# Patient Record
Sex: FEMALE | Race: WHITE | NOT HISPANIC OR LATINO | Employment: UNEMPLOYED | URBAN - METROPOLITAN AREA
[De-identification: names, ages, dates, MRNs, and addresses within clinical notes are randomized per-mention and may not be internally consistent; named-entity substitution may affect disease eponyms.]

---

## 2017-01-04 ENCOUNTER — CLINICAL SUPPORT (OUTPATIENT)
Dept: INTERNAL MEDICINE | Facility: CLINIC | Age: 52
End: 2017-01-04
Payer: COMMERCIAL

## 2017-01-04 ENCOUNTER — LAB VISIT (OUTPATIENT)
Dept: LAB | Facility: HOSPITAL | Age: 52
End: 2017-01-04
Attending: INTERNAL MEDICINE
Payer: COMMERCIAL

## 2017-01-04 ENCOUNTER — OFFICE VISIT (OUTPATIENT)
Dept: INTERNAL MEDICINE | Facility: CLINIC | Age: 52
End: 2017-01-04
Payer: COMMERCIAL

## 2017-01-04 VITALS
HEIGHT: 62 IN | BODY MASS INDEX: 32.09 KG/M2 | WEIGHT: 174.38 LBS | SYSTOLIC BLOOD PRESSURE: 160 MMHG | TEMPERATURE: 98 F | DIASTOLIC BLOOD PRESSURE: 94 MMHG | HEART RATE: 72 BPM

## 2017-01-04 DIAGNOSIS — M54.50 CHRONIC MIDLINE LOW BACK PAIN WITHOUT SCIATICA: ICD-10-CM

## 2017-01-04 DIAGNOSIS — F51.01 PRIMARY INSOMNIA: ICD-10-CM

## 2017-01-04 DIAGNOSIS — G89.29 CHRONIC MIDLINE LOW BACK PAIN WITHOUT SCIATICA: ICD-10-CM

## 2017-01-04 DIAGNOSIS — Z00.00 ANNUAL PHYSICAL EXAM: ICD-10-CM

## 2017-01-04 DIAGNOSIS — K59.01 SLOW TRANSIT CONSTIPATION: ICD-10-CM

## 2017-01-04 DIAGNOSIS — Z00.00 ANNUAL PHYSICAL EXAM: Primary | ICD-10-CM

## 2017-01-04 DIAGNOSIS — Z12.31 VISIT FOR SCREENING MAMMOGRAM: ICD-10-CM

## 2017-01-04 DIAGNOSIS — I10 ESSENTIAL HYPERTENSION: ICD-10-CM

## 2017-01-04 LAB
25(OH)D3+25(OH)D2 SERPL-MCNC: 24 NG/ML
ALBUMIN SERPL BCP-MCNC: 3.7 G/DL
ALP SERPL-CCNC: 40 U/L
ALT SERPL W/O P-5'-P-CCNC: 16 U/L
ANION GAP SERPL CALC-SCNC: 14 MMOL/L
AST SERPL-CCNC: 15 U/L
BASOPHILS # BLD AUTO: 0.02 K/UL
BASOPHILS NFR BLD: 0.2 %
BILIRUB SERPL-MCNC: 0.2 MG/DL
BUN SERPL-MCNC: 9 MG/DL
CALCIUM SERPL-MCNC: 10.5 MG/DL
CHLORIDE SERPL-SCNC: 101 MMOL/L
CHOLEST/HDLC SERPL: 5.8 {RATIO}
CO2 SERPL-SCNC: 21 MMOL/L
CREAT SERPL-MCNC: 1.1 MG/DL
DIFFERENTIAL METHOD: NORMAL
EOSINOPHIL # BLD AUTO: 0.1 K/UL
EOSINOPHIL NFR BLD: 1.5 %
ERYTHROCYTE [DISTWIDTH] IN BLOOD BY AUTOMATED COUNT: 13.5 %
EST. GFR  (AFRICAN AMERICAN): >60 ML/MIN/1.73 M^2
EST. GFR  (NON AFRICAN AMERICAN): 59.1 ML/MIN/1.73 M^2
GLUCOSE SERPL-MCNC: 94 MG/DL
HCT VFR BLD AUTO: 40.6 %
HDL/CHOLESTEROL RATIO: 17.3 %
HDLC SERPL-MCNC: 249 MG/DL
HDLC SERPL-MCNC: 43 MG/DL
HGB BLD-MCNC: 13.4 G/DL
LDLC SERPL CALC-MCNC: 144.8 MG/DL
LYMPHOCYTES # BLD AUTO: 3.3 K/UL
LYMPHOCYTES NFR BLD: 34.5 %
MCH RBC QN AUTO: 29.5 PG
MCHC RBC AUTO-ENTMCNC: 33 %
MCV RBC AUTO: 89 FL
MONOCYTES # BLD AUTO: 0.6 K/UL
MONOCYTES NFR BLD: 6.7 %
NEUTROPHILS # BLD AUTO: 5.4 K/UL
NEUTROPHILS NFR BLD: 56.9 %
NONHDLC SERPL-MCNC: 206 MG/DL
PLATELET # BLD AUTO: 286 K/UL
PMV BLD AUTO: 12 FL
POTASSIUM SERPL-SCNC: 3.9 MMOL/L
PROT SERPL-MCNC: 7.4 G/DL
RBC # BLD AUTO: 4.54 M/UL
SODIUM SERPL-SCNC: 136 MMOL/L
TRIGL SERPL-MCNC: 306 MG/DL
TSH SERPL DL<=0.005 MIU/L-ACNC: 1.16 UIU/ML
WBC # BLD AUTO: 9.54 K/UL

## 2017-01-04 PROCEDURE — 99396 PREV VISIT EST AGE 40-64: CPT | Mod: S$GLB,,, | Performed by: INTERNAL MEDICINE

## 2017-01-04 PROCEDURE — 80061 LIPID PANEL: CPT

## 2017-01-04 PROCEDURE — 80053 COMPREHEN METABOLIC PANEL: CPT

## 2017-01-04 PROCEDURE — 3077F SYST BP >= 140 MM HG: CPT | Mod: S$GLB,,, | Performed by: INTERNAL MEDICINE

## 2017-01-04 PROCEDURE — 82306 VITAMIN D 25 HYDROXY: CPT

## 2017-01-04 PROCEDURE — 99999 PR PBB SHADOW E&M-EST. PATIENT-LVL III: CPT | Mod: PBBFAC,,, | Performed by: INTERNAL MEDICINE

## 2017-01-04 PROCEDURE — 36415 COLL VENOUS BLD VENIPUNCTURE: CPT | Mod: PO

## 2017-01-04 PROCEDURE — 85025 COMPLETE CBC W/AUTO DIFF WBC: CPT

## 2017-01-04 PROCEDURE — 86803 HEPATITIS C AB TEST: CPT

## 2017-01-04 PROCEDURE — 84443 ASSAY THYROID STIM HORMONE: CPT

## 2017-01-04 PROCEDURE — 93005 ELECTROCARDIOGRAM TRACING: CPT | Mod: S$GLB,,, | Performed by: INTERNAL MEDICINE

## 2017-01-04 PROCEDURE — 93010 ELECTROCARDIOGRAM REPORT: CPT | Mod: S$GLB,,, | Performed by: INTERNAL MEDICINE

## 2017-01-04 PROCEDURE — 3080F DIAST BP >= 90 MM HG: CPT | Mod: S$GLB,,, | Performed by: INTERNAL MEDICINE

## 2017-01-04 RX ORDER — LISINOPRIL 20 MG/1
20 TABLET ORAL 2 TIMES DAILY
COMMUNITY
End: 2017-02-01

## 2017-01-04 NOTE — PROGRESS NOTES
Subjective:       Patient ID: Sherie Reyes is a 51 y.o. female.    Chief Complaint: Annual Exam and Establish Care    Patient is a 51 y.o.female who presents today for annual.    Cholesterol: (due now)  Vaccines: Influenza (yearly); Tetanus () ; Eye exam:  Mammogram: due now  Gyn exam: due in march; dr. hernandez  Colonoscopy: done two months ago; had a few internal hemorrhoids  Exercise: not much  Diet: healthy    Taking lasix every other day for leg swelling.    Had a recent echo; it showed something to do with a valve. She saw a cardiologist, Dr. Shay Stewart at .    She has been out of work for two years due to low back pain and hip pain/ workman comp case; taking tramadol and tizanidine daily for pain.     Past Medical History   Diagnosis Date    Anxiety     Degenerative joint disease (DJD) of hip     GERD (gastroesophageal reflux disease)     Hyperlipidemia     Hypertension     IBS (irritable bowel syndrome)     Insomnia     Lumbar disc herniation     PTSD (post-traumatic stress disorder)      Past Surgical History   Procedure Laterality Date     section      Adenoidectomy      Tonsillectomy       Social History     Social History    Marital status:      Spouse name: N/A    Number of children: N/A    Years of education: N/A     Occupational History    Not on file.     Social History Main Topics    Smoking status: Former Smoker     Types: Vaping w/o nicotine    Smokeless tobacco: Not on file      Comment: Vapor cigarettes    Alcohol use No    Drug use: No    Sexual activity: Not on file     Other Topics Concern    Not on file     Social History Narrative     Review of patient's allergies indicates:  No Known Allergies  Ms. Reyes had no medications administered during this visit.    Review of Systems   Constitutional: Negative for appetite change, chills, diaphoresis, fatigue and fever.   HENT: Negative for congestion, dental problem, ear discharge, ear  pain, hearing loss, postnasal drip, sinus pressure and sore throat.    Eyes: Negative for discharge, redness and itching.   Respiratory: Negative for cough, chest tightness, shortness of breath and wheezing.    Cardiovascular: Negative for chest pain, palpitations and leg swelling.   Gastrointestinal: Negative for abdominal pain, constipation, diarrhea, nausea and vomiting.   Endocrine: Negative for cold intolerance and heat intolerance.   Genitourinary: Negative for difficulty urinating, frequency, hematuria and urgency.   Musculoskeletal: Positive for back pain. Negative for arthralgias, gait problem, myalgias and neck pain.   Skin: Negative for color change and rash.   Neurological: Negative for dizziness, syncope and headaches.   Hematological: Negative for adenopathy.   Psychiatric/Behavioral: Negative for behavioral problems and sleep disturbance. The patient is not nervous/anxious.        Objective:      Physical Exam   Constitutional: She is oriented to person, place, and time. She appears well-developed and well-nourished. No distress.   HENT:   Head: Normocephalic and atraumatic.   Right Ear: Tympanic membrane and external ear normal.   Left Ear: Tympanic membrane and external ear normal.   Nose: Nose normal. No mucosal edema or rhinorrhea.   Mouth/Throat: Uvula is midline, oropharynx is clear and moist and mucous membranes are normal. No oropharyngeal exudate, posterior oropharyngeal edema, posterior oropharyngeal erythema or tonsillar abscesses.   Eyes: Conjunctivae and EOM are normal. Pupils are equal, round, and reactive to light. Right eye exhibits no discharge. Left eye exhibits no discharge. No scleral icterus.   Neck: Normal range of motion. Neck supple. No JVD present. No thyromegaly present.   Cardiovascular: Normal rate, regular rhythm, normal heart sounds and intact distal pulses.  Exam reveals no gallop and no friction rub.    No murmur heard.  Pulmonary/Chest: Effort normal and breath sounds  normal. No stridor. No respiratory distress. She has no wheezes. She has no rales. She exhibits no tenderness.   Abdominal: Soft. Bowel sounds are normal. She exhibits no distension. There is no tenderness. There is no rebound.   Musculoskeletal: Normal range of motion. She exhibits no edema or tenderness.   Lymphadenopathy:     She has no cervical adenopathy.   Neurological: She is alert and oriented to person, place, and time.   Skin: Skin is warm. No rash noted. She is not diaphoretic. No erythema.   Psychiatric: She has a normal mood and affect. Her behavior is normal.   Nursing note and vitals reviewed.      Assessment and Plan:       1. Annual physical exam  - CBC auto differential; Future  - Comprehensive metabolic panel; Future  - TSH; Future  - Lipid panel; Future  - Vitamin D; Future  - Urinalysis; Future  - Hepatitis C antibody; Future    2. Visit for screening mammogram  - Mammo Digital Screening Bilat with CAD; Future    3. Essential hypertension  - restart lisinopril as prescribed, 20 mg po bid; amlodipine at night  - she has been taking 1/2 tablet of lisinopril every six hours or so at times  - rtc in one week with bp log and bp machine  - EKG 12-lead    4. Primary insomnia  - ambien prn    5. Chronic midline low back pain without sciatica  - follows with workman comp case; taking tizanidine and tramadol prn    6. Slow transit constipation  - follows with gi        No Follow-up on file.

## 2017-01-05 ENCOUNTER — PATIENT MESSAGE (OUTPATIENT)
Dept: INTERNAL MEDICINE | Facility: CLINIC | Age: 52
End: 2017-01-05

## 2017-01-05 LAB — HCV AB SERPL QL IA: NEGATIVE

## 2017-01-06 ENCOUNTER — PATIENT MESSAGE (OUTPATIENT)
Dept: INTERNAL MEDICINE | Facility: CLINIC | Age: 52
End: 2017-01-06

## 2017-01-06 RX ORDER — GEMFIBROZIL 600 MG/1
600 TABLET, FILM COATED ORAL
Qty: 60 TABLET | Refills: 6 | Status: SHIPPED | OUTPATIENT
Start: 2017-01-06 | End: 2017-02-18 | Stop reason: SDUPTHER

## 2017-01-11 ENCOUNTER — HOSPITAL ENCOUNTER (OUTPATIENT)
Dept: RADIOLOGY | Facility: HOSPITAL | Age: 52
Discharge: HOME | End: 2017-01-11
Attending: INTERNAL MEDICINE
Payer: COMMERCIAL

## 2017-01-11 ENCOUNTER — OFFICE VISIT (OUTPATIENT)
Dept: INTERNAL MEDICINE | Facility: CLINIC | Age: 52
End: 2017-01-11
Payer: COMMERCIAL

## 2017-01-11 VITALS
TEMPERATURE: 99 F | HEART RATE: 114 BPM | HEIGHT: 62 IN | DIASTOLIC BLOOD PRESSURE: 90 MMHG | BODY MASS INDEX: 31.93 KG/M2 | SYSTOLIC BLOOD PRESSURE: 140 MMHG | RESPIRATION RATE: 18 BRPM | WEIGHT: 173.5 LBS

## 2017-01-11 DIAGNOSIS — R00.0 TACHYCARDIA: ICD-10-CM

## 2017-01-11 DIAGNOSIS — I10 ESSENTIAL HYPERTENSION: ICD-10-CM

## 2017-01-11 DIAGNOSIS — Z12.31 VISIT FOR SCREENING MAMMOGRAM: ICD-10-CM

## 2017-01-11 DIAGNOSIS — I10 UNCONTROLLED HYPERTENSION: Primary | ICD-10-CM

## 2017-01-11 PROCEDURE — 93005 ELECTROCARDIOGRAM TRACING: CPT | Mod: S$GLB,,, | Performed by: INTERNAL MEDICINE

## 2017-01-11 PROCEDURE — 77063 BREAST TOMOSYNTHESIS BI: CPT | Mod: 26,,, | Performed by: RADIOLOGY

## 2017-01-11 PROCEDURE — 3077F SYST BP >= 140 MM HG: CPT | Mod: S$GLB,,, | Performed by: INTERNAL MEDICINE

## 2017-01-11 PROCEDURE — 77067 SCR MAMMO BI INCL CAD: CPT | Mod: 26,,, | Performed by: RADIOLOGY

## 2017-01-11 PROCEDURE — 99999 PR PBB SHADOW E&M-EST. PATIENT-LVL IV: CPT | Mod: PBBFAC,,, | Performed by: INTERNAL MEDICINE

## 2017-01-11 PROCEDURE — 3080F DIAST BP >= 90 MM HG: CPT | Mod: S$GLB,,, | Performed by: INTERNAL MEDICINE

## 2017-01-11 PROCEDURE — 99214 OFFICE O/P EST MOD 30 MIN: CPT | Mod: S$GLB,,, | Performed by: INTERNAL MEDICINE

## 2017-01-11 PROCEDURE — 77067 SCR MAMMO BI INCL CAD: CPT | Mod: TC

## 2017-01-11 PROCEDURE — 93010 ELECTROCARDIOGRAM REPORT: CPT | Mod: S$GLB,,, | Performed by: INTERNAL MEDICINE

## 2017-01-11 PROCEDURE — 1159F MED LIST DOCD IN RCRD: CPT | Mod: S$GLB,,, | Performed by: INTERNAL MEDICINE

## 2017-01-11 NOTE — PROGRESS NOTES
Subjective:       Patient ID: Sherie Reyes is a 51 y.o. female.    Chief Complaint: Follow-up (1 wk fu ) and Hypertension    Patient is a 51 y.o.female who presents today for hypertension.    HTN - Patient is currently on lisinopril 20 mg po bid and norvasc 5 mg po bid. She does check her BP at home, and it runs very sporadic. In the mornings, it runs high 130-150/90s. At night, she can go as low as 60/30 and she is symptomatic and has to drink lots of water. She states that she saw a cardiologist at  and was told that her heart was fine about two months ago. She did keep a log of readings but is unsure of the timing in each reading. She generally takes her bp meds around 9 am and 10 pm. She does take tizanidine 6mg at 6-8 pm for chronic back pain.    Review of Systems   Constitutional: Negative for appetite change, chills, diaphoresis, fatigue and fever.   HENT: Negative for congestion, dental problem, ear discharge, ear pain, hearing loss, postnasal drip, sinus pressure and sore throat.    Eyes: Negative for discharge, redness and itching.   Respiratory: Negative for cough, chest tightness, shortness of breath and wheezing.    Cardiovascular: Negative for chest pain, palpitations and leg swelling.   Gastrointestinal: Negative for abdominal pain, constipation, diarrhea, nausea and vomiting.   Endocrine: Negative for cold intolerance and heat intolerance.   Genitourinary: Negative for difficulty urinating, frequency, hematuria and urgency.   Musculoskeletal: Negative for arthralgias, back pain, gait problem, myalgias and neck pain.   Skin: Negative for color change and rash.   Neurological: Negative for dizziness, syncope and headaches.   Hematological: Negative for adenopathy.   Psychiatric/Behavioral: Negative for behavioral problems and sleep disturbance. The patient is not nervous/anxious.        Objective:      Physical Exam   Constitutional: She is oriented to person, place, and time. She appears  well-developed and well-nourished. No distress.   HENT:   Head: Normocephalic and atraumatic.   Right Ear: External ear normal.   Left Ear: External ear normal.   Eyes: Conjunctivae and EOM are normal. Pupils are equal, round, and reactive to light. Right eye exhibits no discharge. Left eye exhibits no discharge. No scleral icterus.   Neck: Normal range of motion. Neck supple. No JVD present. No thyromegaly present.   Cardiovascular: Normal rate, regular rhythm, normal heart sounds and intact distal pulses.  Exam reveals no gallop and no friction rub.    No murmur heard.  Pulmonary/Chest: Effort normal and breath sounds normal. No stridor. No respiratory distress. She has no wheezes. She has no rales. She exhibits no tenderness.   Abdominal: Soft. Bowel sounds are normal. She exhibits no distension. There is no tenderness. There is no rebound.   Musculoskeletal: Normal range of motion. She exhibits no edema or tenderness.   Lymphadenopathy:     She has no cervical adenopathy.   Neurological: She is alert and oriented to person, place, and time.   Skin: Skin is warm and dry. No rash noted. She is not diaphoretic. No erythema.   Psychiatric: She has a normal mood and affect. Her behavior is normal.   Nursing note and vitals reviewed.      Assessment and Plan:       1. Uncontrolled hypertension  - blood pressure appears to be very erratic  - low readings always appear in the evening; perhaps due to her use of tizanidine in the evening; advised pt to discuss with her workman comp physician changing her muscle relaxer  - need to rule out other causes of erratic bp readings  - referral to cardio  - advise lisinopril 20 mg in AM; monitor BP prior to taking med and two hours after taking it  - advise lisinopril 20 mg and amlodipine 5 mg in PM; check bp prior to meds and two hours after taking it  - advised against  Using bp meds and tizanidine at the same time    2. Essential hypertension  - see above  - Ambulatory consult  to Cardiology    3. Tachycardia  - EKG 12-lead- sinus tachycardia  - Ambulatory consult to Cardiology    Appreciate cardio recs in this patient's bp meds as her readings on her log are extremely erratic and there is no trend          No Follow-up on file.

## 2017-01-12 ENCOUNTER — PATIENT MESSAGE (OUTPATIENT)
Dept: INTERNAL MEDICINE | Facility: CLINIC | Age: 52
End: 2017-01-12

## 2017-01-13 ENCOUNTER — PATIENT MESSAGE (OUTPATIENT)
Dept: INTERNAL MEDICINE | Facility: CLINIC | Age: 52
End: 2017-01-13

## 2017-01-19 ENCOUNTER — OFFICE VISIT (OUTPATIENT)
Dept: CARDIOLOGY | Facility: CLINIC | Age: 52
End: 2017-01-19
Payer: COMMERCIAL

## 2017-01-19 VITALS
WEIGHT: 171.06 LBS | DIASTOLIC BLOOD PRESSURE: 86 MMHG | HEIGHT: 62 IN | BODY MASS INDEX: 31.48 KG/M2 | SYSTOLIC BLOOD PRESSURE: 132 MMHG | HEART RATE: 112 BPM

## 2017-01-19 DIAGNOSIS — E78.2 MIXED HYPERLIPIDEMIA: ICD-10-CM

## 2017-01-19 DIAGNOSIS — I95.2 HYPOTENSION DUE TO DRUGS: ICD-10-CM

## 2017-01-19 DIAGNOSIS — R07.89 CHEST DISCOMFORT: ICD-10-CM

## 2017-01-19 DIAGNOSIS — I10 ESSENTIAL HYPERTENSION: Primary | ICD-10-CM

## 2017-01-19 PROBLEM — I95.9 HYPOTENSION: Status: ACTIVE | Noted: 2017-01-19

## 2017-01-19 PROCEDURE — 99243 OFF/OP CNSLTJ NEW/EST LOW 30: CPT | Mod: S$GLB,,, | Performed by: INTERNAL MEDICINE

## 2017-01-19 PROCEDURE — 99999 PR PBB SHADOW E&M-EST. PATIENT-LVL III: CPT | Mod: PBBFAC,,, | Performed by: INTERNAL MEDICINE

## 2017-01-19 RX ORDER — LEVONORGESTREL AND ETHINYL ESTRADIOL 0.15-0.03
1 KIT ORAL DAILY
COMMUNITY
Start: 2017-01-11 | End: 2022-10-12

## 2017-01-19 RX ORDER — LINACLOTIDE 290 UG/1
1 CAPSULE, GELATIN COATED ORAL DAILY
COMMUNITY
Start: 2017-01-14 | End: 2018-09-19

## 2017-01-19 NOTE — LETTER
January 22, 2017      Tonia Bernal DO  [address] [facility]  [place] [address]           [place] - Cardiology  [address] [facility] [address]  Phone: [phone]          Patient: Sherie Reyes   MR Number: [id number]   YOB: 1965   Date of Visit: 1/19/2017       Dear Dr. Tonia Bernal:    Thank you for referring Sherie Reyes to me for evaluation. Attached you will find relevant portions of my assessment and plan of care.    If you have questions, please do not hesitate to call me. I look forward to following Sherie Reyes along with you.    Sincerely,    Eufemia St MD    Enclosure  CC:  No Recipients    If you would like to receive this communication electronically, please contact [email or web address] or [phone] to request more information on [employer] Link access.    For providers and/or their staff who would like to refer a patient to [facility], please contact us through our one-stop-shop provider referral line, [facility] [place], at [phone].    If you feel you have received this communication in error or would no longer like to receive these types of communications, please e-mail [email or web address]

## 2017-01-19 NOTE — PROGRESS NOTES
Subjective:     Patient ID:  Sherie Reyes is a 51 y.o. female who presents for evaluation of Hypertension and Tachycardia      Problem List:  Hypertension    HPI:     Sherie Sexton is referred by Dr. Bernal. She finds that her BP is low later in the night. Last night she had taken her meds at the usual time. Her BP was 167/114 mm Hg before she took lisinopril and around 11 pm when she stood up, she felt lightheaded and fell. SBP was <60 mm Hg.  She takes amlodipine and the second dose of lisinopril around 8 pm. She goes to bed around midnight but does not fall asleep til 2 am.  She uses a wrist BP monitor and reports that it was accurate when it was checked in Dr. Bernal's office.   A few days ago, her BP was 190/119 mm Hg and HR was 109 bpm. She also reports chest discomfort. It can lasts several hours. It usually occurs while at rest.  An echo at [facility] ordered by Dr. Stewart was normal 3 months ago.  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       ROS  Negative except as noted above.    Current Outpatient Prescriptions   Medication Sig    amlodipine (NORVASC) 5 MG tablet Take 5 mg by mouth once daily.     buPROPion (WELLBUTRIN XL) 300 MG 24 hr tablet Take 300 mg by mouth once daily.    clonazePAM (KLONOPIN) 1 MG tablet Take 1 mg by mouth 3 (three) times daily.    diphenhydramine-acetaminophen (TYLENOL PM)  mg Tab Take 1 tablet by mouth nightly as needed.    duloxetine (CYMBALTA) 60 MG capsule Take 60 mg by mouth once daily.    furosemide (LASIX) 20 MG tablet Take 20 mg by mouth once daily.    gabapentin (NEURONTIN) 300 MG capsule Take 300 mg by mouth 3 (three) times daily.    gemfibrozil (LOPID) 600 MG tablet Take 1 tablet (600 mg total) by mouth 2 (two) times daily before meals.    KURVELO 0.15-0.03 mg per tablet Take 1 tablet by mouth once daily.    LINZESS 290 mcg Cap Take 1  "capsule by mouth once daily.    lisinopril (PRINIVIL,ZESTRIL) 20 MG tablet Take 20 mg by mouth 2 (two) times daily.    melatonin 5 mg Tab Take by mouth nightly as needed.    meloxicam (MOBIC) 15 MG tablet Take 15 mg by mouth once daily.    NALOXEGOL OXALATE (MOVANTIK ORAL) Take 25 mg by mouth once daily.     pantoprazole (PROTONIX) 40 MG tablet Take 40 mg by mouth once daily.    POLYETHYLENE GLYCOL 3350 (MIRALAX ORAL) Take by mouth as needed.     tizanidine (ZANAFLEX) 6 mg capsule Take 6 mg by mouth 2 (two) times daily as needed for Muscle spasms.    tramadol (ULTRAM) 50 mg tablet Take 50 mg by mouth every 6 (six) hours as needed for Pain.    zolpidem (AMBIEN) 10 mg Tab Take 5 mg by mouth nightly as needed.            Past Medical History   Diagnosis Date    Anxiety     Degenerative joint disease (DJD) of hip     GERD (gastroesophageal reflux disease)     Hyperlipidemia     Hypertension     IBS (irritable bowel syndrome)     Insomnia     Lumbar disc herniation     PTSD (post-traumatic stress disorder)          Social History   Substance Use Topics    Smoking status: Former Smoker     Types: Vaping w/o nicotine    Smokeless tobacco: None      Comment: Vapor cigarettes    Alcohol use No         History reviewed. No pertinent family history.      Objective:     Physical Exam   Constitutional: She is oriented to person, place, and time. She appears well-developed and well-nourished.   /86  Pulse (!) 112  Ht 5' 2" (1.575 m)  Wt 77.6 kg (171 lb 1.2 oz)  BMI 31.29 kg/m2     HENT:   Head: Normocephalic.   Neck: No JVD present. Carotid bruit is not present.   Cardiovascular: Normal rate, regular rhythm, S1 normal and S2 normal.  Exam reveals no gallop.    No murmur heard.  Pulses:       Posterior tibial pulses are 2+ on the right side, and 2+ on the left side.   Pulmonary/Chest: Effort normal. She has no wheezes. She has no rales. Chest wall is not dull to percussion.   Abdominal: Soft. She " exhibits no abdominal bruit. There is no splenomegaly or hepatomegaly. There is no tenderness.   Musculoskeletal:        Right lower leg: She exhibits no edema.        Left lower leg: She exhibits no edema.   Neurological: She is alert and oriented to person, place, and time. Gait normal.   Skin: Skin is warm and dry. No bruising and no rash noted. No cyanosis. Nails show no clubbing.   Psychiatric: She has a normal mood and affect. Her speech is normal and behavior is normal. Judgment normal. Cognition and memory are normal.         Lab Results   Component Value Date    CHOL 249 (H) 01/04/2017     Lab Results   Component Value Date    HDL 43 01/04/2017     Lab Results   Component Value Date    LDLCALC 144.8 01/04/2017     Lab Results   Component Value Date    TRIG 306 (H) 01/04/2017     Lab Results   Component Value Date    CHOLHDL 17.3 (L) 01/04/2017     Lab Results   Component Value Date    ALT 16 01/04/2017     Lab Results   Component Value Date    ALT 16 01/04/2017    AST 15 01/04/2017    ALKPHOS 40 (L) 01/04/2017    BILITOT 0.2 01/04/2017      Lab Results   Component Value Date    CREATININE 1.1 01/04/2017    BUN 9 01/04/2017     01/04/2017    K 3.9 01/04/2017     01/04/2017    CO2 21 (L) 01/04/2017         Assessment:     1. Essential hypertension    2. Hypotension due to drugs    3. Mixed hyperlipidemia    4. Chest discomfort          Plan:       Hypotension  Suspect that the combination of tizanidine and antihypertensive medication resulted in a severe drop in BP.   Recommend changing the timing of her meds. Llisinopril 40 mg in the morning, amlodipine 5 mg at supper time and tizanidine at bedtime. Review in 1-2 weeks.    Chest discomfort  Lexiscan.      Get prior echo and lipids.

## 2017-01-19 NOTE — PATIENT INSTRUCTIONS
Try taking lisinopril 40 mg in the morning, amlodipine 5 mg at supper time and tizanidine at bedtime.      ===============    Triglycerides are made worse by the following  Foods containing fat and sugars: ice cream, desserts, cakes, chocolate  Beer, fruit juices  Starches: rice, potatoes, bread, pasta  Lack of exercise  Uncontrolled diabetes  Cymbalta also raises triglycerides.

## 2017-01-19 NOTE — ASSESSMENT & PLAN NOTE
Suspect that the combination of tizanidine and antihypertensive medication resulted in a severe drop in BP.   Recommend changing the timing of her meds. Llisinopril 40 mg in the morning, amlodipine 5 mg at supper time and tizanidine at bedtime. Review in 1-2 weeks.

## 2017-01-20 ENCOUNTER — PATIENT MESSAGE (OUTPATIENT)
Dept: INTERNAL MEDICINE | Facility: CLINIC | Age: 52
End: 2017-01-20

## 2017-01-24 ENCOUNTER — TELEPHONE (OUTPATIENT)
Dept: CARDIOLOGY | Facility: CLINIC | Age: 52
End: 2017-01-24

## 2017-01-24 NOTE — TELEPHONE ENCOUNTER
Left msg for pt to call our office regarding SPECT appt that needs to be reschedule to a different date due to department changes.

## 2017-01-25 ENCOUNTER — TELEPHONE (OUTPATIENT)
Dept: INTERNAL MEDICINE | Facility: CLINIC | Age: 52
End: 2017-01-25

## 2017-01-25 NOTE — TELEPHONE ENCOUNTER
Had Recent changes to blood pressure medication done by dr lema.  Her bp got really low and she passed out, vomited and ambulance brought her to [facility] yesterday.  She is still in [facility].  Got 3 liters of fluid at hosp so far.  Pressure today 101/55 off medication.    They found acute pancreatitis and will likely be in 2 more days.  Kidney dr told her she was very dehydrated.   i told her to come see us for a hospital fol up visit once discharged.      Message just BELLE    Thanks Samira

## 2017-01-30 ENCOUNTER — PATIENT MESSAGE (OUTPATIENT)
Dept: INTERNAL MEDICINE | Facility: CLINIC | Age: 52
End: 2017-01-30

## 2017-02-01 ENCOUNTER — HOSPITAL ENCOUNTER (OUTPATIENT)
Dept: RADIOLOGY | Facility: HOSPITAL | Age: 52
Discharge: HOME | End: 2017-02-01
Attending: INTERNAL MEDICINE
Payer: COMMERCIAL

## 2017-02-01 ENCOUNTER — OFFICE VISIT (OUTPATIENT)
Dept: INTERNAL MEDICINE | Facility: CLINIC | Age: 52
End: 2017-02-01
Payer: COMMERCIAL

## 2017-02-01 VITALS
HEIGHT: 62 IN | HEART RATE: 110 BPM | RESPIRATION RATE: 16 BRPM | DIASTOLIC BLOOD PRESSURE: 88 MMHG | WEIGHT: 170.19 LBS | SYSTOLIC BLOOD PRESSURE: 144 MMHG | TEMPERATURE: 98 F | BODY MASS INDEX: 31.32 KG/M2

## 2017-02-01 DIAGNOSIS — M79.671 RIGHT FOOT PAIN: ICD-10-CM

## 2017-02-01 DIAGNOSIS — N17.9 ACUTE RENAL FAILURE, UNSPECIFIED ACUTE RENAL FAILURE TYPE: ICD-10-CM

## 2017-02-01 DIAGNOSIS — K85.90 ACUTE PANCREATITIS, UNSPECIFIED COMPLICATION STATUS, UNSPECIFIED PANCREATITIS TYPE: ICD-10-CM

## 2017-02-01 DIAGNOSIS — I10 ESSENTIAL HYPERTENSION: Primary | ICD-10-CM

## 2017-02-01 PROCEDURE — 73630 X-RAY EXAM OF FOOT: CPT | Mod: 26,RT,, | Performed by: RADIOLOGY

## 2017-02-01 PROCEDURE — 99999 PR PBB SHADOW E&M-EST. PATIENT-LVL III: CPT | Mod: PBBFAC,,, | Performed by: INTERNAL MEDICINE

## 2017-02-01 PROCEDURE — 3077F SYST BP >= 140 MM HG: CPT | Mod: S$GLB,,, | Performed by: INTERNAL MEDICINE

## 2017-02-01 PROCEDURE — 99214 OFFICE O/P EST MOD 30 MIN: CPT | Mod: S$GLB,,, | Performed by: INTERNAL MEDICINE

## 2017-02-01 PROCEDURE — 3079F DIAST BP 80-89 MM HG: CPT | Mod: S$GLB,,, | Performed by: INTERNAL MEDICINE

## 2017-02-01 PROCEDURE — 73630 X-RAY EXAM OF FOOT: CPT | Mod: TC,PO,RT

## 2017-02-01 RX ORDER — OXYCODONE AND ACETAMINOPHEN 7.5; 325 MG/1; MG/1
TABLET ORAL
COMMUNITY
Start: 2017-01-27 | End: 2017-03-22

## 2017-02-01 RX ORDER — PROMETHAZINE HYDROCHLORIDE 25 MG/1
TABLET ORAL
COMMUNITY
Start: 2017-01-27 | End: 2018-05-03 | Stop reason: SDUPTHER

## 2017-02-01 NOTE — PROGRESS NOTES
Subjective:       Patient ID: Sherie Reyes is a 51 y.o. female.    Chief Complaint: Hospital Follow Up    Patient is a 51 y.o.female who presents today for hospital follow up. She was recently hospitalized at  for acute pancreatitis and  Hypotension. The cause for her pancreatitis was unknown. In the hospital, her blood pressure was normal off all of her blood pressure medications. Her lipase was 358. She complains of right foot pain today; cannot place pressure on it.    Review of Systems   Constitutional: Negative for appetite change, chills, diaphoresis, fatigue and fever.   HENT: Negative for congestion, dental problem, ear discharge, ear pain, hearing loss, postnasal drip, sinus pressure and sore throat.    Eyes: Negative for discharge, redness and itching.   Respiratory: Negative for cough, chest tightness, shortness of breath and wheezing.    Cardiovascular: Negative for chest pain, palpitations and leg swelling.   Gastrointestinal: Positive for abdominal pain. Negative for constipation, diarrhea, nausea and vomiting.   Endocrine: Negative for cold intolerance and heat intolerance.   Genitourinary: Negative for difficulty urinating, frequency, hematuria and urgency.   Musculoskeletal: Positive for arthralgias. Negative for back pain, gait problem, myalgias and neck pain.   Skin: Negative for color change and rash.   Neurological: Negative for dizziness, syncope and headaches.   Hematological: Negative for adenopathy.   Psychiatric/Behavioral: Negative for behavioral problems and sleep disturbance. The patient is not nervous/anxious.        Objective:      Physical Exam   Constitutional: She is oriented to person, place, and time. She appears well-developed and well-nourished. No distress.   HENT:   Head: Normocephalic and atraumatic.   Right Ear: External ear normal.   Left Ear: External ear normal.   Eyes: Conjunctivae and EOM are normal. Pupils are equal, round, and reactive to light. Right eye  exhibits no discharge. Left eye exhibits no discharge. No scleral icterus.   Neck: Normal range of motion. Neck supple. No JVD present. No thyromegaly present.   Cardiovascular: Normal rate, regular rhythm, normal heart sounds and intact distal pulses.  Exam reveals no gallop and no friction rub.    No murmur heard.  Pulmonary/Chest: Effort normal and breath sounds normal. No stridor. No respiratory distress. She has no wheezes. She has no rales. She exhibits no tenderness.   Abdominal: Soft. Bowel sounds are normal. She exhibits no distension. There is generalized tenderness. There is no rebound.   Musculoskeletal: Normal range of motion. She exhibits no edema or tenderness.   Lymphadenopathy:     She has no cervical adenopathy.   Neurological: She is alert and oriented to person, place, and time.   Skin: Skin is warm. No rash noted. She is not diaphoretic. No erythema.   Psychiatric: She has a normal mood and affect. Her behavior is normal.   Nursing note and vitals reviewed.      Assessment and Plan:       1. Essential hypertension  - stable off bp meds    2. Acute pancreatitis, unspecified complication status, unspecified pancreatitis type  - seeing GI on 2/14  - Amylase; Future  - Lipase; Future  - Comprehensive metabolic panel; Future  - CBC auto differential; Future    3. Acute renal failure, unspecified acute renal failure type  - check cmp today    4. Right foot pain  - Ambulatory Referral to Podiatry  - X-Ray Foot Complete 3 view Right; Future        No Follow-up on file.

## 2017-02-02 ENCOUNTER — PATIENT MESSAGE (OUTPATIENT)
Dept: INTERNAL MEDICINE | Facility: CLINIC | Age: 52
End: 2017-02-02

## 2017-02-09 ENCOUNTER — PATIENT MESSAGE (OUTPATIENT)
Dept: INTERNAL MEDICINE | Facility: CLINIC | Age: 52
End: 2017-02-09

## 2017-02-09 ENCOUNTER — OFFICE VISIT (OUTPATIENT)
Dept: PODIATRY | Facility: CLINIC | Age: 52
End: 2017-02-09
Payer: COMMERCIAL

## 2017-02-09 VITALS
HEART RATE: 99 BPM | BODY MASS INDEX: 31.28 KG/M2 | DIASTOLIC BLOOD PRESSURE: 82 MMHG | HEIGHT: 62 IN | SYSTOLIC BLOOD PRESSURE: 143 MMHG | WEIGHT: 170 LBS

## 2017-02-09 DIAGNOSIS — M79.671 RIGHT FOOT PAIN: Primary | ICD-10-CM

## 2017-02-09 DIAGNOSIS — M25.80 SESAMOIDITIS: ICD-10-CM

## 2017-02-09 DIAGNOSIS — M77.8 FLEXOR HALLUCIS LONGUS TENDINITIS: ICD-10-CM

## 2017-02-09 PROCEDURE — 99999 PR PBB SHADOW E&M-EST. PATIENT-LVL IV: CPT | Mod: PBBFAC,,, | Performed by: PODIATRIST

## 2017-02-09 PROCEDURE — 3077F SYST BP >= 140 MM HG: CPT | Mod: S$GLB,,, | Performed by: PODIATRIST

## 2017-02-09 PROCEDURE — 3079F DIAST BP 80-89 MM HG: CPT | Mod: S$GLB,,, | Performed by: PODIATRIST

## 2017-02-09 PROCEDURE — 99204 OFFICE O/P NEW MOD 45 MIN: CPT | Mod: S$GLB,,, | Performed by: PODIATRIST

## 2017-02-09 NOTE — LETTER
February 9, 2017      Tonia Bernal DO  [address] [facility] [address]           [place] - Podiatry  [address] [facility] [address]  Phone: [phone]          Patient: Sherie Reyes   MR Number: [id number]   YOB: 1965   Date of Visit: 2/9/2017       Dear Dr. Tonia Bernal:    Thank you for referring Sherie Reyes to me for evaluation. Attached you will find relevant portions of my assessment and plan of care.    If you have questions, please do not hesitate to call me. I look forward to following Sherie Reyes along with you.    Sincerely,    Susu Boswell DPM    Enclosure  CC:  No Recipients    If you would like to receive this communication electronically, please contact [email or web address] or [phone] to request more information on [employer] access.    For providers and/or their staff who would like to refer a patient to [facility], please contact us through our one-stop-shop provider referral line, [facility], at [phone].    If you feel you have received this communication in error or would no longer like to receive these types of communications, please e-mail [email or web address]

## 2017-02-09 NOTE — MR AVS SNAPSHOT
[facility] [facility]   [facility]  [place] [address]  Phone: [phone]                  Sherie Reyes   2017 8:45 AM   Office Visit    Description:  Female : 1965   Provider:  Susu Boswell DPM   Department:  [facility]           Reason for Visit     Foot Pain           Diagnoses this Visit        Comments    Right foot pain    -  Primary     Sesamoiditis         Flexor hallucis longus tendinitis                To Do List           Future Appointments        Provider Department Dept Phone    2017 2:40 PM Tonia Bernal DO [facility] [phone]    3/9/2017 1:30 PM Susu Boswell DPM [facility] [phone]    3/16/2017 7:15 AM NUCLEAR CAMERA, [facility] - [facility] [phone]      Goals (5 Years of Data)     None      Follow-Up and Disposition     Return in about 4 weeks (around 3/9/2017) for follow up foot pain, or sooner if concerned.      [facility] On [facility]     [facility] On Call Nurse Care Line -  Assistance  Registered nurses in the [facility] provide clinical advisement, health education, appointment booking, and other advisory services.  Call for this free service at [phone].             Medications                Verify that the below list of medications is an accurate representation of the medications you are currently taking.  If none reported, the list may be blank. If incorrect, please contact your healthcare provider. Carry this list with you in case of emergency.           Current Medications     alprazolam 1 mg/mL Conc     buPROPion (WELLBUTRIN XL) 300 MG 24 hr tablet Take 300 mg by mouth once daily.    clonazePAM (KLONOPIN) 1 MG tablet Take 1 mg by mouth 3 (three) times daily.    diphenhydramine-acetaminophen (TYLENOL PM)  mg Tab Take 1 tablet by mouth nightly as needed.    duloxetine (CYMBALTA) 60 MG capsule Take 60 mg by mouth once daily.    gabapentin (NEURONTIN) 300 MG  "capsule Take 300 mg by mouth 3 (three) times daily.    gemfibrozil (LOPID) 600 MG tablet Take 1 tablet (600 mg total) by mouth 2 (two) times daily before meals.    KURVELO 0.15-0.03 mg per tablet Take 1 tablet by mouth once daily.    LINZESS 290 mcg Cap Take 1 capsule by mouth once daily.    melatonin 5 mg Tab Take by mouth nightly as needed.    NALOXEGOL OXALATE (MOVANTIK ORAL) Take 25 mg by mouth once daily.     oxycodone-acetaminophen (PERCOCET) 7.5-325 mg per tablet     pantoprazole (PROTONIX) 40 MG tablet Take 40 mg by mouth once daily.    POLYETHYLENE GLYCOL 3350 (MIRALAX ORAL) Take by mouth as needed.     promethazine (PHENERGAN) 25 MG tablet     tizanidine (ZANAFLEX) 6 mg capsule Take 6 mg by mouth 2 (two) times daily as needed for Muscle spasms.    tramadol (ULTRAM) 50 mg tablet Take 50 mg by mouth every 6 (six) hours as needed for Pain.    zolpidem (AMBIEN) 10 mg Tab Take 5 mg by mouth nightly as needed.            Clinical Reference Information           Your Vitals Were     BP Pulse Height Weight BMI    143/82 99 5' 2" (1.575 m) 77.1 kg (170 lb) 31.09 kg/m2      Blood Pressure          Most Recent Value    BP  (!)  143/82      Allergies as of 2/9/2017     No Known Allergies      Immunizations Administered on Date of Encounter - 2/9/2017     None      Language Assistance Services     ATTENTION: Language assistance services are available, free of charge. Please call [phone].      ATENCIÓN: Si michellela niall, tiene a blanco disposición servicios gratuitos de asistencia lingüística. Llame al [phone].     [facility] Ý: N?u b?n nói Ti?ng Vi?t, có các d?ch v? h? tr? ngôn ng? mi?n phí dành cho b?n. G?i s? [phone].         [facility] complies with applicable Federal civil rights laws and does not discriminate on the basis of race, color, national origin, age, disability, or sex.        "

## 2017-02-09 NOTE — PROGRESS NOTES
CC: right  foot pain     HPI:   Sherie Reyes is a 51 y.o. female with complaints of pain to the right  forefoot, sub 1st MPJ. Pt states the pain began a little over a month ago, when she had a fall, possibly hyperextended her toe when her  helped her up.  She has tried no treatment yet.  Xrays done last week without fractures noted.   She reports pain mainly with weight bearing and direct pressure to the area.  There is also pain with dorsiflexion of the toe.  She is wearing athletic shoes today.      PCP:  Tonia Bernal DO          Past Medical History   Diagnosis Date    Anxiety     Degenerative joint disease (DJD) of hip     GERD (gastroesophageal reflux disease)     Hyperlipidemia     Hypertension     IBS (irritable bowel syndrome)     Insomnia     Lumbar disc herniation     PTSD (post-traumatic stress disorder)        Current Outpatient Prescriptions on File Prior to Visit   Medication Sig Dispense Refill    buPROPion (WELLBUTRIN XL) 300 MG 24 hr tablet Take 300 mg by mouth once daily.      clonazePAM (KLONOPIN) 1 MG tablet Take 1 mg by mouth 3 (three) times daily.      diphenhydramine-acetaminophen (TYLENOL PM)  mg Tab Take 1 tablet by mouth nightly as needed.      duloxetine (CYMBALTA) 60 MG capsule Take 60 mg by mouth once daily.      gabapentin (NEURONTIN) 300 MG capsule Take 300 mg by mouth 3 (three) times daily.      gemfibrozil (LOPID) 600 MG tablet Take 1 tablet (600 mg total) by mouth 2 (two) times daily before meals. 60 tablet 6    KURVELO 0.15-0.03 mg per tablet Take 1 tablet by mouth once daily.      LINZESS 290 mcg Cap Take 1 capsule by mouth once daily.      melatonin 5 mg Tab Take by mouth nightly as needed.      NALOXEGOL OXALATE (MOVANTIK ORAL) Take 25 mg by mouth once daily.       oxycodone-acetaminophen (PERCOCET) 7.5-325 mg per tablet       pantoprazole (PROTONIX) 40 MG tablet Take 40 mg by mouth once daily.      POLYETHYLENE GLYCOL 3350  "(MIRALAX ORAL) Take by mouth as needed.       promethazine (PHENERGAN) 25 MG tablet       tizanidine (ZANAFLEX) 6 mg capsule Take 6 mg by mouth 2 (two) times daily as needed for Muscle spasms.      tramadol (ULTRAM) 50 mg tablet Take 50 mg by mouth every 6 (six) hours as needed for Pain.      zolpidem (AMBIEN) 10 mg Tab Take 5 mg by mouth nightly as needed.        No current facility-administered medications on file prior to visit.          ALLG:   Review of patient's allergies indicates:  No Known Allergies          ROS:   General ROS: negative for chills, fatigue or fever  Cardiovascular ROS: no chest pain or dyspnea on exertion  Musculoskeletal ROS: positive for  joint pain or joint stiffness.  Negative for loss of strength  Neuro ROS: Negative for syncope, numbness, or muscle weakness  Skin ROS: Negative for rash, itching or hair changes.        EXAM:  Vitals:    17 0843   BP: (!) 143/82   Pulse: 99   Weight: 77.1 kg (170 lb)   Height: 5' 2" (1.575 m)        General: Patient is well-developed, well-nourished.  Alert and oriented x 3 and in no apparent distress.   Lower extremity exam:   Vasc: Palpable pedal pulses bilaterally. Normal capillary refill time.    Neuro:  Light touch, proprioception, and sharp/dull sensation are all intact bilaterally.   No sensorimotor deficits.   Derm: No open lesions, macerations, rashes, or hyperkeratotic lesions. no erythema, minimal localized edema to the area of the right sub 1st metatarsophalangeal joint.  MSK: localized tenderness on palpation of tibial sesamoid, right foot. Full ROM of 1st MPJ, with pain at sesamoids with dorsiflexion. 1st ray ROM full and intact without crepitus. No pain on palpation to dorsal 1st MPJ, or lesser MPJs of the affected foot.    IMAGIN/1/17  Findings: 3 views right.  There is no fracture, dislocation, or bony erosion.  Normal variant Bipartite tibial sesamoid noted.       ASSESSMENT:   1. Right foot pain     2. Sesamoiditis - " Right Foot     3. Flexor hallucis longus tendinitis - Right Foot          PLAN:  I counseled the patient on her conditions, their implications and medical management.  Reviewed xrays with patient.   Walking boot and offloading pad.    Rest as much as possible.  Reduce weight bearing and ice prn.   NSAIDs prn pain   Return in about 4 weeks (around 3/9/2017) for follow up foot pain, or sooner if concerned.  Patient is amenable to plan.

## 2017-02-10 ENCOUNTER — PATIENT MESSAGE (OUTPATIENT)
Dept: INTERNAL MEDICINE | Facility: CLINIC | Age: 52
End: 2017-02-10

## 2017-02-17 ENCOUNTER — PATIENT MESSAGE (OUTPATIENT)
Dept: PODIATRY | Facility: CLINIC | Age: 52
End: 2017-02-17

## 2017-02-17 ENCOUNTER — TELEPHONE (OUTPATIENT)
Dept: PODIATRY | Facility: CLINIC | Age: 52
End: 2017-02-17

## 2017-02-18 ENCOUNTER — PATIENT MESSAGE (OUTPATIENT)
Dept: INTERNAL MEDICINE | Facility: CLINIC | Age: 52
End: 2017-02-18

## 2017-02-18 RX ORDER — GEMFIBROZIL 600 MG/1
600 TABLET, FILM COATED ORAL
Qty: 60 TABLET | Refills: 6 | Status: SHIPPED | OUTPATIENT
Start: 2017-02-18 | End: 2018-07-31 | Stop reason: SDUPTHER

## 2017-02-20 ENCOUNTER — PATIENT MESSAGE (OUTPATIENT)
Dept: INTERNAL MEDICINE | Facility: CLINIC | Age: 52
End: 2017-02-20

## 2017-02-23 ENCOUNTER — TELEPHONE (OUTPATIENT)
Dept: PODIATRY | Facility: CLINIC | Age: 52
End: 2017-02-23

## 2017-02-23 DIAGNOSIS — M79.671 RIGHT FOOT PAIN: Primary | ICD-10-CM

## 2017-02-23 RX ORDER — HYDROCODONE BITARTRATE AND IBUPROFEN 7.5; 2 MG/1; MG/1
1 TABLET, FILM COATED ORAL EVERY 12 HOURS PRN
Qty: 20 TABLET | Refills: 0 | Status: SHIPPED | OUTPATIENT
Start: 2017-02-23 | End: 2017-03-09 | Stop reason: SDUPTHER

## 2017-02-23 NOTE — TELEPHONE ENCOUNTER
----- Message from Memo Agustin sent at 2/23/2017 10:02 AM CST -----  Contact: patient  Pt is returning call. [phone]

## 2017-02-23 NOTE — TELEPHONE ENCOUNTER
Called patient to check up on foot pain.    She will try to stay off her foot more and take Vicoprofen bid prn pain.  Rest and elevate.   follow up as previously scheduled.  Patient is amenable to plan.

## 2017-03-06 ENCOUNTER — PATIENT MESSAGE (OUTPATIENT)
Dept: INTERNAL MEDICINE | Facility: CLINIC | Age: 52
End: 2017-03-06

## 2017-03-06 ENCOUNTER — TELEPHONE (OUTPATIENT)
Dept: INTERNAL MEDICINE | Facility: CLINIC | Age: 52
End: 2017-03-06

## 2017-03-06 NOTE — TELEPHONE ENCOUNTER
----- Message from Jaquelin Rafa sent at 3/6/2017  8:37 AM CST -----  Contact: Mobile: [phone]   Patient went to [facility] for dehydration on Saturday 04/04/17. Last night she was very pale, legs and hands were very cold and disoriented ( she was told). She didn't go to hospital because her B/p was good. This morning she's doing good. She wanted you to know this and add in her chart. She's not sure to f/u with you or not. She want to know if you want her to f/u?

## 2017-03-07 ENCOUNTER — PATIENT MESSAGE (OUTPATIENT)
Dept: INTERNAL MEDICINE | Facility: CLINIC | Age: 52
End: 2017-03-07

## 2017-03-08 ENCOUNTER — OFFICE VISIT (OUTPATIENT)
Dept: INTERNAL MEDICINE | Facility: CLINIC | Age: 52
End: 2017-03-08
Payer: COMMERCIAL

## 2017-03-08 VITALS
TEMPERATURE: 98 F | BODY MASS INDEX: 31.93 KG/M2 | HEIGHT: 62 IN | HEART RATE: 107 BPM | SYSTOLIC BLOOD PRESSURE: 146 MMHG | WEIGHT: 173.5 LBS | RESPIRATION RATE: 16 BRPM | DIASTOLIC BLOOD PRESSURE: 86 MMHG

## 2017-03-08 DIAGNOSIS — R23.1 PALE COMPLEXION: ICD-10-CM

## 2017-03-08 DIAGNOSIS — R42 DIZZINESS: ICD-10-CM

## 2017-03-08 DIAGNOSIS — E86.0 DEHYDRATION: Primary | ICD-10-CM

## 2017-03-08 PROCEDURE — 3077F SYST BP >= 140 MM HG: CPT | Mod: S$GLB,,, | Performed by: INTERNAL MEDICINE

## 2017-03-08 PROCEDURE — 99999 PR PBB SHADOW E&M-EST. PATIENT-LVL III: CPT | Mod: PBBFAC,,, | Performed by: INTERNAL MEDICINE

## 2017-03-08 PROCEDURE — 1160F RVW MEDS BY RX/DR IN RCRD: CPT | Mod: S$GLB,,, | Performed by: INTERNAL MEDICINE

## 2017-03-08 PROCEDURE — 3079F DIAST BP 80-89 MM HG: CPT | Mod: S$GLB,,, | Performed by: INTERNAL MEDICINE

## 2017-03-08 PROCEDURE — 99213 OFFICE O/P EST LOW 20 MIN: CPT | Mod: S$GLB,,, | Performed by: INTERNAL MEDICINE

## 2017-03-08 RX ORDER — AMLODIPINE BESYLATE 5 MG/1
TABLET ORAL
COMMUNITY
Start: 2017-02-27 | End: 2017-03-08

## 2017-03-08 RX ORDER — LISINOPRIL 20 MG/1
TABLET ORAL
COMMUNITY
Start: 2017-02-25 | End: 2017-03-08

## 2017-03-08 NOTE — PROGRESS NOTES
Subjective:       Patient ID: Sherie Reyes is a 51 y.o. female.    Chief Complaint: Follow-up (er follow up x 5 days )    Patient is a 51 y.o.female who presents today for follow up.    She went to  ER a few days ago. She felt dehydrated and had symptoms of dizziness, confusion and pale face. Her labs were all normal. She was given IV fluids.     On Saturday, she was leaving her therapist office. She dropped her keys, bent forward and hit her head. She became dizzy and went straight to the ER. Her labs were normal. Her BP was low.    On Sunday, she felt lightheaded again. Her family said she was pale and had cool extremities. Her blood pressure was good during this episode.     She denies any symptoms currently.  Review of Systems   Constitutional: Negative for appetite change, chills, diaphoresis, fatigue and fever.   HENT: Negative for congestion, dental problem, ear discharge, ear pain, hearing loss, postnasal drip, sinus pressure and sore throat.    Eyes: Negative for discharge, redness and itching.   Respiratory: Negative for cough, chest tightness, shortness of breath and wheezing.    Cardiovascular: Negative for chest pain, palpitations and leg swelling.   Gastrointestinal: Negative for abdominal pain, constipation, diarrhea, nausea and vomiting.   Endocrine: Negative for cold intolerance and heat intolerance.   Genitourinary: Negative for difficulty urinating, frequency, hematuria and urgency.   Musculoskeletal: Negative for arthralgias, back pain, gait problem, myalgias and neck pain.   Skin: Negative for color change and rash.   Neurological: Negative for dizziness, syncope and headaches.   Hematological: Negative for adenopathy.   Psychiatric/Behavioral: Negative for behavioral problems and sleep disturbance. The patient is not nervous/anxious.        Objective:      Physical Exam   Constitutional: She is oriented to person, place, and time. She appears well-developed and well-nourished. No  distress.   HENT:   Head: Normocephalic and atraumatic.   Right Ear: External ear normal.   Left Ear: External ear normal.   Eyes: Conjunctivae and EOM are normal. Pupils are equal, round, and reactive to light. Right eye exhibits no discharge. Left eye exhibits no discharge. No scleral icterus.   Neck: Normal range of motion. Neck supple. No JVD present. No thyromegaly present.   Cardiovascular: Normal rate, regular rhythm, normal heart sounds and intact distal pulses.  Exam reveals no gallop and no friction rub.    No murmur heard.  Pulmonary/Chest: Effort normal and breath sounds normal. No stridor. No respiratory distress. She has no wheezes. She has no rales. She exhibits no tenderness.   Abdominal: Soft. Bowel sounds are normal. She exhibits no distension. There is no tenderness. There is no rebound.   Musculoskeletal: Normal range of motion. She exhibits no edema or tenderness.   Lymphadenopathy:     She has no cervical adenopathy.   Neurological: She is alert and oriented to person, place, and time.   Skin: Skin is warm. No rash noted. She is not diaphoretic. No erythema.   Psychiatric: She has a normal mood and affect. Her behavior is normal.   Nursing note and vitals reviewed.      Assessment and Plan:       1. Dehydration  - labs are normal  - increase fluids; gatorade daily    2. Dizziness  - recommend doing nuclear stress ordered by cardio; if normal, will order MRI and carotid US    3. Pale complexion  - cbc normal in ER    Notify clinic if symptoms change, worsen or do not improve        No Follow-up on file.

## 2017-03-09 ENCOUNTER — OFFICE VISIT (OUTPATIENT)
Dept: PODIATRY | Facility: CLINIC | Age: 52
End: 2017-03-09
Payer: COMMERCIAL

## 2017-03-09 VITALS
DIASTOLIC BLOOD PRESSURE: 83 MMHG | HEART RATE: 97 BPM | HEIGHT: 62 IN | WEIGHT: 173 LBS | BODY MASS INDEX: 31.83 KG/M2 | SYSTOLIC BLOOD PRESSURE: 140 MMHG

## 2017-03-09 DIAGNOSIS — M25.80 SESAMOIDITIS: ICD-10-CM

## 2017-03-09 DIAGNOSIS — M79.671 RIGHT FOOT PAIN: Primary | ICD-10-CM

## 2017-03-09 PROCEDURE — 3077F SYST BP >= 140 MM HG: CPT | Mod: S$GLB,,, | Performed by: PODIATRIST

## 2017-03-09 PROCEDURE — 1160F RVW MEDS BY RX/DR IN RCRD: CPT | Mod: S$GLB,,, | Performed by: PODIATRIST

## 2017-03-09 PROCEDURE — 99213 OFFICE O/P EST LOW 20 MIN: CPT | Mod: S$GLB,,, | Performed by: PODIATRIST

## 2017-03-09 PROCEDURE — 3079F DIAST BP 80-89 MM HG: CPT | Mod: S$GLB,,, | Performed by: PODIATRIST

## 2017-03-09 PROCEDURE — 99999 PR PBB SHADOW E&M-EST. PATIENT-LVL IV: CPT | Mod: PBBFAC,,, | Performed by: PODIATRIST

## 2017-03-09 RX ORDER — HYDROCODONE BITARTRATE AND IBUPROFEN 7.5; 2 MG/1; MG/1
1 TABLET, FILM COATED ORAL EVERY 12 HOURS PRN
Qty: 20 TABLET | Refills: 0 | Status: SHIPPED | OUTPATIENT
Start: 2017-03-09 | End: 2017-03-22 | Stop reason: SDUPTHER

## 2017-03-09 NOTE — PROGRESS NOTES
CC: right  foot pain     HPI:   Sherie Reyes is a 51 y.o. female with complaints of pain to the right  forefoot, sub 1st MPJ. Pt states the pain began a little over a month ago, when she had a fall, possibly hyperextended her toe when her  helped her up.  She has tried no treatment yet.  Xrays done last week without fractures noted.   She reports pain mainly with weight bearing and direct pressure to the area.  There is also pain with dorsiflexion of the toe.  She is wearing athletic shoes today.      3/9/17  follow up right foot pain.   She is using an offloading pad which helps.  Also in CAM with mild relief.  Still painful to the area however.  Vicoprofen does help.   No other changes in the interval.         PCP:  Tonia Bernal DO          Past Medical History:   Diagnosis Date    Anxiety     Degenerative joint disease (DJD) of hip     GERD (gastroesophageal reflux disease)     Hyperlipidemia     Hypertension     IBS (irritable bowel syndrome)     Insomnia     Lumbar disc herniation     PTSD (post-traumatic stress disorder)        Current Outpatient Prescriptions on File Prior to Visit   Medication Sig Dispense Refill    alprazolam 1 mg/mL Conc       buPROPion (WELLBUTRIN XL) 300 MG 24 hr tablet Take 300 mg by mouth once daily.      clonazePAM (KLONOPIN) 1 MG tablet Take 1 mg by mouth 3 (three) times daily.      diphenhydramine-acetaminophen (TYLENOL PM)  mg Tab Take 1 tablet by mouth nightly as needed.      duloxetine (CYMBALTA) 60 MG capsule Take 60 mg by mouth once daily.      gabapentin (NEURONTIN) 300 MG capsule Take 300 mg by mouth 3 (three) times daily.      gemfibrozil (LOPID) 600 MG tablet Take 1 tablet (600 mg total) by mouth 2 (two) times daily before meals. 60 tablet 6    KURVELO 0.15-0.03 mg per tablet Take 1 tablet by mouth once daily.      LINZESS 290 mcg Cap Take 1 capsule by mouth once daily.      melatonin 5 mg Tab Take by mouth nightly as needed.    "   NALOXEGOL OXALATE (MOVANTIK ORAL) Take 25 mg by mouth once daily.       oxycodone-acetaminophen (PERCOCET) 7.5-325 mg per tablet       pantoprazole (PROTONIX) 40 MG tablet Take 40 mg by mouth once daily.      POLYETHYLENE GLYCOL 3350 (MIRALAX ORAL) Take by mouth as needed.       promethazine (PHENERGAN) 25 MG tablet       tizanidine (ZANAFLEX) 6 mg capsule Take 6 mg by mouth 2 (two) times daily as needed for Muscle spasms.      tramadol (ULTRAM) 50 mg tablet Take 50 mg by mouth every 6 (six) hours as needed for Pain.      zolpidem (AMBIEN) 10 mg Tab Take 5 mg by mouth nightly as needed.        No current facility-administered medications on file prior to visit.          ALLG:   Review of patient's allergies indicates:  No Known Allergies          ROS:   General ROS: negative for chills, fatigue or fever  Cardiovascular ROS: no chest pain or dyspnea on exertion  Musculoskeletal ROS: positive for  joint pain or joint stiffness.  Negative for loss of strength  Neuro ROS: Negative for syncope, numbness, or muscle weakness  Skin ROS: Negative for rash, itching or hair changes.        EXAM:  Vitals:    03/09/17 1410   BP: (!) 140/83   Pulse: 97   Weight: 78.5 kg (173 lb)   Height: 5' 2" (1.575 m)        General: Patient is well-developed, well-nourished.  Alert and oriented x 3 and in no apparent distress.   Lower extremity exam:   Vasc: Palpable pedal pulses bilaterally. Normal capillary refill time.    Neuro:  Light touch, proprioception, and sharp/dull sensation are all intact bilaterally.   No sensorimotor deficits.   Derm: No open lesions, macerations, rashes, or hyperkeratotic lesions. no erythema, minimal localized edema to the area of the right sub 1st metatarsophalangeal joint.  MSK: localized tenderness on palpation of tibial sesamoid, right foot. Full ROM of 1st MPJ, with pain at sesamoids with dorsiflexion. 1st ray ROM full and intact without crepitus. No pain on palpation to dorsal 1st MPJ, or " lesser MPJs of the affected foot.    IMAGIN/1/17  Findings: 3 views right.  There is no fracture, dislocation, or bony erosion.  Normal variant Bipartite tibial sesamoid noted.       ASSESSMENT:   1. Right foot pain  MRI Lower Extremity Without Contrast Right    hydrocodone-ibuprofen 7.5-200 mg (VICOPROFEN) 7.5-200 mg per tablet   2. Sesamoiditis - Right Foot  MRI Lower Extremity Without Contrast Right        PLAN:  I counseled the patient on her conditions, their implications and medical management.   Continue Walking boot and offloading pad.    Rest as much as possible.  Reduce weight bearing and ice prn.   MRI, will call with results.   Patient is amenable to plan.

## 2017-03-13 ENCOUNTER — HOSPITAL ENCOUNTER (OUTPATIENT)
Dept: RADIOLOGY | Facility: HOSPITAL | Age: 52
Discharge: HOME | End: 2017-03-13
Attending: PODIATRIST
Payer: COMMERCIAL

## 2017-03-13 DIAGNOSIS — M25.80 SESAMOIDITIS: ICD-10-CM

## 2017-03-13 DIAGNOSIS — M79.671 RIGHT FOOT PAIN: ICD-10-CM

## 2017-03-13 PROCEDURE — 73718 MRI LOWER EXTREMITY W/O DYE: CPT | Mod: TC,RT

## 2017-03-13 PROCEDURE — 73718 MRI LOWER EXTREMITY W/O DYE: CPT | Mod: 26,RT,, | Performed by: RADIOLOGY

## 2017-03-15 ENCOUNTER — PATIENT MESSAGE (OUTPATIENT)
Dept: INTERNAL MEDICINE | Facility: CLINIC | Age: 52
End: 2017-03-15

## 2017-03-16 ENCOUNTER — CLINICAL SUPPORT (OUTPATIENT)
Dept: CARDIOLOGY | Facility: CLINIC | Age: 52
End: 2017-03-16
Payer: COMMERCIAL

## 2017-03-16 DIAGNOSIS — R07.89 CHEST DISCOMFORT: ICD-10-CM

## 2017-03-16 PROCEDURE — 99999 PR PBB SHADOW E&M-EST. PATIENT-LVL III: CPT | Mod: PBBFAC,,,

## 2017-03-16 PROCEDURE — 93015 CV STRESS TEST SUPVJ I&R: CPT | Mod: S$GLB,,, | Performed by: INTERNAL MEDICINE

## 2017-03-16 PROCEDURE — 78452 HT MUSCLE IMAGE SPECT MULT: CPT | Mod: S$GLB,,, | Performed by: INTERNAL MEDICINE

## 2017-03-16 PROCEDURE — A9502 TC99M TETROFOSMIN: HCPCS | Mod: S$GLB,,, | Performed by: INTERNAL MEDICINE

## 2017-03-18 ENCOUNTER — PATIENT MESSAGE (OUTPATIENT)
Dept: PODIATRY | Facility: CLINIC | Age: 52
End: 2017-03-18

## 2017-03-21 ENCOUNTER — PATIENT MESSAGE (OUTPATIENT)
Dept: INTERNAL MEDICINE | Facility: CLINIC | Age: 52
End: 2017-03-21

## 2017-03-22 ENCOUNTER — TELEPHONE (OUTPATIENT)
Dept: PODIATRY | Facility: CLINIC | Age: 52
End: 2017-03-22

## 2017-03-22 DIAGNOSIS — M79.671 RIGHT FOOT PAIN: ICD-10-CM

## 2017-03-22 RX ORDER — HYDROCODONE BITARTRATE AND IBUPROFEN 7.5; 2 MG/1; MG/1
1 TABLET, FILM COATED ORAL EVERY 12 HOURS PRN
Qty: 20 TABLET | Refills: 0 | Status: SHIPPED | OUTPATIENT
Start: 2017-03-22 | End: 2017-08-17

## 2017-03-24 ENCOUNTER — TELEPHONE (OUTPATIENT)
Dept: CARDIOLOGY | Facility: CLINIC | Age: 52
End: 2017-03-24

## 2017-03-24 ENCOUNTER — PATIENT MESSAGE (OUTPATIENT)
Dept: INTERNAL MEDICINE | Facility: CLINIC | Age: 52
End: 2017-03-24

## 2017-03-24 NOTE — TELEPHONE ENCOUNTER
----- Message from Eufemia St MD sent at 3/24/2017  7:58 AM CDT -----  Please let patient know that her stress test was normal. I understand that she had some chest discomfort during the study, but that is not uncommon.

## 2017-03-25 ENCOUNTER — PATIENT MESSAGE (OUTPATIENT)
Dept: INTERNAL MEDICINE | Facility: CLINIC | Age: 52
End: 2017-03-25

## 2017-03-25 DIAGNOSIS — R42 DIZZINESS: Primary | ICD-10-CM

## 2017-03-25 DIAGNOSIS — G44.309 HEADACHES DUE TO OLD HEAD INJURY: ICD-10-CM

## 2017-03-25 DIAGNOSIS — R53.83 FATIGUE, UNSPECIFIED TYPE: ICD-10-CM

## 2017-03-25 DIAGNOSIS — R55 SYNCOPE, UNSPECIFIED SYNCOPE TYPE: ICD-10-CM

## 2017-03-25 DIAGNOSIS — G89.29 CHRONIC NONINTRACTABLE HEADACHE, UNSPECIFIED HEADACHE TYPE: ICD-10-CM

## 2017-03-25 DIAGNOSIS — S09.90XS HEADACHES DUE TO OLD HEAD INJURY: ICD-10-CM

## 2017-03-25 DIAGNOSIS — R51.9 CHRONIC NONINTRACTABLE HEADACHE, UNSPECIFIED HEADACHE TYPE: ICD-10-CM

## 2017-03-29 ENCOUNTER — CLINICAL SUPPORT (OUTPATIENT)
Dept: CARDIOLOGY | Facility: CLINIC | Age: 52
End: 2017-03-29
Payer: COMMERCIAL

## 2017-03-29 DIAGNOSIS — R51.9 CHRONIC NONINTRACTABLE HEADACHE, UNSPECIFIED HEADACHE TYPE: ICD-10-CM

## 2017-03-29 DIAGNOSIS — G89.29 CHRONIC NONINTRACTABLE HEADACHE, UNSPECIFIED HEADACHE TYPE: ICD-10-CM

## 2017-03-29 DIAGNOSIS — R42 DIZZINESS: ICD-10-CM

## 2017-03-29 DIAGNOSIS — R53.83 FATIGUE, UNSPECIFIED TYPE: ICD-10-CM

## 2017-03-29 DIAGNOSIS — R55 SYNCOPE, UNSPECIFIED SYNCOPE TYPE: ICD-10-CM

## 2017-03-29 LAB — INTERNAL CAROTID STENOSIS: NORMAL

## 2017-03-29 PROCEDURE — 93880 EXTRACRANIAL BILAT STUDY: CPT | Mod: S$GLB,,, | Performed by: INTERNAL MEDICINE

## 2017-03-30 ENCOUNTER — PATIENT MESSAGE (OUTPATIENT)
Dept: INTERNAL MEDICINE | Facility: CLINIC | Age: 52
End: 2017-03-30

## 2017-03-30 DIAGNOSIS — M79.671 RIGHT FOOT PAIN: ICD-10-CM

## 2017-03-30 RX ORDER — HYDROCODONE BITARTRATE AND IBUPROFEN 7.5; 2 MG/1; MG/1
1 TABLET, FILM COATED ORAL EVERY 12 HOURS PRN
Qty: 20 TABLET | Refills: 0 | OUTPATIENT
Start: 2017-03-30

## 2017-03-31 ENCOUNTER — PATIENT MESSAGE (OUTPATIENT)
Dept: INTERNAL MEDICINE | Facility: CLINIC | Age: 52
End: 2017-03-31

## 2017-04-02 ENCOUNTER — PATIENT MESSAGE (OUTPATIENT)
Dept: INTERNAL MEDICINE | Facility: CLINIC | Age: 52
End: 2017-04-02

## 2017-04-03 ENCOUNTER — PATIENT MESSAGE (OUTPATIENT)
Dept: INTERNAL MEDICINE | Facility: CLINIC | Age: 52
End: 2017-04-03

## 2017-04-03 ENCOUNTER — HOSPITAL ENCOUNTER (OUTPATIENT)
Dept: RADIOLOGY | Facility: HOSPITAL | Age: 52
Discharge: HOME | End: 2017-04-03
Attending: INTERNAL MEDICINE
Payer: COMMERCIAL

## 2017-04-03 DIAGNOSIS — R51.9 CHRONIC NONINTRACTABLE HEADACHE, UNSPECIFIED HEADACHE TYPE: ICD-10-CM

## 2017-04-03 DIAGNOSIS — R42 DIZZINESS: ICD-10-CM

## 2017-04-03 DIAGNOSIS — G89.29 CHRONIC NONINTRACTABLE HEADACHE, UNSPECIFIED HEADACHE TYPE: ICD-10-CM

## 2017-04-03 DIAGNOSIS — R55 SYNCOPE, UNSPECIFIED SYNCOPE TYPE: ICD-10-CM

## 2017-04-03 DIAGNOSIS — R53.83 FATIGUE, UNSPECIFIED TYPE: ICD-10-CM

## 2017-04-03 PROCEDURE — A9585 GADOBUTROL INJECTION: HCPCS | Performed by: INTERNAL MEDICINE

## 2017-04-03 PROCEDURE — 63600175 PHARM REV CODE 636 W HCPCS: Performed by: RADIOLOGY

## 2017-04-03 PROCEDURE — 25500020 PHARM REV CODE 255: Performed by: INTERNAL MEDICINE

## 2017-04-03 PROCEDURE — 70553 MRI BRAIN STEM W/O & W/DYE: CPT | Mod: TC

## 2017-04-03 PROCEDURE — 70553 MRI BRAIN STEM W/O & W/DYE: CPT | Mod: 26,,, | Performed by: RADIOLOGY

## 2017-04-03 RX ORDER — MIDAZOLAM HYDROCHLORIDE 1 MG/ML
1 INJECTION INTRAMUSCULAR; INTRAVENOUS ONCE
Status: COMPLETED | OUTPATIENT
Start: 2017-04-03 | End: 2017-04-03

## 2017-04-03 RX ORDER — GADOBUTROL 604.72 MG/ML
8 INJECTION INTRAVENOUS
Status: COMPLETED | OUTPATIENT
Start: 2017-04-03 | End: 2017-04-03

## 2017-04-03 RX ADMIN — MIDAZOLAM HYDROCHLORIDE 1 MG: 1 INJECTION, SOLUTION INTRAMUSCULAR; INTRAVENOUS at 07:04

## 2017-04-03 RX ADMIN — GADOBUTROL 8 ML: 604.72 INJECTION INTRAVENOUS at 08:04

## 2017-04-04 ENCOUNTER — PATIENT MESSAGE (OUTPATIENT)
Dept: INTERNAL MEDICINE | Facility: CLINIC | Age: 52
End: 2017-04-04

## 2017-04-04 ENCOUNTER — LAB VISIT (OUTPATIENT)
Dept: LAB | Facility: HOSPITAL | Age: 52
End: 2017-04-04
Attending: INTERNAL MEDICINE
Payer: COMMERCIAL

## 2017-04-04 ENCOUNTER — OFFICE VISIT (OUTPATIENT)
Dept: PODIATRY | Facility: CLINIC | Age: 52
End: 2017-04-04
Payer: COMMERCIAL

## 2017-04-04 VITALS
HEART RATE: 98 BPM | WEIGHT: 173 LBS | DIASTOLIC BLOOD PRESSURE: 80 MMHG | SYSTOLIC BLOOD PRESSURE: 131 MMHG | BODY MASS INDEX: 31.83 KG/M2 | HEIGHT: 62 IN

## 2017-04-04 DIAGNOSIS — R55 SYNCOPE, UNSPECIFIED SYNCOPE TYPE: ICD-10-CM

## 2017-04-04 DIAGNOSIS — R93.0 ABNORMAL MRI OF HEAD: Primary | ICD-10-CM

## 2017-04-04 DIAGNOSIS — E86.0 DEHYDRATION: ICD-10-CM

## 2017-04-04 DIAGNOSIS — M79.671 RIGHT FOOT PAIN: Primary | ICD-10-CM

## 2017-04-04 DIAGNOSIS — M77.51 BURSITIS OF RIGHT FOOT: ICD-10-CM

## 2017-04-04 LAB
ANION GAP SERPL CALC-SCNC: 9 MMOL/L
BUN SERPL-MCNC: 10 MG/DL
CALCIUM SERPL-MCNC: 9.6 MG/DL
CHLORIDE SERPL-SCNC: 105 MMOL/L
CO2 SERPL-SCNC: 24 MMOL/L
CREAT SERPL-MCNC: 1 MG/DL
EST. GFR  (AFRICAN AMERICAN): >60 ML/MIN/1.73 M^2
EST. GFR  (NON AFRICAN AMERICAN): >60 ML/MIN/1.73 M^2
GLUCOSE SERPL-MCNC: 88 MG/DL
POTASSIUM SERPL-SCNC: 4 MMOL/L
SODIUM SERPL-SCNC: 138 MMOL/L

## 2017-04-04 PROCEDURE — 99499 UNLISTED E&M SERVICE: CPT | Mod: S$GLB,,, | Performed by: PODIATRIST

## 2017-04-04 PROCEDURE — 99999 PR PBB SHADOW E&M-EST. PATIENT-LVL III: CPT | Mod: PBBFAC,,, | Performed by: PODIATRIST

## 2017-04-04 PROCEDURE — 36415 COLL VENOUS BLD VENIPUNCTURE: CPT | Mod: PO

## 2017-04-04 PROCEDURE — 20605 DRAIN/INJ JOINT/BURSA W/O US: CPT | Mod: S$GLB,,, | Performed by: PODIATRIST

## 2017-04-04 PROCEDURE — 80048 BASIC METABOLIC PNL TOTAL CA: CPT

## 2017-04-04 RX ORDER — DEXAMETHASONE SODIUM PHOSPHATE 4 MG/ML
4 INJECTION, SOLUTION INTRA-ARTICULAR; INTRALESIONAL; INTRAMUSCULAR; INTRAVENOUS; SOFT TISSUE ONCE
Status: COMPLETED | OUTPATIENT
Start: 2017-04-04 | End: 2017-04-04

## 2017-04-04 RX ORDER — TRIAMCINOLONE ACETONIDE 40 MG/ML
40 INJECTION, SUSPENSION INTRA-ARTICULAR; INTRAMUSCULAR ONCE
Status: COMPLETED | OUTPATIENT
Start: 2017-04-04 | End: 2017-04-04

## 2017-04-04 RX ADMIN — TRIAMCINOLONE ACETONIDE 40 MG: 40 INJECTION, SUSPENSION INTRA-ARTICULAR; INTRAMUSCULAR at 03:04

## 2017-04-04 RX ADMIN — DEXAMETHASONE SODIUM PHOSPHATE 4 MG: 4 INJECTION, SOLUTION INTRA-ARTICULAR; INTRALESIONAL; INTRAMUSCULAR; INTRAVENOUS; SOFT TISSUE at 03:04

## 2017-04-04 NOTE — PROGRESS NOTES
CC: right  foot pain     HPI:   Sherie Reyes is a 51 y.o. female with complaints of pain to the right  forefoot, sub 1st MPJ. Pt states the pain began a little over a month ago, when she had a fall, possibly hyperextended her toe when her  helped her up.  She has tried no treatment yet.  Xrays done last week without fractures noted.   She reports pain mainly with weight bearing and direct pressure to the area.  There is also pain with dorsiflexion of the toe.  She is wearing athletic shoes today.      3/9/17  follow up right foot pain.   She is using an offloading pad which helps.  Also in CAM with mild relief.  Still painful to the area however.  Vicoprofen does help.   No other changes in the interval.       4/4/17:  follow up right foot pain.  Still in walking boot as this seems to improve her pain though she reports that she keeps falling due to dehydration.  MRI foot was done.        PCP:  Tonia Bernal DO          Past Medical History:   Diagnosis Date    Anxiety     Degenerative joint disease (DJD) of hip     GERD (gastroesophageal reflux disease)     Hyperlipidemia     Hypertension     IBS (irritable bowel syndrome)     Insomnia     Lumbar disc herniation     PTSD (post-traumatic stress disorder)        Current Outpatient Prescriptions on File Prior to Visit   Medication Sig Dispense Refill    alprazolam 1 mg/mL Conc       buPROPion (WELLBUTRIN XL) 300 MG 24 hr tablet Take 300 mg by mouth once daily.      clonazePAM (KLONOPIN) 1 MG tablet Take 1 mg by mouth 3 (three) times daily.      diphenhydramine-acetaminophen (TYLENOL PM)  mg Tab Take 1 tablet by mouth nightly as needed.      duloxetine (CYMBALTA) 60 MG capsule Take 60 mg by mouth once daily.      gabapentin (NEURONTIN) 300 MG capsule Take 300 mg by mouth 3 (three) times daily.      gemfibrozil (LOPID) 600 MG tablet Take 1 tablet (600 mg total) by mouth 2 (two) times daily before meals. 60 tablet 6     "hydrocodone-ibuprofen 7.5-200 mg (VICOPROFEN) 7.5-200 mg per tablet Take 1 tablet by mouth every 12 (twelve) hours as needed for Pain. 20 tablet 0    KURVELO 0.15-0.03 mg per tablet Take 1 tablet by mouth once daily.      LINZESS 290 mcg Cap Take 1 capsule by mouth once daily.      melatonin 5 mg Tab Take by mouth nightly as needed.      NALOXEGOL OXALATE (MOVANTIK ORAL) Take 25 mg by mouth once daily.       pantoprazole (PROTONIX) 40 MG tablet Take 40 mg by mouth once daily.      POLYETHYLENE GLYCOL 3350 (MIRALAX ORAL) Take by mouth as needed.       promethazine (PHENERGAN) 25 MG tablet       tizanidine (ZANAFLEX) 6 mg capsule Take 6 mg by mouth 2 (two) times daily as needed for Muscle spasms.      zolpidem (AMBIEN) 10 mg Tab Take 5 mg by mouth nightly as needed.        Current Facility-Administered Medications on File Prior to Visit   Medication Dose Route Frequency Provider Last Rate Last Dose    [COMPLETED] gadobutrol 8 mL  8 mL Intravenous ONCE PRN Tonia Bernal DO   8 mL at 04/03/17 2010    [COMPLETED] midazolam injection 1 mg  1 mg Intravenous Once Rafat Burton MD   1 mg at 04/03/17 1944         ALLG:   Review of patient's allergies indicates:  No Known Allergies          ROS:   General ROS: negative for chills, fatigue or fever  Cardiovascular ROS: no chest pain or dyspnea on exertion  Musculoskeletal ROS: positive for  joint pain or joint stiffness.  Negative for loss of strength  Neuro ROS: Negative for syncope, numbness, or muscle weakness  Skin ROS: Negative for rash, itching or hair changes.        EXAM:  Vitals:    04/04/17 1442   BP: 131/80   Pulse: 98   Weight: 78.5 kg (173 lb)   Height: 5' 2" (1.575 m)        General: Patient is well-developed, well-nourished.  Alert and oriented x 3 and in no apparent distress.   Lower extremity exam:   Vasc: Palpable pedal pulses bilaterally. Normal capillary refill time.    Neuro:  Light touch, proprioception, and sharp/dull sensation are all " intact bilaterally.   No sensorimotor deficits.   Derm: No open lesions, macerations, rashes, or hyperkeratotic lesions. no erythema, minimal localized edema to the area of the right sub 1st metatarsophalangeal joint.  MSK: Full ROM of 1st MPJ, with pain at sesamoids with dorsiflexion. 1st ray ROM full and intact without crepitus. No pain on palpation to dorsal 1st MPJ, or lesser MPJs of the affected foot.      IMAGIN/1/17  Findings: 3 views right.  There is no fracture, dislocation, or bony erosion.  Normal variant Bipartite tibial sesamoid noted.   MRI:  3/13/17   Acute on chronic degenerative changes of the right 1st MTP joint, as above.  Fluid between the 1st and 2nd metatarsal heads, suggesting intermetatarsal bursitis.  Marrow edema at the base of the first metatarsal, near the insertion of the peroneus longus tendon. No fracture. The peroneus longus and  [place] ligaments appear intact.            ASSESSMENT:   1. Right foot pain  dexamethasone injection 4 mg    triamcinolone acetonide injection 40 mg   2. Bursitis of right foot  dexamethasone injection 4 mg    triamcinolone acetonide injection 40 mg            PLAN:  I counseled the patient on her conditions, their implications and medical management.   Continue Walking boot as needed.    Rest as much as possible.  Reduce weight bearing and ice prn.   With patient's permission,  a cortisone injection was performed at the Right 1st intermetatarsal space,  using 3ccs of mixture of (1cc 1% plain Lidocaine : 1cc 0.5% bupivicaine : 0.5cc kenalog-40 : 0.5cc dexamethasone).  Patient tolerated this without immediate complication.    Return in about 1 month (around 2017) for follow up foot pain, or sooner if concerned.

## 2017-04-04 NOTE — PROGRESS NOTES
1 mg versed given prior to MRI start for anxiolysis.  Patient denies allergies.  Patient verified ride home .  Care of patient left in hands of MRI staff.

## 2017-04-05 ENCOUNTER — PATIENT MESSAGE (OUTPATIENT)
Dept: INTERNAL MEDICINE | Facility: CLINIC | Age: 52
End: 2017-04-05

## 2017-04-05 DIAGNOSIS — R55 SYNCOPE, UNSPECIFIED SYNCOPE TYPE: Primary | ICD-10-CM

## 2017-04-11 ENCOUNTER — OFFICE VISIT (OUTPATIENT)
Dept: NEUROLOGY | Facility: CLINIC | Age: 52
End: 2017-04-11
Payer: COMMERCIAL

## 2017-04-11 VITALS
WEIGHT: 173.06 LBS | HEIGHT: 62 IN | BODY MASS INDEX: 31.85 KG/M2 | SYSTOLIC BLOOD PRESSURE: 164 MMHG | DIASTOLIC BLOOD PRESSURE: 100 MMHG | HEART RATE: 118 BPM

## 2017-04-11 DIAGNOSIS — I95.1 SYNCOPE DUE TO ORTHOSTATIC HYPOTENSION: Primary | ICD-10-CM

## 2017-04-11 PROCEDURE — 3077F SYST BP >= 140 MM HG: CPT | Mod: S$GLB,,, | Performed by: PSYCHIATRY & NEUROLOGY

## 2017-04-11 PROCEDURE — 99214 OFFICE O/P EST MOD 30 MIN: CPT | Mod: S$GLB,,, | Performed by: PSYCHIATRY & NEUROLOGY

## 2017-04-11 PROCEDURE — 1160F RVW MEDS BY RX/DR IN RCRD: CPT | Mod: S$GLB,,, | Performed by: PSYCHIATRY & NEUROLOGY

## 2017-04-11 PROCEDURE — 3080F DIAST BP >= 90 MM HG: CPT | Mod: S$GLB,,, | Performed by: PSYCHIATRY & NEUROLOGY

## 2017-04-11 PROCEDURE — 99999 PR PBB SHADOW E&M-EST. PATIENT-LVL III: CPT | Mod: PBBFAC,,, | Performed by: PSYCHIATRY & NEUROLOGY

## 2017-04-11 NOTE — MR AVS SNAPSHOT
[place] [facility]  [address] [place] [address] Ave  [place] [address]  Phone: [phone]  Fax: [phone]                  Sherie Reyes   2017 2:20 PM   Office Visit    Description:  Female : 1965   Provider:  London Palacios MD   Department:  [place] [facility]rology           Reason for Visit     Loss of Consciousness                To Do List           Future Appointments        Provider Department Dept Phone    2017 2:30 PM Susu Boswell DPM [place] - Podiatry [phone]      Goals (5 Years of Data)     None      Follow-Up and Disposition     Return if symptoms worsen or fail to improve.      [facility] On Call     [facility] On Call Nurse Care Line -  Assistance  Unless otherwise directed by your provider, please contact [facility], our nurse care line that is available for  assistance.     Registered nurses in the [facility] provide: appointment scheduling, clinical advisement, health education, and other advisory services.  Call: [phone] (toll free)               Medications                Verify that the below list of medications is an accurate representation of the medications you are currently taking.  If none reported, the list may be blank. If incorrect, please contact your healthcare provider. Carry this list with you in case of emergency.           Current Medications     alprazolam 1 mg/mL Conc     buPROPion (WELLBUTRIN XL) 300 MG 24 hr tablet Take 300 mg by mouth once daily.    clonazePAM (KLONOPIN) 1 MG tablet Take 1 mg by mouth 3 (three) times daily.    diphenhydramine-acetaminophen (TYLENOL PM)  mg Tab Take 1 tablet by mouth nightly as needed.    duloxetine (CYMBALTA) 60 MG capsule Take 60 mg by mouth once daily.    gabapentin (NEURONTIN) 300 MG capsule Take 300 mg by mouth 3 (three) times daily.    gemfibrozil (LOPID) 600 MG tablet Take 1 tablet (600 mg total) by mouth 2 (two) times daily before meals.    hydrocodone-ibuprofen 7.5-200  "mg (VICOPROFEN) 7.5-200 mg per tablet Take 1 tablet by mouth every 12 (twelve) hours as needed for Pain.    KURVELO 0.15-0.03 mg per tablet Take 1 tablet by mouth once daily.    LINZESS 290 mcg Cap Take 1 capsule by mouth once daily.    melatonin 5 mg Tab Take by mouth nightly as needed.    NALOXEGOL OXALATE (MOVANTIK ORAL) Take 25 mg by mouth once daily.     pantoprazole (PROTONIX) 40 MG tablet Take 40 mg by mouth once daily.    POLYETHYLENE GLYCOL 3350 (MIRALAX ORAL) Take by mouth as needed.     promethazine (PHENERGAN) 25 MG tablet     tizanidine (ZANAFLEX) 6 mg capsule Take 6 mg by mouth 2 (two) times daily as needed for Muscle spasms.    zolpidem (AMBIEN) 10 mg Tab Take 5 mg by mouth nightly as needed.            Clinical Reference Information           Your Vitals Were     BP Pulse Height Weight BMI    164/100 118 5' 2" (1.575 m) 78.5 kg (173 lb 1 oz) 31.65 kg/m2      Blood Pressure          Most Recent Value    BP  (!)  164/100      Allergies as of 4/11/2017     No Known Allergies      Immunizations Administered on Date of Encounter - 4/11/2017     None      Language Assistance Services     ATTENTION: Language assistance services are available, free of charge. Please call [phone].      ATENCIÓN: Si habla niall, tiene a blanco disposición servicios gratuitos de asistencia lingüística. Llame al [phone].     WHIT Ý: N?u b?n nói Ti?ng Vi?t, có các d?ch v? h? tr? ngôn ng? mi?n phí dành cho b?n. G?i s? [phone].         [facility] complies with applicable Federal civil rights laws and does not discriminate on the basis of race, color, national origin, age, disability, or sex.        "

## 2017-04-11 NOTE — LETTER
April 11, 2017      Tonai Bernal DO  [address] [facility] [place] [address]           [facility]  [address] [facility] [address]  Phone: [phone]  Fax: [phone]          Patient: Sherie Reyes   MR Number: [id number]   YOB: 1965   Date of Visit: 4/11/2017       Dear Dr. Tonia Bernal:    Thank you for referring Sherie Reyes to me for evaluation. Attached you will find relevant portions of my assessment and plan of care.    If you have questions, please do not hesitate to call me. I look forward to following Sherie Reyes along with you.    Sincerely,    London Palacios MD    Enclosure  CC:  No Recipients    If you would like to receive this communication electronically, please contact [email or web address] or [phone] to request more information on [employer] Link access.    For providers and/or their staff who would like to refer a patient to [facility], please contact us through our one-stop-shop provider referral line, [facility], at [phone].    If you feel you have received this communication in error or would no longer like to receive these types of communications, please e-mail [email or web address]

## 2017-04-11 NOTE — PROGRESS NOTES
"Vascular Neurology  Clinic Note    Reason For Visit (Chief Complaint):Loss of Consciousness      HPI: Sherie Reyes is a 51 y.o. {Blank single:87524::"left handed","right handed"} female with ***.  {Blank single:10272::"These symptoms have never happened before","These symptoms have occurred before, ***"}. {Blank single:00315::"There are no identified triggers or modifying factors","***"}. {Blank single:19197::"There have been no recurrent events","These symptoms have reoccurred ***"}. {Blank single:06498::"There are no other associated symptoms","These symptoms are associated with ***","***"}.    Patient's family member is present and they informed me that ***    Brain Imaging:  {Blank multiple:70272::"CT ***","MRI ***","None"}  Vessel Imaging:  {Blank multiple:12939::"Carotid Ultrasound - ***","CTA -  ***","MRA -  ***","None"}  Cardiac Evaluation:  {Blank multiple:02108::"TTE - ***","TOVA - ***","Telemetry - ***","LTM - ***","None"}  Other:   {Blank multiple:14915::"CTP***","Transcranial Doppler ***","None"}  Relevant Labwork:    Recent Labs  Lab 01/04/17  1425   LDL CHOLESTEROL 144.8   HDL 43   TRIGLYCERIDES 306 H   CHOLESTEROL 249 H       {Blank multiple:53595::"I independently viewed the above imaging studies"," I reviewed the reports of the above imaging***","I independently viewed the above imaging studies in addition to reviewing the report."}  I reviewed the above labwork.    Review of Systems  Review of Systems      Past Medical History  Past Medical History:   Diagnosis Date    Anxiety     Degenerative joint disease (DJD) of hip     GERD (gastroesophageal reflux disease)     Hyperlipidemia     Hypertension     IBS (irritable bowel syndrome)     Insomnia     Lumbar disc herniation     PTSD (post-traumatic stress disorder)      Past Medical History:   Diagnosis Date    Anxiety     Degenerative joint disease (DJD) of hip     GERD (gastroesophageal reflux disease)     Hyperlipidemia     " "Hypertension     IBS (irritable bowel syndrome)     Insomnia     Lumbar disc herniation     PTSD (post-traumatic stress disorder)        Family History  {Blank single:87592::"Relevant history: ***","No relevant history"}   Social History  Social History   Substance Use Topics    Smoking status: Former Smoker     Types: Vaping w/o nicotine    Smokeless tobacco: None      Comment: Vapor cigarettes    Alcohol use No         Medication List with Changes/Refills   Current Medications    ALPRAZOLAM 1 MG/ML CONC        BUPROPION (WELLBUTRIN XL) 300 MG 24 HR TABLET    Take 300 mg by mouth once daily.    CLONAZEPAM (KLONOPIN) 1 MG TABLET    Take 1 mg by mouth 3 (three) times daily.    DIPHENHYDRAMINE-ACETAMINOPHEN (TYLENOL PM)  MG TAB    Take 1 tablet by mouth nightly as needed.    DULOXETINE (CYMBALTA) 60 MG CAPSULE    Take 60 mg by mouth once daily.    GABAPENTIN (NEURONTIN) 300 MG CAPSULE    Take 300 mg by mouth 3 (three) times daily.    GEMFIBROZIL (LOPID) 600 MG TABLET    Take 1 tablet (600 mg total) by mouth 2 (two) times daily before meals.    HYDROCODONE-IBUPROFEN 7.5-200 MG (VICOPROFEN) 7.5-200 MG PER TABLET    Take 1 tablet by mouth every 12 (twelve) hours as needed for Pain.    KURVELO 0.15-0.03 MG PER TABLET    Take 1 tablet by mouth once daily.    LINZESS 290 MCG CAP    Take 1 capsule by mouth once daily.    MELATONIN 5 MG TAB    Take by mouth nightly as needed.    NALOXEGOL OXALATE (MOVANTIK ORAL)    Take 25 mg by mouth once daily.     PANTOPRAZOLE (PROTONIX) 40 MG TABLET    Take 40 mg by mouth once daily.    POLYETHYLENE GLYCOL 3350 (MIRALAX ORAL)    Take by mouth as needed.     PROMETHAZINE (PHENERGAN) 25 MG TABLET        TIZANIDINE (ZANAFLEX) 6 MG CAPSULE    Take 6 mg by mouth 2 (two) times daily as needed for Muscle spasms.    ZOLPIDEM (AMBIEN) 10 MG TAB    Take 5 mg by mouth nightly as needed.        EXAM  Vital Signs:Blood pressure (!) 164/100, pulse (!) 118, height 5' 2" (1.575 m), weight " "78.5 kg (173 lb 1 oz).  General: well appearing without discomfort   Mental Status:alert, oriented to person - place - age - month   Language: able to name, repeat, comprehend commands   Cranial Nerves: EOMI, PERRL, no facial asymmetry, tongue to midline, palate midline  Motor: 5/5 power in all extremities, normal tone  Sensory: intact light touch bilaterally, intact proprioception bilaterally  Coordination: no ataxia on finger-to-nose or heel-to-shin testing; no truncal ataxia  Gait & Stance: normal    Stroke Scales    MoCA {Blank single:03883::"= ***","not performed"}    ___________________  ASSESSMENT & PLAN    There are no diagnoses linked to this encounter.    ***     · Secondary stroke prevention: {Blank single:03076::"Continue ***","Start ***","***"}  · {Blank single:87489::"Continue current statin therapy ***","Start ***","***"}  · Blood pressure goal {Blank single:69785::"<140/90 mmHg","< 130/80 mmHg","***"}   · Stroke education administered  · ***    No Follow-up on file.    "

## 2017-04-12 NOTE — PROGRESS NOTES
"  Sherie Reyes is a 51 y.o. year old female that  Presents for evaluation of syncope, dizziness, HA.  Chief Complaint   Patient presents with    Loss of Consciousness   .     HPI: Many thanks for allowing me to participate in the care of your patient.  Patient is a pleasant 52 y/o with PMH as detailed below, who comes in expressing that since the beginning of this year she had had 2 ED visits after passing out in the context of severe hypotension and polypharmacy.  I did review all the available clinical notes.  She reportedly had erratic BP that apparently got worsened by concurrent use of multiple medications.  Patient also mentioned that a week after discharge from the hospital she was at her MD office, leaned forward to  her keys and sustained an episode in which she became dizzy, pale, and her BP dropped to the low 70's and thus she was taken again to the ED.  Then, reports that approximately a week ago  her legs got " like jelly, without power or function" and her body went forward but without losing  Consciousness.  Work up so far includes unrevealing ECHO, stress test, and MRI brain that I personally reviewed and only showed minimal and nonspecific white matter changes.  On the other hand, she said that she gets severe HA with sensitivity to light, lasting several hours, " but I usually don't pay much attention because I have been busy".  Denies true vertigo, double vision, focal weakness or numbness, slurred speech, language or vision impairment.       Past Medical History:   Diagnosis Date    Anxiety     Degenerative joint disease (DJD) of hip     GERD (gastroesophageal reflux disease)     Hyperlipidemia     Hypertension     IBS (irritable bowel syndrome)     Insomnia     Lumbar disc herniation     PTSD (post-traumatic stress disorder)      Social History     Social History    Marital status:      Spouse name: N/A    Number of children: N/A    Years of education: N/A " "    Occupational History    Not on file.     Social History Main Topics    Smoking status: Former Smoker     Types: Vaping w/o nicotine    Smokeless tobacco: Not on file      Comment: Vapor cigarettes    Alcohol use No    Drug use: No    Sexual activity: Not on file     Other Topics Concern    Not on file     Social History Narrative     Past Surgical History:   Procedure Laterality Date    ADENOIDECTOMY       SECTION      TONSILLECTOMY       History reviewed. No pertinent family history.        Review of Systems  General ROS: negative for chills, fever or weight loss  Psychological ROS: negative for hallucination, depression or suicidal ideation  Ophthalmic ROS: negative for blurry vision, photophobia or eye pain  ENT ROS: negative for epistaxis, sore throat or rhinorrhea  Respiratory ROS: no cough, shortness of breath, or wheezing  Cardiovascular ROS: no chest pain or dyspnea on exertion  Gastrointestinal ROS: no abdominal pain, change in bowel habits, or black/ bloody stools  Genito-Urinary ROS: no dysuria, trouble voiding, or hematuria  Musculoskeletal ROS: negative for gait disturbance or muscular weakness  Neurological ROS: no syncope or seizures; no ataxia  Dermatological ROS: negative for pruritis, rash and jaundice      Physical Exam:  BP (!) 164/100  Pulse (!) 118  Ht 5' 2" (1.575 m)  Wt 78.5 kg (173 lb 1 oz)  BMI 31.65 kg/m2  General appearance: alert, cooperative, no distress  Constitutional:Oriented to person, place, and time.appears well-developed and well-nourished.   HEENT: Normocephalic, atraumatic, neck symmetrical, no nasal discharge   Eyes: conjunctivae/corneas clear, PERRL, EOM's intact  Lungs: clear to auscultation bilaterally, no dullness to percussion bilaterally  Heart: regular rate and rhythm without rub; no displacement of the PMI   Abdomen: soft, non-tender; bowel sounds normoactive; no organomegaly  Extremities: extremities symmetric; no clubbing, cyanosis, or " edema  Integument: Skin color, texture, turgor normal; no rashes; hair distrubution normal  Neurologic: Alert, awake, and oriented x 4. Comprehension, naming, and repetitiommn intact. No aphasia or dysarthria. CN 2-12: pupils 4 mm bilaterally reactive to light. Fundi without papilledema. EOM full, no nystagmus. Face sensation normal. No face asymmetry. Tongue midline. Motor: 5/5 all over. Sensory: intact. DTR's: 2 all over. Plantars: down. Coordination and gait normal  Psychiatric: no pressured speech; normal affect; no evidence of impaired cognition     LABS:    Complete Blood Count  Lab Results   Component Value Date    RBC 4.17 02/01/2017    HGB 12.5 02/01/2017    HCT 38.1 02/01/2017    MCV 91 02/01/2017    MCH 30.0 02/01/2017    MCHC 32.8 02/01/2017    RDW 13.7 02/01/2017     02/01/2017    MPV 11.6 02/01/2017    GRAN 4.4 02/01/2017    GRAN 64.5 02/01/2017    LYMPH 1.9 02/01/2017    LYMPH 28.3 02/01/2017    MONO 0.3 02/01/2017    MONO 4.8 02/01/2017    EOS 0.1 02/01/2017    BASO 0.02 02/01/2017    EOSINOPHIL 1.8 02/01/2017    BASOPHIL 0.3 02/01/2017    DIFFMETHOD Automated 02/01/2017       Comprehensive Metabolic Panel  Lab Results   Component Value Date    GLU 88 04/04/2017    BUN 10 04/04/2017    CREATININE 1.0 04/04/2017     04/04/2017    K 4.0 04/04/2017     04/04/2017    PROT 7.7 02/01/2017    ALBUMIN 3.7 02/01/2017    BILITOT 0.2 02/01/2017    AST 17 02/01/2017    ALKPHOS 47 (L) 02/01/2017    CO2 24 04/04/2017    ALT 21 02/01/2017    ANIONGAP 9 04/04/2017    EGFRNONAA >60.0 04/04/2017    ESTGFRAFRICA >60.0 04/04/2017       TSH  Lab Results   Component Value Date    TSH 1.155 01/04/2017         Assessment:  52 y/o with syncope/presyncopal episodes in the setting of marked hypotension.  Neuro-exam non focal. MRI brain was reviewed and showed minimal but non specific white matter hyperintensity of unclear significance, although can not entirely exclude migraine as the etiology.  Advised  patient to keep a HA calendar for the next 4 weeks for better characterization of her HA phenotype.  On the other hand, will defer EEG as the concern for seizures is quite low at this moment.  Patient to al office with any questions, concerns, or new developments.  Thanks for the kindness of this referral.    No diagnosis found.  There were no encounter diagnoses.      Plan:  No orders of the defined types were placed in this encounter.          London Palacios MD

## 2017-05-02 ENCOUNTER — OFFICE VISIT (OUTPATIENT)
Dept: PODIATRY | Facility: CLINIC | Age: 52
End: 2017-05-02
Payer: COMMERCIAL

## 2017-05-02 VITALS
HEART RATE: 106 BPM | SYSTOLIC BLOOD PRESSURE: 135 MMHG | DIASTOLIC BLOOD PRESSURE: 88 MMHG | HEIGHT: 62 IN | BODY MASS INDEX: 31.83 KG/M2 | WEIGHT: 173 LBS

## 2017-05-02 DIAGNOSIS — S99.921A RIGHT FOOT INJURY, INITIAL ENCOUNTER: ICD-10-CM

## 2017-05-02 DIAGNOSIS — M79.671 RIGHT FOOT PAIN: Primary | ICD-10-CM

## 2017-05-02 PROCEDURE — 1160F RVW MEDS BY RX/DR IN RCRD: CPT | Mod: S$GLB,,, | Performed by: PODIATRIST

## 2017-05-02 PROCEDURE — 99213 OFFICE O/P EST LOW 20 MIN: CPT | Mod: S$GLB,,, | Performed by: PODIATRIST

## 2017-05-02 PROCEDURE — 99999 PR PBB SHADOW E&M-EST. PATIENT-LVL III: CPT | Mod: PBBFAC,,, | Performed by: PODIATRIST

## 2017-05-02 PROCEDURE — 3079F DIAST BP 80-89 MM HG: CPT | Mod: S$GLB,,, | Performed by: PODIATRIST

## 2017-05-02 PROCEDURE — 3075F SYST BP GE 130 - 139MM HG: CPT | Mod: S$GLB,,, | Performed by: PODIATRIST

## 2017-08-17 ENCOUNTER — HOSPITAL ENCOUNTER (OUTPATIENT)
Dept: RADIOLOGY | Facility: HOSPITAL | Age: 52
Discharge: HOME | End: 2017-08-17
Attending: INTERNAL MEDICINE
Payer: COMMERCIAL

## 2017-08-17 ENCOUNTER — PATIENT MESSAGE (OUTPATIENT)
Dept: INTERNAL MEDICINE | Facility: CLINIC | Age: 52
End: 2017-08-17

## 2017-08-17 ENCOUNTER — OFFICE VISIT (OUTPATIENT)
Dept: INTERNAL MEDICINE | Facility: CLINIC | Age: 52
End: 2017-08-17
Payer: COMMERCIAL

## 2017-08-17 VITALS
RESPIRATION RATE: 16 BRPM | HEART RATE: 74 BPM | BODY MASS INDEX: 32.94 KG/M2 | DIASTOLIC BLOOD PRESSURE: 70 MMHG | OXYGEN SATURATION: 97 % | WEIGHT: 179 LBS | SYSTOLIC BLOOD PRESSURE: 104 MMHG | TEMPERATURE: 98 F | HEIGHT: 62 IN

## 2017-08-17 DIAGNOSIS — M25.511 CHRONIC PAIN OF BOTH SHOULDERS: Primary | ICD-10-CM

## 2017-08-17 DIAGNOSIS — G89.29 CHRONIC PAIN OF BOTH SHOULDERS: Primary | ICD-10-CM

## 2017-08-17 DIAGNOSIS — M25.511 CHRONIC PAIN OF BOTH SHOULDERS: ICD-10-CM

## 2017-08-17 DIAGNOSIS — M25.512 CHRONIC PAIN OF BOTH SHOULDERS: Primary | ICD-10-CM

## 2017-08-17 DIAGNOSIS — M25.512 CHRONIC PAIN OF BOTH SHOULDERS: ICD-10-CM

## 2017-08-17 DIAGNOSIS — G89.29 CHRONIC PAIN OF BOTH SHOULDERS: ICD-10-CM

## 2017-08-17 PROCEDURE — 73030 X-RAY EXAM OF SHOULDER: CPT | Mod: 26,50,, | Performed by: RADIOLOGY

## 2017-08-17 PROCEDURE — 99214 OFFICE O/P EST MOD 30 MIN: CPT | Mod: S$GLB,,, | Performed by: INTERNAL MEDICINE

## 2017-08-17 PROCEDURE — 73030 X-RAY EXAM OF SHOULDER: CPT | Mod: TC,50,PO

## 2017-08-17 PROCEDURE — 3074F SYST BP LT 130 MM HG: CPT | Mod: S$GLB,,, | Performed by: INTERNAL MEDICINE

## 2017-08-17 PROCEDURE — 3078F DIAST BP <80 MM HG: CPT | Mod: S$GLB,,, | Performed by: INTERNAL MEDICINE

## 2017-08-17 PROCEDURE — 99999 PR PBB SHADOW E&M-EST. PATIENT-LVL IV: CPT | Mod: PBBFAC,,, | Performed by: INTERNAL MEDICINE

## 2017-08-17 PROCEDURE — 3008F BODY MASS INDEX DOCD: CPT | Mod: S$GLB,,, | Performed by: INTERNAL MEDICINE

## 2017-08-17 RX ORDER — QUETIAPINE FUMARATE 50 MG/1
50 TABLET, FILM COATED ORAL NIGHTLY
COMMUNITY
End: 2018-05-04

## 2017-08-17 RX ORDER — IBUPROFEN 600 MG/1
600 TABLET ORAL 2 TIMES DAILY
Qty: 28 TABLET | Refills: 0 | Status: SHIPPED | OUTPATIENT
Start: 2017-08-17 | End: 2017-11-06

## 2017-08-17 RX ORDER — METHYLPREDNISOLONE 4 MG/1
TABLET ORAL
Qty: 1 PACKAGE | Refills: 0 | Status: SHIPPED | OUTPATIENT
Start: 2017-08-17 | End: 2017-10-22

## 2017-08-17 NOTE — MEDICAL/APP STUDENT
Subjective:       Patient ID: Sherie Reyes is a 52 y.o. female.    Chief Complaint: Shoulder Pain (chronic bilateral shoulder pain)    HPI   53 yo F with chronic shoulder pain, HTN, HLD presented to clinic today with bilateral shoulder pain, L >> R.  Pt was unclear when this acute exacerbation started, however she stated this pain has been essentially chronic for approximately 10 years.  She reported limited ROM bilaterally, but much worse on her L.  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  The pain has prevented her from sleeping comfortably, since it is exacerbated when she attempts to lie on it.  The pain is constant and dull at baseline, but is sharp with certain movements.  She's tried tylenol, ibuprofen, gabapentin, and tramadol with only mild benefit.    Pt denied associated symptoms including fever, chills, unexplained weight loss, or any skin changes.    Review of Systems   Constitutional: Negative for activity change, appetite change, chills, diaphoresis, fatigue, fever and unexpected weight change.   HENT: Negative for congestion, ear discharge, ear pain and nosebleeds.    Eyes: Negative for photophobia, pain, discharge, redness, itching and visual disturbance.   Respiratory: Negative for apnea, cough, choking, chest tightness, shortness of breath, wheezing and stridor.    Cardiovascular: Negative for chest pain, palpitations and leg swelling.   Gastrointestinal: Negative for abdominal distention, abdominal pain, anal bleeding, blood in stool, constipation, diarrhea, nausea, rectal pain and vomiting.   Genitourinary: Negative for difficulty urinating, dysuria, enuresis, frequency and hematuria.   Musculoskeletal: Positive for arthralgias and myalgias. Negative for back pain, gait problem, joint swelling, neck pain and neck stiffness.   Skin: Negative for color change, pallor, rash and wound.   Neurological: Positive for dizziness. Negative for light-headedness.       Objective:       Physical Exam   Constitutional: She is oriented to person, place, and time. She appears well-developed and well-nourished. No distress.   HENT:   Head: Normocephalic and atraumatic.   Mouth/Throat: No oropharyngeal exudate.   Eyes: Conjunctivae and EOM are normal. Pupils are equal, round, and reactive to light. Right eye exhibits no discharge. Left eye exhibits no discharge. No scleral icterus.   Neck: Normal range of motion. Neck supple.   Cardiovascular: Normal rate, regular rhythm and normal heart sounds.  Exam reveals no gallop and no friction rub.    No murmur heard.  Pulmonary/Chest: Effort normal and breath sounds normal. No respiratory distress. She has no wheezes. She has no rales. She exhibits no tenderness.   Abdominal: Soft. Bowel sounds are normal. She exhibits no distension and no mass. There is no tenderness. There is no rebound and no guarding. No hernia.   Musculoskeletal: She exhibits tenderness. She exhibits no edema or deformity.        Right shoulder: She exhibits tenderness and pain. She exhibits normal range of motion, no bony tenderness, no swelling, no effusion, no crepitus, no deformity, no laceration and normal strength.        Left shoulder: She exhibits decreased range of motion, tenderness, bony tenderness, pain and decreased strength. She exhibits no swelling, no effusion, no crepitus, no deformity, no laceration, no spasm and normal pulse.        Arms:  Lymphadenopathy:     She has no cervical adenopathy.   Neurological: She is alert and oriented to person, place, and time. No cranial nerve deficit.   Skin: Skin is warm and dry. She is not diaphoretic.   Nursing note and vitals reviewed.      Assessment:       1. Chronic pain of both shoulders        Plan:           Chronic pain of both shoulders  - Ibuprofen 800mg BID for 2 weeks  - bilateral shoulder XR to assess for arthritic changes  - arrange orthopedic surgery referral

## 2017-08-21 ENCOUNTER — PATIENT MESSAGE (OUTPATIENT)
Dept: INTERNAL MEDICINE | Facility: CLINIC | Age: 52
End: 2017-08-21

## 2017-08-22 ENCOUNTER — PATIENT MESSAGE (OUTPATIENT)
Dept: INTERNAL MEDICINE | Facility: CLINIC | Age: 52
End: 2017-08-22

## 2017-08-25 DIAGNOSIS — Z12.11 COLON CANCER SCREENING: ICD-10-CM

## 2017-08-28 ENCOUNTER — OFFICE VISIT (OUTPATIENT)
Dept: ORTHOPEDICS | Facility: CLINIC | Age: 52
End: 2017-08-28
Payer: COMMERCIAL

## 2017-08-28 VITALS — WEIGHT: 179 LBS | HEIGHT: 62 IN | BODY MASS INDEX: 32.94 KG/M2

## 2017-08-28 DIAGNOSIS — M25.511 CHRONIC PAIN OF BOTH SHOULDERS: ICD-10-CM

## 2017-08-28 DIAGNOSIS — M25.512 CHRONIC PAIN OF BOTH SHOULDERS: ICD-10-CM

## 2017-08-28 DIAGNOSIS — G89.29 CHRONIC PAIN OF BOTH SHOULDERS: ICD-10-CM

## 2017-08-28 PROCEDURE — 99999 PR PBB SHADOW E&M-EST. PATIENT-LVL II: CPT | Mod: PBBFAC,,, | Performed by: ORTHOPAEDIC SURGERY

## 2017-08-28 PROCEDURE — 20610 DRAIN/INJ JOINT/BURSA W/O US: CPT | Mod: LT,S$GLB,, | Performed by: ORTHOPAEDIC SURGERY

## 2017-08-28 PROCEDURE — 99203 OFFICE O/P NEW LOW 30 MIN: CPT | Mod: 25,S$GLB,, | Performed by: ORTHOPAEDIC SURGERY

## 2017-08-28 PROCEDURE — 3008F BODY MASS INDEX DOCD: CPT | Mod: S$GLB,,, | Performed by: ORTHOPAEDIC SURGERY

## 2017-08-28 RX ORDER — TRIAMCINOLONE ACETONIDE 40 MG/ML
40 INJECTION, SUSPENSION INTRA-ARTICULAR; INTRAMUSCULAR
Status: COMPLETED | OUTPATIENT
Start: 2017-08-28 | End: 2017-08-28

## 2017-08-28 RX ADMIN — TRIAMCINOLONE ACETONIDE 40 MG: 40 INJECTION, SUSPENSION INTRA-ARTICULAR; INTRAMUSCULAR at 03:08

## 2017-08-28 NOTE — Clinical Note
August 28, 2017      Tonia Bernal, DO  [address] [facility] [place] [address]           [place] [facility]  [address] [facility] [address]  [facility] [address]  Phone: [phone]          Patient: Sherie Reyes   MR Number: [id number]   YOB: 1965   Date of Visit: 8/28/2017       Dear Dr. Tonia Bernal:    Thank you for referring Sherie Reyes to me for evaluation. Attached you will find relevant portions of my assessment and plan of care.    If you have questions, please do not hesitate to call me. I look forward to following Sherie Reyes along with you.    Sincerely,    Melchor Hughes Jr., MD    Enclosure  CC:  No Recipients    If you would like to receive this communication electronically, please contact [email or web address] or [phone] to request more information on [employer] Link access.    For providers and/or their staff who would like to refer a patient to [facility], please contact us through our one-stop-shop provider referral line, [facility], at [phone].    If you feel you have received this communication in error or would no longer like to receive these types of communications, please e-mail [email or web address]

## 2017-08-28 NOTE — LETTER
August 28, 2017        Tonia Bernal,   [address] [facility] [address]             [facility]  [address] [place] [address] [address]  [facility] [address]  Phone: [phone]   Patient: Sherie Reyes   MR Number: [id number]   YOB: 1965   Date of Visit: 8/28/2017       Dear Dr. Bernal:    Thank you for referring Sherie Reyes to me for evaluation. Below are the relevant portions of my assessment and plan of care.            If you have questions, please do not hesitate to call me. I look forward to following Sherie along with you.    Sincerely,      Melchor Hughes Jr., MD           CC  No Recipients

## 2017-08-28 NOTE — PROGRESS NOTES
INITIAL VISIT HISTORY:  A 52-year-old female presents for evaluation of   bilateral shoulder symptoms.  She has had problems on and off for the past   several years, left shoulder worse than right.  She has also had problems with   chronic back problems and this seems to be aggravating her left shoulder   currently.  No recent trauma reported.  She has not had any specific treatment   for the shoulders.    No numbness or tingling reported in the upper extremities.    PAST MEDICAL HISTORY:  Significant for anxiety, GERD, hyperlipidemia, IBS,   hypertension, insomnia, lumbar disk herniation and problems, chronic back pain.    PAST SURGICAL HISTORY:  Includes , adenoidectomy, tonsillectomy.    FAMILY HISTORY:  Negative.    SOCIAL HISTORY:  The patient is a former smoker, does not give pack-year   history.  Denies alcohol.    REVIEW OF SYSTEMS:  Negative fever, chills, rashes.    CURRENT MEDICATIONS:  Reviewed on chart.    ALLERGIES:  None.    PHYSICAL EXAMINATION:  GENERAL:  Well-developed, well-nourished female in no acute distress, alert and   oriented x3.  She is currently ambulating with a cane due to back pain.  EXTREMITIES:  Physical examination of left shoulder demonstrates some tenderness   anterolaterally, positive impingement sign, slight weakness of rotator cuff, no   instability.  Right shoulder demonstrates full range of motion, negative   impingement sign.  NEUROLOGIC:  Intact.  NECK:  Nontender.    X-RAYS:  AP and lateral of left shoulder demonstrates slight spurring at the   anterolateral acromion and slight irregularity at the greater tuberosity;   otherwise, no abnormalities.  Right shoulder demonstrates no significant   abnormalities.    IMPRESSION:  1.  Left shoulder impingement.  2.  Possible rotator cuff tear, left shoulder.    PLAN:  Because the patient's symptoms have been present for so long in the left   shoulder, I recommended an MRI scan ordered of left shoulder to check the    rotator cuff.  Additionally, injection performed on left shoulder with   combination of Kenalog 40 mg and 2 mL of Xylocaine in sterile technique.  After   pause for timeout, injection performed in left shoulder without complication.  I   recommended ice to the area and anti-inflammatory medication by mouth.  Follow   up after the MRI is complete.      CHARLENE  dd: 08/28/2017 15:22:04 (CDT)  td: 08/29/2017 02:36:08 (CDT)  Doc ID   #[id number]  Job ID #[id number]    CC:

## 2017-08-29 ENCOUNTER — PATIENT MESSAGE (OUTPATIENT)
Dept: INTERNAL MEDICINE | Facility: CLINIC | Age: 52
End: 2017-08-29

## 2017-08-29 NOTE — ADDENDUM NOTE
Encounter addended by: Melchor Hughes Jr., MD on: 8/29/2017  8:24 AM<BR>    Actions taken: Letter status changed

## 2017-08-30 ENCOUNTER — PATIENT MESSAGE (OUTPATIENT)
Dept: ADMINISTRATIVE | Facility: HOSPITAL | Age: 52
End: 2017-08-30

## 2017-08-30 ENCOUNTER — PATIENT MESSAGE (OUTPATIENT)
Dept: INTERNAL MEDICINE | Facility: CLINIC | Age: 52
End: 2017-08-30

## 2017-08-31 ENCOUNTER — HOSPITAL ENCOUNTER (OUTPATIENT)
Dept: RADIOLOGY | Facility: HOSPITAL | Age: 52
Discharge: HOME | End: 2017-08-31
Attending: ORTHOPAEDIC SURGERY
Payer: COMMERCIAL

## 2017-08-31 DIAGNOSIS — G89.29 CHRONIC PAIN OF BOTH SHOULDERS: ICD-10-CM

## 2017-08-31 DIAGNOSIS — M25.511 CHRONIC PAIN OF BOTH SHOULDERS: ICD-10-CM

## 2017-08-31 DIAGNOSIS — M25.512 CHRONIC PAIN OF BOTH SHOULDERS: ICD-10-CM

## 2017-08-31 PROCEDURE — 73221 MRI JOINT UPR EXTREM W/O DYE: CPT | Mod: TC,LT

## 2017-08-31 PROCEDURE — 73221 MRI JOINT UPR EXTREM W/O DYE: CPT | Mod: 26,LT,, | Performed by: RADIOLOGY

## 2017-09-19 ENCOUNTER — PATIENT MESSAGE (OUTPATIENT)
Dept: INTERNAL MEDICINE | Facility: CLINIC | Age: 52
End: 2017-09-19

## 2017-09-19 ENCOUNTER — OFFICE VISIT (OUTPATIENT)
Dept: ORTHOPEDICS | Facility: CLINIC | Age: 52
End: 2017-09-19
Payer: COMMERCIAL

## 2017-09-19 VITALS — BODY MASS INDEX: 32.94 KG/M2 | HEIGHT: 62 IN | WEIGHT: 179 LBS

## 2017-09-19 DIAGNOSIS — M25.511 CHRONIC PAIN OF BOTH SHOULDERS: Primary | ICD-10-CM

## 2017-09-19 DIAGNOSIS — M25.512 CHRONIC PAIN OF BOTH SHOULDERS: Primary | ICD-10-CM

## 2017-09-19 DIAGNOSIS — G89.29 CHRONIC PAIN OF BOTH SHOULDERS: Primary | ICD-10-CM

## 2017-09-19 PROCEDURE — 99214 OFFICE O/P EST MOD 30 MIN: CPT | Mod: 57,25,S$GLB, | Performed by: ORTHOPAEDIC SURGERY

## 2017-09-19 PROCEDURE — 3008F BODY MASS INDEX DOCD: CPT | Mod: S$GLB,,, | Performed by: ORTHOPAEDIC SURGERY

## 2017-09-19 PROCEDURE — 20610 DRAIN/INJ JOINT/BURSA W/O US: CPT | Mod: LT,S$GLB,, | Performed by: ORTHOPAEDIC SURGERY

## 2017-09-19 PROCEDURE — 99999 PR PBB SHADOW E&M-EST. PATIENT-LVL III: CPT | Mod: PBBFAC,,, | Performed by: ORTHOPAEDIC SURGERY

## 2017-09-19 RX ORDER — HYDROCODONE BITARTRATE AND ACETAMINOPHEN 5; 325 MG/1; MG/1
1 TABLET ORAL EVERY 12 HOURS PRN
Qty: 30 TABLET | Refills: 0 | Status: SHIPPED | OUTPATIENT
Start: 2017-09-19 | End: 2017-09-29

## 2017-09-19 RX ORDER — TRIAMCINOLONE ACETONIDE 40 MG/ML
40 INJECTION, SUSPENSION INTRA-ARTICULAR; INTRAMUSCULAR
Status: COMPLETED | OUTPATIENT
Start: 2017-09-19 | End: 2017-09-19

## 2017-09-19 RX ADMIN — TRIAMCINOLONE ACETONIDE 40 MG: 40 INJECTION, SUSPENSION INTRA-ARTICULAR; INTRAMUSCULAR at 09:09

## 2017-09-19 NOTE — PROGRESS NOTES
CHIEF COMPLAINT:  Left shoulder pain.    HISTORY OF PRESENT ILLNESS:  A 52-year-old female with ongoing symptoms of left   shoulder for more than a year now.  Symptoms are worse with use, particularly   overhead lifting.  She has had therapy in the past without relief.  We also   tried an injection, which only worked for a few weeks.    A recent MRI showed tendinopathy and bursitis.    PAST MEDICAL HISTORY:  Significant for anxiety, hip pain, GERD, hyperlipidemia,   hypertension, irritable bowel, insomnia and PTSD.    PAST SURGICAL HISTORY:  Includes , tonsillectomy and adenoidectomy.    SOCIAL HISTORY:  The patient is a former smoker, currently vapes, does not drink   alcohol.    REVIEW OF SYSTEMS:  Negative for fever, chills or rashes.    CURRENT MEDICATIONS:  Reviewed on chart.    ALLERGIES:  None.    PHYSICAL EXAMINATION:  GENERAL:  A well-developed, well-nourished female, in no acute distress, alert   and oriented x3.  HEENT:  Unremarkable.  LUNGS:  Clear to auscultation.  HEART:  Regular rate and rhythm.  ABDOMEN:  Soft, nontender.  EXTREMITIES:  Significant for the left shoulder demonstrating some tenderness   anterolaterally.  No bruising, no swelling.  Range of motion is slightly   decreased secondary to pain.  She does have a positive impingement sign and a   positive supraspinatus stress test.  Rotator cuff strength intact.    MRI of the left shoulder demonstrates some bursitis with impingement and   tendinosis of the rotator cuff.    IMPRESSION:  Left shoulder impingement, possible partial tear of rotator cuff.    PLAN:  The patient would like to set up surgery for the left shoulder.  This   would include left shoulder arthroscopy, possible rotator cuff repair.  The   risks and benefits of surgery were explained to the patient, she understands.      CHARLENE  dd: 2017 09:40:41 (CDT)  td: 2017 23:12:17 (CDT)  Doc ID   #[id number]  Job ID #[id number]    CC:

## 2017-09-20 ENCOUNTER — PATIENT MESSAGE (OUTPATIENT)
Dept: INTERNAL MEDICINE | Facility: CLINIC | Age: 52
End: 2017-09-20

## 2017-10-16 ENCOUNTER — ANESTHESIA EVENT (OUTPATIENT)
Dept: SURGERY | Facility: HOSPITAL | Age: 52
End: 2017-10-16
Payer: COMMERCIAL

## 2017-10-16 ENCOUNTER — PATIENT MESSAGE (OUTPATIENT)
Dept: INTERNAL MEDICINE | Facility: CLINIC | Age: 52
End: 2017-10-16

## 2017-10-16 ENCOUNTER — HOSPITAL ENCOUNTER (OUTPATIENT)
Dept: PREADMISSION TESTING | Facility: HOSPITAL | Age: 52
Discharge: HOME | End: 2017-10-16
Attending: ORTHOPAEDIC SURGERY
Payer: COMMERCIAL

## 2017-10-16 NOTE — DISCHARGE INSTRUCTIONS
· Arrive on  ***  at  ***  .  · Report to the 2nd floor Procedural Check In Room .   · Nothing to eat or drink after midnight the night before your procedure.  · Take the *** medications the morning of surgery with a small sip of water.                                                         · Please remove all body piercings and leave all your jewelery and valuables at home .  · Please remove your contact lenses.   · Wear loose comfortable clothing.  · You will not be able to drive that day, please make arrangement for transportation to and from your procedure.  · You cannot be alone for 24 hours after anesthesia. Make arrangements as needed.  · Shower the night before your procedure and the morning of your procedure with Hibiclens antibacterial solution.  · No lotions, creams or powders  · Stop Aspirin, Ibuprofen, Advil, Motrin, Aleve, Nuprin, Naprosyn (all NSAID Medication) or any other blood thinners 5 days before your procedure unless directed by your physician.  Also hold all over the counter vitamins and herbal supplements for 5 days prior to your procedure.  · You may take Tylenol or Acetaminophen up the day of surgery for any pain.        Call [phone] with any questions.          Pre-Op Bathing Instructions    Before surgery, you can play an important role in your own health.    Because skin is not sterile, we need to be sure that your skin is as free of germs as possible. By following the instructions below, you can reduce the number of germs on your skin before surgery.    IMPORTANT: You will need to shower with a special soap called Hibiclens*, available at any pharmacy, over the counter usually in the first aid isle.  If you are allergic to Chlorhexidine (the antiseptic in Hibiclens), use an antibacterial soap such as Dial Soap for your preoperative showers.  You will shower with Hibiclens the night before and the morning of surgery. Both the night before your surgery and the morning of your  surgery see steps 2 and 3.   Do not use Hibiclens on the head, face or genitals to avoid injury to those areas.    STEP #1  1.  Shower with Hibiclens solution night before and the morning of surgery.      STEP #2: THE NIGHT BEFORE YOUR SURGERY     1. Do not shave the area of your body where your surgery will be performed.  2. Wash your hair as usual with your normal  Shampoo. Wash body shoulder to toes with your normal soap.  3. Squeeze Hibiclens into hand apply to surgical site.   4. Wash the site gently for five (5) minutes. Do not scrub your skin too hard.   5. Do not wash with your regular soap after Hibiclens is used.  6. Rinse your body thoroughly.  7. Pat yourself dry with a clean, soft towel.  8. Do not use lotion, cream, or powder.  9. Wear clean clothes.    STEP #3: THE MORNING OF YOUR SURGERY     1. Repeat Step #2.    * Not to be used by people allergic to Chlorhexidine.          Anesthesia: General Anesthesia  Youre due to have surgery. During surgery, youll be given medication called anesthesia. (It is also called anesthetic.) This will keep you comfortable and pain-free. Your anesthesia provider will use general anesthesia. This sheet tells you more about it.  What is general anesthesia?    General anesthesia puts you into a state like deep sleep. It goes into the bloodstream (IV anesthetics), into the lungs (gas anesthetics), or both. You feel nothing during the procedure. You will not remember it. During the procedure, the anesthesia provider monitors you continuously. He or she checks your heart rate and rhythm, blood pressure, breathing, and blood oxygen.  · IV Anesthetics. IV anesthetics are given through an IV line in your arm. Theyre often given first. This is so you are asleep before a gas anesthetic is started. Some kinds of IV anesthetics relieve pain. Others relax you. Your doctor will decide which kind is best in your case.  · Gas Anesthetics. Gas anesthetics are breathed into the lungs.  They are often used to keep you asleep. They can be given through a facemask or a tube placed in your larynx or trachea (breathing tube).  · If you have a facemask, your anesthesia provider will most likely place it over your nose and mouth while youre still awake. Youll breathe oxygen through the mask as your IV anesthetic is started. Gas anesthetic may be added through the mask.  · If you have a tube in the larynx or trachea, it will be inserted into your throat after youre asleep.  Anesthesia tools and medications  You will likely have:  · IV anesthetics. These are put into an IV line into your bloodstream.  · Gas anesthetics. You breathe these anesthetics into your lungs, where they pass into your bloodstream.  · Pulse oximeter. This is a small clip that is attached to the end of your finger. This measures your blood oxygen level.  · Electrocardiography leads (electrodes). These are small sticky pads that are placed on your chest. They record your heart rate and rhythm.  · Blood pressure cuff. This reads your blood pressure.    Anesthesia safety  · Follow all instructions you are given for how long not to eat or drink before your procedure.  · Be sure your doctor knows what medications and drugs you take. This includes over-the-counter medications, herbs, supplements, alcohol or other drugs. You will be asked when those were last taken.  · Have an adult family member or friend drive you home after the procedure.  · For the first 24 hours after your surgery:  · Do not drive or use heavy equipment.  · Have a trusted family member or spouse make important decisions or sign documents.  · Avoid alcohol.  · Have a responsible adult stay with you. He or she can watch for problems and help keep you safe.  © 7020-0432 [employer]. [address] [place] [address], [place] [address]. All rights reserved. This information is not intended as a substitute for professional medical care. Always follow your healthcare  professional's instructions.

## 2017-10-17 ENCOUNTER — PATIENT MESSAGE (OUTPATIENT)
Dept: INTERNAL MEDICINE | Facility: CLINIC | Age: 52
End: 2017-10-17

## 2017-10-19 ENCOUNTER — TELEPHONE (OUTPATIENT)
Dept: ORTHOPEDICS | Facility: CLINIC | Age: 52
End: 2017-10-19

## 2017-10-20 ENCOUNTER — HOSPITAL ENCOUNTER (OUTPATIENT)
Facility: HOSPITAL | Age: 52
Discharge: HOME | End: 2017-10-20
Attending: ORTHOPAEDIC SURGERY | Admitting: ORTHOPAEDIC SURGERY
Payer: COMMERCIAL

## 2017-10-20 ENCOUNTER — SURGERY (OUTPATIENT)
Age: 52
End: 2017-10-20

## 2017-10-20 ENCOUNTER — ANESTHESIA (OUTPATIENT)
Dept: SURGERY | Facility: HOSPITAL | Age: 52
End: 2017-10-20
Payer: COMMERCIAL

## 2017-10-20 VITALS
RESPIRATION RATE: 17 BRPM | DIASTOLIC BLOOD PRESSURE: 84 MMHG | HEART RATE: 102 BPM | HEIGHT: 62 IN | TEMPERATURE: 98 F | SYSTOLIC BLOOD PRESSURE: 148 MMHG | OXYGEN SATURATION: 97 % | WEIGHT: 170 LBS | BODY MASS INDEX: 31.28 KG/M2

## 2017-10-20 DIAGNOSIS — G89.29 CHRONIC PAIN OF BOTH SHOULDERS: ICD-10-CM

## 2017-10-20 DIAGNOSIS — M25.511 CHRONIC PAIN OF BOTH SHOULDERS: ICD-10-CM

## 2017-10-20 DIAGNOSIS — M25.512 CHRONIC PAIN OF BOTH SHOULDERS: ICD-10-CM

## 2017-10-20 LAB
ANION GAP SERPL CALC-SCNC: 9 MMOL/L
BUN SERPL-MCNC: 13 MG/DL
CALCIUM SERPL-MCNC: 9.3 MG/DL
CHLORIDE SERPL-SCNC: 108 MMOL/L
CO2 SERPL-SCNC: 21 MMOL/L
CREAT SERPL-MCNC: 1 MG/DL
EST. GFR  (AFRICAN AMERICAN): >60 ML/MIN/1.73 M^2
EST. GFR  (NON AFRICAN AMERICAN): >60 ML/MIN/1.73 M^2
GLUCOSE SERPL-MCNC: 116 MG/DL
POTASSIUM SERPL-SCNC: 4 MMOL/L
SODIUM SERPL-SCNC: 138 MMOL/L

## 2017-10-20 PROCEDURE — 63600175 PHARM REV CODE 636 W HCPCS: Performed by: NURSE ANESTHETIST, CERTIFIED REGISTERED

## 2017-10-20 PROCEDURE — 71000016 HC POSTOP RECOV ADDL HR: Performed by: ORTHOPAEDIC SURGERY

## 2017-10-20 PROCEDURE — 36000711: Performed by: ORTHOPAEDIC SURGERY

## 2017-10-20 PROCEDURE — 27200664 HC NERVE BLOCK COMPLETE KIT: Performed by: ANESTHESIOLOGY

## 2017-10-20 PROCEDURE — 93005 ELECTROCARDIOGRAM TRACING: CPT

## 2017-10-20 PROCEDURE — 76942 ECHO GUIDE FOR BIOPSY: CPT | Performed by: ANESTHESIOLOGY

## 2017-10-20 PROCEDURE — 25000003 PHARM REV CODE 250: Performed by: NURSE ANESTHETIST, CERTIFIED REGISTERED

## 2017-10-20 PROCEDURE — 63600175 PHARM REV CODE 636 W HCPCS: Performed by: ANESTHESIOLOGY

## 2017-10-20 PROCEDURE — 25000003 PHARM REV CODE 250: Performed by: ORTHOPAEDIC SURGERY

## 2017-10-20 PROCEDURE — 36415 COLL VENOUS BLD VENIPUNCTURE: CPT

## 2017-10-20 PROCEDURE — 63600175 PHARM REV CODE 636 W HCPCS: Performed by: ORTHOPAEDIC SURGERY

## 2017-10-20 PROCEDURE — 29824 SHO ARTHRS SRG DSTL CLAVICLC: CPT | Mod: LT,,, | Performed by: ORTHOPAEDIC SURGERY

## 2017-10-20 PROCEDURE — 71000015 HC POSTOP RECOV 1ST HR: Performed by: ORTHOPAEDIC SURGERY

## 2017-10-20 PROCEDURE — 71000033 HC RECOVERY, INTIAL HOUR: Performed by: ORTHOPAEDIC SURGERY

## 2017-10-20 PROCEDURE — 80048 BASIC METABOLIC PNL TOTAL CA: CPT

## 2017-10-20 PROCEDURE — 37000008 HC ANESTHESIA 1ST 15 MINUTES: Performed by: ORTHOPAEDIC SURGERY

## 2017-10-20 PROCEDURE — 71000039 HC RECOVERY, EACH ADD'L HOUR: Performed by: ORTHOPAEDIC SURGERY

## 2017-10-20 PROCEDURE — 27201423 OPTIME MED/SURG SUP & DEVICES STERILE SUPPLY: Performed by: ORTHOPAEDIC SURGERY

## 2017-10-20 PROCEDURE — 36000710: Performed by: ORTHOPAEDIC SURGERY

## 2017-10-20 PROCEDURE — 29826 SHO ARTHRS SRG DECOMPRESSION: CPT | Mod: LT,,, | Performed by: ORTHOPAEDIC SURGERY

## 2017-10-20 PROCEDURE — 25000003 PHARM REV CODE 250: Performed by: ANESTHESIOLOGY

## 2017-10-20 PROCEDURE — 37000009 HC ANESTHESIA EA ADD 15 MINS: Performed by: ORTHOPAEDIC SURGERY

## 2017-10-20 RX ORDER — DEXAMETHASONE SODIUM PHOSPHATE 4 MG/ML
INJECTION, SOLUTION INTRA-ARTICULAR; INTRALESIONAL; INTRAMUSCULAR; INTRAVENOUS; SOFT TISSUE
Status: DISCONTINUED | OUTPATIENT
Start: 2017-10-20 | End: 2017-10-20

## 2017-10-20 RX ORDER — KETOROLAC TROMETHAMINE 30 MG/ML
15 INJECTION, SOLUTION INTRAMUSCULAR; INTRAVENOUS ONCE
Status: COMPLETED | OUTPATIENT
Start: 2017-10-20 | End: 2017-10-20

## 2017-10-20 RX ORDER — ONDANSETRON 8 MG/1
8 TABLET, ORALLY DISINTEGRATING ORAL EVERY 8 HOURS PRN
Status: DISCONTINUED | OUTPATIENT
Start: 2017-10-20 | End: 2017-10-20 | Stop reason: HOSPADM

## 2017-10-20 RX ORDER — HYDROMORPHONE HYDROCHLORIDE 2 MG/ML
0.4 INJECTION, SOLUTION INTRAMUSCULAR; INTRAVENOUS; SUBCUTANEOUS EVERY 5 MIN PRN
Status: DISCONTINUED | OUTPATIENT
Start: 2017-10-20 | End: 2017-10-20 | Stop reason: HOSPADM

## 2017-10-20 RX ORDER — ONDANSETRON HYDROCHLORIDE 2 MG/ML
INJECTION, SOLUTION INTRAMUSCULAR; INTRAVENOUS
Status: DISCONTINUED | OUTPATIENT
Start: 2017-10-20 | End: 2017-10-20

## 2017-10-20 RX ORDER — MIDAZOLAM HYDROCHLORIDE 1 MG/ML
INJECTION INTRAMUSCULAR; INTRAVENOUS
Status: DISCONTINUED | OUTPATIENT
Start: 2017-10-20 | End: 2017-10-20

## 2017-10-20 RX ORDER — LIDOCAINE HCL/PF 100 MG/5ML
SYRINGE (ML) INTRAVENOUS
Status: DISCONTINUED | OUTPATIENT
Start: 2017-10-20 | End: 2017-10-20

## 2017-10-20 RX ORDER — HYDROCODONE BITARTRATE AND ACETAMINOPHEN 7.5; 325 MG/1; MG/1
1 TABLET ORAL EVERY 4 HOURS PRN
Qty: 40 TABLET | Refills: 0 | Status: SHIPPED | OUTPATIENT
Start: 2017-10-20 | End: 2017-10-30 | Stop reason: SDUPTHER

## 2017-10-20 RX ORDER — CEFAZOLIN SODIUM 2 G/50ML
2 SOLUTION INTRAVENOUS
Status: DISCONTINUED | OUTPATIENT
Start: 2017-10-20 | End: 2017-10-20 | Stop reason: HOSPADM

## 2017-10-20 RX ORDER — PROPOFOL 10 MG/ML
VIAL (ML) INTRAVENOUS
Status: DISCONTINUED | OUTPATIENT
Start: 2017-10-20 | End: 2017-10-20

## 2017-10-20 RX ORDER — OXYCODONE HYDROCHLORIDE 5 MG/1
10 TABLET ORAL EVERY 4 HOURS PRN
Status: DISCONTINUED | OUTPATIENT
Start: 2017-10-20 | End: 2017-10-20 | Stop reason: HOSPADM

## 2017-10-20 RX ORDER — FENTANYL CITRATE 50 UG/ML
INJECTION, SOLUTION INTRAMUSCULAR; INTRAVENOUS
Status: DISCONTINUED | OUTPATIENT
Start: 2017-10-20 | End: 2017-10-20

## 2017-10-20 RX ORDER — ACETAMINOPHEN 325 MG/1
650 TABLET ORAL EVERY 4 HOURS PRN
Status: DISCONTINUED | OUTPATIENT
Start: 2017-10-20 | End: 2017-10-20 | Stop reason: HOSPADM

## 2017-10-20 RX ORDER — EPINEPHRINE 1 MG/ML
INJECTION, SOLUTION INTRACARDIAC; INTRAMUSCULAR; INTRAVENOUS; SUBCUTANEOUS
Status: DISCONTINUED | OUTPATIENT
Start: 2017-10-20 | End: 2017-10-20 | Stop reason: HOSPADM

## 2017-10-20 RX ORDER — SUCCINYLCHOLINE CHLORIDE 20 MG/ML
INJECTION INTRAMUSCULAR; INTRAVENOUS
Status: DISCONTINUED | OUTPATIENT
Start: 2017-10-20 | End: 2017-10-20

## 2017-10-20 RX ORDER — ACETAMINOPHEN 10 MG/ML
INJECTION, SOLUTION INTRAVENOUS
Status: DISCONTINUED | OUTPATIENT
Start: 2017-10-20 | End: 2017-10-20

## 2017-10-20 RX ORDER — SODIUM CHLORIDE, SODIUM LACTATE, POTASSIUM CHLORIDE, CALCIUM CHLORIDE 600; 310; 30; 20 MG/100ML; MG/100ML; MG/100ML; MG/100ML
INJECTION, SOLUTION INTRAVENOUS CONTINUOUS PRN
Status: DISCONTINUED | OUTPATIENT
Start: 2017-10-20 | End: 2017-10-20

## 2017-10-20 RX ORDER — LORAZEPAM 1 MG/1
2 TABLET ORAL ONCE
Status: DISCONTINUED | OUTPATIENT
Start: 2017-10-20 | End: 2017-10-20

## 2017-10-20 RX ORDER — ONDANSETRON 2 MG/ML
4 INJECTION INTRAMUSCULAR; INTRAVENOUS DAILY PRN
Status: DISCONTINUED | OUTPATIENT
Start: 2017-10-20 | End: 2017-10-20 | Stop reason: HOSPADM

## 2017-10-20 RX ORDER — OXYCODONE AND ACETAMINOPHEN 5; 325 MG/1; MG/1
1 TABLET ORAL
Status: DISCONTINUED | OUTPATIENT
Start: 2017-10-20 | End: 2017-10-20 | Stop reason: HOSPADM

## 2017-10-20 RX ORDER — HYDROMORPHONE HYDROCHLORIDE 2 MG/ML
0.2 INJECTION, SOLUTION INTRAMUSCULAR; INTRAVENOUS; SUBCUTANEOUS EVERY 5 MIN PRN
Status: DISCONTINUED | OUTPATIENT
Start: 2017-10-20 | End: 2017-10-20 | Stop reason: HOSPADM

## 2017-10-20 RX ADMIN — MIDAZOLAM HYDROCHLORIDE 2 MG: 1 INJECTION, SOLUTION INTRAMUSCULAR; INTRAVENOUS at 10:10

## 2017-10-20 RX ADMIN — OXYCODONE HYDROCHLORIDE 10 MG: 5 TABLET ORAL at 04:10

## 2017-10-20 RX ADMIN — ACETAMINOPHEN 1000 MG: 10 INJECTION, SOLUTION INTRAVENOUS at 10:10

## 2017-10-20 RX ADMIN — HYDROMORPHONE HYDROCHLORIDE 0.4 MG: 2 INJECTION, SOLUTION INTRAMUSCULAR; INTRAVENOUS; SUBCUTANEOUS at 12:10

## 2017-10-20 RX ADMIN — FENTANYL CITRATE 150 MCG: 50 INJECTION, SOLUTION INTRAMUSCULAR; INTRAVENOUS at 10:10

## 2017-10-20 RX ADMIN — SODIUM CHLORIDE, SODIUM LACTATE, POTASSIUM CHLORIDE, AND CALCIUM CHLORIDE: .6; .31; .03; .02 INJECTION, SOLUTION INTRAVENOUS at 10:10

## 2017-10-20 RX ADMIN — EPINEPHRINE 6 ML: 1 INJECTION, SOLUTION INTRAMUSCULAR; SUBCUTANEOUS at 09:10

## 2017-10-20 RX ADMIN — CEFAZOLIN SODIUM 2 G: 2 SOLUTION INTRAVENOUS at 10:10

## 2017-10-20 RX ADMIN — FENTANYL CITRATE 50 MCG: 50 INJECTION, SOLUTION INTRAMUSCULAR; INTRAVENOUS at 11:10

## 2017-10-20 RX ADMIN — SODIUM CHLORIDE, SODIUM LACTATE, POTASSIUM CHLORIDE, AND CALCIUM CHLORIDE: .6; .31; .03; .02 INJECTION, SOLUTION INTRAVENOUS at 08:10

## 2017-10-20 RX ADMIN — KETOROLAC TROMETHAMINE 15 MG: 30 INJECTION, SOLUTION INTRAMUSCULAR at 12:10

## 2017-10-20 RX ADMIN — SUCCINYLCHOLINE CHLORIDE 120 MG: 20 INJECTION, SOLUTION INTRAMUSCULAR; INTRAVENOUS at 10:10

## 2017-10-20 RX ADMIN — LORAZEPAM 2 MG: 2 INJECTION INTRAMUSCULAR; INTRAVENOUS at 01:10

## 2017-10-20 RX ADMIN — ONDANSETRON 8 MG: 2 INJECTION, SOLUTION INTRAMUSCULAR; INTRAVENOUS at 11:10

## 2017-10-20 RX ADMIN — DEXAMETHASONE SODIUM PHOSPHATE 8 MG: 4 INJECTION, SOLUTION INTRAMUSCULAR; INTRAVENOUS at 11:10

## 2017-10-20 RX ADMIN — PROPOFOL 150 MG: 10 INJECTION, EMULSION INTRAVENOUS at 10:10

## 2017-10-20 RX ADMIN — PROMETHAZINE HYDROCHLORIDE 6.25 MG: 25 INJECTION INTRAMUSCULAR; INTRAVENOUS at 01:10

## 2017-10-20 RX ADMIN — PROPOFOL 50 MG: 10 INJECTION, EMULSION INTRAVENOUS at 10:10

## 2017-10-20 RX ADMIN — LIDOCAINE HYDROCHLORIDE 80 MG: 20 INJECTION, SOLUTION INTRAVENOUS at 10:10

## 2017-10-20 NOTE — H&P
CHIEF COMPLAINT:  Left shoulder pain.     HISTORY OF PRESENT ILLNESS:  A 52-year-old female with ongoing symptoms of left   shoulder for more than a year now.  Symptoms are worse with use, particularly   overhead lifting.  She has had therapy in the past without relief.  We also   tried an injection, which only worked for a few weeks.     A recent MRI showed tendinopathy and bursitis.     PAST MEDICAL HISTORY:  Significant for anxiety, hip pain, GERD, hyperlipidemia,   hypertension, irritable bowel, insomnia and PTSD.     PAST SURGICAL HISTORY:  Includes , tonsillectomy and adenoidectomy.     SOCIAL HISTORY:  The patient is a former smoker, currently vapes, does not drink   alcohol.     REVIEW OF SYSTEMS:  Negative for fever, chills or rashes.     CURRENT MEDICATIONS:  Reviewed on chart.     ALLERGIES:  None.     PHYSICAL EXAMINATION:  GENERAL:  A well-developed, well-nourished female, in no acute distress, alert   and oriented x3.  HEENT:  Unremarkable.  LUNGS:  Clear to auscultation.  HEART:  Regular rate and rhythm.  ABDOMEN:  Soft, nontender.  EXTREMITIES:  Significant for the left shoulder demonstrating some tenderness   anterolaterally.  No bruising, no swelling.  Range of motion is slightly   decreased secondary to pain.  She does have a positive impingement sign and a   positive supraspinatus stress test.  Rotator cuff strength intact.     MRI of the left shoulder demonstrates some bursitis with impingement and   tendinosis of the rotator cuff.     IMPRESSION:  Left shoulder impingement, possible partial tear of rotator cuff.     PLAN:  The patient would like to set up surgery for the left shoulder.  This   would include left shoulder arthroscopy, possible rotator cuff repair.  The   risks and benefits of surgery were explained to the patient, she understands.

## 2017-10-20 NOTE — TRANSFER OF CARE
"Anesthesia Transfer of Care Note    Patient: Sherie Reyes    Procedure(s) Performed: Procedure(s) (LRB):  REPAIR ROTATOR CUFF ARTHROSCOPIC (Left)    Patient location: PACU    Anesthesia Type: general and regional    Transport from OR: Transported from OR on 6-10 L/min O2 by face mask with adequate spontaneous ventilation    Post pain: adequate analgesia    Post assessment: no apparent anesthetic complications    Post vital signs: stable    Level of consciousness: sedated and responds to stimulation    Nausea/Vomiting: no nausea/vomiting    Complications: none    Transfer of care protocol was followed      Last vitals:   Visit Vitals  BP (!) 113/100   Pulse 100   Temp 36.1 °C (97 °F) (Temporal)   Resp 17   Ht 5' 2" (1.575 m)   Wt 77.1 kg (170 lb)   LMP 10/13/2017   SpO2 99%   Breastfeeding? No   BMI 31.09 kg/m²     "

## 2017-10-20 NOTE — ANESTHESIA PREPROCEDURE EVALUATION
10/20/2017  Sherie Reyes is a 52 y.o., female.    Anesthesia Evaluation      I have reviewed the Medications.     Review of Systems  Anesthesia Hx:  History of prior surgery of interest to airway management or planning: Denies Personal Hx of Anesthesia complications.   Social:  Former Smoker, No Alcohol Use    Cardiovascular:   Hypertension, well controlled    Hepatic/GI:   GERD, well controlled    Musculoskeletal:   Arthritis     Psych:   Psychiatric History anxiety          Physical Exam  General:  Well nourished, Obesity    Airway/Jaw/Neck:  Airway Findings: Mouth Opening: Normal Tongue: Normal  General Airway Assessment: Adult  Mallampati: III  TM Distance: Normal, at least 6 cm  Jaw/Neck Findings:     Neck ROM: Normal ROM      Dental:  Dental Findings: In tact   Chest/Lungs:  Chest/Lungs Findings: Clear to auscultation, Normal Respiratory Rate     Heart/Vascular:  Heart Findings: Rate: Normal  Rhythm: Regular Rhythm        Mental Status:  Mental Status Findings:  Cooperative, Alert and Oriented         Anesthesia Plan  Type of Anesthesia, risks & benefits discussed:  Anesthesia Type:  general  Patient's Preference: general  Intra-op Monitoring Plan:   Intra-op Monitoring Plan Comments:   Post Op Pain Control Plan: multimodal analgesia, peripheral nerve block and IV/PO Opioids PRN  Post Op Pain Control Plan Comments:   Induction:   IV  Beta Blocker:  Patient is not currently on a Beta-Blocker (No further documentation required).       Informed Consent: Patient understands risks and agrees with Anesthesia plan.  Questions answered. Anesthesia consent signed with patient.  ASA Score: 2     Day of Surgery Review of History & Physical:    H&P update referred to the surgeon.         Ready For Surgery From Anesthesia Perspective.

## 2017-10-20 NOTE — BRIEF OP NOTE
[facility]  Brief Operative Note     SUMMARY     Surgery Date: 10/20/2017     Surgeon(s) and Role:     * Melchor Hughes Jr., MD - Primary    Assisting Surgeon: None    Pre-op Diagnosis:  Chronic pain of both shoulders [M25.512, G89.29, M25.511]    Post-op Diagnosis:  Post-Op Diagnosis Codes:     * Chronic pain of both shoulders [M25.511, G89.29, M25.512]    Procedure(s) (LRB):  REPAIR ROTATOR CUFF ARTHROSCOPIC (Left)    Anesthesia: General    Description of the findings of the procedure: left shoulder SAD and AC resection    Findings/Key Components: left shoulder impingemnet    Estimated Blood Loss: * No values recorded between 10/20/2017 11:20 AM and 10/20/2017 12:11 PM *         Specimens:   Specimen (12h ago through future)    None          Discharge Note    SUMMARY     Admit Date: 10/20/2017    Discharge Date and Time:  10/20/2017 12:14 PM    Hospital Course (synopsis of major diagnoses, care, treatment, and services provided during the course of the hospital stay): see op noyte     Final Diagnosis: Post-Op Diagnosis Codes:     * Chronic pain of both shoulders [M25.511, G89.29, M25.512]    Disposition: Home or Self Care    Follow Up/Patient Instructions:     Medications:  Reconciled Home Medications:   Current Discharge Medication List      START taking these medications    Details   hydrocodone-acetaminophen 7.5-325mg (NORCO) 7.5-325 mg per tablet Take 1 tablet by mouth every 4 (four) hours as needed.  Qty: 40 tablet, Refills: 0         CONTINUE these medications which have NOT CHANGED    Details   buPROPion (WELLBUTRIN XL) 300 MG 24 hr tablet Take 300 mg by mouth once daily.      duloxetine (CYMBALTA) 60 MG capsule Take 60 mg by mouth once daily.      alprazolam 1 mg/mL Conc       clonazePAM (KLONOPIN) 1 MG tablet Take 1 mg by mouth 3 (three) times daily.      diphenhydramine-acetaminophen (TYLENOL PM)  mg Tab Take 1 tablet by mouth nightly as needed.      gabapentin (NEURONTIN) 300  MG capsule Take 300 mg by mouth 6 (six) times daily.       gemfibrozil (LOPID) 600 MG tablet Take 1 tablet (600 mg total) by mouth 2 (two) times daily before meals.  Qty: 60 tablet, Refills: 6      ibuprofen (ADVIL,MOTRIN) 600 MG tablet Take 1 tablet (600 mg total) by mouth 2 (two) times daily. With food  Qty: 28 tablet, Refills: 0    Associated Diagnoses: Chronic pain of both shoulders      KURVELO 0.15-0.03 mg per tablet Take 1 tablet by mouth once daily.      LINZESS 290 mcg Cap Take 1 capsule by mouth once daily.      melatonin 5 mg Tab Take by mouth nightly as needed.      methylPREDNISolone (MEDROL DOSEPACK) 4 mg tablet Take as directed  Qty: 1 Package, Refills: 0    Associated Diagnoses: Chronic pain of both shoulders      pantoprazole (PROTONIX) 40 MG tablet Take 40 mg by mouth once daily.      POLYETHYLENE GLYCOL 3350 (MIRALAX ORAL) Take by mouth as needed.       promethazine (PHENERGAN) 25 MG tablet       quetiapine (SEROQUEL) 50 MG tablet Take 50 mg by mouth every evening.      tizanidine (ZANAFLEX) 6 mg capsule Take 6 mg by mouth 2 (two) times daily as needed for Muscle spasms.             Discharge Procedure Orders  Diet general     Shower on day dressing removed (No bath)     Call MD for:  temperature >100.4     Call MD for:  persistent nausea and vomiting     Call MD for:  severe uncontrolled pain     Ice to affected area     Remove dressing in 24 hours       Follow-up Information     Melchor Hughes Jr, MD. Schedule an appointment as soon as possible for a visit in 8 days.    Specialties:  Hand Surgery, Orthopedic Surgery  Why:  For suture removal  Contact information:  [address]  [address]  [facility] [address] [phone]

## 2017-10-20 NOTE — ANESTHESIA PROCEDURE NOTES
Continuous interscalene block    Patient location during procedure: holding area   Block not for primary anesthetic.  Reason for block: at surgeon's request and post-op pain management   Post-op Pain Location: shoulder pain  Start time: 10/20/2017 10:26 AM  Timeout: 10/20/2017 10:25 AM   End time: 10/20/2017 10:44 AM  Surgery related to: left shoulder pain  Staffing  Anesthesiologist: AMILCAR ROTH  Performed: anesthesiologist   Preanesthetic Checklist  Completed: patient identified, site marked, surgical consent, pre-op evaluation, timeout performed, IV checked, risks and benefits discussed and monitors and equipment checked  Peripheral Block  Patient position: supine  Prep: ChloraPrep and site prepped and draped  Patient monitoring: heart rate, cardiac monitor, continuous pulse ox, continuous capnometry and frequent blood pressure checks  Block type: interscalene  Laterality: left  Injection technique: continuous  Needle  Needle type: echogenic   Needle gauge: 20 G  Needle length: 3.5 in  Needle localization: ultrasound guidance and nerve stimulator  Needle insertion depth: 4 cm  Catheter type: stimulating  Catheter size: 18 G  Catheter at skin depth: 4 cm  Test dose: negative   -ultrasound image captured on disc.  Assessment  Injection assessment: negative aspiration, negative parasthesia and local visualized surrounding nerve  Paresthesia pain: none  Heart rate change: no  Slow fractionated injection: yes  Medications:  Bolus administered: 30 mL of 0.2 ropivacaine  Epinephrine added: 5 mcg/mL (1/200,000)  Additional Notes  Clonidine 25mcg added

## 2017-10-20 NOTE — ANESTHESIA POSTPROCEDURE EVALUATION
"Anesthesia Post Evaluation    Patient: Sherie Reyes    Procedure(s) Performed: Procedure(s) (LRB):  REPAIR ROTATOR CUFF ARTHROSCOPIC (Left)    Final Anesthesia Type: general  Patient location during evaluation: PACU  Patient participation: Yes- Able to Participate  Level of consciousness: awake and alert  Post-procedure vital signs: reviewed and stable  Pain management: adequate  Airway patency: patent  PONV status at discharge: No PONV  Anesthetic complications: no      Cardiovascular status: blood pressure returned to baseline  Respiratory status: unassisted  Hydration status: euvolemic  Follow-up not needed.        Visit Vitals  BP (!) 180/94   Pulse (!) 128   Temp 37.1 °C (98.8 °F) (Skin)   Resp (!) 22   Ht 5' 2" (1.575 m)   Wt 77.1 kg (170 lb)   LMP 10/13/2017   SpO2 96%   Breastfeeding? No   BMI 31.09 kg/m²       Pain/Jessica Score: Pain Assessment Performed: Yes (10/20/2017 12:15 PM)  Presence of Pain: non-verbal indicators absent (10/20/2017 12:15 PM)  Pain Rating Prior to Med Admin: 6 (10/20/2017 12:59 PM)  Jessica Score: 8 (10/20/2017 12:30 PM)      "

## 2017-10-20 NOTE — PLAN OF CARE
Patient sleeping,arousable. VSS. Dr. Angelo okay to release patient from PACU. Report to Nik AKINS with time for questions.

## 2017-10-20 NOTE — DISCHARGE INSTRUCTIONS
DIET: You may resume your home diet. If nausea is present, increase your diet gradually with fluids and bland foods    ACTIVITY LEVEL: You have received sedation or an anesthetic, you may feel sleepy for several hours. Rest until you are more awake. Gradually resume your normal activities    Medications: Pain medication should be taken only if needed and as directed. If antibiotics are prescribed, the medication should be taken until completed. You will be given an updated list of you medications.    No driving, alcoholic beverages or signing legal documents for next 24 hours or while taking pain medication.       CALL THE DOCTOR:    For any obvious bleeding (some dried blood over the incision is normal).      Redness, swelling, foul smell around incision or fever over 101.   Shortness of breath, Coughing up Bloody sputum, Pains or Swelling in your Calves .   Persistent pain or nausea not relieved by medication.    If any unusual problems or difficulties occur contact your doctor. If you cannot contact your doctor but feel your signs and symptoms warrant a physicians attention return to the emergency room.

## 2017-10-20 NOTE — PLAN OF CARE
POC board updated and reviewed with pt and pt's family. Pt and pt's family verbalize understanding. Safety precautions maintained. Call bell in reach. Non skid socks on. Bed locked and in lowest position. Instructed pt to call for assistance. Pt verbalizes understanding.

## 2017-10-20 NOTE — PLAN OF CARE
..VSS  NAD  Discharge instructions given with claimed understanding by pt and family. Patient has ride home with family or friend. Claims pain level is ___5__ at this time.  Has voided without difficulty if required by surgical type. Dressing to left shoulder is dry and intact   Arm remains in sling  Dr lafleur made rounds

## 2017-10-20 NOTE — PLAN OF CARE
Block complete, vss, no issues, negative aspiration and test dose site confirmed with US and nerve stimulator, catheter secured in place

## 2017-10-21 NOTE — OP NOTE
DATE OF PROCEDURE:  10/20/2017.    PREOPERATIVE DIAGNOSES:  1.  Left shoulder impingement with partial tear rotator cuff.  2.  Left shoulder acromioclavicular arthritis.    POSTOPERATIVE DIAGNOSES:  1.  Left shoulder impingement with partial tear rotator cuff.  2.  Left shoulder acromioclavicular arthritis.    OPERATIVE PROCEDURES:  1.  Left shoulder arthroscopy with subacromial decompression and debridement of   partial tear rotator cuff.  2.  Left shoulder arthroscopic acromioclavicular joint resection (mini-Lyric).    SURGEON:  Melchor Hughes Jr., M.D.    ANESTHESIA:  General endotracheal.    ESTIMATED BLOOD LOSS:  Minimal.    COMPLICATIONS:  None.    SPECIMENS:  None.    BRIEF INDICATIONS:  A 52-year-old female with left shoulder symptoms   unresponsive to conservative treatment.    PROCEDURE IN DETAIL:  After operative consent was obtained, the patient brought   to the Operating Room, placed supine on the operating room table.  Anesthesia by   general endotracheal method performed by the Anesthesia staff.  After the   patient was asleep, carefully turned to lateral decubitus position and   stabilized on the bean bag.  The left shoulder and upper extremity prepped and   draped out in the normal sterile fashion.  The left arm suspended in 12 pounds   longitudinal traction.    Following this, a posterolateral stab incision made with a #15 blade and the   scope inserted into the left shoulder joint.  Diagnostic arthroscopy of the   shoulder showed some degenerative arthritis and mild-to-moderate and some   partial tear rotator cuff bursal side, scope reinserted into the subacromial   space.  A lateral portal, was created with a #15 blade, the sucker shaver   inserted laterally and a complete bursectomy performed.  The CA ligament was   divided and excised.  The bur was brought in laterally and an acromioplasty   performed from lateral to medial, decompressing the subacromial space all the   way to the AC joint.   After smoothing all surfaces.  The AC joint was identified   and noted to be degenerative and a distal clavicle mini Lyric excision   performed removing the distal 5 to 8 mm of bone, smoothing down nicely.    Attention then turned to the rotator cuff, which had a partial tear less than   50% of the bursal surfaces, it was carefully debrided with the sucker shaver to   a smooth edge.  Hemostasis achieved with a Bovie.  The instruments were then   removed.  Excess fluid evacuated from the joint and the portals closed using   interrupted 3-0 nylon suture on the skin.  Sterile dressing applied followed by   a sling.  The patient extubated and brought to the Recovery Room in stable   condition.  All sponge and needle counts reported as correct.  No complications.      ISAEL/IN  dd: 10/20/2017 12:17:56 (CDT)  td: 10/20/2017 19:49:10 (CDT)  Doc ID   #[id number]  Job ID #[id number]    CC:

## 2017-10-22 ENCOUNTER — HOSPITAL ENCOUNTER (EMERGENCY)
Facility: HOSPITAL | Age: 52
Discharge: HOME | End: 2017-10-22
Attending: EMERGENCY MEDICINE
Payer: OTHER MISCELLANEOUS

## 2017-10-22 VITALS
DIASTOLIC BLOOD PRESSURE: 63 MMHG | HEART RATE: 82 BPM | TEMPERATURE: 98 F | SYSTOLIC BLOOD PRESSURE: 114 MMHG | RESPIRATION RATE: 16 BRPM | OXYGEN SATURATION: 97 %

## 2017-10-22 DIAGNOSIS — T50.905A MEDICATION SIDE EFFECT, INITIAL ENCOUNTER: ICD-10-CM

## 2017-10-22 DIAGNOSIS — Z48.00 ENCOUNTER FOR CHANGE OR REMOVAL OF WOUND DRESSING: Primary | ICD-10-CM

## 2017-10-22 LAB
ALBUMIN SERPL BCP-MCNC: 2.5 G/DL
ALP SERPL-CCNC: 40 U/L
ALT SERPL W/O P-5'-P-CCNC: 15 U/L
ANION GAP SERPL CALC-SCNC: 6 MMOL/L
AST SERPL-CCNC: 17 U/L
BASOPHILS # BLD AUTO: 0.02 K/UL
BASOPHILS NFR BLD: 0.2 %
BILIRUB SERPL-MCNC: 0.1 MG/DL
BILIRUB UR QL STRIP: NEGATIVE
BUN SERPL-MCNC: 9 MG/DL
CALCIUM SERPL-MCNC: 8.1 MG/DL
CHLORIDE SERPL-SCNC: 109 MMOL/L
CLARITY UR: CLEAR
CO2 SERPL-SCNC: 21 MMOL/L
COLOR UR: YELLOW
CREAT SERPL-MCNC: 1 MG/DL
DIFFERENTIAL METHOD: ABNORMAL
EOSINOPHIL # BLD AUTO: 0.2 K/UL
EOSINOPHIL NFR BLD: 1.9 %
ERYTHROCYTE [DISTWIDTH] IN BLOOD BY AUTOMATED COUNT: 13.4 %
EST. GFR  (AFRICAN AMERICAN): >60 ML/MIN/1.73 M^2
EST. GFR  (NON AFRICAN AMERICAN): >60 ML/MIN/1.73 M^2
GLUCOSE SERPL-MCNC: 83 MG/DL
GLUCOSE UR QL STRIP: NEGATIVE
HCT VFR BLD AUTO: 31 %
HGB BLD-MCNC: 9.9 G/DL
HGB UR QL STRIP: ABNORMAL
KETONES UR QL STRIP: NEGATIVE
LEUKOCYTE ESTERASE UR QL STRIP: NEGATIVE
LYMPHOCYTES # BLD AUTO: 3.9 K/UL
LYMPHOCYTES NFR BLD: 42.6 %
MCH RBC QN AUTO: 29.6 PG
MCHC RBC AUTO-ENTMCNC: 31.9 G/DL
MCV RBC AUTO: 93 FL
MONOCYTES # BLD AUTO: 0.6 K/UL
MONOCYTES NFR BLD: 6.6 %
NEUTROPHILS # BLD AUTO: 4.5 K/UL
NEUTROPHILS NFR BLD: 48.5 %
NITRITE UR QL STRIP: NEGATIVE
PH UR STRIP: 6 [PH] (ref 5–8)
PLATELET # BLD AUTO: 232 K/UL
PMV BLD AUTO: 10.6 FL
POTASSIUM SERPL-SCNC: 3.9 MMOL/L
PROT SERPL-MCNC: 5.3 G/DL
PROT UR QL STRIP: NEGATIVE
RBC # BLD AUTO: 3.34 M/UL
SODIUM SERPL-SCNC: 136 MMOL/L
SP GR UR STRIP: 1.01 (ref 1–1.03)
URN SPEC COLLECT METH UR: ABNORMAL
UROBILINOGEN UR STRIP-ACNC: NEGATIVE EU/DL
WBC # BLD AUTO: 9.18 K/UL

## 2017-10-22 PROCEDURE — 93005 ELECTROCARDIOGRAM TRACING: CPT

## 2017-10-22 PROCEDURE — 81003 URINALYSIS AUTO W/O SCOPE: CPT

## 2017-10-22 PROCEDURE — 96361 HYDRATE IV INFUSION ADD-ON: CPT

## 2017-10-22 PROCEDURE — 96360 HYDRATION IV INFUSION INIT: CPT

## 2017-10-22 PROCEDURE — 99284 EMERGENCY DEPT VISIT MOD MDM: CPT

## 2017-10-22 PROCEDURE — 25000003 PHARM REV CODE 250: Performed by: NURSE PRACTITIONER

## 2017-10-22 PROCEDURE — 80053 COMPREHEN METABOLIC PANEL: CPT

## 2017-10-22 PROCEDURE — 85025 COMPLETE CBC W/AUTO DIFF WBC: CPT

## 2017-10-22 RX ADMIN — SODIUM CHLORIDE 1000 ML: 0.9 INJECTION, SOLUTION INTRAVENOUS at 04:10

## 2017-10-22 RX ADMIN — SODIUM CHLORIDE 1000 ML: 0.9 INJECTION, SOLUTION INTRAVENOUS at 03:10

## 2017-10-22 NOTE — ED NOTES
Pt presents to ED with post op problem to pain pump after shoulder surgery she had done on Friday by Dr. Hughes. Pt states site around IV dressing was leaking and that she has not been receiving medication. Pt called Dr. Hughes and he told her to come to ED.

## 2017-10-22 NOTE — ED NOTES
Pt continues to be drowsy and easily falls asleep. Respirations even, unlabored. Equal rise and fall of chest.  Awakens very easily to voice. BP still low. NP aware. Will continue to monitor.

## 2017-10-22 NOTE — ADDENDUM NOTE
Addendum  created 10/22/17 1158 by Siddharth Hernández MD    Anesthesia Event edited, Sign clinical note

## 2017-10-22 NOTE — ANESTHESIA POST-OP PAIN MANAGEMENT
Acute Pain Service Progress Note    Sherie Reyes is a 52 y.o., female, [id number].    Surgery:    1.  Left shoulder arthroscopy with subacromial decompression and debridement of   partial tear rotator cuff.  2.  Left shoulder arthroscopic acromioclavicular joint resection (mini-Lyric).    Post Op Day #: 2    Catheter type: perineural  interscalene    Infusion type: Ropivacaine 0.2%  14 basal - now empty.    Problem List:    Active Hospital Problems    Diagnosis  POA    *Chronic pain of both shoulders [M25.511, G89.29, M25.512]  Yes      Resolved Hospital Problems    Diagnosis Date Resolved POA   No resolved problems to display.       Subjective:  Patient noticed leaking of fluid near catheter insertion site. Contacted On-Q help service, who recommended turning up infusion rate. Patient did so, On-Q ball now empty. Patient states she does not believe she had any relief from the catheter.   Pain with rest: 8    Numbers   Pain with movement: 9    Numbers   Side Effects    1. Pruritis No    2. Nausea No    3. Motor Blockade Yes    4. Sedation No    Objective:      Vitals   Vitals:    10/20/17 1630   BP: (!) 148/84   Pulse: 102   Resp: 17   Temp: 36.8 °C (98.2 °F)        Labs    Admission on 10/20/2017, Discharged on 10/20/2017   Component Date Value Ref Range Status    Sodium 10/20/2017 138  136 - 145 mmol/L Final    Potassium 10/20/2017 4.0  3.5 - 5.1 mmol/L Final    Chloride 10/20/2017 108  95 - 110 mmol/L Final    CO2 10/20/2017 21* 23 - 29 mmol/L Final    Glucose 10/20/2017 116* 70 - 110 mg/dL Final    BUN, Bld 10/20/2017 13  6 - 20 mg/dL Final    Creatinine 10/20/2017 1.0  0.5 - 1.4 mg/dL Final    Calcium 10/20/2017 9.3  8.7 - 10.5 mg/dL Final    Anion Gap 10/20/2017 9  8 - 16 mmol/L Final    eGFR if African American 10/20/2017 >60  >60 mL/min/1.73 m^2 Final    eGFR if non African American 10/20/2017 >60  >60 mL/min/1.73 m^2 Final        Meds   No current facility-administered medications for  this encounter.      Current Outpatient Prescriptions   Medication Sig Dispense Refill    buPROPion (WELLBUTRIN XL) 300 MG 24 hr tablet Take 300 mg by mouth once daily.      duloxetine (CYMBALTA) 60 MG capsule Take 60 mg by mouth once daily.      alprazolam 1 mg/mL Conc       clonazePAM (KLONOPIN) 1 MG tablet Take 1 mg by mouth 3 (three) times daily.      diphenhydramine-acetaminophen (TYLENOL PM)  mg Tab Take 1 tablet by mouth nightly as needed.      gabapentin (NEURONTIN) 300 MG capsule Take 300 mg by mouth 6 (six) times daily.       gemfibrozil (LOPID) 600 MG tablet Take 1 tablet (600 mg total) by mouth 2 (two) times daily before meals. 60 tablet 6    hydrocodone-acetaminophen 7.5-325mg (NORCO) 7.5-325 mg per tablet Take 1 tablet by mouth every 4 (four) hours as needed. 40 tablet 0    ibuprofen (ADVIL,MOTRIN) 600 MG tablet Take 1 tablet (600 mg total) by mouth 2 (two) times daily. With food 28 tablet 0    KURVELO 0.15-0.03 mg per tablet Take 1 tablet by mouth once daily.      LINZESS 290 mcg Cap Take 1 capsule by mouth once daily.      melatonin 5 mg Tab Take by mouth nightly as needed.      methylPREDNISolone (MEDROL DOSEPACK) 4 mg tablet Take as directed 1 Package 0    pantoprazole (PROTONIX) 40 MG tablet Take 40 mg by mouth once daily.      POLYETHYLENE GLYCOL 3350 (MIRALAX ORAL) Take by mouth as needed.       promethazine (PHENERGAN) 25 MG tablet       quetiapine (SEROQUEL) 50 MG tablet Take 50 mg by mouth every evening.      tizanidine (ZANAFLEX) 6 mg capsule Take 6 mg by mouth 2 (two) times daily as needed for Muscle spasms.         Assessment:     Pain control challenging    Plan:  Patient to remove catheter today.  Patient to take oral medications - prescription per primary team.      Evaluator Siddharth Hernández

## 2017-10-22 NOTE — DISCHARGE INSTRUCTIONS
You had your pain ball removed in the ED today.  Follow up with  as scheduled.  Take HALF of your prescription pain medication as prescribed if needed for pain.  Return to the ED if condition changes, progresses, or if you have any concerns. Increase your water intake.

## 2017-10-22 NOTE — ED NOTES
Pt assisted to restroom via wheelchair. Steady on her feet. No dizziness or lightheaded reported. States she feels fine. NAD noted.

## 2017-10-22 NOTE — ED PROVIDER NOTES
Encounter Date: 10/22/2017       History     Chief Complaint   Patient presents with    Post-op Problem     pt presents to ed with c/o left shoulder pain and discomfort s/p shoulder repain with dr. lafleur on Friday. pt reports beginning with leaking of pain pump on Friday night and hasn't been getting pain medication for relief. called dr. lafleur's office. was told to come into ed.      51yo female presents to the ED for a post-op problem.  On Friday, pt had orthoscopic surgery for left shoulder pain with .  She had a pain ball placed by anesthesia which was working well.  Starting Friday night, she noticed fluid coming from dressing at the location of the catheder.   The leaking worsened yesterday and today she reports that the pain pump is not working at all.  There has been no leakage from the dressing today.  She denies fever, weakness, increased pain, and drainage from the surgical site.  She called  today and was advised to come to the ED.  She reports that she took her first Norco at home about 30 mins PTA.  She denies CP, SOB, abd pain, and vomiting.  This is the extent of the patient's complaints at this time.        The history is provided by the patient.     Review of patient's allergies indicates:  No Known Allergies  Past Medical History:   Diagnosis Date    Anxiety     Degenerative joint disease (DJD) of hip     GERD (gastroesophageal reflux disease)     Hyperlipidemia     Hypertension     IBS (irritable bowel syndrome)     Insomnia     Lumbar disc herniation     PTSD (post-traumatic stress disorder)      Past Surgical History:   Procedure Laterality Date    ADENOIDECTOMY       SECTION      TONSILLECTOMY       History reviewed. No pertinent family history.  Social History   Substance Use Topics    Smoking status: Former Smoker     Types: Vaping w/o nicotine    Smokeless tobacco: Former User     Quit date: 10/20/2014      Comment: Vapor cigarettes    Alcohol use  No      Comment: socially     Review of Systems   Constitutional: Negative for chills, fatigue and fever.   HENT: Negative for congestion.    Respiratory: Negative for cough and shortness of breath.    Cardiovascular: Negative for chest pain.   Gastrointestinal: Negative for abdominal pain, diarrhea, nausea and vomiting.   Genitourinary: Negative for dysuria.   Musculoskeletal: Positive for arthralgias. Negative for back pain, joint swelling, neck pain and neck stiffness.   Skin: Positive for wound. Negative for rash.   Neurological: Negative for dizziness, weakness, numbness and headaches.   Psychiatric/Behavioral: Negative for confusion.       Physical Exam     Initial Vitals   BP Pulse Resp Temp SpO2   -- -- -- -- --      MAP       --         Physical Exam    Nursing note and vitals reviewed.  Constitutional: She appears well-developed and well-nourished. She is Obese . She is active and cooperative. She is easily aroused.  Non-toxic appearance. She does not have a sickly appearance. She does not appear ill. No distress.   Pt complains of dry mouth.    HENT:   Head: Normocephalic and atraumatic.   Eyes: Conjunctivae and EOM are normal.   Neck: Normal range of motion. Neck supple.   Cardiovascular: Normal rate, regular rhythm and normal heart sounds.   Pulses:       Radial pulses are 2+ on the right side, and 2+ on the left side.   Pulmonary/Chest: Effort normal and breath sounds normal.   Abdominal: Soft. Normal appearance and bowel sounds are normal. There is no tenderness.   Musculoskeletal:        Left shoulder: She exhibits tenderness, swelling and pain. She exhibits normal range of motion, no bony tenderness, no effusion, no crepitus, no deformity, no laceration, no spasm, normal pulse and normal strength.        Cervical back: Normal.        Left upper arm: Normal.   Neurological: She is alert, oriented to person, place, and time and easily aroused. She has normal strength. No sensory deficit. GCS eye  subscore is 4. GCS verbal subscore is 5. GCS motor subscore is 6.   Skin: Skin is warm, dry and intact. Bruising noted. No rash noted.        Well-approximated surgical wounds to left shoulder.    Psychiatric: She has a normal mood and affect. Her behavior is normal. Judgment and thought content normal. Her speech is slurred. Cognition and memory are normal.         ED Course   Procedures  Labs Reviewed - No data to display          Medical Decision Making:   Initial Assessment:   52-year-old female here for a left shoulder postop problem.  She reports the catheter of her pain pump is leaking.  She reports left shoulder pain.  No new trauma, fever/chills, chest pain, weakness, or increased pain. Pt appears well, nontoxic. Pt has hypotension.  Left shoulder surgical wounds well-approximated without drainage or signs of infection.  HR RRR.  Pain pump cath to left lateral neck with dressing loose.  No signs of infection.  No current leaking from site.    Differential Diagnosis:   Post-op complication, device malfunction, device removal, infection, medication side-effect.   Clinical Tests:   Lab Tests: Ordered and Reviewed  Radiological Study: Ordered and Reviewed  Medical Tests: Ordered and Reviewed  ED Management:  Exam, consult anesthesia, labs, IV fluids, EKG  Other:   I have discussed this case with another health care provider.       <> Summary of the Discussion: Anesthesia (Dr.James Vilchis) saw the pt in the ED and removed the catheter and pain ball.  Pt to f/u with  at scheduled appointment.   Anesthesia removed the pain pump and cath.    Pt has hypotension despite 1000ml NS IV bolus.  Labs and EKG ordered.  I feel the pt's hypotension is a side-effect of her medications.  Pt reports the only new medication is Norco, and she took it as prescribed.    Patient's blood pressure improved after 2L NS bolus.  Labs unremarkable.  CXR read by radiologist as negative for infiltrate or acute change.  Urinalysis  negative for infection.  Pt has norco and neurontin at home for pain. I feel the patient's hypotension was due to a medication side effect.  I advised her to take half of her prescribed pain medication as needed for pain. Advised f/u with  within 1-2 days. Increase fluid intake. Reviewed return precautions.  Pt verbalized understanding, compliance, and agreement with the treatment plan.                     ED Course      Clinical Impression:   The encounter diagnosis was Encounter for change or removal of wound dressing.                           YASMANI Aguero  10/22/17 1523       Heather Weber [facility]  10/22/17 1642       ARCENIO Aguero  10/22/17 1800       Heather Weber [facility]  10/22/17 1902

## 2017-10-23 ENCOUNTER — TELEPHONE (OUTPATIENT)
Dept: ORTHOPEDICS | Facility: CLINIC | Age: 52
End: 2017-10-23

## 2017-10-23 ENCOUNTER — TELEPHONE (OUTPATIENT)
Dept: INTERNAL MEDICINE | Facility: CLINIC | Age: 52
End: 2017-10-23

## 2017-10-23 NOTE — TELEPHONE ENCOUNTER
----- Message from Lise Lemos sent at 10/23/2017  4:22 PM CDT -----  Contact: pt   Pt would like to be called back regarding low blood pressure. Pt was I ER on yesterday 10/22/17.       Pt can be reached at [phone].

## 2017-10-23 NOTE — TELEPHONE ENCOUNTER
Spoke with pt, informed in pain. Pt advised to start taking pain meds d/t pain. Understanding verbalized. PO appt scheduled at this time.

## 2017-10-23 NOTE — TELEPHONE ENCOUNTER
Pt wanted to update Dr. Glynn, had surgery on Friday 10/20/17. Pt went to ER 10/22/17 for dehydration. Pt has family hx of low potassium.

## 2017-10-23 NOTE — TELEPHONE ENCOUNTER
----- Message from Magui Grey sent at 10/23/2017  4:17 PM CDT -----  Contact: [phone]/self  Patient had surgery on Friday 10/20/17 and is now having difficulties. Patient was seen in the ER yesterday. Please advise.

## 2017-10-26 ENCOUNTER — PATIENT MESSAGE (OUTPATIENT)
Dept: INTERNAL MEDICINE | Facility: CLINIC | Age: 52
End: 2017-10-26

## 2017-10-27 ENCOUNTER — PATIENT MESSAGE (OUTPATIENT)
Dept: INTERNAL MEDICINE | Facility: CLINIC | Age: 52
End: 2017-10-27

## 2017-10-30 ENCOUNTER — OFFICE VISIT (OUTPATIENT)
Dept: ORTHOPEDICS | Facility: CLINIC | Age: 52
End: 2017-10-30
Payer: COMMERCIAL

## 2017-10-30 DIAGNOSIS — G89.29 CHRONIC PAIN OF BOTH SHOULDERS: Primary | ICD-10-CM

## 2017-10-30 DIAGNOSIS — M25.512 CHRONIC PAIN OF BOTH SHOULDERS: Primary | ICD-10-CM

## 2017-10-30 DIAGNOSIS — M75.112 INCOMPLETE TEAR OF LEFT ROTATOR CUFF: ICD-10-CM

## 2017-10-30 DIAGNOSIS — M25.511 CHRONIC PAIN OF BOTH SHOULDERS: Primary | ICD-10-CM

## 2017-10-30 PROCEDURE — 99024 POSTOP FOLLOW-UP VISIT: CPT | Mod: S$GLB,,, | Performed by: ORTHOPAEDIC SURGERY

## 2017-10-30 PROCEDURE — 99999 PR PBB SHADOW E&M-EST. PATIENT-LVL II: CPT | Mod: PBBFAC,,, | Performed by: ORTHOPAEDIC SURGERY

## 2017-10-30 RX ORDER — HYDROCODONE BITARTRATE AND ACETAMINOPHEN 7.5; 325 MG/1; MG/1
1 TABLET ORAL EVERY 8 HOURS PRN
Qty: 60 TABLET | Refills: 0 | Status: SHIPPED | OUTPATIENT
Start: 2017-10-30 | End: 2017-11-06 | Stop reason: SDUPTHER

## 2017-10-30 NOTE — PROGRESS NOTES
HISTORY OF PRESENT ILLNESS:  Ms. Reyes in followup of left shoulder   arthroscopy and debridement, partial tear, rotator cuff.  She is doing well   except for pain in the shoulder.    PHYSICAL EXAMINATION:  LEFT SHOULDER:  The incision is healing well.  Slight swelling.  Slight   bruising.  Range of motion is limited secondary to pain.  NEUROLOGIC:  Intact.    PLAN:  Sutures were removed.  Instructed in range of motion exercises.  She can   start weaning out of the sling now.  Pain medicine refilled, but again, I want   her to cut back to no more than 2 pain pills per day.  We will order some   therapy for the shoulder for range of motion, passive and active assist.  No   heavy lifting.  Follow up in 3 weeks.      ISAEL/IN  dd: 10/30/2017 14:47:35 (CDT)  td: 10/31/2017 07:39:57 (CDT)  Doc ID   #[id number]  Job ID #[id number]    CC:

## 2017-10-31 ENCOUNTER — HOSPITAL ENCOUNTER (OUTPATIENT)
Dept: RADIOLOGY | Facility: HOSPITAL | Age: 52
Discharge: HOME | End: 2017-10-31
Attending: INTERNAL MEDICINE
Payer: COMMERCIAL

## 2017-10-31 ENCOUNTER — OFFICE VISIT (OUTPATIENT)
Dept: INTERNAL MEDICINE | Facility: CLINIC | Age: 52
End: 2017-10-31
Payer: COMMERCIAL

## 2017-10-31 VITALS
DIASTOLIC BLOOD PRESSURE: 80 MMHG | BODY MASS INDEX: 35.17 KG/M2 | SYSTOLIC BLOOD PRESSURE: 122 MMHG | HEIGHT: 62 IN | RESPIRATION RATE: 20 BRPM | TEMPERATURE: 98 F | WEIGHT: 191.13 LBS | HEART RATE: 88 BPM

## 2017-10-31 DIAGNOSIS — F41.1 GAD (GENERALIZED ANXIETY DISORDER): ICD-10-CM

## 2017-10-31 DIAGNOSIS — R14.0 ABDOMINAL DISTENSION: ICD-10-CM

## 2017-10-31 DIAGNOSIS — F32.A DEPRESSION, UNSPECIFIED DEPRESSION TYPE: ICD-10-CM

## 2017-10-31 DIAGNOSIS — D64.9 POSTOPERATIVE ANEMIA: ICD-10-CM

## 2017-10-31 DIAGNOSIS — F41.0 PANIC ATTACK: Primary | ICD-10-CM

## 2017-10-31 PROCEDURE — 74000 XR ABDOMEN AP 1 VIEW: CPT | Mod: 26,,, | Performed by: RADIOLOGY

## 2017-10-31 PROCEDURE — 99999 PR PBB SHADOW E&M-EST. PATIENT-LVL III: CPT | Mod: PBBFAC,,, | Performed by: INTERNAL MEDICINE

## 2017-10-31 PROCEDURE — 74000 XR ABDOMEN AP 1 VIEW: CPT | Mod: TC,PO

## 2017-10-31 PROCEDURE — 99214 OFFICE O/P EST MOD 30 MIN: CPT | Mod: S$GLB,,, | Performed by: INTERNAL MEDICINE

## 2017-10-31 NOTE — PROGRESS NOTES
Subjective:       Patient ID: Sherie Reyes is a 52 y.o. female.    Chief Complaint: Panic Attack    Patient is a 52 y.o.female who presents today for anxiety. She sees a therapist and a psychiatrist. She is taking wellbutrin, klonopin and cymbalta. She woke up with a panic attack. She called her therapist who said it was something medical. Pt recently  Had an EKG which was unchanged. She recently had surgery two weeks ago.     She complains of mild abdominal distension and is having small bowel movements.    Review of Systems   Constitutional: Negative for appetite change, chills, diaphoresis, fatigue and fever.   HENT: Negative for congestion, dental problem, ear discharge, ear pain, hearing loss, postnasal drip, sinus pressure and sore throat.    Eyes: Negative for discharge, redness and itching.   Respiratory: Negative for cough, chest tightness, shortness of breath and wheezing.    Cardiovascular: Negative for chest pain, palpitations and leg swelling.   Gastrointestinal: Positive for abdominal pain and constipation. Negative for diarrhea, nausea and vomiting.   Endocrine: Negative for cold intolerance and heat intolerance.   Genitourinary: Negative for difficulty urinating, frequency, hematuria and urgency.   Musculoskeletal: Negative for arthralgias, back pain, gait problem, myalgias and neck pain.   Skin: Negative for color change and rash.   Neurological: Negative for dizziness, syncope and headaches.   Hematological: Negative for adenopathy.   Psychiatric/Behavioral: Negative for behavioral problems and sleep disturbance. The patient is nervous/anxious.        Objective:      Physical Exam   Constitutional: She is oriented to person, place, and time. She appears well-developed and well-nourished. No distress.   HENT:   Head: Normocephalic and atraumatic.   Right Ear: External ear normal.   Left Ear: External ear normal.   Nose: Nose normal.   Mouth/Throat: Oropharynx is clear and moist. No  oropharyngeal exudate.   Eyes: Conjunctivae and EOM are normal. Pupils are equal, round, and reactive to light. Right eye exhibits no discharge. Left eye exhibits no discharge. No scleral icterus.   Neck: Normal range of motion. Neck supple. No JVD present. No thyromegaly present.   Cardiovascular: Normal rate, regular rhythm, normal heart sounds and intact distal pulses.  Exam reveals no gallop and no friction rub.    No murmur heard.  Pulmonary/Chest: Effort normal and breath sounds normal. No stridor. No respiratory distress. She has no wheezes. She has no rales. She exhibits no tenderness.   Abdominal: Soft. Bowel sounds are normal. She exhibits distension. There is no rebound.   Musculoskeletal: Normal range of motion. She exhibits no edema or tenderness.   Lymphadenopathy:     She has no cervical adenopathy.   Neurological: She is alert and oriented to person, place, and time. No cranial nerve deficit.   Skin: Skin is warm and dry. No rash noted. She is not diaphoretic. No erythema.   Psychiatric: She has a normal mood and affect. Her behavior is normal.   Nursing note and vitals reviewed.      Assessment and Plan:       1. Panic attack  - rule out medical cause; sees a psychiatrist  - CBC auto differential; Future  - Comprehensive metabolic panel; Future  - TSH; Future    2. ZARI (generalized anxiety disorder)  - on daily psych meds    3. Depression, unspecified depression type  - follows with psychiatry    4. Abdominal distension  - X-Ray Abdomen AP 1 View; Future  - Urinalysis; Future  - Urine culture; Future  - Iron and TIBC; Future  - Ferritin; Future    5. Postoperative anemia  - check labs          No Follow-up on file.

## 2017-11-01 ENCOUNTER — PATIENT MESSAGE (OUTPATIENT)
Dept: INTERNAL MEDICINE | Facility: CLINIC | Age: 52
End: 2017-11-01

## 2017-11-01 ENCOUNTER — PATIENT MESSAGE (OUTPATIENT)
Dept: ORTHOPEDICS | Facility: CLINIC | Age: 52
End: 2017-11-01

## 2017-11-01 RX ORDER — NITROFURANTOIN 25; 75 MG/1; MG/1
100 CAPSULE ORAL 2 TIMES DAILY
Qty: 10 CAPSULE | Refills: 0 | Status: SHIPPED | OUTPATIENT
Start: 2017-11-01 | End: 2017-11-06

## 2017-11-02 ENCOUNTER — TELEPHONE (OUTPATIENT)
Dept: ORTHOPEDICS | Facility: CLINIC | Age: 52
End: 2017-11-02

## 2017-11-02 NOTE — TELEPHONE ENCOUNTER
I spoke with the patient and informed her to ice, stay on antibiotics, take advil. She understood.

## 2017-11-02 NOTE — TELEPHONE ENCOUNTER
----- Message from Magui Grey sent at 11/2/2017  4:49 PM CDT -----  Contact: [phone]/self  Patient requesting to speak with you concerning the pain she's experiencing in her left shoulder. Please call and advise.

## 2017-11-06 ENCOUNTER — OFFICE VISIT (OUTPATIENT)
Dept: ORTHOPEDICS | Facility: CLINIC | Age: 52
End: 2017-11-06
Payer: COMMERCIAL

## 2017-11-06 VITALS — WEIGHT: 191 LBS | HEIGHT: 62 IN | BODY MASS INDEX: 35.15 KG/M2

## 2017-11-06 DIAGNOSIS — M75.112 INCOMPLETE TEAR OF LEFT ROTATOR CUFF: Primary | ICD-10-CM

## 2017-11-06 PROCEDURE — 99024 POSTOP FOLLOW-UP VISIT: CPT | Mod: S$GLB,,, | Performed by: ORTHOPAEDIC SURGERY

## 2017-11-06 PROCEDURE — 99999 PR PBB SHADOW E&M-EST. PATIENT-LVL II: CPT | Mod: PBBFAC,,, | Performed by: ORTHOPAEDIC SURGERY

## 2017-11-06 RX ORDER — HYDROCODONE BITARTRATE AND ACETAMINOPHEN 7.5; 325 MG/1; MG/1
1 TABLET ORAL EVERY 8 HOURS PRN
Qty: 60 TABLET | Refills: 0 | Status: SHIPPED | OUTPATIENT
Start: 2017-11-06 | End: 2017-11-27 | Stop reason: SDUPTHER

## 2017-11-06 NOTE — PROGRESS NOTES
HISTORY OF PRESENT ILLNESS:  Ms. Reyes returns early because of increasing   pain in the left shoulder.  She has been having a little bit more pain than she   thought was normal.  She has been taking hydrocodone three times a day.    PHYSICAL EXAMINATION:  LEFT SHOULDER:  The incisions are all well healed.  There is some mild swelling,   diffuse tenderness.  No evidence of infection.  Range of motion limited   secondary to pain.  Incisions well healed.    PLAN:  She has not started therapy yet, so I have encouraged her to follow up   with therapy as soon as possible.  I think that will help some of her symptoms.    Also, we are going to go ahead and start her on Vimovo twice a day with food to   get good anti-inflammatory effect and I want her to stop the ibuprofen   over-the-counter.  We can refill the hydrocodone, but no more than three per day   maximum.  Follow up in three weeks for recheck.      CHARLENE  dd: 11/06/2017 16:16:44 (CST)  td: 11/07/2017 11:48:42 (CST)  Doc ID   #[id number]  Job ID #[id number]    CC:

## 2017-11-18 ENCOUNTER — PATIENT MESSAGE (OUTPATIENT)
Dept: ORTHOPEDICS | Facility: CLINIC | Age: 52
End: 2017-11-18

## 2017-11-27 ENCOUNTER — OFFICE VISIT (OUTPATIENT)
Dept: ORTHOPEDICS | Facility: CLINIC | Age: 52
End: 2017-11-27
Payer: COMMERCIAL

## 2017-11-27 ENCOUNTER — CLINICAL SUPPORT (OUTPATIENT)
Dept: REHABILITATION | Facility: HOSPITAL | Age: 52
End: 2017-11-27
Attending: ORTHOPAEDIC SURGERY
Payer: COMMERCIAL

## 2017-11-27 ENCOUNTER — TELEPHONE (OUTPATIENT)
Dept: ORTHOPEDICS | Facility: CLINIC | Age: 52
End: 2017-11-27

## 2017-11-27 DIAGNOSIS — M25.612 DECREASED RANGE OF MOTION OF LEFT SHOULDER: ICD-10-CM

## 2017-11-27 DIAGNOSIS — G89.29 CHRONIC PAIN OF BOTH SHOULDERS: Primary | ICD-10-CM

## 2017-11-27 DIAGNOSIS — M75.112 INCOMPLETE TEAR OF LEFT ROTATOR CUFF: Primary | ICD-10-CM

## 2017-11-27 DIAGNOSIS — M25.511 CHRONIC PAIN OF BOTH SHOULDERS: Primary | ICD-10-CM

## 2017-11-27 DIAGNOSIS — M25.611 DECREASED ROM OF RIGHT SHOULDER: ICD-10-CM

## 2017-11-27 DIAGNOSIS — M25.512 CHRONIC PAIN OF BOTH SHOULDERS: Primary | ICD-10-CM

## 2017-11-27 PROCEDURE — 20610 DRAIN/INJ JOINT/BURSA W/O US: CPT | Mod: 79,RT,S$GLB, | Performed by: ORTHOPAEDIC SURGERY

## 2017-11-27 PROCEDURE — 97161 PT EVAL LOW COMPLEX 20 MIN: CPT

## 2017-11-27 PROCEDURE — 99024 POSTOP FOLLOW-UP VISIT: CPT | Mod: S$GLB,,, | Performed by: ORTHOPAEDIC SURGERY

## 2017-11-27 PROCEDURE — 99999 PR PBB SHADOW E&M-EST. PATIENT-LVL I: CPT | Mod: PBBFAC,,, | Performed by: ORTHOPAEDIC SURGERY

## 2017-11-27 RX ORDER — HYDROCODONE BITARTRATE AND ACETAMINOPHEN 7.5; 325 MG/1; MG/1
1 TABLET ORAL
Qty: 30 TABLET | Refills: 0 | Status: SHIPPED | OUTPATIENT
Start: 2017-11-27 | End: 2017-12-21 | Stop reason: SDUPTHER

## 2017-11-27 RX ORDER — TRIAMCINOLONE ACETONIDE 40 MG/ML
40 INJECTION, SUSPENSION INTRA-ARTICULAR; INTRAMUSCULAR
Status: COMPLETED | OUTPATIENT
Start: 2017-11-27 | End: 2017-11-27

## 2017-11-27 RX ADMIN — TRIAMCINOLONE ACETONIDE 40 MG: 40 INJECTION, SUSPENSION INTRA-ARTICULAR; INTRAMUSCULAR at 03:11

## 2017-11-27 NOTE — TELEPHONE ENCOUNTER
I spoke with Mrs. Herrera and informed her that she would have to go through our Legal department to set it up. She stated she have been calling and sending emails but can not get in contact with anyone. I informed her that I can not set it up, she was informed to try them again.
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
## 2017-11-27 NOTE — TELEPHONE ENCOUNTER
----- Message from Vero Stover sent at 11/27/2017 11:07 AM CST -----  Contact: Sharon from Attorneys office/ [phone]  Attorneys office called in to speak with Dr. Hughes about setting a conference call so 's office can speak with him about patient.    Please call and advise.

## 2017-11-27 NOTE — PROGRESS NOTES
HISTORY OF PRESENT ILLNESS:  Ms. Reyes in followup of left shoulder   arthroscopy.  She is about five weeks' postop, just started therapy last week,   having some pain in the opposite right shoulder now apparently because of   overuse.  She has had problems in the right shoulder as well.    PHYSICAL EXAMINATION:  LEFT SHOULDER:  Incisions well healed.  No swelling, no tenderness.  Range of   motion is actually pretty good passively, limited active elevation and some   weakness.  RIGHT SHOULDER:  Demonstrates a positive impingement sign, which is actually   worse than the left.  NEUROLOGIC:  Intact.    PLAN:  I would like her to continue therapy for the left shoulder, refilled   hydrocodone, but no more than one per day.    For the opposite right shoulder, the patient would like to have an injection for   the right shoulder.  After pause for timeout, she identified the right shoulder   injected with combination of Kenalog 40 mg, 2 mL Xylocaine, sterile technique.    She tolerated the procedure well without complications.    Follow up in 3-4 weeks.      CHARLENE  dd: 11/27/2017 15:45:20 (CST)  td: 11/28/2017 10:24:41 (CST)  Doc ID   #[id number]  Job ID #[id number]    CC:

## 2017-12-06 ENCOUNTER — CLINICAL SUPPORT (OUTPATIENT)
Dept: REHABILITATION | Facility: HOSPITAL | Age: 52
End: 2017-12-06
Attending: ORTHOPAEDIC SURGERY
Payer: COMMERCIAL

## 2017-12-06 DIAGNOSIS — M25.511 CHRONIC PAIN OF BOTH SHOULDERS: Primary | ICD-10-CM

## 2017-12-06 DIAGNOSIS — M25.512 CHRONIC PAIN OF BOTH SHOULDERS: Primary | ICD-10-CM

## 2017-12-06 DIAGNOSIS — G89.29 CHRONIC PAIN OF BOTH SHOULDERS: Primary | ICD-10-CM

## 2017-12-06 PROCEDURE — 97140 MANUAL THERAPY 1/> REGIONS: CPT

## 2017-12-06 PROCEDURE — 97110 THERAPEUTIC EXERCISES: CPT
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
## 2017-12-12 ENCOUNTER — CLINICAL SUPPORT (OUTPATIENT)
Dept: REHABILITATION | Facility: HOSPITAL | Age: 52
End: 2017-12-12
Attending: ORTHOPAEDIC SURGERY
Payer: COMMERCIAL

## 2017-12-12 DIAGNOSIS — M25.611 DECREASED ROM OF RIGHT SHOULDER: ICD-10-CM

## 2017-12-12 DIAGNOSIS — M25.512 CHRONIC PAIN OF BOTH SHOULDERS: Primary | ICD-10-CM

## 2017-12-12 DIAGNOSIS — M25.612 DECREASED RANGE OF MOTION OF LEFT SHOULDER: ICD-10-CM

## 2017-12-12 DIAGNOSIS — M25.511 CHRONIC PAIN OF BOTH SHOULDERS: Primary | ICD-10-CM

## 2017-12-12 DIAGNOSIS — G89.29 CHRONIC PAIN OF BOTH SHOULDERS: Primary | ICD-10-CM

## 2017-12-12 PROCEDURE — 97140 MANUAL THERAPY 1/> REGIONS: CPT

## 2017-12-12 PROCEDURE — 97110 THERAPEUTIC EXERCISES: CPT
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
## 2017-12-14 ENCOUNTER — CLINICAL SUPPORT (OUTPATIENT)
Dept: REHABILITATION | Facility: HOSPITAL | Age: 52
End: 2017-12-14
Attending: ORTHOPAEDIC SURGERY
Payer: COMMERCIAL

## 2017-12-14 DIAGNOSIS — M25.611 DECREASED ROM OF RIGHT SHOULDER: ICD-10-CM

## 2017-12-14 DIAGNOSIS — G89.29 CHRONIC PAIN OF BOTH SHOULDERS: Primary | ICD-10-CM

## 2017-12-14 DIAGNOSIS — M25.512 CHRONIC PAIN OF BOTH SHOULDERS: Primary | ICD-10-CM

## 2017-12-14 DIAGNOSIS — M25.612 DECREASED RANGE OF MOTION OF LEFT SHOULDER: ICD-10-CM

## 2017-12-14 DIAGNOSIS — M25.511 CHRONIC PAIN OF BOTH SHOULDERS: Primary | ICD-10-CM

## 2017-12-14 PROCEDURE — 97140 MANUAL THERAPY 1/> REGIONS: CPT

## 2017-12-14 PROCEDURE — 97110 THERAPEUTIC EXERCISES: CPT
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
## 2017-12-15 ENCOUNTER — PATIENT MESSAGE (OUTPATIENT)
Dept: ORTHOPEDICS | Facility: CLINIC | Age: 52
End: 2017-12-15

## 2017-12-18 ENCOUNTER — CLINICAL SUPPORT (OUTPATIENT)
Dept: REHABILITATION | Facility: HOSPITAL | Age: 52
End: 2017-12-18
Attending: ORTHOPAEDIC SURGERY
Payer: COMMERCIAL

## 2017-12-18 DIAGNOSIS — G89.29 CHRONIC PAIN OF BOTH SHOULDERS: Primary | ICD-10-CM

## 2017-12-18 DIAGNOSIS — M25.511 CHRONIC PAIN OF BOTH SHOULDERS: Primary | ICD-10-CM

## 2017-12-18 DIAGNOSIS — M25.611 DECREASED ROM OF RIGHT SHOULDER: ICD-10-CM

## 2017-12-18 DIAGNOSIS — M25.512 CHRONIC PAIN OF BOTH SHOULDERS: Primary | ICD-10-CM

## 2017-12-18 DIAGNOSIS — M25.612 DECREASED RANGE OF MOTION OF LEFT SHOULDER: ICD-10-CM

## 2017-12-18 PROCEDURE — 97110 THERAPEUTIC EXERCISES: CPT

## 2017-12-18 PROCEDURE — 97140 MANUAL THERAPY 1/> REGIONS: CPT
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
## 2017-12-19 ENCOUNTER — PATIENT MESSAGE (OUTPATIENT)
Dept: ORTHOPEDICS | Facility: CLINIC | Age: 52
End: 2017-12-19

## 2017-12-20 ENCOUNTER — CLINICAL SUPPORT (OUTPATIENT)
Dept: REHABILITATION | Facility: HOSPITAL | Age: 52
End: 2017-12-20
Attending: ORTHOPAEDIC SURGERY
Payer: COMMERCIAL

## 2017-12-20 DIAGNOSIS — M25.612 DECREASED RANGE OF MOTION OF LEFT SHOULDER: Primary | ICD-10-CM

## 2017-12-20 PROCEDURE — 97140 MANUAL THERAPY 1/> REGIONS: CPT

## 2017-12-20 PROCEDURE — 97110 THERAPEUTIC EXERCISES: CPT

## 2017-12-20 NOTE — PROGRESS NOTES
Outpatient Physical Therapy Progress Note     Start of treatment: 3:00  End of treatment: 4:20  Total Treatment time: 60      Subjective: Pt reports 6/10 L shld pn upon arrival    Objective: Pt arrived with L UE in sling    Treatment: Pt was instructed in and performed ther ex 45 min for UE strengthening, stretching, ROM, flexibility, scap stab. .  UBE 6 min  Pulleys flex/scap 3 min ea  supine flex with wand 30x  supine ABD with wand 30x  supine ER with wand 30x  [place] 30x each way  Shld flex on wall 30x  Shld ABD on wall 30x  Scap retraction 30x  Pt reviewed and demonstrated HEP with good return  Pt received grade I-II shld jt/GH mobs, PROM x 10 min  Pt received CP to L shld x 10 min      Assessment: Pt keaton tx with ther ex fair due to continued provocation of LBP, with transitional movements. Pt with limited PROM due to pn. Pt with very tight posterior capsule.Patient is a 52 y.o. female referred to outpatient physical therapy who presents with a PT diagnosis of B shoulder pain/weakness/decreaed ROM demonstrating joint dysfunction and functional limitation as described below. Level of complexity is low;  based on patient's past medical history including the below co-morbidities and personal factors; functional limitations, and clinical presentation directly impacting his/her plan of care. Pt demonstrates fair rehab potential as she has difficulty with mobility from prior accident and her ROM is severely limited and painful. Pt will benefit from physcial therapy services in order to maximize pain free functional mobility. The following goals were discussed with the patient and patient is in agreement with them as to be addressed in the treatment plan            Medical necessity is demonstrated by:   Fall risk  Unable to participate in daily activities  Continued inability to participate in vocational pursuits  Pain limits function of effected part for all activities  Unable to participate fully in daily  activities  Requires skilled supervision to complete and progress HEP      Progress towards goals: good    New/Revised Goals:    Plan:  Continue with established Plan of Care toward PT goals.

## 2017-12-21 ENCOUNTER — OFFICE VISIT (OUTPATIENT)
Dept: ORTHOPEDICS | Facility: CLINIC | Age: 52
End: 2017-12-21
Payer: COMMERCIAL

## 2017-12-21 VITALS — HEIGHT: 62 IN | WEIGHT: 191 LBS | BODY MASS INDEX: 35.15 KG/M2

## 2017-12-21 DIAGNOSIS — M75.40 IMPINGEMENT SYNDROME OF SHOULDER REGION, UNSPECIFIED LATERALITY: Primary | ICD-10-CM

## 2017-12-21 PROCEDURE — 20610 DRAIN/INJ JOINT/BURSA W/O US: CPT | Mod: RT,S$GLB,, | Performed by: ORTHOPAEDIC SURGERY

## 2017-12-21 PROCEDURE — 99999 PR PBB SHADOW E&M-EST. PATIENT-LVL II: CPT | Mod: PBBFAC,,, | Performed by: ORTHOPAEDIC SURGERY

## 2017-12-21 PROCEDURE — 99024 POSTOP FOLLOW-UP VISIT: CPT | Mod: S$GLB,,, | Performed by: ORTHOPAEDIC SURGERY

## 2017-12-21 RX ORDER — TRIAMCINOLONE ACETONIDE 40 MG/ML
40 INJECTION, SUSPENSION INTRA-ARTICULAR; INTRAMUSCULAR
Status: COMPLETED | OUTPATIENT
Start: 2017-12-21 | End: 2017-12-21

## 2017-12-21 RX ORDER — HYDROCODONE BITARTRATE AND ACETAMINOPHEN 7.5; 325 MG/1; MG/1
1 TABLET ORAL
Qty: 30 TABLET | Refills: 0 | Status: SHIPPED | OUTPATIENT
Start: 2017-12-21 | End: 2018-04-03

## 2017-12-21 RX ADMIN — TRIAMCINOLONE ACETONIDE 40 MG: 40 INJECTION, SUSPENSION INTRA-ARTICULAR; INTRAMUSCULAR at 03:12

## 2017-12-21 NOTE — PROGRESS NOTES
HISTORY OF PRESENT ILLNESS:  Ms. Reyes in followup of left shoulder   arthroscopy and debridement, partial tear.  She is about two months' postop.    She is doing well in therapy, but having increasing pain in the opposite right   shoulder where she is having ongoing symptoms related to impingement.  She had   an injection about a month ago with some improvement and would like to have this   repeated today.    PHYSICAL EXAMINATION:  LEFT SHOULDER:  No tenderness.  No swelling.  Incision well healed.  Range of   motion is pretty good passively, limited active elevation.  Strength is still   weak.  RIGHT SHOULDER:  Demonstrates some tenderness anteriorly, positive impingement   sign, slight weakness rotator cuff.    PLAN:  For the left shoulder, increase activities as tolerated, continue   therapy, no heavy lifting.  For the right shoulder, after pause for timeout, the   patient identified the right shoulder, then injected with combination of   Kenalog 40 mg, 2 mL Xylocaine, sterile technique.  She tolerated the procedure   well without complications.    Continue current medications.  Refilled today one pain pill per day, augment   with Advil or Motrin.  Follow up in one month.      CHARLENE  dd: 12/21/2017 15:48:23 (CST)  td: 12/22/2017 11:04:21 (CST)  Doc ID   #[id number]  Job ID #[id number]    CC:

## 2017-12-27 ENCOUNTER — CLINICAL SUPPORT (OUTPATIENT)
Dept: REHABILITATION | Facility: HOSPITAL | Age: 52
End: 2017-12-27
Attending: ORTHOPAEDIC SURGERY
Payer: COMMERCIAL

## 2017-12-27 DIAGNOSIS — M25.612 DECREASED RANGE OF MOTION OF LEFT SHOULDER: ICD-10-CM

## 2017-12-27 DIAGNOSIS — G89.29 CHRONIC PAIN OF BOTH SHOULDERS: Primary | ICD-10-CM

## 2017-12-27 DIAGNOSIS — M25.611 DECREASED ROM OF RIGHT SHOULDER: ICD-10-CM

## 2017-12-27 DIAGNOSIS — M25.512 CHRONIC PAIN OF BOTH SHOULDERS: Primary | ICD-10-CM

## 2017-12-27 DIAGNOSIS — M25.511 CHRONIC PAIN OF BOTH SHOULDERS: Primary | ICD-10-CM

## 2017-12-27 PROCEDURE — 97140 MANUAL THERAPY 1/> REGIONS: CPT

## 2017-12-27 PROCEDURE — 97110 THERAPEUTIC EXERCISES: CPT

## 2017-12-27 NOTE — PROGRESS NOTES
"                                                  Physical Therapy Daily Note     Date: 2017  [facility]  Clinic Number: [id number]  Diagnosis:   Encounter Diagnoses   Name Primary?   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 Pt was having issues standing and ended up compensating by using her UEs more, which she feels like contributed to developing B shoulder pain. Pt is post-op week 5 s/p L RTC tear with subacrominal decompression.    Subjective     Pt reports 6/10 B shoulder pain pre-tx. Pt reports she feels like her R shoulder is overworking when she performs UBE and that the L shoulder is just along for the ride. When she does this, she feels like she leaves with increased pain and difficulty driving.    Objective   Measurements   145 abduction L GHJ s/l    Patient received individual therapy to increase strength, endurance, ROM, flexibility and posture with activities as follows:     Sherie received therapeutic exercises to develop strength, endurance, ROM, flexibility and posture for 27 minutes includin/27/2017   Supine AAROM flexion x30   S/l ER NP   S/l abduction x20   scap squeezes 20x5"   Upper trap stretch 2x30" B   No money YTB 2x10   scap stabilization ball on wall x20 up/down and side/side     Supine pec minor stretch 1/2 foam 4 minutes         Sherie received the following manual therapy techniques for 28 minutes.    B shoulder posterior GHJ glides grade 2-3   B GHJ flexion with subscap release   PROM B shoulders in all " planes   L levator scap contract-relax   L first rib mobilization   R inferior glides grade 3 in end range flexion and abduction        Pt received ice to R shoulder for 10 minutes post-tx.    Written Home Exercises Provided: no new exercises provided this session  Pt demo good understanding of the education provided. Kathriney demonstrated good return demonstration of activities.     Education provided: Pt educated on the importance of postural stability and proper positioning in reaching overhead. Pt educated in upper trap dominance with dysfunctional movement patterns and shoulder pain.  Kathriney verbalized good understanding of education provided.   No spiritual or educational barriers to learning identified.    Assessment   Pt demos improved shoulder flexion and abduction during s/l abduction and supine flexion. Pt demos improved scapular control during scap squeezes; added scap stabilization with the ball to progress scapular strengthening. Pt requires cueing to sit tall and keep shoulders down and back during all exercises. Pt will continue to benefit from skilled PT in order to decrease pain, increase B shoulder ROM/strength/endurance, and improve functional use of B UEs.    GOALS:   Short Term Goals:  3 weeks  1. Patient will be proficient and compliant in HEP. *GOAL MET 12/18/17  2. Decrease L shoulder pain at worst to no greater than 7/10.  3. Increase L shoulder ROM to >/= 90 degrees in order to increase functional mobility.    Long Term Goals: 12 weeks  1.Pt will be able to wash her hair with her L hand.  2. Pt will be able to put on her seatbelt with her L arm.  3. Decrease B shoulder pain at worst to no greater than 3/10.     Plan   Progress RUE strength/ROM and postural stability as tolerated.      Therapist: Eva Guardado, PT, DPT

## 2017-12-28 DIAGNOSIS — M16.11 OSTEOARTHRITIS OF RIGHT HIP, UNSPECIFIED OSTEOARTHRITIS TYPE: Primary | ICD-10-CM

## 2017-12-29 ENCOUNTER — OFFICE VISIT (OUTPATIENT)
Dept: ORTHOPEDICS | Facility: CLINIC | Age: 52
End: 2017-12-29
Payer: COMMERCIAL

## 2017-12-29 VITALS
SYSTOLIC BLOOD PRESSURE: 131 MMHG | BODY MASS INDEX: 35.15 KG/M2 | HEIGHT: 62 IN | DIASTOLIC BLOOD PRESSURE: 84 MMHG | WEIGHT: 191 LBS

## 2017-12-29 DIAGNOSIS — M54.16 LUMBAR RADICULOPATHY, CHRONIC: Primary | ICD-10-CM

## 2017-12-29 PROCEDURE — 99999 PR PBB SHADOW E&M-EST. PATIENT-LVL IV: CPT | Mod: PBBFAC,,, | Performed by: ORTHOPAEDIC SURGERY

## 2017-12-29 PROCEDURE — 99202 OFFICE O/P NEW SF 15 MIN: CPT | Mod: S$GLB,,, | Performed by: ORTHOPAEDIC SURGERY

## 2017-12-29 NOTE — LETTER
December 29, 2017      Melchor Hughes Jr., MD  [address]  [address]  [place] [address]           [facility]  [address]  [place] [address]  Phone: [phone]          Patient: Sherie Reyes   MR Number: [id number]   YOB: 1965   Date of Visit: 12/29/2017       Dear Dr. Melchor Hughes Jr.:    Thank you for referring Sherie Reyes to me for evaluation. Attached you will find relevant portions of my assessment and plan of care.    If you have questions, please do not hesitate to call me. I look forward to following Sherie Reyes along with you.    Sincerely,    Olivier Adams MD    Enclosure  CC:  No Recipients    If you would like to receive this communication electronically, please contact [email or web address] or [phone] to request more information on [facility] access.    For providers and/or their staff who would like to refer a patient to [facility], please contact us through our one-stop-shop provider referral line, [facility], at [phone].    If you feel you have received this communication in error or would no longer like to receive these types of communications, please e-mail [email or web address]

## 2017-12-29 NOTE — PROGRESS NOTES
Subjective:      Patient ID: Sherie Reyes is a 52 y.o. female.    Chief Complaint: Pain of the Right Hip    Pain   Associated symptoms include myalgias. Pertinent negatives include no abdominal pain, chest pain, congestion or fever.   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 Pain is constant. Starts in her lower back and radiates to her lateral hip and distal to knee. +numbness and giving way. -Bowel /bladder incont.  Treated with NSAIDs, narcotics and Neurontin w/o relief. Also has failed to improve with extensive Ptx.   Review of Systems   Constitution: Negative for fever and weight loss.   HENT: Negative for congestion.    Eyes: Negative for visual disturbance.   Cardiovascular: Negative for chest pain.   Respiratory: Negative for shortness of breath.    Hematologic/Lymphatic: Negative for bleeding problem. Does not bruise/bleed easily.   Skin: Negative for poor wound healing.   Musculoskeletal: Positive for back pain, joint pain, muscle cramps and myalgias.   Gastrointestinal: Negative for abdominal pain.   Genitourinary: Negative for dysuria.   Neurological: Negative for seizures.   Psychiatric/Behavioral: Negative for altered mental status.   Allergic/Immunologic: Negative for persistent infections.         Objective:            General    Constitutional: She appears well-developed and well-nourished. No distress.     General Musculoskeletal Exam   Gait: normal     Right Ankle/Foot Exam     Tests   Heel Walk: unable to perform  Tiptoe Walk: unable to perform    Back (L-Spine & T-Spine) / Neck (C-Spine) Exam     Back (L-Spine & T-Spine) Range of Motion   Extension: normal   Flexion: normal     Spinal Sensation   Right Side Sensation  L-Spine Level: normal    Back (L-Spine & T-Spine) Tests   Right Side Tests  Straight leg raise:      Sitting SLR: 50 degrees      Supine SLR: 30 degrees    Other She has no scoliosis .  Spinal Kyphosis:  Absent  Spinal Lordosis:   Absent    Comments:  Hip MRI shows minimal labral faying      Muscle Strength   Right Lower Extremity   Hip Abduction: 5/5   Hip Flexion: 5/5   Hip Extensors: 5/5  Quadriceps:  5/5   Hamstrin/5   Anterior tibial:  5/5/5  Gastrocsoleus:  5/5/5  EHL:  5/5    Reflexes     Right Side   Quadriceps:  2+  Achilles:  2+  Babinski Sign:  absent    Vascular Exam     Right Pulses  Dorsalis Pedis:      2+  Posterior Tibial:      2+                    Assessment:       Encounter Diagnosis   Name Primary?    Lumbar radiculopathy, chronic Yes    The hip pain is referred. The labral findings on MRI are not relevant to the patient's symptoms.      Plan:       Sherie was seen today for pain.    Diagnoses and all orders for this visit:    Lumbar radiculopathy, chronic    I explained my impression in detail. MRI and spine surgery referral recommended. Process explained. Patient agrees.

## 2018-01-02 ENCOUNTER — PATIENT MESSAGE (OUTPATIENT)
Dept: ORTHOPEDICS | Facility: CLINIC | Age: 53
End: 2018-01-02

## 2018-01-05 ENCOUNTER — CLINICAL SUPPORT (OUTPATIENT)
Dept: REHABILITATION | Facility: HOSPITAL | Age: 53
End: 2018-01-05
Attending: ORTHOPAEDIC SURGERY
Payer: COMMERCIAL

## 2018-01-05 DIAGNOSIS — M25.612 DECREASED RANGE OF MOTION OF LEFT SHOULDER: ICD-10-CM

## 2018-01-05 DIAGNOSIS — M25.511 CHRONIC PAIN OF BOTH SHOULDERS: Primary | ICD-10-CM

## 2018-01-05 DIAGNOSIS — M25.611 DECREASED ROM OF RIGHT SHOULDER: ICD-10-CM

## 2018-01-05 DIAGNOSIS — G89.29 CHRONIC PAIN OF BOTH SHOULDERS: Primary | ICD-10-CM

## 2018-01-05 DIAGNOSIS — M25.512 CHRONIC PAIN OF BOTH SHOULDERS: Primary | ICD-10-CM

## 2018-01-05 PROCEDURE — 97140 MANUAL THERAPY 1/> REGIONS: CPT

## 2018-01-05 PROCEDURE — 97110 THERAPEUTIC EXERCISES: CPT

## 2018-01-05 NOTE — PROGRESS NOTES
"                                                  Physical Therapy Daily Note     Date: 2018  [facility]  Clinic Number: [id number]  Diagnosis:   Encounter Diagnoses   Name Primary?   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 Pt was having issues standing and ended up compensating by using her UEs more, which she feels like contributed to developing B shoulder pain. Pt is post-op week 5 s/p L RTC tear with subacrominal decompression.    Subjective     Pt reports 6/10 B shoulder pain pre-tx. Pt reports she feels stiff and has tried to stretch her shoulder independently. Pt reports she can get her arms overhead to wash her hair but has to do it all at once because her arms hurt so much coming down. Pt reports her shoulder "pops" a lot, even when she is not moving her arm overhead, such as when she is rolling over in bed. Pt  Reports her doctor told her not to let the arm pop, but that she can't help it.    Objective   Noticeable clicking/popping palpated in L shoulder joint with minimal ROM under 30 degrees flexion.    Patient received individual therapy to increase strength, endurance, ROM, flexibility and posture with activities as follows:     Sherie received therapeutic exercises to develop strength, endurance, ROM, flexibility and posture for 25 minutes includin/05/2018   Supine AAROM flexion x30   S/l ER NP   S/l abduction x20   Standing shoulder extension 2x10 OTB   scap squeezes 20x5"   Upper trap stretch 2x30" B   No money YTB " 2x10   scap stabilization ball on wall x20 up/down and side/side     Supine pec minor stretch 1/2 foam 4 minutes     25 minutes over therex overseen by PT technicians, Em and Bebe.    Sherie received the following manual therapy techniques for 30 minutes.    B shoulder posterior GHJ glides grade 2-3   B GHJ flexion with subscap release   PROM B shoulders in all planes   B upper trap contract-relax       Pt received ice to L shoulder for 10 minutes post-tx.    Written Home Exercises Provided: no new exercises provided this session  Pt demo good understanding of the education provided. Sherie demonstrated good return demonstration of activities.     Education provided: Pt educated on the importance of postural stability and proper positioning in reaching overhead. Pt educated in upper trap dominance with dysfunctional movement patterns and shoulder pain.  Sherie verbalized good understanding of education provided.   No spiritual or educational barriers to learning identified.    Assessment   Pt demos improved tolerance to ROM and demos WNL PROM B. Patient continues to be limited in AROM secondary to pain and has a clicking/popping in L shoulder in early ranges of motion. Pt muscle guards due to pain and reports increased pain when lowering her arm. Added standing shoulder extension to program to improve scapular stability and posture this session. Pt making good progress toward goals. Pt will continue to benefit from skilled PT in order to decrease pain, increase B shoulder ROM/strength/endurance, and improve functional use of B UEs.    GOALS:   Short Term Goals:  3 weeks  1. Patient will be proficient and compliant in HEP. *GOAL MET 12/18/17  2. Decrease L shoulder pain at worst to no greater than 7/10.  3. Increase L shoulder ROM to >/= 90 degrees in order to increase functional mobility. *GOAL MET 12/18/17    Long Term Goals: 12 weeks  1.Pt will be able to wash her hair with her L hand. *GOAL MET 1/5/18  2. Pt  will be able to put on her seatbelt with her L arm.  3. Decrease B shoulder pain at worst to no greater than 3/10.     Plan   Pt will be treated by physical therapy 1-3 times a week for 12 weeks for Pt education, HEP, therapeutic exercises, neuromuscular re-education, soft tissue and joint mobilizations, modalities prn to achieve established goals. Sherie may at times be seen by a PTA as part of the Rehab Team. Progress RUE strength/ROM and postural stability as tolerated.      Therapist: Eva Guardado, PT, DPT

## 2018-01-10 ENCOUNTER — CLINICAL SUPPORT (OUTPATIENT)
Dept: REHABILITATION | Facility: HOSPITAL | Age: 53
End: 2018-01-10
Attending: ORTHOPAEDIC SURGERY
Payer: COMMERCIAL

## 2018-01-10 DIAGNOSIS — M25.612 DECREASED RANGE OF MOTION OF LEFT SHOULDER: Primary | ICD-10-CM

## 2018-01-10 PROCEDURE — 97140 MANUAL THERAPY 1/> REGIONS: CPT

## 2018-01-10 PROCEDURE — 97110 THERAPEUTIC EXERCISES: CPT

## 2018-01-10 NOTE — PROGRESS NOTES
"                                                  Physical Therapy Daily Note     Date: 01/10/2018  Name: Sherie [facility]  Clinic Number: [id number]  Diagnosis:   Encounter Diagnosis   Name Primary?   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 Pt was having issues standing and ended up compensating by using her UEs more, which she feels like contributed to developing B shoulder pain. Pt is post-op week 5 s/p L RTC tear with subacrominal decompression.    Subjective     Pt states that her shld is feeling better today, denies pn. Pt reports she feels stiff and has tried to stretch her shoulder independently.     Objective    No noticeable clicking/popping palpated in L shoulder joint today    Patient received individual therapy to increase strength, endurance, ROM, flexibility and posture with activities as follows:     Sherie was instructed in and performed  therapeutic exercises to develop strength, endurance, ROM, flexibility and posture for 45 minutes includin/10/2018   Supine AAROM flexion x30   S/l ER 30x   S/l abduction x30   Standing shoulder extension 3x10 OTB   scap squeezes 20x5"   Upper trap stretch 2x30" B   No money YTB 3x10   scap stabilization ball on wall x20 up/down and side/side     Supine pec minor stretch 1/2 foam 5 minutes       Sherie received the following manual therapy techniques for 15 minutes.    B shoulder posterior GHJ glides grade 2-3   B GHJ flexion with subscap release   PROM B shoulders in all planes   B upper trap contract-relax       Pt received ice to L shoulder for 10 minutes " post-tx.    Written Home Exercises Provided: no new exercises provided this session  Pt demo good understanding of the education provided. Sherie demonstrated good return demonstration of activities.     Education provided: Pt educated on the importance of postural stability and proper positioning in reaching overhead. Pt educated in upper trap dominance with dysfunctional movement patterns and shoulder pain.  Sherie verbalized good understanding of education provided.   No spiritual or educational barriers to learning identified.    Assessment   Pt demos improved tolerance to ROM and demos WNL PROM B. Patient with improved PROM and popping or clicking on this date. Pt muscle guards due to pain and reports slight  pain when lowering her arm. Pt making good progress toward goals. Pt will continue to benefit from skilled PT in order to decrease pain, increase B shoulder ROM/strength/endurance, and improve functional use of B UEs.    GOALS:   Short Term Goals:  3 weeks  1. Patient will be proficient and compliant in HEP. *GOAL MET 12/18/17  2. Decrease L shoulder pain at worst to no greater than 7/10.  3. Increase L shoulder ROM to >/= 90 degrees in order to increase functional mobility. *GOAL MET 12/18/17    Long Term Goals: 12 weeks  1.Pt will be able to wash her hair with her L hand. *GOAL MET 1/5/18  2. Pt will be able to put on her seatbelt with her L arm.  3. Decrease B shoulder pain at worst to no greater than 3/10.     Plan   Pt will be treated by physical therapy 1-3 times a week for 12 weeks for Pt education, HEP, therapeutic exercises, neuromuscular re-education, soft tissue and joint mobilizations, modalities prn to achieve established goals. Sherie may at times be seen by a PTA as part of the Rehab Team. Progress RUE strength/ROM and postural stability as tolerated.      Therapist: Zaki Pinzon, CATHIE,

## 2018-01-12 ENCOUNTER — CLINICAL SUPPORT (OUTPATIENT)
Dept: REHABILITATION | Facility: HOSPITAL | Age: 53
End: 2018-01-12
Attending: ORTHOPAEDIC SURGERY
Payer: COMMERCIAL

## 2018-01-12 DIAGNOSIS — M25.511 CHRONIC PAIN OF BOTH SHOULDERS: Primary | ICD-10-CM

## 2018-01-12 DIAGNOSIS — G89.29 CHRONIC PAIN OF BOTH SHOULDERS: Primary | ICD-10-CM

## 2018-01-12 DIAGNOSIS — M25.611 DECREASED ROM OF RIGHT SHOULDER: ICD-10-CM

## 2018-01-12 DIAGNOSIS — M25.512 CHRONIC PAIN OF BOTH SHOULDERS: Primary | ICD-10-CM

## 2018-01-12 DIAGNOSIS — M25.612 DECREASED RANGE OF MOTION OF LEFT SHOULDER: ICD-10-CM

## 2018-01-12 PROCEDURE — 97140 MANUAL THERAPY 1/> REGIONS: CPT

## 2018-01-12 PROCEDURE — 97110 THERAPEUTIC EXERCISES: CPT

## 2018-01-12 NOTE — PROGRESS NOTES
"                                                  Physical Therapy Daily Note     Date: 2018  [facility]  Clinic Number: [id number]  Diagnosis:   Encounter Diagnoses   Name Primary?   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 Pt was having issues standing and ended up compensating by using her UEs more, which she feels like contributed to developing B shoulder pain. Pt is post-op week 5 s/p L RTC tear with subacrominal decompression.    Subjective     Pt reports 8/10 L shoulder pain pre-tx and 0/10 R shoulder pain pre treatment. Attributes increased pain to weather.    Objective    No noticeable clicking/popping palpated in L shoulder joint today    Patient received individual therapy to increase strength, endurance, ROM, flexibility and posture with activities as follows:     Sherie was instructed in and performed  therapeutic exercises to develop strength, endurance, ROM, flexibility and posture for 22 minutes includin/12/2018   Supine AAROM flexion x30   S/l ER 1#x30   S/l abduction 1#x30   Standing shoulder extension 3x10 OTB np   scap squeezes 20x5"   Upper trap stretch 2x30" B   No money YTB 3x10   scap stabilization ball on wall x30 up/down and side/side   Prone shoulder extension x20   Prone rows 1# 3x10     Supine pec minor stretch 1/2 foam 5 minutes np       Sherie received the following manual therapy techniques for 8 minutes.    B shoulder posterior GHJ glides grade 2-3   L shoulder inferior glides grade 3 in end-range " flexion   PROM B shoulders in all planes       Pt received ice to L shoulder for 10 minutes post-tx.    Written Home Exercises Provided: no new exercises provided this session  Pt demo good understanding of the education provided. Sherie demonstrated good return demonstration of activities.     Education provided: Pt educated on the importance of postural stability and proper positioning in reaching overhead. Pt educated in upper trap dominance with dysfunctional movement patterns and shoulder pain.  Sherie verbalized good understanding of education provided.   No spiritual or educational barriers to learning identified.    PT reviewed FOTO scores for Sherie Reyes on 1/12/18  FOTO score: 42    Functional Limitations Reports - G Codes  Category: Carrying, moving & handling objects  Tool: FOTO      Current ():  CK  Goal (): CK        Assessment   Pt demos full PROM BUE, but continues to report pain with ROM. According to FOTO, patient between 40% and 60% limited in carrying, moving, and handling objects at this point, demonstrating improvements in function. Patient demos improved strength with weight added to s/l ER and abduction. Added prone rows and shoulder extension to program to improve scapular stability and control. Pain is patient's most significant limiting factor in improving function. Pt will continue to benefit from skilled PT in order to decrease pain, increase B shoulder ROM/strength/endurance, and improve functional use of B UEs.    GOALS:   Short Term Goals:  3 weeks  1. Patient will be proficient and compliant in HEP. *GOAL MET 12/18/17  2. Decrease L shoulder pain at worst to no greater than 7/10.  3. Increase L shoulder ROM to >/= 90 degrees in order to increase functional mobility. *GOAL MET 12/18/17    Long Term Goals: 12 weeks  1.Pt will be able to wash her hair with her L hand. *GOAL MET 1/5/18  2. Pt will be able to put on her seatbelt with her L arm.  3. Decrease B shoulder  pain at worst to no greater than 3/10.     Plan   Pt will be treated by physical therapy 1-3 times a week for 12 weeks for Pt education, HEP, therapeutic exercises, neuromuscular re-education, soft tissue and joint mobilizations, modalities prn to achieve established goals. Sherie may at times be seen by a PTA as part of the Rehab Team. Progress BUE strength/ROM and postural stability as tolerated.      Therapist: Eva Guardado, PT,

## 2018-01-18 ENCOUNTER — PATIENT MESSAGE (OUTPATIENT)
Dept: ORTHOPEDICS | Facility: CLINIC | Age: 53
End: 2018-01-18

## 2018-01-22 ENCOUNTER — OFFICE VISIT (OUTPATIENT)
Dept: ORTHOPEDICS | Facility: CLINIC | Age: 53
End: 2018-01-22
Payer: COMMERCIAL

## 2018-01-22 DIAGNOSIS — M75.112 INCOMPLETE TEAR OF LEFT ROTATOR CUFF: Primary | ICD-10-CM

## 2018-01-22 PROCEDURE — 20610 DRAIN/INJ JOINT/BURSA W/O US: CPT | Mod: LT,S$GLB,, | Performed by: ORTHOPAEDIC SURGERY

## 2018-01-22 PROCEDURE — 99213 OFFICE O/P EST LOW 20 MIN: CPT | Mod: 25,S$GLB,, | Performed by: ORTHOPAEDIC SURGERY

## 2018-01-22 PROCEDURE — 99999 PR PBB SHADOW E&M-EST. PATIENT-LVL II: CPT | Mod: PBBFAC,,, | Performed by: ORTHOPAEDIC SURGERY

## 2018-01-22 RX ORDER — OXYCODONE AND ACETAMINOPHEN 7.5; 325 MG/1; MG/1
1 TABLET ORAL EVERY 12 HOURS PRN
Qty: 60 TABLET | Refills: 0 | Status: SHIPPED | OUTPATIENT
Start: 2018-01-22 | End: 2018-04-03

## 2018-01-22 RX ORDER — TRIAMCINOLONE ACETONIDE 40 MG/ML
40 INJECTION, SUSPENSION INTRA-ARTICULAR; INTRAMUSCULAR
Status: COMPLETED | OUTPATIENT
Start: 2018-01-22 | End: 2018-01-22

## 2018-01-22 RX ADMIN — TRIAMCINOLONE ACETONIDE 40 MG: 40 INJECTION, SUSPENSION INTRA-ARTICULAR; INTRAMUSCULAR at 03:01

## 2018-01-22 NOTE — PROGRESS NOTES
HISTORY OF PRESENT ILLNESS:  Ms. Reyes in followup of bilateral shoulder   symptoms.  She received an injection in her right shoulder about a month ago   with some improvement.  She is still recovering from surgery on the left   shoulder from October, at which time she underwent debridement and decompression   of the left shoulder; however, she has been out of therapy recently and she   feels like this may have aggravated her symptoms a bit.  Previously, I   recommended therapy, but it has not been approved yet by insurance.    Basically, she is here for increasing pain, left shoulder today.    PHYSICAL EXAMINATION:  LEFT SHOULDER:  Previous surgical incisions well healed.  Mild tenderness.  No   swelling.  Range of motion shoulder is full, but she has a mildly positive   impingement sign.  Rotator cuff strength a little bit weak.  Sensation intact.    PLAN:  The patient would like to try an injection today performed into the left   shoulder.  After pause for timeout, she identified the left shoulder injected   with combination of Kenalog 40 mg, 2 mL Xylocaine, sterile technique.  She   tolerated the procedure well.    I have also recommended that we continue the Vimovo by mouth.  We will try to   get her into therapy again for the left shoulder.  In the meantime, continue   current strengthening exercises, which she can do at home.  I have given her   some Percocet, but I want her to start weaning off of the Percocet and if she   requires this in the future, we may need to make referral to the Pain Clinic.    Follow up in one month.      CHARLENE  dd: 01/22/2018 16:01:14 (CST)  td: 01/23/2018 11:20:18 (CST)  Doc ID   #[id number]  Job ID #[id number]    CC:

## 2018-01-26 NOTE — PROGRESS NOTES
January 22, 2018     [facility] [address] [address]  [place]  [address]    RE:  SHERIE FORD  [facility] No.:  [id number]    Dear Mr. Alex:    We spoke on the phone today regarding patient, Sherie Ford.  You have asked   me some questions regarding her shoulder condition and how this could be related   to her previous back problem.    As you recall, Ms. Ford is a 52-year-old female who I have treated for left   shoulder impingement and partial tear of the rotator cuff.  She failed   conservative treatment and underwent surgery for the shoulder in October 2017.    At the time of surgery, there was noted to be a partial tear of the rotator   cuff.    In terms of causation, this type of thing usually develops slowly over time, not   necessarily the result of a onetime event.  However, in a patient who has back   problems who needs to rely on their upper extremities for transfers and   weightbearing, particularly on the left arm and shoulder, this could either   cause or aggravate preexisting problem in the shoulder.    If you have further questions regarding Ms. Ford, feel free to contact me   for a formal conference.    Sincerely,        Melchor Hughes M.D.          CHALRENE  dd: 01/25/2018 17:02:35 (CST)  td: 01/26/2018 07:11:24 (CST)  Doc ID   #[id number]  Job ID #[id number]    CC:

## 2018-02-02 ENCOUNTER — CLINICAL SUPPORT (OUTPATIENT)
Dept: REHABILITATION | Facility: HOSPITAL | Age: 53
End: 2018-02-02
Attending: ORTHOPAEDIC SURGERY
Payer: COMMERCIAL

## 2018-02-02 DIAGNOSIS — M25.612 DECREASED RANGE OF MOTION OF LEFT SHOULDER: ICD-10-CM

## 2018-02-02 DIAGNOSIS — M25.511 CHRONIC PAIN OF BOTH SHOULDERS: Primary | ICD-10-CM

## 2018-02-02 DIAGNOSIS — M25.512 CHRONIC PAIN OF BOTH SHOULDERS: Primary | ICD-10-CM

## 2018-02-02 DIAGNOSIS — G89.29 CHRONIC PAIN OF BOTH SHOULDERS: Primary | ICD-10-CM

## 2018-02-02 DIAGNOSIS — M25.611 DECREASED ROM OF RIGHT SHOULDER: ICD-10-CM

## 2018-02-02 PROCEDURE — 97110 THERAPEUTIC EXERCISES: CPT

## 2018-02-02 PROCEDURE — 97140 MANUAL THERAPY 1/> REGIONS: CPT
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
## 2018-02-09 ENCOUNTER — CLINICAL SUPPORT (OUTPATIENT)
Dept: REHABILITATION | Facility: HOSPITAL | Age: 53
End: 2018-02-09
Attending: ORTHOPAEDIC SURGERY
Payer: COMMERCIAL

## 2018-02-09 DIAGNOSIS — M25.611 DECREASED ROM OF RIGHT SHOULDER: ICD-10-CM

## 2018-02-09 DIAGNOSIS — G89.29 CHRONIC PAIN OF BOTH SHOULDERS: Primary | ICD-10-CM

## 2018-02-09 DIAGNOSIS — M25.612 DECREASED RANGE OF MOTION OF LEFT SHOULDER: ICD-10-CM

## 2018-02-09 DIAGNOSIS — M25.511 CHRONIC PAIN OF BOTH SHOULDERS: Primary | ICD-10-CM

## 2018-02-09 DIAGNOSIS — M25.512 CHRONIC PAIN OF BOTH SHOULDERS: Primary | ICD-10-CM

## 2018-02-09 PROCEDURE — 97110 THERAPEUTIC EXERCISES: CPT

## 2018-02-09 PROCEDURE — 97140 MANUAL THERAPY 1/> REGIONS: CPT
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
## 2018-02-12 ENCOUNTER — HOSPITAL ENCOUNTER (EMERGENCY)
Facility: HOSPITAL | Age: 53
Discharge: HOME | End: 2018-02-12
Attending: EMERGENCY MEDICINE

## 2018-02-12 VITALS
WEIGHT: 160 LBS | SYSTOLIC BLOOD PRESSURE: 141 MMHG | RESPIRATION RATE: 20 BRPM | TEMPERATURE: 98 F | DIASTOLIC BLOOD PRESSURE: 96 MMHG | BODY MASS INDEX: 29.44 KG/M2 | OXYGEN SATURATION: 97 % | HEART RATE: 123 BPM | HEIGHT: 62 IN

## 2018-02-12 DIAGNOSIS — S46.919A STRAIN OF SHOULDER, UNSPECIFIED LATERALITY, INITIAL ENCOUNTER: Primary | ICD-10-CM

## 2018-02-12 PROCEDURE — 25000003 PHARM REV CODE 250: Performed by: EMERGENCY MEDICINE

## 2018-02-12 PROCEDURE — 96372 THER/PROPH/DIAG INJ SC/IM: CPT

## 2018-02-12 PROCEDURE — 99283 EMERGENCY DEPT VISIT LOW MDM: CPT | Mod: 25

## 2018-02-12 PROCEDURE — 63600175 PHARM REV CODE 636 W HCPCS: Performed by: EMERGENCY MEDICINE

## 2018-02-12 RX ORDER — DICLOFENAC SODIUM 50 MG/1
50 TABLET, DELAYED RELEASE ORAL 3 TIMES DAILY
Qty: 15 TABLET | Refills: 0 | Status: SHIPPED | OUTPATIENT
Start: 2018-02-12 | End: 2018-04-03

## 2018-02-12 RX ORDER — KETOROLAC TROMETHAMINE 30 MG/ML
30 INJECTION, SOLUTION INTRAMUSCULAR; INTRAVENOUS
Status: COMPLETED | OUTPATIENT
Start: 2018-02-12 | End: 2018-02-12

## 2018-02-12 RX ORDER — OXYCODONE HYDROCHLORIDE 5 MG/1
10 TABLET ORAL
Status: COMPLETED | OUTPATIENT
Start: 2018-02-12 | End: 2018-02-12

## 2018-02-12 RX ORDER — METAXALONE 800 MG/1
800 TABLET ORAL
Status: COMPLETED | OUTPATIENT
Start: 2018-02-12 | End: 2018-02-12

## 2018-02-12 RX ADMIN — KETOROLAC TROMETHAMINE 30 MG: 30 INJECTION, SOLUTION INTRAMUSCULAR at 02:02

## 2018-02-12 RX ADMIN — METAXALONE 800 MG: 800 TABLET ORAL at 02:02

## 2018-02-12 RX ADMIN — OXYCODONE HYDROCHLORIDE 10 MG: 5 TABLET ORAL at 03:02
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
## 2018-02-12 NOTE — ED NOTES
Pt requesting pain medication prior to discharge, states previously administered medications have not decreased pain. Rates pain 10/10 at this time. No adverse reactions noted to medications administered.

## 2018-02-15 ENCOUNTER — CLINICAL SUPPORT (OUTPATIENT)
Dept: REHABILITATION | Facility: HOSPITAL | Age: 53
End: 2018-02-15
Attending: ORTHOPAEDIC SURGERY
Payer: COMMERCIAL

## 2018-02-15 DIAGNOSIS — M25.611 DECREASED ROM OF RIGHT SHOULDER: ICD-10-CM

## 2018-02-15 DIAGNOSIS — G89.29 CHRONIC PAIN OF BOTH SHOULDERS: Primary | ICD-10-CM

## 2018-02-15 DIAGNOSIS — M25.612 DECREASED RANGE OF MOTION OF LEFT SHOULDER: ICD-10-CM

## 2018-02-15 DIAGNOSIS — M25.512 CHRONIC PAIN OF BOTH SHOULDERS: Primary | ICD-10-CM

## 2018-02-15 DIAGNOSIS — M25.511 CHRONIC PAIN OF BOTH SHOULDERS: Primary | ICD-10-CM

## 2018-02-15 PROCEDURE — 97110 THERAPEUTIC EXERCISES: CPT

## 2018-02-15 PROCEDURE — 97140 MANUAL THERAPY 1/> REGIONS: CPT
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
## 2018-02-21 ENCOUNTER — CLINICAL SUPPORT (OUTPATIENT)
Dept: REHABILITATION | Facility: HOSPITAL | Age: 53
End: 2018-02-21
Attending: ORTHOPAEDIC SURGERY
Payer: COMMERCIAL

## 2018-02-21 DIAGNOSIS — M25.612 DECREASED RANGE OF MOTION OF LEFT SHOULDER: ICD-10-CM

## 2018-02-21 DIAGNOSIS — M25.511 CHRONIC PAIN OF BOTH SHOULDERS: Primary | ICD-10-CM

## 2018-02-21 DIAGNOSIS — M25.512 CHRONIC PAIN OF BOTH SHOULDERS: Primary | ICD-10-CM

## 2018-02-21 DIAGNOSIS — G89.29 CHRONIC PAIN OF BOTH SHOULDERS: Primary | ICD-10-CM

## 2018-02-21 DIAGNOSIS — M25.611 DECREASED ROM OF RIGHT SHOULDER: ICD-10-CM

## 2018-02-21 PROCEDURE — 97110 THERAPEUTIC EXERCISES: CPT
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
## 2018-02-22 ENCOUNTER — OFFICE VISIT (OUTPATIENT)
Dept: ORTHOPEDICS | Facility: CLINIC | Age: 53
End: 2018-02-22
Payer: COMMERCIAL

## 2018-02-22 VITALS — WEIGHT: 160 LBS | BODY MASS INDEX: 29.44 KG/M2 | HEIGHT: 62 IN

## 2018-02-22 DIAGNOSIS — M75.112 INCOMPLETE TEAR OF LEFT ROTATOR CUFF: Primary | ICD-10-CM

## 2018-02-22 PROCEDURE — 99213 OFFICE O/P EST LOW 20 MIN: CPT | Mod: S$GLB,,, | Performed by: ORTHOPAEDIC SURGERY

## 2018-02-22 PROCEDURE — 3008F BODY MASS INDEX DOCD: CPT | Mod: S$GLB,,, | Performed by: ORTHOPAEDIC SURGERY

## 2018-02-22 PROCEDURE — 99999 PR PBB SHADOW E&M-EST. PATIENT-LVL II: CPT | Mod: PBBFAC,,, | Performed by: ORTHOPAEDIC SURGERY

## 2018-02-22 RX ORDER — NAPROXEN AND ESOMEPRAZOLE MAGNESIUM 20; 500 MG/1; MG/1
1 TABLET, DELAYED RELEASE ORAL 2 TIMES DAILY WITH MEALS
Qty: 60 TABLET | Refills: 2 | Status: SHIPPED | OUTPATIENT
Start: 2018-02-22 | End: 2018-06-29

## 2018-02-22 RX ORDER — OXYCODONE AND ACETAMINOPHEN 7.5; 325 MG/1; MG/1
1 TABLET ORAL EVERY 12 HOURS PRN
Qty: 60 TABLET | Refills: 0 | Status: SHIPPED | OUTPATIENT
Start: 2018-02-22 | End: 2018-06-26

## 2018-02-22 NOTE — PROGRESS NOTES
HISTORY OF PRESENT ILLNESS:  Ms. Reyes in followup of bilateral shoulder   symptoms, continues to have pain in both shoulders.  She underwent left shoulder   arthroscopy and decompression about four months ago in the left shoulder, still   having some pain in the left shoulder and also the right.  We did an injection   last visit for the right shoulder, which helped out a little bit.    She is currently in therapy.  She is still on the Percocet and we have discussed   getting off of the Percocet, but she does not feel able to do so at this point.    PHYSICAL EXAMINATION:  Both shoulders have full range of motion.  Mildly   positive impingement sign, left worse than right.  Strength is good.    IMPRESSION:  Chronic pain after previous left shoulder arthroscopy.    PLAN:  I will make referral to the Pain Clinic for her chronic pain.  She does   have other issues in the lower extremities as well so this may be a good idea to   get her under some type of pain management program.    For the shoulders, I would like her to continue Vimovo by mouth and continue   therapy both shoulders.  Follow up in 1-2 months.      CHARLENE  dd: 02/22/2018 14:40:27 (CST)  td: 02/23/2018 11:38:09 (CST)  Doc ID   #[id number]  Job ID #[id number]    CC:

## 2018-02-23 ENCOUNTER — CLINICAL SUPPORT (OUTPATIENT)
Dept: REHABILITATION | Facility: HOSPITAL | Age: 53
End: 2018-02-23
Attending: ORTHOPAEDIC SURGERY
Payer: COMMERCIAL

## 2018-02-23 DIAGNOSIS — M25.511 CHRONIC PAIN OF BOTH SHOULDERS: Primary | ICD-10-CM

## 2018-02-23 DIAGNOSIS — M25.611 DECREASED ROM OF RIGHT SHOULDER: ICD-10-CM

## 2018-02-23 DIAGNOSIS — M25.612 DECREASED RANGE OF MOTION OF LEFT SHOULDER: ICD-10-CM

## 2018-02-23 DIAGNOSIS — M25.512 CHRONIC PAIN OF BOTH SHOULDERS: Primary | ICD-10-CM

## 2018-02-23 DIAGNOSIS — G89.29 CHRONIC PAIN OF BOTH SHOULDERS: Primary | ICD-10-CM

## 2018-02-23 PROCEDURE — 97110 THERAPEUTIC EXERCISES: CPT
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
## 2018-02-26 ENCOUNTER — CLINICAL SUPPORT (OUTPATIENT)
Dept: REHABILITATION | Facility: HOSPITAL | Age: 53
End: 2018-02-26
Attending: ORTHOPAEDIC SURGERY
Payer: COMMERCIAL

## 2018-02-26 DIAGNOSIS — M25.611 DECREASED ROM OF RIGHT SHOULDER: ICD-10-CM

## 2018-02-26 DIAGNOSIS — G89.29 CHRONIC PAIN OF BOTH SHOULDERS: Primary | ICD-10-CM

## 2018-02-26 DIAGNOSIS — M25.511 CHRONIC PAIN OF BOTH SHOULDERS: Primary | ICD-10-CM

## 2018-02-26 DIAGNOSIS — M25.612 DECREASED RANGE OF MOTION OF LEFT SHOULDER: ICD-10-CM

## 2018-02-26 DIAGNOSIS — M25.512 CHRONIC PAIN OF BOTH SHOULDERS: Primary | ICD-10-CM

## 2018-02-26 PROCEDURE — 97110 THERAPEUTIC EXERCISES: CPT
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
## 2018-02-28 ENCOUNTER — CLINICAL SUPPORT (OUTPATIENT)
Dept: REHABILITATION | Facility: HOSPITAL | Age: 53
End: 2018-02-28
Attending: ORTHOPAEDIC SURGERY
Payer: COMMERCIAL

## 2018-02-28 DIAGNOSIS — M25.612 DECREASED RANGE OF MOTION OF LEFT SHOULDER: ICD-10-CM

## 2018-02-28 DIAGNOSIS — M25.512 CHRONIC PAIN OF BOTH SHOULDERS: Primary | ICD-10-CM

## 2018-02-28 DIAGNOSIS — M25.611 DECREASED ROM OF RIGHT SHOULDER: ICD-10-CM

## 2018-02-28 DIAGNOSIS — M25.511 CHRONIC PAIN OF BOTH SHOULDERS: Primary | ICD-10-CM

## 2018-02-28 DIAGNOSIS — G89.29 CHRONIC PAIN OF BOTH SHOULDERS: Primary | ICD-10-CM

## 2018-02-28 PROCEDURE — 97110 THERAPEUTIC EXERCISES: CPT

## 2018-02-28 NOTE — PROGRESS NOTES
"                                                  Physical Therapy Daily Note     Date: 2018  [facility]  Clinic Number: [id number]  Diagnosis:   Encounter Diagnoses   Name Primary?   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 Pt was having issues standing and ended up compensating by using her UEs more, which she feels like contributed to developing B shoulder pain. Pt is post-op week 5 s/p L RTC tear with subacrominal decompression.    Subjective     Pt reports 8/10 B shoulder pain pre-treatment. Pt reports she was really sore after last treatment session.    Objective   Measurements (as of 2/15/18)   105 AROM R shoulder flexion, 4-/5 MMT   92 degrees AROM L shoulder flexion, 4/5 MMT   115 degress AROM R shoulder abudction, 4/5 MMT   110 degrees AORM L shoulder abduction, 4-/5 MMT  Patient had X-Rays of B shoulders with no evidence of dislocation or fracture and was given dx of muscle strain from ER visit. Pt was given Voltaren for pain, cleared of c-spine involvement, and discharged from ER.      Patient received individual therapy to increase strength, endurance, ROM, flexibility and posture with activities as follows:     Sherie was instructed in and performed  therapeutic exercises to develop strength, endurance, ROM, flexibility and posture for 27 minutes includin/28/18   Supine AAROM flexion x30    S/l ER 3# 3x10   S/l abduction 2# 3x10   Standing shoulder extension 3x10 GTB    rows GTB 3x10   scap squeezes 20x5" np   Upper trap stretch 3x30" B with inferior " GHJ glide    No money OTB 3x10    scap stabilization ball on wall x30 up/down and side/side, 15 circles clockwise and counterclockwise np   Prone shoulder extension x20 np   Prone rows 1# 3x10 np   Seated flexion x5 np   DOREEN posterior capsule stretch x2 min    scap slides up wall x5 YTB np   Supine pec minor stretch 1/2 foam 3 minutes    Rhythmic stabilization 3 levels of flexion x1 min each    Therex overseen by PT technician, Ricki    Sherie received the following manual therapy techniques for 0 minutes: NP   B shoulder posterior GHJ glides grade 2-3   B shoulder inferior glides grade 2-3       Pt received ice to L shoulder for 10 minutes post-tx.    Written Home Exercises Provided: no new exercises provided this session  HEP2GO Code: [id number]  Pt demo good understanding of the education provided. Sherie demonstrated good return demonstration of activities.     Education provided: Pt educated on the importance of postural stability and proper positioning in reaching overhead. Pt educated in upper trap dominance with dysfunctional movement patterns and shoulder pain.  Sherie verbalized good understanding of education provided.   No spiritual or educational barriers to learning identified.    PT reviewed FOTO scores for Sherie Reyes on 1/12/18  FOTO score: 42    Functional Limitations Reports - G Codes  Category: Carrying, moving & handling objects  Tool: FOTO      Current ():  CK  Goal (): CK        Assessment   Due to patient reports of increased pain after last treatment session, no weight was increased this session. Pt with decreased need for cueing during exercises this session, however, still laughs out with reports of pain during each exercise. Pt making fair progress toward goals. Pt will continue to benefit from skilled PT in order to decrease pain, increase B shoulder ROM/strength/endurance, and improve functional use of B UEs.    GOALS:   Short Term Goals:  3 weeks  1. Patient will be  proficient and compliant in HEP. *GOAL MET 12/18/17  2. Decrease L shoulder pain at worst to no greater than 7/10.  3. Increase L shoulder ROM to >/= 90 degrees in order to increase functional mobility. *GOAL MET 12/18/17    Long Term Goals: 12 weeks  1.Pt will be able to wash her hair with her L hand. *GOAL MET 1/5/18  2. Pt will be able to put on her seatbelt with her L arm.  3. Decrease B shoulder pain at worst to no greater than 3/10.     Plan   Pt will be treated by physical therapy 1-3 times a week for 12 weeks for Pt education, HEP, therapeutic exercises, neuromuscular re-education, soft tissue and joint mobilizations, modalities prn to achieve established goals. Sherie may at times be seen by a PTA as part of the Rehab Team. Progress BUE strength/ROM and postural stability as tolerated.      Therapist: Eva Guardado, PT, DPT

## 2018-03-01 ENCOUNTER — PATIENT MESSAGE (OUTPATIENT)
Dept: ADMINISTRATIVE | Facility: HOSPITAL | Age: 53
End: 2018-03-01

## 2018-03-03 ENCOUNTER — PATIENT MESSAGE (OUTPATIENT)
Dept: ADMINISTRATIVE | Facility: OTHER | Age: 53
End: 2018-03-03

## 2018-03-06 ENCOUNTER — CLINICAL SUPPORT (OUTPATIENT)
Dept: REHABILITATION | Facility: HOSPITAL | Age: 53
End: 2018-03-06
Attending: ORTHOPAEDIC SURGERY
Payer: MEDICAID

## 2018-03-06 DIAGNOSIS — M25.612 DECREASED RANGE OF MOTION OF LEFT SHOULDER: ICD-10-CM

## 2018-03-06 DIAGNOSIS — G89.29 CHRONIC PAIN OF BOTH SHOULDERS: Primary | ICD-10-CM

## 2018-03-06 DIAGNOSIS — M25.512 CHRONIC PAIN OF BOTH SHOULDERS: Primary | ICD-10-CM

## 2018-03-06 DIAGNOSIS — M25.611 DECREASED ROM OF RIGHT SHOULDER: ICD-10-CM

## 2018-03-06 DIAGNOSIS — M25.511 CHRONIC PAIN OF BOTH SHOULDERS: Primary | ICD-10-CM

## 2018-03-06 PROCEDURE — 97110 THERAPEUTIC EXERCISES: CPT

## 2018-03-07 NOTE — PLAN OF CARE
"                                                  Physical Therapy Daily Note     Date: 03/07/2018  [facility]  Cl[facility] Number: [id number]  Diagnosis:   Encounter Diagnoses   Name Primary?   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 Pt was having issues standing and ended up compensating by using her UEs more, which she feels like contributed to developing B shoulder pain. Pt is post-op week 5 s/p L RTC tear with subacrominal decompression.    Subjective     Pt reports 6/10 B shoulder pain pre-treatment. Pt reports worker's comp denied her medication so she has been having a lot of pain. Pt reports she feels everything from her low back to her shoulders. Pt reports she was in so much pain last night that when she was searching for something to watch on [employer] she just fell asleep.    Objective   Measurements   Full PROM all planes B shoulder      Patient received individual therapy to increase strength, endurance, ROM, flexibility and posture with activities as follows:     Sherie was instructed in and performed  therapeutic exercises to develop strength, endurance, ROM, flexibility and posture for 24 minutes including:      3/6/18   Supine AAROM flexion x30    S/l ER 3# 3x10 np   S/l abduction 2# 3x10 np   Standing shoulder extension 3x10 GTB    rows GTB 3x10   scap squeezes 20x5" np   Upper trap stretch 3x30" B with inferior GHJ glide np    No money OTB 3x10    scap stabilization ball on wall x30 up/down and side/side, 15 circles clockwise and counterclockwise    Prone shoulder " extension x20 np   Prone rows 1# 3x10 np   Seated flexion x5 np   DOREEN posterior capsule stretch x2 min np   scap slides up wall x5 YTB np   Supine pec minor stretch 1/2 foam 3 minutes    Rhythmic stabilization 3 levels of flexion x1 min each      Sherie received the following manual therapy techniques for 0 minutes: NP   B shoulder posterior GHJ glides grade 2-3   B shoulder inferior glides grade 2-3       Pt received ice to L shoulder for 10 minutes post-tx.    Written Home Exercises Provided: no new exercises provided this session  HEP2GO Code: [id number]  Pt demo good understanding of the education provided. Sherie demonstrated good return demonstration of activities.     Education provided: Pt educated on the importance of postural stability and proper positioning in reaching overhead. Pt educated in upper trap dominance with dysfunctional movement patterns and shoulder pain.  Sherie verbalized good understanding of education provided.   No spiritual or educational barriers to learning identified.    PT reviewed FOTO scores for Sherie Reyes on 1/12/18  FOTO score: 42    Functional Limitations Reports - G Codes  Category: Carrying, moving & handling objects  Tool: FOTO      Current ():  CK  Goal (): CK        Assessment   Patient arrived to treatment late after forgetting about the change in location so time was limited this session. Pt presented with decreased pain pre treatment despite reports of feeling worse off her pain medication. Pt required decreased need for cueing this session but continues to transition slowly secondary to pain in her low back and B shoulders. Pt was encouraged to focus more on increased strength and function rather than pain. Because patient was inconsistent in treatment and is now consistent, I am recommending an extension of POC to continue working on goals. Pt will continue to benefit from skilled PT in order to decrease pain, increase B shoulder  ROM/strength/endurance, and improve functional use of B UEs.    GOALS:   Short Term Goals:  3 weeks  1. Patient will be proficient and compliant in HEP. *GOAL MET 12/18/17  2. Decrease L shoulder pain at worst to no greater than 7/10.  3. Increase L shoulder ROM to >/= 90 degrees in order to increase functional mobility. *GOAL MET 12/18/17    Long Term Goals: 12 weeks  1.Pt will be able to wash her hair with her L hand. *GOAL MET 1/5/18  2. Pt will be able to put on her seatbelt with her L arm.  3. Decrease B shoulder pain at worst to no greater than 3/10.     Plan   Pt will be treated by physical therapy 1-3 times a week for 4 more weeks for Pt education, HEP, therapeutic exercises, neuromuscular re-education, soft tissue and joint mobilizations, modalities prn to achieve established goals. Sherie may at times be seen by a PTA as part of the Rehab Team. Progress BUE strength/ROM and postural stability as tolerated.      Therapist: Eva Guardado, PT, DPT

## 2018-03-07 NOTE — PROGRESS NOTES
"                                                  Physical Therapy Daily Note     Date: 03/07/2018  [facility]  Clinic Number: [id number]  Diagnosis:   Encounter Diagnoses   Name Primary?   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 Pt was having issues standing and ended up compensating by using her UEs more, which she feels like contributed to developing B shoulder pain. Pt is post-op week 5 s/p L RTC tear with subacrominal decompression.    Subjective     Pt reports 6/10 B shoulder pain pre-treatment. Pt reports worker's comp denied her medication so she has been having a lot of pain. Pt reports she feels everything from her low back to her shoulders. Pt reports she was in so much pain last night that when she was searching for something to watch on [employer] she just fell asleep.    Objective   Measurements   Full PROM all planes B shoulder      Patient received individual therapy to increase strength, endurance, ROM, flexibility and posture with activities as follows:     Sherie was instructed in and performed  therapeutic exercises to develop strength, endurance, ROM, flexibility and posture for 24 minutes including:      3/6/18   Supine AAROM flexion x30    S/l ER 3# 3x10 np   S/l abduction 2# 3x10 np   Standing shoulder extension 3x10 GTB    rows GTB 3x10   scap squeezes 20x5" np   Upper trap stretch 3x30" B with inferior GHJ glide np    No money OTB 3x10    scap stabilization ball on wall x30 up/down and side/side, 15 circles clockwise and counterclockwise    Prone shoulder " extension x20 np   Prone rows 1# 3x10 np   Seated flexion x5 np   DOREEN posterior capsule stretch x2 min np   scap slides up wall x5 YTB np   Supine pec minor stretch 1/2 foam 3 minutes    Rhythmic stabilization 3 levels of flexion x1 min each      Sherie received the following manual therapy techniques for 0 minutes: NP   B shoulder posterior GHJ glides grade 2-3   B shoulder inferior glides grade 2-3       Pt received ice to L shoulder for 10 minutes post-tx.    Written Home Exercises Provided: no new exercises provided this session  HEP2GO Code: [id number]  Pt demo good understanding of the education provided. Sherie demonstrated good return demonstration of activities.     Education provided: Pt educated on the importance of postural stability and proper positioning in reaching overhead. Pt educated in upper trap dominance with dysfunctional movement patterns and shoulder pain.  Sherie verbalized good understanding of education provided.   No spiritual or educational barriers to learning identified.    PT reviewed FOTO scores for Sherie Reyes on 1/12/18  FOTO score: 42    Functional Limitations Reports - G Codes  Category: Carrying, moving & handling objects  Tool: FOTO      Current ():  CK  Goal (): CK        Assessment   Patient arrived to treatment late after forgetting about the change in location so time was limited this session. Pt presented with decreased pain pre treatment despite reports of feeling worse off her pain medication. Pt required decreased need for cueing this session but continues to transition slowly secondary to pain in her low back and B shoulders. Pt was encouraged to focus more on increased strength and function rather than pain. Because patient was inconsistent in treatment and is now consistent, I am recommending an extension of POC to continue working on goals. Pt will continue to benefit from skilled PT in order to decrease pain, increase B shoulder  ROM/strength/endurance, and improve functional use of B UEs.    GOALS:   Short Term Goals:  3 weeks  1. Patient will be proficient and compliant in HEP. *GOAL MET 12/18/17  2. Decrease L shoulder pain at worst to no greater than 7/10.  3. Increase L shoulder ROM to >/= 90 degrees in order to increase functional mobility. *GOAL MET 12/18/17    Long Term Goals: 12 weeks  1.Pt will be able to wash her hair with her L hand. *GOAL MET 1/5/18  2. Pt will be able to put on her seatbelt with her L arm.  3. Decrease B shoulder pain at worst to no greater than 3/10.     Plan   Pt will be treated by physical therapy 1-3 times a week for 4 more weeks for Pt education, HEP, therapeutic exercises, neuromuscular re-education, soft tissue and joint mobilizations, modalities prn to achieve established goals. Sherie may at times be seen by a PTA as part of the Rehab Team. Progress BUE strength/ROM and postural stability as tolerated.      Therapist: Eva Guardado, PT, DPT

## 2018-03-08 ENCOUNTER — TELEPHONE (OUTPATIENT)
Dept: ORTHOPEDICS | Facility: CLINIC | Age: 53
End: 2018-03-08

## 2018-03-08 ENCOUNTER — CLINICAL SUPPORT (OUTPATIENT)
Dept: REHABILITATION | Facility: HOSPITAL | Age: 53
End: 2018-03-08
Attending: ORTHOPAEDIC SURGERY
Payer: MEDICAID

## 2018-03-08 DIAGNOSIS — M25.511 CHRONIC PAIN OF BOTH SHOULDERS: Primary | ICD-10-CM

## 2018-03-08 DIAGNOSIS — G89.29 CHRONIC PAIN OF BOTH SHOULDERS: Primary | ICD-10-CM

## 2018-03-08 DIAGNOSIS — M25.612 DECREASED RANGE OF MOTION OF LEFT SHOULDER: ICD-10-CM

## 2018-03-08 DIAGNOSIS — M25.611 DECREASED ROM OF RIGHT SHOULDER: ICD-10-CM

## 2018-03-08 DIAGNOSIS — M25.512 CHRONIC PAIN OF BOTH SHOULDERS: Primary | ICD-10-CM

## 2018-03-08 PROCEDURE — 97110 THERAPEUTIC EXERCISES: CPT

## 2018-03-08 NOTE — PROGRESS NOTES
"                                                  Physical Therapy Daily Note     Date: 03/08/2018  [facility]  Clinic Number: [id number]  Diagnosis:   Encounter Diagnoses   Name Primary?   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 Pt was having issues standing and ended up compensating by using her UEs more, which she feels like contributed to developing B shoulder pain. Pt had RTC repair with subacromial decompression on October 20, 2017.    Subjective     Pt reports no changes in pain since last treatment session. Pt reports she has not been trying to use her LUE as normally as her RUE. She has only tried to carry light objects in her LUE because she doesn't want to "mess anything up." Pt reports she is nervous that she may never be pain free.    Objective   Measurements   Full PROM all planes B shoulder      Patient received individual therapy to increase strength, endurance, ROM, flexibility and posture with activities as follows:     Sherie was instructed in and performed  therapeutic exercises to develop strength, endurance, ROM, flexibility and posture for 60 minutes including:      3/8/18   Supine AAROM flexion x30    S/l ER 3# 3x10 np   S/l abduction 2# 3x10 np   Standing shoulder extension 3x10 GTB    rows GTB 3x10   scap squeezes 20x5" np   Upper trap stretch 3x30" B with inferior GHJ glide    No money OTB 3x10    scap stabilization ball on wall x30 up/down and side/side, 15 circles clockwise and counterclockwise    Prone shoulder extension x20 np   Prone rows 1# 3x10 " np   Seated flexion 2x10   DOREEN posterior capsule stretch x2 min np   scap slides up wall x5 YTB np   Standing ER 2x10 B OTB   Standing IR 2x10 OTB   Supine pec minor stretch 1/2 foam 3 minutes    Rhythmic stabilization 3 levels of flexion x1 min each B      Sherie received the following manual therapy techniques for 0 minutes: NP   B shoulder posterior GHJ glides grade 2-3   B shoulder inferior glides grade 2-3       Pt received ice to L shoulder for 10 minutes post-tx.    Written Home Exercises Provided: no new exercises provided this session  HEP2GO Code: [id number]  Pt demo good understanding of the education provided. Sherie demonstrated good return demonstration of activities.     Education provided: Pt educated on the importance of postural stability and proper positioning in reaching overhead. Pt educated in upper trap dominance with dysfunctional movement patterns and shoulder pain. Discussed possible referral to a pain specialist.  Sherie verbalized good understanding of education provided.   No spiritual or educational barriers to learning identified.    PT reviewed FOTO scores for Sherie Reyes on 3/8/18  FOTO score: 40    Functional Limitations Reports - G Codes  Category: Carrying, moving & handling objects  Tool: FOTO      Current ():  CL  Goal (): CK          Assessment   According to FOTO, patient between 60% and 80% limited in Carrying, moving & handling objects. Patient has presented to therapy with improved joint capsule mobility the past 2 sessions not requiring joint mobilizations for full ROM. Although patient has improved strength and ROM, she continues to have a great deal of pain. Discussed possible referral to a pain specialist; pt noted that her doctor thought the same thing but her  wants her to hold off since her case is with workerman's compensation. Pt continues to require cues for posture. Pt has difficulty finding motivation to increase functional use of BUEs due to  pain and reports she does not want to increase use when she has so much pain. Pt will continue to benefit from skilled PT in order to decrease pain, increase B shoulder ROM/strength/endurance, and improve functional use of B UEs.    GOALS:   Short Term Goals:  3 weeks  1. Patient will be proficient and compliant in HEP. *GOAL MET 12/18/17  2. Decrease L shoulder pain at worst to no greater than 7/10.  3. Increase L shoulder ROM to >/= 90 degrees in order to increase functional mobility. *GOAL MET 12/18/17    Long Term Goals: 12 weeks  1.Pt will be able to wash her hair with her L hand. *GOAL MET 1/5/18  2. Pt will be able to put on her seatbelt with her L arm.  3. Decrease B shoulder pain at worst to no greater than 3/10.     Plan   Progress BUE strength as tolerated.    Therapist: Eva Guardado, PT, DPT

## 2018-03-08 NOTE — TELEPHONE ENCOUNTER
----- Message from Vero Stover sent at 3/8/2018  1:03 PM CST -----  Contact: Self/ [phone]  Patient needs prior authorization for medication oxyCODONE-acetaminophen (PERCOCET).    Please call and advise.

## 2018-03-12 ENCOUNTER — TELEPHONE (OUTPATIENT)
Dept: ORTHOPEDICS | Facility: CLINIC | Age: 53
End: 2018-03-12

## 2018-03-12 NOTE — TELEPHONE ENCOUNTER
----- Message from Susanna Michel sent at 3/12/2018  2:34 PM CDT -----  Contact: [phone] self  Patient called to add her medicaid insurance and advised [facility] is waiting for a prior auth to be sent to medicaid for the oxyCODONE-acetaminophen (PERCOCET) 7.5-325 mg per tablet. The insurance had been added to the chart. Please call and advise.

## 2018-03-12 NOTE — TELEPHONE ENCOUNTER
I spoke with the patient and informed her that her percocet was denied by her insurance. She stated she will give them a call.

## 2018-03-19 ENCOUNTER — CLINICAL SUPPORT (OUTPATIENT)
Dept: REHABILITATION | Facility: HOSPITAL | Age: 53
End: 2018-03-19
Attending: ORTHOPAEDIC SURGERY
Payer: MEDICAID

## 2018-03-19 DIAGNOSIS — M25.512 CHRONIC PAIN OF BOTH SHOULDERS: Primary | ICD-10-CM

## 2018-03-19 DIAGNOSIS — M25.612 DECREASED RANGE OF MOTION OF LEFT SHOULDER: ICD-10-CM

## 2018-03-19 DIAGNOSIS — M25.511 CHRONIC PAIN OF BOTH SHOULDERS: Primary | ICD-10-CM

## 2018-03-19 DIAGNOSIS — M25.611 DECREASED ROM OF RIGHT SHOULDER: ICD-10-CM

## 2018-03-19 DIAGNOSIS — G89.29 CHRONIC PAIN OF BOTH SHOULDERS: Primary | ICD-10-CM

## 2018-03-19 PROCEDURE — 97110 THERAPEUTIC EXERCISES: CPT

## 2018-03-19 NOTE — PROGRESS NOTES
"                                                  Physical Therapy Daily Note     Date: 03/19/2018  [facility]  Clinic Number: [id number]  Diagnosis:   Encounter Diagnoses   Name Primary?   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 Pt was having issues standing and ended up compensating by using her UEs more, which she feels like contributed to developing B shoulder pain. Pt had RTC repair with subacromial decompression on October 20, 2017.    Subjective     Pt reports no changes in pain since last treatment session. Pt inquired "is this how it's always going to be? I can't even drive with this (left) arm."    Objective   Measurements   Full PROM all planes B shoulder      Patient received individual therapy to increase strength, endurance, ROM, flexibility and posture with activities as follows:     Sherie was instructed in and performed  therapeutic exercises to develop strength, endurance, ROM, flexibility and posture for 25 minutes including:      3/19/18   Supine AAROM flexion x30    S/l ER 3# 3x10 np   S/l abduction 2# 3x10 np   Standing shoulder extension 3x10 GTB    rows GTB 3x10   scap squeezes 20x5" np   Upper trap stretch 3x30" B with inferior GHJ glide    No money OTB 3x10    scap stabilization ball on wall x30 up/down and side/side, 15 circles clockwise and counterclockwise NP    Prone shoulder extension x20 np   Prone rows 1# 3x10 np   Seated flexion 2x10 NP   DOREEN posterior capsule stretch x2 min np   scap slides up wall x5 YTB np   Standing ER 2x10 B OTB NP "   Standing IR 2x10 OTB NP   Supine pec minor stretch 1/2 foam 3 minutes    Rhythmic stabilization 3 levels of flexion x1 min each B NP  Seated Horizontal adduction OTB 3x10      Sherie received the following manual therapy techniques for 5 minutes:    B shoulder posterior GHJ glides grade 2-3   B GHJ PROM       Pt received ice to L shoulder for 10 minutes post-tx.    Written Home Exercises Provided: no new exercises provided this session  HEP2GO Code: [id number]  Pt demo good understanding of the education provided. Sherie demonstrated good return demonstration of activities.     Education provided: Pt educated on the importance of postural stability and proper positioning in reaching overhead. Pt educated in upper trap dominance with dysfunctional movement patterns and shoulder pain. Discussed possible referral to a pain specialist.  Sherie verbalized good understanding of education provided.   No spiritual or educational barriers to learning identified.    PT reviewed FOTO scores for Sherie Reyes on 3/8/18  FOTO score: 40    Functional Limitations Reports - G Codes  Category: Carrying, moving & handling objects  Tool: FOTO      Current ():  CL  Goal (): CK          Assessment   Patient continues to present with a great deal of pain and we have discussed that her doctor has referred her to pain management. Added seated shoulder horizontal adduction to improve BUE strength and driving mechanics. Pt requires verbal cues to keep shoulders away from ears. Pt making fair progress toward goals. Pt will continue to benefit from skilled PT in order to decrease pain, increase B shoulder ROM/strength/endurance, and improve functional use of B UEs.    GOALS:   Short Term Goals:  3 weeks  1. Patient will be proficient and compliant in HEP. *GOAL MET 12/18/17  2. Decrease L shoulder pain at worst to no greater than 7/10.  3. Increase L shoulder ROM to >/= 90 degrees in order to increase functional mobility. *GOAL  MET 12/18/17    Long Term Goals: 12 weeks  1.Pt will be able to wash her hair with her L hand. *GOAL MET 1/5/18  2. Pt will be able to put on her seatbelt with her L arm.  3. Decrease B shoulder pain at worst to no greater than 3/10.     Plan   Progress BUE strength as tolerated.    Therapist: Eva Guardado, PT, DPT

## 2018-03-21 ENCOUNTER — CLINICAL SUPPORT (OUTPATIENT)
Dept: REHABILITATION | Facility: HOSPITAL | Age: 53
End: 2018-03-21
Attending: ORTHOPAEDIC SURGERY
Payer: MEDICAID

## 2018-03-21 DIAGNOSIS — M25.611 DECREASED ROM OF RIGHT SHOULDER: ICD-10-CM

## 2018-03-21 DIAGNOSIS — M25.512 CHRONIC PAIN OF BOTH SHOULDERS: Primary | ICD-10-CM

## 2018-03-21 DIAGNOSIS — M25.612 DECREASED RANGE OF MOTION OF LEFT SHOULDER: ICD-10-CM

## 2018-03-21 DIAGNOSIS — G89.29 CHRONIC PAIN OF BOTH SHOULDERS: Primary | ICD-10-CM

## 2018-03-21 DIAGNOSIS — M25.511 CHRONIC PAIN OF BOTH SHOULDERS: Primary | ICD-10-CM

## 2018-03-21 PROCEDURE — 97110 THERAPEUTIC EXERCISES: CPT

## 2018-03-21 NOTE — PROGRESS NOTES
"                                                  Physical Therapy Daily Note     Date: 03/21/2018  [facility]  Clinic Number: [id number]  Diagnosis:   Encounter Diagnoses   Name Primary?   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 Pt was having issues standing and ended up compensating by using her UEs more, which she feels like contributed to developing B shoulder pain. Pt had RTC repair with subacromial decompression on October 20, 2017.    Subjective     Pt reports she was so sore yesterday she couldn't even get out of bed. Pt reports trying to drive with her L arm yesterday.    Objective   Measurements   Full PROM all planes B shoulder      Patient received individual therapy to increase strength, endurance, ROM, flexibility and posture with activities as follows:     Sherie was instructed in and performed  therapeutic exercises to develop strength, endurance, ROM, flexibility and posture for 40 minutes including:      3/21/18   Supine AAROM flexion x30    S/l ER 3# 3x10 NP   S/l horizontal abduction 3x10   S/l abduction 2# 3x10 L   Standing shoulder extension 3x10 GTB    rows GTB 3x10   scap squeezes 20x5" np   Upper trap stretch 3x30" B with inferior GHJ glide    No money OTB 3x10    scap stabilization ball on wall x30 up/down and side/side, 15 circles clockwise and counterclockwise NP    Prone shoulder extension 1# 3x10   Prone rows 1# 3x10 np   Seated flexion 3x10    DOREEN posterior capsule stretch x2 min np   scap slides up wall x5 YTB np   Standing IR 2x10 OTB    Supine " pec minor stretch 1/2 foam 3 minutes    Rhythmic stabilization 3 levels of flexion x1 min each B NP  Seated Horizontal adduction OTB 3x10    10 minutes of therex overseen by PT technician, Rishabh Eller.    Sherie received the following manual therapy techniques for 0 minutes: NP   B shoulder posterior GHJ glides grade 2-3   B GHJ PROM       Pt received heat to B shoulder for 10 minutes post-tx.    Written Home Exercises Provided: no new exercises provided this session  HEP2GO Code: [id number]  Pt demo good understanding of the education provided. Sherie demonstrated good return demonstration of activities.     Education provided: Pt educated on the importance of postural stability and proper positioning in reaching overhead. Pt educated in upper trap dominance with dysfunctional movement patterns and shoulder pain. Discussed possible referral to a pain specialist.  Sherie verbalized good understanding of education provided.   No spiritual or educational barriers to learning identified.    PT reviewed FOTO scores for Sherie Reyes on 3/8/18  FOTO score: 40    Functional Limitations Reports - G Codes  Category: Carrying, moving & handling objects  Tool: FOTO      Current ():  CL  Goal (): CK          Assessment   Patient continues to present with a great deal of pain and reported increased pain after last treatment session so no new exercises were added this session. Pt requires verbal cues to keep shoulders away from ears. Pt demos improved AROM during seated L GHJ flexion. Pt making fair progress toward goals. Pt will continue to benefit from skilled PT in order to decrease pain, increase B shoulder ROM/strength/endurance, and improve functional use of B UEs.    GOALS:   Short Term Goals:  3 weeks  1. Patient will be proficient and compliant in HEP. *GOAL MET 12/18/17  2. Decrease L shoulder pain at worst to no greater than 7/10.  3. Increase L shoulder ROM to >/= 90 degrees in order to increase  functional mobility. *GOAL MET 12/18/17    Long Term Goals: 12 weeks  1.Pt will be able to wash her hair with her L hand. *GOAL MET 1/5/18  2. Pt will be able to put on her seatbelt with her L arm.  3. Decrease B shoulder pain at worst to no greater than 3/10.     Plan   Progress BUE strength as tolerated.    Therapist: Eva Guardado, PT, DPT

## 2018-03-26 ENCOUNTER — CLINICAL SUPPORT (OUTPATIENT)
Dept: REHABILITATION | Facility: HOSPITAL | Age: 53
End: 2018-03-26
Attending: ORTHOPAEDIC SURGERY
Payer: MEDICAID

## 2018-03-26 DIAGNOSIS — M25.611 DECREASED ROM OF RIGHT SHOULDER: ICD-10-CM

## 2018-03-26 DIAGNOSIS — G89.29 CHRONIC PAIN OF BOTH SHOULDERS: Primary | ICD-10-CM

## 2018-03-26 DIAGNOSIS — M25.512 CHRONIC PAIN OF BOTH SHOULDERS: Primary | ICD-10-CM

## 2018-03-26 DIAGNOSIS — M25.612 DECREASED RANGE OF MOTION OF LEFT SHOULDER: ICD-10-CM

## 2018-03-26 DIAGNOSIS — M25.511 CHRONIC PAIN OF BOTH SHOULDERS: Primary | ICD-10-CM

## 2018-03-26 PROCEDURE — 97110 THERAPEUTIC EXERCISES: CPT

## 2018-03-26 NOTE — PROGRESS NOTES
"                                                  Physical Therapy Daily Note     Date: 03/26/2018  [facility]  Clinic Number: [id number]  Diagnosis:   Encounter Diagnoses   Name Primary?   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 Pt was having issues standing and ended up compensating by using her UEs more, which she feels like contributed to developing B shoulder pain. Pt had RTC repair with subacromial decompression on October 20, 2017.    Subjective     Pt reports she is still in a lot of pain and no changes in sx since last treatment. "it just won't behave and it just pops all day long, I don't care what I do. It's ok until the big pops, boy they hurt. It was feeling pretty good a couple days ago but not so much anymore. I didn't do anything."    Objective   Measurements   Full PROM all planes B shoulder    Pt warmed up on cycle ergometer 2min forward/backward with LUE only    Patient received individual therapy to increase strength, endurance, ROM, flexibility and posture with activities as follows:     Sherie was instructed in and performed  therapeutic exercises to develop strength, endurance, ROM, flexibility and posture for 25 minutes including:      3/26/18   Supine AAROM flexion x30    S/l ER 3# 3x10 NP   S/l horizontal abduction 3x10 1#   S/l abduction 2# 3x10 L   Standing shoulder extension 3x10 GTB NP   rows GTB 3x10 NP   scap squeezes 20x5" np   Upper trap stretch 3x30" B with inferior GHJ glide    No money OTB 3x10    Seated flexion 3x10    DOREEN posterior capsule " stretch x2 min    Supine pec minor stretch 1/2 foam 3 minutes    Rhythmic stabilization 3 levels of flexion x1 min each B NP  Seated Horizontal adduction OTB 3x10       Pt received heat to L shoulder for 10 minutes post-tx.    Written Home Exercises Provided: no new exercises provided this session  HEP2GO Code: [id number]  Pt demo good understanding of the education provided. Sherie demonstrated good return demonstration of activities.     Education provided: Pt educated on the importance of postural stability and proper positioning in reaching overhead. Pt educated in upper trap dominance with dysfunctional movement patterns and shoulder pain. Discussed possible referral to a pain specialist.  Sherie verbalized good understanding of education provided.   No spiritual or educational barriers to learning identified.    PT reviewed FOTO scores for Sherie Reyes on 3/8/18  FOTO score: 40    Functional Limitations Reports - G Codes  Category: Carrying, moving & handling objects  Tool: FOTO      Current ():  CL  Goal (): CK          Assessment   Patient continues to present with a great deal of pain with all movements, but demos full ROM. Pt making fair progress toward goals but pain has not improved at all. Pt will continue to benefit from skilled PT in order to decrease pain, increase B shoulder ROM/strength/endurance, and improve functional use of B Ues. Anticipate discharge with HEP next visit.    GOALS:   Short Term Goals:  3 weeks  1. Patient will be proficient and compliant in HEP. *GOAL MET 12/18/17  2. Decrease L shoulder pain at worst to no greater than 7/10.  3. Increase L shoulder ROM to >/= 90 degrees in order to increase functional mobility. *GOAL MET 12/18/17    Long Term Goals: 12 weeks  1.Pt will be able to wash her hair with her L hand. *GOAL MET 1/5/18  2. Pt will be able to put on her seatbelt with her L arm.  3. Decrease B shoulder pain at worst to no greater than 3/10.     Plan    Progress BUE strength as tolerated. Anticipate d/c from PT next visit.    Therapist: Eva Guardado, PT, DPT

## 2018-03-28 ENCOUNTER — CLINICAL SUPPORT (OUTPATIENT)
Dept: REHABILITATION | Facility: HOSPITAL | Age: 53
End: 2018-03-28
Attending: ORTHOPAEDIC SURGERY
Payer: MEDICAID

## 2018-03-28 DIAGNOSIS — M25.612 DECREASED RANGE OF MOTION OF LEFT SHOULDER: ICD-10-CM

## 2018-03-28 DIAGNOSIS — M25.512 CHRONIC PAIN OF BOTH SHOULDERS: Primary | ICD-10-CM

## 2018-03-28 DIAGNOSIS — M25.611 DECREASED ROM OF RIGHT SHOULDER: ICD-10-CM

## 2018-03-28 DIAGNOSIS — M25.511 CHRONIC PAIN OF BOTH SHOULDERS: Primary | ICD-10-CM

## 2018-03-28 DIAGNOSIS — G89.29 CHRONIC PAIN OF BOTH SHOULDERS: Primary | ICD-10-CM

## 2018-03-28 PROCEDURE — 97110 THERAPEUTIC EXERCISES: CPT

## 2018-03-28 NOTE — PROGRESS NOTES
"                                                  Physical Therapy Daily Note     Date: 03/28/2018  [facility]  Clinic Number: [id number]  Diagnosis:   Encounter Diagnoses   Name Primary?   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 Pt was having issues standing and ended up compensating by using her UEs more, which she feels like contributed to developing B shoulder pain. Pt had RTC repair with subacromial decompression on October 20, 2017.    Subjective     Pt reports she has increased pain today (does not verbalize number). She "didn't sleep right last night" because she has a lot going on. Pt reports she goes to see her doctor tomorrow and he is going to ask her whether she wants to go to another round of Pt. Pt reports PT never works for her; she tried 4 different therapists and aquatic therapy for her back and she never got better. She reports she "doesn't heal right."    Objective   Measurements   Full PROM all planes B shoulder   4/5 B MMT GHJ    Pt warmed up on cycle ergometer 2min forward/backward with LUE only    Patient received individual therapy to increase strength, endurance, ROM, flexibility and posture with activities as follows:     Sherie was instructed in and performed  therapeutic exercises to develop strength, endurance, ROM, flexibility and posture for 45 minutes including:      3/28/18   Supine AAROM flexion x30    S/l horizontal abduction 3x10 1#   S/l abduction 2# 3x10 L   Standing shoulder extension 3x10 GTB    rows GTB 3x10    scap squeezes 20x5" np " "  Upper trap stretch 3x30" B with inferior GHJ glide    No money OTB 3x10    DOREEN posterior capsule stretch x2 min    Supine pec minor stretch 1/2 foam 3 minutes    Rhythmic stabilization 3 levels of flexion x1 min each L  Seated Horizontal adduction OTB 3x10  Supine serratus punches 2# 3x10     Pt received heat to L shoulder for 10 minutes post-tx.    Written Home Exercises Provided: no new exercises provided this session  HEP2GO Code: [id number]  Pt demo good understanding of the education provided. Sherie demonstrated good return demonstration of activities.     Education provided: Pt educated on the importance of postural stability and proper positioning in reaching overhead. Pt educated in upper trap dominance with dysfunctional movement patterns and shoulder pain. Discussed possible referral to a pain specialist.  Sherie verbalized good understanding of education provided.   No spiritual or educational barriers to learning identified.    PT reviewed FOTO scores for Sherie Reyes on 3/28/18  FOTO score: 36    Functional Limitations Reports - G Codes  Category: Carrying, moving & handling objects  Tool: FOTO      Goal (): CK  Discharge ():  CL        Assessment   Pt has met 2/3 STGs and 1/3 LTGs. According to FOTO, patient is between 60% and 80% limited in carrying, moving, and handling objects. Pt has not made functional progress in 23 sessions according to FOTO. Pt demos improved strength and ROM based on PT measures, however, her pain level has not decreased since evaluation. Pt has not made any new progress in therapy in weeks; I agree with the doctor's recommendation of seeing a pain specialist. Pt was seen for 23 sessions between 11/27/17 and 3/28/18 for evaluation, HEP, education, therex, manual therapy, and modalities. Pt will no longer benefit from skilled PT and is being discharged with HEP to continue working toward goals independently.    GOALS:   Short Term Goals:  3 weeks  1. Patient " will be proficient and compliant in HEP. *GOAL MET 12/18/17  2. Decrease L shoulder pain at worst to no greater than 7/10.  3. Increase L shoulder ROM to >/= 90 degrees in order to increase functional mobility. *GOAL MET 12/18/17    Long Term Goals: 12 weeks  1.Pt will be able to wash her hair with her L hand. *GOAL MET 1/5/18  2. Pt will be able to put on her seatbelt with her L arm.  3. Decrease B shoulder pain at worst to no greater than 3/10.     Plan   Discharge patient with HEP today, 3/28/18.    Therapist: Eva Guardado, PT, DPT

## 2018-03-29 ENCOUNTER — TELEPHONE (OUTPATIENT)
Dept: ORTHOPEDICS | Facility: CLINIC | Age: 53
End: 2018-03-29

## 2018-03-29 ENCOUNTER — OFFICE VISIT (OUTPATIENT)
Dept: ORTHOPEDICS | Facility: CLINIC | Age: 53
End: 2018-03-29
Payer: MEDICAID

## 2018-03-29 VITALS — HEIGHT: 62 IN | WEIGHT: 160 LBS | BODY MASS INDEX: 29.44 KG/M2

## 2018-03-29 DIAGNOSIS — M25.512 LEFT SHOULDER PAIN, UNSPECIFIED CHRONICITY: Primary | ICD-10-CM

## 2018-03-29 DIAGNOSIS — M75.112 INCOMPLETE TEAR OF LEFT ROTATOR CUFF: ICD-10-CM

## 2018-03-29 PROCEDURE — 99213 OFFICE O/P EST LOW 20 MIN: CPT | Mod: PBBFAC,PO | Performed by: ORTHOPAEDIC SURGERY

## 2018-03-29 PROCEDURE — 99213 OFFICE O/P EST LOW 20 MIN: CPT | Mod: S$PBB,,, | Performed by: ORTHOPAEDIC SURGERY

## 2018-03-29 PROCEDURE — 99999 PR PBB SHADOW E&M-EST. PATIENT-LVL III: CPT | Mod: PBBFAC,,, | Performed by: ORTHOPAEDIC SURGERY

## 2018-03-29 NOTE — TELEPHONE ENCOUNTER
I spoke with the patient and informed her of her mri appointment. She was made aware of date, time and location.

## 2018-03-29 NOTE — PROGRESS NOTES
HISTORY OF PRESENT ILLNESS:  Ms. Reyes is about six months out from left   shoulder subacromial decompression.  She continues to have pain in the shoulder.    She has finished up therapy but really does not feel like she is any better.    She really does not feel like the surgery helped her.    PHYSICAL EXAMINATION:  LEFT SHOULDER:  She is mildly tender diffusely about the shoulder.  No bruising,   no swelling.  Range of motion is limited secondary to pain and the patient's   resistance.  She has a positive impingement sign, positive supraspinatus stress   test.  NEUROLOGIC:  Normal.    IMAGING:  Previous x-rays showed no significant abnormalities.    IMPRESSION:  Persistent pain, left shoulder after previous surgery.    PLAN:  At this point, I think we need to go ahead and get another MRI.  I have   ordered this for the left shoulder to look at the rotator cuff to see what could   be causing her pain.  I have also made a referral to the [facility] for pain   management issues.  Follow up with me after the MRI is complete.      CHARLENE  dd: 03/29/2018 15:00:07 (CDT)  td: 03/30/2018 11:55:14 (BO)  Doc ID   #[id number]  Job ID #[id number]    CC:

## 2018-03-29 NOTE — TELEPHONE ENCOUNTER
----- Message from Gabrielle Singleton sent at 3/29/2018  3:36 PM CDT -----  Contact: self / [phone]  Patient is requesting a call back regarding her MRI. Please advise

## 2018-03-30 ENCOUNTER — HOSPITAL ENCOUNTER (EMERGENCY)
Facility: HOSPITAL | Age: 53
Discharge: HOME | End: 2018-03-31
Attending: FAMILY MEDICINE
Payer: MEDICAID

## 2018-03-30 DIAGNOSIS — I95.9 HYPOTENSION: ICD-10-CM

## 2018-03-30 DIAGNOSIS — N39.0 URINARY TRACT INFECTION WITHOUT HEMATURIA, SITE UNSPECIFIED: Primary | ICD-10-CM

## 2018-03-30 LAB
ABO + RH BLD: NORMAL
ALBUMIN SERPL BCP-MCNC: 3.5 G/DL
ALP SERPL-CCNC: 60 U/L
ALT SERPL W/O P-5'-P-CCNC: 19 U/L
AMORPH CRY UR QL COMP ASSIST: ABNORMAL
ANION GAP SERPL CALC-SCNC: 11 MMOL/L
AST SERPL-CCNC: 19 U/L
B-HCG UR QL: NEGATIVE
BACTERIA #/AREA URNS AUTO: ABNORMAL /HPF
BASOPHILS # BLD AUTO: 0.04 K/UL
BASOPHILS NFR BLD: 0.3 %
BILIRUB SERPL-MCNC: 0.3 MG/DL
BILIRUB UR QL STRIP: NEGATIVE
BLD GP AB SCN CELLS X3 SERPL QL: NORMAL
BNP SERPL-MCNC: <10 PG/ML
BUN SERPL-MCNC: 23 MG/DL
BUN SERPL-MCNC: 25 MG/DL (ref 6–30)
CALCIUM SERPL-MCNC: 9.4 MG/DL
CHLORIDE SERPL-SCNC: 102 MMOL/L
CHLORIDE SERPL-SCNC: 102 MMOL/L (ref 95–110)
CLARITY UR REFRACT.AUTO: ABNORMAL
CO2 SERPL-SCNC: 21 MMOL/L
COLOR UR AUTO: YELLOW
CREAT SERPL-MCNC: 1.6 MG/DL
CREAT SERPL-MCNC: 1.6 MG/DL (ref 0.5–1.4)
DIFFERENTIAL METHOD: ABNORMAL
EOSINOPHIL # BLD AUTO: 0.3 K/UL
EOSINOPHIL NFR BLD: 1.8 %
ERYTHROCYTE [DISTWIDTH] IN BLOOD BY AUTOMATED COUNT: 12.9 %
EST. GFR  (AFRICAN AMERICAN): 43 ML/MIN/1.73 M^2
EST. GFR  (NON AFRICAN AMERICAN): 37.3 ML/MIN/1.73 M^2
GLUCOSE SERPL-MCNC: 81 MG/DL
GLUCOSE SERPL-MCNC: 84 MG/DL (ref 70–110)
GLUCOSE UR QL STRIP: NEGATIVE
HCT VFR BLD AUTO: 39.9 %
HCT VFR BLD CALC: 43 %PCV (ref 36–54)
HGB BLD-MCNC: 13.1 G/DL
HGB UR QL STRIP: NEGATIVE
HYALINE CASTS UR QL AUTO: 5 /LPF
IMM GRANULOCYTES # BLD AUTO: 0.06 K/UL
IMM GRANULOCYTES NFR BLD AUTO: 0.4 %
INR PPP: 0.9
KETONES UR QL STRIP: NEGATIVE
LEUKOCYTE ESTERASE UR QL STRIP: ABNORMAL
LYMPHOCYTES # BLD AUTO: 2.9 K/UL
LYMPHOCYTES NFR BLD: 20.7 %
MCH RBC QN AUTO: 30.1 PG
MCHC RBC AUTO-ENTMCNC: 32.8 G/DL
MCV RBC AUTO: 92 FL
MICROSCOPIC COMMENT: ABNORMAL
MONOCYTES # BLD AUTO: 1.1 K/UL
MONOCYTES NFR BLD: 7.6 %
NEUTROPHILS # BLD AUTO: 9.5 K/UL
NEUTROPHILS NFR BLD: 69.2 %
NITRITE UR QL STRIP: NEGATIVE
NRBC BLD-RTO: 0 /100 WBC
PH UR STRIP: 5 [PH] (ref 5–8)
PLATELET # BLD AUTO: 278 K/UL
PMV BLD AUTO: 12.1 FL
POC IONIZED CALCIUM: 1.14 MMOL/L (ref 1.06–1.42)
POC TCO2 (MEASURED): 23 MMOL/L (ref 23–29)
POTASSIUM BLD-SCNC: 4.2 MMOL/L (ref 3.5–5.1)
POTASSIUM SERPL-SCNC: 4.3 MMOL/L
PROT SERPL-MCNC: 7.4 G/DL
PROT UR QL STRIP: NEGATIVE
PROTHROMBIN TIME: 9.8 SEC
RBC # BLD AUTO: 4.35 M/UL
RBC #/AREA URNS AUTO: 1 /HPF (ref 0–4)
SAMPLE: ABNORMAL
SODIUM BLD-SCNC: 135 MMOL/L (ref 136–145)
SODIUM SERPL-SCNC: 134 MMOL/L
SP GR UR STRIP: 1.02 (ref 1–1.03)
SQUAMOUS #/AREA URNS AUTO: 7 /HPF
TROPONIN I SERPL DL<=0.01 NG/ML-MCNC: <0.006 NG/ML
TSH SERPL DL<=0.005 MIU/L-ACNC: 1.18 UIU/ML
URN SPEC COLLECT METH UR: ABNORMAL
UROBILINOGEN UR STRIP-ACNC: NEGATIVE EU/DL
WBC # BLD AUTO: 13.74 K/UL
WBC #/AREA URNS AUTO: 24 /HPF (ref 0–5)

## 2018-03-30 PROCEDURE — 25000003 PHARM REV CODE 250: Performed by: FAMILY MEDICINE

## 2018-03-30 PROCEDURE — 99284 EMERGENCY DEPT VISIT MOD MDM: CPT | Mod: ,,, | Performed by: PHYSICIAN ASSISTANT

## 2018-03-30 PROCEDURE — 94761 N-INVAS EAR/PLS OXIMETRY MLT: CPT

## 2018-03-30 PROCEDURE — 99285 EMERGENCY DEPT VISIT HI MDM: CPT | Mod: 25

## 2018-03-30 PROCEDURE — 83880 ASSAY OF NATRIURETIC PEPTIDE: CPT

## 2018-03-30 PROCEDURE — 84484 ASSAY OF TROPONIN QUANT: CPT

## 2018-03-30 PROCEDURE — 85025 COMPLETE CBC W/AUTO DIFF WBC: CPT

## 2018-03-30 PROCEDURE — 81001 URINALYSIS AUTO W/SCOPE: CPT

## 2018-03-30 PROCEDURE — 93005 ELECTROCARDIOGRAM TRACING: CPT

## 2018-03-30 PROCEDURE — 80053 COMPREHEN METABOLIC PANEL: CPT

## 2018-03-30 PROCEDURE — 87086 URINE CULTURE/COLONY COUNT: CPT

## 2018-03-30 PROCEDURE — 85610 PROTHROMBIN TIME: CPT

## 2018-03-30 PROCEDURE — 84443 ASSAY THYROID STIM HORMONE: CPT

## 2018-03-30 PROCEDURE — 93010 ELECTROCARDIOGRAM REPORT: CPT | Mod: ,,, | Performed by: INTERNAL MEDICINE

## 2018-03-30 PROCEDURE — 81025 URINE PREGNANCY TEST: CPT

## 2018-03-30 PROCEDURE — 96361 HYDRATE IV INFUSION ADD-ON: CPT

## 2018-03-30 PROCEDURE — 96374 THER/PROPH/DIAG INJ IV PUSH: CPT

## 2018-03-30 PROCEDURE — 86850 RBC ANTIBODY SCREEN: CPT

## 2018-03-30 PROCEDURE — 25000003 PHARM REV CODE 250: Performed by: PHYSICIAN ASSISTANT

## 2018-03-30 RX ORDER — ONDANSETRON 4 MG/1
4 TABLET, ORALLY DISINTEGRATING ORAL
Status: COMPLETED | OUTPATIENT
Start: 2018-03-30 | End: 2018-03-30

## 2018-03-30 RX ADMIN — SODIUM CHLORIDE 1000 ML: 0.9 INJECTION, SOLUTION INTRAVENOUS at 09:03

## 2018-03-30 RX ADMIN — SODIUM CHLORIDE 1000 ML: 0.9 INJECTION, SOLUTION INTRAVENOUS at 10:03

## 2018-03-30 RX ADMIN — ONDANSETRON 4 MG: 4 TABLET, ORALLY DISINTEGRATING ORAL at 09:03

## 2018-03-31 VITALS
HEIGHT: 62 IN | WEIGHT: 160 LBS | BODY MASS INDEX: 29.44 KG/M2 | OXYGEN SATURATION: 97 % | TEMPERATURE: 97 F | DIASTOLIC BLOOD PRESSURE: 66 MMHG | HEART RATE: 89 BPM | SYSTOLIC BLOOD PRESSURE: 139 MMHG | RESPIRATION RATE: 18 BRPM

## 2018-03-31 PROCEDURE — 63600175 PHARM REV CODE 636 W HCPCS: Performed by: PHYSICIAN ASSISTANT

## 2018-03-31 PROCEDURE — 25500020 PHARM REV CODE 255: Performed by: EMERGENCY MEDICINE

## 2018-03-31 RX ORDER — CEPHALEXIN 500 MG/1
500 CAPSULE ORAL 4 TIMES DAILY
Qty: 28 CAPSULE | Refills: 0 | Status: SHIPPED | OUTPATIENT
Start: 2018-03-31 | End: 2018-04-07

## 2018-03-31 RX ORDER — CEFTRIAXONE 1 G/1
1 INJECTION, POWDER, FOR SOLUTION INTRAMUSCULAR; INTRAVENOUS
Status: COMPLETED | OUTPATIENT
Start: 2018-03-31 | End: 2018-03-31

## 2018-03-31 RX ADMIN — CEFTRIAXONE SODIUM 1 G: 1 INJECTION, POWDER, FOR SOLUTION INTRAMUSCULAR; INTRAVENOUS at 01:03

## 2018-03-31 RX ADMIN — IOHEXOL 75 ML: 350 INJECTION, SOLUTION INTRAVENOUS at 01:03

## 2018-03-31 NOTE — DISCHARGE INSTRUCTIONS
Follow up with family doctor on Monday regarding your elevated kidney function. Stay hydrated. Finish full course of antibiotics. Return to the ED if your symptoms persist or worsen.

## 2018-03-31 NOTE — ED NOTES
.  Patient identifiers for Sherie Reyes 52 y.o. female checked and correct.  Chief Complaint   Patient presents with    Hypotension     Pt reports feeling weak and took BP at home - 80/60. Reports sore throat and chest congestion. BP in triage 127/58     Past Medical History:   Diagnosis Date    Anxiety     Degenerative joint disease (DJD) of hip     GERD (gastroesophageal reflux disease)     Hyperlipidemia     Hypertension     IBS (irritable bowel syndrome)     Insomnia     Lumbar disc herniation     PTSD (post-traumatic stress disorder)      Allergies reported: Review of patient's allergies indicates:  No Known Allergies      LOC: Patient is awake, alert, and aware of environment with an appropriate affect. Patient is oriented x 3 and speaking appropriately.  APPEARANCE: Patient resting comfortably and in no acute distress. Patient is clean and well groomed, patient's clothing is properly fastened.  SKIN: The skin is warm and dry. Patient has normal skin turgor and moist mucus membranes. Skin is intact; no bruising or breakdown noted.  MUSKULOSKELETAL: Patient is moving all extremities well, no obvious deformities noted. Pulses intact.   RESPIRATORY: Airway is open and patent. Respirations are spontaneous and non-labored with normal effort and rate, BBS=clear  ABDOMEN: No distention noted. Bowel sounds active in all 4 quadrants. Soft and non-tender upon palpation.  NEUROLOGICAL: PERRL. Facial expression is symmetrical. Hand grasps are equal bilaterally. Normal sensation in all extremities when touched with finger.

## 2018-03-31 NOTE — PROVIDER PROGRESS NOTES - EMERGENCY DEPT.
Encounter Date: 3/30/2018    ED Physician Progress Notes           Patient is a 52-year-old female who reports increasing episodes of fatigue, dizziness, lightheadedness with periods of diaphoresis for the past few days.  Over the past 24 hours she's developed a fairly nonproductive cough.  She does have heavy menstrual cycles approximately 4 times a year and is on an oral contraceptive to try to regulate the cycles.  Her most recent heavy cycle was within the past week and a half.  Today, her daughter felt she looked pale.  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  He denies fever, chills or vomiting.  His never been known to be anemic before.  She does have a history of hypertension but is not on any hyper-on antihypertensives.  On exam, she does appear quite pale and slightly diaphoretic.  Chest exam shows no gallop.  Lungs are clear with no crackles extremities do not appear acutely ischemic.  Obviously with the above history, I'm concerned the patient has symptomatic blood loss anemia.  6.  Symptomatic hypotension and possibly even high output heart failure.  We will start IV fluids appropriate labs, type and screen and refer to the main ED pods for further disposition  because of her cough and dyspnea, probably reasonable to do a chest x-ray and BNP as well

## 2018-03-31 NOTE — ED PROVIDER NOTES
Encounter Date: 3/30/2018       History     Chief Complaint   Patient presents with    Hypotension     Pt reports feeling weak and took BP at home - 80/60. Reports sore throat and chest congestion. BP in triage 127/58     Patient is a 52-year-old female with a history of GERD, hypertension, IBS, chronic back pain and is presenting to the ER for evaluation of hypotension.  Patient states that when she checked her blood pressure earlier today it was 80 systolic.  She states that over the last couple days she has been feeling increasingly dizzy and lightheaded.  She also had some intermittent nausea.  Patient states that she has been clammy and sweaty and has had some increasing shortness of breath at times.  She denies any chest pain or discomfort.  Denies any vomiting or diarrhea.  Patient states that she has been on antihypertensive medications for a while and has not had any recent changes.  She denies any headache, blurred vision or visual disturbances.  She does complain of a nonproductive cough over the last couple days.  She's also had a scratchy throat.  No recorded fevers at home.  No chills.  She denies any UTI symptoms including dysuria hematuria.  No flank pain otherwise.  Patient denies prior cardiac history.  No stent placement.      The history is provided by the patient and the spouse.     Review of patient's allergies indicates:  No Known Allergies  Past Medical History:   Diagnosis Date    Anxiety     Degenerative joint disease (DJD) of hip     GERD (gastroesophageal reflux disease)     Hyperlipidemia     Hypertension     IBS (irritable bowel syndrome)     Insomnia     Lumbar disc herniation     PTSD (post-traumatic stress disorder)      Past Surgical History:   Procedure Laterality Date    ADENOIDECTOMY       SECTION      TONSILLECTOMY       History reviewed. No pertinent family history.  Social History   Substance Use Topics    Smoking status: Former Smoker     Types: Vaping  w/o nicotine    Smokeless tobacco: Former User     Quit date: 10/20/2014      Comment: Vapor cigarettes    Alcohol use No      Comment: socially     Review of Systems   Constitutional: Negative for chills and fever.   HENT: Negative for congestion.    Eyes: Negative for visual disturbance.   Respiratory: Positive for cough and shortness of breath.    Cardiovascular: Negative for chest pain and palpitations.   Gastrointestinal: Negative for abdominal pain, diarrhea, nausea and vomiting.   Genitourinary: Negative for dysuria, flank pain and hematuria.   Musculoskeletal: Positive for back pain (chronic).   Skin: Negative for rash.   Allergic/Immunologic: Negative for immunocompromised state.   Neurological: Positive for dizziness, weakness and light-headedness. Negative for seizures and syncope.   Hematological: Does not bruise/bleed easily.   Psychiatric/Behavioral: Negative for confusion.       Physical Exam     Initial Vitals [03/30/18 1916]   BP Pulse Resp Temp SpO2   (!) 127/58 108 18 96.8 °F (36 °C) 97 %      MAP       81         Physical Exam    Constitutional: She appears well-developed and well-nourished. She is not diaphoretic. No distress.   HENT:   Head: Normocephalic and atraumatic.   Mouth/Throat: Oropharynx is clear and moist.   Eyes: Conjunctivae and EOM are normal. Pupils are equal, round, and reactive to light.   Neck: Neck supple.   Cardiovascular: Normal rate, regular rhythm, normal heart sounds and intact distal pulses.   Pulmonary/Chest: Breath sounds normal. No respiratory distress. She has no wheezes. She has no rhonchi.   Abdominal: Soft. Normal appearance. There is tenderness in the left upper quadrant.   Neurological: She is alert and oriented to person, place, and time.   Skin: Skin is warm and dry.         ED Course   Procedures  Labs Reviewed   CBC W/ AUTO DIFFERENTIAL - Abnormal; Notable for the following:        Result Value    WBC 13.74 (*)     Gran # (ANC) 9.5 (*)     Immature Grans  (Abs) 0.06 (*)     Mono # 1.1 (*)     All other components within normal limits   COMPREHENSIVE METABOLIC PANEL - Abnormal; Notable for the following:     Sodium 134 (*)     CO2 21 (*)     BUN, Bld 23 (*)     Creatinine 1.6 (*)     eGFR if  43.0 (*)     eGFR if non  37.3 (*)     All other components within normal limits   URINALYSIS, REFLEX TO URINE CULTURE - Abnormal; Notable for the following:     Appearance, UA Cloudy (*)     Leukocytes, UA 3+ (*)     All other components within normal limits    Narrative:     ADD ON URINE PREGNANCY PER DR PEPE RANGEL/ORDER# [id number] @ 23:35   3/30/18  Preferred Collection Type->Urine, Clean Catch One urine cup only   URINALYSIS MICROSCOPIC - Abnormal; Notable for the following:     WBC, UA 24 (*)     Bacteria, UA Few (*)     Hyaline Casts, UA 5 (*)     All other components within normal limits    Narrative:     ADD ON URINE PREGNANCY PER DR PEPE RANGEL/ORDER# [id number] @ 23:35   3/30/18  Preferred Collection Type->Urine, Clean Catch One urine cup only   ISTAT PROCEDURE - Abnormal; Notable for the following:     POC Creatinine 1.6 (*)     POC Sodium 135 (*)     All other components within normal limits   CULTURE, URINE   B-TYPE NATRIURETIC PEPTIDE   TROPONIN I   PROTIME-INR   TSH   TSH    Narrative:     [id number]  Add on TSH per Abisai De Paz MD @ 21:55  03/30/2018    PREGNANCY TEST, URINE RAPID   PREGNANCY TEST, URINE RAPID    Narrative:     ADD ON URINE PREGNANCY PER DR PEPE RANGEL/ORDER# [id number] @ 23:35   3/30/18  Preferred Collection Type->Urine, Clean Catch One urine cup only   TYPE & SCREEN   ISTAT CHEM8              CT Abdomen Pelvis With Contrast   Final Result      No findings to explain this patient's left upper quadrant pain.      Incidental findings including small umbilical hernia, splenule, and common bile duct at the upper limit of normal.      Electronically signed by resident: Abisai Mccord   Date:    03/31/2018    Time:    01:13      Electronically signed by: Charlie Shafer MD   Date:    03/31/2018   Time:    01:46      X-Ray Chest 1 View   Final Result      No acute findings in the chest.         Electronically signed by: Charlie Shafer MD   Date:    03/30/2018   Time:    21:51                APC / Resident Notes:   Patient was seen in the ER promptly upon arrival.  She is afebrile in no acute distress.  Physical examination is unremarkable.  Mild tenderness over the left upper quadrant.  Patient was placed on a cardiac monitor.  EKG unremarkable.  Patient was not hypotensive in the ER.    Laboratory studies show a mildly elevated white count of 13.  Chemistries do show an elevated creatinine of 1.6, BUN 23.  Lipase normal.  Cardiac workup unremarkable.  X-ray of the chest associated acute abnormality.  CT of the abdomen and pelvis does not show any acute pathology.    Urinalysis did show WBC in the urine.  Negative for blood.  Did cover her with Rocephin and ED.  We'll prescribe patient home on Keflex to use as directed for urinary tract infection.  Patient advised to stay hydrated.  Upon reassessment patient is resting comfortably.    I did have an extensive conversation with patient regarding close follow-up of her elevated kidney functions.  She is to follow-up with her family doctor on Monday for further assessment and recheck her blood work.  I did advise the ER if she is unable to get a close follow-up appointment or if symptoms persist or worsen.    The care of this patient was overseen by attending physician who agrees with treatment, plan, and disposition.             Attending Attestation:     Physician Attestation Statement for NP/PA:   I discussed this assessment and plan of this patient with the NP/PA, but I did not personally examine the patient. The face to face encounter was performed by the NP/PA.                     Clinical Impression:   The primary encounter diagnosis was Urinary tract infection  without hematuria, site unspecified. A diagnosis of Hypotension was also pertinent to this visit.    Disposition:   Disposition: Discharged  Condition: Stable                        Cookie Delgadillo PA-C  03/31/18 0204       Freddy Mcgee MD  04/01/18 3491

## 2018-03-31 NOTE — ED NOTES
"Lab called stating "the urine sent was sent in the blue container with a sharp that we aren't supposed to accept so I must cancel it". Pt informed a new sample is needed.   "

## 2018-04-01 LAB
BACTERIA UR CULT: NORMAL
BACTERIA UR CULT: NORMAL

## 2018-04-03 ENCOUNTER — OFFICE VISIT (OUTPATIENT)
Dept: INTERNAL MEDICINE | Facility: CLINIC | Age: 53
End: 2018-04-03
Payer: MEDICAID

## 2018-04-03 ENCOUNTER — LAB VISIT (OUTPATIENT)
Dept: LAB | Facility: HOSPITAL | Age: 53
End: 2018-04-03
Attending: INTERNAL MEDICINE
Payer: MEDICAID

## 2018-04-03 ENCOUNTER — HOSPITAL ENCOUNTER (OUTPATIENT)
Dept: RADIOLOGY | Facility: HOSPITAL | Age: 53
Discharge: HOME | End: 2018-04-03
Attending: ORTHOPAEDIC SURGERY
Payer: MEDICAID

## 2018-04-03 VITALS
HEART RATE: 80 BPM | TEMPERATURE: 98 F | WEIGHT: 187.63 LBS | HEIGHT: 62 IN | BODY MASS INDEX: 34.53 KG/M2 | SYSTOLIC BLOOD PRESSURE: 152 MMHG | DIASTOLIC BLOOD PRESSURE: 98 MMHG

## 2018-04-03 DIAGNOSIS — J01.00 ACUTE NON-RECURRENT MAXILLARY SINUSITIS: ICD-10-CM

## 2018-04-03 DIAGNOSIS — N17.9 AKI (ACUTE KIDNEY INJURY): ICD-10-CM

## 2018-04-03 DIAGNOSIS — I10 ESSENTIAL HYPERTENSION: ICD-10-CM

## 2018-04-03 DIAGNOSIS — R05.9 COUGH: ICD-10-CM

## 2018-04-03 DIAGNOSIS — N30.00 ACUTE CYSTITIS WITHOUT HEMATURIA: Primary | ICD-10-CM

## 2018-04-03 DIAGNOSIS — D72.829 LEUKOCYTOSIS, UNSPECIFIED TYPE: ICD-10-CM

## 2018-04-03 DIAGNOSIS — M25.512 LEFT SHOULDER PAIN, UNSPECIFIED CHRONICITY: ICD-10-CM

## 2018-04-03 LAB
ANION GAP SERPL CALC-SCNC: 8 MMOL/L
BASOPHILS # BLD AUTO: 0.04 K/UL
BASOPHILS NFR BLD: 0.6 %
BUN SERPL-MCNC: 10 MG/DL
CALCIUM SERPL-MCNC: 9.7 MG/DL
CHLORIDE SERPL-SCNC: 109 MMOL/L
CO2 SERPL-SCNC: 20 MMOL/L
CREAT SERPL-MCNC: 0.9 MG/DL
DIFFERENTIAL METHOD: ABNORMAL
EOSINOPHIL # BLD AUTO: 0.1 K/UL
EOSINOPHIL NFR BLD: 1.9 %
ERYTHROCYTE [DISTWIDTH] IN BLOOD BY AUTOMATED COUNT: 13.1 %
EST. GFR  (AFRICAN AMERICAN): >60 ML/MIN/1.73 M^2
EST. GFR  (NON AFRICAN AMERICAN): >60 ML/MIN/1.73 M^2
GLUCOSE SERPL-MCNC: 102 MG/DL
HCT VFR BLD AUTO: 40.1 %
HGB BLD-MCNC: 12.4 G/DL
IMM GRANULOCYTES # BLD AUTO: 0.02 K/UL
IMM GRANULOCYTES NFR BLD AUTO: 0.3 %
LYMPHOCYTES # BLD AUTO: 2.3 K/UL
LYMPHOCYTES NFR BLD: 33.3 %
MCH RBC QN AUTO: 29.2 PG
MCHC RBC AUTO-ENTMCNC: 30.9 G/DL
MCV RBC AUTO: 95 FL
MONOCYTES # BLD AUTO: 0.3 K/UL
MONOCYTES NFR BLD: 4.3 %
NEUTROPHILS # BLD AUTO: 4.1 K/UL
NEUTROPHILS NFR BLD: 59.6 %
NRBC BLD-RTO: 0 /100 WBC
PLATELET # BLD AUTO: 337 K/UL
PMV BLD AUTO: 12 FL
POTASSIUM SERPL-SCNC: 4.5 MMOL/L
RBC # BLD AUTO: 4.24 M/UL
SODIUM SERPL-SCNC: 137 MMOL/L
WBC # BLD AUTO: 6.94 K/UL

## 2018-04-03 PROCEDURE — 80048 BASIC METABOLIC PNL TOTAL CA: CPT

## 2018-04-03 PROCEDURE — 99213 OFFICE O/P EST LOW 20 MIN: CPT | Mod: PBBFAC,25,PO | Performed by: INTERNAL MEDICINE

## 2018-04-03 PROCEDURE — 99214 OFFICE O/P EST MOD 30 MIN: CPT | Mod: S$PBB,,, | Performed by: INTERNAL MEDICINE

## 2018-04-03 PROCEDURE — 85025 COMPLETE CBC W/AUTO DIFF WBC: CPT

## 2018-04-03 PROCEDURE — 36415 COLL VENOUS BLD VENIPUNCTURE: CPT | Mod: PO

## 2018-04-03 PROCEDURE — 73221 MRI JOINT UPR EXTREM W/O DYE: CPT | Mod: 26,LT,, | Performed by: RADIOLOGY

## 2018-04-03 PROCEDURE — 99999 PR PBB SHADOW E&M-EST. PATIENT-LVL III: CPT | Mod: PBBFAC,,, | Performed by: INTERNAL MEDICINE

## 2018-04-03 PROCEDURE — 73221 MRI JOINT UPR EXTREM W/O DYE: CPT | Mod: TC,LT

## 2018-04-03 RX ORDER — BENZONATATE 200 MG/1
200 CAPSULE ORAL 3 TIMES DAILY PRN
Qty: 30 CAPSULE | Refills: 0 | Status: SHIPPED | OUTPATIENT
Start: 2018-04-03 | End: 2018-04-13

## 2018-04-03 NOTE — PROGRESS NOTES
Subjective:       Patient ID: Sherie Reyes is a 52 y.o. female who presents for Follow-up (ER hypotention)    Repeated hypotensive episodes    HPI     51yo F with HTN and erratic BP who presented to clinic on 3/30 due to lightheadedness and was found to by hypotensive. She was previously on amlodipine. She was given IVF with improvement in BP and antihypertensive medication was stopped. She was found to have a UTI and was treated with Keflex. She did not have any symptoms at the time but will complete the antibiotic. No fever, chills or sweats. She took amlodipine 5mg daily or lisinopril 40mg daily but was stopped ~1 year ago. Unclear cause for her hypotensive episode.      Review of Systems   Constitutional: Positive for diaphoresis (unknown cause). Negative for chills, fatigue and fever.   HENT: Positive for sinus pain and sinus pressure. Negative for congestion, ear pain, postnasal drip, rhinorrhea and sore throat.    Eyes: Negative for redness and visual disturbance.   Respiratory: Positive for cough. Negative for chest tightness and shortness of breath.    Cardiovascular: Negative for chest pain, palpitations and leg swelling.   Gastrointestinal: Negative for abdominal pain, diarrhea, nausea and vomiting.   Genitourinary: Negative for dysuria, hematuria and urgency.   Musculoskeletal: Positive for arthralgias and back pain (chronic). Negative for myalgias.   Skin: Negative for rash.   Neurological: Negative for dizziness, weakness, light-headedness and headaches.   Hematological: Negative for adenopathy.       Objective:      Physical Exam   Constitutional: She is oriented to person, place, and time. Vital signs are normal. She appears well-developed and well-nourished. No distress.   HENT:   Head: Normocephalic and atraumatic.   Right Ear: Hearing and external ear normal.   Left Ear: Hearing and external ear normal.   Nose: Mucosal edema and rhinorrhea present. Right sinus exhibits maxillary sinus  tenderness. Right sinus exhibits no frontal sinus tenderness. Left sinus exhibits maxillary sinus tenderness. Left sinus exhibits no frontal sinus tenderness.   Mouth/Throat: Uvula is midline, oropharynx is clear and moist and mucous membranes are normal. No oropharyngeal exudate or posterior oropharyngeal erythema.   Eyes: Lids are normal.   Cardiovascular: Normal rate, regular rhythm, normal heart sounds and intact distal pulses.    No murmur heard.  Pulmonary/Chest: Effort normal and breath sounds normal. She has no wheezes.   Abdominal: Soft. Bowel sounds are normal. She exhibits no distension. There is no tenderness.   Musculoskeletal: Normal range of motion. She exhibits no edema.   Neurological: She is alert and oriented to person, place, and time.   Skin: Skin is warm, dry and intact. No rash noted. She is not diaphoretic.   Psychiatric: She has a normal mood and affect.   Vitals reviewed.      Assessment:       1. Acute cystitis without hematuria    2. Acute non-recurrent maxillary sinusitis    3. Cough    4. Leukocytosis, unspecified type    5. TASIA (acute kidney injury)    6. Essential hypertension        Plan:       1. Acute cystitis without hematuria  - continue keflex    2. Acute non-recurrent maxillary sinusitis  - manage symptomatically  - call if symptoms worsen over next few days  - may use xyzal daily, avoid decongestants, start flonase nasal spray    3. Cough  - start tessalon perles 200mg three times daily as needed for cough    4. Leukocytosis, unspecified type  - CBC auto differential; Future    5. TASIA (acute kidney injury)  - Basic metabolic panel; Future    6. Hypertension  - persistent episodic hypotension, evaluated ib past by Cardiology, possibly dysautonomia, consider Neurology referral    RTC for follow up with PCP    Farhana Whittington MD

## 2018-04-04 ENCOUNTER — PATIENT MESSAGE (OUTPATIENT)
Dept: INTERNAL MEDICINE | Facility: CLINIC | Age: 53
End: 2018-04-04

## 2018-04-05 ENCOUNTER — TELEPHONE (OUTPATIENT)
Dept: ORTHOPEDICS | Facility: CLINIC | Age: 53
End: 2018-04-05

## 2018-04-05 NOTE — TELEPHONE ENCOUNTER
----- Message from Phuong Agustin sent at 4/5/2018  3:06 PM CDT -----  Contact: self/[phone]  She needs to see if she can schedule an appt for left shoulder pain asap.  
Attempted to contact pt in regards to ML. No answer noted. Message left with name and call back number.   
normal...

## 2018-04-09 ENCOUNTER — PATIENT MESSAGE (OUTPATIENT)
Dept: ORTHOPEDICS | Facility: CLINIC | Age: 53
End: 2018-04-09

## 2018-04-09 ENCOUNTER — PATIENT MESSAGE (OUTPATIENT)
Dept: INTERNAL MEDICINE | Facility: CLINIC | Age: 53
End: 2018-04-09

## 2018-04-19 ENCOUNTER — TELEPHONE (OUTPATIENT)
Dept: ORTHOPEDICS | Facility: CLINIC | Age: 53
End: 2018-04-19

## 2018-05-01 ENCOUNTER — OFFICE VISIT (OUTPATIENT)
Dept: INTERNAL MEDICINE | Facility: CLINIC | Age: 53
End: 2018-05-01
Payer: MEDICAID

## 2018-05-01 ENCOUNTER — LAB VISIT (OUTPATIENT)
Dept: LAB | Facility: HOSPITAL | Age: 53
End: 2018-05-01
Attending: INTERNAL MEDICINE
Payer: MEDICAID

## 2018-05-01 VITALS
SYSTOLIC BLOOD PRESSURE: 150 MMHG | HEART RATE: 94 BPM | DIASTOLIC BLOOD PRESSURE: 102 MMHG | BODY MASS INDEX: 33.71 KG/M2 | TEMPERATURE: 98 F | RESPIRATION RATE: 16 BRPM | HEIGHT: 62 IN | WEIGHT: 183.19 LBS

## 2018-05-01 DIAGNOSIS — I10 ESSENTIAL HYPERTENSION: ICD-10-CM

## 2018-05-01 DIAGNOSIS — I95.0 IDIOPATHIC HYPOTENSION: ICD-10-CM

## 2018-05-01 DIAGNOSIS — R61 DIAPHORESIS: ICD-10-CM

## 2018-05-01 DIAGNOSIS — R00.2 HEART PALPITATIONS: ICD-10-CM

## 2018-05-01 DIAGNOSIS — I95.2 HYPOTENSION DUE TO DRUGS: ICD-10-CM

## 2018-05-01 DIAGNOSIS — I10 ESSENTIAL HYPERTENSION: Primary | ICD-10-CM

## 2018-05-01 LAB
ALBUMIN SERPL BCP-MCNC: 3.8 G/DL
ALP SERPL-CCNC: 49 U/L
ALT SERPL W/O P-5'-P-CCNC: 20 U/L
ANION GAP SERPL CALC-SCNC: 12 MMOL/L
AST SERPL-CCNC: 20 U/L
BILIRUB SERPL-MCNC: 0.3 MG/DL
BUN SERPL-MCNC: 8 MG/DL
CALCIUM SERPL-MCNC: 10.3 MG/DL
CHLORIDE SERPL-SCNC: 105 MMOL/L
CO2 SERPL-SCNC: 22 MMOL/L
CREAT SERPL-MCNC: 1.2 MG/DL
CRP SERPL-MCNC: 6.4 MG/L
ERYTHROCYTE [SEDIMENTATION RATE] IN BLOOD BY WESTERGREN METHOD: 6 MM/HR
EST. GFR  (AFRICAN AMERICAN): >60 ML/MIN/1.73 M^2
EST. GFR  (NON AFRICAN AMERICAN): 52.8 ML/MIN/1.73 M^2
GLUCOSE SERPL-MCNC: 102 MG/DL
POTASSIUM SERPL-SCNC: 4.2 MMOL/L
PROT SERPL-MCNC: 7.4 G/DL
SODIUM SERPL-SCNC: 139 MMOL/L
T4 FREE SERPL-MCNC: 1.13 NG/DL
TSH SERPL DL<=0.005 MIU/L-ACNC: 0.4 UIU/ML

## 2018-05-01 PROCEDURE — 82088 ASSAY OF ALDOSTERONE: CPT

## 2018-05-01 PROCEDURE — 86140 C-REACTIVE PROTEIN: CPT

## 2018-05-01 PROCEDURE — 83519 RIA NONANTIBODY: CPT | Mod: 59

## 2018-05-01 PROCEDURE — 99999 PR PBB SHADOW E&M-EST. PATIENT-LVL IV: CPT | Mod: PBBFAC,,, | Performed by: INTERNAL MEDICINE

## 2018-05-01 PROCEDURE — 86341 ISLET CELL ANTIBODY: CPT

## 2018-05-01 PROCEDURE — 84443 ASSAY THYROID STIM HORMONE: CPT

## 2018-05-01 PROCEDURE — 86038 ANTINUCLEAR ANTIBODIES: CPT

## 2018-05-01 PROCEDURE — 99214 OFFICE O/P EST MOD 30 MIN: CPT | Mod: S$PBB,,, | Performed by: INTERNAL MEDICINE

## 2018-05-01 PROCEDURE — 85651 RBC SED RATE NONAUTOMATED: CPT

## 2018-05-01 PROCEDURE — 80053 COMPREHEN METABOLIC PANEL: CPT

## 2018-05-01 PROCEDURE — 84439 ASSAY OF FREE THYROXINE: CPT

## 2018-05-01 PROCEDURE — 36415 COLL VENOUS BLD VENIPUNCTURE: CPT | Mod: PO

## 2018-05-01 PROCEDURE — 99214 OFFICE O/P EST MOD 30 MIN: CPT | Mod: PBBFAC,PO | Performed by: INTERNAL MEDICINE

## 2018-05-01 RX ORDER — TRAMADOL HYDROCHLORIDE 50 MG/1
100 TABLET ORAL DAILY
COMMUNITY
End: 2019-04-05

## 2018-05-01 RX ORDER — GABAPENTIN 300 MG/1
CAPSULE ORAL
COMMUNITY
End: 2018-05-04 | Stop reason: SDUPTHER

## 2018-05-01 NOTE — PROGRESS NOTES
Subjective:       Patient ID: Sherie Reyes is a 52 y.o. female who presents for Follow-up and Hypertension    Multiple hospitalizations for episodic hypotension @[facility] and [facility], cause unclear    Hypertension   This is a chronic problem. The current episode started more than 1 year ago. The problem has been waxing and waning since onset. Associated symptoms include headaches (when BP elevated) and palpitations (episodic). Pertinent negatives include no chest pain, malaise/fatigue or shortness of breath. There are no associated agents to hypertension. Risk factors for coronary artery disease include obesity and sedentary lifestyle. There are no compliance problems.  There is no history of kidney disease or heart failure. There is no history of chronic renal disease.     Has been off antihypertensives but per BP log, has periodic BP drops as low as 68/70's. Has not been recording HR when heart races. Episodic diaphoresis but no associated BP elevations. Does note headaches and dizziness when BP is elevated.      Review of Systems   Constitutional: Positive for diaphoresis (episodic). Negative for chills, fever and malaise/fatigue.   HENT: Negative for congestion, rhinorrhea and sinus pressure.    Eyes: Negative for redness and visual disturbance.   Respiratory: Negative for cough and shortness of breath.    Cardiovascular: Positive for palpitations (episodic). Negative for chest pain and leg swelling.   Gastrointestinal: Positive for constipation. Negative for abdominal pain, diarrhea, nausea and vomiting.   Endocrine: Positive for heat intolerance. Negative for cold intolerance.   Musculoskeletal: Positive for arthralgias and back pain. Negative for myalgias.   Skin: Negative for rash.   Neurological: Positive for dizziness (when BP elevated) and headaches (when BP elevated). Negative for speech difficulty, weakness and light-headedness.   Psychiatric/Behavioral: Positive for sleep disturbance. The patient is  nervous/anxious.        Objective:      Physical Exam   Constitutional: She is oriented to person, place, and time. Vital signs are normal. She appears well-developed and well-nourished. No distress.   HENT:   Head: Normocephalic and atraumatic.   Right Ear: Hearing and external ear normal.   Left Ear: Hearing and external ear normal.   Nose: Nose normal.   Mouth/Throat: Uvula is midline and mucous membranes are normal.   Eyes: Lids are normal.   Cardiovascular: Normal rate, regular rhythm, normal heart sounds and intact distal pulses.    No murmur heard.  Pulmonary/Chest: Effort normal and breath sounds normal. She has no wheezes.   Abdominal: Soft. Bowel sounds are normal. She exhibits no distension. There is no tenderness.   Musculoskeletal: Normal range of motion. She exhibits no edema.   Neurological: She is alert and oriented to person, place, and time.   Skin: Skin is warm, dry and intact. No rash noted. She is not diaphoretic.   Psychiatric: She has a normal mood and affect.   Vitals reviewed.      Assessment:       1. Essential hypertension    2. Idiopathic hypotension    3. Heart palpitations    4. Diaphoresis        Plan:       1. Essential hypertension  - Comprehensive metabolic panel; Future  - Urinalysis; Future  - Protein / creatinine ratio, urine; Future  - Ambulatory Referral to Cardiology  - [place]; Future  - Aldosterone/Renin Activity Ratio; Future  - continue to monitor BP regularly, call with readings  - hold antihypertensive due to episodic hypotension    2. Idiopathic hypotension  - Comprehensive metabolic panel; Future  - Urinalysis; Future  - Protein / creatinine ratio, urine; Future  - Sedimentation rate, manual; Future  - C-reactive protein; Future  - Ambulatory Referral to Cardiology  - Autoimmune Dysautonomia Evaluation; Future    3. Heart palpitations  - [place]; Future  - Aldosterone/Renin Activity Ratio; Future  - Autoimmune Dysautonomia Evaluation; Future    4. Diaphoresis  - TSH;  Future    RTC for f/u with PCP    Farhana Whittington MD

## 2018-05-02 LAB — ANA SER QL IF: NORMAL

## 2018-05-03 ENCOUNTER — PATIENT MESSAGE (OUTPATIENT)
Dept: INTERNAL MEDICINE | Facility: CLINIC | Age: 53
End: 2018-05-03

## 2018-05-03 DIAGNOSIS — I99.8 BLOOD PRESSURE INSTABILITY: Primary | ICD-10-CM

## 2018-05-03 DIAGNOSIS — R80.9 PROTEINURIA, UNSPECIFIED TYPE: ICD-10-CM

## 2018-05-03 DIAGNOSIS — R82.81 PYURIA: Primary | ICD-10-CM

## 2018-05-03 RX ORDER — CLONIDINE HYDROCHLORIDE 0.1 MG/1
0.1 TABLET ORAL 2 TIMES DAILY PRN
Qty: 10 TABLET | Refills: 0 | Status: SHIPPED | OUTPATIENT
Start: 2018-05-03 | End: 2018-05-07

## 2018-05-03 RX ORDER — NITROFURANTOIN 25; 75 MG/1; MG/1
100 CAPSULE ORAL 2 TIMES DAILY
Qty: 10 CAPSULE | Refills: 0 | Status: SHIPPED | OUTPATIENT
Start: 2018-05-03 | End: 2018-05-08

## 2018-05-03 RX ORDER — PROMETHAZINE HYDROCHLORIDE 25 MG/1
25 TABLET ORAL EVERY 8 HOURS PRN
Qty: 20 TABLET | Refills: 0 | Status: SHIPPED | OUTPATIENT
Start: 2018-05-03 | End: 2018-08-01

## 2018-05-03 NOTE — TELEPHONE ENCOUNTER
BP when I spoke to patient earlier was 150's but she called with /134. Repeated readings were 168/115 with 's and then 169/104. Rechecked and was 162/119.    No chest pain but admits to nausea today. Advised to monitor another reading to see if it decreases    Advised to go to the ER immediately if BP does not decrease to <160 or if she develops chest pain, SOB or dizziness.    Patient had repeated hospitalizations at [facility] [place] and  for hypotensive episodes when she was on an antihypertensive but she has stopped. Would be hesitant to try a medication at home since BP last night was 74/55.

## 2018-05-03 NOTE — TELEPHONE ENCOUNTER
Phenergan did not help with nausea. Repeat SBP in 160's, HA resolved. Took 1/2 clonidine and patient to monitor BP and HR every 15 minutes. Advised to call in 1 hour with readings.    ER if BP elevates or develops dizziness, shortness of breath or chest pain.    If BP stabilizes overnight, will get to clinic in AM for EKG, vitals, additional testing.

## 2018-05-03 NOTE — TELEPHONE ENCOUNTER
Spoke to patient about urinalysis results.    Last UA in March + for UTI but culture negative. Did not culture this time. Will start Macrobid 100mg bid.    Repeat UA and culture 1 week after stopping Macrobid. Repeat urine PC ratio.    Unable to make an appointment with Cardiology before November per patient. She is willing to go to [facility]. Please arrange.

## 2018-05-04 ENCOUNTER — LAB VISIT (OUTPATIENT)
Dept: LAB | Facility: HOSPITAL | Age: 53
End: 2018-05-04
Attending: INTERNAL MEDICINE
Payer: MEDICAID

## 2018-05-04 ENCOUNTER — PATIENT MESSAGE (OUTPATIENT)
Dept: INTERNAL MEDICINE | Facility: CLINIC | Age: 53
End: 2018-05-04

## 2018-05-04 ENCOUNTER — OFFICE VISIT (OUTPATIENT)
Dept: INTERNAL MEDICINE | Facility: CLINIC | Age: 53
End: 2018-05-04
Payer: MEDICAID

## 2018-05-04 ENCOUNTER — CLINICAL SUPPORT (OUTPATIENT)
Dept: CARDIOLOGY | Facility: CLINIC | Age: 53
End: 2018-05-04
Attending: INTERNAL MEDICINE
Payer: MEDICAID

## 2018-05-04 VITALS
HEART RATE: 88 BPM | RESPIRATION RATE: 16 BRPM | HEIGHT: 62 IN | WEIGHT: 181.19 LBS | SYSTOLIC BLOOD PRESSURE: 152 MMHG | DIASTOLIC BLOOD PRESSURE: 96 MMHG | TEMPERATURE: 98 F | BODY MASS INDEX: 33.34 KG/M2 | OXYGEN SATURATION: 98 %

## 2018-05-04 DIAGNOSIS — R42 DIZZINESS: ICD-10-CM

## 2018-05-04 DIAGNOSIS — I99.8 FLUCTUATING BLOOD PRESSURE: ICD-10-CM

## 2018-05-04 DIAGNOSIS — I99.8 FLUCTUATING BLOOD PRESSURE: Primary | ICD-10-CM

## 2018-05-04 DIAGNOSIS — I10 ESSENTIAL HYPERTENSION: ICD-10-CM

## 2018-05-04 DIAGNOSIS — R07.89 CHEST TIGHTNESS: Primary | ICD-10-CM

## 2018-05-04 DIAGNOSIS — I99.8 BLOOD PRESSURE INSTABILITY: ICD-10-CM

## 2018-05-04 DIAGNOSIS — I95.0 IDIOPATHIC HYPOTENSION: ICD-10-CM

## 2018-05-04 DIAGNOSIS — R07.89 CHEST TIGHTNESS: ICD-10-CM

## 2018-05-04 PROCEDURE — 36415 COLL VENOUS BLD VENIPUNCTURE: CPT | Mod: PO

## 2018-05-04 PROCEDURE — 99214 OFFICE O/P EST MOD 30 MIN: CPT | Mod: S$PBB,,, | Performed by: INTERNAL MEDICINE

## 2018-05-04 PROCEDURE — 93005 ELECTROCARDIOGRAM TRACING: CPT | Mod: PBBFAC,PO | Performed by: INTERNAL MEDICINE

## 2018-05-04 PROCEDURE — 93306 TTE W/DOPPLER COMPLETE: CPT | Mod: PBBFAC,PO | Performed by: INTERNAL MEDICINE

## 2018-05-04 PROCEDURE — 86316 IMMUNOASSAY TUMOR OTHER: CPT

## 2018-05-04 PROCEDURE — 99999 PR PBB SHADOW E&M-EST. PATIENT-LVL V: CPT | Mod: PBBFAC,,, | Performed by: INTERNAL MEDICINE

## 2018-05-04 PROCEDURE — 93010 ELECTROCARDIOGRAM REPORT: CPT | Mod: ,,, | Performed by: INTERNAL MEDICINE

## 2018-05-04 PROCEDURE — 82384 ASSAY THREE CATECHOLAMINES: CPT | Mod: 91

## 2018-05-04 PROCEDURE — 99215 OFFICE O/P EST HI 40 MIN: CPT | Mod: PBBFAC,PO,25 | Performed by: INTERNAL MEDICINE

## 2018-05-04 RX ORDER — GABAPENTIN 600 MG/1
TABLET ORAL
COMMUNITY
Start: 2018-03-07 | End: 2018-05-04

## 2018-05-04 RX ORDER — QUETIAPINE FUMARATE 100 MG/1
1 TABLET, FILM COATED ORAL DAILY
Status: ON HOLD | COMMUNITY
Start: 2018-02-26 | End: 2020-07-09 | Stop reason: HOSPADM

## 2018-05-04 NOTE — PROGRESS NOTES
Subjective:       Patient ID: Sherie Reyes is a 52 y.o. female who presents for Hypertension      53yo F with HTN previously on antihypertensive medications until a few months ago [place] [facility] [place]. Episodes were though to be related to medication interactions and since the last hospitalization, patient has been hesitant to restart medications. BP on 5/2/18 was 74/55 and a few hours later, was 95/62.     She called clinic on 5/3/18 when BP increased to 210/122 and discussed getting her to ER. She reported hesitance to go to ER since BP is so erratic. She was agreeable with very frequent BP checks and ordered clonidine 0.1mg to be given in small doses (1/2 pill) n order to monitor her response and avoid hypotension. She remained stable overnight and presented to clinic with /96. She denies headache, no weakness, no body pain, no dizziness, no vision changes, no chest pain.    Hypertension   This is a chronic problem. The current episode started more than 1 year ago. The problem has been gradually worsening since onset. Associated symptoms include headaches (intermittent) and palpitations. Pertinent negatives include no anxiety, chest pain, malaise/fatigue, orthopnea, peripheral edema, PND or shortness of breath. Agents associated with hypertension include oral contraceptives. Risk factors for coronary artery disease include sedentary lifestyle. Past treatments include calcium channel blockers and diuretics. The current treatment provides mild improvement. Compliance problems include exercise.  There is no history of CAD/MI or heart failure. There is no history of chronic renal disease.      [facility]:     - 3/4/17 for syncopal episode, EMS recorded BP 95/43 on arrival, workup negative and discharged    - 1/25/17 for N/V, syncope, hypotension to 60/40's, admitted and treated for pancreatitis, TASIA. On lisinopril, amlodipine and lasix at the time.      Review of  Systems   Constitutional: Negative for chills, fatigue, fever and malaise/fatigue.   HENT: Negative for congestion, rhinorrhea and sinus pressure.    Eyes: Negative for redness and visual disturbance.   Respiratory: Negative for cough, chest tightness and shortness of breath.    Cardiovascular: Positive for palpitations. Negative for chest pain, orthopnea, leg swelling and PND.   Gastrointestinal: Negative for abdominal pain, diarrhea, nausea and vomiting.   Genitourinary: Negative for decreased urine volume, dysuria, flank pain and hematuria.   Musculoskeletal: Positive for back pain. Negative for arthralgias and myalgias.   Skin: Negative for rash.   Neurological: Positive for headaches (intermittent). Negative for dizziness, weakness and light-headedness.   Hematological: Negative for adenopathy.   Psychiatric/Behavioral: Negative for dysphoric mood. The patient is not nervous/anxious.        Objective:      Physical Exam   Constitutional: She is oriented to person, place, and time. Vital signs are normal. She appears well-developed and well-nourished. No distress.   HENT:   Head: Normocephalic and atraumatic.   Right Ear: Hearing and external ear normal.   Left Ear: Hearing and external ear normal.   Nose: Nose normal.   Mouth/Throat: Uvula is midline and mucous membranes are normal.   Eyes: Lids are normal.   Neck: Full passive range of motion without pain.   Cardiovascular: Normal rate, regular rhythm, normal heart sounds and intact distal pulses.    No murmur heard.  Pulmonary/Chest: Effort normal and breath sounds normal. She has no wheezes.   Abdominal: Soft. Bowel sounds are normal. She exhibits no distension. There is no tenderness.   Musculoskeletal: Normal range of motion. She exhibits no edema.   Neurological: She is alert and oriented to person, place, and time.   Skin: Skin is warm, dry and intact. No rash noted. She is not diaphoretic.   Psychiatric: She has a normal mood and affect.   Vitals  reviewed.      Assessment:       1. Fluctuating blood pressure    2. Idiopathic hypotension    3. Essential hypertension        Plan:       1. Fluctuating blood pressure  - IN OFFICE EKG 12-LEAD (to [facility])  - CHROMOGRANIN A; Future  - serum and urine catecholamines  - Ambulatory Referral to Cardiology  - dysautonomia panel pending    2. Idiopathic hypotension  - IN OFFICE EKG 12-LEAD (to [facility])  - Ambulatory Referral to Cardiology    3. Essential hypertension  - IN OFFICE EKG 12-LEAD (to [facility])  - Ambulatory Referral to Cardiology  - monitor BP closely, hesitant to have a scheduled antihypertensive    Farhana Whittington MD

## 2018-05-04 NOTE — PATIENT INSTRUCTIONS
Continue clonidine 0.1mg every 12 hours as needed for blood pressure > 180/110    ER if develops chest pain, dizziness or shortness of breath

## 2018-05-05 ENCOUNTER — PATIENT MESSAGE (OUTPATIENT)
Dept: INTERNAL MEDICINE | Facility: CLINIC | Age: 53
End: 2018-05-05

## 2018-05-05 LAB
AORTIC VALVE REGURGITATION: NORMAL
DIASTOLIC DYSFUNCTION: NO
ESTIMATED PA SYSTOLIC PRESSURE: 27.04
RETIRED EF AND QEF - SEE NOTES: 65 (ref 55–65)
TRICUSPID VALVE REGURGITATION: NORMAL

## 2018-05-05 NOTE — TELEPHONE ENCOUNTER
Spoke with patient. Just woke up and checked blood pressure 141/80 with HR 87.     May take full clonidine 0.1mg pill if SBP>170.     Will see if Cardiology referral is scheduled on Monday.

## 2018-05-06 ENCOUNTER — PATIENT MESSAGE (OUTPATIENT)
Dept: INTERNAL MEDICINE | Facility: CLINIC | Age: 53
End: 2018-05-06

## 2018-05-07 ENCOUNTER — PATIENT MESSAGE (OUTPATIENT)
Dept: INTERNAL MEDICINE | Facility: CLINIC | Age: 53
End: 2018-05-07

## 2018-05-07 ENCOUNTER — TELEPHONE (OUTPATIENT)
Dept: INTERNAL MEDICINE | Facility: CLINIC | Age: 53
End: 2018-05-07

## 2018-05-07 RX ORDER — LISINOPRIL 5 MG/1
5 TABLET ORAL DAILY
Qty: 90 TABLET | Refills: 0 | Status: SHIPPED | OUTPATIENT
Start: 2018-05-07 | End: 2018-05-09 | Stop reason: SDUPTHER

## 2018-05-07 NOTE — TELEPHONE ENCOUNTER
Will need to begin daily medication for BP control since persistently high.    Starting Lisinopril 5mg daily with patient in AM    Patient to make appt with Cardiology tomorrow.

## 2018-05-07 NOTE — TELEPHONE ENCOUNTER
----- Message from Bhavna Agustin sent at 5/7/2018  2:34 PM CDT -----  Contact: self/ [phone]  Patient is returning a phone call.  Who left a message for the patient: Marguerite  Does patient know what this is regarding:  bp?  Comments:

## 2018-05-07 NOTE — TELEPHONE ENCOUNTER
Patient sent second message with following results   Here's some updated numbers for you.     1800.  108/75.  87.  Asymptomatic     2000.  133/84.  88.  Asymptomatic     2200.  133/84.  88  Asymptomatic     0000.  126/86.  78.  Asymptomatic     0900.  129/93.  96       This morning I noticed that my urine was almost the color red with some low abd acheness. Could this be normal with the antibiotics?     Thank you   Sherie Reyes

## 2018-05-08 ENCOUNTER — TELEPHONE (OUTPATIENT)
Dept: INTERNAL MEDICINE | Facility: CLINIC | Age: 53
End: 2018-05-08

## 2018-05-08 ENCOUNTER — DOCUMENTATION ONLY (OUTPATIENT)
Dept: INTERNAL MEDICINE | Facility: CLINIC | Age: 53
End: 2018-05-08

## 2018-05-08 LAB
ACHR BIND AB SER-SCNC: 0 NMOL/L
DYSAUTONOMIA,INTERPRETATION,S: NORMAL
GAD65 AB SER-SCNC: 0 NMOL/L
HU1 AB TITR SER: NEGATIVE TITER
NACHR AB SER-SCNC: 0 NMOL/L
PAVAL REFLEX TEST ADDED: NORMAL
STRIA MUS AB TITR SER: NEGATIVE TITER
VGCC-N BIND AB SER-SCNC: 0 NMOL/L
VGCC-P/Q BIND AB SER-SCNC: 0 NMOL/L
VGKC AB SER-SCNC: 0 NMOL/L

## 2018-05-08 NOTE — TELEPHONE ENCOUNTER
On visit 5/4/18  Orthostatic b/p and pulse performed as follows:    Lying  B/p    150/90 Pulse 80   Sitting  B/p    154/96 Pulse 88  Stand B/p    152/94 Pulse 90

## 2018-05-09 LAB
ALDOST SERPL-MCNC: 9.6 NG/DL
ALDOST/RENIN PLAS-RTO: 0.8 RATIO
CGA SERPL-MCNC: 153 NG/ML (ref 0–95)
RENIN PLAS-CCNC: 12.5 NG/ML/HR

## 2018-05-09 RX ORDER — LISINOPRIL 10 MG/1
10 TABLET ORAL DAILY
Start: 2018-05-09 | End: 2018-07-11 | Stop reason: ALTCHOICE

## 2018-05-09 NOTE — TELEPHONE ENCOUNTER
Called patient and left message.     Should report BP readings from yesterday and today before she takes the dose of lisinopril.     Will send last office visit note for Dr. Wilson.

## 2018-05-10 ENCOUNTER — OFFICE VISIT (OUTPATIENT)
Dept: ORTHOPEDICS | Facility: CLINIC | Age: 53
End: 2018-05-10
Payer: MEDICAID

## 2018-05-10 VITALS — HEIGHT: 62 IN | BODY MASS INDEX: 33.31 KG/M2 | WEIGHT: 181 LBS

## 2018-05-10 DIAGNOSIS — G89.29 CHRONIC PAIN OF BOTH SHOULDERS: Primary | ICD-10-CM

## 2018-05-10 DIAGNOSIS — M25.511 CHRONIC PAIN OF BOTH SHOULDERS: Primary | ICD-10-CM

## 2018-05-10 DIAGNOSIS — M25.512 CHRONIC PAIN OF BOTH SHOULDERS: Primary | ICD-10-CM

## 2018-05-10 PROCEDURE — 99999 PR PBB SHADOW E&M-EST. PATIENT-LVL III: CPT | Mod: PBBFAC,,, | Performed by: ORTHOPAEDIC SURGERY

## 2018-05-10 PROCEDURE — 99213 OFFICE O/P EST LOW 20 MIN: CPT | Mod: PBBFAC,PO | Performed by: ORTHOPAEDIC SURGERY

## 2018-05-10 PROCEDURE — 20610 DRAIN/INJ JOINT/BURSA W/O US: CPT | Mod: 50,S$PBB,, | Performed by: ORTHOPAEDIC SURGERY

## 2018-05-10 PROCEDURE — 99213 OFFICE O/P EST LOW 20 MIN: CPT | Mod: 25,S$PBB,, | Performed by: ORTHOPAEDIC SURGERY

## 2018-05-10 PROCEDURE — 20610 DRAIN/INJ JOINT/BURSA W/O US: CPT | Mod: 50,PBBFAC,PO | Performed by: ORTHOPAEDIC SURGERY

## 2018-05-10 RX ORDER — TRIAMCINOLONE ACETONIDE 40 MG/ML
40 INJECTION, SUSPENSION INTRA-ARTICULAR; INTRAMUSCULAR
Status: COMPLETED | OUTPATIENT
Start: 2018-05-10 | End: 2018-05-10

## 2018-05-10 RX ADMIN — TRIAMCINOLONE ACETONIDE 40 MG: 40 INJECTION, SUSPENSION INTRA-ARTICULAR; INTRAMUSCULAR at 11:05

## 2018-05-10 NOTE — PROGRESS NOTES
HISTORY OF PRESENT ILLNESS:  Ms. Reyes in followup of bilateral shoulder   pain.  She had a previous left shoulder arthroscopy and decompression more than   six months ago.  She continued to have pain in the left shoulder and a recent   MRI was performed on the left shoulder.  The results of the MRI show that she   really does not have anything significant going on.  No tear noted, just some   post-surgical changes, may be some mild tendinosis.  I went over that with her   today.  We have tried physical therapy for both shoulders.  She continues to   have pain in both shoulders.  She would like to try injections today.    I also tried referring her to the Pain Clinic, but she did not follow up with   that, something to do with her  she said.    PHYSICAL EXAMINATION:  Both shoulders have some mild tenderness.  No swelling.    Full range of motion.  Mildly positive impingement sign bilaterally.    IMPRESSION:  Bilateral shoulder impingement after previous left shoulder   arthroscopy.    PLAN:  Previously I thought her symptoms may be coming from her neck, but she   really assures me that it is not coming from her neck.  I have encouraged her to   follow up with the Pain Clinic because I really do not see anything further   that surgery would help in either shoulder and explained that to her today.    She would like to try injections.  After pause for timeout, she identified each   shoulder injected bilaterally with combination of Kenalog 40 mg, 2 mL Xylocaine,   sterile technique.  She tolerated the procedure well without complication.    I have also recommended she continue with Vimovo by mouth and exercises for the   shoulders.  Follow up in 6-8 weeks or p.r.n. and again I have encouraged her to   follow up with the Pain Clinic as well.      CHARLENE  dd: 05/10/2018 11:14:36 (CDT)  td: 05/11/2018 07:14:38 (CDT)  Doc ID   #[id number]  Job ID #[id number]    CC:

## 2018-05-11 ENCOUNTER — LAB VISIT (OUTPATIENT)
Dept: LAB | Facility: HOSPITAL | Age: 53
End: 2018-05-11
Attending: INTERNAL MEDICINE
Payer: MEDICAID

## 2018-05-11 DIAGNOSIS — R80.9 PROTEINURIA, UNSPECIFIED TYPE: ICD-10-CM

## 2018-05-11 DIAGNOSIS — R82.81 PYURIA: ICD-10-CM

## 2018-05-11 LAB
BACTERIA #/AREA URNS AUTO: ABNORMAL /HPF
BILIRUB UR QL STRIP: NEGATIVE
CLARITY UR REFRACT.AUTO: ABNORMAL
COLOR UR AUTO: YELLOW
CREAT UR-MCNC: 173 MG/DL
GLUCOSE UR QL STRIP: NEGATIVE
HGB UR QL STRIP: ABNORMAL
KETONES UR QL STRIP: NEGATIVE
LEUKOCYTE ESTERASE UR QL STRIP: ABNORMAL
MICROSCOPIC COMMENT: ABNORMAL
NITRITE UR QL STRIP: NEGATIVE
PH UR STRIP: 5 [PH] (ref 5–8)
PROT UR QL STRIP: NEGATIVE
PROT UR-MCNC: 26 MG/DL
PROT/CREAT RATIO, UR: 0.15
RBC #/AREA URNS AUTO: 6 /HPF (ref 0–4)
SP GR UR STRIP: 1.02 (ref 1–1.03)
SQUAMOUS #/AREA URNS AUTO: 11 /HPF
URN SPEC COLLECT METH UR: ABNORMAL
UROBILINOGEN UR STRIP-ACNC: NEGATIVE EU/DL
WBC #/AREA URNS AUTO: 15 /HPF (ref 0–5)

## 2018-05-11 PROCEDURE — 87086 URINE CULTURE/COLONY COUNT: CPT

## 2018-05-11 PROCEDURE — 81001 URINALYSIS AUTO W/SCOPE: CPT

## 2018-05-11 PROCEDURE — 82570 ASSAY OF URINE CREATININE: CPT

## 2018-05-12 LAB
CATECHOLS PLAS-MCNC: 356 PG/ML
DOPAMINE SERPL-MCNC: 11 PG/ML
EPINEPH PLAS-MCNC: <20 PG/ML
NOREPINEPH PLAS-MCNC: 345 PG/ML

## 2018-05-13 LAB
BACTERIA UR CULT: NORMAL
BACTERIA UR CULT: NORMAL

## 2018-05-15 ENCOUNTER — PATIENT MESSAGE (OUTPATIENT)
Dept: INTERNAL MEDICINE | Facility: CLINIC | Age: 53
End: 2018-05-15

## 2018-05-15 NOTE — TELEPHONE ENCOUNTER
Psychiatrist decreased wellbutrin xl to 150mg daily and Cymbalta but patient cannot recall the dose.    -150's, call if SBP drops to less than 90 or SBP greater than 160.

## 2018-05-16 ENCOUNTER — PATIENT MESSAGE (OUTPATIENT)
Dept: INTERNAL MEDICINE | Facility: CLINIC | Age: 53
End: 2018-05-16

## 2018-06-10 ENCOUNTER — PATIENT MESSAGE (OUTPATIENT)
Dept: INTERNAL MEDICINE | Facility: CLINIC | Age: 53
End: 2018-06-10

## 2018-06-11 ENCOUNTER — HOSPITAL ENCOUNTER (EMERGENCY)
Facility: HOSPITAL | Age: 53
Discharge: HOME | End: 2018-06-11
Attending: EMERGENCY MEDICINE
Payer: MEDICAID

## 2018-06-11 ENCOUNTER — PATIENT MESSAGE (OUTPATIENT)
Dept: INTERNAL MEDICINE | Facility: CLINIC | Age: 53
End: 2018-06-11

## 2018-06-11 VITALS
OXYGEN SATURATION: 96 % | SYSTOLIC BLOOD PRESSURE: 138 MMHG | WEIGHT: 181 LBS | RESPIRATION RATE: 18 BRPM | DIASTOLIC BLOOD PRESSURE: 87 MMHG | BODY MASS INDEX: 33.31 KG/M2 | TEMPERATURE: 98 F | HEIGHT: 62 IN | HEART RATE: 88 BPM

## 2018-06-11 DIAGNOSIS — R29.818 ROMBERG'S TEST POSITIVE: ICD-10-CM

## 2018-06-11 DIAGNOSIS — R26.81 GAIT INSTABILITY: Primary | ICD-10-CM

## 2018-06-11 LAB
ALBUMIN SERPL BCP-MCNC: 3.1 G/DL
ALP SERPL-CCNC: 53 U/L
ALT SERPL W/O P-5'-P-CCNC: 16 U/L
AMMONIA PLAS-SCNC: 33 UMOL/L
AMPHET+METHAMPHET UR QL: NEGATIVE
ANION GAP SERPL CALC-SCNC: 12 MMOL/L
AST SERPL-CCNC: 10 U/L
BACTERIA #/AREA URNS AUTO: ABNORMAL /HPF
BARBITURATES UR QL SCN>200 NG/ML: NEGATIVE
BASOPHILS # BLD AUTO: 0.03 K/UL
BASOPHILS NFR BLD: 0.5 %
BENZODIAZ UR QL SCN>200 NG/ML: NORMAL
BILIRUB SERPL-MCNC: 0.2 MG/DL
BILIRUB UR QL STRIP: NEGATIVE
BUN SERPL-MCNC: 9 MG/DL
BZE UR QL SCN: NEGATIVE
CALCIUM SERPL-MCNC: 8.8 MG/DL
CANNABINOIDS UR QL SCN: NEGATIVE
CHLORIDE SERPL-SCNC: 107 MMOL/L
CLARITY UR REFRACT.AUTO: ABNORMAL
CO2 SERPL-SCNC: 20 MMOL/L
COLOR UR AUTO: YELLOW
CREAT SERPL-MCNC: 1.3 MG/DL
CREAT UR-MCNC: 146 MG/DL
DIFFERENTIAL METHOD: ABNORMAL
EOSINOPHIL # BLD AUTO: 0.2 K/UL
EOSINOPHIL NFR BLD: 2.3 %
ERYTHROCYTE [DISTWIDTH] IN BLOOD BY AUTOMATED COUNT: 12.6 %
EST. GFR  (AFRICAN AMERICAN): 55.3 ML/MIN/1.73 M^2
EST. GFR  (NON AFRICAN AMERICAN): 48 ML/MIN/1.73 M^2
ETHANOL SERPL-MCNC: <10 MG/DL
GLUCOSE SERPL-MCNC: 133 MG/DL
GLUCOSE UR QL STRIP: NEGATIVE
HCT VFR BLD AUTO: 35.6 %
HGB BLD-MCNC: 11.7 G/DL
HGB UR QL STRIP: ABNORMAL
IMM GRANULOCYTES # BLD AUTO: 0.02 K/UL
IMM GRANULOCYTES NFR BLD AUTO: 0.3 %
INR PPP: 0.9
KETONES UR QL STRIP: NEGATIVE
LEUKOCYTE ESTERASE UR QL STRIP: ABNORMAL
LYMPHOCYTES # BLD AUTO: 3.1 K/UL
LYMPHOCYTES NFR BLD: 48.3 %
MAGNESIUM SERPL-MCNC: 1.7 MG/DL
MCH RBC QN AUTO: 29.4 PG
MCHC RBC AUTO-ENTMCNC: 32.9 G/DL
MCV RBC AUTO: 89 FL
METHADONE UR QL SCN>300 NG/ML: NEGATIVE
MICROSCOPIC COMMENT: ABNORMAL
MONOCYTES # BLD AUTO: 0.5 K/UL
MONOCYTES NFR BLD: 7.6 %
NEUTROPHILS # BLD AUTO: 2.7 K/UL
NEUTROPHILS NFR BLD: 41 %
NITRITE UR QL STRIP: NEGATIVE
NRBC BLD-RTO: 0 /100 WBC
OPIATES UR QL SCN: NORMAL
PCP UR QL SCN>25 NG/ML: NEGATIVE
PH UR STRIP: 5 [PH] (ref 5–8)
PLATELET # BLD AUTO: 260 K/UL
PMV BLD AUTO: 11 FL
POTASSIUM SERPL-SCNC: 3.8 MMOL/L
PROT SERPL-MCNC: 6.3 G/DL
PROT UR QL STRIP: NEGATIVE
PROTHROMBIN TIME: 9.8 SEC
RBC # BLD AUTO: 3.98 M/UL
RBC #/AREA URNS AUTO: 3 /HPF (ref 0–4)
SODIUM SERPL-SCNC: 139 MMOL/L
SP GR UR STRIP: 1.01 (ref 1–1.03)
SQUAMOUS #/AREA URNS AUTO: 19 /HPF
TOXICOLOGY INFORMATION: NORMAL
TROPONIN I SERPL DL<=0.01 NG/ML-MCNC: 0.01 NG/ML
URN SPEC COLLECT METH UR: ABNORMAL
UROBILINOGEN UR STRIP-ACNC: NEGATIVE EU/DL
WBC # BLD AUTO: 6.48 K/UL
WBC #/AREA URNS AUTO: 26 /HPF (ref 0–5)

## 2018-06-11 PROCEDURE — 63600175 PHARM REV CODE 636 W HCPCS: Performed by: EMERGENCY MEDICINE

## 2018-06-11 PROCEDURE — 80320 DRUG SCREEN QUANTALCOHOLS: CPT

## 2018-06-11 PROCEDURE — 99284 EMERGENCY DEPT VISIT MOD MDM: CPT | Mod: ,,, | Performed by: EMERGENCY MEDICINE

## 2018-06-11 PROCEDURE — 96374 THER/PROPH/DIAG INJ IV PUSH: CPT

## 2018-06-11 PROCEDURE — 82140 ASSAY OF AMMONIA: CPT

## 2018-06-11 PROCEDURE — 80307 DRUG TEST PRSMV CHEM ANLYZR: CPT

## 2018-06-11 PROCEDURE — 85610 PROTHROMBIN TIME: CPT

## 2018-06-11 PROCEDURE — 93005 ELECTROCARDIOGRAM TRACING: CPT

## 2018-06-11 PROCEDURE — 81001 URINALYSIS AUTO W/SCOPE: CPT

## 2018-06-11 PROCEDURE — 85025 COMPLETE CBC W/AUTO DIFF WBC: CPT

## 2018-06-11 PROCEDURE — 87086 URINE CULTURE/COLONY COUNT: CPT

## 2018-06-11 PROCEDURE — 83735 ASSAY OF MAGNESIUM: CPT

## 2018-06-11 PROCEDURE — 93010 ELECTROCARDIOGRAM REPORT: CPT | Mod: ,,, | Performed by: INTERNAL MEDICINE

## 2018-06-11 PROCEDURE — 80053 COMPREHEN METABOLIC PANEL: CPT

## 2018-06-11 PROCEDURE — 84484 ASSAY OF TROPONIN QUANT: CPT

## 2018-06-11 PROCEDURE — 99284 EMERGENCY DEPT VISIT MOD MDM: CPT | Mod: 25

## 2018-06-11 RX ORDER — LORAZEPAM 2 MG/ML
2 INJECTION INTRAMUSCULAR ONCE AS NEEDED
Status: COMPLETED | OUTPATIENT
Start: 2018-06-11 | End: 2018-06-11

## 2018-06-11 RX ADMIN — LORAZEPAM 2 MG: 2 INJECTION INTRAMUSCULAR; INTRAVENOUS at 09:06
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
## 2018-06-11 NOTE — ED TRIAGE NOTES
52 year old female pt presents to the ed with complaints of gait disturbance and involuntarily muscle movements. Pt states she fell 5 x yesterday and presents with minor bruising. Denies loc or head trauma. Pt baseline has gait disturbance due to back injury from work. Pt is aox 4. Pt denies any neurological problems. Pt does complain of pain to the right shoulder and pain to the lower back 9/10. Pt denies chest pain or sob but complains of nausea and blurred vision. Pt also complains of confusion

## 2018-06-11 NOTE — PROGRESS NOTES
"Pt placed to portable monitor and to MRI / moved to MRI table and placed to monitor and Sat upon arrival pt stating " I think I need some more ativan" nurse stated to pt to allow the medication she received to work / pt having slurred speech AAO   "

## 2018-06-11 NOTE — TELEPHONE ENCOUNTER
Spoke to patient.    She reports slurred speech, dizziness, lack of coordination since Wednesday. Advised patient to go to the ER, nearest is [facility]. She agrees.
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
## 2018-06-11 NOTE — PROVIDER PROGRESS NOTES - EMERGENCY DEPT.
Encounter Date: 6/11/2018    ED Physician Progress Notes        Physician Note:   Received check out from Dr. Baez awaiting MRI of the brain. If negative, plan was to discharge. Her MRI shows no acute findings. Discussed with pt who is happy to be discharged. She appears nontoxic. No distress.     I, Matilda Garza, am scribing for, and in the presence of, Dr. Araya. I performed the above scribed service and the documentation accurately describes the services I performed. I attest to the accuracy of the note.

## 2018-06-11 NOTE — ED NOTES
Josiah from MRI called. They are ready for pt. When I told pt, she stated she needed her medication to have to MRI, she is very clostrophobic.

## 2018-06-11 NOTE — ED NOTES
Left hand HL D/C'd . Pt asked to speak with MD again, she had additional questions. I went to get Dr Araya and he followed me back to the room and spoke to patient- answered all her questions and re-explaned her d/c instructions

## 2018-06-12 LAB
BACTERIA UR CULT: NORMAL
BACTERIA UR CULT: NORMAL

## 2018-06-25 ENCOUNTER — PATIENT MESSAGE (OUTPATIENT)
Dept: INTERNAL MEDICINE | Facility: CLINIC | Age: 53
End: 2018-06-25

## 2018-06-26 NOTE — TELEPHONE ENCOUNTER
BP at home 158-110/ 50-68's, controlled. No recent need for lisinopril.    H/o recurrent MRSA infection.

## 2018-06-29 ENCOUNTER — PATIENT MESSAGE (OUTPATIENT)
Dept: INTERNAL MEDICINE | Facility: CLINIC | Age: 53
End: 2018-06-29

## 2018-06-29 RX ORDER — HYDROGEN PEROXIDE 3 %
20 SOLUTION, NON-ORAL MISCELLANEOUS 2 TIMES DAILY
Qty: 60 CAPSULE | Refills: 2 | Status: SHIPPED | OUTPATIENT
Start: 2018-06-29 | End: 2018-08-01

## 2018-06-29 RX ORDER — NAPROXEN 500 MG/1
500 TABLET ORAL 2 TIMES DAILY WITH MEALS
Qty: 60 TABLET | Refills: 2 | Status: SHIPPED | OUTPATIENT
Start: 2018-06-29 | End: 2018-12-12 | Stop reason: SDUPTHER

## 2018-06-29 NOTE — TELEPHONE ENCOUNTER
Patient took clonidine 0.1mg. BP decreased, will recheck.    Stop protonix. Use naproxen 500mg bid plus esomeprazole 20mg bid. Patient to see if covered and notify.

## 2018-07-02 ENCOUNTER — PATIENT MESSAGE (OUTPATIENT)
Dept: INTERNAL MEDICINE | Facility: CLINIC | Age: 53
End: 2018-07-02

## 2018-07-03 ENCOUNTER — PATIENT MESSAGE (OUTPATIENT)
Dept: INTERNAL MEDICINE | Facility: CLINIC | Age: 53
End: 2018-07-03

## 2018-07-10 ENCOUNTER — PATIENT MESSAGE (OUTPATIENT)
Dept: INTERNAL MEDICINE | Facility: CLINIC | Age: 53
End: 2018-07-10

## 2018-07-10 DIAGNOSIS — R80.9 PROTEINURIA, UNSPECIFIED TYPE: ICD-10-CM

## 2018-07-10 DIAGNOSIS — I99.8 BLOOD PRESSURE INSTABILITY: Primary | ICD-10-CM

## 2018-07-10 DIAGNOSIS — R79.89 ABNORMAL TSH: ICD-10-CM

## 2018-07-11 RX ORDER — CLONIDINE HYDROCHLORIDE 0.1 MG/1
0.1 TABLET ORAL EVERY 12 HOURS PRN
Qty: 60 TABLET | Refills: 0 | Status: SHIPPED | OUTPATIENT
Start: 2018-07-11 | End: 2018-08-20 | Stop reason: SDUPTHER

## 2018-07-12 ENCOUNTER — OFFICE VISIT (OUTPATIENT)
Dept: ORTHOPEDICS | Facility: CLINIC | Age: 53
End: 2018-07-12
Payer: MEDICAID

## 2018-07-12 VITALS — WEIGHT: 176 LBS | HEIGHT: 62 IN | BODY MASS INDEX: 32.39 KG/M2

## 2018-07-12 DIAGNOSIS — M25.512 CHRONIC PAIN OF BOTH SHOULDERS: Primary | ICD-10-CM

## 2018-07-12 DIAGNOSIS — G89.29 CHRONIC PAIN OF BOTH SHOULDERS: Primary | ICD-10-CM

## 2018-07-12 DIAGNOSIS — M25.511 CHRONIC PAIN OF BOTH SHOULDERS: Primary | ICD-10-CM

## 2018-07-12 PROCEDURE — 20610 DRAIN/INJ JOINT/BURSA W/O US: CPT | Mod: 50,S$PBB,, | Performed by: ORTHOPAEDIC SURGERY

## 2018-07-12 PROCEDURE — 99213 OFFICE O/P EST LOW 20 MIN: CPT | Mod: 25,S$PBB,, | Performed by: ORTHOPAEDIC SURGERY

## 2018-07-12 PROCEDURE — 99999 PR PBB SHADOW E&M-EST. PATIENT-LVL III: CPT | Mod: PBBFAC,,, | Performed by: ORTHOPAEDIC SURGERY

## 2018-07-12 PROCEDURE — 99213 OFFICE O/P EST LOW 20 MIN: CPT | Mod: PBBFAC,PO,25 | Performed by: ORTHOPAEDIC SURGERY

## 2018-07-12 PROCEDURE — 20610 DRAIN/INJ JOINT/BURSA W/O US: CPT | Mod: 50,PBBFAC,PO | Performed by: ORTHOPAEDIC SURGERY

## 2018-07-12 RX ORDER — TRIAMCINOLONE ACETONIDE 40 MG/ML
40 INJECTION, SUSPENSION INTRA-ARTICULAR; INTRAMUSCULAR
Status: COMPLETED | OUTPATIENT
Start: 2018-07-12 | End: 2018-07-12

## 2018-07-12 RX ADMIN — TRIAMCINOLONE ACETONIDE 40 MG: 40 INJECTION, SUSPENSION INTRA-ARTICULAR; INTRAMUSCULAR at 11:07

## 2018-07-12 NOTE — PROGRESS NOTES
HISTORY OF PRESENT ILLNESS:  Ms. Reyes in followup of chronic shoulder and   neck pain.  Last visit I recommended referral to the Pain Clinic, but she tells   me that her  wants her to wait on that.    She has had injections in the past with improvement, but symptoms seem to be   coming back now.  It is hard to pin down, which shoulder is the worst.  It seems   to go from one side to the other.  A recent MRI of the left shoulder showed   some post-surgical changes, but no evidence of significant abnormalities.    PHYSICAL EXAMINATION:  Both shoulders have full range of motion.  Mildly   positive impingement sign bilaterally.    Strength is good.    PLAN:  Again, I am not really sure what we can do to cure this problem, but the   injections are helpful, so she would like to try this again.  After pause for   timeout, she identified each shoulder injected bilaterally with combination of   Kenalog 40 mg, 2 mL Xylocaine, sterile technique.  Tolerated the procedure well.    Going forward, I would like her to consider a Pain Clinic, anti-inflammatory   medication by mouth.  I do not think she needs any further surgery but follow up   in three months.      CHARLENE  dd: 07/12/2018 11:10:00 (CDT)  td: 07/13/2018 07:37:35 (CDT)  Doc ID   #[id number]  Job ID #[id number]    CC:

## 2018-07-17 ENCOUNTER — PATIENT MESSAGE (OUTPATIENT)
Dept: INTERNAL MEDICINE | Facility: CLINIC | Age: 53
End: 2018-07-17

## 2018-07-17 ENCOUNTER — PATIENT MESSAGE (OUTPATIENT)
Dept: ADMINISTRATIVE | Facility: HOSPITAL | Age: 53
End: 2018-07-17

## 2018-07-18 ENCOUNTER — PATIENT MESSAGE (OUTPATIENT)
Dept: INTERNAL MEDICINE | Facility: CLINIC | Age: 53
End: 2018-07-18

## 2018-07-18 NOTE — TELEPHONE ENCOUNTER
Spoke to patient.     No BP lows. Will repeat labs, cbc, bmp, ua, urine protein, urine eos, thyroid hormones and antibodies, aldosterone-renin ratio, renal ultrasound.

## 2018-07-24 ENCOUNTER — LAB VISIT (OUTPATIENT)
Dept: LAB | Facility: HOSPITAL | Age: 53
End: 2018-07-24
Attending: INTERNAL MEDICINE
Payer: MEDICAID

## 2018-07-24 DIAGNOSIS — I99.8 BLOOD PRESSURE INSTABILITY: ICD-10-CM

## 2018-07-24 DIAGNOSIS — R79.89 ABNORMAL TSH: ICD-10-CM

## 2018-07-24 DIAGNOSIS — R80.9 PROTEINURIA, UNSPECIFIED TYPE: ICD-10-CM

## 2018-07-24 LAB
BASOPHILS # BLD AUTO: 0.05 K/UL
BASOPHILS NFR BLD: 0.4 %
DIFFERENTIAL METHOD: ABNORMAL
EOSINOPHIL # BLD AUTO: 0.1 K/UL
EOSINOPHIL NFR BLD: 0.5 %
ERYTHROCYTE [DISTWIDTH] IN BLOOD BY AUTOMATED COUNT: 13.6 %
HCT VFR BLD AUTO: 44.9 %
HGB BLD-MCNC: 14.2 G/DL
IMM GRANULOCYTES # BLD AUTO: 0.05 K/UL
IMM GRANULOCYTES NFR BLD AUTO: 0.4 %
LYMPHOCYTES # BLD AUTO: 3.5 K/UL
LYMPHOCYTES NFR BLD: 29.7 %
MCH RBC QN AUTO: 29 PG
MCHC RBC AUTO-ENTMCNC: 31.6 G/DL
MCV RBC AUTO: 92 FL
MONOCYTES # BLD AUTO: 0.8 K/UL
MONOCYTES NFR BLD: 6.5 %
NEUTROPHILS # BLD AUTO: 7.4 K/UL
NEUTROPHILS NFR BLD: 62.5 %
NRBC BLD-RTO: 0 /100 WBC
PLATELET # BLD AUTO: 363 K/UL
PMV BLD AUTO: 11.3 FL
RBC # BLD AUTO: 4.9 M/UL
WBC # BLD AUTO: 11.77 K/UL

## 2018-07-24 PROCEDURE — 84439 ASSAY OF FREE THYROXINE: CPT

## 2018-07-24 PROCEDURE — 84445 ASSAY OF TSI GLOBULIN: CPT

## 2018-07-24 PROCEDURE — 86376 MICROSOMAL ANTIBODY EACH: CPT

## 2018-07-24 PROCEDURE — 84443 ASSAY THYROID STIM HORMONE: CPT

## 2018-07-24 PROCEDURE — 80048 BASIC METABOLIC PNL TOTAL CA: CPT

## 2018-07-24 PROCEDURE — 86800 THYROGLOBULIN ANTIBODY: CPT

## 2018-07-24 PROCEDURE — 85025 COMPLETE CBC W/AUTO DIFF WBC: CPT

## 2018-07-24 PROCEDURE — 82088 ASSAY OF ALDOSTERONE: CPT

## 2018-07-24 PROCEDURE — 84481 FREE ASSAY (FT-3): CPT

## 2018-07-25 ENCOUNTER — PATIENT MESSAGE (OUTPATIENT)
Dept: INTERNAL MEDICINE | Facility: CLINIC | Age: 53
End: 2018-07-25

## 2018-07-25 LAB
ANION GAP SERPL CALC-SCNC: 13 MMOL/L
BUN SERPL-MCNC: 13 MG/DL
CALCIUM SERPL-MCNC: 9.7 MG/DL
CHLORIDE SERPL-SCNC: 101 MMOL/L
CO2 SERPL-SCNC: 23 MMOL/L
CREAT SERPL-MCNC: 1.1 MG/DL
EST. GFR  (AFRICAN AMERICAN): >60 ML/MIN/1.73 M^2
EST. GFR  (NON AFRICAN AMERICAN): 58.3 ML/MIN/1.73 M^2
GLUCOSE SERPL-MCNC: 93 MG/DL
POTASSIUM SERPL-SCNC: 3.7 MMOL/L
SODIUM SERPL-SCNC: 137 MMOL/L
T3FREE SERPL-MCNC: 3 PG/ML
T4 FREE SERPL-MCNC: 1.17 NG/DL
THYROGLOB AB SERPL IA-ACNC: <4 IU/ML
THYROPEROXIDASE IGG SERPL-ACNC: <6 IU/ML
TSH SERPL DL<=0.005 MIU/L-ACNC: 1.51 UIU/ML

## 2018-07-26 ENCOUNTER — PATIENT MESSAGE (OUTPATIENT)
Dept: INTERNAL MEDICINE | Facility: CLINIC | Age: 53
End: 2018-07-26

## 2018-07-26 ENCOUNTER — HOSPITAL ENCOUNTER (OUTPATIENT)
Dept: RADIOLOGY | Facility: HOSPITAL | Age: 53
Discharge: HOME | End: 2018-07-26
Attending: INTERNAL MEDICINE
Payer: MEDICAID

## 2018-07-26 DIAGNOSIS — R80.9 PROTEINURIA, UNSPECIFIED TYPE: ICD-10-CM

## 2018-07-26 LAB — TSI SER-ACNC: <0.1 IU/L

## 2018-07-26 PROCEDURE — 76770 US EXAM ABDO BACK WALL COMP: CPT | Mod: TC

## 2018-07-26 PROCEDURE — 76770 US EXAM ABDO BACK WALL COMP: CPT | Mod: 26,,, | Performed by: RADIOLOGY

## 2018-07-27 LAB
ALDOST SERPL-MCNC: 22.2 NG/DL
ALDOST/RENIN PLAS-RTO: 2.6 RATIO
RENIN PLAS-CCNC: 8.6 NG/ML/HR

## 2018-07-27 RX ORDER — AMLODIPINE BESYLATE 5 MG/1
5 TABLET ORAL DAILY
Qty: 30 TABLET | Refills: 0 | Status: SHIPPED | OUTPATIENT
Start: 2018-07-27 | End: 2018-08-20 | Stop reason: SDUPTHER

## 2018-07-28 NOTE — PROGRESS NOTES
Subjective:       Patient ID: Sherie Reyes is a 53 y.o. female.    Chief Complaint: Hypertension (htn; cardiology referral )    Patient is a 53 y.o.female who presents today for follow up on htn.  She is taking amlodipine in the morning. She takes clonidine as needed. Blood pressure has been high at home but she realized that her home bp machine is not accurate at all. She denies chest pain or shortness of breath.      Review of Systems   Constitutional: Negative for appetite change, chills, diaphoresis, fatigue and fever.   HENT: Negative for congestion, dental problem, ear discharge, ear pain, hearing loss, postnasal drip, sinus pressure and sore throat.    Eyes: Negative for discharge, redness and itching.   Respiratory: Negative for cough, chest tightness, shortness of breath and wheezing.    Cardiovascular: Negative for chest pain, palpitations and leg swelling.   Gastrointestinal: Negative for abdominal pain, constipation, diarrhea, nausea and vomiting.   Endocrine: Negative for cold intolerance and heat intolerance.   Genitourinary: Negative for difficulty urinating, frequency, hematuria and urgency.   Musculoskeletal: Negative for arthralgias, back pain, gait problem, myalgias and neck pain.   Skin: Negative for color change and rash.   Neurological: Positive for headaches. Negative for dizziness and syncope.   Hematological: Negative for adenopathy.   Psychiatric/Behavioral: Negative for behavioral problems and sleep disturbance. The patient is not nervous/anxious.        Objective:      Physical Exam   Constitutional: She is oriented to person, place, and time. She appears well-developed and well-nourished. No distress.   HENT:   Head: Normocephalic and atraumatic.   Right Ear: External ear normal.   Left Ear: External ear normal.   Nose: Nose normal.   Mouth/Throat: Oropharynx is clear and moist. No oropharyngeal exudate.   Eyes: Conjunctivae and EOM are normal. Pupils are equal, round, and  reactive to light. Right eye exhibits no discharge. Left eye exhibits no discharge. No scleral icterus.   Neck: Normal range of motion. Neck supple. No JVD present. No thyromegaly present.   Cardiovascular: Normal rate, regular rhythm, normal heart sounds and intact distal pulses.  Exam reveals no gallop and no friction rub.    No murmur heard.  Pulmonary/Chest: Effort normal and breath sounds normal. No stridor. No respiratory distress. She has no wheezes. She has no rales. She exhibits no tenderness.   Abdominal: Soft. Bowel sounds are normal. She exhibits no distension. There is no tenderness. There is no rebound.   Musculoskeletal: Normal range of motion. She exhibits no edema or tenderness.   Lymphadenopathy:     She has no cervical adenopathy.   Neurological: She is alert and oriented to person, place, and time. No cranial nerve deficit.   Skin: Skin is warm and dry. No rash noted. She is not diaphoretic. No erythema.   Psychiatric: She has a normal mood and affect. Her behavior is normal.   Nursing note and vitals reviewed.      Assessment and Plan:       1. Proteinuria, unspecified type  - Urinalysis; Future  - Urine culture; Future  - Hypertension [facility] ([facility]) Enrollment Order  - Hypertension [facility] ([facility]): Assign Onboarding Questionnaires    2. Hypertension, unspecified type    - Urinalysis; Future  - Urine culture; Future  - Hypertension [facility] ([facility]) Enrollment Order  - Hypertension [facility] Medicine ([facility]): Assign Onboarding Questionnaires          No Follow-up on file.

## 2018-07-31 RX ORDER — GEMFIBROZIL 600 MG/1
600 TABLET, FILM COATED ORAL
Qty: 60 TABLET | Refills: 6 | Status: SHIPPED | OUTPATIENT
Start: 2018-07-31 | End: 2018-12-12 | Stop reason: SDUPTHER

## 2018-08-01 ENCOUNTER — PATIENT MESSAGE (OUTPATIENT)
Dept: ADMINISTRATIVE | Facility: OTHER | Age: 53
End: 2018-08-01

## 2018-08-01 ENCOUNTER — OFFICE VISIT (OUTPATIENT)
Dept: INTERNAL MEDICINE | Facility: CLINIC | Age: 53
End: 2018-08-01
Payer: MEDICAID

## 2018-08-01 ENCOUNTER — PATIENT MESSAGE (OUTPATIENT)
Dept: INTERNAL MEDICINE | Facility: CLINIC | Age: 53
End: 2018-08-01

## 2018-08-01 VITALS
SYSTOLIC BLOOD PRESSURE: 128 MMHG | WEIGHT: 181.44 LBS | HEART RATE: 113 BPM | TEMPERATURE: 98 F | HEIGHT: 60 IN | BODY MASS INDEX: 35.62 KG/M2 | RESPIRATION RATE: 16 BRPM | DIASTOLIC BLOOD PRESSURE: 88 MMHG | OXYGEN SATURATION: 98 %

## 2018-08-01 DIAGNOSIS — R80.9 PROTEINURIA, UNSPECIFIED TYPE: Primary | ICD-10-CM

## 2018-08-01 DIAGNOSIS — I10 HYPERTENSION, UNSPECIFIED TYPE: ICD-10-CM

## 2018-08-01 PROCEDURE — 99213 OFFICE O/P EST LOW 20 MIN: CPT | Mod: S$PBB,,, | Performed by: INTERNAL MEDICINE

## 2018-08-01 PROCEDURE — 99999 PR PBB SHADOW E&M-EST. PATIENT-LVL III: CPT | Mod: PBBFAC,,, | Performed by: INTERNAL MEDICINE

## 2018-08-01 PROCEDURE — 99213 OFFICE O/P EST LOW 20 MIN: CPT | Mod: PBBFAC,PO | Performed by: INTERNAL MEDICINE

## 2018-08-02 ENCOUNTER — PATIENT MESSAGE (OUTPATIENT)
Dept: ADMINISTRATIVE | Facility: OTHER | Age: 53
End: 2018-08-02

## 2018-08-02 ENCOUNTER — LAB VISIT (OUTPATIENT)
Dept: LAB | Facility: HOSPITAL | Age: 53
End: 2018-08-02
Attending: INTERNAL MEDICINE
Payer: MEDICAID

## 2018-08-02 DIAGNOSIS — I10 HYPERTENSION, UNSPECIFIED TYPE: ICD-10-CM

## 2018-08-02 DIAGNOSIS — R80.9 PROTEINURIA, UNSPECIFIED TYPE: ICD-10-CM

## 2018-08-02 LAB
BACTERIA #/AREA URNS AUTO: ABNORMAL /HPF
BILIRUB UR QL STRIP: NEGATIVE
CLARITY UR REFRACT.AUTO: CLEAR
COLOR UR AUTO: YELLOW
GLUCOSE UR QL STRIP: NEGATIVE
HGB UR QL STRIP: ABNORMAL
KETONES UR QL STRIP: NEGATIVE
LEUKOCYTE ESTERASE UR QL STRIP: ABNORMAL
MICROSCOPIC COMMENT: ABNORMAL
NITRITE UR QL STRIP: NEGATIVE
PH UR STRIP: 6 [PH] (ref 5–8)
PROT UR QL STRIP: NEGATIVE
RBC #/AREA URNS AUTO: 5 /HPF (ref 0–4)
SP GR UR STRIP: 1.01 (ref 1–1.03)
SQUAMOUS #/AREA URNS AUTO: 2 /HPF
URN SPEC COLLECT METH UR: ABNORMAL
UROBILINOGEN UR STRIP-ACNC: NEGATIVE EU/DL
WBC #/AREA URNS AUTO: 3 /HPF (ref 0–5)

## 2018-08-02 PROCEDURE — 81001 URINALYSIS AUTO W/SCOPE: CPT

## 2018-08-02 PROCEDURE — 87086 URINE CULTURE/COLONY COUNT: CPT

## 2018-08-03 LAB
BACTERIA UR CULT: NORMAL
BACTERIA UR CULT: NORMAL

## 2018-08-08 ENCOUNTER — PATIENT OUTREACH (OUTPATIENT)
Dept: OTHER | Facility: OTHER | Age: 53
End: 2018-08-08

## 2018-08-08 NOTE — PROGRESS NOTES
Last 5 Patient Entered Readings                                      Current 30 Day Average: 122/83     Recent Readings 8/7/2018 8/7/2018 8/7/2018 8/7/2018 8/6/2018    SBP (mmHg) 147 113 170 167 100    DBP (mmHg) 102 81 95 108 70    Pulse 112 67 101 88 93          Mrs. Sherie Reyes is a 53 y.o. female who is newly enrolled in the [facility] Medicine Hypertension Clinics.     The following information was reviewed/updated:  Preferred pharmacy   [facility] [place], [place] - [address]  [address]  [place] [address]  Phone: [phone] Fax: [phone]      Patient prefers a 90 days supply    HYPERTENSION    Explained that we expect patient to obtain several blood pressures per week at random times of day.   Our goal is to get  BP to consistently below 130/80mmHg and make the process convenient so patient can avoid extra trips to the office. Getting your blood pressure below 130/80mmHg (definition of control) will reduce your risk for heart attack, kidney failure, stroke and death (as well as kidney failure, eye disease, & dementia).     Patient is not meeting the goal already. Current BP average 122/83 mmHg.  When asked what the patient thinks is causing BP to be elevated, she states: always fluctuating.    Discussed appropriate BP measuring technique:  Before taking your blood pressure, find a quiet place. You will need to listen for your heartbeat.  Roll up the sleeve on your left arm or remove any tight-sleeved clothing, if needed. (It's best to take your blood pressure from your left arm if you are right-handed.You can use the other arm if you have been told by your health care provider to do so.)  Rest in a chair next to a table for 5 to 10 minutes. (Your left arm should rest comfortably at heart level.)  Sit up straight with your back against the chair, legs uncrossed and on the ground.  Rest your forearm on the table with the palm of your hand facing up.  You  "should not talk, read the newspaper, or watch television during this process.    Last 5 Patient Entered Readings                                      Current 30 Day Average: 122/83     Recent Readings 8/7/2018 8/7/2018 8/7/2018 8/7/2018 8/6/2018    SBP (mmHg) 147 113 170 167 100    DBP (mmHg) 102 81 95 108 70    Pulse 112 67 101 88 93          Instructed patient not to allow anyone else to use phone and BP cuff or glucometer.   I'm not available for emergencies. Patient will call [facility] on-call ([phone] or [phone]) or [phone] if needed.     Patient and I agreed that she will continue to monitor blood pressure, blood glucose, and sodium intake and continue to remain adherent to medications.     I will plan to follow-up with the patient in 3 weeks.   Emailed patient link to [facility] Hypert[facility] webpages and my contact information in case she has any questions.      Lifestyle Assessment:    Diet: <2,000mg of sodium.  Patient states that she does not currently watch her sodium intake.  Does not add salt to foods.   cooks, and states she is a picky eater.  Asked if patient would like to receive quick and easy recipes, patient agreed.  Will email quick and easy recipes.    Exercise: Patient states that she got hurt at work and has been limited on exercise.  Informed patient to focus on low sodium diet.    Alcohol/Tobacco: Patient states she is a social drinker.  Quit smoking cigarette 5 years ago, but is now vaping.    Medication: Patient takes blood pressure before medication so she knows which medication to take so she will not "bottom out."  Placed task for pharmacist to address medication timing.    Reviewed technique with patient.  States she has been sitting on her bed or couch.  Informed patient the best pace to take it would be dining room chair or desk chair.  Encouraged patient to wait 5-10 minutes before taking reading.    "

## 2018-08-13 ENCOUNTER — PATIENT OUTREACH (OUTPATIENT)
Dept: OTHER | Facility: OTHER | Age: 53
End: 2018-08-13

## 2018-08-13 DIAGNOSIS — I10 ESSENTIAL HYPERTENSION: Primary | ICD-10-CM

## 2018-08-13 NOTE — LETTER
Yaniv Calderón, PharmD  [address] [place] [address]     Dear Sherie,    Thank you for enrolling in the [facility] Program. To participate in our program, we ask that you submit a blood pressure reading at least once weekly through your [facility] and maintain regular contact with your Care Team.  We have not connected with you by phone in some time.     The Digital Medicine Care Team has attempted to reach you on multiple occasions to determine if you would like to continue participating in the program. While we encourage you to continue participating fully, we understand that circumstances may change.      To continue participating in the program, please contact me at . If we do not hear back, you will be un-enrolled and your physician will be notified of your decision.    We look forward to hearing from you soon.    Sincerely,     Ariadna Jimenez, Yaniv Acosta, PharmD  [facility]                                                                                                                        Sherie Posey [facility]  [address] [address]

## 2018-08-13 NOTE — PROGRESS NOTES
8/31/18: LVM to complete introduction. WCB in 2 weeks and start NC protocol at next unsuccessful encounter.

## 2018-08-20 RX ORDER — CLONIDINE HYDROCHLORIDE 0.1 MG/1
0.1 TABLET ORAL EVERY 12 HOURS PRN
Qty: 60 TABLET | Refills: 6 | Status: ON HOLD | OUTPATIENT
Start: 2018-08-20 | End: 2020-07-09 | Stop reason: HOSPADM

## 2018-08-20 RX ORDER — AMLODIPINE BESYLATE 5 MG/1
5 TABLET ORAL DAILY
Qty: 30 TABLET | Refills: 6 | Status: SHIPPED | OUTPATIENT
Start: 2018-08-20 | End: 2018-10-08

## 2018-09-05 NOTE — PROGRESS NOTES
Last 5 Patient Entered Readings                                      Current 30 Day Average: 137/88     Recent Readings 9/4/2018 9/3/2018 9/2/2018 9/1/2018 8/31/2018    SBP (mmHg) 167 169 128 135 149    DBP (mmHg) 103 95 84 88 94    Pulse 81 91 87 86 98          09/05: Pushing call back until PharmD completes introduction call.    09/20: Spoke with pharmacist yesterday.  Pushing call back to next week.

## 2018-09-17 NOTE — PROGRESS NOTES
Last 5 Patient Entered Readings                                      Current 30 Day Average: 146/92     Recent Readings 9/16/2018 9/15/2018 9/14/2018 9/13/2018 9/12/2018    SBP (mmHg) 155 161 152 148 159    DBP (mmHg) 95 99 87 97 105    Pulse 95 105 91 98 118          Left voicemail and sent non-compliance letter.

## 2018-09-19 RX ORDER — CEPHRADINE 500 MG
10000 CAPSULE ORAL DAILY
COMMUNITY
End: 2019-04-05

## 2018-09-19 NOTE — PROGRESS NOTES
Last 5 Patient Entered Readings                                      Current 30 Day Average: 145/90     Recent Readings 9/18/2018 9/18/2018 9/18/2018 9/18/2018 9/16/2018    SBP (mmHg) 90 152 95 157 155    DBP (mmHg) 49 118 46 100 95    Pulse 86 96 84 119 95          Patient's BP average is above goal of <130/80.     Patient denies s/s of hypotension (lightheadedness, dizziness, nausea, fatigue) associated with low readings. Instructed patient to inform me if this occurs, patient confirms understanding.      Patient denies s/s of hypertension (SOB, CP, severe headaches, changes in vision) associated with high readings. Instructed patient to go to the ED if BP > 180/110 and accompanied by hypertensive s/s, patient confirms understanding.    Will continue to monitor regularly. Will follow up in 2-3 weeks, sooner if BP begins to trend upward or downward.    Patient has my contact information and knows to call with any concerns or clinical changes.     Current HTN regimen:  Hypertension Medications             amLODIPine (NORVASC) 5 MG tablet Take 1 tablet (5 mg total) by mouth once daily.    cloNIDine (CATAPRES) 0.1 MG tablet Take 1 tablet (0.1 mg total) by mouth every 12 (twelve) hours as needed (only for blood pressure greater than 180/120).        Responses to the depression screening suggest patient is having depressive symptoms. Patient is followed for this and is not interested in referral.     JANIS screening results for this patient suggest a high likelihood of sleep apnea, which can contribute to hypertension. Patient has not been previously diagnosed with sleep apnea and is interested in referral at this time.    Tonia Bernal DO indicated she would like to have patient  referred to a specialist for further evaluation. Referral to sleep clinic placed.    Reviewed BP measurement technique with patient due to variations in BP readings. She is sitting on sofa and not unwrapping cuff between uses. She reports a  history of occasional low BP which is why she likes to take her BP before medications. She agreed to also take BP readings an hour or more after medication going forward. She uses cuff on right arm despite being right handed. Also appears cuff is too loose. She denies using clonidine in past 2 weeks. Takes naproxen 1-2 times per week. 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

## 2018-09-25 ENCOUNTER — PATIENT OUTREACH (OUTPATIENT)
Dept: OTHER | Facility: OTHER | Age: 53
End: 2018-09-25

## 2018-09-25 NOTE — PROGRESS NOTES
Last 5 Patient Entered Readings                                      Current 30 Day Average: 145/89     Recent Readings 9/24/2018 9/24/2018 9/24/2018 9/24/2018 9/23/2018    SBP (mmHg) 158 157 177 144 115    DBP (mmHg) 92 100 166 97 77    Pulse 121 122 120 121 91        09/25: LVM.  Will follow up next week to discuss higher readings.  Patient taking BP readings a few minutes apart.  Will review technique.    10/8: Pharmacist reached out to patient today.  Pushing call back 1 week.

## 2018-10-01 ENCOUNTER — PATIENT OUTREACH (OUTPATIENT)
Dept: OTHER | Facility: OTHER | Age: 53
End: 2018-10-01

## 2018-10-01 DIAGNOSIS — I10 ESSENTIAL HYPERTENSION: Primary | ICD-10-CM

## 2018-10-01 RX ORDER — CHLORTHALIDONE 25 MG/1
TABLET ORAL
Qty: 30 TABLET | Refills: 2 | Status: SHIPPED | OUTPATIENT
Start: 2018-10-01 | End: 2018-11-26 | Stop reason: SDUPTHER

## 2018-10-01 NOTE — PROGRESS NOTES
Last 5 Patient Entered Readings                                      Current 30 Day Average: 150/93     Recent Readings 9/30/2018 9/30/2018 9/29/2018 9/26/2018 9/24/2018    SBP (mmHg) 185 152 168 142 158    DBP (mmHg) 114 120 118 94 92    Pulse 131 112 110 104 121        Called patient due to elevated BP this weekend.    Patient's BP average is above goal of <130/80.     Patient denies s/s of hypotension (lightheadedness, dizziness, nausea, fatigue) associated with low readings. Instructed patient to inform me if this occurs, patient confirms understanding.      Patient denies s/s of hypertension (SOB, CP, severe headaches, changes in vision) associated with high readings. Instructed patient to go to the ED if BP > 180/110 and accompanied by hypertensive s/s, patient confirms understanding.    Patient confirms adjusting BP measurement technique as last discussed. Appears she is having some trouble placing cuff on arm; discussed placing Velcro at angle to accommodate variation in arm size. She reports going to a casino this weekend and diet being off. Despite diet changes this weekend, BP has been running high. Patient would prefer to begin chlorthalidone versus increasing amlodipine. Begin with chlorthalidone 12.5 mg daily given patient's concerns for history of dehydration. Repeat BMP ~ 2 weeks.    Will continue to monitor regularly. Will follow up in 2-3 weeks, sooner if BP begins to trend upward or downward.    Patient has my contact information and knows to call with any concerns or clinical changes.     Current HTN regimen:  Hypertension Medications             amLODIPine (NORVASC) 5 MG tablet Take 1 tablet (5 mg total) by mouth once daily.    cloNIDine (CATAPRES) 0.1 MG tablet Take 1 tablet (0.1 mg total) by mouth every 12 (twelve) hours as needed (only for blood pressure greater than 180/120).

## 2018-10-05 ENCOUNTER — PATIENT OUTREACH (OUTPATIENT)
Dept: OTHER | Facility: OTHER | Age: 53
End: 2018-10-05

## 2018-10-05 DIAGNOSIS — I10 ESSENTIAL HYPERTENSION: Primary | ICD-10-CM

## 2018-10-05 NOTE — PROGRESS NOTES
Last 5 Patient Entered Readings                                      Current 30 Day Average: 153/94     Recent Readings 10/4/2018 10/4/2018 10/3/2018 10/3/2018 10/2/2018    SBP (mmHg) 183 181 184 170 145    DBP (mmHg) 113 104 103 112 89    Pulse 121 109 104 109 101          Called due to elevated readings; left voicemail. Sent [facility] message recommending to increase chlorthalidone to 25 mg once daily, increase amlodipine to 10 mg daily, and increase potassium-rich foods in diet.

## 2018-10-08 RX ORDER — AMLODIPINE BESYLATE 5 MG/1
10 TABLET ORAL DAILY
Qty: 30 TABLET | Refills: 6
Start: 2018-10-08 | End: 2018-11-26 | Stop reason: SDUPTHER

## 2018-10-08 NOTE — PROGRESS NOTES
Last 5 Patient Entered Readings                                      Current 30 Day Average: 150/94     Recent Readings 10/7/2018 10/7/2018 10/6/2018 10/6/2018 10/6/2018    SBP (mmHg) 119 184 115 96 84    DBP (mmHg) 93 102 86 73 63    Pulse 98 114 92 85 73          Patient's BP average is above goal of <130/80.     Patient denies s/s of hypotension (lightheadedness, dizziness, nausea, fatigue) associated with low readings. Instructed patient to inform me if this occurs, patient confirms understanding.      Patient denies s/s of hypertension (SOB, CP, severe headaches, changes in vision) associated with high readings. Instructed patient to go to the ED if BP > 180/110 and accompanied by hypertensive s/s, patient confirms understanding.    Patient attributes lower BP readings 10/6 to taking tizanidine during the day (typically only takes at night). She reports fatigue 10/6 but unclear if related to BP or tizanidine. She felt well 10/7 and so far today. She started increased doses of amlodipine and chlorthalidone 10/5.    Will continue to monitor regularly. Will follow up in 2-3 weeks, sooner if BP begins to trend upward or downward.    Patient has my contact information and knows to call with any concerns or clinical changes.     Current HTN regimen:  Hypertension Medications             amLODIPine (NORVASC) 5 MG tablet Take 1 tablet (5 mg total) by mouth once daily.    chlorthalidone (HYGROTEN) 25 MG Tab Take 0.5 tablet (12.5mg) by mouth once a day. May increase to 1 tablet daily based on lab results. (for blood pressure)    cloNIDine (CATAPRES) 0.1 MG tablet Take 1 tablet (0.1 mg total) by mouth every 12 (twelve) hours as needed (only for blood pressure greater than 180/120).

## 2018-10-10 ENCOUNTER — PATIENT OUTREACH (OUTPATIENT)
Dept: OTHER | Facility: OTHER | Age: 53
End: 2018-10-10

## 2018-10-10 NOTE — PROGRESS NOTES
Last 5 Patient Entered Readings                                      Current 30 Day Average: 146/93     Recent Readings 10/9/2018 10/9/2018 10/9/2018 10/8/2018 10/8/2018    SBP (mmHg) 152 91 182 94 92    DBP (mmHg) 94 64 121 67 71    Pulse 115 99 118 85 82          Patient's BP average is above goal of <130/80.     Patient denies s/s of hypotension (lightheadedness, dizziness, nausea, fatigue) associated with low readings. Instructed patient to inform me if this occurs, patient confirms understanding.      Patient denies s/s of hypertension (SOB, CP, severe headaches, changes in vision) associated with high readings. Instructed patient to go to the ED if BP > 180/110 and accompanied by hypertensive s/s, patient confirms understanding.    Called to discuss BP fluctuations. Patient thinks she took amlodipine 5 mg yesterday instead of 10 mg. She has been varying timing of her BP medications. She already took chlorthalidone and amlodipine 10 mg today. Agreed on standard schedule for BP medications starting 10/11, chlorthalidone 11am-12pm and amlodipine 6-7pm. She will also trial reduction of amlodipine to 5mg daily starting 10/11. She typically takes tizanidine 7-8 pm. She admits she struggles with placing BP cuff and arm and immediately takes measurement. Advised to begin placing cuff on arm, resting 5 minutes, then press start button.    Will continue to monitor regularly. Will follow up in 1-2 weeks, sooner if BP begins to trend upward or downward.    Patient has my contact information and knows to call with any concerns or clinical changes.     Current HTN regimen:  Hypertension Medications             amLODIPine (NORVASC) 5 MG tablet Take 2 tablets (10 mg total) by mouth once daily.    chlorthalidone (HYGROTEN) 25 MG Tab Take 0.5 tablet (12.5mg) by mouth once a day. May increase to 1 tablet daily based on lab results. (for blood pressure)    cloNIDine (CATAPRES) 0.1 MG tablet Take 1 tablet (0.1 mg total) by mouth  every 12 (twelve) hours as needed (only for blood pressure greater than 180/120).

## 2018-10-11 ENCOUNTER — OFFICE VISIT (OUTPATIENT)
Dept: ORTHOPEDICS | Facility: CLINIC | Age: 53
End: 2018-10-11
Payer: MEDICAID

## 2018-10-11 VITALS — HEIGHT: 60 IN | WEIGHT: 181.44 LBS | BODY MASS INDEX: 35.62 KG/M2

## 2018-10-11 DIAGNOSIS — G89.29 CHRONIC PAIN OF BOTH SHOULDERS: Primary | ICD-10-CM

## 2018-10-11 DIAGNOSIS — M25.512 CHRONIC PAIN OF BOTH SHOULDERS: Primary | ICD-10-CM

## 2018-10-11 DIAGNOSIS — M25.511 CHRONIC PAIN OF BOTH SHOULDERS: Primary | ICD-10-CM

## 2018-10-11 PROCEDURE — 99213 OFFICE O/P EST LOW 20 MIN: CPT | Mod: PBBFAC,PN | Performed by: ORTHOPAEDIC SURGERY

## 2018-10-11 PROCEDURE — 20610 DRAIN/INJ JOINT/BURSA W/O US: CPT | Mod: S$PBB,RT,, | Performed by: ORTHOPAEDIC SURGERY

## 2018-10-11 PROCEDURE — 99213 OFFICE O/P EST LOW 20 MIN: CPT | Mod: 25,S$PBB,, | Performed by: ORTHOPAEDIC SURGERY

## 2018-10-11 PROCEDURE — 99999 PR PBB SHADOW E&M-EST. PATIENT-LVL III: CPT | Mod: PBBFAC,,, | Performed by: ORTHOPAEDIC SURGERY

## 2018-10-11 PROCEDURE — 20610 DRAIN/INJ JOINT/BURSA W/O US: CPT | Mod: PBBFAC,PN,RT | Performed by: ORTHOPAEDIC SURGERY

## 2018-10-11 RX ORDER — TRIAMCINOLONE ACETONIDE 40 MG/ML
40 INJECTION, SUSPENSION INTRA-ARTICULAR; INTRAMUSCULAR
Status: COMPLETED | OUTPATIENT
Start: 2018-10-11 | End: 2018-10-11

## 2018-10-11 RX ADMIN — TRIAMCINOLONE ACETONIDE 40 MG: 40 INJECTION, SUSPENSION INTRA-ARTICULAR; INTRAMUSCULAR at 11:10

## 2018-10-11 NOTE — PROGRESS NOTES
HISTORY OF PRESENT ILLNESS:  Ms. Reyes in followup lateral shoulders   symptoms after previous rotator cuff repair, left shoulder.  She is having some   ongoing symptoms in the right shoulder.  The left shoulder seems like it is   doing well.    PHYSICAL EXAMINATION:  RIGHT SHOULDER:  No tenderness.  No swelling.  Range of motion full.  Mildly   positive impingement sign.  Rotator cuff strength intact.  NEUROLOGIC:  Normal.    IMPRESSION:  Right shoulder impingement.    PLAN:  The patient would like to try an injection right shoulder.  After pause   for timeout, she identified the right shoulder injected with Kenalog 40 mg, 2 mL   Xylocaine, sterile technique.  Tolerated the procedure well.    I have also recommended she consider an MRI scan for the shoulder.  She is not   quite ready.  Follow up as needed.      CHARLENE  dd: 10/11/2018 12:00:01 (CDT)  td: 10/12/2018 06:45:29 (CDT)  Doc ID   #[id number]  Job ID #[id number]    CC:

## 2018-10-12 ENCOUNTER — PATIENT OUTREACH (OUTPATIENT)
Dept: OTHER | Facility: OTHER | Age: 53
End: 2018-10-12

## 2018-10-12 DIAGNOSIS — I10 ESSENTIAL HYPERTENSION: Primary | ICD-10-CM

## 2018-10-12 NOTE — PROGRESS NOTES
Last 5 Patient Entered Readings                                      Current 30 Day Average: 142/91     Recent Readings 10/11/2018 10/11/2018 10/11/2018 10/11/2018 10/10/2018    SBP (mmHg) 125 150 193 131 109    DBP (mmHg) 81 97 104 126 74    Pulse 86 105 118 119 86          Left voicemail. Review timing of BP medications and tizanidine. Consider moving amlodipine timing to take with chlorthalidone.

## 2018-10-15 NOTE — PROGRESS NOTES
Last 5 Patient Entered Readings                                      Current 30 Day Average: 142/91     Recent Readings 10/15/2018 10/14/2018 10/14/2018 10/14/2018 10/13/2018    SBP (mmHg) 147 139 181 158 167    DBP (mmHg) 108 91 108 129 90    Pulse 110 98 96 97 107          Returning patient's call; left voicemail. Review timing of BP medications and tizanidine. Consider moving amlodipine timing to take with chlorthalidone.

## 2018-10-16 ENCOUNTER — LAB VISIT (OUTPATIENT)
Dept: LAB | Facility: HOSPITAL | Age: 53
End: 2018-10-16
Attending: INTERNAL MEDICINE
Payer: MEDICAID

## 2018-10-16 DIAGNOSIS — I10 ESSENTIAL HYPERTENSION: ICD-10-CM

## 2018-10-16 LAB
ANION GAP SERPL CALC-SCNC: 15 MMOL/L
BUN SERPL-MCNC: 18 MG/DL
CALCIUM SERPL-MCNC: 10.2 MG/DL
CHLORIDE SERPL-SCNC: 92 MMOL/L
CO2 SERPL-SCNC: 28 MMOL/L
CREAT SERPL-MCNC: 1.3 MG/DL
EST. GFR  (AFRICAN AMERICAN): 54.9 ML/MIN/1.73 M^2
EST. GFR  (NON AFRICAN AMERICAN): 47.6 ML/MIN/1.73 M^2
GLUCOSE SERPL-MCNC: 115 MG/DL
POTASSIUM SERPL-SCNC: 3.2 MMOL/L
SODIUM SERPL-SCNC: 135 MMOL/L

## 2018-10-16 PROCEDURE — 80048 BASIC METABOLIC PNL TOTAL CA: CPT

## 2018-10-16 PROCEDURE — 36415 COLL VENOUS BLD VENIPUNCTURE: CPT | Mod: PO

## 2018-10-16 NOTE — PROGRESS NOTES
Last 5 Patient Entered Readings                                      Current 30 Day Average: 140/89     Recent Readings 10/15/2018 10/15/2018 10/15/2018 10/14/2018 10/14/2018    SBP (mmHg) 109 165 147 139 181    DBP (mmHg) 80 105 108 91 108    Pulse 79 116 110 98 96          See [facility] messages, advised following schedule based on elevated BP readings 2-4pm:  Chlorthalidone + 1 amlodipine tablet (5mg) together ~ 12 pm (between 12-2pm)  1 amlodipine tablet (5mg) in the evening   Tizanidine when needed, but typically ~ 7-8 pm

## 2018-10-17 ENCOUNTER — PATIENT MESSAGE (OUTPATIENT)
Dept: INTERNAL MEDICINE | Facility: CLINIC | Age: 53
End: 2018-10-17

## 2018-10-17 RX ORDER — POTASSIUM CHLORIDE 750 MG/1
10 TABLET, EXTENDED RELEASE ORAL DAILY
Qty: 30 TABLET | Refills: 11 | Status: SHIPPED | OUTPATIENT
Start: 2018-10-17 | End: 2018-11-08

## 2018-10-17 NOTE — PROGRESS NOTES
Last 5 Patient Entered Readings                                      Current 30 Day Average: 139/89     Recent Readings 10/16/2018 10/15/2018 10/15/2018 10/15/2018 10/14/2018    SBP (mmHg) 134 109 165 147 139    DBP (mmHg) 90 80 105 108 91    Pulse 81 79 116 110 98          KCl 10 mEq daily prescribed by Dr. Bernal due to K+ of 3.2. Patient responded to [facility] messages that she understands BP medication instructions.

## 2018-10-22 NOTE — PROGRESS NOTES
Last 5 Patient Entered Readings                                      Current 30 Day Average: 139/90     Recent Readings 10/21/2018 10/21/2018 10/20/2018 10/20/2018 10/19/2018    SBP (mmHg) 108 165 123 143 97    DBP (mmHg) 78 99 79 94 70    Pulse 81 110 81 93 86          10/22: LVM.  Sent MyChart.  Will call in 2 weeks.

## 2018-10-23 NOTE — PROGRESS NOTES
Last 5 Patient Entered Readings                                      Current 30 Day Average: 138/90     Recent Readings 10/22/2018 10/22/2018 10/21/2018 10/21/2018 10/20/2018    SBP (mmHg) 127 167 108 165 123    DBP (mmHg) 81 98 78 99 79    Pulse 78 101 81 110 81          Digital Medicine: Health  Follow Up    Lifestyle Modifications:    1.Dietary Modifications (Sodium intake <2,000mg/day, food labels, dining out): Patient states she only eats once a day, usually in the morning before taking her BP.   does most of the cooking.    2.Physical Activity: States she tried walking down to the end of the block and back last week, but states it was too hot.  Encouraged patient to continue to walk now that the weather is cooler.  Reports pain, but knows exercise can help decrease BP.    3.Medication Therapy: Patient has been compliant with the medication regimen.    4.Patient has the following medication side effects/concerns: none  (Frequency/Alleviating factors/Precipitating factors, etc.)     Follow up with Mrs. Sherie Posey Amy completed. No further questions or concerns. Will continue to follow up to achieve health goals.    States she has noticed that her BP is higher in the morning.  Takes medication 1 hour before taking blood pressure readings.  States she is usually in pain in the mornings and feels this could be attributing to higher readings.  Reports waking up some mornings with headache or feeling dizzy.  This is when its usually high and tries to lay down until her headache goes away and takes another reading and it is lower.  Informed patient pharmacist will be calling her this week.

## 2018-10-25 ENCOUNTER — PATIENT MESSAGE (OUTPATIENT)
Dept: INTERNAL MEDICINE | Facility: CLINIC | Age: 53
End: 2018-10-25

## 2018-10-25 RX ORDER — PROMETHAZINE HYDROCHLORIDE 25 MG/1
25 TABLET ORAL EVERY 6 HOURS PRN
Qty: 30 TABLET | Refills: 0 | Status: SHIPPED | OUTPATIENT
Start: 2018-10-25 | End: 2018-11-24

## 2018-10-25 NOTE — PROGRESS NOTES
Last 5 Patient Entered Readings                                      Current 30 Day Average: 138/90     Recent Readings 10/24/2018 10/24/2018 10/24/2018 10/23/2018 10/23/2018    SBP (mmHg) 142 107 172 112 108    DBP (mmHg) 92 79 94 80 80    Pulse 96 80 100 79 86          Patient's BP average is above goal of <130/80.     Patient denies s/s of hypotension (lightheadedness, dizziness, nausea, fatigue) associated with low readings. Instructed patient to inform me if this occurs, patient confirms understanding.      Patient denies s/s of hypertension (SOB, CP, severe headaches, changes in vision) associated with high readings. Instructed patient to go to the ED if BP > 180/110 and accompanied by hypertensive s/s, patient confirms understanding.    Patient reports taking chlorthalidone and amlodipine 5mg 10am-12pm and amlodipine 5mg 7-9pm. BP continues to fluctuate, due in part to tizanidine. However, BP overall improved and fluctuations less extreme. Patient reports recent nausea. Advised to take KCl with food as she has been taking on empty stomach. Scheduled repeat K+ level 11/7.    Will continue to monitor regularly. Will follow up in 2-3 weeks, sooner if BP begins to trend upward or downward.    Patient has my contact information and knows to call with any concerns or clinical changes.     Current HTN regimen:  Hypertension Medications             amLODIPine (NORVASC) 5 MG tablet Take 2 tablets (10 mg total) by mouth once daily.    chlorthalidone (HYGROTEN) 25 MG Tab Take 0.5 tablet (12.5mg) by mouth once a day. May increase to 1 tablet daily based on lab results. (for blood pressure)    cloNIDine (CATAPRES) 0.1 MG tablet Take 1 tablet (0.1 mg total) by mouth every 12 (twelve) hours as needed (only for blood pressure greater than 180/120).

## 2018-11-02 ENCOUNTER — PATIENT OUTREACH (OUTPATIENT)
Dept: OTHER | Facility: OTHER | Age: 53
End: 2018-11-02

## 2018-11-02 NOTE — PROGRESS NOTES
"Last 5 Patient Entered Readings                                      Current 30 Day Average: 132/91     Recent Readings 11/1/2018 11/1/2018 11/1/2018 11/1/2018 10/31/2018    SBP (mmHg) 136 155 73 178 155    DBP (mmHg) 52 91 61 108 95    Pulse 97 104 104 104 96        11/2: Patient states she had an "episode" on 10/29 when her blood pressure dropped to 70/43.  In her notes she states "Tiazindine at 1915, not feeling well, very little urination output abdominal distention with left rib pain,, sweating, dizziness, unsteady gait, plenty of water and Gatorade intake."  Reports [place]  And the closest hospital was called [facility]  Encouraged patient to use [facility] or go to the ED if she is feeling those symptoms.  Informed pharmacist about this episode and will have her reach out next week.      "

## 2018-11-06 ENCOUNTER — PATIENT OUTREACH (OUTPATIENT)
Dept: OTHER | Facility: OTHER | Age: 53
End: 2018-11-06

## 2018-11-06 NOTE — PROGRESS NOTES
Last 5 Patient Entered Readings                                      Current 30 Day Average: 127/90     Recent Readings 11/5/2018 11/5/2018 11/4/2018 11/4/2018 11/4/2018    SBP (mmHg) 100 154 106 109 94    DBP (mmHg) 71 92 70 72 70    Pulse 79 92 84 92 81        Patient's BP average is above goal of <130/80.     Patient denies s/s of hypotension (lightheadedness, dizziness, nausea, fatigue) associated with low readings. Instructed patient to inform me if this occurs, patient confirms understanding.      Patient denies s/s of hypertension (SOB, CP, severe headaches, changes in vision) associated with high readings. Instructed patient to go to the ED if BP > 180/110 and accompanied by hypertensive s/s, patient confirms understanding.    Patient reports this past weekend she did not take any other pain medications other than tizanidine. Discussed reported symptoms 10/29, she has since realized that she missed her clonazepam dose that morning and also felt dehydrated, which may have contributed to her symptoms. She contributes elevated readings to increased stress while she was at her sister's. She confirms that she tries to take tizanidine with food. She has tizanidine capsules so cannot split in half. Tizanidine prescribed by workers [facility] physician and she has follow up in December or January. Encouraged her to follow up with prescribing physician to discuss changing to tablets so trial dose reduction.    Will continue to monitor regularly. Will follow up in 2-3 weeks, sooner if BP begins to trend upward or downward.    Patient has my contact information and knows to call with any concerns or clinical changes.     Current HTN regimen:  Hypertension Medications             amLODIPine (NORVASC) 5 MG tablet Take 2 tablets (10 mg total) by mouth once daily.    chlorthalidone (HYGROTEN) 25 MG Tab Take 0.5 tablet (12.5mg) by mouth once a day. May increase to 1 tablet daily based on lab results. (for blood pressure)     cloNIDine (CATAPRES) 0.1 MG tablet Take 1 tablet (0.1 mg total) by mouth every 12 (twelve) hours as needed (only for blood pressure greater than 180/120).

## 2018-11-07 ENCOUNTER — LAB VISIT (OUTPATIENT)
Dept: LAB | Facility: HOSPITAL | Age: 53
End: 2018-11-07
Attending: INTERNAL MEDICINE
Payer: MEDICAID

## 2018-11-07 DIAGNOSIS — I10 ESSENTIAL HYPERTENSION: ICD-10-CM

## 2018-11-07 LAB — POTASSIUM SERPL-SCNC: 2.8 MMOL/L

## 2018-11-07 PROCEDURE — 84132 ASSAY OF SERUM POTASSIUM: CPT

## 2018-11-07 PROCEDURE — 36415 COLL VENOUS BLD VENIPUNCTURE: CPT | Mod: PO

## 2018-11-08 ENCOUNTER — PATIENT MESSAGE (OUTPATIENT)
Dept: INTERNAL MEDICINE | Facility: CLINIC | Age: 53
End: 2018-11-08

## 2018-11-08 ENCOUNTER — TELEPHONE (OUTPATIENT)
Dept: INTERNAL MEDICINE | Facility: CLINIC | Age: 53
End: 2018-11-08

## 2018-11-08 DIAGNOSIS — E87.6 HYPOKALEMIA: Primary | ICD-10-CM

## 2018-11-08 RX ORDER — POTASSIUM CHLORIDE 1.5 G/1.58G
20 POWDER, FOR SOLUTION ORAL 2 TIMES DAILY
Qty: 60 PACKET | Refills: 3 | Status: SHIPPED | OUTPATIENT
Start: 2018-11-08 | End: 2018-11-26 | Stop reason: SDUPTHER

## 2018-11-08 NOTE — TELEPHONE ENCOUNTER
Notify pt that potassium level has dropped further to 2.8. Did she start the potassium pills? If so, we need to increase from 10 mg to 40 mg daily and repeat potassium in one week. Let me know if she is agreeable

## 2018-11-08 NOTE — PROGRESS NOTES
Last 5 Patient Entered Readings                                      Current 30 Day Average: 128/90     Recent Readings 11/7/2018 11/7/2018 11/7/2018 11/6/2018 11/6/2018    SBP (mmHg) 135 160 121 110 159    DBP (mmHg) 87 97 89 87 100    Pulse 100 105 82 82 97        K+ level decreased further. Dr. Bernal increased KCl dose and scheduled repeat BMP next week. If K+ does not improve, consider decreasing chlorthalidone to 12.5 mg once daily.

## 2018-11-16 ENCOUNTER — TELEPHONE (OUTPATIENT)
Dept: FAMILY MEDICINE | Facility: CLINIC | Age: 53
End: 2018-11-16

## 2018-11-16 ENCOUNTER — LAB VISIT (OUTPATIENT)
Dept: LAB | Facility: HOSPITAL | Age: 53
End: 2018-11-16
Attending: INTERNAL MEDICINE
Payer: MEDICAID

## 2018-11-16 DIAGNOSIS — E87.6 HYPOKALEMIA: Primary | ICD-10-CM

## 2018-11-16 DIAGNOSIS — E87.6 HYPOKALEMIA: ICD-10-CM

## 2018-11-16 LAB
ANION GAP SERPL CALC-SCNC: 13 MMOL/L
BUN SERPL-MCNC: 9 MG/DL
CALCIUM SERPL-MCNC: 9.8 MG/DL
CHLORIDE SERPL-SCNC: 95 MMOL/L
CO2 SERPL-SCNC: 28 MMOL/L
CREAT SERPL-MCNC: 1.2 MG/DL
EST. GFR  (AFRICAN AMERICAN): >60 ML/MIN/1.73 M^2
EST. GFR  (NON AFRICAN AMERICAN): 52.5 ML/MIN/1.73 M^2
GLUCOSE SERPL-MCNC: 126 MG/DL
POTASSIUM SERPL-SCNC: 2.7 MMOL/L
SODIUM SERPL-SCNC: 136 MMOL/L

## 2018-11-16 PROCEDURE — 80048 BASIC METABOLIC PNL TOTAL CA: CPT

## 2018-11-16 PROCEDURE — 36415 COLL VENOUS BLD VENIPUNCTURE: CPT | Mod: PO

## 2018-11-16 NOTE — TELEPHONE ENCOUNTER
Notified of critically low K 2.7 creatinine stable  Spoke with pt - she notes she is compliant with meds, K powder 40 BID.    Increase to 80 BID over the weekend.  Copy to her PCP.

## 2018-11-17 NOTE — TELEPHONE ENCOUNTER
Contact pt to continue 80 mg po bid and repeat potassium on Tuesday. Is she taking chlorthalidone daily and how much?

## 2018-11-19 DIAGNOSIS — R10.9 ABDOMINAL PAIN, UNSPECIFIED ABDOMINAL LOCATION: Primary | ICD-10-CM

## 2018-11-19 NOTE — TELEPHONE ENCOUNTER
Spoke to pt and informed of Dr. Glynn's recommendations. She verbalized understanding.     She does take chlorthalidone 25 mg daily.

## 2018-11-20 ENCOUNTER — LAB VISIT (OUTPATIENT)
Dept: LAB | Facility: HOSPITAL | Age: 53
End: 2018-11-20
Attending: INTERNAL MEDICINE
Payer: MEDICAID

## 2018-11-20 ENCOUNTER — PATIENT OUTREACH (OUTPATIENT)
Dept: OTHER | Facility: OTHER | Age: 53
End: 2018-11-20

## 2018-11-20 DIAGNOSIS — E87.6 HYPOKALEMIA: ICD-10-CM

## 2018-11-20 LAB
ANION GAP SERPL CALC-SCNC: 11 MMOL/L
BUN SERPL-MCNC: 8 MG/DL
CALCIUM SERPL-MCNC: 9.4 MG/DL
CHLORIDE SERPL-SCNC: 102 MMOL/L
CO2 SERPL-SCNC: 26 MMOL/L
CREAT SERPL-MCNC: 1 MG/DL
EST. GFR  (AFRICAN AMERICAN): >60 ML/MIN/1.73 M^2
EST. GFR  (NON AFRICAN AMERICAN): >60 ML/MIN/1.73 M^2
GLUCOSE SERPL-MCNC: 96 MG/DL
POTASSIUM SERPL-SCNC: 3.6 MMOL/L
SODIUM SERPL-SCNC: 139 MMOL/L

## 2018-11-20 PROCEDURE — 80048 BASIC METABOLIC PNL TOTAL CA: CPT

## 2018-11-20 PROCEDURE — 36415 COLL VENOUS BLD VENIPUNCTURE: CPT | Mod: PO

## 2018-11-20 NOTE — PROGRESS NOTES
Last 5 Patient Entered Readings                                      Current 30 Day Average: 126/87     Recent Readings 11/19/2018 11/19/2018 11/18/2018 11/17/2018 11/17/2018    SBP (mmHg) 126 147 123 141 175    DBP (mmHg) 75 88 82 91 101    Pulse 88 105 87 98 99        Patient's BP average is above goal of <130/80.     Patient denies s/s of hypotension (lightheadedness, dizziness, nausea, fatigue) associated with low readings. Instructed patient to inform me if this occurs, patient confirms understanding.      Patient denies s/s of hypertension (SOB, CP, severe headaches, changes in vision) associated with high readings. Instructed patient to go to the ED if BP > 180/110 and accompanied by hypertensive s/s, patient confirms understanding.    BP much improved, however, K+ remains significantly low. Patient has increased KCl to 80 mEq BID as directed by Dr. Bernal. She will have repeat lab today. I advised patient to decrease chlorthalidone to 12.5 mg once daily and will route note to Dr. Bernal. She denies taking any clonidine recently.    Will continue to monitor regularly. Will follow up in 1-2 weeks, sooner if BP begins to trend upward or downward.    Patient has my contact information and knows to call with any concerns or clinical changes.     Current HTN regimen:  Hypertension Medications             amLODIPine (NORVASC) 5 MG tablet Take 2 tablets (10 mg total) by mouth once daily.    chlorthalidone (HYGROTEN) 25 MG Tab Take 0.5 tablet (12.5mg) by mouth once a day. May increase to 1 tablet daily based on lab results. (for blood pressure)    cloNIDine (CATAPRES) 0.1 MG tablet Take 1 tablet (0.1 mg total) by mouth every 12 (twelve) hours as needed (only for blood pressure greater than 180/120).

## 2018-11-21 ENCOUNTER — PATIENT MESSAGE (OUTPATIENT)
Dept: INTERNAL MEDICINE | Facility: CLINIC | Age: 53
End: 2018-11-21

## 2018-11-21 DIAGNOSIS — E87.6 HYPOKALEMIA: Primary | ICD-10-CM

## 2018-11-25 ENCOUNTER — PATIENT MESSAGE (OUTPATIENT)
Dept: INTERNAL MEDICINE | Facility: CLINIC | Age: 53
End: 2018-11-25

## 2018-11-25 DIAGNOSIS — I10 ESSENTIAL HYPERTENSION: ICD-10-CM

## 2018-11-26 ENCOUNTER — PATIENT MESSAGE (OUTPATIENT)
Dept: INTERNAL MEDICINE | Facility: CLINIC | Age: 53
End: 2018-11-26

## 2018-11-26 DIAGNOSIS — I10 ESSENTIAL HYPERTENSION: ICD-10-CM

## 2018-11-26 RX ORDER — POTASSIUM CHLORIDE 1.5 G/1.58G
40 POWDER, FOR SOLUTION ORAL 2 TIMES DAILY
Qty: 120 PACKET | Refills: 3 | Status: SHIPPED | OUTPATIENT
Start: 2018-11-26 | End: 2019-01-16

## 2018-11-26 RX ORDER — AMLODIPINE BESYLATE 5 MG/1
10 TABLET ORAL DAILY
Qty: 60 TABLET | Refills: 6 | Status: SHIPPED | OUTPATIENT
Start: 2018-11-26 | End: 2018-11-27

## 2018-11-26 RX ORDER — CHLORTHALIDONE 25 MG/1
TABLET ORAL
Qty: 30 TABLET | Refills: 2
Start: 2018-11-26 | End: 2018-11-26 | Stop reason: SDUPTHER

## 2018-11-26 RX ORDER — CHLORTHALIDONE 25 MG/1
TABLET ORAL
Qty: 30 TABLET | Refills: 2 | Status: SHIPPED | OUTPATIENT
Start: 2018-11-26 | End: 2019-07-10

## 2018-11-27 ENCOUNTER — PATIENT OUTREACH (OUTPATIENT)
Dept: OTHER | Facility: OTHER | Age: 53
End: 2018-11-27

## 2018-11-27 ENCOUNTER — TELEPHONE (OUTPATIENT)
Dept: INTERNAL MEDICINE | Facility: CLINIC | Age: 53
End: 2018-11-27

## 2018-11-27 DIAGNOSIS — I10 ESSENTIAL HYPERTENSION: Primary | ICD-10-CM

## 2018-11-27 RX ORDER — AMLODIPINE BESYLATE 10 MG/1
10 TABLET ORAL DAILY
Qty: 30 TABLET | Refills: 11 | Status: SHIPPED | OUTPATIENT
Start: 2018-11-27 | End: 2018-12-04

## 2018-11-27 NOTE — PROGRESS NOTES
Last 5 Patient Entered Readings                                      Current 30 Day Average: 123/87     Recent Readings 11/26/2018 11/26/2018 11/26/2018 11/25/2018 11/25/2018    SBP (mmHg) 132 132 112 114 124    DBP (mmHg) 88 88 79 78 67    Pulse 78 78 73 95 99          Patient's BP average is above goal of <130/80.     Patient denies s/s of hypotension (lightheadedness, dizziness, nausea, fatigue) associated with low readings. Instructed patient to inform me if this occurs, patient confirms understanding.      Patient denies s/s of hypertension (SOB, CP, severe headaches, changes in vision) associated with high readings. Instructed patient to go to the ED if BP > 180/110 and accompanied by hypertensive s/s, patient confirms understanding.    Confirmed notes in chart with patient, she will be continuing KCl 40 mEq BID and chlorthalidone 25 mg daily, repeat BMP 12/5. Amlodipine 10mg prescription sent to pharmacy and picked up by patient's . Clarified with her to take 1/2 tablet (5mg) BID; split dosing providing more stability with concomitant tizanidine.    Will continue to monitor regularly. Will follow up in 2-3 weeks, sooner if BP begins to trend upward or downward.    Patient has my contact information and knows to call with any concerns or clinical changes.     Current HTN regimen:  Hypertension Medications             amLODIPine (NORVASC) 10 MG tablet Take 1 tablet (10 mg total) by mouth once daily.    chlorthalidone (HYGROTEN) 25 MG Tab Take one by mouth once a day.    cloNIDine (CATAPRES) 0.1 MG tablet Take 1 tablet (0.1 mg total) by mouth every 12 (twelve) hours as needed (only for blood pressure greater than 180/120).

## 2018-12-04 ENCOUNTER — PATIENT OUTREACH (OUTPATIENT)
Dept: OTHER | Facility: OTHER | Age: 53
End: 2018-12-04

## 2018-12-04 RX ORDER — AMLODIPINE BESYLATE 5 MG/1
5 TABLET ORAL 2 TIMES DAILY
Qty: 60 TABLET | Refills: 2 | Status: SHIPPED | OUTPATIENT
Start: 2018-12-04 | End: 2019-01-15 | Stop reason: SDUPTHER

## 2018-12-04 NOTE — PROGRESS NOTES
Last 5 Patient Entered Readings                                      Current 30 Day Average: 121/85     Recent Readings 12/3/2018 12/3/2018 12/2/2018 12/1/2018 12/1/2018    SBP (mmHg) 121 104 88 135 112    DBP (mmHg) 78 69 59 86 74    Pulse 86 84 85 99 86        12/4: States her blood pressure is looking good.  States she went and saw another neurologist.  Reports she is waiting longer than an hour to take BP and feels this is helping her readings.  Asked if pharmacist could switch from 10mg to 5mg of amlodipine so she doesn't have to cut her medication.  Informed pharmacist.

## 2018-12-04 NOTE — PROGRESS NOTES
Last 5 Patient Entered Readings                                      Current 30 Day Average: 121/85     Recent Readings 12/3/2018 12/3/2018 12/2/2018 12/1/2018 12/1/2018    SBP (mmHg) 121 104 88 135 112    DBP (mmHg) 78 69 59 86 74    Pulse 86 84 85 99 86          Patient requesting change to amlodipine 5mg tablet, 5mg BID, to avoid having to split tablet.

## 2018-12-04 NOTE — PROGRESS NOTES
Last 5 Patient Entered Readings                                      Current 30 Day Average: 121/85     Recent Readings 12/3/2018 12/3/2018 12/2/2018 12/1/2018 12/1/2018    SBP (mmHg) 121 104 88 135 112    DBP (mmHg) 78 69 59 86 74    Pulse 86 84 85 99 86          12/4: States she is in the doctor's office.  Will give me a call back.

## 2018-12-05 ENCOUNTER — LAB VISIT (OUTPATIENT)
Dept: LAB | Facility: HOSPITAL | Age: 53
End: 2018-12-05
Attending: INTERNAL MEDICINE
Payer: MEDICAID

## 2018-12-05 DIAGNOSIS — R10.9 ABDOMINAL PAIN, UNSPECIFIED ABDOMINAL LOCATION: ICD-10-CM

## 2018-12-05 DIAGNOSIS — E87.6 HYPOKALEMIA: ICD-10-CM

## 2018-12-05 LAB
AMYLASE SERPL-CCNC: 18 U/L
ANION GAP SERPL CALC-SCNC: 9 MMOL/L
BUN SERPL-MCNC: 7 MG/DL
CALCIUM SERPL-MCNC: 9.1 MG/DL
CHLORIDE SERPL-SCNC: 101 MMOL/L
CO2 SERPL-SCNC: 27 MMOL/L
CREAT SERPL-MCNC: 1.1 MG/DL
EST. GFR  (AFRICAN AMERICAN): >60 ML/MIN/1.73 M^2
EST. GFR  (NON AFRICAN AMERICAN): 58.3 ML/MIN/1.73 M^2
GLUCOSE SERPL-MCNC: 102 MG/DL
LIPASE SERPL-CCNC: 4 U/L
POTASSIUM SERPL-SCNC: 3.9 MMOL/L
SODIUM SERPL-SCNC: 137 MMOL/L

## 2018-12-05 PROCEDURE — 82150 ASSAY OF AMYLASE: CPT

## 2018-12-05 PROCEDURE — 36415 COLL VENOUS BLD VENIPUNCTURE: CPT | Mod: PO

## 2018-12-05 PROCEDURE — 83690 ASSAY OF LIPASE: CPT

## 2018-12-05 PROCEDURE — 80048 BASIC METABOLIC PNL TOTAL CA: CPT

## 2018-12-12 ENCOUNTER — PATIENT MESSAGE (OUTPATIENT)
Dept: INTERNAL MEDICINE | Facility: CLINIC | Age: 53
End: 2018-12-12

## 2018-12-12 RX ORDER — NAPROXEN 500 MG/1
500 TABLET ORAL 2 TIMES DAILY WITH MEALS
Qty: 60 TABLET | Refills: 2 | Status: SHIPPED | OUTPATIENT
Start: 2018-12-12 | End: 2019-04-25

## 2018-12-12 RX ORDER — GEMFIBROZIL 600 MG/1
600 TABLET, FILM COATED ORAL
Qty: 60 TABLET | Refills: 6 | Status: SHIPPED | OUTPATIENT
Start: 2018-12-12 | End: 2019-05-07 | Stop reason: SDUPTHER

## 2018-12-19 ENCOUNTER — PATIENT OUTREACH (OUTPATIENT)
Dept: OTHER | Facility: OTHER | Age: 53
End: 2018-12-19

## 2018-12-19 NOTE — PROGRESS NOTES
Last 5 Patient Entered Readings                                      Current 30 Day Average: 121/79     Recent Readings 12/18/2018 12/18/2018 12/17/2018 12/17/2018 12/17/2018    SBP (mmHg) 100 150 120 158 122    DBP (mmHg) 66 93 67 94 60    Pulse 78 101 71 72 88          Patient's BP average is controlled based on 2017 ACC/AHA HTN guidelines of goal BP <130/80.      Patient denies s/s of hypotension (lightheadedness, dizziness, nausea, fatigue) associated with low readings. Instructed patient to inform me if this occurs, patient confirms understanding.      Patient denies s/s of hypertension (SOB, CP, severe headaches, changes in vision) associated with high readings. Instructed patient to go to the ED if BP > 180/110 and accompanied by hypertensive s/s, patient confirms understanding.     BP average at goal. Readings continue to fluctuate, likely due to tizanidine, however improved overall. Discussed overall feeling of not feeling well 12/17, attributes to being outside all night 12/15 and needing to recover.    I will continue to monitor regularly and will follow up in 4-6 weeks, sooner if BP begins to trend upward or downward.    Patient has my contact information and knows to call with any concerns or clinical changes.     Current HTN regimen:  Hypertension Medications             amLODIPine (NORVASC) 5 MG tablet Take 1 tablet (5 mg total) by mouth 2 (two) times daily.    chlorthalidone (HYGROTEN) 25 MG Tab Take one by mouth once a day.    cloNIDine (CATAPRES) 0.1 MG tablet Take 1 tablet (0.1 mg total) by mouth every 12 (twelve) hours as needed (only for blood pressure greater than 180/120).

## 2018-12-29 ENCOUNTER — PATIENT MESSAGE (OUTPATIENT)
Dept: INTERNAL MEDICINE | Facility: CLINIC | Age: 53
End: 2018-12-29

## 2018-12-29 RX ORDER — PANTOPRAZOLE SODIUM 40 MG/1
40 TABLET, DELAYED RELEASE ORAL DAILY
Qty: 30 TABLET | Refills: 11 | Status: SHIPPED | OUTPATIENT
Start: 2018-12-29 | End: 2019-12-09 | Stop reason: SDUPTHER

## 2019-01-02 ENCOUNTER — PATIENT MESSAGE (OUTPATIENT)
Dept: INTERNAL MEDICINE | Facility: CLINIC | Age: 54
End: 2019-01-02

## 2019-01-04 ENCOUNTER — HOSPITAL ENCOUNTER (OUTPATIENT)
Facility: HOSPITAL | Age: 54
LOS: 1 days | Discharge: HOME | End: 2019-01-05
Attending: EMERGENCY MEDICINE | Admitting: FAMILY MEDICINE
Payer: MEDICAID

## 2019-01-04 DIAGNOSIS — N39.0 URINARY TRACT INFECTION WITHOUT HEMATURIA, SITE UNSPECIFIED: ICD-10-CM

## 2019-01-04 DIAGNOSIS — N17.9 AKI (ACUTE KIDNEY INJURY): ICD-10-CM

## 2019-01-04 DIAGNOSIS — R51.9 ACUTE NONINTRACTABLE HEADACHE, UNSPECIFIED HEADACHE TYPE: Primary | ICD-10-CM

## 2019-01-04 DIAGNOSIS — I10 ESSENTIAL HYPERTENSION: ICD-10-CM

## 2019-01-04 DIAGNOSIS — R42 DIZZINESS: ICD-10-CM

## 2019-01-04 PROBLEM — F41.9 ANXIETY: Status: ACTIVE | Noted: 2019-01-04

## 2019-01-04 PROBLEM — F43.10 PTSD (POST-TRAUMATIC STRESS DISORDER): Status: ACTIVE | Noted: 2019-01-04

## 2019-01-04 LAB
ALBUMIN SERPL BCP-MCNC: 3.5 G/DL
ALP SERPL-CCNC: 51 U/L
ALT SERPL W/O P-5'-P-CCNC: 26 U/L
ANION GAP SERPL CALC-SCNC: 12 MMOL/L
AST SERPL-CCNC: 21 U/L
BACTERIA #/AREA URNS HPF: ABNORMAL /HPF
BASOPHILS # BLD AUTO: 0.03 K/UL
BASOPHILS NFR BLD: 0.3 %
BILIRUB SERPL-MCNC: 0.3 MG/DL
BILIRUB UR QL STRIP: ABNORMAL
BUN SERPL-MCNC: 10 MG/DL
CALCIUM SERPL-MCNC: 9.8 MG/DL
CHLORIDE SERPL-SCNC: 100 MMOL/L
CLARITY UR: ABNORMAL
CO2 SERPL-SCNC: 24 MMOL/L
COLOR UR: ABNORMAL
CREAT SERPL-MCNC: 1.5 MG/DL
DIFFERENTIAL METHOD: ABNORMAL
EOSINOPHIL # BLD AUTO: 0.2 K/UL
EOSINOPHIL NFR BLD: 1.7 %
ERYTHROCYTE [DISTWIDTH] IN BLOOD BY AUTOMATED COUNT: 13.7 %
EST. GFR  (AFRICAN AMERICAN): 46 ML/MIN/1.73 M^2
EST. GFR  (NON AFRICAN AMERICAN): 40 ML/MIN/1.73 M^2
GLUCOSE SERPL-MCNC: 98 MG/DL
GLUCOSE UR QL STRIP: NEGATIVE
HCT VFR BLD AUTO: 40.5 %
HGB BLD-MCNC: 13.5 G/DL
HGB UR QL STRIP: ABNORMAL
INR PPP: 0.9
KETONES UR QL STRIP: ABNORMAL
LEUKOCYTE ESTERASE UR QL STRIP: ABNORMAL
LYMPHOCYTES # BLD AUTO: 3.3 K/UL
LYMPHOCYTES NFR BLD: 32.2 %
MCH RBC QN AUTO: 28.8 PG
MCHC RBC AUTO-ENTMCNC: 33.3 G/DL
MCV RBC AUTO: 87 FL
MICROSCOPIC COMMENT: ABNORMAL
MONOCYTES # BLD AUTO: 0.8 K/UL
MONOCYTES NFR BLD: 7.5 %
NEUTROPHILS # BLD AUTO: 6 K/UL
NEUTROPHILS NFR BLD: 58.1 %
NITRITE UR QL STRIP: NEGATIVE
PH UR STRIP: 6 [PH] (ref 5–8)
PLATELET # BLD AUTO: 359 K/UL
PMV BLD AUTO: 11.4 FL
POTASSIUM SERPL-SCNC: 3.4 MMOL/L
PROT SERPL-MCNC: 7.8 G/DL
PROT UR QL STRIP: ABNORMAL
PROTHROMBIN TIME: 9.5 SEC
RBC # BLD AUTO: 4.68 M/UL
RBC #/AREA URNS HPF: 10 /HPF (ref 0–4)
SODIUM SERPL-SCNC: 136 MMOL/L
SP GR UR STRIP: >=1.03 (ref 1–1.03)
URN SPEC COLLECT METH UR: ABNORMAL
UROBILINOGEN UR STRIP-ACNC: NEGATIVE EU/DL
WBC # BLD AUTO: 10.36 K/UL
WBC #/AREA URNS HPF: 50 /HPF (ref 0–5)
WBC CLUMPS URNS QL MICRO: ABNORMAL

## 2019-01-04 PROCEDURE — 25500020 PHARM REV CODE 255: Performed by: EMERGENCY MEDICINE

## 2019-01-04 PROCEDURE — 25000003 PHARM REV CODE 250: Performed by: PHYSICIAN ASSISTANT

## 2019-01-04 PROCEDURE — 93010 EKG 12-LEAD: ICD-10-PCS | Mod: ,,, | Performed by: INTERNAL MEDICINE

## 2019-01-04 PROCEDURE — 99220 PR INITIAL OBSERVATION CARE,LEVL III: ICD-10-PCS | Mod: ,,, | Performed by: FAMILY MEDICINE

## 2019-01-04 PROCEDURE — 87086 URINE CULTURE/COLONY COUNT: CPT

## 2019-01-04 PROCEDURE — 63600175 PHARM REV CODE 636 W HCPCS: Performed by: PHYSICIAN ASSISTANT

## 2019-01-04 PROCEDURE — 99285 EMERGENCY DEPT VISIT HI MDM: CPT | Mod: 25

## 2019-01-04 PROCEDURE — 93005 ELECTROCARDIOGRAM TRACING: CPT

## 2019-01-04 PROCEDURE — 99220 PR INITIAL OBSERVATION CARE,LEVL III: CPT | Mod: ,,, | Performed by: FAMILY MEDICINE

## 2019-01-04 PROCEDURE — 81000 URINALYSIS NONAUTO W/SCOPE: CPT

## 2019-01-04 PROCEDURE — 80053 COMPREHEN METABOLIC PANEL: CPT

## 2019-01-04 PROCEDURE — 85025 COMPLETE CBC W/AUTO DIFF WBC: CPT

## 2019-01-04 PROCEDURE — 85610 PROTHROMBIN TIME: CPT

## 2019-01-04 PROCEDURE — G0378 HOSPITAL OBSERVATION PER HR: HCPCS

## 2019-01-04 PROCEDURE — 96361 HYDRATE IV INFUSION ADD-ON: CPT

## 2019-01-04 PROCEDURE — 96365 THER/PROPH/DIAG IV INF INIT: CPT

## 2019-01-04 PROCEDURE — 93010 ELECTROCARDIOGRAM REPORT: CPT | Mod: ,,, | Performed by: INTERNAL MEDICINE

## 2019-01-04 RX ORDER — CLONIDINE HYDROCHLORIDE 0.1 MG/1
0.1 TABLET ORAL EVERY 12 HOURS PRN
Status: DISCONTINUED | OUTPATIENT
Start: 2019-01-05 | End: 2019-01-05 | Stop reason: HOSPADM

## 2019-01-04 RX ORDER — ACETAMINOPHEN 500 MG
1000 TABLET ORAL
Status: COMPLETED | OUTPATIENT
Start: 2019-01-04 | End: 2019-01-04

## 2019-01-04 RX ORDER — METOCLOPRAMIDE 10 MG/1
10 TABLET ORAL
Status: COMPLETED | OUTPATIENT
Start: 2019-01-04 | End: 2019-01-04

## 2019-01-04 RX ORDER — AMLODIPINE BESYLATE 5 MG/1
5 TABLET ORAL 2 TIMES DAILY
Status: DISCONTINUED | OUTPATIENT
Start: 2019-01-05 | End: 2019-01-05 | Stop reason: HOSPADM

## 2019-01-04 RX ORDER — PANTOPRAZOLE SODIUM 40 MG/1
40 TABLET, DELAYED RELEASE ORAL DAILY
Status: DISCONTINUED | OUTPATIENT
Start: 2019-01-05 | End: 2019-01-05 | Stop reason: HOSPADM

## 2019-01-04 RX ORDER — QUETIAPINE FUMARATE 25 MG/1
100 TABLET, FILM COATED ORAL DAILY
Status: DISCONTINUED | OUTPATIENT
Start: 2019-01-05 | End: 2019-01-05 | Stop reason: HOSPADM

## 2019-01-04 RX ORDER — SODIUM CHLORIDE 9 MG/ML
INJECTION, SOLUTION INTRAVENOUS CONTINUOUS
Status: ACTIVE | OUTPATIENT
Start: 2019-01-04 | End: 2019-01-05

## 2019-01-04 RX ORDER — ACETAMINOPHEN 325 MG/1
650 TABLET ORAL EVERY 4 HOURS PRN
Status: DISCONTINUED | OUTPATIENT
Start: 2019-01-04 | End: 2019-01-05

## 2019-01-04 RX ORDER — BUPROPION HYDROCHLORIDE 150 MG/1
300 TABLET ORAL DAILY
Status: DISCONTINUED | OUTPATIENT
Start: 2019-01-05 | End: 2019-01-05 | Stop reason: HOSPADM

## 2019-01-04 RX ORDER — CLONAZEPAM 1 MG/1
1 TABLET ORAL 2 TIMES DAILY PRN
Status: DISCONTINUED | OUTPATIENT
Start: 2019-01-05 | End: 2019-01-05 | Stop reason: HOSPADM

## 2019-01-04 RX ORDER — GEMFIBROZIL 600 MG/1
600 TABLET, FILM COATED ORAL
Status: DISCONTINUED | OUTPATIENT
Start: 2019-01-05 | End: 2019-01-05 | Stop reason: HOSPADM

## 2019-01-04 RX ORDER — DULOXETIN HYDROCHLORIDE 30 MG/1
60 CAPSULE, DELAYED RELEASE ORAL DAILY
Status: DISCONTINUED | OUTPATIENT
Start: 2019-01-05 | End: 2019-01-05 | Stop reason: HOSPADM

## 2019-01-04 RX ORDER — CHLORTHALIDONE 25 MG/1
25 TABLET ORAL DAILY
Status: DISCONTINUED | OUTPATIENT
Start: 2019-01-05 | End: 2019-01-05 | Stop reason: HOSPADM

## 2019-01-04 RX ORDER — GABAPENTIN 300 MG/1
600 CAPSULE ORAL 3 TIMES DAILY
Status: DISCONTINUED | OUTPATIENT
Start: 2019-01-05 | End: 2019-01-05 | Stop reason: HOSPADM

## 2019-01-04 RX ADMIN — ACETAMINOPHEN 1000 MG: 500 TABLET ORAL at 06:01

## 2019-01-04 RX ADMIN — SODIUM CHLORIDE: 0.9 INJECTION, SOLUTION INTRAVENOUS at 11:01

## 2019-01-04 RX ADMIN — IOHEXOL 100 ML: 350 INJECTION, SOLUTION INTRAVENOUS at 07:01

## 2019-01-04 RX ADMIN — CEFTRIAXONE 2 G: 2 INJECTION, SOLUTION INTRAVENOUS at 08:01

## 2019-01-04 RX ADMIN — SODIUM CHLORIDE 1000 ML: 0.9 INJECTION, SOLUTION INTRAVENOUS at 06:01

## 2019-01-04 RX ADMIN — METOCLOPRAMIDE 10 MG: 10 TABLET ORAL at 06:01

## 2019-01-04 RX ADMIN — ACETAMINOPHEN 650 MG: 325 TABLET ORAL at 11:01

## 2019-01-04 NOTE — ED TRIAGE NOTES
Patient states she has been having a headache behind her ears for 2 days; has taken everything over the counter with no relief. Patient states she is currently being treated for a back injury; she is suppose to have a cervical MRI done but her insurance has not approved it yet because it is going through workmans comp. no pain on flexion and extension of neck but pain and stiffness with rotation in her neck. .

## 2019-01-04 NOTE — ED PROVIDER NOTES
Encounter Date: 2019       History     Chief Complaint   Patient presents with    Headache     pt c/o headache behind right ear onset x 2 days ago. pt reports painful to speak and turn neck when headache began. Pt reports she called her PCP and was advised to be seen in ED       53-year-old female with PMH of EGD, GERD, HLD, HTN, anxiety, IBS, PTSD, lumbar herniated disc presents the ED with complaints of severe posterior headache x2 days.  Patient states headache started abruptly 2 days ago and woke her from sleep.  She rated it at 12/10 in severity at that time.  She states that today it is that approximately a 7/10 in severity.  She describes it as the worst headache of her life. She localizes it to behind her ears bilaterally and travels about care home down her neck.  Also admits to mild dizziness.   She has taken multiple OTC pain medications with no relief.  Patient is currently being treated for a back injury and has some residual right-sided weakness.  She states that her weakness is not worsened. She denies blurry vision, photophobia, history of migraines,  Numbness, nausea, vomiting , slurred speech, facial asymmetry , trauma or injury.      The history is provided by the patient. No  was used.     Review of patient's allergies indicates:  No Known Allergies  Past Medical History:   Diagnosis Date    Anxiety     Degenerative joint disease (DJD) of hip     GERD (gastroesophageal reflux disease)     Hyperlipidemia     Hypertension     IBS (irritable bowel syndrome)     Insomnia     Lumbar disc herniation     PTSD (post-traumatic stress disorder)      Past Surgical History:   Procedure Laterality Date    ADENOIDECTOMY      ARTHROSCOPY-SHOULDER WITH SUBACROMIAL DECOMPRESSION  10/20/2017    [facility]     SECTION      DEBRIDEMENT-ROTATOR CUFF-ARTHROSCOPIC Left 10/20/2017    [facility]     RESECTION-ACROMIOCLAVICULAR JOINT-ARTHROSCOPIC  10/20/2017    [facility] OR    TONSILLECTOMY       Family History   Problem Relation Age of Onset    Hypertension Mother     Hypothyroidism Mother     Hypertension Father     Stroke Father 46    Diabetes Father     Cancer Maternal Grandfather      Social History     Tobacco Use    Smoking status: Former Smoker     Types: Vaping w/o nicotine    Smokeless tobacco: Former User     Quit date: 10/20/2014    Tobacco comment: Vapor cigarettes   Substance Use Topics    Alcohol use: No     Comment: socially    Drug use: No     Review of Systems   Eyes: Negative for visual disturbance.   Cardiovascular: Negative for chest pain.   Gastrointestinal: Negative for nausea and vomiting.   Neurological: Positive for dizziness, weakness (Chronic right-sided, not worsened.) and headaches. Negative for syncope, facial asymmetry, speech difficulty and numbness.   Psychiatric/Behavioral: Negative for confusion.   All other systems reviewed and are negative.      Physical Exam     Initial Vitals   BP Pulse Resp Temp SpO2   -- -- -- -- --      MAP       --         Physical Exam    Nursing note and vitals reviewed.  Constitutional: Vital signs are normal. She appears well-developed and well-nourished. No distress.   HENT:   Head: Normocephalic and atraumatic.   Nose: Nose normal.   Eyes: Conjunctivae, EOM and lids are normal. Pupils are equal, round, and reactive to light.   Neck: Normal range of motion and phonation normal. Muscular tenderness (Bilateral occipital and superior cervical paraspinal muscles with tightness) present. No spinous process tenderness present. Normal range of motion present. No neck rigidity.     Increased pain with neck rotation.  No increased pain with neck flexion or extension   Cardiovascular: Normal rate, regular rhythm and normal heart sounds.   Pulmonary/Chest: Effort normal and breath sounds normal.   Abdominal:  Soft. Normal appearance and bowel sounds are normal.   Musculoskeletal: Normal range of motion.   Neurological: She is alert and oriented to person, place, and time. She has normal strength. No sensory deficit.    No focal neuro deficits   Skin: Skin is warm and dry.   Psychiatric: She has a normal mood and affect. Her speech is normal and behavior is normal. Judgment and thought content normal. Cognition and memory are normal.         ED Course   Procedures  Labs Reviewed   CBC W/ AUTO DIFFERENTIAL - Abnormal; Notable for the following components:       Result Value    Platelets 359 (*)     All other components within normal limits   COMPREHENSIVE METABOLIC PANEL - Abnormal; Notable for the following components:    Potassium 3.4 (*)     Creatinine 1.5 (*)     Alkaline Phosphatase 51 (*)     eGFR if  46 (*)     eGFR if non  40 (*)     All other components within normal limits   URINALYSIS, REFLEX TO URINE CULTURE - Abnormal; Notable for the following components:    Color, UA Orange (*)     Appearance, UA Hazy (*)     Specific Gravity, UA >=1.030 (*)     Protein, UA Trace (*)     Ketones, UA Trace (*)     Bilirubin (UA) 1+ (*)     Occult Blood UA Trace (*)     Leukocytes, UA 2+ (*)     All other components within normal limits    Narrative:     Preferred Collection Type->Urine, Clean Catch   URINALYSIS MICROSCOPIC - Abnormal; Notable for the following components:    RBC, UA 10 (*)     WBC, UA 50 (*)     Bacteria, UA Moderate (*)     All other components within normal limits    Narrative:     Preferred Collection Type->Urine, Clean Catch   CULTURE, URINE   PROTIME-INR   CBC W/ AUTO DIFFERENTIAL   COMPREHENSIVE METABOLIC PANEL   MAGNESIUM   PHOSPHORUS   COMPREHENSIVE METABOLIC PANEL   CBC W/ AUTO DIFFERENTIAL   POCT URINE PREGNANCY          Imaging Results          CTA Head and Neck (xpd) (Final result)  Result time 01/04/19 19:46:03   Procedure changed from CTA Brain     Final result by  Deja Vargas MD (01/04/19 19:46:03)                 Impression:      No acute intracranial abnormalities identified.    CTA head and neck without significant focal stenosis, occlusion, or aneurysm.      Electronically signed by: Deja Vargas MD  Date:    01/04/2019  Time:    19:46             Narrative:    EXAMINATION:  CTA HEAD AND NECK (XPD)    CLINICAL HISTORY:  Headache, acute, severe, thunderclap, worst HA of life;    TECHNIQUE:  Non contrast low dose axial images were obtained through the head.  CT angiogram was performed from the level of the myra to the top of the head following the IV administration of 100mL of Omnipaque 350.   Sagittal and coronal reconstructions and maximum intensity projection reconstructions were performed. Arterial stenosis percentages are based on NASCET measurement criteria.    COMPARISON:  CT head and MRI brain from June 2018.    FINDINGS:  No evidence of acute/recent major vascular distribution cerebral infarction, intraparenchymal hemorrhage, or intra-axial space occupying lesion. The ventricular system is normal in size and configuration with no evidence of hydrocephalus. No effacement of the skull-base cisterns. No abnormal extra-axial fluid collections or blood products. The visualized paranasal sinuses and mastoid air cells are clear. The calvarium shows no significant abnormality.    CTA Neck: The origins of the right brachiocephalic, left common carotid and left subclavian arteries from the arch are within normal limits.  The origins of the vertebral arteries are within normal limits.  The vertebral arteries are unremarkable throughout their course without evidence for focal stenosis or occlusion.   The bilateral common carotid arteries and internal carotid arteries are patent without evidence for focal stenosis or occlusion.    Anterior circulation: The bilateral distal cervical, petrous, cavernous, and supraclinoid ICAs are patent without significant stenosis or  aneurysm.  The anterior and middle cerebral arteries are patent without significant stenosis, occlusion, or aneurysm.    Posterior circulation: Distal vertebral arteries, basilar artery and posterior cerebral arteries are patent without significant focal stenosis, occlusion, or aneurysm.    Airways: Unremarkable.    Glands/Nodes: The parotid and submandibular are unremarkable.  Few small subcentimeter bilateral thyroid hypodense nodules are seen.    Spine: No acute osseous abnormality identified.    Lungs: Visualized lung apices are clear.                                 Medical Decision Making:   Initial Assessment:   54 yo female with sudden onset occipital headache described as worst headache of her life x 2 days. Unrelieved by OTC medications.  Patient is and no acute distress.  You she has mild tenderness to bilateral occipital muscles extending into her superior cervical paraspinal musclesapproximately care home down neck.  Increased pain with neck rotation.  No increased pain with neck flexion or extension.  No focal neurological deficits noted.  Differential Diagnosis:   Differential Diagnosis includes, but is not limited to:  Ischemic stroke, hemorrhagic stroke, subarachnoid hemorrhage/ruptured aneurysm, intracranial lesion/mass, meningitis/encephalitis, epidural hematoma, subdural hematoma, pseudotumor cerebri, venous sinus thrombosis, CO poisoning, hypertensive encephalopathy, MI/ACS, head trauma/contusion, concussion, sinus headache, dehydration, anxiety, medication non-compliance, primary headache (tension/cluster/migraine).  Clinical Tests:   Lab Tests: Ordered and Reviewed  Radiological Study: Ordered and Reviewed  ED Management:   Labs, imaging, IV fluids, meds ordered.  8:00pm  UA shows UTI.  Creatinine elevated at 1.5, GFR decreased at 40.  Patient labs consistent with acute ATSIA.  CTA shows no abnormal findings.  IV Rocephin ordered for UTI.   Further diagnostic evaluation with a lumbar puncture was  discussed with the patient by Dr. Floyd, the patient refuses lumbar puncture.  The patient was discussed with Neurology on-call who states the patient should be placed in ICU and have q.1 hour neuro checks.  The patient was then discussed with [place] hospitalist who agree with admission at this time.                   ED Course as of Jan 04 1923 Fri Jan 04, 2019 1838 CO2: 24 [RN]   1838 Chloride: 100 [RN]   1838 Chloride: 100 [RN]   1838 Chloride: 100 [RN]   1838 Chloride: 100 [RN]   1921 EKG rate of 69 normal sinus rhythm no ST segment changes no STEMI  [RN]      ED Course User Index  [RN] Melchor Floyd Jr., MD     Clinical Impression:   The primary encounter diagnosis was Acute nonintractable headache, unspecified headache type. Diagnoses of Dizziness, TASIA (acute kidney injury), Urinary tract infection without hematuria, site unspecified, and Essential hypertension were also pertinent to this visit.                             Gala Clancy PA-C  01/05/19 0142

## 2019-01-04 NOTE — TELEPHONE ENCOUNTER
Called pt and informed to go to ER per Dr. Glynn. She stated that she spoke to her neurologist who advised her to go to ER if symptoms return. She stated that symptoms have subsided and that she doesn't think she needs to go. Informed that Dr. Glynn would still like her to be evaluated in the ER for possible stroke, she argued that she didn't have a stroke because she never had paralysis. Informed pt that she could still have a stroke without visible paralysis. She stated maybe a TIA but that she isn't having symptoms so she doesn't need to go. Advised pt that a TIA is considered a stroke and that imaging would be able to help us determine if that is what happened and the next step to prevent further occurences. She stated that she would think about it. I was able to convince pt to book appointment next week with Dr. Glynn.

## 2019-01-05 VITALS
TEMPERATURE: 97 F | WEIGHT: 165.38 LBS | RESPIRATION RATE: 20 BRPM | OXYGEN SATURATION: 97 % | HEART RATE: 85 BPM | SYSTOLIC BLOOD PRESSURE: 119 MMHG | HEIGHT: 62 IN | BODY MASS INDEX: 30.44 KG/M2 | DIASTOLIC BLOOD PRESSURE: 67 MMHG

## 2019-01-05 PROBLEM — N28.9 ACUTE RENAL INSUFFICIENCY: Status: RESOLVED | Noted: 2019-01-05 | Resolved: 2019-01-05

## 2019-01-05 PROBLEM — R51.9 ACUTE NONINTRACTABLE HEADACHE: Status: RESOLVED | Noted: 2019-01-04 | Resolved: 2019-01-05

## 2019-01-05 PROBLEM — N28.9 ACUTE RENAL INSUFFICIENCY: Status: ACTIVE | Noted: 2019-01-05

## 2019-01-05 PROBLEM — N39.0 UTI (URINARY TRACT INFECTION): Status: RESOLVED | Noted: 2019-01-05 | Resolved: 2019-01-05

## 2019-01-05 PROBLEM — N39.0 UTI (URINARY TRACT INFECTION): Status: ACTIVE | Noted: 2019-01-05

## 2019-01-05 LAB
ALBUMIN SERPL BCP-MCNC: 3 G/DL
ALBUMIN SERPL BCP-MCNC: 3 G/DL
ALP SERPL-CCNC: 45 U/L
ALP SERPL-CCNC: 45 U/L
ALT SERPL W/O P-5'-P-CCNC: 22 U/L
ALT SERPL W/O P-5'-P-CCNC: 22 U/L
ANION GAP SERPL CALC-SCNC: 10 MMOL/L
ANION GAP SERPL CALC-SCNC: 10 MMOL/L
AST SERPL-CCNC: 16 U/L
AST SERPL-CCNC: 16 U/L
BASOPHILS # BLD AUTO: 0.03 K/UL
BASOPHILS # BLD AUTO: 0.03 K/UL
BASOPHILS NFR BLD: 0.4 %
BASOPHILS NFR BLD: 0.4 %
BILIRUB SERPL-MCNC: 0.1 MG/DL
BILIRUB SERPL-MCNC: 0.1 MG/DL
BUN SERPL-MCNC: 11 MG/DL
BUN SERPL-MCNC: 11 MG/DL
CALCIUM SERPL-MCNC: 8.9 MG/DL
CALCIUM SERPL-MCNC: 8.9 MG/DL
CHLORIDE SERPL-SCNC: 102 MMOL/L
CHLORIDE SERPL-SCNC: 102 MMOL/L
CO2 SERPL-SCNC: 27 MMOL/L
CO2 SERPL-SCNC: 27 MMOL/L
CREAT SERPL-MCNC: 1.3 MG/DL
CREAT SERPL-MCNC: 1.3 MG/DL
DIFFERENTIAL METHOD: NORMAL
DIFFERENTIAL METHOD: NORMAL
EOSINOPHIL # BLD AUTO: 0.2 K/UL
EOSINOPHIL # BLD AUTO: 0.2 K/UL
EOSINOPHIL NFR BLD: 3.3 %
EOSINOPHIL NFR BLD: 3.3 %
ERYTHROCYTE [DISTWIDTH] IN BLOOD BY AUTOMATED COUNT: 13.9 %
ERYTHROCYTE [DISTWIDTH] IN BLOOD BY AUTOMATED COUNT: 13.9 %
EST. GFR  (AFRICAN AMERICAN): 55 ML/MIN/1.73 M^2
EST. GFR  (AFRICAN AMERICAN): 55 ML/MIN/1.73 M^2
EST. GFR  (NON AFRICAN AMERICAN): 48 ML/MIN/1.73 M^2
EST. GFR  (NON AFRICAN AMERICAN): 48 ML/MIN/1.73 M^2
GLUCOSE SERPL-MCNC: 101 MG/DL
GLUCOSE SERPL-MCNC: 101 MG/DL
HCT VFR BLD AUTO: 37.4 %
HCT VFR BLD AUTO: 37.4 %
HGB BLD-MCNC: 12.2 G/DL
HGB BLD-MCNC: 12.2 G/DL
LYMPHOCYTES # BLD AUTO: 3.3 K/UL
LYMPHOCYTES # BLD AUTO: 3.3 K/UL
LYMPHOCYTES NFR BLD: 47.8 %
LYMPHOCYTES NFR BLD: 47.8 %
MAGNESIUM SERPL-MCNC: 1.8 MG/DL
MCH RBC QN AUTO: 28.4 PG
MCH RBC QN AUTO: 28.4 PG
MCHC RBC AUTO-ENTMCNC: 32.6 G/DL
MCHC RBC AUTO-ENTMCNC: 32.6 G/DL
MCV RBC AUTO: 87 FL
MCV RBC AUTO: 87 FL
MONOCYTES # BLD AUTO: 0.7 K/UL
MONOCYTES # BLD AUTO: 0.7 K/UL
MONOCYTES NFR BLD: 10 %
MONOCYTES NFR BLD: 10 %
NEUTROPHILS # BLD AUTO: 2.7 K/UL
NEUTROPHILS # BLD AUTO: 2.7 K/UL
NEUTROPHILS NFR BLD: 38.4 %
NEUTROPHILS NFR BLD: 38.4 %
PHOSPHATE SERPL-MCNC: 3 MG/DL
PLATELET # BLD AUTO: 323 K/UL
PLATELET # BLD AUTO: 323 K/UL
PMV BLD AUTO: 10.9 FL
PMV BLD AUTO: 10.9 FL
POTASSIUM SERPL-SCNC: 3.4 MMOL/L
POTASSIUM SERPL-SCNC: 3.4 MMOL/L
PROT SERPL-MCNC: 6.6 G/DL
PROT SERPL-MCNC: 6.6 G/DL
RBC # BLD AUTO: 4.3 M/UL
RBC # BLD AUTO: 4.3 M/UL
SODIUM SERPL-SCNC: 139 MMOL/L
SODIUM SERPL-SCNC: 139 MMOL/L
WBC # BLD AUTO: 6.91 K/UL
WBC # BLD AUTO: 6.91 K/UL

## 2019-01-05 PROCEDURE — 25000003 PHARM REV CODE 250: Performed by: PHYSICIAN ASSISTANT

## 2019-01-05 PROCEDURE — 96361 HYDRATE IV INFUSION ADD-ON: CPT

## 2019-01-05 PROCEDURE — G0378 HOSPITAL OBSERVATION PER HR: HCPCS

## 2019-01-05 PROCEDURE — 80053 COMPREHEN METABOLIC PANEL: CPT

## 2019-01-05 PROCEDURE — 83735 ASSAY OF MAGNESIUM: CPT

## 2019-01-05 PROCEDURE — 84100 ASSAY OF PHOSPHORUS: CPT

## 2019-01-05 PROCEDURE — 25000003 PHARM REV CODE 250: Performed by: FAMILY MEDICINE

## 2019-01-05 PROCEDURE — 25000003 PHARM REV CODE 250: Performed by: HOSPITALIST

## 2019-01-05 PROCEDURE — 85025 COMPLETE CBC W/AUTO DIFF WBC: CPT

## 2019-01-05 RX ORDER — TRAMADOL HYDROCHLORIDE 50 MG/1
100 TABLET ORAL EVERY 8 HOURS PRN
Status: DISCONTINUED | OUTPATIENT
Start: 2019-01-05 | End: 2019-01-05 | Stop reason: HOSPADM

## 2019-01-05 RX ORDER — SODIUM CHLORIDE 0.9 % (FLUSH) 0.9 %
3 SYRINGE (ML) INJECTION
Status: DISCONTINUED | OUTPATIENT
Start: 2019-01-05 | End: 2019-01-05 | Stop reason: HOSPADM

## 2019-01-05 RX ORDER — CIPROFLOXACIN 250 MG/1
250 TABLET, FILM COATED ORAL EVERY 12 HOURS
Status: DISCONTINUED | OUTPATIENT
Start: 2019-01-05 | End: 2019-01-05 | Stop reason: HOSPADM

## 2019-01-05 RX ORDER — CIPROFLOXACIN 250 MG/1
250 TABLET, FILM COATED ORAL EVERY 12 HOURS
Qty: 6 TABLET | Refills: 0 | Status: SHIPPED | OUTPATIENT
Start: 2019-01-05 | End: 2019-01-08

## 2019-01-05 RX ORDER — POTASSIUM CHLORIDE 20 MEQ/1
20 TABLET, EXTENDED RELEASE ORAL ONCE
Status: COMPLETED | OUTPATIENT
Start: 2019-01-05 | End: 2019-01-05

## 2019-01-05 RX ORDER — ACETAMINOPHEN 500 MG
500 TABLET ORAL EVERY 6 HOURS PRN
Refills: 0 | COMMUNITY
Start: 2019-01-05 | End: 2019-04-25 | Stop reason: SDUPTHER

## 2019-01-05 RX ORDER — BUTALBITAL, ACETAMINOPHEN AND CAFFEINE 50; 325; 40 MG/1; MG/1; MG/1
1 TABLET ORAL EVERY 4 HOURS PRN
Status: DISCONTINUED | OUTPATIENT
Start: 2019-01-05 | End: 2019-01-05 | Stop reason: HOSPADM

## 2019-01-05 RX ADMIN — DULOXETINE HYDROCHLORIDE 60 MG: 30 CAPSULE, DELAYED RELEASE ORAL at 08:01

## 2019-01-05 RX ADMIN — BUTALBITAL, ACETAMINOPHEN, AND CAFFEINE 1 TABLET: 50; 325; 40 TABLET ORAL at 12:01

## 2019-01-05 RX ADMIN — SODIUM CHLORIDE: 0.9 INJECTION, SOLUTION INTRAVENOUS at 08:01

## 2019-01-05 RX ADMIN — POTASSIUM CHLORIDE 20 MEQ: 20 TABLET, EXTENDED RELEASE ORAL at 12:01

## 2019-01-05 RX ADMIN — GEMFIBROZIL 600 MG: 600 TABLET ORAL at 08:01

## 2019-01-05 RX ADMIN — AMLODIPINE BESYLATE 5 MG: 5 TABLET ORAL at 08:01

## 2019-01-05 RX ADMIN — GABAPENTIN 600 MG: 300 CAPSULE ORAL at 08:01

## 2019-01-05 RX ADMIN — CIPROFLOXACIN HYDROCHLORIDE 250 MG: 250 TABLET, FILM COATED ORAL at 12:01

## 2019-01-05 RX ADMIN — TRAMADOL HYDROCHLORIDE 100 MG: 50 TABLET, COATED ORAL at 08:01

## 2019-01-05 RX ADMIN — CLONAZEPAM 1 MG: 0.5 TABLET ORAL at 08:01

## 2019-01-05 RX ADMIN — CHLORTHALIDONE 25 MG: 25 TABLET ORAL at 09:01

## 2019-01-05 RX ADMIN — BUPROPION HYDROCHLORIDE 300 MG: 150 TABLET, FILM COATED, EXTENDED RELEASE ORAL at 09:01

## 2019-01-05 RX ADMIN — TIZANIDINE 6 MG: 2 TABLET ORAL at 09:01

## 2019-01-05 RX ADMIN — GABAPENTIN 600 MG: 300 CAPSULE ORAL at 03:01

## 2019-01-05 RX ADMIN — PANTOPRAZOLE SODIUM 40 MG: 40 TABLET, DELAYED RELEASE ORAL at 08:01

## 2019-01-05 RX ADMIN — GEMFIBROZIL 600 MG: 600 TABLET ORAL at 03:01

## 2019-01-05 NOTE — PLAN OF CARE
Problem: Adult Inpatient Plan of Care  Goal: Plan of Care Review  Outcome: Outcome(s) achieved Date Met: 01/05/19  Patient safety maintained throughout this shift, IV out, tele box off and returned, pt is being discharged to home, pt verbalizes understanding of discharge instructions, follow up visits and new prescriptions, pt states that she's @ 5/10 on pain level but feels good, IV fluids stopped, pt in stable condition, @ 1735 pt departed unit in stable condition via wheelchair

## 2019-01-05 NOTE — NURSING
Patient resting quietly, lying in bed asleep, no c/o pain or discomfort or distress, pt states pain 5/10 on pain scale, will continue to monitor

## 2019-01-05 NOTE — H&P
[facility] Medicine  History & Physical    Patient Name: Sherie Reyes  MRN: [id number]  Admission Date: 2019  Attending Physician: Dr. Connor  Primary Care Provider: Tonia Bernal DO         Patient information was obtained from patient and ER records.     Subjective:     Principal Problem:Acute nonintractable headache    Chief Complaint:   Chief Complaint   Patient presents with    Headache     pt c/o headache behind right ear onset x 2 days ago. pt reports painful to speak and turn neck when headache began. Pt reports she called her PCP and was advised to be seen in ED         HPI: 53 yr old pleasant white female with PTSD, GERD, anxiety, HTN, HLD, IBS, lumbar disc disease, presents to emergency complaining of severe headache bilateral and occipital. Onset 2 days ago and gradually worsening. It is aching and pounding type, 10/10 in severity and no relieving factors. It radiates in her neck. She describes headache as worst headache of her life. Aggravated by neck movements. No fever or chills. She tried home meds and no relief. She contacted her PCP who advised her to come to emergency. On presentation, she has headache and upon CTA head/neck no abnormality found. Upon neurology consultation, it was advised lumbar puncture and patient politely declined. She is admitted to ICu for observation and frequent neuro checks. Of note, she has TASIA and mild UTI.    Past Medical History:   Diagnosis Date    Anxiety     Degenerative joint disease (DJD) of hip     GERD (gastroesophageal reflux disease)     Hyperlipidemia     Hypertension     IBS (irritable bowel syndrome)     Insomnia     Lumbar disc herniation     PTSD (post-traumatic stress disorder)        Past Surgical History:   Procedure Laterality Date    ADENOIDECTOMY      ARTHROSCOPY-SHOULDER WITH SUBACROMIAL DECOMPRESSION  10/20/2017    [facility]     SECTION       DEBRIDEMENT-ROTATOR CUFF-ARTHROSCOPIC Left 10/20/2017    Per[facility] by [facility] OR    RESECTION-ACROMIOCLAVICULAR JOINT-ARTHROSCOPIC  10/20/2017    Performed by [facility] at [facility] OR    TONSILLECTOMY         Review of patient's allergies indicates:  No Known Allergies    No current facility-administered medications on file prior to encounter.      Current Outpatient Medications on File Prior to Encounter   Medication Sig    amLODIPine (NORVASC) 5 MG tablet Take 1 tablet (5 mg total) by mouth 2 (two) times daily.    buPROPion (WELLBUTRIN XL) 300 MG 24 hr tablet Take 300 mg by mouth once daily.    chlorthalidone (HYGROTEN) 25 MG Tab Take one by mouth once a day.    cholecalciferol, vitamin D3, (VITAMIN D3) 10,000 unit Cap Take 10,000 Units by mouth once daily.    clonazePAM (KLONOPIN) 1 MG tablet Take 1 mg by mouth 2 (two) times daily as needed.     cloNIDine (CATAPRES) 0.1 MG tablet Take 1 tablet (0.1 mg total) by mouth every 12 (twelve) hours as needed (only for blood pressure greater than 180/120).    duloxetine (CYMBALTA) 60 MG capsule Take 60 mg by mouth once daily.    gabapentin (NEURONTIN) 300 MG capsule Take 600 mg by mouth 3 (three) times daily.    gemfibrozil (LOPID) 600 MG tablet Take 1 tablet (600 mg total) by mouth 2 (two) times daily before meals.    KURVELO 0.15-0.03 mg per tablet Take 1 tablet by mouth once daily.    linaclotide (LINZESS) 290 mcg Cap Take 1 capsule (290 mcg total) by mouth once daily.    naproxen (NAPROSYN) 500 MG tablet Take 1 tablet (500 mg total) by mouth 2 (two) times daily with meals.    pantoprazole (PROTONIX) 40 MG tablet Take 1 tablet (40 mg total) by mouth once daily.    POLYETHYLENE GLYCOL 3350 (MIRALAX ORAL) Take by mouth as needed.     potassium chloride (KLOR-CON) 20 mEq Pack Take 40 mEq by mouth 2 (two) times daily.    QUEtiapine (SEROQUEL) 100 MG Tab Take 1 tablet by mouth once daily.    tizanidine (ZANAFLEX) 6 mg  capsule Take 6 mg by mouth 2 (two) times daily as needed for Muscle spasms.    traMADol (ULTRAM) 50 mg tablet Take 100 mg by mouth once daily.     Family History     Problem Relation (Age of Onset)    Cancer Maternal Grandfather    Diabetes Father    Hypertension Mother, Father    Hypothyroidism Mother    Stroke Father (46)        Tobacco Use    Smoking status: Former Smoker     Types: Vaping w/o nicotine    Smokeless tobacco: Former User     Quit date: 10/20/2014    Tobacco comment: Vapor cigarettes   Substance and Sexual Activity    Alcohol use: No     Comment: socially    Drug use: No    Sexual activity: Not on file     Review of Systems   Constitutional: Negative.  Negative for activity change, diaphoresis and unexpected weight change.   HENT: Negative.  Negative for congestion, ear discharge, hearing loss, rhinorrhea, sore throat and voice change.    Eyes: Negative.  Negative for pain, discharge and visual disturbance.   Respiratory: Negative.  Negative for chest tightness, shortness of breath and wheezing.    Cardiovascular: Negative.  Negative for chest pain.   Gastrointestinal: Negative.  Negative for abdominal distention, anal bleeding, constipation and nausea.   Endocrine: Negative.  Negative for cold intolerance, polydipsia and polyuria.   Genitourinary: Negative.  Negative for decreased urine volume, difficulty urinating, dysuria, frequency, menstrual problem and vaginal pain.   Musculoskeletal: Positive for arthralgias, back pain, myalgias and neck pain. Negative for gait problem.   Skin: Negative.  Negative for color change, pallor and wound.   Allergic/Immunologic: Negative.  Negative for environmental allergies and immunocompromised state.   Neurological: Positive for headaches. Negative for dizziness, tremors, seizures and speech difficulty.   Hematological: Negative.  Negative for adenopathy. Does not bruise/bleed easily.   Psychiatric/Behavioral: Negative.  Negative for agitation,  confusion, decreased concentration, hallucinations, self-injury and suicidal ideas. The patient is not nervous/anxious.      Objective:     Vital Signs (Most Recent):  Temp: 98.1 °F (36.7 °C) (01/04/19 2135)  Pulse: 80 (01/05/19 0001)  Resp: (!) 24 (01/05/19 0001)  BP: (!) 108/56 (01/05/19 0001)  SpO2: 99 % (01/05/19 0001) Vital Signs (24h Range):  Temp:  [97.8 °F (36.6 °C)-98.1 °F (36.7 °C)] 98.1 °F (36.7 °C)  Pulse:  [68-80] 80  Resp:  [21-25] 24  SpO2:  [98 %-100 %] 99 %  BP: ()/(56-65) 108/56        There is no height or weight on file to calculate BMI.    Physical Exam   Constitutional: She is oriented to person, place, and time. She appears well-developed and well-nourished. No distress.   HENT:   Head: Normocephalic and atraumatic.   Right Ear: External ear normal.   Left Ear: External ear normal.   Nose: Nose normal.   Mouth/Throat: Oropharynx is clear and moist. No oropharyngeal exudate.   Eyes: Conjunctivae and EOM are normal. Pupils are equal, round, and reactive to light. Right eye exhibits no discharge. Left eye exhibits no discharge. No scleral icterus.   Neck: Neck supple. No JVD present. Spinous process tenderness and muscular tenderness present. No neck rigidity. Decreased range of motion present. No tracheal deviation, no edema and no erythema present. No Brudzinski's sign and no Kernig's sign noted. No thyromegaly present.   Cardiovascular: Normal rate, regular rhythm, normal heart sounds and intact distal pulses. Exam reveals no gallop and no friction rub.   No murmur heard.  Pulmonary/Chest: Effort normal and breath sounds normal. No stridor. She has no wheezes. She has no rales. She exhibits no tenderness.   Abdominal: Soft. Bowel sounds are normal. She exhibits no distension and no mass. There is no tenderness. There is no rebound and no guarding. No hernia.   Musculoskeletal: She exhibits tenderness (TTP C SPINE C2-C5 AND PARASPINAL AREA AND TRAPEZIUS. KERNIG AND ESTHERKI NEGATIVE).  She exhibits no edema.   Lymphadenopathy:     She has no cervical adenopathy.   Neurological: She is alert and oriented to person, place, and time. She has normal reflexes. She displays normal reflexes. No cranial nerve deficit or sensory deficit. She exhibits normal muscle tone. Coordination normal.   Skin: Skin is warm and dry. No rash noted. She is not diaphoretic. No erythema. No pallor.   Psychiatric: She has a normal mood and affect. Her behavior is normal. Judgment and thought content normal.         CRANIAL NERVES     CN III, IV, VI   Pupils are equal, round, and reactive to light.  Extraocular motions are normal.       Significant Labs:.  Admission on 01/04/2019   Component Date Value Ref Range Status    WBC 01/04/2019 10.36  3.90 - 12.70 K/uL Final    RBC 01/04/2019 4.68  4.00 - 5.40 M/uL Final    Hemoglobin 01/04/2019 13.5  12.0 - 16.0 g/dL Final    Hematocrit 01/04/2019 40.5  37.0 - 48.5 % Final    MCV 01/04/2019 87  82 - 98 fL Final    MCH 01/04/2019 28.8  27.0 - 31.0 pg Final    MCHC 01/04/2019 33.3  32.0 - 36.0 g/dL Final    RDW 01/04/2019 13.7  11.5 - 14.5 % Final    Platelets 01/04/2019 359* 150 - 350 K/uL Final    MPV 01/04/2019 11.4  9.2 - 12.9 fL Final    Gran # (ANC) 01/04/2019 6.0  1.8 - 7.7 K/uL Final    Lymph # 01/04/2019 3.3  1.0 - 4.8 K/uL Final    Mono # 01/04/2019 0.8  0.3 - 1.0 K/uL Final    Eos # 01/04/2019 0.2  0.0 - 0.5 K/uL Final    Baso # 01/04/2019 0.03  0.00 - 0.20 K/uL Final    Gran% 01/04/2019 58.1  38.0 - 73.0 % Final    Lymph% 01/04/2019 32.2  18.0 - 48.0 % Final    Mono% 01/04/2019 7.5  4.0 - 15.0 % Final    Eosinophil% 01/04/2019 1.7  0.0 - 8.0 % Final    Basophil% 01/04/2019 0.3  0.0 - 1.9 % Final    Differential Method 01/04/2019 Automated   Final    Sodium 01/04/2019 136  136 - 145 mmol/L Final    Potassium 01/04/2019 3.4* 3.5 - 5.1 mmol/L Final    Chloride 01/04/2019 100  95 - 110 mmol/L Final    CO2 01/04/2019 24  23 - 29 mmol/L Final     Glucose 01/04/2019 98  70 - 110 mg/dL Final    BUN, Bld 01/04/2019 10  6 - 20 mg/dL Final    Creatinine 01/04/2019 1.5* 0.5 - 1.4 mg/dL Final    Calcium 01/04/2019 9.8  8.7 - 10.5 mg/dL Final    Total Protein 01/04/2019 7.8  6.0 - 8.4 g/dL Final    Albumin 01/04/2019 3.5  3.5 - 5.2 g/dL Final    Total Bilirubin 01/04/2019 0.3  0.1 - 1.0 mg/dL Final    Comment: For infants and newborns, interpretation of results should be based  on gestational age, weight and in agreement with clinical  observations.  Premature Infant recommended reference ranges:  Up to 24 hours.............<8.0 mg/dL  Up to 48 hours............<12.0 mg/dL  3-5 days..................<15.0 mg/dL  6-29 days.................<15.0 mg/dL      Alkaline Phosphatase 01/04/2019 51* 55 - 135 U/L Final    AST 01/04/2019 21  10 - 40 U/L Final    ALT 01/04/2019 26  10 - 44 U/L Final    Anion Gap 01/04/2019 12  8 - 16 mmol/L Final    eGFR if  01/04/2019 46* >60 mL/min/1.73 m^2 Final    eGFR if non African American 01/04/2019 40* >60 mL/min/1.73 m^2 Final    Comment: Calculation used to obtain the estimated glomerular filtration  rate (eGFR) is the CKD-EPI equation.       Prothrombin Time 01/04/2019 9.5  9.0 - 12.5 sec Final    INR 01/04/2019 0.9  0.8 - 1.2 Final    Comment: Coumadin Therapy:  2.0 - 3.0 for INR for all indicators except mechanical heart valves  and antiphospholipid syndromes which should use 2.5 - 3.5.      Specimen UA 01/04/2019 Urine, Clean Catch   Final    Color, UA 01/04/2019 Orange* Yellow, Straw, Fariha Final    Appearance, UA 01/04/2019 Hazy* Clear Final    pH, UA 01/04/2019 6.0  5.0 - 8.0 Final    Specific Gravity, UA 01/04/2019 >=1.030* 1.005 - 1.030 Final    Protein, UA 01/04/2019 Trace* Negative Final    Comment: Recommend a 24 hour urine protein or a urine   protein/creatinine ratio if globulin induced proteinuria is  clinically suspected.      Glucose, UA 01/04/2019 Negative  Negative Final     Ketones, UA 01/04/2019 Trace* Negative Final    Bilirubin (UA) 01/04/2019 1+* Negative Final    Comment: Positive urine bilirubin is not confirmed. Correlate with   serum bilirubin and clinical presentation.      Occult Blood UA 01/04/2019 Trace* Negative Final    Nitrite, UA 01/04/2019 Negative  Negative Final    Urobilinogen, UA 01/04/2019 Negative  <2.0 EU/dL Final    Leukocytes, UA 01/04/2019 2+* Negative Final    RBC, UA 01/04/2019 10* 0 - 4 /hpf Final    WBC, UA 01/04/2019 50* 0 - 5 /hpf Final    WBC Clumps, UA 01/04/2019 None  None-Rare Final    Bacteria, UA 01/04/2019 Moderate* None-Occ /hpf Final    Microscopic Comment 01/04/2019 SEE COMMENT   Final    Comment: Other formed elements not mentioned in the report are not   present in the microscopic examination.            Significant Imaging: I have reviewed and interpreted all pertinent imaging results/findings within the past 24 hours.     Imaging Results          CTA Head and Neck (xpd) (Final result)  Result time 01/04/19 19:46:03   Procedure changed from CTA Brain     Final result by Deja Vargas MD (01/04/19 19:46:03)                 Impression:      No acute intracranial abnormalities identified.    CTA head and neck without significant focal stenosis, occlusion, or aneurysm.      Electronically signed by: Deja Vargas MD  Date:    01/04/2019  Time:    19:46             Narrative:    EXAMINATION:  CTA HEAD AND NECK (XPD)    CLINICAL HISTORY:  Headache, acute, severe, thunderclap, worst HA of life;    TECHNIQUE:  Non contrast low dose axial images were obtained through the head.  CT angiogram was performed from the level of the myra to the top of the head following the IV administration of 100mL of Omnipaque 350.   Sagittal and coronal reconstructions and maximum intensity projection reconstructions were performed. Arterial stenosis percentages are based on NASCET measurement criteria.    COMPARISON:  CT head and MRI brain from June  2018.    FINDINGS:  No evidence of acute/recent major vascular distribution cerebral infarction, intraparenchymal hemorrhage, or intra-axial space occupying lesion. The ventricular system is normal in size and configuration with no evidence of hydrocephalus. No effacement of the skull-base cisterns. No abnormal extra-axial fluid collections or blood products. The visualized paranasal sinuses and mastoid air cells are clear. The calvarium shows no significant abnormality.    CTA Neck: The origins of the right brachiocephalic, left common carotid and left subclavian arteries from the arch are within normal limits.  The origins of the vertebral arteries are within normal limits.  The vertebral arteries are unremarkable throughout their course without evidence for focal stenosis or occlusion.   The bilateral common carotid arteries and internal carotid arteries are patent without evidence for focal stenosis or occlusion.    Anterior circulation: The bilateral distal cervical, petrous, cavernous, and supraclinoid ICAs are patent without significant stenosis or aneurysm.  The anterior and middle cerebral arteries are patent without significant stenosis, occlusion, or aneurysm.    Posterior circulation: Distal vertebral arteries, basilar artery and posterior cerebral arteries are patent without significant focal stenosis, occlusion, or aneurysm.    Airways: Unremarkable.    Glands/Nodes: The parotid and submandibular are unremarkable.  Few small subcentimeter bilateral thyroid hypodense nodules are seen.    Spine: No acute osseous abnormality identified.    Lungs: Visualized lung apices are clear.                                  Assessment/Plan:     Active Diagnoses:    Diagnosis Date Noted POA    PRINCIPAL PROBLEM:  Acute nonintractable headache [R51] 01/04/2019 Yes    Acute renal insufficiency [N28.9] 01/05/2019 Yes    UTI (urinary tract infection) [N39.0] 01/05/2019 Yes    Anxiety [F41.9] 01/04/2019 Yes    PTSD  (post-traumatic stress disorder) [F43.10] 01/04/2019 Yes    Essential hypertension [I10] 01/19/2017 Yes    Mixed hyperlipidemia [E78.2] 01/19/2017 Yes    Primary insomnia [F51.01] 01/04/2017 Yes      Problems Resolved During this Admission:     VTE Risk Mitigation (From admission, onward)    None          Admit to Hospital Medicine - ICU    1. Acute headache  -r/o ICH vs spinal etiology  -LP may be attempted in AM  -neuro consult    2. HTN  -continue home meds    3. PTSD/anxiety  -stable  -continue home meds    4. Prophylaxis  -SCD, PPI    5. TASIA/UTI  -fluids and abx    Spent adequate time in obtaining history and explaining differentials          Pradip Hays MD  Department of Hospital Medicine   [facility]

## 2019-01-05 NOTE — ED NOTES
Pt ambulated to bathroom with a steady gait. Pt used personal cane for assistance. Pt denies any needs at this time. Will continue to monitor.

## 2019-01-05 NOTE — ED NOTES
"Report received from MABLE Mendoza. Assumed care of pt at this time. Pt aaox3. rr even and unlabored on ra. Pt reports "My headache is better after I ate some dinner." pt denies cp, sob, n/v, abdominal pain, bladder or bowel issues at this time. Pt connected to cardiac monitor. NS infusing at 125cc/hr. Pt denies any needs at this time. Call light within reach. Will continue to monitor.    "

## 2019-01-05 NOTE — DISCHARGE INSTRUCTIONS
Urinary Tract Infections in Women (English) View Edit Remove   Headache, Unspecified (English) View Edit Remove   Dehydration (Adult) (English) View Edit Remove   Acetaminophen tablets or caplets (English) View Edit Remove   Ciprofloxacin tablets (English) View Edit Remove

## 2019-01-05 NOTE — PLAN OF CARE
Discharge orders noted, no HH or HME ordered.    Future Appointments   Date Time Provider Department Center   1/9/2019 11:00 AM Tonia Bernal DO [facility] [place]       Pt's nurse will go over medications/signs and symptoms prior to discharge       01/05/19 1633   Final Note   Assessment Type Final Discharge Note   Anticipated Discharge Disposition Home   What phone number can be called within the next 1-3 days to see how you are doing after discharge? [phone]   Hospital Follow Up  Appt(s) scheduled? No  (Offices closed for weekend. Patient to schedule own follow up appointment.)   Right Care Referral Info   Post Acute Recommendation No Care     Samira Simms, RN Transitional Navigator  [phone]

## 2019-01-05 NOTE — CONSULTS
"[facility] Consult    Reason for Consult: headache, concern for SAH     Information obtained from: patient    Chief Complaint: headache    HPI: 53F with h/o HTN, HLD, IBS, anxiety, GERD, PTSD, lumbar disc herniation who presented to the ER after having the "worst headache of her life" 2 days ago. Pt was at home when she stood up and felt suddenly dizzy. Head pains started at the left temporal area and then radiated through the occiptal area and bialterally. She says pain was worse with neck movement. Not alleviated with naproxen or her home robaxin (which she takes for back spasms). No visual changes, weakness, dysarthria or facial droop noted when this happened. Baseline has baseline weakness on the right leg 2/2 lumbar disc disease. She is currently in the process of a workmen's comp workup for this. She walks with a cane at [facility]. She has no history of migraines.     At time of exam headache has mostly resolved. Pt reports generalized fatigue.     PMH:   Past Medical History:   Diagnosis Date    Anxiety     Degenerative joint disease (DJD) of hip     GERD (gastroesophageal reflux disease)     Hyperlipidemia     Hypertension     IBS (irritable bowel syndrome)     Insomnia     Lumbar disc herniation     PTSD (post-traumatic stress disorder)        PSH:   Past Surgical History:   Procedure Laterality Date    ADENOIDECTOMY      ARTHROSCOPY-SHOULDER WITH SUBACROMIAL DECOMPRESSION  10/20/2017    [facility]     SECTION      DEBRIDEMENT-ROTATOR CUFF-ARTHROSCOPIC Left 10/20/2017    Per[facility]    RESECTION-ACROMIOCLAVICULAR JOINT-ARTHROSCOPIC  10/20/2017    [facility] at [facility] OR    TONSILLECTOMY         FH: The patient has a family history of hemorrhagic stroke in her father.     SH:   Social History     Socioeconomic History    Marital status:      Spouse name: Not on file    Number " of children: Not on file    Years of education: Not on file    Highest education level: Not on file   Social Needs    Financial resource strain: Not on file    Food insecurity - worry: Not on file    Food insecurity - inability: Not on file    Transportation needs - medical: Not on file    Transportation needs - non-medical: Not on file   Occupational History    Not on file   Tobacco Use    Smoking status: Former Smoker     Types: Vaping w/o nicotine    Smokeless tobacco: Former User     Quit date: 10/20/2014    Tobacco comment: Vapor cigarettes   Substance and Sexual Activity    Alcohol use: No     Comment: socially    Drug use: No    Sexual activity: Not on file   Other Topics Concern    Not on file   Social History Narrative    Not on file     Home meds:    amLODIPine  5 mg Oral BID    buPROPion  300 mg Oral Daily    chlorthalidone  25 mg Oral Daily    DULoxetine  60 mg Oral Daily    gabapentin  600 mg Oral TID    gemfibrozil  600 mg Oral BID AC    pantoprazole  40 mg Oral Daily    potassium chloride  20 mEq Oral Once    QUEtiapine  100 mg Oral Daily       ________________________  Vitals:    01/05/19 1035   BP: (!) 105/52   Pulse: 97   Resp: 20   Temp: 98.5 °F (36.9 °C)       NEUROLOGICAL EXAM  Mental Status  Orientation: alert and oriented to person, place, time and situation, memory intact  Language: good fluency, no aphasia or dysarthria    Cranial Nerves  Visual acuity grossly intact, PERRLA, EOMI, V1-V3 subjectively intact, smile symmetric, pt closes eyes symmetrically, hearing grossly intact, uvula midline, SCM and trapezius 5/5  Bilaterally, tongue protrudes midline    Motor  Normal tone and bulk. No fasciculations.  LUE 5/5  LLE 4+/5  RUE 5/5  RLE 4/5  DTRs 2+  Throughout    Sensory  Light touch, vibration, pinprick intact throughout    Cerebellar  Finger to nose intact bilaterally  Heel to shin intact bilaterally    Gait  Walks with a cane for assistance.     IMAGING  Imaging  Results          CT Head Without Contrast (Final result)  Result time 01/05/19 06:33:50    Final result by Vickie Kelly MD (01/05/19 06:33:50)                 Impression:      No CT evidence of acute intracranial abnormality. If the patient's headaches are sufficiently clinically suspicious, or associated with signs of elevated ICP, focal neurologic deficits, nausea, or vomiting, further evaluation with can be performed if there are no clinical contraindications.      Electronically signed by: Vickie Kelly MD  Date:    01/05/2019  Time:    06:33             Narrative:    EXAMINATION:  CT HEAD WITHOUT CONTRAST    CLINICAL HISTORY:  Headache, acute, severe, thunderclap, worst HA of life;    TECHNIQUE:  Low dose axial images were obtained through the head.  Coronal and sagittal reformations were also performed. Contrast was not administered.    COMPARISON:  CTA head and neck 01/04/2019, head CT and MRI brain 06/11/2018    FINDINGS:  There is no acute intracranial hemorrhage, hydrocephalus, midline shift or mass effect. Gray-white matter differentiation appears maintained. The basal cisterns are patent. The mastoid air cells and paranasal sinuses are clear of acute process. The visualized bones of the calvarium demonstrate no acute osseous abnormality.                               CTA Head and Neck (xpd) (Final result)  Result time 01/04/19 19:46:03   Procedure changed from CTA Brain     Final result by Deja Vargas MD (01/04/19 19:46:03)                 Impression:      No acute intracranial abnormalities identified.    CTA head and neck without significant focal stenosis, occlusion, or aneurysm.      Electronically signed by: Deja Vargas MD  Date:    01/04/2019  Time:    19:46             Narrative:    EXAMINATION:  CTA HEAD AND NECK (XPD)    CLINICAL HISTORY:  Headache, acute, severe, thunderclap, worst HA of life;    TECHNIQUE:  Non contrast low dose axial images were obtained through the head.  CT  angiogram was performed from the level of the myra to the top of the head following the IV administration of 100mL of Omnipaque 350.   Sagittal and coronal reconstructions and maximum intensity projection reconstructions were performed. Arterial stenosis percentages are based on NASCET measurement criteria.    COMPARISON:  CT head and MRI brain from June 2018.    FINDINGS:  No evidence of acute/recent major vascular distribution cerebral infarction, intraparenchymal hemorrhage, or intra-axial space occupying lesion. The ventricular system is normal in size and configuration with no evidence of hydrocephalus. No effacement of the skull-base cisterns. No abnormal extra-axial fluid collections or blood products. The visualized paranasal sinuses and mastoid air cells are clear. The calvarium shows no significant abnormality.    CTA Neck: The origins of the right brachiocephalic, left common carotid and left subclavian arteries from the arch are within normal limits.  The origins of the vertebral arteries are within normal limits.  The vertebral arteries are unremarkable throughout their course without evidence for focal stenosis or occlusion.   The bilateral common carotid arteries and internal carotid arteries are patent without evidence for focal stenosis or occlusion.    Anterior circulation: The bilateral distal cervical, petrous, cavernous, and supraclinoid ICAs are patent without significant stenosis or aneurysm.  The anterior and middle cerebral arteries are patent without significant stenosis, occlusion, or aneurysm.    Posterior circulation: Distal vertebral arteries, basilar artery and posterior cerebral arteries are patent without significant focal stenosis, occlusion, or aneurysm.    Airways: Unremarkable.    Glands/Nodes: The parotid and submandibular are unremarkable.  Few small subcentimeter bilateral thyroid hypodense nodules are seen.    Spine: No acute osseous abnormality identified.    Lungs:  "Visualized lung apices are clear.                                ___________________________________________  ASSESSMENT   53F with past medical history of TN, HLD, IBS, anxiety, GERD, PTSD, lumbar disc herniation who presented to the ER after having the "worst headache of her life" 2 days ago.     RECOMMENDATIONS:  - CT head and CTA unremarkable 2 days after onset of headache  - headache has largely resolved; given time course SAH unlikely  - UA positive for UTI; there is likely an element of dehydration in conjunction with her back spasms that have attributed to her headache  - s/p IVF, tylenol and metoclopromide with improvement  - no further imaging recommended at this time  - recommend she follow-up with her neurologist as an outpatient at discharge      Case discussed with staff. Will sign off. Thank you for the consult.    Madelaine Bailey MD  [facility], PGY-3            "

## 2019-01-05 NOTE — ED NOTES
Report received from MABLE Perkins. Pt is awake, alert. C/o continued frontal headache and pain to the top of her head, rated 7/10. Pt states pain mildly improved with tylenol earlier. Reports she normally takes tizanidine and tramadol for her chronic back pain, but has not taken these since pta. No neurologic deficits noted. Respirations even, unlabored. Will continue to monitor closely.

## 2019-01-05 NOTE — HPI
53 yr old pleasant white female with PTSD, GERD, anxiety, HTN, HLD, IBS, lumbar disc disease, presents to emergency complaining of severe headache bilateral and occipital. Onset 2 days ago and gradually worsening. It is aching and pounding type, 10/10 in severity and no relieving factors. It radiates in her neck. She describes headache as worst headache of her life. Aggravated by neck movements. No fever or chills. She tried home meds and no relief. She contacted her PCP who advised her to come to emergency. On presentation, she has headache and upon CTA head/neck no abnormality found. Upon neurology consultation, it was advised lumbar puncture and patient politely declined. She is admitted to [facility] for observation in the CDU. Of note, she has TASIA and mild UTI.

## 2019-01-05 NOTE — ED NOTES
Patient in bed eating [employer] at this time talking and laughing with family; however still states her headache is a 7/10.

## 2019-01-05 NOTE — HOSPITAL COURSE
Patient admitted to CDU for observation. She was given continuous IV fluids, fioricet, metoclopramide and ciprofloxacin for UTI. Potassium was mildly low and was replaced orally. Neurology was consulted and felt headache likely to be related to dehydration and back spasms given negative workup and time course. Recommended outpatient neurology follow-up. Patient reported improvement in headache. Hemodynamically stable and ready for discharge. She was discharged to home in good condition with ciprofloxacin 250 mg PO BID x 3 days and will follow-up with her Neurologist.

## 2019-01-05 NOTE — PLAN OF CARE
"[facility] Plan of Care Note    54 yo F came to the ED and endorsed that two days ago she woke up with "worst headache" of her life. No reported h/o migraines. OTC medications did not help headache. Initially it was 12/10 now 7/10; she was sent to the ER by PCP. CTA head and neck unremarkable. Refusing LP. No leukocytosis or fever to suggest meningitis. No neuro deficits.    - Q1h neuro checks overnight  - CTH in AM or with any change in neuro status  - Maintain normotension  - Control headache symptomatically, can use fioricet  - if agrees, proceed with LP  - full not to follow in AM    Misha Caban MD  [facility] PGY 2  "

## 2019-01-06 LAB — BACTERIA UR CULT: NORMAL

## 2019-01-06 NOTE — DISCHARGE SUMMARY
[facility]  Discharge Summary    Patient Name: Sherie Reyes  MRN: [id number]  Admission Date: 1/4/2019  Hospital Length of Stay: 1 days  Discharge Date and Time: 1/5/2019  5:35 PM  Attending Physician: Tien Donahue MD  Discharging Provider: Miryam Sanchez PA-C  Primary Care Provider: Tonia Bernal DO      HPI:   53 yr old pleasant white female with PTSD, GERD, anxiety, HTN, HLD, IBS, lumbar disc disease, presents to emergency complaining of severe headache bilateral and occipital. Onset 2 days ago and gradually worsening. It is aching and pounding type, 10/10 in severity and no relieving factors. It radiates in her neck. She describes headache as worst headache of her life. Aggravated by neck movements. No fever or chills. She tried home meds and no relief. She contacted her PCP who advised her to come to emergency. On presentation, she has headache and upon CTA head/neck no abnormality found. Upon neurology consultation, it was advised lumbar puncture and patient politely declined. She is admitted to [facility] for observation in the CDU. Of note, she has TASIA and mild UTI.    * No surgery found *      Hospital Course:   Patient admitted to CDU for observation. She was given continuous IV fluids, fioricet, metoclopramide and ciprofloxacin for UTI. Potassium was mildly low and was replaced orally. Neurology was consulted and felt headache likely to be related to dehydration and back spasms given negative workup and time course. Recommended outpatient neurology follow-up. Patient reported improvement in headache. Hemodynamically stable and ready for discharge. She was discharged to home in good condition with ciprofloxacin 250 mg PO BID x 3 days and will follow-up with her Neurologist.     Consults:     No new Assessment & Plan notes have been filed under this hospital service since the last note was generated.  Service: Hospital Medicine    Final  Active Diagnoses:    Diagnosis Date Noted POA    Anxiety [F41.9] 01/04/2019 Yes    PTSD (post-traumatic stress disorder) [F43.10] 01/04/2019 Yes    Essential hypertension [I10] 01/19/2017 Yes    Mixed hyperlipidemia [E78.2] 01/19/2017 Yes    Primary insomnia [F51.01] 01/04/2017 Yes      Problems Resolved During this Admission:    Diagnosis Date Noted Date Resolved POA    PRINCIPAL PROBLEM:  Acute nonintractable headache [R51] 01/04/2019 01/05/2019 Yes    Acute renal insufficiency [N28.9] 01/05/2019 01/05/2019 Yes    UTI (urinary tract infection) [N39.0] 01/05/2019 01/05/2019 Yes       Discharged Condition: good    Disposition: Home or Self Care    Follow Up:  Follow-up Information     Tonia Bernal DO On 1/9/2019.    Specialty:  Internal Medicine  Why:  Hospital follow-up / @ 11:00 am  Contact information:  [address] [place] [address]  [phone]             Schedule an appointment as soon as possible for a visit with Neurology - Dr. Angeles.    Why:  Hospital follow-up / Offices closed for weekend. Patient to schedule own follow up appointment.           [facility] - Neurology.    Specialty:  Neurology  Contact information:  [address] [place] [address] [phone]  Additional information:  [address], [address]           [facility] - NEUROLOGY.    Specialty:  Neurology  Contact information:  [address] [place] [id number] [phone]                 Patient Instructions:      Diet Adult Regular     Notify your health care provider if you experience any of the following:  severe persistent headache     Notify your health care provider if you experience any of the following:  increased confusion or weakness     Notify your health care provider if you experience any of the following:  persistent dizziness, light-headedness, or visual disturbances     Activity as tolerated       Significant Diagnostic Studies: Labs: All labs within the  past 24 hours have been reviewed    Pending Diagnostic Studies:     None         Medications:  Reconciled Home Medications:      Medication List      START taking these medications    acetaminophen 500 MG tablet  Commonly known as:  TYLENOL  Take 1 tablet (500 mg total) by mouth every 6 (six) hours as needed for Pain (Headache).     ciprofloxacin HCl 250 MG tablet  Commonly known as:  CIPRO  Take 1 tablet (250 mg total) by mouth every 12 (twelve) hours. for 3 days        CONTINUE taking these medications    amLODIPine 5 MG tablet  Commonly known as:  NORVASC  Take 1 tablet (5 mg total) by mouth 2 (two) times daily.     buPROPion 300 MG 24 hr tablet  Commonly known as:  WELLBUTRIN XL  Take 300 mg by mouth once daily.     chlorthalidone 25 MG Tab  Commonly known as:  HYGROTEN  Take one by mouth once a day.     cholecalciferol (vitamin D3) 10,000 unit Cap  Commonly known as:  VITAMIN D3  Take 10,000 Units by mouth once daily.     clonazePAM 1 MG tablet  Commonly known as:  KLONOPIN  Take 1 mg by mouth 2 (two) times daily.     cloNIDine 0.1 MG tablet  Commonly known as:  CATAPRES  Take 1 tablet (0.1 mg total) by mouth every 12 (twelve) hours as needed (only for blood pressure greater than 180/120).     DULoxetine 60 MG capsule  Commonly known as:  CYMBALTA  Take 60 mg by mouth once daily.     gabapentin 300 MG capsule  Commonly known as:  NEURONTIN  Take 600 mg by mouth 3 (three) times daily.     gemfibrozil 600 MG tablet  Commonly known as:  LOPID  Take 1 tablet (600 mg total) by mouth 2 (two) times daily before meals.     KURVELO 0.15-0.03 mg per tablet  Generic drug:  levonorgestrel-ethinyl estradiol  Take 1 tablet by mouth once daily.     linaclotide 290 mcg Cap  Commonly known as:  LINZESS  Take 1 capsule (290 mcg total) by mouth once daily.     MIRALAX ORAL  Take by mouth as needed.     naproxen 500 MG tablet  Commonly known as:  NAPROSYN  Take 1 tablet (500 mg total) by mouth 2 (two) times daily with meals.      pantoprazole 40 MG tablet  Commonly known as:  PROTONIX  Take 1 tablet (40 mg total) by mouth once daily.     potassium chloride 20 mEq Pack  Commonly known as:  KLOR-CON  Take 40 mEq by mouth 2 (two) times daily.     QUEtiapine 100 MG Tab  Commonly known as:  SEROQUEL  Take 1 tablet by mouth once daily.     tiZANidine 6 mg capsule  Commonly known as:  ZANAFLEX  Take 6 mg by mouth 2 (two) times daily as needed for Muscle spasms.     traMADol 50 mg tablet  Commonly known as:  ULTRAM  Take 100 mg by mouth once daily.            Indwelling Lines/Drains at time of discharge:   Lines/Drains/Airways                     Time spent on the discharge of patient: 35 minutes  Patient was seen and examined on the date of discharge and determined to be suitable for discharge.    PORTER Tierney MD  [facility]  MD Sacha Wang MD Ijeoma Innocent-Ituah, MD Fadi Hawawini, MD Elizabeth Knipp, PA-C Brittany Chatman, NP Kristin Stein, PA-C Arekeva Selmon, NP-C  [address] [place] [address]  Office Phone: [phone]  Office Fax: [phone]

## 2019-01-08 ENCOUNTER — PATIENT OUTREACH (OUTPATIENT)
Dept: OTHER | Facility: OTHER | Age: 54
End: 2019-01-08

## 2019-01-08 NOTE — PROGRESS NOTES
Subjective:       Patient ID: Sherie Reyes is a 53 y.o. female.    Chief Complaint: Follow-up (ER visit)    Patient is a 53 y.o.female who presents today for hospital follow up. She was hospitalized on 1/4 for headache. She was placed in observation. Neuro was consulted and felt that headache was due to deydration and back spasms.   Pt had a normal CTA. Neuro advised lumbar puncture but pt refused. She was discharged with cipro for a mild uti and advised to stay well hydrated.    HA: located behind her ears; the pain does radiate down her neck at times. Then, pain became generalized. Pain did wake her up from sleep. She took tylenol and it did not help. Therefore, she went to the Er for evaluation. Her BP has been well controlled at home. Pt worried she is having trigeminal neuralgia.    She is scheduled to go get a CT neck and back this week.     Review of Systems   Constitutional: Negative for appetite change, chills, diaphoresis, fatigue and fever.   HENT: Negative for congestion, dental problem, ear discharge, ear pain, hearing loss, postnasal drip, sinus pressure and sore throat.    Eyes: Negative for discharge, redness and itching.   Respiratory: Negative for cough, chest tightness, shortness of breath and wheezing.    Cardiovascular: Negative for chest pain, palpitations and leg swelling.   Gastrointestinal: Negative for abdominal pain, constipation, diarrhea, nausea and vomiting.   Endocrine: Negative for cold intolerance and heat intolerance.   Genitourinary: Negative for difficulty urinating, frequency, hematuria and urgency.   Musculoskeletal: Negative for arthralgias, back pain, gait problem, myalgias and neck pain.   Skin: Negative for color change and rash.   Neurological: Negative for dizziness, syncope and headaches.   Hematological: Negative for adenopathy.   Psychiatric/Behavioral: Negative for behavioral problems and sleep disturbance. The patient is not nervous/anxious.        Objective:       Physical Exam   Constitutional: She is oriented to person, place, and time. She appears well-developed and well-nourished. No distress.   HENT:   Head: Normocephalic and atraumatic.   Right Ear: External ear normal.   Left Ear: External ear normal.   Nose: Nose normal.   Mouth/Throat: Oropharynx is clear and moist. No oropharyngeal exudate.   Eyes: Conjunctivae and EOM are normal. Pupils are equal, round, and reactive to light. Right eye exhibits no discharge. Left eye exhibits no discharge. No scleral icterus.   Neck: Normal range of motion. Neck supple. No JVD present. No thyromegaly present.   Cardiovascular: Normal rate, regular rhythm, normal heart sounds and intact distal pulses. Exam reveals no gallop and no friction rub.   No murmur heard.  Pulmonary/Chest: Effort normal and breath sounds normal. No stridor. No respiratory distress. She has no wheezes. She has no rales. She exhibits no tenderness.   Abdominal: Soft. Bowel sounds are normal. She exhibits no distension. There is no tenderness. There is no rebound.   Musculoskeletal: Normal range of motion. She exhibits no edema or tenderness.   Lymphadenopathy:     She has no cervical adenopathy.   Neurological: She is alert and oriented to person, place, and time. No cranial nerve deficit.   Skin: Skin is warm and dry. No rash noted. She is not diaphoretic. No erythema.   Psychiatric: She has a normal mood and affect. Her behavior is normal.   Nursing note and vitals reviewed.      Assessment and Plan:       1. Nonintractable headache, unspecified chronicity pattern, unspecified headache type  - Sedimentation rate; Future  - C-reactive protein; Future  - CBC auto differential; Future  - Ambulatory Referral to Neurology    2. Urinary tract infection without hematuria, site unspecified  - Urine culture; Future    3. Hypokalemia  - Comprehensive metabolic panel; Future    4. Essential hypertension  - stable on meds  - amLODIPine (NORVASC) 5 MG tablet; Take  1 tablet (5 mg total) by mouth 2 (two) times daily.  Dispense: 180 tablet; Refill: 3          No Follow-up on file.

## 2019-01-08 NOTE — PROGRESS NOTES
Last 5 Patient Entered Readings                                      Current 30 Day Average: 130/77     Recent Readings 1/8/2019 1/7/2019 1/6/2019 1/6/2019 1/6/2019    SBP (mmHg) 127 175 162 150 154    DBP (mmHg) 77 102 97 102 94    Pulse 109 107 118 111 116        Digital Medicine: Health  Follow Up    Lifestyle Modifications:    1.Dietary Modifications (Sodium intake <2,000mg/day, food labels, dining out): Patient states she was in the hospital on 1/4 due to dehydration, headache, and UTI.  Encouraged patient to stay hydrated.  States she drinks 7 bottles of water per day.    2.Physical Activity: deferred    3.Medication Therapy: Patient has been compliant with the medication regimen.    4.Patient has the following medication side effects/concerns: none  (Frequency/Alleviating factors/Precipitating factors, etc.)     Follow up with Ms. Sherie Posey Amy completed. No further questions or concerns. Will continue to follow up to achieve health goals.    States she is trying to get some rest since being in the hospital.  Unsure why BP is fluctuating.  States her BP was high last night and she remembered to take her BP medicine.  Waited an hour and took BP and it went back down.  Encouraged patient to take medication consistently.

## 2019-01-09 NOTE — PROGRESS NOTES
"Last 5 Patient Entered Readings                                      Current 30 Day Average: 130/78     Recent Readings 1/9/2019 1/8/2019 1/8/2019 1/8/2019 1/8/2019    SBP (mmHg) 143 151 168 176 127    DBP (mmHg) 95 102 104 107 77    Pulse 101 109 104 108 109          Chart reviewed due to upward trend of BP. Patient recently admitted, "She was given continuous IV fluids, fioricet, metoclopramide and ciprofloxacin for UTI. Potassium was mildly low and was replaced orally. Neurology was consulted and felt headache likely to be related to dehydration and back spasms given negative workup and time course" She has follow up with Dr. Bernal 1/15.      "

## 2019-01-15 ENCOUNTER — LAB VISIT (OUTPATIENT)
Dept: LAB | Facility: HOSPITAL | Age: 54
End: 2019-01-15
Attending: INTERNAL MEDICINE
Payer: MEDICAID

## 2019-01-15 ENCOUNTER — PATIENT MESSAGE (OUTPATIENT)
Dept: INTERNAL MEDICINE | Facility: CLINIC | Age: 54
End: 2019-01-15

## 2019-01-15 ENCOUNTER — OFFICE VISIT (OUTPATIENT)
Dept: INTERNAL MEDICINE | Facility: CLINIC | Age: 54
End: 2019-01-15
Payer: MEDICAID

## 2019-01-15 VITALS
WEIGHT: 180.13 LBS | TEMPERATURE: 99 F | HEIGHT: 62 IN | OXYGEN SATURATION: 98 % | RESPIRATION RATE: 18 BRPM | SYSTOLIC BLOOD PRESSURE: 119 MMHG | DIASTOLIC BLOOD PRESSURE: 85 MMHG | HEART RATE: 94 BPM | BODY MASS INDEX: 33.15 KG/M2

## 2019-01-15 DIAGNOSIS — R51.9 NONINTRACTABLE HEADACHE, UNSPECIFIED CHRONICITY PATTERN, UNSPECIFIED HEADACHE TYPE: Primary | ICD-10-CM

## 2019-01-15 DIAGNOSIS — R51.9 NONINTRACTABLE HEADACHE, UNSPECIFIED CHRONICITY PATTERN, UNSPECIFIED HEADACHE TYPE: ICD-10-CM

## 2019-01-15 DIAGNOSIS — I10 ESSENTIAL HYPERTENSION: ICD-10-CM

## 2019-01-15 DIAGNOSIS — N39.0 URINARY TRACT INFECTION WITHOUT HEMATURIA, SITE UNSPECIFIED: ICD-10-CM

## 2019-01-15 DIAGNOSIS — E87.6 HYPOKALEMIA: ICD-10-CM

## 2019-01-15 LAB
ALBUMIN SERPL BCP-MCNC: 3.6 G/DL
ALP SERPL-CCNC: 43 U/L
ALT SERPL W/O P-5'-P-CCNC: 16 U/L
ANION GAP SERPL CALC-SCNC: 12 MMOL/L
AST SERPL-CCNC: 16 U/L
BASOPHILS # BLD AUTO: 0.06 K/UL
BASOPHILS NFR BLD: 0.7 %
BILIRUB SERPL-MCNC: 0.3 MG/DL
BUN SERPL-MCNC: 10 MG/DL
CALCIUM SERPL-MCNC: 10.5 MG/DL
CHLORIDE SERPL-SCNC: 98 MMOL/L
CO2 SERPL-SCNC: 27 MMOL/L
CREAT SERPL-MCNC: 1.3 MG/DL
CRP SERPL-MCNC: 10.1 MG/L
DIFFERENTIAL METHOD: ABNORMAL
EOSINOPHIL # BLD AUTO: 0.3 K/UL
EOSINOPHIL NFR BLD: 3.1 %
ERYTHROCYTE [DISTWIDTH] IN BLOOD BY AUTOMATED COUNT: 13.6 %
ERYTHROCYTE [SEDIMENTATION RATE] IN BLOOD BY WESTERGREN METHOD: 20 MM/HR
EST. GFR  (AFRICAN AMERICAN): 54.9 ML/MIN/1.73 M^2
EST. GFR  (NON AFRICAN AMERICAN): 47.6 ML/MIN/1.73 M^2
GLUCOSE SERPL-MCNC: 100 MG/DL
HCT VFR BLD AUTO: 39.6 %
HGB BLD-MCNC: 13.1 G/DL
IMM GRANULOCYTES # BLD AUTO: 0.03 K/UL
IMM GRANULOCYTES NFR BLD AUTO: 0.3 %
LYMPHOCYTES # BLD AUTO: 2.9 K/UL
LYMPHOCYTES NFR BLD: 33.4 %
MCH RBC QN AUTO: 29.2 PG
MCHC RBC AUTO-ENTMCNC: 33.1 G/DL
MCV RBC AUTO: 88 FL
MONOCYTES # BLD AUTO: 0.6 K/UL
MONOCYTES NFR BLD: 6.8 %
NEUTROPHILS # BLD AUTO: 4.9 K/UL
NEUTROPHILS NFR BLD: 55.7 %
NRBC BLD-RTO: 0 /100 WBC
PLATELET # BLD AUTO: 360 K/UL
PMV BLD AUTO: 12.2 FL
POTASSIUM SERPL-SCNC: 2.8 MMOL/L
PROT SERPL-MCNC: 7.3 G/DL
RBC # BLD AUTO: 4.49 M/UL
SODIUM SERPL-SCNC: 137 MMOL/L
WBC # BLD AUTO: 8.79 K/UL

## 2019-01-15 PROCEDURE — 36415 COLL VENOUS BLD VENIPUNCTURE: CPT | Mod: PO

## 2019-01-15 PROCEDURE — 80053 COMPREHEN METABOLIC PANEL: CPT

## 2019-01-15 PROCEDURE — 99214 PR OFFICE/OUTPT VISIT, EST, LEVL IV, 30-39 MIN: ICD-10-PCS | Mod: S$PBB,,, | Performed by: INTERNAL MEDICINE

## 2019-01-15 PROCEDURE — 99214 OFFICE O/P EST MOD 30 MIN: CPT | Mod: S$PBB,,, | Performed by: INTERNAL MEDICINE

## 2019-01-15 PROCEDURE — 99999 PR PBB SHADOW E&M-EST. PATIENT-LVL IV: CPT | Mod: PBBFAC,,, | Performed by: INTERNAL MEDICINE

## 2019-01-15 PROCEDURE — 99214 OFFICE O/P EST MOD 30 MIN: CPT | Mod: PBBFAC,PO | Performed by: INTERNAL MEDICINE

## 2019-01-15 PROCEDURE — 85652 RBC SED RATE AUTOMATED: CPT

## 2019-01-15 PROCEDURE — 86140 C-REACTIVE PROTEIN: CPT

## 2019-01-15 PROCEDURE — 99999 PR PBB SHADOW E&M-EST. PATIENT-LVL IV: ICD-10-PCS | Mod: PBBFAC,,, | Performed by: INTERNAL MEDICINE

## 2019-01-15 PROCEDURE — 85025 COMPLETE CBC W/AUTO DIFF WBC: CPT

## 2019-01-15 RX ORDER — AMLODIPINE BESYLATE 5 MG/1
5 TABLET ORAL 2 TIMES DAILY
Qty: 180 TABLET | Refills: 3 | Status: SHIPPED | OUTPATIENT
Start: 2019-01-15 | End: 2019-06-27

## 2019-01-16 ENCOUNTER — TELEPHONE (OUTPATIENT)
Dept: INTERNAL MEDICINE | Facility: CLINIC | Age: 54
End: 2019-01-16

## 2019-01-16 ENCOUNTER — PATIENT MESSAGE (OUTPATIENT)
Dept: INTERNAL MEDICINE | Facility: CLINIC | Age: 54
End: 2019-01-16

## 2019-01-16 DIAGNOSIS — E87.6 HYPOKALEMIA: Primary | ICD-10-CM

## 2019-01-16 RX ORDER — POTASSIUM CHLORIDE 20 MEQ/1
60 TABLET, EXTENDED RELEASE ORAL 2 TIMES DAILY
Qty: 42 TABLET | Refills: 0 | Status: SHIPPED | OUTPATIENT
Start: 2019-01-16 | End: 2019-01-24 | Stop reason: SDUPTHER

## 2019-01-16 NOTE — TELEPHONE ENCOUNTER
----- Message from Bhavna Agustin sent at 1/16/2019  8:58 AM CST -----  Contact: [id number] [place] - [phone] (Phone)  Pharmacy is calling to clarify an RX.  RX name:  potassium chloride SA (K-DUR,KLOR-CON) 20 MEQ tablet  What do they need to clarify:  Take 3 tablets (60 mEq total) by mouth 2 (two) times daily?  Comments: [facility] - [place] - [address] [phone] (Phone)  [phone] (Fax)

## 2019-01-17 ENCOUNTER — PATIENT OUTREACH (OUTPATIENT)
Dept: OTHER | Facility: OTHER | Age: 54
End: 2019-01-17

## 2019-01-17 NOTE — PROGRESS NOTES
Last 5 Patient Entered Readings                                      Current 30 Day Average: 130/82     Recent Readings 1/15/2019 1/14/2019 1/14/2019 1/13/2019 1/12/2019    SBP (mmHg) 141 122 148 133 143    DBP (mmHg) 100 78 101 83 94    Pulse 70 72 98 87 103        HPI:  Called patient to follow up. Patient endorses adherence to medication regimen. Patient reports having to take tizanidine earlier in the day recently. She followed up with PCP regarding headache. She recently established with new orthopedist and is hoping for improved pain management.    Patient denies hypotensive s/sx (lightheadedness, dizziness, nausea, fatigue); patient denies hypertensive s/sx (SOB, CP, severe headaches, changes in vision, dizziness, fatigue, confusion, anxiety, nosebleeds).     Assessment:  Reviewed recent readings. Per 2017 ACC/ AHA HTN guidelines (goal of BP < 130/80), current 30-day average is slightly above goal; on upward trend.     Plan:  Continue current medication regimen. Patient intends to continue use of tizanidine at this time, thus, will not increase BP medications due to significant drops with tizanidine. Discussed will likely need to add BP medication if her use of tizanidine decreases. She understands Dr. Bernal increased her potassium supplement and has repeat BMP 1/23. I will continue to monitor regularly and will follow-up in 2 to 3 weeks, sooner if blood pressure begins to trend upward or downward.     Current medication regimen:  Hypertension Medications             amLODIPine (NORVASC) 5 MG tablet Take 1 tablet (5 mg total) by mouth 2 (two) times daily.    chlorthalidone (HYGROTEN) 25 MG Tab Take one by mouth once a day.    cloNIDine (CATAPRES) 0.1 MG tablet Take 1 tablet (0.1 mg total) by mouth every 12 (twelve) hours as needed (only for blood pressure greater than 180/120).          Patient denies having questions or concerns. Patient has my contact information and knows to call with any concerns or  clinical changes.

## 2019-01-23 ENCOUNTER — LAB VISIT (OUTPATIENT)
Dept: LAB | Facility: HOSPITAL | Age: 54
End: 2019-01-23
Attending: INTERNAL MEDICINE
Payer: MEDICAID

## 2019-01-23 DIAGNOSIS — E87.6 HYPOKALEMIA: ICD-10-CM

## 2019-01-23 LAB
ANION GAP SERPL CALC-SCNC: 12 MMOL/L
BUN SERPL-MCNC: 10 MG/DL
CALCIUM SERPL-MCNC: 9.6 MG/DL
CHLORIDE SERPL-SCNC: 102 MMOL/L
CO2 SERPL-SCNC: 22 MMOL/L
CREAT SERPL-MCNC: 1.1 MG/DL
EST. GFR  (AFRICAN AMERICAN): >60 ML/MIN/1.73 M^2
EST. GFR  (NON AFRICAN AMERICAN): 58.3 ML/MIN/1.73 M^2
GLUCOSE SERPL-MCNC: 119 MG/DL
POTASSIUM SERPL-SCNC: 3.6 MMOL/L
SODIUM SERPL-SCNC: 136 MMOL/L

## 2019-01-23 PROCEDURE — 80048 BASIC METABOLIC PNL TOTAL CA: CPT

## 2019-01-23 PROCEDURE — 36415 COLL VENOUS BLD VENIPUNCTURE: CPT | Mod: PO

## 2019-01-24 ENCOUNTER — PATIENT MESSAGE (OUTPATIENT)
Dept: INTERNAL MEDICINE | Facility: CLINIC | Age: 54
End: 2019-01-24

## 2019-01-24 RX ORDER — POTASSIUM CHLORIDE 20 MEQ/1
40 TABLET, EXTENDED RELEASE ORAL 2 TIMES DAILY
Qty: 60 TABLET | Refills: 3 | Status: SHIPPED | OUTPATIENT
Start: 2019-01-24 | End: 2019-03-14 | Stop reason: SDUPTHER

## 2019-01-29 NOTE — PROGRESS NOTES
Last 5 Patient Entered Readings                                      Current 30 Day Average: 128/86     Recent Readings 1/28/2019 1/27/2019 1/26/2019 1/25/2019 1/24/2019    SBP (mmHg) 117 103 114 115 120    DBP (mmHg) 76 65 79 91 83    Pulse 92 73 101 91 94          K+ WNL. BP improved with majority of recent readings at goal. No medication recommendations at this time.

## 2019-02-05 NOTE — TELEPHONE ENCOUNTER
----- Message from Gypsy Perez sent at 1/25/2017  8:12 AM CST -----  Contact: Self- Sherie- [phone]  Patient would like to speak to about her being in the hospital. Please call to discuss.   None known

## 2019-02-07 ENCOUNTER — PATIENT OUTREACH (OUTPATIENT)
Dept: OTHER | Facility: OTHER | Age: 54
End: 2019-02-07

## 2019-02-07 NOTE — PROGRESS NOTES
Last 5 Patient Entered Readings                                      Current 30 Day Average: 121/81     Recent Readings 2/6/2019 2/5/2019 2/4/2019 2/4/2019 2/3/2019    SBP (mmHg) 144 130 100 89 114    DBP (mmHg) 73 86 72 59 80    Pulse 78 90 71 76 88          2/7: LVM.  Will call in 1 week.

## 2019-02-08 ENCOUNTER — PATIENT MESSAGE (OUTPATIENT)
Dept: ADMINISTRATIVE | Facility: OTHER | Age: 54
End: 2019-02-08

## 2019-02-10 ENCOUNTER — HOSPITAL ENCOUNTER (EMERGENCY)
Facility: HOSPITAL | Age: 54
Discharge: HOME | End: 2019-02-10
Attending: EMERGENCY MEDICINE
Payer: MEDICAID

## 2019-02-10 VITALS
TEMPERATURE: 98 F | SYSTOLIC BLOOD PRESSURE: 163 MMHG | BODY MASS INDEX: 31.28 KG/M2 | WEIGHT: 170 LBS | HEART RATE: 102 BPM | RESPIRATION RATE: 20 BRPM | HEIGHT: 62 IN | OXYGEN SATURATION: 100 % | DIASTOLIC BLOOD PRESSURE: 83 MMHG
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
## 2019-02-10 DIAGNOSIS — M25.519 SHOULDER PAIN, UNSPECIFIED CHRONICITY, UNSPECIFIED LATERALITY: Primary | ICD-10-CM

## 2019-02-10 LAB
B-HCG UR QL: NEGATIVE
CTP QC/QA: YES

## 2019-02-10 PROCEDURE — 99284 PR EMERGENCY DEPT VISIT,LEVEL IV: ICD-10-PCS | Mod: ,,, | Performed by: EMERGENCY MEDICINE

## 2019-02-10 PROCEDURE — 25000003 PHARM REV CODE 250: Performed by: EMERGENCY MEDICINE

## 2019-02-10 PROCEDURE — 99283 EMERGENCY DEPT VISIT LOW MDM: CPT | Mod: 25

## 2019-02-10 PROCEDURE — 99284 EMERGENCY DEPT VISIT MOD MDM: CPT | Mod: ,,, | Performed by: EMERGENCY MEDICINE

## 2019-02-10 PROCEDURE — 81025 URINE PREGNANCY TEST: CPT | Performed by: EMERGENCY MEDICINE

## 2019-02-10 RX ORDER — OXYCODONE AND ACETAMINOPHEN 5; 325 MG/1; MG/1
1 TABLET ORAL
Status: COMPLETED | OUTPATIENT
Start: 2019-02-10 | End: 2019-02-10

## 2019-02-10 RX ADMIN — OXYCODONE HYDROCHLORIDE AND ACETAMINOPHEN 1 TABLET: 5; 325 TABLET ORAL at 05:02

## 2019-02-10 NOTE — ED NOTES
LOC: The patient is awake, alert and aware of environment with an appropriate affect, the patient is oriented x 3 and speaking appropriately.  APPEARANCE: Patient resting comfortably and in no acute distress, patient is clean and well groomed, patient's clothing is properly fastened.  SKIN: The skin is warm and dry, color consistent with ethnicity, patient has normal skin turgor and moist mucus membranes, skin intact, no breakdown or bruising noted.  MUSCULOSKELETAL: Patient moving all extremities spontaneously, + swelling to left upper arm.  +Pain to left arm on movement and palpation.  RESPIRATORY: Airway is open and patent, respirations are spontaneous, patient has a normal effort and rate, no accessory muscle use noted.  ABDOMEN: Soft and non tender to palpation, no distention noted, normoactive bowel sounds present in all four quadrants.  NEUROLOGIC:  facial expression is symmetrical, patient moving all extremities spontaneously, normal sensation in all extremities when touched with a finger.  Follows all commands appropriately.

## 2019-02-10 NOTE — ED TRIAGE NOTES
Pt states that on Friday she fell from standing on her bed, wedging herself between the dresser and the wall.  Pt now with left upper arm pain and swelling.

## 2019-02-10 NOTE — ED PROVIDER NOTES
"Encounter Date: 2/10/2019    SCRIBE #1 NOTE: I, Naseem Good, am scribing for, and in the presence of,  Mak Casey MD. I have scribed the following portions of the note - Other sections scribed: HPI, ROS, PE.       History     Chief Complaint   Patient presents with    Arm Injury     fell friday night, got wedged in between bed and dresser, left shoulder, left bicep pain, hx of left shoulder sugery, warm to touch, "thats what brought her in"    Shoulder Injury     53 year old female with PMHx of degenerative joint disease, HLD, HTN presenting to the ED with complaints of left shoulder injury. The patient states that two nights ago she was standing on her bed, when she lost balance and fell off. She landed on her left shoulder and hit her head. She denies syncope or any headaches or neck pain since the fall. Her left shoulder has been in pain since then and it is red and warm to the touch. It has not been improving over time. She takes Norco for her other injuries regularly but has not taken any in the past 3 days since she has been driving. She denies any chest pain, shortness of breath, chest pain, new neck or back pain, other extremity pain, abdominal pain.          Review of patient's allergies indicates:  No Known Allergies  Past Medical History:   Diagnosis Date    Anxiety     Degenerative joint disease (DJD) of hip     GERD (gastroesophageal reflux disease)     Hyperlipidemia     Hypertension     IBS (irritable bowel syndrome)     Insomnia     Lumbar disc herniation     PTSD (post-traumatic stress disorder)      Past Surgical History:   Procedure Laterality Date    ADENOIDECTOMY      ARTHROSCOPY-SHOULDER WITH SUBACROMIAL DECOMPRESSION  10/20/2017    [facility]     SECTION      DEBRIDEMENT-ROTATOR CUFF-ARTHROSCOPIC Left 10/20/2017    [facility]    RESECTION-ACROMIOCLAVICULAR JOINT-ARTHROSCOPIC  10/20/2017    " [facility]    TONSILLECTOMY       Family History   Problem Relation Age of Onset    Hypertension Mother     Hypothyroidism Mother     Hypertension Father     Stroke Father 46    Diabetes Father     Cancer Maternal Grandfather      Social History     Tobacco Use    Smoking status: Former Smoker     Types: Vaping w/o nicotine    Smokeless tobacco: Former User     Quit date: 10/20/2014    Tobacco comment: Vapor cigarettes   Substance Use Topics    Alcohol use: No     Comment: socially    Drug use: No     Review of Systems   Constitutional: Negative for fever.   HENT: Negative for sore throat.    Respiratory: Negative for shortness of breath.    Cardiovascular: Negative for chest pain.   Gastrointestinal: Negative for abdominal pain.   Genitourinary: Negative for dysuria.   Musculoskeletal: Positive for arthralgias.   Skin: Negative for rash.   Neurological: Negative for syncope, numbness and headaches.   Hematological: Does not bruise/bleed easily.       Physical Exam     Initial Vitals [02/10/19 1507]   BP Pulse Resp Temp SpO2   (!) 172/86 106 18 98.8 °F (37.1 °C) 95 %      MAP       --         Physical Exam  GENERAL APPEARANCE: Well developed, well nourished, in no acute distress.  HENT: Normocephalic, atraumatic    EYES: Sclerae anicteric   NECK: Supple  LUNGS: Breathing comfortably. Speaking in full sentences   CARD: S1, s2, no murmur heard.  NEUROLOGIC: Alert, interacting normally. No facial droop.   MSK: Moving all four extremities. No C, T, L spine tenderness. Left shoulder slightly warm to touch. Active ROM about 90 degrees. Tenderness to palpation upon deltoid. Arm is distally neurovascularly intact.  Skin: Warm and dry. No visible rash on exposed areas of skin. Numerous small scratches and bruises on her forearms.  Psych: Mood and affect normal.   ED Course   Procedures  Labs Reviewed - No data to display       Imaging Results    None          Medical Decision  Making:   History:   Old Medical Records: I decided to obtain old medical records.  Initial Assessment:   Left shoulder pain after mechanical fall. Will obtain x-rays to look for fracture, and give patient dose of pain meds. If x-ray is negative, then follow up outpatient for possible MRI if symptoms do not improve spontaneously.   Clinical Tests:   Radiological Study: Ordered and Reviewed  ED Management:  Update:  X-ray is negative.  Symptoms improved with Percocet.  There is good range of motion, this happen directly after fall does not consistent with an infected shoulder.  Not consistent with dangerous pathology this time.  Patient has for sling for comfort during upcoming event, provided sling, however informed patient to avoid using sling as possible to avoid stiffen shoulder.  No signs of head trauma, or other new traumatic pathology.   Follow-up outpatient with PMD for further evaluation.                  Scribe Attestation:   Scribe #1: I performed the above scribed service and the documentation accurately describes the services I performed. I attest to the accuracy of the note.               Clinical Impression:   The encounter diagnosis was Fall.                             Mak Casey MD  02/11/19 0231

## 2019-02-11 NOTE — DISCHARGE INSTRUCTIONS
Your x-ray was negative for fractures or dislocations.  You take her home pain medications as needed for pain control.    If your symptoms do not completely resolve and 3-5 days please follow up with your primary care doctor for re-evaluation.

## 2019-02-15 NOTE — PROGRESS NOTES
Last 5 Patient Entered Readings                                      Current 30 Day Average: 120/76     Recent Readings 2019 2019 2019 2019 2/10/2019    SBP (mmHg) 118 163 112 122 100    DBP (mmHg) 92 94 78 53 73    Pulse 92 110 77 76 79          2/15: States she is at a .  Will call next week.

## 2019-02-22 NOTE — PROGRESS NOTES
Last 5 Patient Entered Readings                                      Current 30 Day Average: 118/76     Recent Readings 2/21/2019 2/19/2019 2/19/2019 2/18/2019 2/15/2019    SBP (mmHg) 110 98 107 109 115    DBP (mmHg) 69 61 81 64 77    Pulse 84 71 69 68 91          2/22: LVM.  Will call in 2 weeks.

## 2019-03-08 NOTE — PROGRESS NOTES
Last 5 Patient Entered Readings                                      Current 30 Day Average: 123/79     Recent Readings 3/8/2019 3/6/2019 3/5/2019 3/4/2019 3/3/2019    SBP (mmHg) 153 151 116 132 115    DBP (mmHg) 92 105 84 82 85    Pulse 98 104 92 92 92          3/8: Patient picked up and then hung up.  Will call in 1 week.

## 2019-03-10 ENCOUNTER — PATIENT MESSAGE (OUTPATIENT)
Dept: INTERNAL MEDICINE | Facility: CLINIC | Age: 54
End: 2019-03-10

## 2019-03-10 DIAGNOSIS — Z00.00 ANNUAL PHYSICAL EXAM: Primary | ICD-10-CM

## 2019-03-12 ENCOUNTER — PATIENT MESSAGE (OUTPATIENT)
Dept: INTERNAL MEDICINE | Facility: CLINIC | Age: 54
End: 2019-03-12

## 2019-03-14 ENCOUNTER — PATIENT MESSAGE (OUTPATIENT)
Dept: INTERNAL MEDICINE | Facility: CLINIC | Age: 54
End: 2019-03-14

## 2019-03-15 RX ORDER — POTASSIUM CHLORIDE 1500 MG/1
TABLET, EXTENDED RELEASE ORAL
Qty: 120 TABLET | Refills: 1 | Status: SHIPPED | OUTPATIENT
Start: 2019-03-15 | End: 2019-04-29 | Stop reason: SDUPTHER

## 2019-03-19 ENCOUNTER — LAB VISIT (OUTPATIENT)
Dept: LAB | Facility: HOSPITAL | Age: 54
End: 2019-03-19
Attending: INTERNAL MEDICINE
Payer: MEDICAID

## 2019-03-19 DIAGNOSIS — Z00.00 ANNUAL PHYSICAL EXAM: ICD-10-CM

## 2019-03-19 LAB
BASOPHILS # BLD AUTO: 0.04 K/UL
BASOPHILS NFR BLD: 0.4 %
DIFFERENTIAL METHOD: ABNORMAL
EOSINOPHIL # BLD AUTO: 0.2 K/UL
EOSINOPHIL NFR BLD: 2.6 %
ERYTHROCYTE [DISTWIDTH] IN BLOOD BY AUTOMATED COUNT: 13.3 %
HCT VFR BLD AUTO: 39.4 %
HGB BLD-MCNC: 12.6 G/DL
IMM GRANULOCYTES # BLD AUTO: 0.03 K/UL
IMM GRANULOCYTES NFR BLD AUTO: 0.3 %
LYMPHOCYTES # BLD AUTO: 3.1 K/UL
LYMPHOCYTES NFR BLD: 33.3 %
MCH RBC QN AUTO: 28.5 PG
MCHC RBC AUTO-ENTMCNC: 32 G/DL
MCV RBC AUTO: 89 FL
MONOCYTES # BLD AUTO: 0.5 K/UL
MONOCYTES NFR BLD: 5.9 %
NEUTROPHILS # BLD AUTO: 5.3 K/UL
NEUTROPHILS NFR BLD: 57.5 %
NRBC BLD-RTO: 0 /100 WBC
PLATELET # BLD AUTO: 359 K/UL
PMV BLD AUTO: 12.3 FL
RBC # BLD AUTO: 4.42 M/UL
WBC # BLD AUTO: 9.18 K/UL

## 2019-03-19 PROCEDURE — 36415 COLL VENOUS BLD VENIPUNCTURE: CPT | Mod: PO

## 2019-03-19 PROCEDURE — 84443 ASSAY THYROID STIM HORMONE: CPT

## 2019-03-19 PROCEDURE — 85025 COMPLETE CBC W/AUTO DIFF WBC: CPT

## 2019-03-19 PROCEDURE — 82306 VITAMIN D 25 HYDROXY: CPT

## 2019-03-19 PROCEDURE — 80061 LIPID PANEL: CPT

## 2019-03-19 PROCEDURE — 83036 HEMOGLOBIN GLYCOSYLATED A1C: CPT

## 2019-03-19 PROCEDURE — 80053 COMPREHEN METABOLIC PANEL: CPT

## 2019-03-20 DIAGNOSIS — I10 ESSENTIAL HYPERTENSION: ICD-10-CM

## 2019-03-20 LAB
25(OH)D3+25(OH)D2 SERPL-MCNC: 53 NG/ML
ALBUMIN SERPL BCP-MCNC: 3.5 G/DL
ALP SERPL-CCNC: 49 U/L
ALT SERPL W/O P-5'-P-CCNC: 14 U/L
ANION GAP SERPL CALC-SCNC: 14 MMOL/L
AST SERPL-CCNC: 23 U/L
BILIRUB SERPL-MCNC: 0.2 MG/DL
BUN SERPL-MCNC: 15 MG/DL
CALCIUM SERPL-MCNC: 10.6 MG/DL
CHLORIDE SERPL-SCNC: 98 MMOL/L
CHOLEST SERPL-MCNC: 249 MG/DL
CHOLEST/HDLC SERPL: 5.7 {RATIO}
CO2 SERPL-SCNC: 26 MMOL/L
CREAT SERPL-MCNC: 1.3 MG/DL
EST. GFR  (AFRICAN AMERICAN): 54.9 ML/MIN/1.73 M^2
EST. GFR  (NON AFRICAN AMERICAN): 47.6 ML/MIN/1.73 M^2
ESTIMATED AVG GLUCOSE: 137 MG/DL
GLUCOSE SERPL-MCNC: 113 MG/DL
HBA1C MFR BLD HPLC: 6.4 %
HDLC SERPL-MCNC: 44 MG/DL
HDLC SERPL: 17.7 %
LDLC SERPL CALC-MCNC: 152.4 MG/DL
NONHDLC SERPL-MCNC: 205 MG/DL
POTASSIUM SERPL-SCNC: 3.7 MMOL/L
PROT SERPL-MCNC: 7.5 G/DL
SODIUM SERPL-SCNC: 138 MMOL/L
TRIGL SERPL-MCNC: 263 MG/DL
TSH SERPL DL<=0.005 MIU/L-ACNC: 0.59 UIU/ML

## 2019-03-21 RX ORDER — CHLORTHALIDONE 25 MG/1
TABLET ORAL
Qty: 30 TABLET | Refills: 2 | Status: SHIPPED | OUTPATIENT
Start: 2019-03-21 | End: 2019-06-27 | Stop reason: SDUPTHER

## 2019-03-22 NOTE — PROGRESS NOTES
Subjective:       Patient ID: Sherie Reyes is a 53 y.o. female.    Chief Complaint: Annual Exam    Patient is a 53 y.o.female who presents today for annual.    Labs: reviewed  Vaccines: up to date  Mammogram: due now  Gyn: up to date  Colonoscopy: done at age 51; good for ten years    Review of Systems   Constitutional: Negative for appetite change, chills, diaphoresis, fatigue and fever.   HENT: Negative for congestion, dental problem, ear discharge, ear pain, hearing loss, postnasal drip, sinus pressure and sore throat.    Eyes: Negative for discharge, redness and itching.   Respiratory: Negative for cough, chest tightness, shortness of breath and wheezing.    Cardiovascular: Negative for chest pain, palpitations and leg swelling.   Gastrointestinal: Negative for abdominal pain, constipation, diarrhea, nausea and vomiting.   Endocrine: Negative for cold intolerance and heat intolerance.   Genitourinary: Negative for difficulty urinating, frequency, hematuria and urgency.   Musculoskeletal: Negative for arthralgias, back pain, gait problem, myalgias and neck pain.   Skin: Negative for color change and rash.   Neurological: Negative for dizziness, syncope and headaches.   Hematological: Negative for adenopathy.   Psychiatric/Behavioral: Negative for behavioral problems and sleep disturbance. The patient is not nervous/anxious.        Objective:      Physical Exam   Constitutional: She is oriented to person, place, and time. She appears well-developed and well-nourished. No distress.   HENT:   Head: Normocephalic and atraumatic.   Right Ear: External ear normal.   Left Ear: External ear normal.   Nose: Nose normal.   Mouth/Throat: Oropharynx is clear and moist. No oropharyngeal exudate.   Eyes: Conjunctivae and EOM are normal. Pupils are equal, round, and reactive to light. Right eye exhibits no discharge. Left eye exhibits no discharge. No scleral icterus.   Neck: Normal range of motion. Neck supple. No JVD  present. No thyromegaly present.   Cardiovascular: Normal rate, regular rhythm, normal heart sounds and intact distal pulses. Exam reveals no gallop and no friction rub.   No murmur heard.  Pulmonary/Chest: Effort normal and breath sounds normal. No stridor. No respiratory distress. She has no wheezes. She has no rales. She exhibits no tenderness.   Abdominal: Soft. Bowel sounds are normal. She exhibits no distension. There is no tenderness. There is no rebound.   Musculoskeletal: Normal range of motion. She exhibits no edema or tenderness.   Lymphadenopathy:     She has no cervical adenopathy.   Neurological: She is alert and oriented to person, place, and time. No cranial nerve deficit.   Skin: Skin is warm and dry. No rash noted. She is not diaphoretic. No erythema.   Psychiatric: She has a normal mood and affect. Her behavior is normal.   Nursing note and vitals reviewed.      Assessment and Plan:       1. Annual physical exam  - labs reviewed    2. Essential hypertension  - stable    3. Mixed hyperlipidemia    - Comprehensive metabolic panel; Future  - pravastatin (PRAVACHOL) 20 MG tablet; Take 1 tablet (20 mg total) by mouth every evening.  Dispense: 30 tablet; Refill: 6  - Lipid panel; Future    4. Hypercalcemia    - Comprehensive metabolic panel; Future  - Calcium, ionized; Future  - PTH, intact; Future    5. Prediabetes  - discussed diabetic diet    6. Visit for screening mammogram    - Mammo Digital Screening Bilat; Future          No follow-ups on file.

## 2019-04-01 ENCOUNTER — PATIENT OUTREACH (OUTPATIENT)
Dept: OTHER | Facility: OTHER | Age: 54
End: 2019-04-01

## 2019-04-01 NOTE — PROGRESS NOTES
Last 5 Patient Entered Readings                                      Current 30 Day Average: 136/88     Recent Readings 4/1/2019 3/31/2019 3/31/2019 3/30/2019 3/29/2019    SBP (mmHg) 141 144 156 139 151    DBP (mmHg) 92 98 97 89 91    Pulse 95 86 92 92 97          HPI:  Called patient to follow up. Patient endorses adherence to medication regimen. Patient relates higher BP readings to taking them late at night/early in the morning, further apart from tizanidine dose. She anticipates being on tizanidine longterm, trying to limit use to once daily. Reports headaches but contributes to neck injury. Patient denies hypotensive s/sx (lightheadedness, dizziness, nausea, fatigue); patient denies hypertensive s/sx (SOB, CP, severe headaches, changes in vision).      Assessment:  Reviewed recent readings. Higher readings recently due to change in timing of measurement.    Plan:  Discussed balancing BP control with drops in BP due to tizanidine. Change BP goal to < 140/90 mmHg. Asked patient to vary timing of BP measurement so can assess with and without tizanidine. Discussed medication change if BP consistently trending up. I will continue to monitor regularly and will follow-up in 3 to 4 weeks, sooner if blood pressure begins to trend upward or downward.     Current medication regimen:  Hypertension Medications             amLODIPine (NORVASC) 5 MG tablet Take 1 tablet (5 mg total) by mouth 2 (two) times daily.    chlorthalidone (HYGROTEN) 25 MG Tab Take one by mouth once a day.    chlorthalidone (HYGROTEN) 25 MG Tab TAKE 1/2 (ONE-HALF) TABLET BY MOUTH ONCE DAILY (MAY  INCREASE  TO  1  TABLET  DAILY)  BASED  ON  LAB  RESULTS  FOR  BLOOD  PRESSURE.    cloNIDine (CATAPRES) 0.1 MG tablet Take 1 tablet (0.1 mg total) by mouth every 12 (twelve) hours as needed (only for blood pressure greater than 180/120).          Patient denies having questions or concerns. Patient has my contact information and knows to call with any concerns  or clinical changes.

## 2019-04-05 ENCOUNTER — LAB VISIT (OUTPATIENT)
Dept: LAB | Facility: HOSPITAL | Age: 54
End: 2019-04-05
Attending: INTERNAL MEDICINE
Payer: MEDICAID

## 2019-04-05 ENCOUNTER — OFFICE VISIT (OUTPATIENT)
Dept: INTERNAL MEDICINE | Facility: CLINIC | Age: 54
End: 2019-04-05
Payer: MEDICAID

## 2019-04-05 VITALS
TEMPERATURE: 98 F | SYSTOLIC BLOOD PRESSURE: 134 MMHG | BODY MASS INDEX: 34 KG/M2 | WEIGHT: 184.75 LBS | DIASTOLIC BLOOD PRESSURE: 82 MMHG | HEART RATE: 111 BPM | HEIGHT: 62 IN

## 2019-04-05 DIAGNOSIS — E83.52 HYPERCALCEMIA: ICD-10-CM

## 2019-04-05 DIAGNOSIS — E78.2 MIXED HYPERLIPIDEMIA: ICD-10-CM

## 2019-04-05 DIAGNOSIS — G89.29 CHRONIC MIDLINE LOW BACK PAIN WITHOUT SCIATICA: ICD-10-CM

## 2019-04-05 DIAGNOSIS — M54.50 CHRONIC MIDLINE LOW BACK PAIN WITHOUT SCIATICA: ICD-10-CM

## 2019-04-05 DIAGNOSIS — Z00.00 ANNUAL PHYSICAL EXAM: Primary | ICD-10-CM

## 2019-04-05 DIAGNOSIS — I10 ESSENTIAL HYPERTENSION: ICD-10-CM

## 2019-04-05 DIAGNOSIS — N18.30 CKD (CHRONIC KIDNEY DISEASE) STAGE 3, GFR 30-59 ML/MIN: ICD-10-CM

## 2019-04-05 DIAGNOSIS — Z12.31 VISIT FOR SCREENING MAMMOGRAM: ICD-10-CM

## 2019-04-05 DIAGNOSIS — R73.03 PREDIABETES: ICD-10-CM

## 2019-04-05 LAB
ALBUMIN SERPL BCP-MCNC: 3.5 G/DL (ref 3.5–5.2)
ALP SERPL-CCNC: 57 U/L (ref 55–135)
ALT SERPL W/O P-5'-P-CCNC: 11 U/L (ref 10–44)
ANION GAP SERPL CALC-SCNC: 13 MMOL/L (ref 8–16)
AST SERPL-CCNC: 16 U/L (ref 10–40)
BILIRUB SERPL-MCNC: 0.2 MG/DL (ref 0.1–1)
BUN SERPL-MCNC: 15 MG/DL (ref 6–20)
CALCIUM SERPL-MCNC: 10.4 MG/DL (ref 8.7–10.5)
CHLORIDE SERPL-SCNC: 98 MMOL/L (ref 95–110)
CO2 SERPL-SCNC: 24 MMOL/L (ref 23–29)
CREAT SERPL-MCNC: 1.3 MG/DL (ref 0.5–1.4)
EST. GFR  (AFRICAN AMERICAN): 54.9 ML/MIN/1.73 M^2
EST. GFR  (NON AFRICAN AMERICAN): 47.6 ML/MIN/1.73 M^2
GLUCOSE SERPL-MCNC: 99 MG/DL (ref 70–110)
POTASSIUM SERPL-SCNC: 3.2 MMOL/L (ref 3.5–5.1)
PROT SERPL-MCNC: 7.7 G/DL (ref 6–8.4)
PTH-INTACT SERPL-MCNC: 18 PG/ML (ref 9–77)
SODIUM SERPL-SCNC: 135 MMOL/L (ref 136–145)

## 2019-04-05 PROCEDURE — 83970 ASSAY OF PARATHORMONE: CPT

## 2019-04-05 PROCEDURE — 99999 PR PBB SHADOW E&M-EST. PATIENT-LVL III: ICD-10-PCS | Mod: PBBFAC,,, | Performed by: INTERNAL MEDICINE

## 2019-04-05 PROCEDURE — 99396 PR PREVENTIVE VISIT,EST,40-64: ICD-10-PCS | Mod: S$PBB,,, | Performed by: INTERNAL MEDICINE

## 2019-04-05 PROCEDURE — 36415 COLL VENOUS BLD VENIPUNCTURE: CPT | Mod: PO

## 2019-04-05 PROCEDURE — 82330 ASSAY OF CALCIUM: CPT

## 2019-04-05 PROCEDURE — 99396 PREV VISIT EST AGE 40-64: CPT | Mod: S$PBB,,, | Performed by: INTERNAL MEDICINE

## 2019-04-05 PROCEDURE — 99999 PR PBB SHADOW E&M-EST. PATIENT-LVL III: CPT | Mod: PBBFAC,,, | Performed by: INTERNAL MEDICINE

## 2019-04-05 PROCEDURE — 99213 OFFICE O/P EST LOW 20 MIN: CPT | Mod: PBBFAC,PO | Performed by: INTERNAL MEDICINE

## 2019-04-05 PROCEDURE — 80053 COMPREHEN METABOLIC PANEL: CPT

## 2019-04-05 RX ORDER — MELOXICAM 15 MG/1
TABLET ORAL
Refills: 0 | COMMUNITY
Start: 2019-03-26 | End: 2019-04-25 | Stop reason: SDUPTHER

## 2019-04-05 RX ORDER — AMLODIPINE BESYLATE 10 MG/1
TABLET ORAL
Refills: 11 | COMMUNITY
Start: 2019-03-20 | End: 2019-06-27 | Stop reason: SDUPTHER

## 2019-04-05 RX ORDER — PRAVASTATIN SODIUM 20 MG/1
20 TABLET ORAL NIGHTLY
Qty: 30 TABLET | Refills: 6 | Status: SHIPPED | OUTPATIENT
Start: 2019-04-05 | End: 2019-10-17 | Stop reason: SDUPTHER

## 2019-04-06 ENCOUNTER — PATIENT MESSAGE (OUTPATIENT)
Dept: INTERNAL MEDICINE | Facility: CLINIC | Age: 54
End: 2019-04-06

## 2019-04-06 LAB — CA-I BLDV-SCNC: 1.27 MMOL/L (ref 1.06–1.42)

## 2019-04-06 RX ORDER — POTASSIUM CHLORIDE 20 MEQ/1
60 TABLET, EXTENDED RELEASE ORAL 2 TIMES DAILY
Qty: 18 TABLET | Refills: 0 | Status: SHIPPED | OUTPATIENT
Start: 2019-04-06 | End: 2019-07-25

## 2019-04-07 ENCOUNTER — PATIENT MESSAGE (OUTPATIENT)
Dept: INTERNAL MEDICINE | Facility: CLINIC | Age: 54
End: 2019-04-07

## 2019-04-08 ENCOUNTER — TELEPHONE (OUTPATIENT)
Dept: INTERNAL MEDICINE | Facility: CLINIC | Age: 54
End: 2019-04-08

## 2019-04-08 ENCOUNTER — PATIENT OUTREACH (OUTPATIENT)
Dept: OTHER | Facility: OTHER | Age: 54
End: 2019-04-08

## 2019-04-08 DIAGNOSIS — I15.2 HYPERTENSION ASSOCIATED WITH DIABETES: Primary | ICD-10-CM

## 2019-04-08 DIAGNOSIS — E11.59 HYPERTENSION ASSOCIATED WITH DIABETES: Primary | ICD-10-CM

## 2019-04-08 NOTE — PROGRESS NOTES
"Last 5 Patient Entered Readings                                      Current 30 Day Average: 136/87     Recent Readings 4/3/2019 4/2/2019 4/1/2019 3/31/2019 3/31/2019    SBP (mmHg) 115 136 141 144 156    DBP (mmHg) 77 88 92 98 97    Pulse 85 77 95 86 92          Digital Medicine: Health  Follow Up    Lifestyle Modifications:    1.Dietary Modifications (Sodium intake <2,000mg/day, food labels, dining out): Patient reports she has been focusing on cutting back on carbs due to being pre diabetic.  States she does not like vegetables, but she likes fruit.  States she had a salad with boiled eggs, lopez bits, croutons with ranch. Reviewed portion control and serving sizes.  Discussed adding more vegetables to salad, like edamame or carrots.  Offered recipes for her to cook with family, and patient requested resources be sent to email.    2.Physical Activity: Reports she has been walking down the block, which patient describes as a "city block."  Reports she walks her dogs sometimes.  States she likes being in the pool.  Reports they have an above ground pool and has not been able to use it due to the weather.  States she used to be a member at [employer].  Encouraged patient to look into joining gym with pool and has classes.    3.Medication Therapy: Patient has been compliant with the medication regimen.    4.Patient has the following medication side effects/concerns: none  (Frequency/Alleviating factors/Precipitating factors, etc.)     Follow up with Ms. Sherie Posey Amy completed. No further questions or concerns. Will continue to follow up to achieve health goals.    Reports some days she forgets to take BP during the day.  Suggested she set an alarm to remember to take it.  Patient states she will try this.  "

## 2019-04-08 NOTE — TELEPHONE ENCOUNTER
----- Message from Anushka Valdez sent at 4/5/2019  2:11 PM CDT -----  Pt has medicaid and I am unable to schedule pt for her 3 month follow up

## 2019-04-11 ENCOUNTER — TELEPHONE (OUTPATIENT)
Dept: ADMINISTRATIVE | Facility: HOSPITAL | Age: 54
End: 2019-04-11

## 2019-04-25 ENCOUNTER — PATIENT MESSAGE (OUTPATIENT)
Dept: INTERNAL MEDICINE | Facility: CLINIC | Age: 54
End: 2019-04-25

## 2019-04-25 RX ORDER — MELOXICAM 15 MG/1
TABLET ORAL
Qty: 30 TABLET | Refills: 6 | Status: SHIPPED | OUTPATIENT
Start: 2019-04-25 | End: 2019-11-07 | Stop reason: SDUPTHER

## 2019-04-25 RX ORDER — ACETAMINOPHEN 500 MG
500 TABLET ORAL EVERY 6 HOURS PRN
Qty: 90 TABLET | Refills: 1 | Status: SHIPPED | OUTPATIENT
Start: 2019-04-25 | End: 2020-06-11

## 2019-04-29 RX ORDER — POTASSIUM CHLORIDE 20 MEQ/1
40 TABLET, EXTENDED RELEASE ORAL 2 TIMES DAILY
Qty: 120 TABLET | Refills: 1 | Status: SHIPPED | OUTPATIENT
Start: 2019-04-29 | End: 2019-08-16

## 2019-04-30 NOTE — PROGRESS NOTES
Last 5 Patient Entered Readings                                      Current 30 Day Average: 130/84     Recent Readings 4/28/2019 4/25/2019 4/25/2019 4/21/2019 4/21/2019    SBP (mmHg) 147 118 115 108 130    DBP (mmHg) 91 86 79 82 86    Pulse 99 92 81 97 97          BP improved and at patient's goal of < 140/90 mmHg. No medication recommendations at this time.

## 2019-05-03 ENCOUNTER — HOSPITAL ENCOUNTER (OUTPATIENT)
Dept: RADIOLOGY | Facility: HOSPITAL | Age: 54
Discharge: HOME | End: 2019-05-03
Attending: INTERNAL MEDICINE
Payer: MEDICAID

## 2019-05-03 DIAGNOSIS — Z12.31 VISIT FOR SCREENING MAMMOGRAM: ICD-10-CM

## 2019-05-03 PROCEDURE — 77063 BREAST TOMOSYNTHESIS BI: CPT | Mod: 26,,, | Performed by: RADIOLOGY

## 2019-05-03 PROCEDURE — 77067 SCR MAMMO BI INCL CAD: CPT | Mod: TC,PO

## 2019-05-03 PROCEDURE — 77063 MAMMO DIGITAL SCREENING BILAT WITH TOMOSYNTHESIS_CAD: ICD-10-PCS | Mod: 26,,, | Performed by: RADIOLOGY

## 2019-05-03 PROCEDURE — 77067 SCR MAMMO BI INCL CAD: CPT | Mod: 26,,, | Performed by: RADIOLOGY

## 2019-05-03 PROCEDURE — 77067 MAMMO DIGITAL SCREENING BILAT WITH TOMOSYNTHESIS_CAD: ICD-10-PCS | Mod: 26,,, | Performed by: RADIOLOGY

## 2019-05-07 RX ORDER — GEMFIBROZIL 600 MG/1
TABLET, FILM COATED ORAL
Qty: 60 TABLET | Refills: 0 | Status: SHIPPED | OUTPATIENT
Start: 2019-05-07 | End: 2019-09-01 | Stop reason: SDUPTHER

## 2019-05-14 ENCOUNTER — PATIENT OUTREACH (OUTPATIENT)
Dept: OTHER | Facility: OTHER | Age: 54
End: 2019-05-14

## 2019-05-14 NOTE — PROGRESS NOTES
Last 5 Patient Entered Readings                                      Current 30 Day Average: 124/85     Recent Readings 5/11/2019 5/11/2019 5/10/2019 5/8/2019 5/8/2019    SBP (mmHg) 107 137 126 104 143    DBP (mmHg) 77 86 83 74 94    Pulse 85 85 81 87 96        Reports higher reading on 5[place]5 [place].  States she took the first two readings when they got back up in the room.  Reports feeling nauseated and sweating.  States she went to bed after that.    Digital Medicine: Health  Follow Up    Lifestyle Modifications:    1.Dietary Modifications (Sodium intake <2,000mg/day, food labels, dining out): Reports she has lost a couple lbs.  States she has been switching to whole grain options since PCP told her she is prediabetic.  Reports she is switching from sweets to apple slices and using brown rice     2.Physical Activity: Reports she has been walking the dog more.    3.Medication Therapy: Patient has been compliant with the medication regimen.    4.Patient has the following medication side effects/concerns: States she is working on cutting back on the pain medication and the muscle relaxer.  States she forgot she had clonidine and did not know if she should have taken it on 5/5 when BP was elevated.  Will route note to pharmD  (Frequency/Alleviating factors/Precipitating factors, etc.)     Follow up with Mrs. Sherie Posey Amy completed. No further questions or concerns. Will continue to follow up to achieve health goals.

## 2019-05-15 ENCOUNTER — PATIENT OUTREACH (OUTPATIENT)
Dept: OTHER | Facility: OTHER | Age: 54
End: 2019-05-15

## 2019-05-15 NOTE — PROGRESS NOTES
Last 5 Patient Entered Readings                                      Current 30 Day Average: 122/84     Recent Readings 5/15/2019 5/15/2019 5/15/2019 5/11/2019 5/11/2019    SBP (mmHg) 103 90 139 107 137    DBP (mmHg) 79 59 98 77 86    Pulse 78 77 94 85 85        HPI:  Called patient to follow up. Patient endorses adherence to medication regimen. Patient reports not feeling well last night with lower BP reading of 90/59 mmHg. Reports reading was taken after about 20 minutes of trying to get to cuff and phone. She is unable to explain reason for lower reading. Patient denies hypertensive s/sx (SOB, CP, severe headaches, changes in vision).    Assessment:  Reviewed recent readings. Per 2017 ACC/ AHA HTN guidelines (goal of BP < 130/80), current 30-day average is well controlled.     Plan:  Continue current medication regimen. Advised patient to take reduced dose of amlodipine tonight if SBP < 110 mmHg. Discussed use of clonidine. She verbalized understanding to take second reading to confirm critical elevation prior to clonidine use. Reminded patient that comments/notes in ganga are not an appropriate way to communicate more urgent concerns or questions with us and to call or send [facility] message in those instances. I will continue to monitor regularly and will follow-up in 3 to 4 weeks, sooner if blood pressure begins to trend upward or downward.     Current medication regimen:  Hypertension Medications                  amLODIPine (NORVASC) 5 MG tablet Take 1 tablet (5 mg total) by mouth 2 (two) times daily.    chlorthalidone (HYGROTEN) 25 MG Tab Take one by mouth once a day.    chlorthalidone (HYGROTEN) 25 MG Tab TAKE 1/2 (ONE-HALF) TABLET BY MOUTH ONCE DAILY (MAY  INCREASE  TO  1  TABLET  DAILY)  BASED  ON  LAB  RESULTS  FOR  BLOOD  PRESSURE.    cloNIDine (CATAPRES) 0.1 MG tablet Take 1 tablet (0.1 mg total) by mouth every 12 (twelve) hours as needed (only for blood pressure greater than 180/120).          Patient  denies having questions or concerns. Patient has my contact information and knows to call with any concerns or clinical changes.

## 2019-05-15 NOTE — PROGRESS NOTES
Last 5 Patient Entered Readings                                      Current 30 Day Average: 122/84     Recent Readings 5/15/2019 5/15/2019 5/15/2019 5/11/2019 5/11/2019    SBP (mmHg) 103 90 139 107 137    DBP (mmHg) 79 59 98 77 86    Pulse 78 77 94 85 85          Left voicemail.  Called to address patient's question about clonidine use. Would not have recommended use 5/5 as repeat BP was less than specified 180/120 mmHg.

## 2019-06-11 ENCOUNTER — PATIENT OUTREACH (OUTPATIENT)
Dept: OTHER | Facility: OTHER | Age: 54
End: 2019-06-11

## 2019-06-11 NOTE — PROGRESS NOTES
Last 5 Patient Entered Readings                                      Current 30 Day Average: 142/91     Recent Readings 6/10/2019 6/8/2019 6/8/2019 6/6/2019 6/6/2019    SBP (mmHg) 120 163 154 105 116    DBP (mmHg) 84 102 103 75 84    Pulse 89 116 111 86 90          HPI:  Called patient to follow up. Patient endorses adherence to medication regimen. Patient denies hypotensive s/sx (lightheadedness, dizziness, nausea, fatigue); patient denies hypertensive s/sx (SOB, CP, severe headaches, changes in vision).    Patient reports she is feeling better and has slightly decreased tizanidine use, now typically taking 5-7 days.    On 6/4 she could not locate her clonidine but realized she did not need it when she repeated BP reading. She has since located the clonidine but has not required it.    She reports going camping about 2 weeks ago and had KEISHA but revealed through discussion that she ate more salt than usual (ie. Red beans, grilled cheese, mac 'n cheese).    Assessment:  Reviewed recent readings. BP slightly above goal of < 140/90 mmHg.    Plan:  Continue current medication regimen at this time. Discussed starting low dose ARB if she continues decreasing use of tizanidine and BP more consistently elevated. Do not recommend adding medication at this time given significant drops in BP with tizanidine dose. Will place task for health  to assist patient with better food choices while camping. I will continue to monitor regularly and will follow-up in 2 to 3 weeks, sooner if blood pressure begins to trend upward or downward.     Current medication regimen:  Hypertension Medications             amLODIPine (NORVASC) 10 MG tablet     amLODIPine (NORVASC) 5 MG tablet Take 1 tablet (5 mg total) by mouth 2 (two) times daily.    chlorthalidone (HYGROTEN) 25 MG Tab Take one by mouth once a day.    chlorthalidone (HYGROTEN) 25 MG Tab TAKE 1/2 (ONE-HALF) TABLET BY MOUTH ONCE DAILY (MAY  INCREASE  TO  1  TABLET  DAILY)  BASED   ON  LAB  RESULTS  FOR  BLOOD  PRESSURE.    cloNIDine (CATAPRES) 0.1 MG tablet Take 1 tablet (0.1 mg total) by mouth every 12 (twelve) hours as needed (only for blood pressure greater than 180/120).          Patient denies having questions or concerns. Patient has my contact information and knows to call with any concerns or clinical changes.

## 2019-06-11 NOTE — PROGRESS NOTES
"Last 5 Patient Entered Readings                                      Current 30 Day Average: 142/91     Recent Readings 6/10/2019 6/8/2019 6/8/2019 6/6/2019 6/6/2019    SBP (mmHg) 120 163 154 105 116    DBP (mmHg) 84 102 103 75 84    Pulse 89 116 111 86 90          Called to follow up, left voicemail  Discuss patient-entered notes to discuss if tizanidine use is declining, clarify meaning of "no other BP meds available" comment 6/4  "

## 2019-06-14 ENCOUNTER — PATIENT OUTREACH (OUTPATIENT)
Dept: OTHER | Facility: OTHER | Age: 54
End: 2019-06-14

## 2019-06-14 NOTE — PROGRESS NOTES
Last 5 Patient Entered Readings                                      Current 30 Day Average: 139/90     Recent Readings 6/12/2019 6/11/2019 6/10/2019 6/8/2019 6/8/2019    SBP (mmHg) 129 113 120 163 154    DBP (mmHg) 85 79 84 102 103    Pulse 82 81 89 116 111        Pharmacist placed task to provide patient with low sodium camping options.    Digital Medicine: Health  Follow Up    Lifestyle Modifications:    1.Dietary Modifications (Sodium intake <2,000mg/day, food labels, dining out): Reports they have a trailer with a fridge, microwave and grill.  Reports they have been camping a couple times, and noticed leg swelling last weekend, and attributed it to salt intake.  Patient reports eating red beans and rice, bbq chicken and drinking beer.  Encouraged patient to find lower sodium options, like grilled marinated chicken or grilled vegetables.  States she only likes corn.  States she likes to snack on apple slices, chips and dip.  Encouraged patient to bring more fruit options and low sodium chips/popcorn and drink plenty of water.  Sent patient with meal prepping guide to email per patient request.    2.Physical Activity: deferred    3.Medication Therapy: Patient has been compliant with the medication regimen.    4.Patient has the following medication side effects/concerns: none  (Frequency/Alleviating factors/Precipitating factors, etc.)     Follow up with Mrs. Sherie Posey Amy completed. No further questions or concerns. Will continue to follow up to achieve health goals.

## 2019-06-25 NOTE — PROGRESS NOTES
Last 5 Patient Entered Readings                                      Current 30 Day Average: 137/91     Recent Readings 6/21/2019 6/20/2019 6/19/2019 6/12/2019 6/11/2019    SBP (mmHg) 128 162 120 129 113    DBP (mmHg) 86 101 79 85 79    Pulse 96 100 86 82 81          BP improving. No medication recommendations at this time.

## 2019-06-26 NOTE — PROGRESS NOTES
Subjective:       Patient ID: Sherie Reyes is a 54 y.o. female.    Chief Complaint: Follow-up    Patient is a 54 y.o.female who presents today for follow up    Bilateral shoulder pain: right side is worse; she is seeing a workman comp doctor. Pain worse today; waiting to see a workman comp doc again for f/u. In the past, toradol shots have helped her.    Labs: reviewed  Vaccines: up to date  Mammogram: may 2019  Gyn: dr hernandez  Diet: diabetic diet; following strict diet  Colonoscopy: done at age 51; good for ten years    Review of Systems   Constitutional: Negative for activity change, appetite change, chills, diaphoresis, fever and unexpected weight change.   HENT: Negative for congestion, ear discharge, ear pain, hearing loss, postnasal drip, rhinorrhea, tinnitus, trouble swallowing and voice change.    Eyes: Negative for discharge, redness, itching and visual disturbance.   Respiratory: Negative for cough, chest tightness, shortness of breath and wheezing.    Cardiovascular: Negative for chest pain, palpitations and leg swelling.   Gastrointestinal: Negative for abdominal pain, blood in stool, constipation, diarrhea, nausea and vomiting.   Endocrine: Negative for cold intolerance, heat intolerance, polydipsia and polyuria.   Genitourinary: Negative for difficulty urinating, dysuria, flank pain, hematuria, menstrual problem and urgency.   Musculoskeletal: Positive for arthralgias, back pain, joint swelling and neck pain. Negative for gait problem, myalgias and neck stiffness.   Skin: Negative for color change and rash.   Neurological: Negative for dizziness, seizures, syncope, weakness and headaches.   Hematological: Negative for adenopathy.   Psychiatric/Behavioral: Negative for agitation, behavioral problems, confusion, dysphoric mood and sleep disturbance.       Objective:      Physical Exam   Constitutional: She is oriented to person, place, and time. She appears well-developed and well-nourished.  No distress.   HENT:   Head: Normocephalic and atraumatic.   Right Ear: External ear normal.   Left Ear: External ear normal.   Nose: Nose normal.   Mouth/Throat: Oropharynx is clear and moist. No oropharyngeal exudate.   Eyes: Pupils are equal, round, and reactive to light. Conjunctivae and EOM are normal. Right eye exhibits no discharge. Left eye exhibits no discharge. No scleral icterus.   Neck: Neck supple. No JVD present. No tracheal deviation present. No thyromegaly present.   Cardiovascular: Normal rate, normal heart sounds and intact distal pulses. Exam reveals no gallop and no friction rub.   No murmur heard.  Pulmonary/Chest: Effort normal and breath sounds normal. No stridor. No respiratory distress. She has no wheezes. She has no rales. She exhibits no tenderness.   Abdominal: Soft. Bowel sounds are normal. She exhibits no distension. There is no tenderness. There is no rebound.   Musculoskeletal: She exhibits no edema.        Right shoulder: She exhibits decreased range of motion and tenderness.   Lymphadenopathy:     She has no cervical adenopathy.   Neurological: She is alert and oriented to person, place, and time.   Skin: Skin is warm and dry. No rash noted. She is not diaphoretic. No erythema.   Psychiatric: She has a normal mood and affect. Her behavior is normal.   Nursing note and vitals reviewed.      Assessment and Plan:       1. Essential hypertension  - stable but potassium is low; will swap chlorthalidone to spironolactone; f/u in 2 weeks  - continue other bp meds  - bmp in 2 weeks  - continue potassium supplement for now; pt not very compliant with med therefore, level was low; hopefully starting potassium sparing diuretic may help us stop the potassium supplement; it makes her nauseated.    2. Mixed hyperlipidemia  - improving with diet    3. CKD (chronic kidney disease) stage 3, GFR 30-59 ml/min  - stable    4. Chronic midline low back pain without sciatica  - stable    5. Controlled  type 2 diabetes mellitus without complication, without long-term current use of insulin  - diet controlled; cut out apple slices    6. Hypokalemia  - continue potassium supplement; recommend powder over pill due to nausea  - Basic metabolic panel; Future    7. Acute pain of right shoulder  - ketorolac injection 30 mg          Follow up in about 2 weeks (around 7/24/2019).

## 2019-06-27 ENCOUNTER — PATIENT MESSAGE (OUTPATIENT)
Dept: INTERNAL MEDICINE | Facility: CLINIC | Age: 54
End: 2019-06-27

## 2019-06-27 DIAGNOSIS — I10 ESSENTIAL HYPERTENSION: ICD-10-CM

## 2019-06-27 RX ORDER — AMLODIPINE BESYLATE 10 MG/1
10 TABLET ORAL DAILY
Qty: 30 TABLET | Refills: 11 | Status: CANCELLED | OUTPATIENT
Start: 2019-06-27

## 2019-06-27 RX ORDER — AMLODIPINE BESYLATE 10 MG/1
10 TABLET ORAL DAILY
Qty: 30 TABLET | Refills: 11 | Status: SHIPPED | OUTPATIENT
Start: 2019-06-27 | End: 2020-03-25 | Stop reason: SDUPTHER

## 2019-06-27 RX ORDER — CHLORTHALIDONE 25 MG/1
TABLET ORAL
Qty: 30 TABLET | Refills: 2 | Status: SHIPPED | OUTPATIENT
Start: 2019-06-27 | End: 2019-07-10

## 2019-07-08 ENCOUNTER — LAB VISIT (OUTPATIENT)
Dept: LAB | Facility: HOSPITAL | Age: 54
End: 2019-07-08
Attending: INTERNAL MEDICINE
Payer: MEDICAID

## 2019-07-08 DIAGNOSIS — I15.2 HYPERTENSION ASSOCIATED WITH DIABETES: ICD-10-CM

## 2019-07-08 DIAGNOSIS — E11.59 HYPERTENSION ASSOCIATED WITH DIABETES: ICD-10-CM

## 2019-07-08 PROCEDURE — 80061 LIPID PANEL: CPT

## 2019-07-08 PROCEDURE — 36415 COLL VENOUS BLD VENIPUNCTURE: CPT | Mod: PO

## 2019-07-08 PROCEDURE — 80053 COMPREHEN METABOLIC PANEL: CPT

## 2019-07-08 PROCEDURE — 83036 HEMOGLOBIN GLYCOSYLATED A1C: CPT

## 2019-07-09 LAB
ALBUMIN SERPL BCP-MCNC: 3.6 G/DL (ref 3.5–5.2)
ALP SERPL-CCNC: 48 U/L (ref 55–135)
ALT SERPL W/O P-5'-P-CCNC: 16 U/L (ref 10–44)
ANION GAP SERPL CALC-SCNC: 15 MMOL/L (ref 8–16)
AST SERPL-CCNC: 18 U/L (ref 10–40)
BILIRUB SERPL-MCNC: 0.2 MG/DL (ref 0.1–1)
BUN SERPL-MCNC: 10 MG/DL (ref 6–20)
CALCIUM SERPL-MCNC: 10 MG/DL (ref 8.7–10.5)
CHLORIDE SERPL-SCNC: 98 MMOL/L (ref 95–110)
CHOLEST SERPL-MCNC: 196 MG/DL (ref 120–199)
CHOLEST/HDLC SERPL: 4.1 {RATIO} (ref 2–5)
CO2 SERPL-SCNC: 26 MMOL/L (ref 23–29)
CREAT SERPL-MCNC: 1.1 MG/DL (ref 0.5–1.4)
EST. GFR  (AFRICAN AMERICAN): >60 ML/MIN/1.73 M^2
EST. GFR  (NON AFRICAN AMERICAN): 57.9 ML/MIN/1.73 M^2
ESTIMATED AVG GLUCOSE: 140 MG/DL (ref 68–131)
GLUCOSE SERPL-MCNC: 128 MG/DL (ref 70–110)
HBA1C MFR BLD HPLC: 6.5 % (ref 4–5.6)
HDLC SERPL-MCNC: 48 MG/DL (ref 40–75)
HDLC SERPL: 24.5 % (ref 20–50)
LDLC SERPL CALC-MCNC: 105.8 MG/DL (ref 63–159)
NONHDLC SERPL-MCNC: 148 MG/DL
POTASSIUM SERPL-SCNC: 3.1 MMOL/L (ref 3.5–5.1)
PROT SERPL-MCNC: 7.4 G/DL (ref 6–8.4)
SODIUM SERPL-SCNC: 139 MMOL/L (ref 136–145)
TRIGL SERPL-MCNC: 211 MG/DL (ref 30–150)

## 2019-07-10 ENCOUNTER — OFFICE VISIT (OUTPATIENT)
Dept: INTERNAL MEDICINE | Facility: CLINIC | Age: 54
End: 2019-07-10
Payer: MEDICAID

## 2019-07-10 VITALS
RESPIRATION RATE: 16 BRPM | TEMPERATURE: 98 F | HEIGHT: 62 IN | BODY MASS INDEX: 32.09 KG/M2 | HEART RATE: 101 BPM | SYSTOLIC BLOOD PRESSURE: 142 MMHG | WEIGHT: 174.38 LBS | DIASTOLIC BLOOD PRESSURE: 94 MMHG

## 2019-07-10 DIAGNOSIS — E11.9 CONTROLLED TYPE 2 DIABETES MELLITUS WITHOUT COMPLICATION, WITHOUT LONG-TERM CURRENT USE OF INSULIN: ICD-10-CM

## 2019-07-10 DIAGNOSIS — M54.50 CHRONIC MIDLINE LOW BACK PAIN WITHOUT SCIATICA: ICD-10-CM

## 2019-07-10 DIAGNOSIS — M25.511 ACUTE PAIN OF RIGHT SHOULDER: ICD-10-CM

## 2019-07-10 DIAGNOSIS — E78.2 MIXED HYPERLIPIDEMIA: ICD-10-CM

## 2019-07-10 DIAGNOSIS — I10 ESSENTIAL HYPERTENSION: Primary | ICD-10-CM

## 2019-07-10 DIAGNOSIS — N18.30 CKD (CHRONIC KIDNEY DISEASE) STAGE 3, GFR 30-59 ML/MIN: ICD-10-CM

## 2019-07-10 DIAGNOSIS — G89.29 CHRONIC MIDLINE LOW BACK PAIN WITHOUT SCIATICA: ICD-10-CM

## 2019-07-10 DIAGNOSIS — E87.6 HYPOKALEMIA: ICD-10-CM

## 2019-07-10 PROCEDURE — 99999 PR PBB SHADOW E&M-EST. PATIENT-LVL III: ICD-10-PCS | Mod: PBBFAC,,, | Performed by: INTERNAL MEDICINE

## 2019-07-10 PROCEDURE — 99213 OFFICE O/P EST LOW 20 MIN: CPT | Mod: PBBFAC,PO,25 | Performed by: INTERNAL MEDICINE

## 2019-07-10 PROCEDURE — 96372 THER/PROPH/DIAG INJ SC/IM: CPT | Mod: PBBFAC,PO

## 2019-07-10 PROCEDURE — 99214 OFFICE O/P EST MOD 30 MIN: CPT | Mod: S$PBB,,, | Performed by: INTERNAL MEDICINE

## 2019-07-10 PROCEDURE — 99999 PR PBB SHADOW E&M-EST. PATIENT-LVL III: CPT | Mod: PBBFAC,,, | Performed by: INTERNAL MEDICINE

## 2019-07-10 PROCEDURE — 99214 PR OFFICE/OUTPT VISIT, EST, LEVL IV, 30-39 MIN: ICD-10-PCS | Mod: S$PBB,,, | Performed by: INTERNAL MEDICINE

## 2019-07-10 RX ORDER — KETOROLAC TROMETHAMINE 30 MG/ML
30 INJECTION, SOLUTION INTRAMUSCULAR; INTRAVENOUS
Status: COMPLETED | OUTPATIENT
Start: 2019-07-10 | End: 2019-07-10

## 2019-07-10 RX ORDER — SPIRONOLACTONE 25 MG/1
25 TABLET ORAL DAILY
Qty: 30 TABLET | Refills: 11 | Status: SHIPPED | OUTPATIENT
Start: 2019-07-10 | End: 2020-04-16

## 2019-07-10 RX ORDER — SPIRONOLACTONE 25 MG/1
25 TABLET ORAL DAILY
Qty: 30 TABLET | Refills: 11 | Status: SHIPPED | OUTPATIENT
Start: 2019-07-10 | End: 2019-07-10 | Stop reason: SDUPTHER

## 2019-07-10 RX ADMIN — KETOROLAC TROMETHAMINE 30 MG: 30 INJECTION, SOLUTION INTRAMUSCULAR; INTRAVENOUS at 02:07

## 2019-07-11 NOTE — PROGRESS NOTES
Subjective:       Patient ID: Sherie Reyes is a 54 y.o. female.    Chief Complaint: Follow-up (2 week- HTN)    Patient is a 54 y.o.female who presents today for follow up on htn    At last visit:    1. Essential hypertension  - stable but potassium is low; will swap chlorthalidone to spironolactone; f/u in 2 weeks for bp check  - BP has been doing well at home; has been taking 40 meq of potassium daily    - her potassium was checked yesterday and was normal      Left upper quadrant pain : started one week ago; no change in BMs. No fever or chills.     Notes exacerbation of pain to multiple joints; especially ribs, shoulders, knees and back  Review of Systems   Constitutional: Negative for activity change, appetite change, chills, diaphoresis, fever and unexpected weight change.   HENT: Negative for congestion, ear discharge, ear pain, hearing loss, postnasal drip, rhinorrhea, tinnitus, trouble swallowing and voice change.    Eyes: Negative for discharge, redness, itching and visual disturbance.   Respiratory: Negative for cough, chest tightness, shortness of breath and wheezing.    Cardiovascular: Negative for chest pain, palpitations and leg swelling.   Gastrointestinal: Positive for abdominal pain. Negative for blood in stool, constipation, diarrhea, nausea and vomiting.   Endocrine: Positive for polyuria. Negative for cold intolerance, heat intolerance and polydipsia.   Genitourinary: Negative for difficulty urinating, dysuria, flank pain, hematuria, menstrual problem and urgency.   Musculoskeletal: Positive for arthralgias, joint swelling and neck pain. Negative for gait problem, myalgias and neck stiffness.   Skin: Negative for color change and rash.   Neurological: Positive for weakness. Negative for dizziness, seizures, syncope and headaches.   Hematological: Negative for adenopathy.   Psychiatric/Behavioral: Positive for dysphoric mood. Negative for agitation, behavioral problems, confusion and  sleep disturbance.       Objective:      Physical Exam   Constitutional: She is oriented to person, place, and time. She appears well-developed and well-nourished. No distress.   HENT:   Head: Normocephalic and atraumatic.   Right Ear: External ear normal.   Left Ear: External ear normal.   Nose: Nose normal.   Mouth/Throat: Oropharynx is clear and moist. No oropharyngeal exudate.   Eyes: Pupils are equal, round, and reactive to light. Conjunctivae and EOM are normal. Right eye exhibits no discharge. Left eye exhibits no discharge. No scleral icterus.   Neck: Neck supple. No JVD present. No tracheal deviation present. No thyromegaly present.   Cardiovascular: Normal rate, normal heart sounds and intact distal pulses. Exam reveals no gallop and no friction rub.   No murmur heard.  Pulmonary/Chest: Effort normal and breath sounds normal. No stridor. No respiratory distress. She has no wheezes. She has no rales. She exhibits no tenderness.   Abdominal: Soft. Bowel sounds are normal. She exhibits distension. There is no tenderness. There is no rebound.   Musculoskeletal: She exhibits no edema or tenderness.   Lymphadenopathy:     She has no cervical adenopathy.   Neurological: She is alert and oriented to person, place, and time.   Skin: Skin is warm and dry. No rash noted. She is not diaphoretic. No erythema.   Psychiatric: She has a normal mood and affect. Her behavior is normal.   Nursing note and vitals reviewed.      Assessment and Plan:       1. Essential hypertension  - stable  - Comprehensive metabolic panel; Future    2. Left upper quadrant pain  - CT Abdomen Without Contrast; Future    3. Chronic pain of multiple joints  - ORLANDO Screen w/Reflex; Future  - Anti-DNA antibody, double-stranded; Future  - Cyclic citrul peptide antibody, IgG; Future  - Rheumatoid factor; Future  - ketorolac injection 60 mg    4. Hypokalemia  - decrease potassium to 20 meq daily and repeat in 2 weeks          No follow-ups on file.

## 2019-07-12 ENCOUNTER — PATIENT OUTREACH (OUTPATIENT)
Dept: OTHER | Facility: OTHER | Age: 54
End: 2019-07-12

## 2019-07-12 NOTE — PROGRESS NOTES
Last 5 Patient Entered Readings                                      Current 30 Day Average: 132/90     Recent Readings 7/10/2019 7/7/2019 7/7/2019 6/30/2019 6/29/2019    SBP (mmHg) 125 119 112 107 131    DBP (mmHg) 84 82 77 78 89    Pulse 83 99 78 88 91        Reports her BP is better at night after she takes her muscle relaxer.    Digital Medicine: Health  Follow Up    Lifestyle Modifications:    1.Dietary Modifications (Sodium intake <2,000mg/day, food labels, dining out): Patient reports she has been trying to keep her sugar down since she is pre diabetic.  Reports her potassium is low and does not like to take potassium supplements.  Encouraged patient to try to increase intake of foods with potassium like avocado, spinach, broccoli and brussels sprouts.    2.Physical Activity: deferred    3.Medication Therapy: Patient has been compliant with the medication regimen.    4.Patient has the following medication side effects/concerns: none  (Frequency/Alleviating factors/Precipitating factors, etc.)     Follow up with Shorty Sherie Taty Reyes completed. No further questions or concerns. Will continue to follow up to achieve health goals.

## 2019-07-12 NOTE — PROGRESS NOTES
Last 5 Patient Entered Readings                                      Current 30 Day Average: 132/90     Recent Readings 7/10/2019 7/7/2019 7/7/2019 6/30/2019 6/29/2019    SBP (mmHg) 125 119 112 107 131    DBP (mmHg) 84 82 77 78 89    Pulse 83 99 78 88 91          7/12: LVM. Will call in 4 weeks.

## 2019-07-16 NOTE — PROGRESS NOTES
Last 5 Patient Entered Readings                                      Current 30 Day Average: 133/90     Recent Readings 7/15/2019 7/10/2019 7/7/2019 7/7/2019 6/30/2019    SBP (mmHg) 138 125 119 112 107    DBP (mmHg) 86 84 82 77 78    Pulse 107 83 99 78 88          Patient seen by Dr. Lafluer 7/10/19:  1. Essential hypertension  - stable but potassium is low; will swap chlorthalidone to spironolactone; f/u in 2 weeks  - continue other bp meds  - bmp in 2 weeks  - continue potassium supplement for now; pt not very compliant with med therefore, level was low; hopefully starting potassium sparing diuretic may help us stop the potassium supplement; it makes her nauseated.    BMP 7/24 and PCP appointment 7/25.  Only 1 home BP reading since medication change and at goal of < 140/90 mmHg. Continue to monitor.

## 2019-07-17 RX ORDER — POTASSIUM CHLORIDE 20 MEQ/1
TABLET, EXTENDED RELEASE ORAL
Qty: 120 TABLET | Refills: 4 | Status: SHIPPED | OUTPATIENT
Start: 2019-07-17 | End: 2019-08-16

## 2019-07-23 ENCOUNTER — PATIENT MESSAGE (OUTPATIENT)
Dept: OTHER | Facility: OTHER | Age: 54
End: 2019-07-23

## 2019-07-24 ENCOUNTER — LAB VISIT (OUTPATIENT)
Dept: LAB | Facility: HOSPITAL | Age: 54
End: 2019-07-24
Attending: INTERNAL MEDICINE
Payer: MEDICAID

## 2019-07-24 DIAGNOSIS — E87.6 HYPOKALEMIA: ICD-10-CM

## 2019-07-24 LAB
ANION GAP SERPL CALC-SCNC: 9 MMOL/L (ref 8–16)
BUN SERPL-MCNC: 9 MG/DL (ref 6–20)
CALCIUM SERPL-MCNC: 9.9 MG/DL (ref 8.7–10.5)
CHLORIDE SERPL-SCNC: 105 MMOL/L (ref 95–110)
CO2 SERPL-SCNC: 23 MMOL/L (ref 23–29)
CREAT SERPL-MCNC: 0.9 MG/DL (ref 0.5–1.4)
EST. GFR  (AFRICAN AMERICAN): >60 ML/MIN/1.73 M^2
EST. GFR  (NON AFRICAN AMERICAN): >60 ML/MIN/1.73 M^2
GLUCOSE SERPL-MCNC: 113 MG/DL (ref 70–110)
POTASSIUM SERPL-SCNC: 4.1 MMOL/L (ref 3.5–5.1)
SODIUM SERPL-SCNC: 137 MMOL/L (ref 136–145)

## 2019-07-24 PROCEDURE — 36415 COLL VENOUS BLD VENIPUNCTURE: CPT | Mod: PO

## 2019-07-24 PROCEDURE — 80048 BASIC METABOLIC PNL TOTAL CA: CPT

## 2019-07-25 ENCOUNTER — LAB VISIT (OUTPATIENT)
Dept: LAB | Facility: HOSPITAL | Age: 54
End: 2019-07-25
Attending: INTERNAL MEDICINE
Payer: MEDICAID

## 2019-07-25 ENCOUNTER — OFFICE VISIT (OUTPATIENT)
Dept: INTERNAL MEDICINE | Facility: CLINIC | Age: 54
End: 2019-07-25
Payer: MEDICAID

## 2019-07-25 VITALS
RESPIRATION RATE: 18 BRPM | HEIGHT: 62 IN | SYSTOLIC BLOOD PRESSURE: 138 MMHG | HEART RATE: 100 BPM | TEMPERATURE: 98 F | BODY MASS INDEX: 33.55 KG/M2 | WEIGHT: 182.31 LBS | DIASTOLIC BLOOD PRESSURE: 86 MMHG

## 2019-07-25 DIAGNOSIS — M25.50 CHRONIC PAIN OF MULTIPLE JOINTS: ICD-10-CM

## 2019-07-25 DIAGNOSIS — R10.12 LEFT UPPER QUADRANT PAIN: ICD-10-CM

## 2019-07-25 DIAGNOSIS — G89.29 CHRONIC PAIN OF MULTIPLE JOINTS: ICD-10-CM

## 2019-07-25 DIAGNOSIS — E87.6 HYPOKALEMIA: ICD-10-CM

## 2019-07-25 DIAGNOSIS — I10 ESSENTIAL HYPERTENSION: Primary | ICD-10-CM

## 2019-07-25 PROCEDURE — 86431 RHEUMATOID FACTOR QUANT: CPT

## 2019-07-25 PROCEDURE — 86200 CCP ANTIBODY: CPT

## 2019-07-25 PROCEDURE — 86038 ANTINUCLEAR ANTIBODIES: CPT

## 2019-07-25 PROCEDURE — 96372 THER/PROPH/DIAG INJ SC/IM: CPT | Mod: PBBFAC,PO

## 2019-07-25 PROCEDURE — 36415 COLL VENOUS BLD VENIPUNCTURE: CPT | Mod: PO

## 2019-07-25 PROCEDURE — 99214 OFFICE O/P EST MOD 30 MIN: CPT | Mod: S$PBB,,, | Performed by: INTERNAL MEDICINE

## 2019-07-25 PROCEDURE — 99214 PR OFFICE/OUTPT VISIT, EST, LEVL IV, 30-39 MIN: ICD-10-PCS | Mod: S$PBB,,, | Performed by: INTERNAL MEDICINE

## 2019-07-25 PROCEDURE — 99999 PR PBB SHADOW E&M-EST. PATIENT-LVL III: ICD-10-PCS | Mod: PBBFAC,,, | Performed by: INTERNAL MEDICINE

## 2019-07-25 PROCEDURE — 86225 DNA ANTIBODY NATIVE: CPT

## 2019-07-25 PROCEDURE — 99999 PR PBB SHADOW E&M-EST. PATIENT-LVL III: CPT | Mod: PBBFAC,,, | Performed by: INTERNAL MEDICINE

## 2019-07-25 PROCEDURE — 99213 OFFICE O/P EST LOW 20 MIN: CPT | Mod: PBBFAC,PO,25 | Performed by: INTERNAL MEDICINE

## 2019-07-25 RX ORDER — GABAPENTIN 600 MG/1
TABLET ORAL
Refills: 5 | Status: ON HOLD | COMMUNITY
Start: 2019-07-10 | End: 2020-07-09 | Stop reason: HOSPADM

## 2019-07-25 RX ORDER — KETOROLAC TROMETHAMINE 30 MG/ML
60 INJECTION, SOLUTION INTRAMUSCULAR; INTRAVENOUS ONCE
Status: COMPLETED | OUTPATIENT
Start: 2019-07-25 | End: 2019-07-25

## 2019-07-25 RX ORDER — TRAMADOL HYDROCHLORIDE 50 MG/1
TABLET ORAL
Refills: 3 | COMMUNITY
Start: 2019-07-08 | End: 2020-03-17 | Stop reason: SDUPTHER

## 2019-07-25 RX ADMIN — KETOROLAC TROMETHAMINE 60 MG: 30 INJECTION, SOLUTION INTRAMUSCULAR; INTRAVENOUS at 01:07

## 2019-07-26 ENCOUNTER — PATIENT MESSAGE (OUTPATIENT)
Dept: INTERNAL MEDICINE | Facility: CLINIC | Age: 54
End: 2019-07-26

## 2019-07-26 LAB
ANA SER QL IF: NORMAL
CCP AB SER IA-ACNC: <0.5 U/ML
DSDNA AB SER-ACNC: NORMAL [IU]/ML
RHEUMATOID FACT SERPL-ACNC: <10 IU/ML (ref 0–15)

## 2019-07-26 NOTE — TELEPHONE ENCOUNTER
The results are low b/c they are so far negative for lupus or rheumatoid arthritis; her OR[place]O is still pending though

## 2019-07-28 ENCOUNTER — PATIENT MESSAGE (OUTPATIENT)
Dept: INTERNAL MEDICINE | Facility: CLINIC | Age: 54
End: 2019-07-28

## 2019-07-29 ENCOUNTER — PATIENT MESSAGE (OUTPATIENT)
Dept: INTERNAL MEDICINE | Facility: CLINIC | Age: 54
End: 2019-07-29

## 2019-07-29 NOTE — TELEPHONE ENCOUNTER
Pt is seeing a workman comp doctor for her pain; should f/u with that doctor. If this is unrelated to that, we can refer her to ortho for her knee

## 2019-08-07 ENCOUNTER — PATIENT MESSAGE (OUTPATIENT)
Dept: ADMINISTRATIVE | Facility: OTHER | Age: 54
End: 2019-08-07

## 2019-08-08 ENCOUNTER — PATIENT MESSAGE (OUTPATIENT)
Dept: OTHER | Facility: OTHER | Age: 54
End: 2019-08-08

## 2019-08-08 ENCOUNTER — LAB VISIT (OUTPATIENT)
Dept: LAB | Facility: HOSPITAL | Age: 54
End: 2019-08-08
Attending: INTERNAL MEDICINE
Payer: MEDICAID

## 2019-08-08 DIAGNOSIS — I10 ESSENTIAL HYPERTENSION: ICD-10-CM

## 2019-08-08 LAB
ALBUMIN SERPL BCP-MCNC: 3.6 G/DL (ref 3.5–5.2)
ALP SERPL-CCNC: 64 U/L (ref 55–135)
ALT SERPL W/O P-5'-P-CCNC: 22 U/L (ref 10–44)
ANION GAP SERPL CALC-SCNC: 15 MMOL/L (ref 8–16)
AST SERPL-CCNC: 28 U/L (ref 10–40)
BILIRUB SERPL-MCNC: 0.3 MG/DL (ref 0.1–1)
BUN SERPL-MCNC: 10 MG/DL (ref 6–20)
CALCIUM SERPL-MCNC: 10.8 MG/DL (ref 8.7–10.5)
CHLORIDE SERPL-SCNC: 101 MMOL/L (ref 95–110)
CO2 SERPL-SCNC: 18 MMOL/L (ref 23–29)
CREAT SERPL-MCNC: 1.1 MG/DL (ref 0.5–1.4)
EST. GFR  (AFRICAN AMERICAN): >60 ML/MIN/1.73 M^2
EST. GFR  (NON AFRICAN AMERICAN): 57.9 ML/MIN/1.73 M^2
GLUCOSE SERPL-MCNC: 110 MG/DL (ref 70–110)
POTASSIUM SERPL-SCNC: 3.9 MMOL/L (ref 3.5–5.1)
PROT SERPL-MCNC: 8 G/DL (ref 6–8.4)
SODIUM SERPL-SCNC: 134 MMOL/L (ref 136–145)

## 2019-08-08 PROCEDURE — 36415 COLL VENOUS BLD VENIPUNCTURE: CPT | Mod: PO

## 2019-08-08 PROCEDURE — 80053 COMPREHEN METABOLIC PANEL: CPT

## 2019-08-09 ENCOUNTER — PATIENT MESSAGE (OUTPATIENT)
Dept: INTERNAL MEDICINE | Facility: CLINIC | Age: 54
End: 2019-08-09

## 2019-08-16 RX ORDER — POTASSIUM CHLORIDE 20 MEQ/1
40 TABLET, EXTENDED RELEASE ORAL DAILY
Qty: 30 TABLET | Refills: 0 | Status: SHIPPED | OUTPATIENT
Start: 2019-08-16 | End: 2020-07-10

## 2019-08-16 RX ORDER — POTASSIUM CHLORIDE 20 MEQ/1
TABLET, EXTENDED RELEASE ORAL
Qty: 120 TABLET | Refills: 0 | OUTPATIENT
Start: 2019-08-16

## 2019-08-16 NOTE — TELEPHONE ENCOUNTER
Clarify with pt: on last note, we decreased potassium to 20 mg daily. Is that still how she is taking it?

## 2019-08-16 NOTE — TELEPHONE ENCOUNTER
Spoke to pt. Pt stated that she is taking the potassium 20 meq 1 capsule once daily. She stated that she has plenty. She stated that this was just an automatic refill.

## 2019-09-01 RX ORDER — GEMFIBROZIL 600 MG/1
TABLET, FILM COATED ORAL
Qty: 60 TABLET | Refills: 5 | Status: SHIPPED | OUTPATIENT
Start: 2019-09-01 | End: 2020-09-15

## 2019-09-03 ENCOUNTER — PATIENT OUTREACH (OUTPATIENT)
Dept: OTHER | Facility: OTHER | Age: 54
End: 2019-09-03

## 2019-09-03 NOTE — PROGRESS NOTES
Last 5 Patient Entered Readings                                      Current 30 Day Average: 140/88     Recent Readings 8/31/2019 8/22/2019 8/17/2019 8/16/2019 8/16/2019    SBP (mmHg) 161 102 156 159 144    DBP (mmHg) 102 74 74 100 96    Pulse 95 76 95 104 101          Digital Medicine: Health  Follow Up    Lifestyle Modifications:    1.Dietary Modifications (Sodium intake <2,000mg/day, food labels, dining out): deferred    2.Physical Activity: deferred    3.Medication Therapy: Patient has been compliant with the medication regimen.    4.Patient has the following medication side effects/concerns: Reports she forgot to take medication last night and did not want to double up on medication.  States she took her medication today and will take a BP reading later today.  Reports having a headache this morning.  States she will start using a pill box again.   (Frequency/Alleviating factors/Precipitating factors, etc.)     Follow up with Mrs. Sherie Posey Amy completed. No further questions or concerns. Will continue to follow up to achieve health goals.

## 2019-09-03 NOTE — PROGRESS NOTES
Last 5 Patient Entered Readings                                      Current 30 Day Average: 140/88     Recent Readings 8/31/2019 8/22/2019 8/17/2019 8/16/2019 8/16/2019    SBP (mmHg) 161 102 156 159 144    DBP (mmHg) 102 74 74 100 96    Pulse 95 76 95 104 101          See health  note. Patient attributes elevated BP to missed medication dose. She agreed to resume using pill box.

## 2019-09-03 NOTE — PROGRESS NOTES
Last 5 Patient Entered Readings                                      Current 30 Day Average: 140/88     Recent Readings 8/31/2019 8/22/2019 8/17/2019 8/16/2019 8/16/2019    SBP (mmHg) 161 102 156 159 144    DBP (mmHg) 102 74 74 100 96    Pulse 95 76 95 104 101            Digital Medicine: Health  Follow Up    Left voicemail to follow up with Mrs. Sherie Posey Amy.  Current BP average 140/88 mmHg is not at goal, 130/80 mmHg.    Will call in 1 week.  Patient had high BP reading on 8/31 of 160/102.

## 2019-09-10 ENCOUNTER — PATIENT OUTREACH (OUTPATIENT)
Dept: OTHER | Facility: OTHER | Age: 54
End: 2019-09-10

## 2019-09-10 NOTE — PROGRESS NOTES
Left voicemail requesting call back  K+ improved with change from chlorthalidone to spironolactone but more frequent DBP elevations. Mildly low Na+.  BP remains variable, likely due to pain and tizanidine. Average remain at patient's goal of < 140/90 mmHg.

## 2019-09-11 NOTE — PROGRESS NOTES
Digital Medicine: Clinician Follow-Up    Patient does not feel any differently since changing chlorthalidone to spironolactone.    Usually takes 1 tablet of Tizanidine ~ 7-8pm. Takes a second tablet about twice per week.    Patient reports headaches sometimes in morning, may be related to neck injury        Follow Up  Follow-up reason(s): reading review              Sleep Apnea  Patient not previously diagnosed with JANIS and         Medication Adherence:     reports 1 missed BP medication dose ~ 1-2 weeks ago  Patient is not selectively taking diuretics.    She does not wonder if medications are working.  She knows purpose of medications.        INTERVENTION(S)  reviewed appropriate dose schedule and encouragement/support    PLAN  patient verbalizes understanding    Continue to balance high BP readings and low readings with tizanidine.  Average remains at goal of <140/90 mmHg  Encouraged patient to take more BP readings earlier in the day to better assess control (at least 45 min - 1 hour after morning BP medications)      There are no preventive care reminders to display for this patient.    Last 5 Patient Entered Readings                                      Current 30 Day Average: 139/88     Recent Readings 9/7/2019 9/7/2019 9/7/2019 9/3/2019 8/31/2019    SBP (mmHg) 154 173 94 101 161    DBP (mmHg) 104 104 64 72 102    Pulse 113 110 92 87 95             Hypertension Medications             amLODIPine (NORVASC) 10 MG tablet Take 0.5 tablet (5 mg total) by mouth once daily. (10am/10pm)    cloNIDine (CATAPRES) 0.1 MG tablet Take 1 tablet (0.1 mg total) by mouth every 12 (twelve) hours as needed (only for blood pressure greater than 180/120).    spironolactone (ALDACTONE) 25 MG tablet Take 1 tablet (25 mg total) by mouth once daily. (10am-12pm)

## 2019-09-19 DIAGNOSIS — I10 ESSENTIAL HYPERTENSION: ICD-10-CM

## 2019-09-19 RX ORDER — CHLORTHALIDONE 25 MG/1
TABLET ORAL
Qty: 30 TABLET | Refills: 0 | OUTPATIENT
Start: 2019-09-19

## 2019-09-29 RX ORDER — GEMFIBROZIL 600 MG/1
TABLET, FILM COATED ORAL
Qty: 60 TABLET | Refills: 0 | Status: ON HOLD | OUTPATIENT
Start: 2019-09-29 | End: 2019-12-02 | Stop reason: HOSPADM

## 2019-10-01 NOTE — PROGRESS NOTES
Chart reviewed  No medication recommendations at this time  Continue to balance high BP readings and low readings 2/2 tizanidine use  BP average at patient's goal of < 140/90 mmHg

## 2019-10-03 ENCOUNTER — PATIENT OUTREACH (OUTPATIENT)
Dept: OTHER | Facility: OTHER | Age: 54
End: 2019-10-03

## 2019-10-03 NOTE — PROGRESS NOTES
Digital Medicine: Health  Follow-Up    Reports she has been experiencing headaches in the morning.  States she takes naps throughout the day, which makes it hard to fall asleep.  States she started a new medication prescribed by Ortho which is making her nauseous and has been experiencing diarrhea.  Informed patient importance of staying hydrated.     The history is provided by the patient.     Follow Up  Follow-up reason(s): reading review      Readings are trending down due to drug-drug interaction.  Reports taking multiple medications to help fall asleep.          Diet:       Reports she is not drinking enough water      SDOH    INTERVENTION(S)  encouragement/support and goal setting    PLAN  patient verbalizes understanding    Encouraged patient to reduce naps throughout the day by doing an activity while watching TV, like puzzles.  States she will try this.  Set SMG.      There are no preventive care reminders to display for this patient.    Last 5 Patient Entered Readings                                      Current 30 Day Average: 129/86     Recent Readings 9/28/2019 9/23/2019 9/21/2019 9/19/2019 9/11/2019    SBP (mmHg) 132 100 99 152 163    DBP (mmHg) 88 61 69 94 102    Pulse 78 90 88 107 110

## 2019-10-10 ENCOUNTER — TELEPHONE (OUTPATIENT)
Dept: ORTHOPEDICS | Facility: CLINIC | Age: 54
End: 2019-10-10

## 2019-10-10 NOTE — TELEPHONE ENCOUNTER
Returned call to Estefany told her to call the med legal dept at [facility]. Gave her the phone #.

## 2019-10-11 ENCOUNTER — PATIENT OUTREACH (OUTPATIENT)
Dept: OTHER | Facility: OTHER | Age: 54
End: 2019-10-11

## 2019-10-11 NOTE — PROGRESS NOTES
"Digital Medicine: Clinician Follow-Up    Patient reports low BP to "staving off dehydration for 3 weeks." States her stomach did not agree with medication changes made by orthopedist (non-[facility] physician). States she feels fine until she eats dinner and then has diarrhea and nauseous the remainder of the night.    The history is provided by the patient.     Follow Up  Follow-up reason(s): reading review and medication change      Alert received. Care Team received low BP alert.            Sleep Apnea  Patient not previously diagnosed with JANIS and     Medication Affordability  Patient is currently having problems affording medications       INTERVENTION(S)  reviewed appropriate dose schedule and recommended med change    PLAN  patient verbalizes understanding and patient amenable to changes    Advised patient to hold spironolactone for time being  Advised her to hold evening dose of amlodipine 5 mg tonight then resume 5mg BID  Encouraged patient to follow up with her orthopedist      There are no preventive care reminders to display for this patient.    Last 5 Patient Entered Readings                                      Current 30 Day Average: 126/79     Recent Readings 10/11/2019 10/11/2019 10/11/2019 10/5/2019 10/4/2019    SBP (mmHg) 93 89 88 101 167    DBP (mmHg) 53 55 58 67 95    Pulse 82 89 108 82 110             Hypertension Medications             amLODIPine (NORVASC) 10 MG tablet Take 1 tablet (10 mg total) by mouth once daily. (5 mg BID)    cloNIDine (CATAPRES) 0.1 MG tablet Take 1 tablet (0.1 mg total) by mouth every 12 (twelve) hours as needed (only for blood pressure greater than 180/120).    spironolactone (ALDACTONE) 25 MG tablet Take 1 tablet (25 mg total) by mouth once daily.               "

## 2019-10-15 ENCOUNTER — PATIENT OUTREACH (OUTPATIENT)
Dept: OTHER | Facility: OTHER | Age: 54
End: 2019-10-15

## 2019-10-16 NOTE — PROGRESS NOTES
Digital Medicine: Clinician Follow-Up    Patient confirms holding spironolactone  States she continues to try to contact her orthopedist. She stopped the medication he prescribed because it was making her sick.  States she is starting to feel better and feels dehydration is improving.    The history is provided by the patient.     Follow Up  Follow-up reason(s): reading review and medication change follow-up      Patient started new medication.    Is patient tolerating med change?:  Yes          Sleep Apnea  Patient not previously diagnosed with JANIS and         Medication Adherence:     holding spironolactone as advised  She does not wonder if medications are working.  She knows purpose of medications.        INTERVENTION(S)  reviewed appropriate dose schedule    PLAN  patient verbalizes understanding and additional monitoring needed    Encouraged patient to continue trying to contact orthopedist  Advised to continue holding spironolactone at this time. Requested at least 2-3 BP readings per week to assess BP control and need to resume spironolactone.      There are no preventive care reminders to display for this patient.    Last 5 Patient Entered Readings                                      Current 30 Day Average: 134/78     Recent Readings 10/12/2019 10/11/2019 10/11/2019 10/11/2019 10/11/2019    SBP (mmHg) 133 184 93 89 88    DBP (mmHg) 88 106 53 55 58    Pulse 80 103 82 89 108             Hypertension Medications             amLODIPine (NORVASC) 10 MG tablet Take 0.5 tablet by mouth BID    cloNIDine (CATAPRES) 0.1 MG tablet Take 1 tablet (0.1 mg total) by mouth every 12 (twelve) hours as needed (only for blood pressure greater than 180/120).    spironolactone (ALDACTONE) 25 MG tablet Take 1 tablet (25 mg total) by mouth once daily. - HOLDING

## 2019-10-17 DIAGNOSIS — E78.2 MIXED HYPERLIPIDEMIA: ICD-10-CM

## 2019-10-17 RX ORDER — PRAVASTATIN SODIUM 20 MG/1
TABLET ORAL
Qty: 30 TABLET | Refills: 6 | Status: SHIPPED | OUTPATIENT
Start: 2019-10-17 | End: 2020-04-24 | Stop reason: SDUPTHER

## 2019-10-22 ENCOUNTER — PATIENT MESSAGE (OUTPATIENT)
Dept: INTERNAL MEDICINE | Facility: CLINIC | Age: 54
End: 2019-10-22

## 2019-10-22 ENCOUNTER — PATIENT OUTREACH (OUTPATIENT)
Dept: OTHER | Facility: OTHER | Age: 54
End: 2019-10-22

## 2019-10-22 DIAGNOSIS — E11.9 CONTROLLED TYPE 2 DIABETES MELLITUS WITHOUT COMPLICATION, WITHOUT LONG-TERM CURRENT USE OF INSULIN: Primary | ICD-10-CM

## 2019-10-22 NOTE — PROGRESS NOTES
Digital Medicine: Clinician Follow-Up    Patient states she is sick. States hydration is probably not good.  She is still holding spironolactone.    The history is provided by the patient.     Follow Up  Follow-up reason(s): reading review              Sleep Apnea  Patient not previously diagnosed with JANIS and         Medication Adherence:     She does not wonder if medications are working.  She knows purpose of medications.        INTERVENTION(S)  reviewed appropriate dose schedule and encouragement/support    PLAN  patient verbalizes understanding    Advised per chart that Dr. Bernal has recommended patient make an appointment for her symptoms.  Advised to continue holding spironolactone at this time; advised to resume if SBP consistently > 140 mmHg.      There are no preventive care reminders to display for this patient.    Last 5 Patient Entered Readings                                      Current 30 Day Average: 141/80     Recent Readings 10/20/2019 10/19/2019 10/19/2019 10/17/2019 10/17/2019    SBP (mmHg) 178 145 112 99 90    DBP (mmHg) 108 93 71 67 63    Pulse 105 101 91 89 82             Hypertension Medications             amLODIPine (NORVASC) 10 MG tablet Take 1 tablet (10 mg total) by mouth once daily.    cloNIDine (CATAPRES) 0.1 MG tablet Take 1 tablet (0.1 mg total) by mouth every 12 (twelve) hours as needed (only for blood pressure greater than 180/120).    spironolactone (ALDACTONE) 25 MG tablet Take 1 tablet (25 mg total) by mouth once daily. - holding

## 2019-10-23 ENCOUNTER — PATIENT MESSAGE (OUTPATIENT)
Dept: INTERNAL MEDICINE | Facility: CLINIC | Age: 54
End: 2019-10-23

## 2019-10-23 ENCOUNTER — OFFICE VISIT (OUTPATIENT)
Dept: INTERNAL MEDICINE | Facility: CLINIC | Age: 54
End: 2019-10-23
Payer: MEDICAID

## 2019-10-23 VITALS
RESPIRATION RATE: 18 BRPM | WEIGHT: 187.38 LBS | BODY MASS INDEX: 34.48 KG/M2 | DIASTOLIC BLOOD PRESSURE: 69 MMHG | HEART RATE: 92 BPM | TEMPERATURE: 99 F | HEIGHT: 62 IN | SYSTOLIC BLOOD PRESSURE: 103 MMHG

## 2019-10-23 DIAGNOSIS — J06.9 UPPER RESPIRATORY TRACT INFECTION, UNSPECIFIED TYPE: Primary | ICD-10-CM

## 2019-10-23 LAB
INFLUENZA A, MOLECULAR: NEGATIVE
INFLUENZA B, MOLECULAR: NEGATIVE
SPECIMEN SOURCE: NORMAL

## 2019-10-23 PROCEDURE — 99215 OFFICE O/P EST HI 40 MIN: CPT | Mod: PBBFAC,PO | Performed by: STUDENT IN AN ORGANIZED HEALTH CARE EDUCATION/TRAINING PROGRAM

## 2019-10-23 PROCEDURE — 99999 PR PBB SHADOW E&M-EST. PATIENT-LVL V: ICD-10-PCS | Mod: PBBFAC,,, | Performed by: STUDENT IN AN ORGANIZED HEALTH CARE EDUCATION/TRAINING PROGRAM

## 2019-10-23 PROCEDURE — 99214 OFFICE O/P EST MOD 30 MIN: CPT | Mod: S$PBB,,, | Performed by: STUDENT IN AN ORGANIZED HEALTH CARE EDUCATION/TRAINING PROGRAM

## 2019-10-23 PROCEDURE — 99999 PR PBB SHADOW E&M-EST. PATIENT-LVL V: CPT | Mod: PBBFAC,,, | Performed by: STUDENT IN AN ORGANIZED HEALTH CARE EDUCATION/TRAINING PROGRAM

## 2019-10-23 PROCEDURE — 99214 PR OFFICE/OUTPT VISIT, EST, LEVL IV, 30-39 MIN: ICD-10-PCS | Mod: S$PBB,,, | Performed by: STUDENT IN AN ORGANIZED HEALTH CARE EDUCATION/TRAINING PROGRAM

## 2019-10-23 PROCEDURE — 87502 INFLUENZA DNA AMP PROBE: CPT | Mod: PO

## 2019-10-23 RX ORDER — DICLOFENAC SODIUM AND MISOPROSTOL 75; 200 MG/1; UG/1
TABLET, DELAYED RELEASE ORAL
Refills: 4 | COMMUNITY
Start: 2019-10-14 | End: 2019-11-07

## 2019-10-23 RX ORDER — KETOROLAC TROMETHAMINE 10 MG/1
TABLET, FILM COATED ORAL
Refills: 0 | COMMUNITY
Start: 2019-09-12 | End: 2019-11-07

## 2019-10-23 RX ORDER — AMOXICILLIN AND CLAVULANATE POTASSIUM 875; 125 MG/1; MG/1
1 TABLET, FILM COATED ORAL EVERY 12 HOURS
Qty: 14 TABLET | Refills: 0 | Status: CANCELLED | OUTPATIENT
Start: 2019-10-23 | End: 2019-10-30

## 2019-10-23 RX ORDER — CHLORTHALIDONE 25 MG/1
TABLET ORAL
Refills: 2 | COMMUNITY
Start: 2019-08-23 | End: 2019-12-10

## 2019-10-23 RX ORDER — AMOXICILLIN AND CLAVULANATE POTASSIUM 875; 125 MG/1; MG/1
1 TABLET, FILM COATED ORAL EVERY 12 HOURS
Qty: 20 TABLET | Refills: 0 | Status: SHIPPED | OUTPATIENT
Start: 2019-10-23 | End: 2019-11-02

## 2019-10-23 RX ORDER — PROMETHAZINE HYDROCHLORIDE AND DEXTROMETHORPHAN HYDROBROMIDE 6.25; 15 MG/5ML; MG/5ML
5 SYRUP ORAL EVERY 4 HOURS PRN
Qty: 473 ML | Refills: 0 | Status: SHIPPED | OUTPATIENT
Start: 2019-10-23 | End: 2019-11-02

## 2019-10-23 NOTE — PROGRESS NOTES
Subjective:       Patient ID: Sherie Reyes is a 54 y.o. female.    Chief Complaint: Cough; Fever; Chest Congestion; and Otalgia    HPI       CKD3, HTN, DM2  Former smoker, nondrinker  Currently vaping- with E cigarette  Amlo 10mg, Chlorthalidone 25mg, Clonidine 0.1mg , Spironolactone 100mg   Seroquel, Gabapentin, Cymbalta, Klonopin  Did not get the flu shot     Started feeling bad about 1.5 weeks ago.   Started as a cough. Initially nonproductive, now it is productive. Yellowish. No blood.    works at [employer], he is sick right now. He was sick before she was.   No sinus pain, but she feels very congested   It hurts to swallow. Ongoing productive cough.   Taking tylenol (2-3 pills a day), Not taking anything else.   Coughing wakes her up from sleep   No issues with breathing or difficulties cathing her breath.   Both ears have pain, and feel congested   Has been getting worse over the last few weeks, which is why she came today.     Review of Systems   Constitutional: Negative for fever.   HENT: Positive for congestion, ear pain, rhinorrhea, sinus pressure and sore throat. Negative for postnasal drip, sinus pain and trouble swallowing.    Eyes: Negative for visual disturbance.   Respiratory: Negative for shortness of breath and wheezing.    Cardiovascular: Negative for chest pain.   Gastrointestinal: Negative for abdominal pain.   Genitourinary: Negative for difficulty urinating.   Musculoskeletal: Positive for myalgias. Negative for joint swelling.   Skin: Negative for rash.   Allergic/Immunologic: Negative for immunocompromised state.   Neurological: Negative for syncope.   Hematological: Does not bruise/bleed easily.       Objective:      Physical Exam   Constitutional: She is oriented to person, place, and time. She appears well-developed. No distress.   HENT:   Head: Normocephalic.   Eyes: Pupils are equal, round, and reactive to light. No scleral icterus.   Neck: No JVD present.   Cardiovascular:  Normal rate.   Pulmonary/Chest: Breath sounds normal.   Abdominal: Soft.   Musculoskeletal: She exhibits no edema.   Neurological: She is alert and oriented to person, place, and time.   Psychiatric: She has a normal mood and affect.   Vitals reviewed.      Assessment:       1. Upper respiratory tract infection, unspecified type        Plan:        Ms Reyes is a 55 yo woman with DM2 and current smoker who presents with worsening 1.5 days of upper respiratory symptoms and congestion with cough, suspected viral infection vs. Bacterial superinfection resulting in sinusitis.     - Rapid flu, however outside of window for tamiflu  - Recommend augmentin 875mg BID for 10 days for suspected bacterial sinusitis.  - Recommend Mucinex (without DM) for congestion  - Vigorous hydration  - Phenergan DM, provided limited prescription to help control cough  - dangers of vaping discussed, including occurrence of vapor induced lung injury. Smoking cessation discussed.     RTC if symptoms worsen or does not improve over next 6-8 days.     Abram Stephens MD  PGY3 Medicine

## 2019-10-30 ENCOUNTER — TELEPHONE (OUTPATIENT)
Dept: ORTHOPEDICS | Facility: CLINIC | Age: 54
End: 2019-10-30

## 2019-10-30 NOTE — TELEPHONE ENCOUNTER
----- Message from Jon Elian sent at 10/30/2019  3:20 PM CDT -----  Contact: [phone] / self  Type:  Sooner Apoointment Request    Caller is requesting a sooner appointment.  Caller declined first available appointment listed below.  Caller will not accept being placed on the waitlist and is requesting a message be sent to doctor.  Name of Caller: pt  When is the first available appointment? 2020  Symptoms: hand , elbow, and shoulder pain   Would the patient rather a call back or a response via [facility]?  Call back   Best Call Back Number: [phone]  Additional Information:

## 2019-10-31 ENCOUNTER — TELEPHONE (OUTPATIENT)
Dept: ADMINISTRATIVE | Facility: OTHER | Age: 54
End: 2019-10-31

## 2019-10-31 NOTE — TELEPHONE ENCOUNTER
Spoke to patient and scheduled appointment with Dr. Hughes for 11/21/19.  She was in agreement with date and time of the appointment

## 2019-10-31 NOTE — TELEPHONE ENCOUNTER
----- Message from Key Nieto MA sent at 10/31/2019  1:02 PM CDT -----  Contact: Patient  Can one of y'all contact this pt schedule her appt. She is saying it is WC but they are not approving it so she wants to go through her regular ins. I just don't want it to get booked wrong.    key  ----- Message -----  From: Alma Stephens  Sent: 10/31/2019   9:54 AM CDT  To: Saul VALDEZ Staff    Type:  Patient Returning Call    Who Called: Sherie  Who Left Message for Patient: Key  Does the patient know what this is regarding?: scheduling sooner  Would the patient rather a call back or a response via [facility]? Call back  Best Call Back Number: [phone]  Additional Information: no

## 2019-11-04 ENCOUNTER — PATIENT OUTREACH (OUTPATIENT)
Dept: OTHER | Facility: OTHER | Age: 54
End: 2019-11-04

## 2019-11-04 NOTE — PROGRESS NOTES
Digital Medicine: Health  Follow-Up    States she had stopped diuretic after talking with pharmacist since she was sick.  States she will get back on it now that she is feeling better.    The history is provided by the patient.     Follow Up  Follow-up reason(s): reading review      Readings are trending up due to lifestyle change.  Reports she had a cold last week, but is starting to feel better.      INTERVENTION(S)  reviewed monitoring technique and encouragement/support    PLAN  patient verbalizes understanding and Clinician follow-up      There are no preventive care reminders to display for this patient.    Last 5 Patient Entered Readings                                      Current 30 Day Average: 143/78     Recent Readings 10/27/2019 10/20/2019 10/19/2019 10/19/2019 10/17/2019    SBP (mmHg) 136 178 145 112 99    DBP (mmHg) 87 108 93 71 67    Pulse 97 105 101 91 89                  Screenings    SDOH

## 2019-11-07 RX ORDER — MELOXICAM 15 MG/1
TABLET ORAL
Qty: 30 TABLET | Refills: 6 | Status: ON HOLD | OUTPATIENT
Start: 2019-11-07 | End: 2020-01-05 | Stop reason: HOSPADM

## 2019-11-12 ENCOUNTER — PATIENT OUTREACH (OUTPATIENT)
Dept: OTHER | Facility: OTHER | Age: 54
End: 2019-11-12

## 2019-11-12 NOTE — PROGRESS NOTES
Line connected but no one answered with multiple introductions/prompts  Follow up if patient resumed spironolactone

## 2019-11-20 ENCOUNTER — PATIENT OUTREACH (OUTPATIENT)
Dept: ADMINISTRATIVE | Facility: OTHER | Age: 54
End: 2019-11-20

## 2019-11-21 ENCOUNTER — OFFICE VISIT (OUTPATIENT)
Dept: ORTHOPEDICS | Facility: CLINIC | Age: 54
End: 2019-11-21
Payer: MEDICAID

## 2019-11-21 VITALS
WEIGHT: 170 LBS | TEMPERATURE: 98 F | HEART RATE: 86 BPM | BODY MASS INDEX: 31.09 KG/M2 | DIASTOLIC BLOOD PRESSURE: 86 MMHG | SYSTOLIC BLOOD PRESSURE: 142 MMHG

## 2019-11-21 DIAGNOSIS — M25.511 RIGHT SHOULDER PAIN, UNSPECIFIED CHRONICITY: Primary | ICD-10-CM

## 2019-11-21 DIAGNOSIS — M77.11 LATERAL EPICONDYLITIS OF RIGHT ELBOW: ICD-10-CM

## 2019-11-21 PROCEDURE — 20610 DRAIN/INJ JOINT/BURSA W/O US: CPT | Mod: PBBFAC,PN | Performed by: ORTHOPAEDIC SURGERY

## 2019-11-21 PROCEDURE — 20551 NJX 1 TENDON ORIGIN/INSJ: CPT | Mod: PBBFAC,PN,RT,59 | Performed by: ORTHOPAEDIC SURGERY

## 2019-11-21 PROCEDURE — 99214 OFFICE O/P EST MOD 30 MIN: CPT | Mod: PBBFAC,PN,25 | Performed by: ORTHOPAEDIC SURGERY

## 2019-11-21 PROCEDURE — 20551 NJX 1 TENDON ORIGIN/INSJ: CPT | Mod: S$PBB,59,51,RT | Performed by: ORTHOPAEDIC SURGERY

## 2019-11-21 PROCEDURE — 99999 PR PBB SHADOW E&M-EST. PATIENT-LVL IV: CPT | Mod: PBBFAC,,, | Performed by: ORTHOPAEDIC SURGERY

## 2019-11-21 PROCEDURE — 99999 PR PBB SHADOW E&M-EST. PATIENT-LVL IV: ICD-10-PCS | Mod: PBBFAC,,, | Performed by: ORTHOPAEDIC SURGERY

## 2019-11-21 PROCEDURE — 99213 OFFICE O/P EST LOW 20 MIN: CPT | Mod: S$PBB,25,, | Performed by: ORTHOPAEDIC SURGERY

## 2019-11-21 PROCEDURE — 99213 PR OFFICE/OUTPT VISIT, EST, LEVL III, 20-29 MIN: ICD-10-PCS | Mod: S$PBB,25,, | Performed by: ORTHOPAEDIC SURGERY

## 2019-11-21 PROCEDURE — 20551 PR INJECT TENDON ORIGIN/INSERT: ICD-10-PCS | Mod: S$PBB,59,51,RT | Performed by: ORTHOPAEDIC SURGERY

## 2019-11-21 PROCEDURE — 20610 DRAIN/INJ JOINT/BURSA W/O US: CPT | Mod: S$PBB,RT,, | Performed by: ORTHOPAEDIC SURGERY

## 2019-11-21 PROCEDURE — 20610 PR DRAIN/INJECT LARGE JOINT/BURSA: ICD-10-PCS | Mod: S$PBB,RT,, | Performed by: ORTHOPAEDIC SURGERY

## 2019-11-21 RX ORDER — TRIAMCINOLONE ACETONIDE 40 MG/ML
40 INJECTION, SUSPENSION INTRA-ARTICULAR; INTRAMUSCULAR
Status: COMPLETED | OUTPATIENT
Start: 2019-11-21 | End: 2019-11-21

## 2019-11-21 RX ORDER — TRIAMCINOLONE ACETONIDE 40 MG/ML
20 INJECTION, SUSPENSION INTRA-ARTICULAR; INTRAMUSCULAR
Status: COMPLETED | OUTPATIENT
Start: 2019-11-21 | End: 2019-11-21

## 2019-11-21 RX ADMIN — TRIAMCINOLONE ACETONIDE 20 MG: 40 INJECTION, SUSPENSION INTRA-ARTICULAR; INTRAMUSCULAR at 02:11

## 2019-11-21 RX ADMIN — TRIAMCINOLONE ACETONIDE 40 MG: 40 INJECTION, SUSPENSION INTRA-ARTICULAR; INTRAMUSCULAR at 02:11

## 2019-11-21 NOTE — PROGRESS NOTES
Subjective:      Patient ID: Sherie Reyes is a 54 y.o. female.    Chief Complaint: Shoulder Pain (right; )      HPI  Sherie Reyes is a  54 y.o. female presenting today for right shoulder right elbow and bilateral hand pain.  There was not a history of trauma.  Onset of symptoms began more than 6 months ago  She is describing nocturnal pain including numbness at night in both hands right worse than left but also right elbow and right shoulder pain  She would like injections for the right shoulder and right elbow today Previously I recommended an MRI last year but that is not been done yet  .      Review of patient's allergies indicates:  No Known Allergies      Current Outpatient Medications   Medication Sig Dispense Refill    acetaminophen (TYLENOL) 500 MG tablet Take 1 tablet (500 mg total) by mouth every 6 (six) hours as needed for Pain (Headache). 90 tablet 1    amLODIPine (NORVASC) 10 MG tablet Take 1 tablet (10 mg total) by mouth once daily. 30 tablet 11    buPROPion (WELLBUTRIN XL) 300 MG 24 hr tablet Take 300 mg by mouth once daily.      chlorthalidone (HYGROTEN) 25 MG Tab   2    clonazePAM (KLONOPIN) 1 MG tablet Take 1 mg by mouth 2 (two) times daily.       cloNIDine (CATAPRES) 0.1 MG tablet Take 1 tablet (0.1 mg total) by mouth every 12 (twelve) hours as needed (only for blood pressure greater than 180/120). 60 tablet 6    duloxetine (CYMBALTA) 60 MG capsule Take 60 mg by mouth once daily.      gabapentin (NEURONTIN) 300 MG capsule Take 600 mg by mouth 3 (three) times daily.      gabapentin (NEURONTIN) 600 MG tablet TK 1 T PO TID  5    gemfibrozil (LOPID) 600 MG tablet TAKE 1 TABLET BY MOUTH TWICE DAILY BEFORE MEALS 60 tablet 5    gemfibrozil (LOPID) 600 MG tablet TAKE 1 TABLET BY MOUTH TWICE DAILY BEFORE MEALS 60 tablet 0    KURVELO 0.15-0.03 mg per tablet Take 1 tablet by mouth once daily.      linaclotide (LINZESS) 290 mcg Cap Take 1 capsule (290 mcg total) by mouth once  daily. 90 capsule 3    meloxicam (MOBIC) 15 MG tablet TAKE 1 TABLET BY MOUTH EVERY DAY WITH FOOD 30 tablet 6    pantoprazole (PROTONIX) 40 MG tablet Take 1 tablet (40 mg total) by mouth once daily. 30 tablet 11    POLYETHYLENE GLYCOL 3350 (MIRALAX ORAL) Take by mouth as needed.       potassium chloride SA (KLOR-CON M20) 20 MEQ tablet Take 2 tablets (40 mEq total) by mouth once daily. 30 tablet 0    pravastatin (PRAVACHOL) 20 MG tablet TAKE 1 TABLET BY MOUTH EVERY EVENING 30 tablet 6    QUEtiapine (SEROQUEL) 100 MG Tab Take 1 tablet by mouth once daily.      spironolactone (ALDACTONE) 25 MG tablet Take 1 tablet (25 mg total) by mouth once daily. 30 tablet 11    tizanidine (ZANAFLEX) 6 mg capsule Take 6 mg by mouth 2 (two) times daily as needed for Muscle spasms.      traMADol (ULTRAM) 50 mg tablet TK 1 TO 2 TS PO TID PRN  3     No current facility-administered medications for this visit.        Past Medical History:   Diagnosis Date    Anxiety     Degenerative joint disease (DJD) of hip     Diabetes type 2, controlled 7/10/2019    GERD (gastroesophageal reflux disease)     Hyperlipidemia     Hypertension     IBS (irritable bowel syndrome)     Insomnia     Lumbar disc herniation     PTSD (post-traumatic stress disorder)        Past Surgical History:   Procedure Laterality Date    ADENOIDECTOMY       SECTION      TONSILLECTOMY         Review of Systems:  ROS    OBJECTIVE:     PHYSICAL EXAM:    Weight: 77.1 kg (170 lb)  Vitals:    19 1356   BP: (!) 142/86   Pulse: 86   Temp: 98.2 °F (36.8 °C)   Weight: 77.1 kg (170 lb)   PainSc:   9     Well developed, well nourished female in no acute distress  Alert and oriented x 3  HEENT- Normal exam  Lungs- Clear to auscultation  Heart- Regular rate and rhythm  Abdomen- Soft nontender  Extremity exam- examination right shoulder no no tenderness no swelling  Positive impingement sign  Range of motion full no instability positive supraspinatus  stress test  Right elbow demonstrates tenderness laterally over the lateral epicondylar area full range of motion no instability  Some pain with resisted wrist extension  Examination both hands demonstrate a mildly positive Tinel sign bilaterally Phalen's test negative    RADIOGRAPHS:  None  Comments: I have personally reviewed the imaging and I agree with the above radiologist's report.    ASSESSMENT/PLAN:     IMPRESSION:  1.  Right shoulder pain.  2.  Possible rotator cuff tear right shoulder.  3.  Right elbow lateral epicondylitis  4.  Bilateral carpal tunnel syndrome    PLAN:  I explained to the patient there were several different things causing problems here  To further evaluate I have ordered an MRI scan right shoulder because of the duration of her symptoms more than 1 year  I also ordered nerve conduction studies both hands to check for carpal tunnel and recommended wrist splints at night  She would like injections today for the shoulder and elbow  After pause for time-out identified the right shoulder injected with Kenalog 40 mg 2 cc xylocaine sterile technique which she tolerated well without complication  Following this she identified the right elbow injected laterally with combination Kenalog 20 and 0.5 cc xylocaine sterile technique  Tolerated the procedure well  Avoid overhead lifting follow-up after the nerve test and the MRI are complete       - We talked at length about the anatomy and pathophysiology of   Encounter Diagnoses   Name Primary?    Right shoulder pain, unspecified chronicity Yes    Lateral epicondylitis of right elbow            Disclaimer: This note has been generated using voice-recognition software. There may be typographical errors that have been missed during proof-reading.

## 2019-11-26 ENCOUNTER — TELEPHONE (OUTPATIENT)
Dept: ORTHOPEDICS | Facility: CLINIC | Age: 54
End: 2019-11-26

## 2019-11-26 NOTE — TELEPHONE ENCOUNTER
Theo Grimes for MRI SHOULDER  has been denied with patient's insurance Dr. PATEL can do a peer to peer to try for overturn by calling [phone], Option #3 with tracking number [id number].     Denial Reason: Your doctor said you have shoulder pain. To review for approval, [employer] guidelines requires the following information: doctor's notes with your joint x-ray findings. These notes should say you did stretches (PT, chiropractic treatments, or medically directed home exercises) for four weeks in the last six months (include dates). We need to know the dates you did those exercises. The records we got did not show this.       Thanks,   Ariana

## 2019-11-28 RX ORDER — LINACLOTIDE 290 UG/1
CAPSULE, GELATIN COATED ORAL
Qty: 90 CAPSULE | Refills: 1 | Status: SHIPPED | OUTPATIENT
Start: 2019-11-28 | End: 2020-05-19 | Stop reason: SDUPTHER

## 2019-11-30 ENCOUNTER — HOSPITAL ENCOUNTER (OUTPATIENT)
Facility: HOSPITAL | Age: 54
Discharge: HOME | End: 2019-12-02
Attending: EMERGENCY MEDICINE | Admitting: EMERGENCY MEDICINE
Payer: MEDICAID

## 2019-11-30 DIAGNOSIS — K11.20 SIALADENITIS: Primary | ICD-10-CM

## 2019-11-30 DIAGNOSIS — R07.9 CHEST PAIN: ICD-10-CM

## 2019-11-30 PROBLEM — R74.01 TRANSAMINITIS: Status: ACTIVE | Noted: 2019-11-30

## 2019-11-30 PROBLEM — N17.9 AKI (ACUTE KIDNEY INJURY): Status: ACTIVE | Noted: 2019-11-30

## 2019-11-30 PROBLEM — R07.89 CHEST DISCOMFORT: Status: RESOLVED | Noted: 2017-01-19 | Resolved: 2019-11-30

## 2019-11-30 LAB
ALBUMIN SERPL BCP-MCNC: 4 G/DL (ref 3.5–5.2)
ALLENS TEST: ABNORMAL
ALP SERPL-CCNC: 90 U/L (ref 55–135)
ALT SERPL W/O P-5'-P-CCNC: 101 U/L (ref 10–44)
ANION GAP SERPL CALC-SCNC: 12 MMOL/L (ref 8–16)
AST SERPL-CCNC: 140 U/L (ref 10–40)
B-HCG UR QL: NEGATIVE
BASOPHILS # BLD AUTO: 0.05 K/UL (ref 0–0.2)
BASOPHILS NFR BLD: 0.6 % (ref 0–1.9)
BILIRUB SERPL-MCNC: 0.4 MG/DL (ref 0.1–1)
BUN SERPL-MCNC: 14 MG/DL (ref 6–20)
CALCIUM SERPL-MCNC: 9.6 MG/DL (ref 8.7–10.5)
CHLORIDE SERPL-SCNC: 108 MMOL/L (ref 95–110)
CO2 SERPL-SCNC: 18 MMOL/L (ref 23–29)
CREAT SERPL-MCNC: 1.6 MG/DL (ref 0.5–1.4)
CRP SERPL-MCNC: 31.6 MG/L (ref 0–8.2)
CTP QC/QA: YES
DELSYS: ABNORMAL
DIFFERENTIAL METHOD: ABNORMAL
EOSINOPHIL # BLD AUTO: 0.1 K/UL (ref 0–0.5)
EOSINOPHIL NFR BLD: 0.6 % (ref 0–8)
ERYTHROCYTE [DISTWIDTH] IN BLOOD BY AUTOMATED COUNT: 13.8 % (ref 11.5–14.5)
ERYTHROCYTE [SEDIMENTATION RATE] IN BLOOD BY WESTERGREN METHOD: 55 MM/HR (ref 0–36)
EST. GFR  (AFRICAN AMERICAN): 42.4 ML/MIN/1.73 M^2
EST. GFR  (NON AFRICAN AMERICAN): 36.8 ML/MIN/1.73 M^2
GLUCOSE SERPL-MCNC: 104 MG/DL (ref 70–110)
HCO3 UR-SCNC: 18.7 MMOL/L (ref 24–28)
HCT VFR BLD AUTO: 44.5 % (ref 37–48.5)
HGB BLD-MCNC: 13.7 G/DL (ref 12–16)
IMM GRANULOCYTES # BLD AUTO: 0.06 K/UL (ref 0–0.04)
IMM GRANULOCYTES NFR BLD AUTO: 0.7 % (ref 0–0.5)
LDH SERPL L TO P-CCNC: 1.28 MMOL/L (ref 0.5–2.2)
LYMPHOCYTES # BLD AUTO: 1.4 K/UL (ref 1–4.8)
LYMPHOCYTES NFR BLD: 16.5 % (ref 18–48)
MCH RBC QN AUTO: 27.6 PG (ref 27–31)
MCHC RBC AUTO-ENTMCNC: 30.8 G/DL (ref 32–36)
MCV RBC AUTO: 90 FL (ref 82–98)
MODE: ABNORMAL
MONOCYTES # BLD AUTO: 0.9 K/UL (ref 0.3–1)
MONOCYTES NFR BLD: 10.1 % (ref 4–15)
NEUTROPHILS # BLD AUTO: 6.2 K/UL (ref 1.8–7.7)
NEUTROPHILS NFR BLD: 71.5 % (ref 38–73)
NRBC BLD-RTO: 0 /100 WBC
PCO2 BLDA: 36.7 MMHG (ref 35–45)
PH SMN: 7.31 [PH] (ref 7.35–7.45)
PLATELET # BLD AUTO: 329 K/UL (ref 150–350)
PMV BLD AUTO: 11.2 FL (ref 9.2–12.9)
PO2 BLDA: 35 MMHG (ref 40–60)
POC BE: -8 MMOL/L
POC SATURATED O2: 63 % (ref 95–100)
POC TCO2: 20 MMOL/L (ref 24–29)
POTASSIUM SERPL-SCNC: 4.6 MMOL/L (ref 3.5–5.1)
PROT SERPL-MCNC: 8.2 G/DL (ref 6–8.4)
RBC # BLD AUTO: 4.97 M/UL (ref 4–5.4)
SAMPLE: ABNORMAL
SITE: ABNORMAL
SODIUM SERPL-SCNC: 138 MMOL/L (ref 136–145)
WBC # BLD AUTO: 8.68 K/UL (ref 3.9–12.7)

## 2019-11-30 PROCEDURE — 99285 EMERGENCY DEPT VISIT HI MDM: CPT | Mod: ,,, | Performed by: EMERGENCY MEDICINE

## 2019-11-30 PROCEDURE — 81025 URINE PREGNANCY TEST: CPT | Performed by: EMERGENCY MEDICINE

## 2019-11-30 PROCEDURE — 83605 ASSAY OF LACTIC ACID: CPT

## 2019-11-30 PROCEDURE — 96365 THER/PROPH/DIAG IV INF INIT: CPT

## 2019-11-30 PROCEDURE — 99220 PR INITIAL OBSERVATION CARE,LEVL III: CPT | Mod: ,,, | Performed by: PHYSICIAN ASSISTANT

## 2019-11-30 PROCEDURE — G0378 HOSPITAL OBSERVATION PER HR: HCPCS

## 2019-11-30 PROCEDURE — 99285 EMERGENCY DEPT VISIT HI MDM: CPT | Mod: 25

## 2019-11-30 PROCEDURE — 25000003 PHARM REV CODE 250: Performed by: EMERGENCY MEDICINE

## 2019-11-30 PROCEDURE — 63600175 PHARM REV CODE 636 W HCPCS: Performed by: EMERGENCY MEDICINE

## 2019-11-30 PROCEDURE — 99220 PR INITIAL OBSERVATION CARE,LEVL III: ICD-10-PCS | Mod: ,,, | Performed by: PHYSICIAN ASSISTANT

## 2019-11-30 PROCEDURE — 99285 PR EMERGENCY DEPT VISIT,LEVEL V: ICD-10-PCS | Mod: ,,, | Performed by: EMERGENCY MEDICINE

## 2019-11-30 PROCEDURE — 86140 C-REACTIVE PROTEIN: CPT

## 2019-11-30 PROCEDURE — 25500020 PHARM REV CODE 255: Performed by: EMERGENCY MEDICINE

## 2019-11-30 PROCEDURE — 85652 RBC SED RATE AUTOMATED: CPT

## 2019-11-30 PROCEDURE — S0077 INJECTION, CLINDAMYCIN PHOSP: HCPCS | Performed by: EMERGENCY MEDICINE

## 2019-11-30 PROCEDURE — 99900035 HC TECH TIME PER 15 MIN (STAT)

## 2019-11-30 PROCEDURE — 84145 PROCALCITONIN (PCT): CPT

## 2019-11-30 PROCEDURE — 96375 TX/PRO/DX INJ NEW DRUG ADDON: CPT | Mod: 59

## 2019-11-30 PROCEDURE — 80053 COMPREHEN METABOLIC PANEL: CPT

## 2019-11-30 PROCEDURE — 85025 COMPLETE CBC W/AUTO DIFF WBC: CPT

## 2019-11-30 PROCEDURE — 82803 BLOOD GASES ANY COMBINATION: CPT

## 2019-11-30 RX ORDER — MORPHINE SULFATE 2 MG/ML
6 INJECTION, SOLUTION INTRAMUSCULAR; INTRAVENOUS
Status: COMPLETED | OUTPATIENT
Start: 2019-11-30 | End: 2019-11-30

## 2019-11-30 RX ORDER — CLINDAMYCIN PHOSPHATE 900 MG/50ML
900 INJECTION, SOLUTION INTRAVENOUS
Status: COMPLETED | OUTPATIENT
Start: 2019-11-30 | End: 2019-11-30

## 2019-11-30 RX ADMIN — CLINDAMYCIN IN 5 PERCENT DEXTROSE 900 MG: 18 INJECTION, SOLUTION INTRAVENOUS at 07:11

## 2019-11-30 RX ADMIN — SODIUM CHLORIDE, SODIUM LACTATE, POTASSIUM CHLORIDE, AND CALCIUM CHLORIDE 1000 ML: .6; .31; .03; .02 INJECTION, SOLUTION INTRAVENOUS at 08:11

## 2019-11-30 RX ADMIN — IOHEXOL 100 ML: 350 INJECTION, SOLUTION INTRAVENOUS at 09:11

## 2019-11-30 RX ADMIN — MORPHINE SULFATE 6 MG: 2 INJECTION, SOLUTION INTRAMUSCULAR; INTRAVENOUS at 08:11

## 2019-11-30 RX ADMIN — SODIUM CHLORIDE, SODIUM LACTATE, POTASSIUM CHLORIDE, AND CALCIUM CHLORIDE 1000 ML: .6; .31; .03; .02 INJECTION, SOLUTION INTRAVENOUS at 06:11

## 2019-11-30 NOTE — ED TRIAGE NOTES
Sherie Reyes, a 54 y.o. female presents to the ED via personal transportation with CC right side neck swelling onset yesterday. Patient reports pain to right side of face and right side neck, difficulty swallowing, denies difficulty breathing.    Patient identifiers verified verbally with patient and correct for Sherie Reyes.    LOC/ APPEARANCE: The patient is AAOx4. Pt is speaking appropriately, no slurred speech. Pt is clean and well groomed. No JVD visible. Pt reports pain level of 9/10 at this time. Pt updated on POC. Bed low and locked, call bell in pt reach. Family at bedside  SKIN: Skin is warm dry and intact, and color is consistent with ethnicity. Capillary refill <3 seconds. Mucus membranes moist, acyanotic. Redness and swelling noted to right side of neck to mid anterior of neck.  RESPIRATORY: Airway is open and patent. Respirations-spontaneous, unlabored, regular rate, equal bilaterally on inspiration and expiration. No accessory muscle use noted. Lungs clear to auscultation in all fields bilaterally anterior and posterior.   CARDIAC: Patient has regular heart rate. No peripheral edema noted, and patient has no c/o chest pain. Peripheral pulses present equal and strong throughout.  ABDOMEN: Soft and non-tender to palpation with no distention noted. Normoactive bowel sounds x4 quadrants. Pt has no complaints of abnormal bowel movements, denies blood in stool. Pt reports normal appetite.   NEUROLOGIC: Eyes open spontaneously and facial expression symmetrical. Pt behavior appropriate to situation, and pt follows commands. Pt reports sensation present in all extremities when touched with a finger, denies any numbness or tingling. PERRLA  MUSCULOSKELETAL: Spontaneous movement noted to all extremities. Hand  equal and leg strength strong +5 bilaterally.   : No complaints of frequency, burning, urgency or blood in the urine. No complaints of incontinence.

## 2019-11-30 NOTE — ED PROVIDER NOTES
"Encounter Date: 2019    SCRIBE #1 NOTE: I, Jenifer Parra, am scribing for, and in the presence of, Misha Santamaria MD. Other sections scribed: HPI, ROS, PE, MDM.       History     Chief Complaint   Patient presents with    Neck Swelling     states "I had a lump pop up on my neck" painful     Time patient was seen by the provider: 5:56 PM      The patient is a 54 y.o. female, who presents to the ED with a complaint of worsening right-sided neck edema. Patient's associated symptoms are right-sided facial pain and mandible pain that appeared one day ago. Patient endorses that she cannot open her mouth. Patient initially believed it to be an inflamed gland. Patient denies fevers, headaches, and allergies.     The history is provided by the patient and medical records.     Review of patient's allergies indicates:  No Known Allergies  Past Medical History:   Diagnosis Date    Anxiety     Degenerative joint disease (DJD) of hip     Diabetes type 2, controlled 7/10/2019    GERD (gastroesophageal reflux disease)     Hyperlipidemia     Hypertension     IBS (irritable bowel syndrome)     Insomnia     Lumbar disc herniation     PTSD (post-traumatic stress disorder)      Past Surgical History:   Procedure Laterality Date    ADENOIDECTOMY       SECTION      TONSILLECTOMY       Family History   Problem Relation Age of Onset    Hypertension Mother     Hypothyroidism Mother     Hypertension Father     Stroke Father 46    Diabetes Father     Cancer Maternal Grandfather     Breast cancer Paternal Aunt      Social History     Tobacco Use    Smoking status: Former Smoker     Types: Vaping w/o nicotine    Smokeless tobacco: Former User     Quit date: 10/20/2014    Tobacco comment: Vapor cigarettes   Substance Use Topics    Alcohol use: No     Frequency: Monthly or less     Drinks per session: 3 or 4     Binge frequency: Never     Comment: socially    Drug use: No     Review of Systems "   Constitutional: Negative for fever.   HENT: Negative for sore throat.         Positive for right-sided facial pain. Positive for mandible pain.    Respiratory: Negative for shortness of breath.    Cardiovascular: Negative for chest pain.   Gastrointestinal: Negative for nausea.   Genitourinary: Negative for dysuria.   Musculoskeletal: Negative for back pain.        Positive for right-sided neck edema.    Skin: Negative for rash.   Neurological: Negative for weakness.   Hematological: Does not bruise/bleed easily.       Physical Exam     Initial Vitals [11/30/19 1641]   BP Pulse Resp Temp SpO2   (!) 169/79 (!) 122 17 97.8 °F (36.6 °C) 98 %      MAP       --         Physical Exam  Appearance: No acute distress.  Skin: No erythema.  Eyes: No conjunctival injection.  ENT: Oropharynx clear.    Chest: Clear to auscultation bilaterally.  Good air movement.  No wheezes.  No rhonchi.  Cardiovascular: Regular rate and rhythm.  No murmurs. No gallops. No rubs.  Abdomen: Soft.  Not distended.  Nontender.  No guarding.  No rebound.  Musculoskeletal: Swelling from angle of mandible that goes across. Not particularly tender.   Neurologic: Motor intact.  Sensation intact.  Cerebellar intact.  Cranial nerves intact.  Mental Status:  Alert and oriented x 3.  Appropriate, conversant.    ED Course   Procedures  Labs Reviewed   CBC W/ AUTO DIFFERENTIAL - Abnormal; Notable for the following components:       Result Value    Mean Corpuscular Hemoglobin Conc 30.8 (*)     Immature Granulocytes 0.7 (*)     Immature Grans (Abs) 0.06 (*)     Lymph% 16.5 (*)     All other components within normal limits   COMPREHENSIVE METABOLIC PANEL - Abnormal; Notable for the following components:    CO2 18 (*)     Creatinine 1.6 (*)      (*)      (*)     eGFR if  42.4 (*)     eGFR if non  36.8 (*)     All other components within normal limits   C-REACTIVE PROTEIN - Abnormal; Notable for the following  components:    CRP 31.6 (*)     All other components within normal limits    Narrative:     ADD CRP [id number] PER DR. ARANZA CLARK  11/30/2019  23:08    SEDIMENTATION RATE - Abnormal; Notable for the following components:    Sed Rate 55 (*)     All other components within normal limits    Narrative:     ADD CRP [id number] PER DR. ARANZA CLARK  11/30/2019  23:08   ADD ON PCAL [id number] PER DR.HEATHER CLARK   11/30/2019  23:32    ADD ON ERS [id number] PER DR.HEATHER CLARK  11/30/2019  23:34    ISTAT PROCEDURE - Abnormal; Notable for the following components:    POC PH 7.314 (*)     POC PO2 35 (*)     POC HCO3 18.7 (*)     POC SATURATED O2 63 (*)     POC TCO2 20 (*)     All other components within normal limits   SEDIMENTATION RATE   C-REACTIVE PROTEIN   PROCALCITONIN   PROCALCITONIN    Narrative:     ADD CRP [id number] PER DR. ARANZA CLARK  11/30/2019  23:08   ADD ON PCAL [id number] PER DR.HEATHER CLARK   11/30/2019  23:32    ADD ON ERS [id number] PER DR.HEATHER CLARK  11/30/2019  23:34    POCT URINE PREGNANCY          Imaging Results          CT Soft Tissue Neck With Contrast (Final result)  Result time 11/30/19 21:56:15    Final result by Yair Geller MD (11/30/19 21:56:15)                 Impression:      Asymmetric enlargement of the right submandibular gland with surrounding inflammatory change suggestive of sialadenitis.    Electronically signed by resident: Rajesh Flower MD  Date:    11/30/2019  Time:    21:23    Electronically signed by: Yair Geller MD  Date:    11/30/2019  Time:    21:56             Narrative:    EXAMINATION:  CT SOFT TISSUE NECK WITH CONTRAST    CLINICAL HISTORY:  Neck mass, nonpulsatile, solitary;    TECHNIQUE:  Low dose axial images as well as sagittal and coronal reconstructions were performed from the skull base to the clavicles following the intravenous administration of 100 mL of Omnipaque 350.    COMPARISON:  CTA head neck 01/04/2019.    FINDINGS:  Skull Base and Brain (limited  evaluation): No significant abnormality.    Sinuses and Mastoid Air Cells: Minimal mucosal thickening in the paranasal sinuses.  No air-fluid levels.  Mastoid air cells are essentially clear.    Salivary Glands: Parotid glands are bilaterally symmetric and demonstrate no gross abnormalities.  The right submandibular gland is asymmetrically enlarged measuring 2.7 x 2.6 cm (for reference the left measures 2.0 x 2.0 cm) with adjacent fat stranding and trace fluid in the submandibular soft tissues.  Inflammatory changes and asymmetric enlargement are new when compared with the prior CTA neck dated 01/04/2019.  No drainable fluid collection.    Thyroid: Normal in size and configuration.  There is are scattered tiny bilateral thyroid nodules which do not meet size criteria for follow-up.    Cervical Lymph Nodes:  No bulky adenopathy.  Right submandibular and cervical chain nodes are not pathologically enlarged but appear asymmetrically more prominent when compared to the left side, likely reactive.    Pharynx/Larynx: Normal, with preservation of the normal anatomical fat planes.    Vasculature (limited evaluation): Vascular structures of the neck appear patent    Upper Airways and Lungs: Trachea is midline and the proximal airways are patent.  Lung apices are clear.    Bones: No acute fracture or suspicious lytic or sclerotic lesion.  Temporomandibular joints are symmetric.                              X-Rays:     Medical Decision Making:   History:   Old Medical Records: I decided to obtain old medical records.  Initial Assessment:   55 y/o woman who presents to ED for swelling of her neck and mandible that has been present for the past two days. Patient presents with no problems breathing or controlling her secretions. Patient recently had a viral illness. Patient denies fever and dental pain.  Independently Interpreted Test(s):   I have ordered and independently interpreted X-rays - see prior notes.  Clinical Tests:    Lab Tests: Ordered and Reviewed  Radiological Study: Ordered and Reviewed  ED Management:  6:30 PM  No airway issues at this time. Tachycardic on arrival. If tachycardia persists, may have further testing done.   10:35 PM  Discussed with ENT who advised to admit with IV antibiotics. Medicine has agreed to observe the patient.             Scribe Attestation:   Scribe #1: I performed the above scribed service and the documentation accurately describes the services I performed. I attest to the accuracy of the note.            ED Course as of Dec 01 1324   Sat Nov 30, 2019 1936 Creatinine(!): 1.6 [DC]   1936 WBC: 8.68 [DC]   1936 POC Lactate: 1.28 [DC]   1936 CO2(!): 18 [DC]   1936 AST(!): 140 [DC]   1936 ALT(!): 101 [DC]      ED Course User Index  [DC] Misha Santamaria MD                Clinical Impression:       ICD-10-CM ICD-9-CM   1. Sialadenitis K11.20 527.2   2. Chest pain R07.9 786.50                             Misha Santamaria MD  12/01/19 4886

## 2019-12-01 PROBLEM — M54.50 CHRONIC MIDLINE LOW BACK PAIN WITHOUT SCIATICA: Chronic | Status: ACTIVE | Noted: 2017-01-04

## 2019-12-01 PROBLEM — G89.29 CHRONIC MIDLINE LOW BACK PAIN WITHOUT SCIATICA: Chronic | Status: ACTIVE | Noted: 2017-01-04

## 2019-12-01 PROBLEM — I10 ESSENTIAL HYPERTENSION: Chronic | Status: ACTIVE | Noted: 2017-01-19

## 2019-12-01 PROBLEM — E78.2 MIXED HYPERLIPIDEMIA: Chronic | Status: ACTIVE | Noted: 2017-01-19

## 2019-12-01 LAB
ALBUMIN SERPL BCP-MCNC: 3.5 G/DL (ref 3.5–5.2)
ALP SERPL-CCNC: 86 U/L (ref 55–135)
ALT SERPL W/O P-5'-P-CCNC: 79 U/L (ref 10–44)
ANION GAP SERPL CALC-SCNC: 12 MMOL/L (ref 8–16)
AST SERPL-CCNC: 77 U/L (ref 10–40)
BASOPHILS # BLD AUTO: 0.03 K/UL (ref 0–0.2)
BASOPHILS NFR BLD: 0.5 % (ref 0–1.9)
BILIRUB SERPL-MCNC: 0.4 MG/DL (ref 0.1–1)
BUN SERPL-MCNC: 12 MG/DL (ref 6–20)
CALCIUM SERPL-MCNC: 9 MG/DL (ref 8.7–10.5)
CHLORIDE SERPL-SCNC: 108 MMOL/L (ref 95–110)
CO2 SERPL-SCNC: 21 MMOL/L (ref 23–29)
CREAT SERPL-MCNC: 1.2 MG/DL (ref 0.5–1.4)
DIFFERENTIAL METHOD: ABNORMAL
EOSINOPHIL # BLD AUTO: 0 K/UL (ref 0–0.5)
EOSINOPHIL NFR BLD: 0.5 % (ref 0–8)
ERYTHROCYTE [DISTWIDTH] IN BLOOD BY AUTOMATED COUNT: 13.9 % (ref 11.5–14.5)
EST. GFR  (AFRICAN AMERICAN): >60 ML/MIN/1.73 M^2
EST. GFR  (NON AFRICAN AMERICAN): 52.1 ML/MIN/1.73 M^2
ESTIMATED AVG GLUCOSE: 140 MG/DL (ref 68–131)
GLUCOSE SERPL-MCNC: 159 MG/DL (ref 70–110)
HBA1C MFR BLD HPLC: 6.5 % (ref 4–5.6)
HCT VFR BLD AUTO: 40.7 % (ref 37–48.5)
HGB BLD-MCNC: 12.6 G/DL (ref 12–16)
IMM GRANULOCYTES # BLD AUTO: 0.06 K/UL (ref 0–0.04)
IMM GRANULOCYTES NFR BLD AUTO: 0.9 % (ref 0–0.5)
LYMPHOCYTES # BLD AUTO: 2 K/UL (ref 1–4.8)
LYMPHOCYTES NFR BLD: 30.8 % (ref 18–48)
MAGNESIUM SERPL-MCNC: 1.6 MG/DL (ref 1.6–2.6)
MCH RBC QN AUTO: 27.6 PG (ref 27–31)
MCHC RBC AUTO-ENTMCNC: 31 G/DL (ref 32–36)
MCV RBC AUTO: 89 FL (ref 82–98)
MONOCYTES # BLD AUTO: 0.8 K/UL (ref 0.3–1)
MONOCYTES NFR BLD: 12.6 % (ref 4–15)
NEUTROPHILS # BLD AUTO: 3.6 K/UL (ref 1.8–7.7)
NEUTROPHILS NFR BLD: 54.7 % (ref 38–73)
NRBC BLD-RTO: 0 /100 WBC
PHOSPHATE SERPL-MCNC: 3 MG/DL (ref 2.7–4.5)
PLATELET # BLD AUTO: 293 K/UL (ref 150–350)
PMV BLD AUTO: 11.7 FL (ref 9.2–12.9)
POTASSIUM SERPL-SCNC: 3.9 MMOL/L (ref 3.5–5.1)
PROCALCITONIN SERPL IA-MCNC: 0.18 NG/ML
PROT SERPL-MCNC: 7.3 G/DL (ref 6–8.4)
RBC # BLD AUTO: 4.57 M/UL (ref 4–5.4)
SODIUM SERPL-SCNC: 141 MMOL/L (ref 136–145)
WBC # BLD AUTO: 6.49 K/UL (ref 3.9–12.7)

## 2019-12-01 PROCEDURE — S0077 INJECTION, CLINDAMYCIN PHOSP: HCPCS | Performed by: PHYSICIAN ASSISTANT

## 2019-12-01 PROCEDURE — G0378 HOSPITAL OBSERVATION PER HR: HCPCS

## 2019-12-01 PROCEDURE — 87040 BLOOD CULTURE FOR BACTERIA: CPT

## 2019-12-01 PROCEDURE — 84100 ASSAY OF PHOSPHORUS: CPT

## 2019-12-01 PROCEDURE — 83036 HEMOGLOBIN GLYCOSYLATED A1C: CPT

## 2019-12-01 PROCEDURE — 80074 ACUTE HEPATITIS PANEL: CPT

## 2019-12-01 PROCEDURE — 99226 PR SUBSEQUENT OBSERVATION CARE,LEVEL III: ICD-10-PCS | Mod: ,,, | Performed by: INTERNAL MEDICINE

## 2019-12-01 PROCEDURE — 63600175 PHARM REV CODE 636 W HCPCS: Performed by: PHYSICIAN ASSISTANT

## 2019-12-01 PROCEDURE — 83735 ASSAY OF MAGNESIUM: CPT

## 2019-12-01 PROCEDURE — 36415 COLL VENOUS BLD VENIPUNCTURE: CPT

## 2019-12-01 PROCEDURE — 96361 HYDRATE IV INFUSION ADD-ON: CPT | Performed by: EMERGENCY MEDICINE

## 2019-12-01 PROCEDURE — 99226 PR SUBSEQUENT OBSERVATION CARE,LEVEL III: CPT | Mod: ,,, | Performed by: INTERNAL MEDICINE

## 2019-12-01 PROCEDURE — 85025 COMPLETE CBC W/AUTO DIFF WBC: CPT

## 2019-12-01 PROCEDURE — 25000003 PHARM REV CODE 250: Performed by: PHYSICIAN ASSISTANT

## 2019-12-01 PROCEDURE — 80053 COMPREHEN METABOLIC PANEL: CPT

## 2019-12-01 PROCEDURE — 96376 TX/PRO/DX INJ SAME DRUG ADON: CPT | Performed by: EMERGENCY MEDICINE

## 2019-12-01 RX ORDER — CLONAZEPAM 1 MG/1
1 TABLET ORAL 2 TIMES DAILY
Status: DISCONTINUED | OUTPATIENT
Start: 2019-12-01 | End: 2019-12-02 | Stop reason: HOSPADM

## 2019-12-01 RX ORDER — BUPROPION HYDROCHLORIDE 150 MG/1
300 TABLET ORAL DAILY
Status: DISCONTINUED | OUTPATIENT
Start: 2019-12-01 | End: 2019-12-02 | Stop reason: HOSPADM

## 2019-12-01 RX ORDER — TALC
6 POWDER (GRAM) TOPICAL NIGHTLY PRN
Status: DISCONTINUED | OUTPATIENT
Start: 2019-12-01 | End: 2019-12-02 | Stop reason: HOSPADM

## 2019-12-01 RX ORDER — AMOXICILLIN 250 MG
1 CAPSULE ORAL 2 TIMES DAILY
Status: DISCONTINUED | OUTPATIENT
Start: 2019-12-01 | End: 2019-12-02 | Stop reason: HOSPADM

## 2019-12-01 RX ORDER — GEMFIBROZIL 600 MG/1
600 TABLET, FILM COATED ORAL
Status: DISCONTINUED | OUTPATIENT
Start: 2019-12-01 | End: 2019-12-02 | Stop reason: HOSPADM

## 2019-12-01 RX ORDER — AMLODIPINE BESYLATE 10 MG/1
10 TABLET ORAL DAILY
Status: DISCONTINUED | OUTPATIENT
Start: 2019-12-01 | End: 2019-12-02 | Stop reason: HOSPADM

## 2019-12-01 RX ORDER — PROMETHAZINE HYDROCHLORIDE 25 MG/1
25 TABLET ORAL EVERY 6 HOURS PRN
Status: DISCONTINUED | OUTPATIENT
Start: 2019-12-01 | End: 2019-12-02 | Stop reason: HOSPADM

## 2019-12-01 RX ORDER — SODIUM CHLORIDE 0.9 % (FLUSH) 0.9 %
10 SYRINGE (ML) INJECTION
Status: DISCONTINUED | OUTPATIENT
Start: 2019-12-01 | End: 2019-12-02 | Stop reason: HOSPADM

## 2019-12-01 RX ORDER — IBUPROFEN 200 MG
16 TABLET ORAL
Status: DISCONTINUED | OUTPATIENT
Start: 2019-12-01 | End: 2019-12-02 | Stop reason: HOSPADM

## 2019-12-01 RX ORDER — MORPHINE SULFATE ORAL SOLUTION 10 MG/5ML
5 SOLUTION ORAL EVERY 6 HOURS PRN
Status: DISCONTINUED | OUTPATIENT
Start: 2019-12-01 | End: 2019-12-02

## 2019-12-01 RX ORDER — TIZANIDINE 2 MG/1
6 TABLET ORAL EVERY 8 HOURS PRN
Status: DISCONTINUED | OUTPATIENT
Start: 2019-12-01 | End: 2019-12-02 | Stop reason: HOSPADM

## 2019-12-01 RX ORDER — PRAVASTATIN SODIUM 10 MG/1
20 TABLET ORAL NIGHTLY
Status: DISCONTINUED | OUTPATIENT
Start: 2019-12-01 | End: 2019-12-02 | Stop reason: HOSPADM

## 2019-12-01 RX ORDER — CLINDAMYCIN PHOSPHATE 600 MG/50ML
600 INJECTION, SOLUTION INTRAVENOUS
Status: DISCONTINUED | OUTPATIENT
Start: 2019-12-01 | End: 2019-12-02 | Stop reason: HOSPADM

## 2019-12-01 RX ORDER — DULOXETIN HYDROCHLORIDE 60 MG/1
60 CAPSULE, DELAYED RELEASE ORAL DAILY
Status: DISCONTINUED | OUTPATIENT
Start: 2019-12-01 | End: 2019-12-02 | Stop reason: HOSPADM

## 2019-12-01 RX ORDER — IPRATROPIUM BROMIDE AND ALBUTEROL SULFATE 2.5; .5 MG/3ML; MG/3ML
3 SOLUTION RESPIRATORY (INHALATION) EVERY 4 HOURS PRN
Status: DISCONTINUED | OUTPATIENT
Start: 2019-12-01 | End: 2019-12-02 | Stop reason: HOSPADM

## 2019-12-01 RX ORDER — MORPHINE SULFATE 20 MG/ML
10 SOLUTION ORAL EVERY 6 HOURS PRN
Status: DISCONTINUED | OUTPATIENT
Start: 2019-12-01 | End: 2019-12-01

## 2019-12-01 RX ORDER — MORPHINE SULFATE 20 MG/ML
5 SOLUTION ORAL EVERY 6 HOURS PRN
Status: DISCONTINUED | OUTPATIENT
Start: 2019-12-01 | End: 2019-12-01

## 2019-12-01 RX ORDER — GLUCAGON 1 MG
1 KIT INJECTION
Status: DISCONTINUED | OUTPATIENT
Start: 2019-12-01 | End: 2019-12-02 | Stop reason: HOSPADM

## 2019-12-01 RX ORDER — GABAPENTIN 300 MG/1
600 CAPSULE ORAL 3 TIMES DAILY
Status: DISCONTINUED | OUTPATIENT
Start: 2019-12-01 | End: 2019-12-02 | Stop reason: HOSPADM

## 2019-12-01 RX ORDER — IBUPROFEN 200 MG
24 TABLET ORAL
Status: DISCONTINUED | OUTPATIENT
Start: 2019-12-01 | End: 2019-12-02 | Stop reason: HOSPADM

## 2019-12-01 RX ORDER — BENZONATATE 100 MG/1
100 CAPSULE ORAL 3 TIMES DAILY PRN
Status: DISCONTINUED | OUTPATIENT
Start: 2019-12-01 | End: 2019-12-02 | Stop reason: HOSPADM

## 2019-12-01 RX ORDER — SODIUM CHLORIDE 9 MG/ML
1000 INJECTION, SOLUTION INTRAVENOUS CONTINUOUS
Status: DISCONTINUED | OUTPATIENT
Start: 2019-12-01 | End: 2019-12-02

## 2019-12-01 RX ORDER — MORPHINE SULFATE ORAL SOLUTION 10 MG/5ML
10 SOLUTION ORAL EVERY 6 HOURS PRN
Status: DISCONTINUED | OUTPATIENT
Start: 2019-12-01 | End: 2019-12-02

## 2019-12-01 RX ORDER — POTASSIUM CHLORIDE 20 MEQ/1
40 TABLET, EXTENDED RELEASE ORAL DAILY
Status: DISCONTINUED | OUTPATIENT
Start: 2019-12-01 | End: 2019-12-02 | Stop reason: HOSPADM

## 2019-12-01 RX ORDER — QUETIAPINE FUMARATE 25 MG/1
100 TABLET, FILM COATED ORAL DAILY
Status: DISCONTINUED | OUTPATIENT
Start: 2019-12-01 | End: 2019-12-02 | Stop reason: HOSPADM

## 2019-12-01 RX ORDER — ACETAMINOPHEN 325 MG/1
650 TABLET ORAL EVERY 4 HOURS PRN
Status: DISCONTINUED | OUTPATIENT
Start: 2019-12-01 | End: 2019-12-02 | Stop reason: HOSPADM

## 2019-12-01 RX ORDER — SODIUM CHLORIDE 0.9 % (FLUSH) 0.9 %
5 SYRINGE (ML) INJECTION
Status: DISCONTINUED | OUTPATIENT
Start: 2019-12-01 | End: 2019-12-02 | Stop reason: HOSPADM

## 2019-12-01 RX ORDER — PANTOPRAZOLE SODIUM 40 MG/1
40 TABLET, DELAYED RELEASE ORAL DAILY
Status: DISCONTINUED | OUTPATIENT
Start: 2019-12-01 | End: 2019-12-02 | Stop reason: HOSPADM

## 2019-12-01 RX ADMIN — CLINDAMYCIN IN 5 PERCENT DEXTROSE 600 MG: 12 INJECTION, SOLUTION INTRAVENOUS at 09:12

## 2019-12-01 RX ADMIN — TIZANIDINE 6 MG: 2 TABLET ORAL at 09:12

## 2019-12-01 RX ADMIN — CLONAZEPAM 1 MG: 1 TABLET ORAL at 09:12

## 2019-12-01 RX ADMIN — QUETIAPINE FUMARATE 100 MG: 25 TABLET ORAL at 09:12

## 2019-12-01 RX ADMIN — SENNOSIDES AND DOCUSATE SODIUM 1 TABLET: 8.6; 5 TABLET ORAL at 01:12

## 2019-12-01 RX ADMIN — POTASSIUM CHLORIDE 40 MEQ: 1500 TABLET, EXTENDED RELEASE ORAL at 09:12

## 2019-12-01 RX ADMIN — MORPHINE SULFATE 10 MG: 10 SOLUTION ORAL at 05:12

## 2019-12-01 RX ADMIN — CLINDAMYCIN IN 5 PERCENT DEXTROSE 600 MG: 12 INJECTION, SOLUTION INTRAVENOUS at 02:12

## 2019-12-01 RX ADMIN — AMLODIPINE BESYLATE 10 MG: 10 TABLET ORAL at 09:12

## 2019-12-01 RX ADMIN — SODIUM CHLORIDE 1000 ML: 0.9 INJECTION, SOLUTION INTRAVENOUS at 02:12

## 2019-12-01 RX ADMIN — BUPROPION HYDROCHLORIDE 300 MG: 150 TABLET, FILM COATED, EXTENDED RELEASE ORAL at 09:12

## 2019-12-01 RX ADMIN — SODIUM CHLORIDE 1000 ML: 0.9 INJECTION, SOLUTION INTRAVENOUS at 06:12

## 2019-12-01 RX ADMIN — PROMETHAZINE HYDROCHLORIDE 25 MG: 25 TABLET ORAL at 01:12

## 2019-12-01 RX ADMIN — PRAVASTATIN SODIUM 20 MG: 10 TABLET ORAL at 09:12

## 2019-12-01 RX ADMIN — MORPHINE SULFATE 10 MG: 10 SOLUTION ORAL at 02:12

## 2019-12-01 RX ADMIN — GEMFIBROZIL 600 MG: 600 TABLET ORAL at 06:12

## 2019-12-01 RX ADMIN — SENNOSIDES AND DOCUSATE SODIUM 1 TABLET: 8.6; 5 TABLET ORAL at 09:12

## 2019-12-01 RX ADMIN — DULOXETINE 60 MG: 60 CAPSULE, DELAYED RELEASE ORAL at 09:12

## 2019-12-01 RX ADMIN — PROMETHAZINE HYDROCHLORIDE 25 MG: 25 TABLET ORAL at 05:12

## 2019-12-01 RX ADMIN — MORPHINE SULFATE 10 MG: 10 SOLUTION ORAL at 10:12

## 2019-12-01 RX ADMIN — PANTOPRAZOLE SODIUM 40 MG: 40 TABLET, DELAYED RELEASE ORAL at 09:12

## 2019-12-01 RX ADMIN — MORPHINE SULFATE 10 MG: 10 SOLUTION ORAL at 08:12

## 2019-12-01 RX ADMIN — PRAVASTATIN SODIUM 20 MG: 10 TABLET ORAL at 01:12

## 2019-12-01 RX ADMIN — GABAPENTIN 600 MG: 300 CAPSULE ORAL at 09:12

## 2019-12-01 RX ADMIN — CLINDAMYCIN IN 5 PERCENT DEXTROSE 600 MG: 12 INJECTION, SOLUTION INTRAVENOUS at 06:12

## 2019-12-01 NOTE — H&P
"[facility] Hospital Medicine  History & Physical    Patient Name: Sherie Reyes  MRN: [id number]  Admission Date: 2019  Attending Physician: Rafy Rico MD   Primary Care Provider: Tonia Bernal DO    [facility] Medicine Team: [facility] HOSP MED G Maylin Bain PA-C     Patient information was obtained from patient, past medical records and ER records.     Subjective:     Principal Problem:Sialadenitis    Chief Complaint:   Chief Complaint   Patient presents with    Neck Swelling     states "I had a lump pop up on my neck" painful        HPI: Patient is a 54 year old lady with a h/o HTN, HLD, chronic back pain.  She presents with right-sided neck edema.  Patient states symptoms began yesterday, but worsened throughout the day.  She states it is painful to move her jaw and notes decreased PO intake.  Denies SOB, difficulty with secretions, difficulty taking medications.   She reports that she and her family have recently been suffering from an URI.  This began at the end of October.  It initially improved following a ten day course of Augmentin and cough syrup, but then cycled back through her family members again.  She tested negative for the flu.  She reports nonproductive cough, rhinitis, congestion.  These symptoms have been getting better.  Denies chest pain, SOB, dizziness, palpitation, fever/chills, N/V/D, abdominal pain.  Denies new medications, itching.    Past Medical History:   Diagnosis Date    Anxiety     Degenerative joint disease (DJD) of hip     Diabetes type 2, controlled 7/10/2019    GERD (gastroesophageal reflux disease)     Hyperlipidemia     Hypertension     IBS (irritable bowel syndrome)     Insomnia     Lumbar disc herniation     PTSD (post-traumatic stress disorder)        Past Surgical History:   Procedure Laterality Date    ADENOIDECTOMY       SECTION      TONSILLECTOMY         Review of patient's allergies indicates:  No Known " Allergies    No current facility-administered medications on file prior to encounter.      Current Outpatient Medications on File Prior to Encounter   Medication Sig    acetaminophen (TYLENOL) 500 MG tablet Take 1 tablet (500 mg total) by mouth every 6 (six) hours as needed for Pain (Headache).    amLODIPine (NORVASC) 10 MG tablet Take 1 tablet (10 mg total) by mouth once daily.    buPROPion (WELLBUTRIN XL) 300 MG 24 hr tablet Take 300 mg by mouth once daily.    chlorthalidone (HYGROTEN) 25 MG Tab     clonazePAM (KLONOPIN) 1 MG tablet Take 1 mg by mouth 2 (two) times daily.     cloNIDine (CATAPRES) 0.1 MG tablet Take 1 tablet (0.1 mg total) by mouth every 12 (twelve) hours as needed (only for blood pressure greater than 180/120).    duloxetine (CYMBALTA) 60 MG capsule Take 60 mg by mouth once daily.    gabapentin (NEURONTIN) 300 MG capsule Take 600 mg by mouth 3 (three) times daily.    gabapentin (NEURONTIN) 600 MG tablet TK 1 T PO TID    gemfibrozil (LOPID) 600 MG tablet TAKE 1 TABLET BY MOUTH TWICE DAILY BEFORE MEALS    gemfibrozil (LOPID) 600 MG tablet TAKE 1 TABLET BY MOUTH TWICE DAILY BEFORE MEALS    KURVELO 0.15-0.03 mg per tablet Take 1 tablet by mouth once daily.    LINZESS 290 mcg Cap capsule TAKE 1 CAPSULE BY MOUTH EVERY DAY    meloxicam (MOBIC) 15 MG tablet TAKE 1 TABLET BY MOUTH EVERY DAY WITH FOOD    pantoprazole (PROTONIX) 40 MG tablet Take 1 tablet (40 mg total) by mouth once daily.    POLYETHYLENE GLYCOL 3350 (MIRALAX ORAL) Take by mouth as needed.     potassium chloride SA (KLOR-CON M20) 20 MEQ tablet Take 2 tablets (40 mEq total) by mouth once daily.    pravastatin (PRAVACHOL) 20 MG tablet TAKE 1 TABLET BY MOUTH EVERY EVENING    QUEtiapine (SEROQUEL) 100 MG Tab Take 1 tablet by mouth once daily.    spironolactone (ALDACTONE) 25 MG tablet Take 1 tablet (25 mg total) by mouth once daily.    tizanidine (ZANAFLEX) 6 mg capsule Take 6 mg by mouth 2 (two) times daily as needed for  Muscle spasms.    traMADol (ULTRAM) 50 mg tablet TK 1 TO 2 TS PO TID PRN     Family History     Problem Relation (Age of Onset)    Breast cancer Paternal Aunt    Cancer Maternal Grandfather    Diabetes Father    Hypertension Mother, Father    Hypothyroidism Mother    Stroke Father (46)        Tobacco Use    Smoking status: Former Smoker     Types: Vaping w/o nicotine    Smokeless tobacco: Former User     Quit date: 10/20/2014    Tobacco comment: Vapor cigarettes   Substance and Sexual Activity    Alcohol use: No     Frequency: Monthly or less     Drinks per session: 3 or 4     Binge frequency: Never     Comment: socially    Drug use: No    Sexual activity: Not on file     Review of Systems   Constitutional: Positive for appetite change. Negative for activity change, chills, fatigue, fever and unexpected weight change.   HENT: Positive for congestion and facial swelling. Negative for rhinorrhea, sore throat, trouble swallowing and voice change.    Eyes: Negative for visual disturbance.   Respiratory: Positive for cough. Negative for choking, chest tightness, shortness of breath and wheezing.    Cardiovascular: Negative for chest pain, palpitations and leg swelling.   Gastrointestinal: Negative for abdominal distention, abdominal pain, anal bleeding, blood in stool, constipation, diarrhea, nausea and vomiting.   Endocrine: Negative for cold intolerance, heat intolerance, polydipsia and polyuria.   Genitourinary: Negative for dysuria, flank pain, frequency, hematuria and urgency.   Musculoskeletal: Negative for arthralgias, back pain, joint swelling and myalgias.   Skin: Negative for color change and rash.   Neurological: Negative for dizziness, seizures, syncope, facial asymmetry, speech difficulty, weakness, light-headedness, numbness and headaches.   Hematological: Negative for adenopathy. Does not bruise/bleed easily.   Psychiatric/Behavioral: Negative for agitation, confusion, hallucinations and suicidal  ideas.     Objective:     Vital Signs (Most Recent):  Temp: 98.3 °F (36.8 °C) (12/01/19 0101)  Pulse: 99 (12/01/19 0101)  Resp: 18 (12/01/19 0101)  BP: (!) 157/90 (12/01/19 0101)  SpO2: 97 % (12/01/19 0101) Vital Signs (24h Range):  Temp:  [97.8 °F (36.6 °C)-98.3 °F (36.8 °C)] 98.3 °F (36.8 °C)  Pulse:  [] 99  Resp:  [16-18] 18  SpO2:  [95 %-98 %] 97 %  BP: (136-169)/(79-95) 157/90     Weight: 77.1 kg (170 lb)  Body mass index is 31.09 kg/m².    Physical Exam   Constitutional: She is oriented to person, place, and time. She appears well-developed and well-nourished. No distress.   HENT:   Head: Normocephalic and atraumatic.   Eyes: Pupils are equal, round, and reactive to light.   Neck: Neck supple. Carotid bruit is not present. No neck rigidity. Edema and erythema present. No thyromegaly present.   Cardiovascular: Normal rate and regular rhythm. Exam reveals no gallop.   No murmur heard.  Pulmonary/Chest: Effort normal and breath sounds normal. No respiratory distress. She has no wheezes.   Abdominal: Soft. Bowel sounds are normal. She exhibits no distension and no mass. There is no splenomegaly or hepatomegaly. There is no tenderness.   Musculoskeletal: Normal range of motion. She exhibits no edema or deformity.   Neurological: She is alert and oriented to person, place, and time. No cranial nerve deficit or sensory deficit.   Skin: Skin is warm and dry. No rash noted.   Psychiatric: She has a normal mood and affect.         CRANIAL NERVES     CN III, IV, VI   Pupils are equal, round, and reactive to light.       Significant Labs:   CBC:   Recent Labs   Lab 11/30/19  1820   WBC 8.68   HGB 13.7   HCT 44.5        CMP:   Recent Labs   Lab 11/30/19  1820      K 4.6      CO2 18*      BUN 14   CREATININE 1.6*   CALCIUM 9.6   PROT 8.2   ALBUMIN 4.0   BILITOT 0.4   ALKPHOS 90   *   *   ANIONGAP 12   EGFRNONAA 36.8*     Lactic Acid: No results for input(s): LACTATE in the last  48 hours.    Significant Imaging: I have reviewed all pertinent imaging results/findings within the past 24 hours.    Assessment/Plan:     * Sialadenitis  - Patient with no difficulty tolerating secretions.  No evidence of airway compromise on exam.  - CT soft tissue neck:  Asymmetric enlargement of the right submandibular gland with surrounding inflammatory change suggestive of sialadenitis.  - ER provider discussed patient with ENT.  They recommended starting patient on antibiotics and monitoring in observation.  - Patient started on clindamycin.  - Aspiration precautions, continuous pulse ox.      TASIA (acute kidney injury)  - Creatinine elevated to 1.6 from 1.4 on 8/8/2019, GFR down to 36.8 from 57.9.  - Patient reports decreased PO intake.  - IVFs, hold Mobic, spironolactone, chlorthalidone.  - Avoid nephrotoxins.      Transaminitis  - , , AlkPhos 90, Tbili 0.4.  - Will order hepatitis panel and RUQ US.  - Continue to monitor.      Essential hypertension  - Continue amlodipine 10mg daily.  - Chlorthalidone 25mg daily, spironolactone 25mg daily held secondary to TASIA.    Mixed hyperlipidemia  - Continue pravastatin 20mg qHS.      Chronic midline low back pain without sciatica  - Continue gabapentin 600mg TID, Cymbalta 60mg daily, PRN tizanidine.      VTE Risk Mitigation (From admission, onward)         Ordered     IP VTE HIGH RISK PATIENT  Once      12/01/19 0045     Place sequential compression device  Until discontinued      12/01/19 0045     Place EUGENIA hose  Until discontinued      12/01/19 0045                   Maylin Bain PA-C  Department of Hospital Medicine   [facility]

## 2019-12-01 NOTE — CONSULTS
"Otolaryngology - Head and Neck Surgery  Consultation Report      Consultation from: medicine  Reason for Consultation:  sialadenitis      Subjective:      CC:   Chief Complaint   Patient presents with    Neck Swelling     states "I had a lump pop up on my neck" painful       HPI       54F c h/o mumps presents with right submandibular swelling and pain x3 days. Started spontaneously. She reports remote history of multiple salivary gland swelling d/t mumps, but denies singular gland swelling. She reports pain with chewing and decreased PO intake. No difficulty breathing, tolerating secretions. Reports recent contact with family members with URI.       ROS: ENT-focused ROS completed and is negative unless otherwise stated in the HPI.     Past Medical History:   Diagnosis Date    Anxiety     Degenerative joint disease (DJD) of hip     Diabetes type 2, controlled 7/10/2019    GERD (gastroesophageal reflux disease)     Hyperlipidemia     Hypertension     IBS (irritable bowel syndrome)     Insomnia     Lumbar disc herniation     PTSD (post-traumatic stress disorder)      Past Surgical History:   Procedure Laterality Date    ADENOIDECTOMY       SECTION      TONSILLECTOMY       Social History     Tobacco Use    Smoking status: Former Smoker     Types: Vaping w/o nicotine    Smokeless tobacco: Former User     Quit date: 10/20/2014    Tobacco comment: Vapor cigarettes   Substance Use Topics    Alcohol use: No     Frequency: Monthly or less     Drinks per session: 3 or 4     Binge frequency: Never     Comment: socially     Family History   Problem Relation Age of Onset    Hypertension Mother     Hypothyroidism Mother     Hypertension Father     Stroke Father 46    Diabetes Father     Cancer Maternal Grandfather     Breast cancer Paternal Aunt        Current Facility-Administered Medications:     0.9%  NaCl infusion, 1,000 mL, Intravenous, Continuous, Maylin Bain PA-C, Last Rate: 100 " mL/hr at 12/01/19 0202, 1,000 mL at 12/01/19 0202    acetaminophen tablet 650 mg, 650 mg, Oral, Q4H PRN, Maylin Bain PA-C    albuterol-ipratropium 2.5 mg-0.5 mg/3 mL nebulizer solution 3 mL, 3 mL, Nebulization, Q4H PRN, Maylin Bain PA-C    amLODIPine tablet 10 mg, 10 mg, Oral, Daily, Maylin Bain PA-C, 10 mg at 12/01/19 0936    benzonatate capsule 100 mg, 100 mg, Oral, TID PRN, Maylin Bain PA-C    buPROPion TB24 tablet 300 mg, 300 mg, Oral, Daily, Maylin Bain PA-C, 300 mg at 12/01/19 0935    clindamycin 600 MG/50 ML D5W 600 mg/50 mL IVPB 600 mg, 600 mg, Intravenous, Q8H, Maylin Bain PA-C, Last Rate: 100 mL/hr at 12/01/19 0941, 600 mg at 12/01/19 0941    clonazePAM tablet 1 mg, 1 mg, Oral, BID, Maylin Bain PA-C, 1 mg at 12/01/19 0936    dextrose 10% (D10W) Bolus, 12.5 g, Intravenous, PRN, Rafy Rico MD    dextrose 10% (D10W) Bolus, 25 g, Intravenous, PRN, Rafy Rico MD    DULoxetine DR capsule 60 mg, 60 mg, Oral, Daily, Maylin Bain PA-C, 60 mg at 12/01/19 0936    gabapentin capsule 600 mg, 600 mg, Oral, TID, Maylin Bain PA-C, 600 mg at 12/01/19 0935    gemfibrozil tablet 600 mg, 600 mg, Oral, BID AC, Maylin Bain PA-C, 600 mg at 12/01/19 0645    glucagon (human recombinant) injection 1 mg, 1 mg, Intramuscular, PRN, Maylin Bain PA-C    glucose chewable tablet 16 g, 16 g, Oral, PRN, Maylin Bain PA-C    glucose chewable tablet 24 g, 24 g, Oral, PRN, Maylin Bain PA-C    influenza (QUADRIVALENT PF) vaccine 0.5 mL, 0.5 mL, Intramuscular, vaccine x 1 dose, Rafy Rico MD    melatonin tablet 6 mg, 6 mg, Oral, Nightly PRN, Maylin Bain PA-C    morphine 10 mg/5 mL solution 10 mg, 10 mg, Oral, Q6H PRN, Maylin Bain PA-C, 10 mg at 12/01/19 0801    morphine 10 mg/5 mL solution 5 mg, 5 mg, Oral, Q6H PRN, Maylin Bain PA-C    pantoprazole EC tablet 40 mg, 40 mg,  Oral, Daily, Maylin JORGE LUIS Bain, PA-C, 40 mg at 12/01/19 0935    potassium chloride SA CR tablet 40 mEq, 40 mEq, Oral, Daily, Maylin B. Odell, PA-C, 40 mEq at 12/01/19 0935    pravastatin tablet 20 mg, 20 mg, Oral, QHS, Maylin JORGE LUIS Bain, PA-C, 20 mg at 12/01/19 0112    promethazine (PHENERGAN) 12.5 mg in dextrose 5 % 50 mL IVPB, 12.5 mg, Intravenous, Q6H PRN, Maylin JORGE LUIS Bain, PA-C    promethazine tablet 25 mg, 25 mg, Oral, Q6H PRN, Maylin JORGE LUIS Bain, PA-C, 25 mg at 12/01/19 0112    QUEtiapine tablet 100 mg, 100 mg, Oral, Daily, Maylin JORGE LUIS Bain, PA-C, 100 mg at 12/01/19 0936    senna-docusate 8.6-50 mg per tablet 1 tablet, 1 tablet, Oral, BID, Maylin JORGE LUIS Bain, PA-C, 1 tablet at 12/01/19 0936    sodium chloride 0.9% flush 10 mL, 10 mL, Intravenous, PRN, Misha Santamaria MD    sodium chloride 0.9% flush 5 mL, 5 mL, Intravenous, PRN, Maylin JORGE LUIS Bain, PA-C    tiZANidine tablet 6 mg, 6 mg, Oral, Q8H PRN, Maylin JORGE LUIS Bain, PA-C, 6 mg at 12/01/19 0940  Patient has no known allergies.      Objective:     Temp:  [97.7 °F (36.5 °C)-98.6 °F (37 °C)] 97.7 °F (36.5 °C)  Pulse:  [] 92  Resp:  [16-18] 16  SpO2:  [95 %-98 %] 97 %  BP: (122-169)/(70-95) 122/70    General Appearance:   Awake, Alert and Oriented. NAD. Appropriate affect and appearance     Neuro:   Spontaneous eye opening, appropriate verbal responses, follows commands  Pupils equal, round & brisk. EOMI, no proptosis, no spontaneous nystagmus  Face is symmetric, HB I, non-edematous and SILT bilaterally  Vision grossly intact, Hearing grossly intact  EDIN, normal tone    Head and Face:   Normocephalic, atraumatic, No facial deformities, skin is intact and non-erythematous    Ears:  Periauricular regions non-erythematous, non-fluctuanct non-tender  Pinna normal bilaterally, no skin lesions  EACs patent and without drainage bilaterally    Nose:   External nose is symmetric, no skin lesions    OC/OP:  Tongue midline on extension,  non-edematous, soft  No labial, buccal, oral tongue or floor of mouth lesions  Soft palate symmetric, midline and without lesions or masses, tonsils symmetric  No masses or lesions of the visualized oropharynx  No purulence in FOM, FOM is soft    Neck:  Neck is symmetric, non-edematous, non-erythematous  Trachea is midline and easily palpable,    Glands:  Parotid glands are symmetric and non-tender.   Right submandibular gland is enlarged and tender  No thyroid nodules or masses, non-tender     Respiratory:  Normal work of breathing, no accessory muscle use, no stridor    Voice:  Normal vocal quality, volume, prosody and articulation         Recent Labs   Lab 11/30/19  1820 12/01/19  0352   WBC 8.68 6.49   HGB 13.7 12.6   HCT 44.5 40.7    293    141   K 4.6 3.9   BUN 14 12   CREATININE 1.6* 1.2   CALCIUM 9.6 9.0   PHOS  --  3.0   MG  --  1.6       Microbiology Results (last 7 days)     Procedure Component Value Units Date/Time    Blood culture [[id number]] Collected:  12/01/19 0144    Order Status:  Completed Specimen:  Blood Updated:  12/01/19 0915     Blood Culture, Routine No Growth to date          Imaging Results          CT Soft Tissue Neck With Contrast (Final result)  Result time 11/30/19 21:56:15    Final result by Yair Geller MD (11/30/19 21:56:15)                 Impression:      Asymmetric enlargement of the right submandibular gland with surrounding inflammatory change suggestive of sialadenitis.    Electronically signed by resident: Rajesh Flower MD  Date:    11/30/2019  Time:    21:23    Electronically signed by: Yair Geller MD  Date:    11/30/2019  Time:    21:56             Narrative:    EXAMINATION:  CT SOFT TISSUE NECK WITH CONTRAST    CLINICAL HISTORY:  Neck mass, nonpulsatile, solitary;    TECHNIQUE:  Low dose axial images as well as sagittal and coronal reconstructions were performed from the skull base to the clavicles following the intravenous administration of 100 mL  of Omnipaque 350.    COMPARISON:  CTA head neck 01/04/2019.    FINDINGS:  Skull Base and Brain (limited evaluation): No significant abnormality.    Sinuses and Mastoid Air Cells: Minimal mucosal thickening in the paranasal sinuses.  No air-fluid levels.  Mastoid air cells are essentially clear.    Salivary Glands: Parotid glands are bilaterally symmetric and demonstrate no gross abnormalities.  The right submandibular gland is asymmetrically enlarged measuring 2.7 x 2.6 cm (for reference the left measures 2.0 x 2.0 cm) with adjacent fat stranding and trace fluid in the submandibular soft tissues.  Inflammatory changes and asymmetric enlargement are new when compared with the prior CTA neck dated 01/04/2019.  No drainable fluid collection.    Thyroid: Normal in size and configuration.  There is are scattered tiny bilateral thyroid nodules which do not meet size criteria for follow-up.    Cervical Lymph Nodes:  No bulky adenopathy.  Right submandibular and cervical chain nodes are not pathologically enlarged but appear asymmetrically more prominent when compared to the left side, likely reactive.    Pharynx/Larynx: Normal, with preservation of the normal anatomical fat planes.    Vasculature (limited evaluation): Vascular structures of the neck appear patent    Upper Airways and Lungs: Trachea is midline and the proximal airways are patent.  Lung apices are clear.    Bones: No acute fracture or suspicious lytic or sclerotic lesion.  Temporomandibular joints are symmetric.                                  Assessment/Plan:      Right SMG sialadenitis. No e/o ductal stone or fluid collection on CT.    - IV abx  - Heat compresses to right SMG  - Massages to right SMG  - Sialogogues    Will follow.         Trey Aguiar,   Otorhinolaryngology-Head & Neck Surgery  [facility]  12/01/2019

## 2019-12-01 NOTE — NURSING
Received from ER per w/darion NAD awake alert and oriented.Right jaw swollen and red,area hard and tender to touch. Pt. Complaining of pain. Oriented to room and call bell system.

## 2019-12-01 NOTE — ASSESSMENT & PLAN NOTE
- Patient with no difficulty tolerating secretions.  No evidence of airway compromise on exam.  - CT soft tissue neck:  Asymmetric enlargement of the right submandibular gland with surrounding inflammatory change suggestive of sialadenitis.  - ER provider discussed patient with ENT.  They recommended starting patient on antibiotics and monitoring in observation.  - Patient started on clindamycin.  - Aspiration precautions, continuous pulse ox.

## 2019-12-01 NOTE — PROGRESS NOTES
[facility] Hospital Medicine                                                                     Progress Note     Team: [facility] MED G Rafy Rico MD   Admit Date: 11/30/2019   Hospital Day: 0  TETE: 12/2/2019   Code status: Full Code   Principal Problem: Sialadenitis     SUMMARY:     Patient is a 54 year old lady with a h/o HTN, HLD, chronic back pain.  She presents with right-sided neck edema.  Patient states symptoms began yesterday, but worsened throughout the day.  She states it is painful to move her jaw and notes decreased PO intake.  Denies SOB, difficulty with secretions, difficulty taking medications.   She reports that she and her family have recently been suffering from an URI.  This began at the end of October.  It initially improved following a ten day course of Augmentin and cough syrup, but then cycled back through her family members again.  She tested negative for the flu.  She reports nonproductive cough, rhinitis, congestion.  These symptoms have been getting better.  Denies chest pain, SOB, dizziness, palpitation, fever/chills, N/V/D, abdominal pain.  Denies new medications, itching.    Admitted to hospital medicine. CT soft tissue neck:  Asymmetric enlargement of the right submandibular gland with surrounding inflammatory change suggestive of sialadenitis. Patient with no difficulty tolerating secretions.  No evidence of airway compromise on exam. ER provider discussed patient with ENT.  They recommended starting patient on antibiotics and monitoring in observation. Patient started on clindamycin. Remains stable otherwise. ENT rec IV abx, Massages to right SMG, Sialogogues and heat compression.       SUBJECTIVE:     Pt was seen and examined at bedside. Pt had no acute events overnight, and no new complaints this morning. Pt remained  hemodynamically stable and afebrile. Pain improved but swelling still same. Redness and calor resolving. No other complaints.Denies nausea, vomiting, diarrhea, constipation, blood in stools or trouble urinating. Pt denies any fevers, chills, malaise, headaches, chest pain, SOB, cough. Pt has been able to ambulate without any distress.      ROS (Positive in Bold, otherwise negative)  Pain Scale: 3 /10   Constitutional: fever, chills, night sweats  HENT: facial swelling, congestion  CV: chest pain, edema, palpitations  Resp: SOB, cough, sputum production  GI: changes in appetite, NVDC, pain, melena, hematochezia, GERD, hematemesis  : Dysuria, hematuria, urinary urgency, frequency  MSK: arthralgia/myalgia, joint swelling  SKIN: rashes, pruritis, petechiae   Neuro/Psych: FND anxiety, depression      OBJECTIVE:     Vitals:  Temp:  [97.7 °F (36.5 °C)-98.6 °F (37 °C)]   Pulse:  []   Resp:  [16-18]   BP: (122-169)/(70-95)   SpO2:  [95 %-98 %]      I & O (Last 24H):     Intake/Output Summary (Last 24 hours) at 12/1/2019 1131  Last data filed at 12/1/2019 0600  Gross per 24 hour   Intake 740 ml   Output --   Net 740 ml       Physical Exam   Constitutional: She is oriented to person, place, and time. She appears well-developed and well-nourished. No distress.   HENT:   Head: Normocephalic and atraumatic.   Eyes: Pupils are equal, round, and reactive to light.   Neck: Neck supple. Carotid bruit is not present. No neck rigidity. Right submandibular area is enlarged, with edema and tenderness. Erythema resolving. No thyromegaly present.   Cardiovascular: Normal rate and regular rhythm. Exam reveals no gallop.   No murmur heard.  Pulmonary/Chest: Effort normal and breath sounds normal. No respiratory distress. She has no wheezes.   Abdominal: Soft. Bowel sounds are normal. She exhibits no distension and no mass. There is no splenomegaly or hepatomegaly. There is no tenderness.   Musculoskeletal: Normal range of motion. She  exhibits no edema or deformity.   Neurological: She is alert and oriented to person, place, and time. No cranial nerve deficit or sensory deficit.   Skin: Skin is warm and dry. No rash noted.   Psychiatric: She has a normal mood and affect.       All recent labs and imaging has been reviewed.     Recent Results (from the past 24 hour(s))   CBC auto differential    Collection Time: 11/30/19  6:20 PM   Result Value Ref Range    WBC 8.68 3.90 - 12.70 K/uL    RBC 4.97 4.00 - 5.40 M/uL    Hemoglobin 13.7 12.0 - 16.0 g/dL    Hematocrit 44.5 37.0 - 48.5 %    Mean Corpuscular Volume 90 82 - 98 fL    Mean Corpuscular Hemoglobin 27.6 27.0 - 31.0 pg    Mean Corpuscular Hemoglobin Conc 30.8 (L) 32.0 - 36.0 g/dL    RDW 13.8 11.5 - 14.5 %    Platelets 329 150 - 350 K/uL    MPV 11.2 9.2 - 12.9 fL    Immature Granulocytes 0.7 (H) 0.0 - 0.5 %    Gran # (ANC) 6.2 1.8 - 7.7 K/uL    Immature Grans (Abs) 0.06 (H) 0.00 - 0.04 K/uL    Lymph # 1.4 1.0 - 4.8 K/uL    Mono # 0.9 0.3 - 1.0 K/uL    Eos # 0.1 0.0 - 0.5 K/uL    Baso # 0.05 0.00 - 0.20 K/uL    nRBC 0 0 /100 WBC    Gran% 71.5 38.0 - 73.0 %    Lymph% 16.5 (L) 18.0 - 48.0 %    Mono% 10.1 4.0 - 15.0 %    Eosinophil% 0.6 0.0 - 8.0 %    Basophil% 0.6 0.0 - 1.9 %    Differential Method Automated    Comprehensive metabolic panel    Collection Time: 11/30/19  6:20 PM   Result Value Ref Range    Sodium 138 136 - 145 mmol/L    Potassium 4.6 3.5 - 5.1 mmol/L    Chloride 108 95 - 110 mmol/L    CO2 18 (L) 23 - 29 mmol/L    Glucose 104 70 - 110 mg/dL    BUN, Bld 14 6 - 20 mg/dL    Creatinine 1.6 (H) 0.5 - 1.4 mg/dL    Calcium 9.6 8.7 - 10.5 mg/dL    Total Protein 8.2 6.0 - 8.4 g/dL    Albumin 4.0 3.5 - 5.2 g/dL    Total Bilirubin 0.4 0.1 - 1.0 mg/dL    Alkaline Phosphatase 90 55 - 135 U/L     (H) 10 - 40 U/L     (H) 10 - 44 U/L    Anion Gap 12 8 - 16 mmol/L    eGFR if African American 42.4 (A) >60 mL/min/1.73 m^2    eGFR if non  36.8 (A) >60 mL/min/1.73 m^2    C-reactive protein    Collection Time: 11/30/19  6:20 PM   Result Value Ref Range    CRP 31.6 (H) 0.0 - 8.2 mg/L   Procalcitonin    Collection Time: 11/30/19  6:20 PM   Result Value Ref Range    Procalcitonin 0.18 <0.25 ng/mL   Sedimentation rate    Collection Time: 11/30/19  6:20 PM   Result Value Ref Range    Sed Rate 55 (H) 0 - 36 mm/Hr   ISTAT PROCEDURE    Collection Time: 11/30/19  6:21 PM   Result Value Ref Range    POC PH 7.314 (L) 7.35 - 7.45    POC PCO2 36.7 35 - 45 mmHg    POC PO2 35 (LL) 40 - 60 mmHg    POC HCO3 18.7 (L) 24 - 28 mmol/L    POC BE -8 -2 to 2 mmol/L    POC SATURATED O2 63 (L) 95 - 100 %    POC Lactate 1.28 0.5 - 2.2 mmol/L    POC TCO2 20 (L) 24 - 29 mmol/L    Sample VENOUS     Site Other     Allens Test N/A     DelSys Room Air     Mode SPONT    POCT urine pregnancy    Collection Time: 11/30/19  8:46 PM   Result Value Ref Range    POC Preg Test, Ur Negative Negative     Acceptable Yes    Blood culture    Collection Time: 12/01/19  1:44 AM   Result Value Ref Range    Blood Culture, Routine No Growth to date    Comprehensive Metabolic Panel (CMP)    Collection Time: 12/01/19  3:52 AM   Result Value Ref Range    Sodium 141 136 - 145 mmol/L    Potassium 3.9 3.5 - 5.1 mmol/L    Chloride 108 95 - 110 mmol/L    CO2 21 (L) 23 - 29 mmol/L    Glucose 159 (H) 70 - 110 mg/dL    BUN, Bld 12 6 - 20 mg/dL    Creatinine 1.2 0.5 - 1.4 mg/dL    Calcium 9.0 8.7 - 10.5 mg/dL    Total Protein 7.3 6.0 - 8.4 g/dL    Albumin 3.5 3.5 - 5.2 g/dL    Total Bilirubin 0.4 0.1 - 1.0 mg/dL    Alkaline Phosphatase 86 55 - 135 U/L    AST 77 (H) 10 - 40 U/L    ALT 79 (H) 10 - 44 U/L    Anion Gap 12 8 - 16 mmol/L    eGFR if African American >60.0 >60 mL/min/1.73 m^2    eGFR if non  52.1 (A) >60 mL/min/1.73 m^2   Magnesium    Collection Time: 12/01/19  3:52 AM   Result Value Ref Range    Magnesium 1.6 1.6 - 2.6 mg/dL   Phosphorus    Collection Time: 12/01/19  3:52 AM   Result Value Ref Range     Phosphorus 3.0 2.7 - 4.5 mg/dL   Hemoglobin A1c    Collection Time: 12/01/19  3:52 AM   Result Value Ref Range    Hemoglobin A1C 6.5 (H) 4.0 - 5.6 %    Estimated Avg Glucose 140 (H) 68 - 131 mg/dL   CBC with Automated Differential    Collection Time: 12/01/19  3:52 AM   Result Value Ref Range    WBC 6.49 3.90 - 12.70 K/uL    RBC 4.57 4.00 - 5.40 M/uL    Hemoglobin 12.6 12.0 - 16.0 g/dL    Hematocrit 40.7 37.0 - 48.5 %    Mean Corpuscular Volume 89 82 - 98 fL    Mean Corpuscular Hemoglobin 27.6 27.0 - 31.0 pg    Mean Corpuscular Hemoglobin Conc 31.0 (L) 32.0 - 36.0 g/dL    RDW 13.9 11.5 - 14.5 %    Platelets 293 150 - 350 K/uL    MPV 11.7 9.2 - 12.9 fL    Immature Granulocytes 0.9 (H) 0.0 - 0.5 %    Gran # (ANC) 3.6 1.8 - 7.7 K/uL    Immature Grans (Abs) 0.06 (H) 0.00 - 0.04 K/uL    Lymph # 2.0 1.0 - 4.8 K/uL    Mono # 0.8 0.3 - 1.0 K/uL    Eos # 0.0 0.0 - 0.5 K/uL    Baso # 0.03 0.00 - 0.20 K/uL    nRBC 0 0 /100 WBC    Gran% 54.7 38.0 - 73.0 %    Lymph% 30.8 18.0 - 48.0 %    Mono% 12.6 4.0 - 15.0 %    Eosinophil% 0.5 0.0 - 8.0 %    Basophil% 0.5 0.0 - 1.9 %    Differential Method Automated        No results for input(s): POCTGLUCOSE in the last 168 hours.    Hemoglobin A1C   Date Value Ref Range Status   12/01/2019 6.5 (H) 4.0 - 5.6 % Final     Comment:     ADA Screening Guidelines:  5.7-6.4%  Consistent with prediabetes  >or=6.5%  Consistent with diabetes  High levels of fetal hemoglobin interfere with the HbA1C  assay. Heterozygous hemoglobin variants (HbS, HgC, etc)do  not significantly interfere with this assay.   However, presence of multiple variants may affect accuracy.     07/08/2019 6.5 (H) 4.0 - 5.6 % Final     Comment:     ADA Screening Guidelines:  5.7-6.4%  Consistent with prediabetes  >or=6.5%  Consistent with diabetes  High levels of fetal hemoglobin interfere with the HbA1C  assay. Heterozygous hemoglobin variants (HbS, HgC, etc)do  not significantly interfere with this assay.   However, presence of  multiple variants may affect accuracy.     03/19/2019 6.4 (H) 4.0 - 5.6 % Final     Comment:     ADA Screening Guidelines:  5.7-6.4%  Consistent with prediabetes  >or=6.5%  Consistent with diabetes  High levels of fetal hemoglobin interfere with the HbA1C  assay. Heterozygous hemoglobin variants (HbS, HgC, etc)do  not significantly interfere with this assay.   However, presence of multiple variants may affect accuracy.          Active Hospital Problems    Diagnosis  POA    *Sialadenitis [K11.20]  Yes    TASIA (acute kidney injury) [N17.9]  Yes    Transaminitis [R74.0]  Yes    Essential hypertension [I10]  Yes     Chronic    Mixed hyperlipidemia [E78.2]  Yes     Chronic    Chronic midline low back pain without sciatica [M54.5, G89.29]  Yes     Chronic      Resolved Hospital Problems   No resolved problems to display.          ASSESSMENT AND PLAN:       Sialadenitis  - Patient with no difficulty tolerating secretions.  No evidence of airway compromise on exam.  - CT soft tissue neck:  Asymmetric enlargement of the right submandibular gland with surrounding inflammatory change suggestive of sialadenitis.  - ER provider discussed patient with ENT.  They recommended starting patient on antibiotics and monitoring in observation.  - Patient started on clindamycin.  - ENT seen patient, observation overnight, rec IV abx, Massages to right SMG, Sialogogues and heat compression.   - Aspiration precautions, continuous pulse ox  - Dysphagia diet for now  - Remains afebrile and HDS        TASIA (acute kidney injury)  - On admission Cr 1.6, baseline 0.9   - Patient reports decreased PO intake.  - IVF   - hold Mobic, spironolactone, chlorthalidone.  - Avoid nephrotoxins.  - Resolved with fluids      Transaminitis   - On admission , , AlkPhos 90, Tbili 0.4.  - LFTs improving  - Pending hepatitis panel   - RUQ US with enlarged liver and hepatic steatosis. Stable, mildly prominent extrahepatic duct.  No intrahepatic  ductal dilatation.  - Continue to monitor.     Essential hypertension  - Continue amlodipine 10mg daily.  - Chlorthalidone 25mg daily, spironolactone 25mg daily held secondary to TASIA.     Mixed hyperlipidemia  - Continue pravastatin 20mg qHS.     Chronic midline low back pain without sciatica  - Continue gabapentin 600mg TID, Cymbalta 60mg daily, PRN tizanidine.            Prophylaxis- SCDs,  Code Status- Full Code   Discharge plan and follow up - d/c home once medically stable    Rafy Rico MD  [facility] Medicine Staff  Pager [phone]

## 2019-12-01 NOTE — HPI
Patient is a 54 year old lady with a h/o HTN, HLD, chronic back pain.  She presents with right-sided neck edema.  Patient states symptoms began yesterday, but worsened throughout the day.  She states it is painful to move her jaw and notes decreased PO intake.  Denies SOB, difficulty with secretions, difficulty taking medications.   She reports that she and her family have recently been suffering from an URI.  This began at the end of October.  It initially improved following a ten day course of Augmentin and cough syrup, but then cycled back through her family members again.  She tested negative for the flu.  She reports nonproductive cough, rhinitis, congestion.  These symptoms have been getting better.  Denies chest pain, SOB, dizziness, palpitation, fever/chills, N/V/D, abdominal pain.  Denies new medications, itching.

## 2019-12-01 NOTE — SUBJECTIVE & OBJECTIVE
Past Medical History:   Diagnosis Date    Anxiety     Degenerative joint disease (DJD) of hip     Diabetes type 2, controlled 7/10/2019    GERD (gastroesophageal reflux disease)     Hyperlipidemia     Hypertension     IBS (irritable bowel syndrome)     Insomnia     Lumbar disc herniation     PTSD (post-traumatic stress disorder)        Past Surgical History:   Procedure Laterality Date    ADENOIDECTOMY       SECTION      TONSILLECTOMY         Review of patient's allergies indicates:  No Known Allergies    No current facility-administered medications on file prior to encounter.      Current Outpatient Medications on File Prior to Encounter   Medication Sig    acetaminophen (TYLENOL) 500 MG tablet Take 1 tablet (500 mg total) by mouth every 6 (six) hours as needed for Pain (Headache).    amLODIPine (NORVASC) 10 MG tablet Take 1 tablet (10 mg total) by mouth once daily.    buPROPion (WELLBUTRIN XL) 300 MG 24 hr tablet Take 300 mg by mouth once daily.    chlorthalidone (HYGROTEN) 25 MG Tab     clonazePAM (KLONOPIN) 1 MG tablet Take 1 mg by mouth 2 (two) times daily.     cloNIDine (CATAPRES) 0.1 MG tablet Take 1 tablet (0.1 mg total) by mouth every 12 (twelve) hours as needed (only for blood pressure greater than 180/120).    duloxetine (CYMBALTA) 60 MG capsule Take 60 mg by mouth once daily.    gabapentin (NEURONTIN) 300 MG capsule Take 600 mg by mouth 3 (three) times daily.    gabapentin (NEURONTIN) 600 MG tablet TK 1 T PO TID    gemfibrozil (LOPID) 600 MG tablet TAKE 1 TABLET BY MOUTH TWICE DAILY BEFORE MEALS    gemfibrozil (LOPID) 600 MG tablet TAKE 1 TABLET BY MOUTH TWICE DAILY BEFORE MEALS    KURVELO 0.15-0.03 mg per tablet Take 1 tablet by mouth once daily.    LINZESS 290 mcg Cap capsule TAKE 1 CAPSULE BY MOUTH EVERY DAY    meloxicam (MOBIC) 15 MG tablet TAKE 1 TABLET BY MOUTH EVERY DAY WITH FOOD    pantoprazole (PROTONIX) 40 MG tablet Take 1 tablet (40 mg total) by mouth once  daily.    POLYETHYLENE GLYCOL 3350 (MIRALAX ORAL) Take by mouth as needed.     potassium chloride SA (KLOR-CON M20) 20 MEQ tablet Take 2 tablets (40 mEq total) by mouth once daily.    pravastatin (PRAVACHOL) 20 MG tablet TAKE 1 TABLET BY MOUTH EVERY EVENING    QUEtiapine (SEROQUEL) 100 MG Tab Take 1 tablet by mouth once daily.    spironolactone (ALDACTONE) 25 MG tablet Take 1 tablet (25 mg total) by mouth once daily.    tizanidine (ZANAFLEX) 6 mg capsule Take 6 mg by mouth 2 (two) times daily as needed for Muscle spasms.    traMADol (ULTRAM) 50 mg tablet TK 1 TO 2 TS PO TID PRN     Family History     Problem Relation (Age of Onset)    Breast cancer Paternal Aunt    Cancer Maternal Grandfather    Diabetes Father    Hypertension Mother, Father    Hypothyroidism Mother    Stroke Father (46)        Tobacco Use    Smoking status: Former Smoker     Types: Vaping w/o nicotine    Smokeless tobacco: Former User     Quit date: 10/20/2014    Tobacco comment: Vapor cigarettes   Substance and Sexual Activity    Alcohol use: No     Frequency: Monthly or less     Drinks per session: 3 or 4     Binge frequency: Never     Comment: socially    Drug use: No    Sexual activity: Not on file     Review of Systems   Constitutional: Positive for appetite change. Negative for activity change, chills, fatigue, fever and unexpected weight change.   HENT: Positive for congestion and facial swelling. Negative for rhinorrhea, sore throat, trouble swallowing and voice change.    Eyes: Negative for visual disturbance.   Respiratory: Positive for cough. Negative for choking, chest tightness, shortness of breath and wheezing.    Cardiovascular: Negative for chest pain, palpitations and leg swelling.   Gastrointestinal: Negative for abdominal distention, abdominal pain, anal bleeding, blood in stool, constipation, diarrhea, nausea and vomiting.   Endocrine: Negative for cold intolerance, heat intolerance, polydipsia and polyuria.    Genitourinary: Negative for dysuria, flank pain, frequency, hematuria and urgency.   Musculoskeletal: Negative for arthralgias, back pain, joint swelling and myalgias.   Skin: Negative for color change and rash.   Neurological: Negative for dizziness, seizures, syncope, facial asymmetry, speech difficulty, weakness, light-headedness, numbness and headaches.   Hematological: Negative for adenopathy. Does not bruise/bleed easily.   Psychiatric/Behavioral: Negative for agitation, confusion, hallucinations and suicidal ideas.     Objective:     Vital Signs (Most Recent):  Temp: 98.3 °F (36.8 °C) (12/01/19 0101)  Pulse: 99 (12/01/19 0101)  Resp: 18 (12/01/19 0101)  BP: (!) 157/90 (12/01/19 0101)  SpO2: 97 % (12/01/19 0101) Vital Signs (24h Range):  Temp:  [97.8 °F (36.6 °C)-98.3 °F (36.8 °C)] 98.3 °F (36.8 °C)  Pulse:  [] 99  Resp:  [16-18] 18  SpO2:  [95 %-98 %] 97 %  BP: (136-169)/(79-95) 157/90     Weight: 77.1 kg (170 lb)  Body mass index is 31.09 kg/m².    Physical Exam   Constitutional: She is oriented to person, place, and time. She appears well-developed and well-nourished. No distress.   HENT:   Head: Normocephalic and atraumatic.   Eyes: Pupils are equal, round, and reactive to light.   Neck: Neck supple. Carotid bruit is not present. No neck rigidity. Edema and erythema present. No thyromegaly present.   Cardiovascular: Normal rate and regular rhythm. Exam reveals no gallop.   No murmur heard.  Pulmonary/Chest: Effort normal and breath sounds normal. No respiratory distress. She has no wheezes.   Abdominal: Soft. Bowel sounds are normal. She exhibits no distension and no mass. There is no splenomegaly or hepatomegaly. There is no tenderness.   Musculoskeletal: Normal range of motion. She exhibits no edema or deformity.   Neurological: She is alert and oriented to person, place, and time. No cranial nerve deficit or sensory deficit.   Skin: Skin is warm and dry. No rash noted.   Psychiatric: She has a  normal mood and affect.         CRANIAL NERVES     CN III, IV, VI   Pupils are equal, round, and reactive to light.       Significant Labs:   CBC:   Recent Labs   Lab 11/30/19  1820   WBC 8.68   HGB 13.7   HCT 44.5        CMP:   Recent Labs   Lab 11/30/19  1820      K 4.6      CO2 18*      BUN 14   CREATININE 1.6*   CALCIUM 9.6   PROT 8.2   ALBUMIN 4.0   BILITOT 0.4   ALKPHOS 90   *   *   ANIONGAP 12   EGFRNONAA 36.8*     Lactic Acid: No results for input(s): LACTATE in the last 48 hours.    Significant Imaging: I have reviewed all pertinent imaging results/findings within the past 24 hours.

## 2019-12-01 NOTE — ASSESSMENT & PLAN NOTE
- Continue amlodipine 10mg daily.  - Chlorthalidone 25mg daily, spironolactone 25mg daily held secondary to TASIA.

## 2019-12-01 NOTE — PLAN OF CARE
Pt admitted and care plan initiated.continue antibiotics.reports adequate pain relief.resp even and nonlabored.o2 sats 96-98% on r/a.no resp distress noted.safety precautions implemented.pt free of falls or injuries.continue plan of care.

## 2019-12-01 NOTE — ASSESSMENT & PLAN NOTE
- Creatinine elevated to 1.6 from 1.4 on 8/8/2019, GFR down to 36.8 from 57.9.  - Patient reports decreased PO intake.  - IVFs, hold Mobic, spironolactone, chlorthalidone.  - Avoid nephrotoxins.

## 2019-12-02 ENCOUNTER — TELEPHONE (OUTPATIENT)
Dept: INTERNAL MEDICINE | Facility: CLINIC | Age: 54
End: 2019-12-02

## 2019-12-02 VITALS
BODY MASS INDEX: 34.48 KG/M2 | HEART RATE: 90 BPM | HEIGHT: 62 IN | WEIGHT: 187.38 LBS | TEMPERATURE: 98 F | OXYGEN SATURATION: 93 % | RESPIRATION RATE: 18 BRPM | DIASTOLIC BLOOD PRESSURE: 74 MMHG | SYSTOLIC BLOOD PRESSURE: 119 MMHG

## 2019-12-02 LAB
ALBUMIN SERPL BCP-MCNC: 3.2 G/DL (ref 3.5–5.2)
ALP SERPL-CCNC: 75 U/L (ref 55–135)
ALT SERPL W/O P-5'-P-CCNC: 68 U/L (ref 10–44)
ANION GAP SERPL CALC-SCNC: 10 MMOL/L (ref 8–16)
AST SERPL-CCNC: 50 U/L (ref 10–40)
BASOPHILS # BLD AUTO: 0.03 K/UL (ref 0–0.2)
BASOPHILS NFR BLD: 0.4 % (ref 0–1.9)
BILIRUB SERPL-MCNC: 0.2 MG/DL (ref 0.1–1)
BUN SERPL-MCNC: 13 MG/DL (ref 6–20)
CALCIUM SERPL-MCNC: 9.2 MG/DL (ref 8.7–10.5)
CHLORIDE SERPL-SCNC: 109 MMOL/L (ref 95–110)
CO2 SERPL-SCNC: 22 MMOL/L (ref 23–29)
CREAT SERPL-MCNC: 1.2 MG/DL (ref 0.5–1.4)
DIFFERENTIAL METHOD: ABNORMAL
EOSINOPHIL # BLD AUTO: 0.1 K/UL (ref 0–0.5)
EOSINOPHIL NFR BLD: 1.8 % (ref 0–8)
ERYTHROCYTE [DISTWIDTH] IN BLOOD BY AUTOMATED COUNT: 14.3 % (ref 11.5–14.5)
EST. GFR  (AFRICAN AMERICAN): >60 ML/MIN/1.73 M^2
EST. GFR  (NON AFRICAN AMERICAN): 52.1 ML/MIN/1.73 M^2
GLUCOSE SERPL-MCNC: 145 MG/DL (ref 70–110)
HAV IGM SERPL QL IA: NEGATIVE
HBV CORE IGM SERPL QL IA: NEGATIVE
HBV SURFACE AG SERPL QL IA: NEGATIVE
HCT VFR BLD AUTO: 37 % (ref 37–48.5)
HCV AB SERPL QL IA: NEGATIVE
HGB BLD-MCNC: 11.3 G/DL (ref 12–16)
IMM GRANULOCYTES # BLD AUTO: 0.06 K/UL (ref 0–0.04)
IMM GRANULOCYTES NFR BLD AUTO: 0.8 % (ref 0–0.5)
LYMPHOCYTES # BLD AUTO: 2.6 K/UL (ref 1–4.8)
LYMPHOCYTES NFR BLD: 34.7 % (ref 18–48)
MCH RBC QN AUTO: 27.9 PG (ref 27–31)
MCHC RBC AUTO-ENTMCNC: 30.5 G/DL (ref 32–36)
MCV RBC AUTO: 91 FL (ref 82–98)
MONOCYTES # BLD AUTO: 0.9 K/UL (ref 0.3–1)
MONOCYTES NFR BLD: 11.7 % (ref 4–15)
NEUTROPHILS # BLD AUTO: 3.8 K/UL (ref 1.8–7.7)
NEUTROPHILS NFR BLD: 50.6 % (ref 38–73)
NRBC BLD-RTO: 0 /100 WBC
PLATELET # BLD AUTO: 284 K/UL (ref 150–350)
PMV BLD AUTO: 11.7 FL (ref 9.2–12.9)
POTASSIUM SERPL-SCNC: 4.5 MMOL/L (ref 3.5–5.1)
PROT SERPL-MCNC: 6.7 G/DL (ref 6–8.4)
RBC # BLD AUTO: 4.05 M/UL (ref 4–5.4)
SODIUM SERPL-SCNC: 141 MMOL/L (ref 136–145)
WBC # BLD AUTO: 7.6 K/UL (ref 3.9–12.7)

## 2019-12-02 PROCEDURE — 25000003 PHARM REV CODE 250: Performed by: PHYSICIAN ASSISTANT

## 2019-12-02 PROCEDURE — G0378 HOSPITAL OBSERVATION PER HR: HCPCS

## 2019-12-02 PROCEDURE — 99226 PR SUBSEQUENT OBSERVATION CARE,LEVEL III: ICD-10-PCS | Mod: ,,, | Performed by: INTERNAL MEDICINE

## 2019-12-02 PROCEDURE — 99226 PR SUBSEQUENT OBSERVATION CARE,LEVEL III: CPT | Mod: ,,, | Performed by: INTERNAL MEDICINE

## 2019-12-02 PROCEDURE — 85025 COMPLETE CBC W/AUTO DIFF WBC: CPT

## 2019-12-02 PROCEDURE — S0077 INJECTION, CLINDAMYCIN PHOSP: HCPCS | Performed by: PHYSICIAN ASSISTANT

## 2019-12-02 PROCEDURE — 36415 COLL VENOUS BLD VENIPUNCTURE: CPT

## 2019-12-02 PROCEDURE — 80053 COMPREHEN METABOLIC PANEL: CPT

## 2019-12-02 PROCEDURE — 96376 TX/PRO/DX INJ SAME DRUG ADON: CPT | Performed by: EMERGENCY MEDICINE

## 2019-12-02 RX ORDER — CLINDAMYCIN HYDROCHLORIDE 300 MG/1
300 CAPSULE ORAL EVERY 8 HOURS
Qty: 30 CAPSULE | Refills: 0 | Status: SHIPPED | OUTPATIENT
Start: 2019-12-02 | End: 2019-12-12

## 2019-12-02 RX ADMIN — BUPROPION HYDROCHLORIDE 300 MG: 150 TABLET, FILM COATED, EXTENDED RELEASE ORAL at 08:12

## 2019-12-02 RX ADMIN — QUETIAPINE FUMARATE 100 MG: 25 TABLET ORAL at 08:12

## 2019-12-02 RX ADMIN — CLINDAMYCIN IN 5 PERCENT DEXTROSE 600 MG: 12 INJECTION, SOLUTION INTRAVENOUS at 09:12

## 2019-12-02 RX ADMIN — PANTOPRAZOLE SODIUM 40 MG: 40 TABLET, DELAYED RELEASE ORAL at 08:12

## 2019-12-02 RX ADMIN — GEMFIBROZIL 600 MG: 600 TABLET ORAL at 05:12

## 2019-12-02 RX ADMIN — MORPHINE SULFATE 10 MG: 10 SOLUTION ORAL at 05:12

## 2019-12-02 RX ADMIN — AMLODIPINE BESYLATE 10 MG: 10 TABLET ORAL at 08:12

## 2019-12-02 RX ADMIN — SENNOSIDES AND DOCUSATE SODIUM 1 TABLET: 8.6; 5 TABLET ORAL at 08:12

## 2019-12-02 RX ADMIN — MORPHINE SULFATE 10 MG: 10 SOLUTION ORAL at 01:12

## 2019-12-02 RX ADMIN — CLINDAMYCIN IN 5 PERCENT DEXTROSE 600 MG: 12 INJECTION, SOLUTION INTRAVENOUS at 02:12

## 2019-12-02 RX ADMIN — GABAPENTIN 600 MG: 300 CAPSULE ORAL at 08:12

## 2019-12-02 RX ADMIN — DULOXETINE 60 MG: 60 CAPSULE, DELAYED RELEASE ORAL at 08:12

## 2019-12-02 RX ADMIN — CLONAZEPAM 1 MG: 1 TABLET ORAL at 08:12

## 2019-12-02 RX ADMIN — POTASSIUM CHLORIDE 40 MEQ: 1500 TABLET, EXTENDED RELEASE ORAL at 08:12

## 2019-12-02 NOTE — PROGRESS NOTES
[facility]  Otorhinolaryngology-Head & Neck Surgery  Progress Note    Subjective:     Post-Op Info:  * No surgery found *      Hospital Day: 3     Interval History: NAEON. Pain and swelling improving w current treatments.    Medications:  Continuous Infusions:  Scheduled Meds:   amLODIPine  10 mg Oral Daily    buPROPion  300 mg Oral Daily    clindamycin 600 MG/50 ML D5W  600 mg Intravenous Q8H    clonazePAM  1 mg Oral BID    DULoxetine  60 mg Oral Daily    gabapentin  600 mg Oral TID    gemfibrozil  600 mg Oral BID AC    pantoprazole  40 mg Oral Daily    potassium chloride SA  40 mEq Oral Daily    pravastatin  20 mg Oral QHS    QUEtiapine  100 mg Oral Daily    senna-docusate 8.6-50 mg  1 tablet Oral BID     PRN Meds:acetaminophen, albuterol-ipratropium, benzonatate, Dextrose 10% Bolus, Dextrose 10% Bolus, glucagon (human recombinant), glucose, glucose, influenza, melatonin, morphine, morphine, promethazine (PHENERGAN) IVPB, promethazine, sodium chloride 0.9%, sodium chloride 0.9%, tiZANidine     Review of patient's allergies indicates:  No Known Allergies  Objective:     Vital Signs (24h Range):  Temp:  [97.4 °F (36.3 °C)-97.9 °F (36.6 °C)] 97.5 °F (36.4 °C)  Pulse:  [] 90  Resp:  [16-18] 18  SpO2:  [95 %-97 %] 96 %  BP: (120-152)/(59-82) 152/82       Lines/Drains/Airways     Peripheral Intravenous Line                 Peripheral IV - Single Lumen 11/30/19 1821 20 G Left 1 day                Physical Exam  NAD, AAOX3  Breathing comfortably on RA  Parotid glands are symmetric and non-tender.   Right submandibular gland is enlarged and tender  No thyroid nodules or masses, non-tender   No purulence from submandibular or parotid ducts  Neck with full ROM    Significant Labs:  CBC:   Recent Labs   Lab 12/02/19  0404   WBC 7.60   RBC 4.05   HGB 11.3*   HCT 37.0      MCV 91   MCH 27.9   MCHC 30.5*     CMP:   Recent Labs   Lab 12/01/19  0352   *   CALCIUM 9.0   ALBUMIN  3.5   PROT 7.3      K 3.9   CO2 21*      BUN 12   CREATININE 1.2   ALKPHOS 86   ALT 79*   AST 77*   BILITOT 0.4       Significant Diagnostics:  None    Assessment/Plan:     * Sialadenitis   Right SMG sialadenitis. No e/o ductal stone or fluid collection on CT. Improving.     - IV abx; may switch to PO  - Heat compresses to right SMG  - Massages to right SMG   - Sialogogues  -         Chaitanya Chavis MD  Otorhinolaryngology-Head & Neck Surgery  [facility]

## 2019-12-02 NOTE — NURSING
Pt reports discomfort and pain in the left side of the face, the side is hard to touch, tender  and a little swollen. Med team G paged. Awaiting call back.

## 2019-12-02 NOTE — PLAN OF CARE
CM met with patient for discharge planning.  Patient states she lives with her spouse and two adult children in a one story house with one step to enter.  Patient is independent with ADL's but ambulates with a cane.  Patient's spouse will provide ride home.  Plan is to discharge home with family.    Pharmacy:    [id number] - [place] - [address] [facility] [address]  [address]  [place] [address]  Phone: [phone] Fax: [phone]    PCP:  Tonia Bernal DO    Payor: MEDICAID / Plan: [employer] [facility] ([facility]) / Product Type: Managed Medicaid /      12/02/19 1145   Discharge Assessment   Assessment Type Discharge Planning Assessment   Confirmed/corrected address and phone number on facesheet? Yes   Assessment information obtained from? Patient   Expected Length of Stay (days) 3   Communicated expected length of stay with patient/caregiver yes   Prior to hospitilization cognitive status: Alert/Oriented   Prior to hospitalization functional status: Assistive Equipment  (cane)   Current cognitive status: Alert/Oriented   Current Functional Status: Assistive Equipment   Facility Arrived From: Emergency room   Lives With spouse;child(gwen), adult   Able to Return to Prior Arrangements yes   Is patient able to care for self after discharge? Unable to determine at this time (comments)   Patient's perception of discharge disposition home or selfcare   Readmission Within the Last 30 Days no previous admission in last 30 days   Patient currently being followed by outpatient case management? No   Patient currently receives any other outside agency services? No   Equipment Currently Used at Home cane, straight   Do you have any problems affording any of your prescribed medications? No   Is the patient taking medications as prescribed? yes   Does the patient have transportation home? Yes   Transportation Anticipated family or friend will  provide   Does the patient receive services at the [facility]? No   Discharge Plan A Home with family   Discharge Plan B Home   DME Needed Upon Discharge  none   Patient/Family in Agreement with Plan yes

## 2019-12-02 NOTE — PLAN OF CARE
Pt. Remains on Iv antibiotics,wbc count 6.4 afebrile,right jaw less swollen area soft redness diminished. Pt. Remains on fall precautions,bed low and locked side rails up x 2 no falls sustained. Pt. Still experiencing pain to right jaw,effectively controlled with po ms 10 mg. Continue current plan of care.

## 2019-12-02 NOTE — SUBJECTIVE & OBJECTIVE
Interval History: NAEON. Pain and swelling improving w current treatments.    Medications:  Continuous Infusions:  Scheduled Meds:   amLODIPine  10 mg Oral Daily    buPROPion  300 mg Oral Daily    clindamycin 600 MG/50 ML D5W  600 mg Intravenous Q8H    clonazePAM  1 mg Oral BID    DULoxetine  60 mg Oral Daily    gabapentin  600 mg Oral TID    gemfibrozil  600 mg Oral BID AC    pantoprazole  40 mg Oral Daily    potassium chloride SA  40 mEq Oral Daily    pravastatin  20 mg Oral QHS    QUEtiapine  100 mg Oral Daily    senna-docusate 8.6-50 mg  1 tablet Oral BID     PRN Meds:acetaminophen, albuterol-ipratropium, benzonatate, Dextrose 10% Bolus, Dextrose 10% Bolus, glucagon (human recombinant), glucose, glucose, influenza, melatonin, morphine, morphine, promethazine (PHENERGAN) IVPB, promethazine, sodium chloride 0.9%, sodium chloride 0.9%, tiZANidine     Review of patient's allergies indicates:  No Known Allergies  Objective:     Vital Signs (24h Range):  Temp:  [97.4 °F (36.3 °C)-97.9 °F (36.6 °C)] 97.5 °F (36.4 °C)  Pulse:  [] 90  Resp:  [16-18] 18  SpO2:  [95 %-97 %] 96 %  BP: (120-152)/(59-82) 152/82       Lines/Drains/Airways     Peripheral Intravenous Line                 Peripheral IV - Single Lumen 11/30/19 1821 20 G Left 1 day                Physical Exam  NAD, AAOX3  Breathing comfortably on RA  Parotid glands are symmetric and non-tender.   Right submandibular gland is enlarged and tender  No thyroid nodules or masses, non-tender   No purulence from submandibular or parotid ducts  Neck with full ROM    Significant Labs:  CBC:   Recent Labs   Lab 12/02/19  0404   WBC 7.60   RBC 4.05   HGB 11.3*   HCT 37.0      MCV 91   MCH 27.9   MCHC 30.5*     CMP:   Recent Labs   Lab 12/01/19  0352   *   CALCIUM 9.0   ALBUMIN 3.5   PROT 7.3      K 3.9   CO2 21*      BUN 12   CREATININE 1.2   ALKPHOS 86   ALT 79*   AST 77*   BILITOT 0.4       Significant Diagnostics:  None

## 2019-12-02 NOTE — ASSESSMENT & PLAN NOTE
Right SMG sialadenitis. No e/o ductal stone or fluid collection on CT. Improving.     - IV abx; may switch to PO  - Heat compresses to right SMG  - Massages to right SMG   - Sialogogues  -

## 2019-12-03 NOTE — PROGRESS NOTES
Patient in process of being discharged for admission for sialadenitis  Chlorthalidone 25mg daily, spironolactone 25mg daily held secondary to TASIA. Per notes, TASIA resolved with fluids.  Continue to monitor.    Hypertension Medications             amLODIPine (NORVASC) 10 MG tablet Take 1 tablet (10 mg total) by mouth once daily.    chlorthalidone (HYGROTEN) 25 MG Tab     cloNIDine (CATAPRES) 0.1 MG tablet Take 1 tablet (0.1 mg total) by mouth every 12 (twelve) hours as needed (only for blood pressure greater than 180/120).    spironolactone (ALDACTONE) 25 MG tablet Take 1 tablet (25 mg total) by mouth once daily.      Hospital Medications

## 2019-12-03 NOTE — PLAN OF CARE
Patient discharged home with family.    Future Appointments   Date Time Provider Department Center   12/3/2019  1:45 PM [facility] MRI1 450 LB LIMIT [facility]   12/13/2019  3:00 PM [facility]        12/03/19 0800   Final Note   Assessment Type Final Discharge Note   Anticipated Discharge Disposition Home   Right Care Referral Info   Post Acute Recommendation No Care

## 2019-12-04 NOTE — DISCHARGE SUMMARY
"[facility]  Discharge Summary  Hospital Medicine    Patient Name: Sherie Reyes  YOB: 1965    Admit Date: 11/30/2019    Discharge Date and Time: 12/2/2019  5:19 PM    Discharge Attending Physician: Rafy Rico MD     Team: [facility]OS[facility] MED G    Reason for Admission:   Chief Complaint   Patient presents with    Neck Swelling     states "I had a lump pop up on my neck" painful       Active Hospital Problems    Diagnosis  POA    *Sialadenitis [K11.20]  Yes    TASIA (acute kidney injury) [N17.9]  Yes    Transaminitis [R74.0]  Yes    Essential hypertension [I10]  Yes     Chronic    Mixed hyperlipidemia [E78.2]  Yes     Chronic    Chronic midline low back pain without sciatica [M54.5, G89.29]  Yes     Chronic      Resolved Hospital Problems   No resolved problems to display.       HPI:   Patient is a 54 year old lady with a h/o HTN, HLD, chronic back pain.  She presents with right-sided neck edema.  Patient states symptoms began yesterday, but worsened throughout the day.  She states it is painful to move her jaw and notes decreased PO intake.  Denies SOB, difficulty with secretions, difficulty taking medications.   She reports that she and her family have recently been suffering from an URI.  This began at the end of October.  It initially improved following a ten day course of Augmentin and cough syrup, but then cycled back through her family members again.  She tested negative for the flu.  She reports nonproductive cough, rhinitis, congestion.  These symptoms have been getting better.  Denies chest pain, SOB, dizziness, palpitation, fever/chills, N/V/D, abdominal pain.  Denies new medications, itching.     Hospital Course:   Admitted to hospital medicine. CT soft tissue neck:  Asymmetric enlargement of the right submandibular gland with surrounding inflammatory change suggestive of sialadenitis. Patient with no difficulty tolerating secretions.  No evidence of airway compromise " on exam. ER provider discussed patient with ENT.  They recommended starting patient on antibiotics and monitoring in observation. Patient started on clindamycin. Remains stable otherwise. ENT rec IV abx, Massages to right SMG, Sialogogues and heat compression. Overall symptoms improved on IV abx. As per ENT transitioned to orals abx. Stable for discharge, confirmed with ENT. Will d/c patient with 10 days of clindamycin with ENT fu.    Principal Problem: Sialadenitis    Other Problems Addressed:  TASIA (acute kidney injury)  Transaminitis   Essential hypertension  Mixed hyperlipidemia  Chronic midline low back pain without sciatica    Procedures Performed: * No surgery found *    Special Care, Treatment, and Services Provided: none    Consults: ENT    Significant Diagnostic Studies:  No results found for: EF  Hemoglobin A1C   Date Value Ref Range Status   12/01/2019 6.5 (H) 4.0 - 5.6 % Final     Comment:     ADA Screening Guidelines:  5.7-6.4%  Consistent with prediabetes  >or=6.5%  Consistent with diabetes  High levels of fetal hemoglobin interfere with the HbA1C  assay. Heterozygous hemoglobin variants (HbS, HgC, etc)do  not significantly interfere with this assay.   However, presence of multiple variants may affect accuracy.     07/08/2019 6.5 (H) 4.0 - 5.6 % Final     Comment:     ADA Screening Guidelines:  5.7-6.4%  Consistent with prediabetes  >or=6.5%  Consistent with diabetes  High levels of fetal hemoglobin interfere with the HbA1C  assay. Heterozygous hemoglobin variants (HbS, HgC, etc)do  not significantly interfere with this assay.   However, presence of multiple variants may affect accuracy.       All labs and imaging reviewed    Final Diagnoses: Same as principal problem.    Discharged Condition: stable  Face to face services were provided on 12/3/2019   Time Spent:  I spent > 30 minutes on the discharge, which included reviewing hospital course with patient/family, reviewing discharge medications, and  "arranging follow-up care.  Physical Exam on 12/3/2019:  /74   Pulse 90   Temp 97.5 °F (36.4 °C)   Resp 18   Ht 5' 2" (1.575 m)   Wt 85 kg (187 lb 6.3 oz)   LMP 11/26/2019   SpO2 (!) 93%   Breastfeeding? No   BMI 34.27 kg/m²   Physical Exam   Constitutional: She is oriented to person, place, and time. She appears well-developed and well-nourished. No distress.   HENT:   Head: Normocephalic and atraumatic.   Eyes: Pupils are equal, round, and reactive to light.   Neck: Neck supple. Carotid bruit is not present. No neck rigidity. Right submandibular area is enlarged, with edema and tenderness improving. Erythema resolving. No thyromegaly present.   Cardiovascular: Normal rate and regular rhythm. Exam reveals no gallop.   No murmur heard.  Pulmonary/Chest: Effort normal and breath sounds normal. No respiratory distress. She has no wheezes.   Abdominal: Soft. Bowel sounds are normal. She exhibits no distension and no mass. There is no splenomegaly or hepatomegaly. There is no tenderness.   Musculoskeletal: Normal range of motion. She exhibits no edema or deformity.   Neurological: She is alert and oriented to person, place, and time. No cranial nerve deficit or sensory deficit.   Skin: Skin is warm and dry. No rash noted.   Psychiatric: She has a normal mood and affect.     Disposition: Home or Self Care    Follow Up Instructions:   Follow-up Information     Tonia Bernal DO. Schedule an appointment as soon as possible for a visit in 2 weeks.    Specialty:  Internal Medicine  Contact information:  [address] [facility] [address]  [phone]             ENT. Call today.    Why:  Please call them today/tomorrow to set up an apt               Future Appointments   Date Time Provider Department Center   12/13/2019  3:00 PM [facility]       Medications:    Discharge Medication List as of 12/2/2019  4:32 PM      START taking these medications    Details "   clindamycin (CLEOCIN) 300 MG capsule Take 1 capsule (300 mg total) by mouth every 8 (eight) hours. for 10 days, Starting Mon 12/2/2019, Until Thu 12/12/2019, Normal         CONTINUE these medications which have NOT CHANGED    Details   acetaminophen (TYLENOL) 500 MG tablet Take 1 tablet (500 mg total) by mouth every 6 (six) hours as needed for Pain (Headache)., Starting Thu 4/25/2019, Normal      amLODIPine (NORVASC) 10 MG tablet Take 1 tablet (10 mg total) by mouth once daily., Starting Thu 6/27/2019, Normal      buPROPion (WELLBUTRIN XL) 300 MG 24 hr tablet Take 300 mg by mouth once daily., Until Discontinued, Historical Med      chlorthalidone (HYGROTEN) 25 MG Tab Starting Fri 8/23/2019, Historical Med      clonazePAM (KLONOPIN) 1 MG tablet Take 1 mg by mouth 2 (two) times daily. , Historical Med      cloNIDine (CATAPRES) 0.1 MG tablet Take 1 tablet (0.1 mg total) by mouth every 12 (twelve) hours as needed (only for blood pressure greater than 180/120)., Starting Mon 8/20/2018, Normal      duloxetine (CYMBALTA) 60 MG capsule Take 60 mg by mouth once daily., Until Discontinued, Historical Med      gabapentin (NEURONTIN) 300 MG capsule Take 600 mg by mouth 3 (three) times daily., Historical Med      gabapentin (NEURONTIN) 600 MG tablet TK 1 T PO TID, Historical Med      gemfibrozil (LOPID) 600 MG tablet TAKE 1 TABLET BY MOUTH TWICE DAILY BEFORE MEALS, Normal      KURVELO 0.15-0.03 mg per tablet Take 1 tablet by mouth once daily., Starting 1/11/2017, Until Discontinued, Historical Med      LINZESS 290 mcg Cap capsule TAKE 1 CAPSULE BY MOUTH EVERY DAY, Normal      meloxicam (MOBIC) 15 MG tablet TAKE 1 TABLET BY MOUTH EVERY DAY WITH FOOD, Normal      pantoprazole (PROTONIX) 40 MG tablet Take 1 tablet (40 mg total) by mouth once daily., Starting Sat 12/29/2018, Normal      POLYETHYLENE GLYCOL 3350 (MIRALAX ORAL) Take by mouth as needed. , Until Discontinued, Historical Med      potassium chloride SA (KLOR-CON M20) 20  MEQ tablet Take 2 tablets (40 mEq total) by mouth once daily., Starting Fri 8/16/2019, Normal      pravastatin (PRAVACHOL) 20 MG tablet TAKE 1 TABLET BY MOUTH EVERY EVENING, Normal      QUEtiapine (SEROQUEL) 100 MG Tab Take 1 tablet by mouth once daily., Starting Mon 2/26/2018, Historical Med      spironolactone (ALDACTONE) 25 MG tablet Take 1 tablet (25 mg total) by mouth once daily., Starting Wed 7/10/2019, Until Thu 7/9/2020, Normal      tizanidine (ZANAFLEX) 6 mg capsule Take 6 mg by mouth 2 (two) times daily as needed for Muscle spasms., Until Discontinued, Historical Med      traMADol (ULTRAM) 50 mg tablet TK 1 TO 2 TS PO TID PRN, Historical Med             Discharge Instructions:  Discussed in length with patient. If his presenting symptoms were to worsen, or if febrile, he should return to the ED. Patient voiced understanding. He needs to fu with PCP and ENT with appropriate labs.      Discharge Procedure Orders   Ambulatory Referral to ENT   Referral Priority: Urgent Referral Type: Consultation   Referral Reason: Specialty Services Required   Requested Specialty: Otolaryngology   Number of Visits Requested: 1         Rafy Rico MD  Department of Hospital Medicine

## 2019-12-05 ENCOUNTER — TELEPHONE (OUTPATIENT)
Dept: ORTHOPEDICS | Facility: CLINIC | Age: 54
End: 2019-12-05

## 2019-12-05 NOTE — TELEPHONE ENCOUNTER
----- Message from Aide Agustin sent at 12/5/2019  2:58 PM CST -----  Contact: [phone]-self  Sherie Reyes called stating her MRI orders were denied and would like to know if they will be resubmitted. please call and advise.

## 2019-12-05 NOTE — TELEPHONE ENCOUNTER
Spoke to pre service, they stated that Dr Hughes has to do peer to peer. Patient was informed that we will give information to Dr Hughes and will call as soon as hear back from insurance company.

## 2019-12-06 LAB — BACTERIA BLD CULT: NORMAL

## 2019-12-09 RX ORDER — PANTOPRAZOLE SODIUM 40 MG/1
TABLET, DELAYED RELEASE ORAL
Qty: 30 TABLET | Refills: 0 | Status: SHIPPED | OUTPATIENT
Start: 2019-12-09 | End: 2019-12-11 | Stop reason: SDUPTHER

## 2019-12-10 NOTE — PROGRESS NOTES
Digital Medicine: Clinician Follow-Up    Patient reports she has resume spironolactone since hospital discharge and holding chlorthalidone  She takes tizanidine unless she falls asleep first    The history is provided by the patient.     Follow Up  Follow-up reason(s): reading review          INTERVENTION(S)  reviewed appropriate dose schedule and encouragement/support    PLAN  patient verbalizes understanding and additional monitoring needed    Continue to hold chlorthalidone due to recent dehydration concerns and TASIA  Continue with amlodipine 5 mg BID and spironolactone 25 mg daily  Resume BP monitoring  Stressed adequate water intake      There are no preventive care reminders to display for this patient.    Last 5 Patient Entered Readings                                      Current 30 Day Average: 130/75     Recent Readings 12/4/2019 12/4/2019 11/25/2019 11/19/2019 11/17/2019    SBP (mmHg) 94 83 147 166 114    DBP (mmHg) 69 56 88 90 73    Pulse 80 78 101 95 101             Hypertension Medications             amLODIPine (NORVASC) 10 MG tablet Take 0.5 tablet (5 mg total) by mouth twice daily.    chlorthalidone (HYGROTEN) 25 MG Tab NOT TAKING    cloNIDine (CATAPRES) 0.1 MG tablet Take 1 tablet (0.1 mg total) by mouth every 12 (twelve) hours as needed (only for blood pressure greater than 180/120).    spironolactone (ALDACTONE) 25 MG tablet Take 1 tablet (25 mg total) by mouth once daily.                 Screenings

## 2019-12-10 NOTE — PROGRESS NOTES
Left voicemail requesting call back  Follow up current medications as chlorthalidone and spironolactone held 2/2 TASIA during recent hospital admission. Per notes, TASIA resolved with fluids.    Hypertension Medications             amLODIPine (NORVASC) 10 MG tablet Take 1 tablet (10 mg total) by mouth once daily.    chlorthalidone (HYGROTEN) 25 MG Tab     cloNIDine (CATAPRES) 0.1 MG tablet Take 1 tablet (0.1 mg total) by mouth every 12 (twelve) hours as needed (only for blood pressure greater than 180/120).    spironolactone (ALDACTONE) 25 MG tablet Take 1 tablet (25 mg total) by mouth once daily.

## 2019-12-11 ENCOUNTER — PATIENT MESSAGE (OUTPATIENT)
Dept: INTERNAL MEDICINE | Facility: CLINIC | Age: 54
End: 2019-12-11

## 2019-12-11 RX ORDER — PANTOPRAZOLE SODIUM 40 MG/1
TABLET, DELAYED RELEASE ORAL
Qty: 30 TABLET | Refills: 11 | Status: ON HOLD | OUTPATIENT
Start: 2019-12-11 | End: 2020-01-05 | Stop reason: HOSPADM

## 2019-12-12 ENCOUNTER — TELEPHONE (OUTPATIENT)
Dept: INTERNAL MEDICINE | Facility: CLINIC | Age: 54
End: 2019-12-12

## 2019-12-12 NOTE — TELEPHONE ENCOUNTER
----- Message from Anat Rodriguez sent at 12/12/2019  3:59 PM CST -----  Contact: [phone]  Type: Rx Prior Authorization    Medication:pantoprazole (PROTONIX) 40 MG tablet    Pharmacy number:[id number] - [place] - [address] AT [address]   [phone] (Phone)  [phone] (Fax)      Comments:please advise, thanks

## 2019-12-13 ENCOUNTER — OFFICE VISIT (OUTPATIENT)
Dept: OTOLARYNGOLOGY | Facility: CLINIC | Age: 54
End: 2019-12-13
Payer: MEDICAID

## 2019-12-13 ENCOUNTER — PATIENT MESSAGE (OUTPATIENT)
Dept: INTERNAL MEDICINE | Facility: CLINIC | Age: 54
End: 2019-12-13

## 2019-12-13 VITALS
TEMPERATURE: 98 F | DIASTOLIC BLOOD PRESSURE: 81 MMHG | SYSTOLIC BLOOD PRESSURE: 175 MMHG | BODY MASS INDEX: 34.15 KG/M2 | WEIGHT: 186.75 LBS | HEART RATE: 102 BPM

## 2019-12-13 DIAGNOSIS — K11.20 SIALADENITIS: ICD-10-CM

## 2019-12-13 PROCEDURE — 99999 PR PBB SHADOW E&M-EST. PATIENT-LVL IV: ICD-10-PCS | Mod: PBBFAC,,, | Performed by: NURSE PRACTITIONER

## 2019-12-13 PROCEDURE — 99213 OFFICE O/P EST LOW 20 MIN: CPT | Mod: S$PBB,,, | Performed by: NURSE PRACTITIONER

## 2019-12-13 PROCEDURE — 99214 OFFICE O/P EST MOD 30 MIN: CPT | Mod: PBBFAC | Performed by: NURSE PRACTITIONER

## 2019-12-13 PROCEDURE — 99999 PR PBB SHADOW E&M-EST. PATIENT-LVL IV: CPT | Mod: PBBFAC,,, | Performed by: NURSE PRACTITIONER

## 2019-12-13 PROCEDURE — 99213 PR OFFICE/OUTPT VISIT, EST, LEVL III, 20-29 MIN: ICD-10-PCS | Mod: S$PBB,,, | Performed by: NURSE PRACTITIONER

## 2019-12-13 RX ORDER — OMEPRAZOLE 40 MG/1
40 CAPSULE, DELAYED RELEASE ORAL DAILY
Qty: 30 CAPSULE | Refills: 11 | Status: SHIPPED | OUTPATIENT
Start: 2019-12-13 | End: 2020-07-31 | Stop reason: SDUPTHER

## 2019-12-13 NOTE — TELEPHONE ENCOUNTER
Form faxed.     Pt informed of form faxed. Pt wanted to know if there was anything else to take for gerd in the meantime, otc meds do not help. Pt also wanted to know if she needed to be evaluated in clinic since hospital d/c. Pt is following up with ENT today.

## 2019-12-13 NOTE — LETTER
December 16, 2019      Rafy Rico MD  [address]  [facility] [address]           [facility]  [address]  [facility] [address]  Phone: [phone]  Fax: [phone]          Patient: Sherie Reyes   MR Number: [id number]   YOB: 1965   Date of Visit: 12/13/2019       Dear Dr. Rafy Rico:    Thank you for referring Sherie Reyes to me for evaluation. Attached you will find relevant portions of my assessment and plan of care.    If you have questions, please do not hesitate to call me. I look forward to following Sherie Reyes along with you.    Sincerely,    Leann Avitia, NP    Enclosure  CC:  No Recipients    If you would like to receive this communication electronically, please contact [email or web address] or [phone] to request more information on [facility] access.    For providers and/or their staff who would like to refer a patient to [facility], please contact us through our one-stop-shop provider referral line, [facility], at [phone].    If you feel you have received this communication in error or would no longer like to receive these types of communications, please e-mail [email or web address]

## 2019-12-14 ENCOUNTER — PATIENT MESSAGE (OUTPATIENT)
Dept: INTERNAL MEDICINE | Facility: CLINIC | Age: 54
End: 2019-12-14

## 2019-12-16 ENCOUNTER — PATIENT OUTREACH (OUTPATIENT)
Dept: OTHER | Facility: OTHER | Age: 54
End: 2019-12-16

## 2019-12-16 ENCOUNTER — PATIENT MESSAGE (OUTPATIENT)
Dept: INTERNAL MEDICINE | Facility: CLINIC | Age: 54
End: 2019-12-16

## 2019-12-16 NOTE — PROGRESS NOTES
Subjective:       Patient ID: Sherie Reyes is a 54 y.o. female.    Chief Complaint: sialadentitis on right side    HPI     Sherie Reyes returns for a follow-up visit.  She has been doing well since leaving the hospital.  She has completed antibiotics. She reports that her neck swelling and pain or improving.  She does continue to have some mild swelling to her right. She denies fever or chills.  She swallowing and breathing without difficulty.    Past Medical History:   Diagnosis Date    Anxiety     Degenerative joint disease (DJD) of hip     Diabetes type 2, controlled 7/10/2019    GERD (gastroesophageal reflux disease)     Hyperlipidemia     Hypertension     IBS (irritable bowel syndrome)     Insomnia     Lumbar disc herniation     PTSD (post-traumatic stress disorder)        Past Surgical History:   Procedure Laterality Date    ADENOIDECTOMY       SECTION      TONSILLECTOMY           Current Outpatient Medications:     acetaminophen (TYLENOL) 500 MG tablet, Take 1 tablet (500 mg total) by mouth every 6 (six) hours as needed for Pain (Headache)., Disp: 90 tablet, Rfl: 1    amLODIPine (NORVASC) 10 MG tablet, Take 1 tablet (10 mg total) by mouth once daily., Disp: 30 tablet, Rfl: 11    buPROPion (WELLBUTRIN XL) 300 MG 24 hr tablet, Take 300 mg by mouth once daily., Disp: , Rfl:     clonazePAM (KLONOPIN) 1 MG tablet, Take 1 mg by mouth 2 (two) times daily. , Disp: , Rfl:     cloNIDine (CATAPRES) 0.1 MG tablet, Take 1 tablet (0.1 mg total) by mouth every 12 (twelve) hours as needed (only for blood pressure greater than 180/120)., Disp: 60 tablet, Rfl: 6    duloxetine (CYMBALTA) 60 MG capsule, Take 60 mg by mouth once daily., Disp: , Rfl:     gabapentin (NEURONTIN) 300 MG capsule, Take 600 mg by mouth 3 (three) times daily., Disp: , Rfl:     gabapentin (NEURONTIN) 600 MG tablet, TK 1 T PO TID, Disp: , Rfl: 5    gemfibrozil (LOPID) 600 MG tablet, TAKE 1 TABLET BY MOUTH  TWICE DAILY BEFORE MEALS, Disp: 60 tablet, Rfl: 5    KURVELO 0.15-0.03 mg per tablet, Take 1 tablet by mouth once daily., Disp: , Rfl:     LINZESS 290 mcg Cap capsule, TAKE 1 CAPSULE BY MOUTH EVERY DAY, Disp: 90 capsule, Rfl: 1    meloxicam (MOBIC) 15 MG tablet, TAKE 1 TABLET BY MOUTH EVERY DAY WITH FOOD, Disp: 30 tablet, Rfl: 6    pantoprazole (PROTONIX) 40 MG tablet, TAKE 1 TABLET BY MOUTH EVERY DAY, Disp: 30 tablet, Rfl: 11    POLYETHYLENE GLYCOL 3350 (MIRALAX ORAL), Take by mouth as needed. , Disp: , Rfl:     potassium chloride SA (KLOR-CON M20) 20 MEQ tablet, Take 2 tablets (40 mEq total) by mouth once daily., Disp: 30 tablet, Rfl: 0    pravastatin (PRAVACHOL) 20 MG tablet, TAKE 1 TABLET BY MOUTH EVERY EVENING, Disp: 30 tablet, Rfl: 6    QUEtiapine (SEROQUEL) 100 MG Tab, Take 1 tablet by mouth once daily., Disp: , Rfl:     spironolactone (ALDACTONE) 25 MG tablet, Take 1 tablet (25 mg total) by mouth once daily., Disp: 30 tablet, Rfl: 11    tizanidine (ZANAFLEX) 6 mg capsule, Take 6 mg by mouth 2 (two) times daily as needed for Muscle spasms., Disp: , Rfl:     traMADol (ULTRAM) 50 mg tablet, TK 1 TO 2 TS PO TID PRN, Disp: , Rfl: 3    omeprazole (PRILOSEC) 40 MG capsule, Take 1 capsule (40 mg total) by mouth once daily., Disp: 30 capsule, Rfl: 11    Review of patient's allergies indicates:  No Known Allergies    Social History     Socioeconomic History    Marital status:      Spouse name: Not on file    Number of children: Not on file    Years of education: Not on file    Highest education level: Not on file   Occupational History    Not on file   Social Needs    Financial resource strain: Hard    Food insecurity:     Worry: Often true     Inability: Sometimes true    Transportation needs:     Medical: No     Non-medical: No   Tobacco Use    Smoking status: Former Smoker     Types: Vaping w/o nicotine    Smokeless tobacco: Former User     Quit date: 10/20/2014    Tobacco comment:  Vapor cigarettes   Substance and Sexual Activity    Alcohol use: No     Frequency: Monthly or less     Drinks per session: 3 or 4     Binge frequency: Never     Comment: socially    Drug use: No    Sexual activity: Not on file   Lifestyle    Physical activity:     Days per week: 3 days     Minutes per session: 120 min    Stress: Very much   Relationships    Social connections:     Talks on phone: More than three times a week     Gets together: Once a week     Attends Mosque service: Not on file     Active member of club or organization: Yes     Attends meetings of clubs or organizations: Never     Relationship status:    Other Topics Concern    Not on file   Social History Narrative    Not on file       Family History   Problem Relation Age of Onset    Hypertension Mother     Hypothyroidism Mother     Hypertension Father     Stroke Father 46    Diabetes Father     Cancer Maternal Grandfather     Breast cancer Paternal Aunt          Review of Systems   Constitutional: Positive for chills. Negative for appetite change, diaphoresis, fatigue, fever and unexpected weight change.   HENT: Positive for congestion, ear pain, facial swelling, hearing loss, postnasal drip, rhinorrhea, sinus pressure, sore throat and trouble swallowing. Negative for dental problem, drooling, ear discharge, mouth sores, nosebleeds, sneezing, tinnitus and voice change.    Eyes: Positive for photophobia and discharge. Negative for pain, redness and itching.   Respiratory: Positive for cough. Negative for shortness of breath.    Cardiovascular: Negative for chest pain.   Gastrointestinal: Positive for abdominal pain and diarrhea. Negative for abdominal distention, nausea and vomiting.   Endocrine: Negative for cold intolerance and heat intolerance.   Genitourinary: Negative for difficulty urinating.   Musculoskeletal: Positive for arthralgias, back pain and neck pain. Negative for neck stiffness.   Skin: Negative for rash.    Neurological: Positive for dizziness and headaches. Negative for weakness.   Hematological: Negative for adenopathy. Bruises/bleeds easily.   Psychiatric/Behavioral: Positive for sleep disturbance. The patient is nervous/anxious.        Objective:      Physical Exam   Constitutional: She is oriented to person, place, and time. She appears well-developed and well-nourished. She is cooperative. She does not appear ill. No distress.   HENT:   Head: Normocephalic and atraumatic.   Right Ear: Hearing and external ear normal.   Left Ear: Hearing and external ear normal.   Nose: Nose normal. No mucosal edema, rhinorrhea or nasal deformity. No epistaxis.  No foreign bodies. Right sinus exhibits no maxillary sinus tenderness and no frontal sinus tenderness. Left sinus exhibits no maxillary sinus tenderness and no frontal sinus tenderness.   Mouth/Throat: Uvula is midline, oropharynx is clear and moist and mucous membranes are normal. Mucous membranes are not pale, not dry and not cyanotic. She does not have dentures. No oral lesions. No trismus in the jaw. Normal dentition. No uvula swelling or dental caries. No oropharyngeal exudate, posterior oropharyngeal edema, posterior oropharyngeal erythema or tonsillar abscesses.   Eyes: Conjunctivae are normal. Right eye exhibits no discharge. Left eye exhibits no discharge. No scleral icterus.   Neck: Trachea normal, normal range of motion and phonation normal. Neck supple. No JVD present. No tracheal tenderness present. No tracheal deviation present. No thyroid mass and no thyromegaly present.            Cardiovascular: Normal rate.   Pulmonary/Chest: Effort normal. No stridor. No respiratory distress.   Lymphadenopathy:        Head (right side): No submental, no submandibular, no tonsillar and no preauricular adenopathy present.        Head (left side): No submental, no submandibular, no tonsillar and no preauricular adenopathy present.     She has no cervical adenopathy.    Neurological: She is alert and oriented to person, place, and time. No cranial nerve deficit.   Skin: Skin is warm and dry. No rash noted. She is not diaphoretic. No erythema. No pallor.   Psychiatric: She has a normal mood and affect. Her behavior is normal. Thought content normal.   Vitals reviewed.      Assessment:       1. Sialadenitis        Plan:       Problem List Items Addressed This Visit        ENT    Sialadenitis     Sialadenitis is improving.  We discussed that her CT scan did not reveal any stones.  She will continue to monitor her neck swelling let me know if it does not resolve, or worsens.  Questions answered.  Return to clinic as needed.

## 2019-12-16 NOTE — TELEPHONE ENCOUNTER
----- Message from Dolores Lock sent at 12/16/2019  3:15 PM CST -----  Contact: Self [phone]  Caller is requesting an earlier appt than we can schedule.  Caller declined first available appointment listed below. Caller will not accept being placed on the wait list and is requesting a message be sent to the provider.  When is the next available appointment:  Medicaid  Did you offer to schedule the next available appt and put the patient on the wait list?:     What visit type: Hos  Symptoms:  Hospital Follow up  Patient preference of timeframe to be scheduled:  Open  What is the reason the patient is requesting a sooner appointment? (insurance terminating, changing jobs):    Would the patient rather a call back or a response via [facility]?:  Call back  Comments:

## 2019-12-17 ENCOUNTER — PATIENT OUTREACH (OUTPATIENT)
Dept: OTHER | Facility: OTHER | Age: 54
End: 2019-12-17

## 2019-12-17 DIAGNOSIS — I10 ESSENTIAL HYPERTENSION: Primary | ICD-10-CM

## 2019-12-17 NOTE — PROGRESS NOTES
Digital Medicine: Clinician Follow-Up    The history is provided by the patient.     Follow Up  Follow-up reason(s): reading review          INTERVENTION(S)  encouragement/support and denied questions    PLAN  patient verbalizes understanding and continue monitoring    BP readings elevated since last outreach. States readings were taken prior to tizanidine doses.  Requested patient try to also obtain some BP readings after tizanidine doses to see how low BP is dropping.  She has pending lab orders including CMP. Requested patient complete lab work prior to 12/31/19 PCP appointment due to recent TASIA and to help guide BP medication regimen.      There are no preventive care reminders to display for this patient.    Last 5 Patient Entered Readings                                      Current 30 Day Average: 143/86     Recent Readings 12/12/2019 12/11/2019 12/10/2019 12/10/2019 12/4/2019    SBP (mmHg) 159 165 155 170 94    DBP (mmHg) 90 103 95 110 69    Pulse 101 113 98 95 80             Hypertension Medications             amLODIPine (NORVASC) 10 MG tablet Take 1 tablet (10 mg total) by mouth once daily.    cloNIDine (CATAPRES) 0.1 MG tablet Take 1 tablet (0.1 mg total) by mouth every 12 (twelve) hours as needed (only for blood pressure greater than 180/120).    spironolactone (ALDACTONE) 25 MG tablet Take 1 tablet (25 mg total) by mouth once daily.                 Screenings

## 2019-12-17 NOTE — PROGRESS NOTES
Subjective:       Patient ID: Sherie Reyes is a 54 y.o. female.    Chief Complaint: Follow-up    Patient is a 54 y.o.female who presents today for hospital follow up. Doing well today. Feeling good. Trying to lose some weight; only eating one meal per day.     Hospital Course:   Admitted to hospital medicine. CT soft tissue neck:  Asymmetric enlargement of the right submandibular gland with surrounding inflammatory change suggestive of sialadenitis. Patient with no difficulty tolerating secretions.  No evidence of airway compromise on exam. ER provider discussed patient with ENT.  They recommended starting patient on antibiotics and monitoring in observation. Patient started on clindamycin. Remains stable otherwise. ENT rec IV abx, Massages to right SMG, Sialogogues and heat compression. Overall symptoms improved on IV abx. As per ENT transitioned to orals abx. Stable for discharge, confirmed with ENT. Will d/c patient with 10 days of clindamycin with ENT fu. She has already followed up with ent.     Labs: reviewed   gyn exam: dr hernandez; up to date; three weeks ago    Review of Systems   Constitutional: Negative for appetite change, chills, diaphoresis and fever.   HENT: Negative for congestion, ear discharge, ear pain, postnasal drip, tinnitus, trouble swallowing and voice change.    Eyes: Negative for discharge, redness and itching.   Respiratory: Negative for cough, chest tightness, shortness of breath and wheezing.    Cardiovascular: Negative for chest pain, palpitations and leg swelling.   Gastrointestinal: Negative for abdominal pain, constipation, diarrhea, nausea and vomiting.   Endocrine: Negative for cold intolerance and heat intolerance.   Genitourinary: Negative for difficulty urinating, flank pain, hematuria and urgency.   Musculoskeletal: Negative for arthralgias, gait problem, myalgias and neck stiffness.   Skin: Negative for color change and rash.   Neurological: Negative for dizziness,  seizures, syncope and headaches.   Hematological: Negative for adenopathy.   Psychiatric/Behavioral: Negative for agitation, behavioral problems, confusion and sleep disturbance.       Objective:      Physical Exam   Constitutional: She is oriented to person, place, and time. She appears well-developed and well-nourished. No distress.   HENT:   Head: Normocephalic and atraumatic.   Right Ear: External ear normal.   Left Ear: External ear normal.   Nose: Nose normal.   Mouth/Throat: Oropharynx is clear and moist. No oropharyngeal exudate.   Eyes: Pupils are equal, round, and reactive to light. Conjunctivae and EOM are normal. Right eye exhibits no discharge. Left eye exhibits no discharge. No scleral icterus.   Neck: Neck supple. No JVD present. No tracheal deviation present. No thyromegaly present.   Cardiovascular: Normal rate, normal heart sounds and intact distal pulses. Exam reveals no gallop and no friction rub.   No murmur heard.  Pulmonary/Chest: Effort normal and breath sounds normal. No stridor. No respiratory distress. She has no wheezes. She has no rales. She exhibits no tenderness.   Abdominal: Soft. Bowel sounds are normal. She exhibits no distension. There is no tenderness. There is no rebound.   Musculoskeletal: She exhibits no edema or tenderness.   Lymphadenopathy:     She has no cervical adenopathy.   Neurological: She is alert and oriented to person, place, and time.   Skin: Skin is warm and dry. No rash noted. She is not diaphoretic. No erythema.   Psychiatric: She has a normal mood and affect. Her behavior is normal.   Nursing note and vitals reviewed.      Assessment and Plan:       1. Sialadenitis  - resolving    2. Essential hypertension  - stable    3. Mixed hyperlipidemia  - good control    4. CKD (chronic kidney disease) stage 3, GFR 30-59 ml/min  - stable; improved    5. Prediabetes  - diet controlled; well controlled  - labs reviewed        No follow-ups on file.

## 2019-12-17 NOTE — ASSESSMENT & PLAN NOTE
Sialadenitis is improving.  We discussed that her CT scan did not reveal any stones.  She will continue to monitor her neck swelling let me know if it does not resolve, or worsens.  Questions answered.  Return to clinic as needed.

## 2019-12-23 DIAGNOSIS — M67.911 ROTATOR CUFF DISORDER, RIGHT: ICD-10-CM

## 2019-12-23 DIAGNOSIS — M25.511 RIGHT SHOULDER PAIN, UNSPECIFIED CHRONICITY: Primary | ICD-10-CM

## 2019-12-24 ENCOUNTER — TELEPHONE (OUTPATIENT)
Dept: ORTHOPEDICS | Facility: CLINIC | Age: 54
End: 2019-12-24

## 2019-12-24 NOTE — PROGRESS NOTES
BP readings prior to tizanidine dose elevated but drop to at goal, and as low as 90s/60s mmHg after tizanidine dose  Defer medication change at this time  Does not appear patient has completed CMP yet  Could consider increasing spironolactone to 50 mg daily or resume chlorthalidone at 12.5 mg daily depending on renal function and BP readings    Hypertension Medications             amLODIPine (NORVASC) 10 MG tablet Take 1 tablet (10 mg total) by mouth once daily.    cloNIDine (CATAPRES) 0.1 MG tablet Take 1 tablet (0.1 mg total) by mouth every 12 (twelve) hours as needed (only for blood pressure greater than 180/120).    spironolactone (ALDACTONE) 25 MG tablet Take 1 tablet (25 mg total) by mouth once daily.

## 2019-12-27 ENCOUNTER — TELEPHONE (OUTPATIENT)
Dept: ORTHOPEDICS | Facility: HOSPITAL | Age: 54
End: 2019-12-27

## 2019-12-27 ENCOUNTER — DOCUMENTATION ONLY (OUTPATIENT)
Dept: ORTHOPEDICS | Facility: HOSPITAL | Age: 54
End: 2019-12-27

## 2019-12-27 NOTE — PROGRESS NOTES
"Chart reviewed performed and spoke to pt this morning to obtain more detailed history of injury.   Per the patient she has had right shoulder pain and weakness for >6 months. Pt has tried and failed Mobic, Tylenol, corticosteroid injections, and guided HEP for approximately 3 months.   On PE documented by Dr. Hughes on 11/21/19 "no instability, positive supraspinatus stress test".    Lucinda Castro PA-C  "

## 2019-12-30 ENCOUNTER — LAB VISIT (OUTPATIENT)
Dept: LAB | Facility: HOSPITAL | Age: 54
End: 2019-12-30
Attending: INTERNAL MEDICINE
Payer: MEDICAID

## 2019-12-30 DIAGNOSIS — E11.9 CONTROLLED TYPE 2 DIABETES MELLITUS WITHOUT COMPLICATION, WITHOUT LONG-TERM CURRENT USE OF INSULIN: ICD-10-CM

## 2019-12-30 LAB
ALBUMIN SERPL BCP-MCNC: 3.5 G/DL (ref 3.5–5.2)
ALP SERPL-CCNC: 50 U/L (ref 55–135)
ALT SERPL W/O P-5'-P-CCNC: 42 U/L (ref 10–44)
ANION GAP SERPL CALC-SCNC: 7 MMOL/L (ref 8–16)
AST SERPL-CCNC: 36 U/L (ref 10–40)
BILIRUB SERPL-MCNC: 0.3 MG/DL (ref 0.1–1)
BUN SERPL-MCNC: 8 MG/DL (ref 6–20)
CALCIUM SERPL-MCNC: 9 MG/DL (ref 8.7–10.5)
CHLORIDE SERPL-SCNC: 111 MMOL/L (ref 95–110)
CHOLEST SERPL-MCNC: 161 MG/DL (ref 120–199)
CHOLEST/HDLC SERPL: 2.6 {RATIO} (ref 2–5)
CO2 SERPL-SCNC: 21 MMOL/L (ref 23–29)
CREAT SERPL-MCNC: 1 MG/DL (ref 0.5–1.4)
EST. GFR  (AFRICAN AMERICAN): >60 ML/MIN/1.73 M^2
EST. GFR  (NON AFRICAN AMERICAN): >60 ML/MIN/1.73 M^2
ESTIMATED AVG GLUCOSE: 128 MG/DL (ref 68–131)
GLUCOSE SERPL-MCNC: 88 MG/DL (ref 70–110)
HBA1C MFR BLD HPLC: 6.1 % (ref 4–5.6)
HDLC SERPL-MCNC: 61 MG/DL (ref 40–75)
HDLC SERPL: 37.9 % (ref 20–50)
LDLC SERPL CALC-MCNC: 83.4 MG/DL (ref 63–159)
NONHDLC SERPL-MCNC: 100 MG/DL
POTASSIUM SERPL-SCNC: 4.2 MMOL/L (ref 3.5–5.1)
PROT SERPL-MCNC: 7 G/DL (ref 6–8.4)
SODIUM SERPL-SCNC: 139 MMOL/L (ref 136–145)
TRIGL SERPL-MCNC: 83 MG/DL (ref 30–150)

## 2019-12-30 PROCEDURE — 83036 HEMOGLOBIN GLYCOSYLATED A1C: CPT

## 2019-12-30 PROCEDURE — 80053 COMPREHEN METABOLIC PANEL: CPT

## 2019-12-30 PROCEDURE — 80061 LIPID PANEL: CPT

## 2019-12-30 PROCEDURE — 36415 COLL VENOUS BLD VENIPUNCTURE: CPT | Mod: PO

## 2019-12-31 ENCOUNTER — OFFICE VISIT (OUTPATIENT)
Dept: INTERNAL MEDICINE | Facility: CLINIC | Age: 54
End: 2019-12-31
Payer: MEDICAID

## 2019-12-31 VITALS
DIASTOLIC BLOOD PRESSURE: 88 MMHG | TEMPERATURE: 98 F | HEIGHT: 62 IN | HEART RATE: 110 BPM | BODY MASS INDEX: 35.66 KG/M2 | WEIGHT: 193.81 LBS | SYSTOLIC BLOOD PRESSURE: 136 MMHG | RESPIRATION RATE: 16 BRPM

## 2019-12-31 DIAGNOSIS — K11.20 SIALADENITIS: Primary | ICD-10-CM

## 2019-12-31 DIAGNOSIS — R73.03 PREDIABETES: ICD-10-CM

## 2019-12-31 DIAGNOSIS — I10 ESSENTIAL HYPERTENSION: Chronic | ICD-10-CM

## 2019-12-31 DIAGNOSIS — N18.30 CKD (CHRONIC KIDNEY DISEASE) STAGE 3, GFR 30-59 ML/MIN: ICD-10-CM

## 2019-12-31 DIAGNOSIS — E78.2 MIXED HYPERLIPIDEMIA: Chronic | ICD-10-CM

## 2019-12-31 PROCEDURE — 99213 OFFICE O/P EST LOW 20 MIN: CPT | Mod: PBBFAC,PO | Performed by: INTERNAL MEDICINE

## 2019-12-31 PROCEDURE — 99214 PR OFFICE/OUTPT VISIT, EST, LEVL IV, 30-39 MIN: ICD-10-PCS | Mod: S$PBB,,, | Performed by: INTERNAL MEDICINE

## 2019-12-31 PROCEDURE — 99999 PR PBB SHADOW E&M-EST. PATIENT-LVL III: CPT | Mod: PBBFAC,,, | Performed by: INTERNAL MEDICINE

## 2019-12-31 PROCEDURE — 99214 OFFICE O/P EST MOD 30 MIN: CPT | Mod: S$PBB,,, | Performed by: INTERNAL MEDICINE

## 2019-12-31 PROCEDURE — 99999 PR PBB SHADOW E&M-EST. PATIENT-LVL III: ICD-10-PCS | Mod: PBBFAC,,, | Performed by: INTERNAL MEDICINE

## 2020-01-02 ENCOUNTER — PATIENT OUTREACH (OUTPATIENT)
Dept: ADMINISTRATIVE | Facility: HOSPITAL | Age: 55
End: 2020-01-02

## 2020-01-02 ENCOUNTER — TELEPHONE (OUTPATIENT)
Dept: INTERVENTIONAL RADIOLOGY/VASCULAR | Facility: HOSPITAL | Age: 55
End: 2020-01-02

## 2020-01-02 ENCOUNTER — TELEPHONE (OUTPATIENT)
Dept: ORTHOPEDICS | Facility: CLINIC | Age: 55
End: 2020-01-02

## 2020-01-02 NOTE — TELEPHONE ENCOUNTER
Spoke with patient regarding arrival time of 10 am. Patient states she will fast for 4 hours prior to procedure due to CT afterwards. All questions answered.       By Yamileth Goodman RN

## 2020-01-02 NOTE — TELEPHONE ENCOUNTER
----- Message from Yennifer Ziegler sent at 1/2/2020  3:06 PM CST -----  Contact: Self [phone]  Patient is requesting to talk to nurse in regards to her CT-Scan and MRI, she also states she spoke with the insurance.

## 2020-01-02 NOTE — TELEPHONE ENCOUNTER
Returned call to pt. Advising FL and CT for shoulder was approved via peer to peer after initial denial, aware of appt time @ 11 am in [place]

## 2020-01-02 NOTE — LETTER
AUTHORIZATION FOR RELEASE OF   CONFIDENTIAL INFORMATION    Dear Dr. Terrell,    We are seeing Sherie Reyes, date of birth 1965, in the clinic at [facility]. oTnia Bernal DO is the patient's PCP. Sherie Reyes has an outstanding lab/procedure at the time we reviewed her chart. In order to help keep her health information updated, she has authorized us to request the following medical record(s):        (  )  MAMMOGRAM                                      (  )  COLONOSCOPY      ( x )  PAP SMEAR                                          (  )  OUTSIDE LAB RESULTS     (  )  DEXA SCAN                                          (  )  EYE EXAM            (  )  FOOT EXAM                                          (  )  ENTIRE RECORD     (  )  OUTSIDE IMMUNIZATIONS                 (  )  _______________         Please fax records to [facility], Angele S Lafleur, DO, [phone]     If you have any questions, please contact LLOYD Thrasher at [phone]           Patient Name: Sherie Reyes  : 1965  Patient Phone #: [phone]

## 2020-01-03 ENCOUNTER — HOSPITAL ENCOUNTER (OUTPATIENT)
Dept: RADIOLOGY | Facility: HOSPITAL | Age: 55
Discharge: HOME | End: 2020-01-03
Attending: ORTHOPAEDIC SURGERY
Payer: MEDICAID

## 2020-01-03 ENCOUNTER — HOSPITAL ENCOUNTER (OUTPATIENT)
Facility: HOSPITAL | Age: 55
Discharge: HOME | End: 2020-01-03
Attending: RADIOLOGY | Admitting: RADIOLOGY
Payer: MEDICAID

## 2020-01-03 VITALS
OXYGEN SATURATION: 99 % | WEIGHT: 170 LBS | DIASTOLIC BLOOD PRESSURE: 91 MMHG | SYSTOLIC BLOOD PRESSURE: 159 MMHG | TEMPERATURE: 98 F | HEART RATE: 107 BPM | BODY MASS INDEX: 31.28 KG/M2 | RESPIRATION RATE: 16 BRPM | HEIGHT: 62 IN

## 2020-01-03 DIAGNOSIS — M25.511 RIGHT SHOULDER PAIN, UNSPECIFIED CHRONICITY: ICD-10-CM

## 2020-01-03 DIAGNOSIS — M67.911 ROTATOR CUFF DISORDER, RIGHT: ICD-10-CM

## 2020-01-03 PROCEDURE — 63600175 PHARM REV CODE 636 W HCPCS: Performed by: RADIOLOGY

## 2020-01-03 PROCEDURE — 73201 CT UPPER EXTREMITY W/DYE: CPT | Mod: 26,RT,, | Performed by: RADIOLOGY

## 2020-01-03 PROCEDURE — 73201 CT UPPER EXTREMITY W/DYE: CPT | Mod: TC,RT

## 2020-01-03 PROCEDURE — 73201 CT ARTHROGRAM SHOULDER RIGHT: ICD-10-PCS | Mod: 26,RT,, | Performed by: RADIOLOGY

## 2020-01-03 RX ORDER — SODIUM CHLORIDE 9 MG/ML
INJECTION, SOLUTION INTRAVENOUS CONTINUOUS
Status: DISCONTINUED | OUTPATIENT
Start: 2020-01-03 | End: 2020-01-03 | Stop reason: HOSPADM

## 2020-01-03 RX ADMIN — SODIUM CHLORIDE: 0.9 INJECTION, SOLUTION INTRAVENOUS at 11:01

## 2020-01-03 NOTE — NURSING
Dr. Rosales inject lidocaine into procedure site, right shoulder, patient tolerated well    1145 - contrast and NS, total of 13 ml injected into joint.  Procedure complete, will bring patient to CT

## 2020-01-03 NOTE — H&P
Interventional Radiology Pre-Procedure History & Physical      Chief Complaint/Reason for Referral: Shoulder pain    History of Present Illness:  Sherie Reyes is a 54 y.o. female who presents with right shoulder pain. Referred to IR for right shoulder CT arthrogram.    Past Medical History:   Diagnosis Date    Anxiety     Degenerative joint disease (DJD) of hip     Diabetes type 2, controlled 7/10/2019    GERD (gastroesophageal reflux disease)     Hyperlipidemia     Hypertension     IBS (irritable bowel syndrome)     Insomnia     Lumbar disc herniation     PTSD (post-traumatic stress disorder)      Past Surgical History:   Procedure Laterality Date    ADENOIDECTOMY       SECTION      TONSILLECTOMY         Allergies:   Review of patient's allergies indicates:  No Known Allergies    Home Meds:   Prior to Admission medications    Medication Sig Start Date End Date Taking? Authorizing Provider   amLODIPine (NORVASC) 10 MG tablet Take 1 tablet (10 mg total) by mouth once daily. 19  Yes Tonia Bernal DO   buPROPion (WELLBUTRIN XL) 300 MG 24 hr tablet Take 300 mg by mouth once daily.   Yes Historical Provider, MD   clonazePAM (KLONOPIN) 1 MG tablet Take 1 mg by mouth 2 (two) times daily.    Yes Historical Provider, MD   cloNIDine (CATAPRES) 0.1 MG tablet Take 1 tablet (0.1 mg total) by mouth every 12 (twelve) hours as needed (only for blood pressure greater than 180/120). 18  Yes Tonia Bernal DO   duloxetine (CYMBALTA) 60 MG capsule Take 60 mg by mouth once daily.   Yes Historical Provider, MD   gabapentin (NEURONTIN) 300 MG capsule Take 900 mg by mouth 3 (three) times daily.    Yes Historical Provider, MD   gemfibrozil (LOPID) 600 MG tablet TAKE 1 TABLET BY MOUTH TWICE DAILY BEFORE MEALS 19  Yes Tonia Bernal DO   KURVELO 0.15-0.03 mg per tablet Take 1 tablet by mouth once daily. 17  Yes Historical Provider, MD SMALL 290 mcg Cap capsule TAKE 1 CAPSULE BY  MOUTH EVERY DAY 11/28/19  Yes Tonia Bernal DO   meloxicam (MOBIC) 15 MG tablet TAKE 1 TABLET BY MOUTH EVERY DAY WITH FOOD 11/7/19  Yes Tonia Bernal DO   omeprazole (PRILOSEC) 40 MG capsule Take 1 capsule (40 mg total) by mouth once daily. 12/13/19  Yes Tonia Bernal DO   pantoprazole (PROTONIX) 40 MG tablet TAKE 1 TABLET BY MOUTH EVERY DAY 12/11/19  Yes Tonia Bernal DO   POLYETHYLENE GLYCOL 3350 (MIRALAX ORAL) Take by mouth as needed.    Yes Historical Provider, MD   potassium chloride SA (KLOR-CON M20) 20 MEQ tablet Take 2 tablets (40 mEq total) by mouth once daily. 8/16/19  Yes David Grey MD   pravastatin (PRAVACHOL) 20 MG tablet TAKE 1 TABLET BY MOUTH EVERY EVENING 10/17/19  Yes Tonia Bernal DO   QUEtiapine (SEROQUEL) 100 MG Tab Take 1 tablet by mouth once daily. 2/26/18  Yes Historical Provider, MD   spironolactone (ALDACTONE) 25 MG tablet Take 1 tablet (25 mg total) by mouth once daily. 7/10/19 7/9/20 Yes Tnoia Bernal DO   tizanidine (ZANAFLEX) 6 mg capsule Take 6 mg by mouth 2 (two) times daily as needed for Muscle spasms.   Yes Historical Provider, MD   traMADol (ULTRAM) 50 mg tablet TK 1 TO 2 TS PO TID PRN 7/8/19  Yes Historical Provider, MD   acetaminophen (TYLENOL) 500 MG tablet Take 1 tablet (500 mg total) by mouth every 6 (six) hours as needed for Pain (Headache). 4/25/19   Tonia Bernal DO   gabapentin (NEURONTIN) 600 MG tablet TK 1 T PO TID 7/10/19   Historical Provider, MD       Anticoagulation/Antiplatelet Meds: no anticoagulation    Review of Systems:   Hematological: no known coagulopathies  Respiratory: no shortness of breath  Cardiovascular: no chest pain  Gastrointestinal: no abdominal pain  Genitourinary: no dysuria  Musculoskeletal: negative  Neurological: no TIA or stroke symptoms     Physical Exam:  Temp: 97.6 °F (36.4 °C) (01/03/20 1046)  Pulse: 105 (01/03/20 1046)  Resp: 16 (01/03/20 1046)  BP: (!) 144/95 (01/03/20 1046)  SpO2: 97 % (01/03/20  1045)    General: WNWD, NAD  HEENT: Normocephalic, sclera anicteric, oropharynx clear  Heart: RRR  Lungs: Symmetric excursions, breathing unlabored  Abd: NTND, soft  Extremities: No CCE  Neuro: Gross nonfocal    Laboratory:  Lab Results   Component Value Date    INR 0.9 01/04/2019       Lab Results   Component Value Date    WBC 7.60 12/02/2019    HGB 11.3 (L) 12/02/2019    HCT 37.0 12/02/2019    MCV 91 12/02/2019     12/02/2019      Lab Results   Component Value Date    GLU 88 12/30/2019     12/30/2019    K 4.2 12/30/2019     (H) 12/30/2019    CO2 21 (L) 12/30/2019    BUN 8 12/30/2019    CREATININE 1.0 12/30/2019    CALCIUM 9.0 12/30/2019    MG 1.6 12/01/2019    ALT 42 12/30/2019    AST 36 12/30/2019    ALBUMIN 3.5 12/30/2019    BILITOT 0.3 12/30/2019     Imaging:  Rt shoulder XR 8/17/17.    Assessment/Plan:  54 y.o. female with right shoulder pain. Will undergo CT arthrogram today.    Sedation plan: None    Risks (including, but not limited to, pain, bleeding, infection, damage to nearby structures, failure, and the need for additional procedures), benefits, and alternatives were discussed with the patient. All questions were answered to the best of my abilities. The patient wishes to proceed with shoulder arthrogram. Written informed consent was obtained.      Andrew Marsala MD [facility]  Pager [phone]

## 2020-01-03 NOTE — PROCEDURES
Interventional Radiology Post-Procedure Note    Pre Op Diagnosis: Right shoulder pain  Post Op Diagnosis: Same    Procedure: Right shoulder athrogram    Procedure performed by: Bobby    Written Informed Consent Obtained: Yes  Specimen Sent: No: NA  Estimated Blood Loss: Minimal    Findings:   13 mL 50/50 Omnipaque-300 and sterile NS injected into Rt shoulder joint under fluoro guidance.     No immediate complications. Patient tolerated procedure well. Please see full dictated procedure report for additional details and recommendations.    Plan:  CT to follow.      Siddharth Rosales MD  [facility]  Pager [phone]

## 2020-01-04 ENCOUNTER — HOSPITAL ENCOUNTER (OUTPATIENT)
Facility: HOSPITAL | Age: 55
Discharge: HOME | DRG: 684 | End: 2020-01-05
Attending: EMERGENCY MEDICINE | Admitting: INTERNAL MEDICINE
Payer: MEDICAID

## 2020-01-04 ENCOUNTER — NURSE TRIAGE (OUTPATIENT)
Dept: ADMINISTRATIVE | Facility: CLINIC | Age: 55
End: 2020-01-04

## 2020-01-04 DIAGNOSIS — Z79.1 NSAID LONG-TERM USE: ICD-10-CM

## 2020-01-04 DIAGNOSIS — R06.02 SOB (SHORTNESS OF BREATH): ICD-10-CM

## 2020-01-04 DIAGNOSIS — N17.9 AKI (ACUTE KIDNEY INJURY): Primary | ICD-10-CM

## 2020-01-04 DIAGNOSIS — M79.89 SWELLING OF LOWER EXTREMITY: ICD-10-CM

## 2020-01-04 PROBLEM — R74.01 TRANSAMINITIS: Chronic | Status: ACTIVE | Noted: 2019-11-30

## 2020-01-04 LAB
ALBUMIN SERPL BCP-MCNC: 3.6 G/DL (ref 3.5–5.2)
ALP SERPL-CCNC: 58 U/L (ref 55–135)
ALT SERPL W/O P-5'-P-CCNC: 45 U/L (ref 10–44)
ANION GAP SERPL CALC-SCNC: 12 MMOL/L (ref 8–16)
AST SERPL-CCNC: 43 U/L (ref 10–40)
BASOPHILS # BLD AUTO: 0.04 K/UL (ref 0–0.2)
BASOPHILS NFR BLD: 0.5 % (ref 0–1.9)
BILIRUB SERPL-MCNC: 0.3 MG/DL (ref 0.1–1)
BNP SERPL-MCNC: <10 PG/ML (ref 0–99)
BUN SERPL-MCNC: 13 MG/DL (ref 6–20)
CALCIUM SERPL-MCNC: 9.7 MG/DL (ref 8.7–10.5)
CHLORIDE SERPL-SCNC: 108 MMOL/L (ref 95–110)
CO2 SERPL-SCNC: 21 MMOL/L (ref 23–29)
CREAT SERPL-MCNC: 1.9 MG/DL (ref 0.5–1.4)
D DIMER PPP IA.FEU-MCNC: 0.37 MG/L FEU
DIFFERENTIAL METHOD: ABNORMAL
EOSINOPHIL # BLD AUTO: 0.2 K/UL (ref 0–0.5)
EOSINOPHIL NFR BLD: 2.5 % (ref 0–8)
ERYTHROCYTE [DISTWIDTH] IN BLOOD BY AUTOMATED COUNT: 13.7 % (ref 11.5–14.5)
EST. GFR  (AFRICAN AMERICAN): 34.4 ML/MIN/1.73 M^2
EST. GFR  (NON AFRICAN AMERICAN): 29.9 ML/MIN/1.73 M^2
GLUCOSE SERPL-MCNC: 125 MG/DL (ref 70–110)
HCT VFR BLD AUTO: 42.3 % (ref 37–48.5)
HGB BLD-MCNC: 13.2 G/DL (ref 12–16)
IMM GRANULOCYTES # BLD AUTO: 0.03 K/UL (ref 0–0.04)
IMM GRANULOCYTES NFR BLD AUTO: 0.3 % (ref 0–0.5)
LYMPHOCYTES # BLD AUTO: 2.8 K/UL (ref 1–4.8)
LYMPHOCYTES NFR BLD: 32.2 % (ref 18–48)
MCH RBC QN AUTO: 28.3 PG (ref 27–31)
MCHC RBC AUTO-ENTMCNC: 31.2 G/DL (ref 32–36)
MCV RBC AUTO: 91 FL (ref 82–98)
MONOCYTES # BLD AUTO: 0.8 K/UL (ref 0.3–1)
MONOCYTES NFR BLD: 9.1 % (ref 4–15)
NEUTROPHILS # BLD AUTO: 4.8 K/UL (ref 1.8–7.7)
NEUTROPHILS NFR BLD: 55.4 % (ref 38–73)
NRBC BLD-RTO: 0 /100 WBC
PLATELET # BLD AUTO: 296 K/UL (ref 150–350)
PMV BLD AUTO: 11.9 FL (ref 9.2–12.9)
POTASSIUM SERPL-SCNC: 4.1 MMOL/L (ref 3.5–5.1)
PROT SERPL-MCNC: 7.6 G/DL (ref 6–8.4)
RBC # BLD AUTO: 4.67 M/UL (ref 4–5.4)
SODIUM SERPL-SCNC: 141 MMOL/L (ref 136–145)
TROPONIN I SERPL DL<=0.01 NG/ML-MCNC: <0.006 NG/ML (ref 0–0.03)
WBC # BLD AUTO: 8.67 K/UL (ref 3.9–12.7)

## 2020-01-04 PROCEDURE — 99285 EMERGENCY DEPT VISIT HI MDM: CPT | Mod: 25

## 2020-01-04 PROCEDURE — 25000003 PHARM REV CODE 250: Performed by: PHYSICIAN ASSISTANT

## 2020-01-04 PROCEDURE — 99285 EMERGENCY DEPT VISIT HI MDM: CPT | Mod: ,,, | Performed by: PHYSICIAN ASSISTANT

## 2020-01-04 PROCEDURE — 12000002 HC ACUTE/MED SURGE SEMI-PRIVATE ROOM

## 2020-01-04 PROCEDURE — 93010 ELECTROCARDIOGRAM REPORT: CPT | Mod: ,,, | Performed by: INTERNAL MEDICINE

## 2020-01-04 PROCEDURE — 93005 ELECTROCARDIOGRAM TRACING: CPT

## 2020-01-04 PROCEDURE — 84484 ASSAY OF TROPONIN QUANT: CPT

## 2020-01-04 PROCEDURE — 85025 COMPLETE CBC W/AUTO DIFF WBC: CPT

## 2020-01-04 PROCEDURE — 93010 EKG 12-LEAD: ICD-10-PCS | Mod: ,,, | Performed by: INTERNAL MEDICINE

## 2020-01-04 PROCEDURE — 83880 ASSAY OF NATRIURETIC PEPTIDE: CPT

## 2020-01-04 PROCEDURE — 80053 COMPREHEN METABOLIC PANEL: CPT

## 2020-01-04 PROCEDURE — 63600175 PHARM REV CODE 636 W HCPCS: Performed by: PHYSICIAN ASSISTANT

## 2020-01-04 PROCEDURE — G0378 HOSPITAL OBSERVATION PER HR: HCPCS

## 2020-01-04 PROCEDURE — 99223 PR INITIAL HOSPITAL CARE,LEVL III: ICD-10-PCS | Mod: ,,, | Performed by: PHYSICIAN ASSISTANT

## 2020-01-04 PROCEDURE — 99223 1ST HOSP IP/OBS HIGH 75: CPT | Mod: ,,, | Performed by: PHYSICIAN ASSISTANT

## 2020-01-04 PROCEDURE — 99285 PR EMERGENCY DEPT VISIT,LEVEL V: ICD-10-PCS | Mod: ,,, | Performed by: PHYSICIAN ASSISTANT

## 2020-01-04 PROCEDURE — 85379 FIBRIN DEGRADATION QUANT: CPT

## 2020-01-04 RX ORDER — MORPHINE SULFATE 15 MG/1
15 TABLET ORAL
Status: COMPLETED | OUTPATIENT
Start: 2020-01-04 | End: 2020-01-04

## 2020-01-04 RX ORDER — SODIUM CHLORIDE 9 MG/ML
1000 INJECTION, SOLUTION INTRAVENOUS
Status: COMPLETED | OUTPATIENT
Start: 2020-01-04 | End: 2020-01-04

## 2020-01-04 RX ORDER — SODIUM CHLORIDE 0.9 % (FLUSH) 0.9 %
10 SYRINGE (ML) INJECTION
Status: DISCONTINUED | OUTPATIENT
Start: 2020-01-05 | End: 2020-01-05 | Stop reason: HOSPADM

## 2020-01-04 RX ORDER — ACETAMINOPHEN 500 MG
1000 TABLET ORAL
Status: COMPLETED | OUTPATIENT
Start: 2020-01-04 | End: 2020-01-04

## 2020-01-04 RX ADMIN — MORPHINE SULFATE 15 MG: 15 TABLET ORAL at 11:01

## 2020-01-04 RX ADMIN — SODIUM CHLORIDE 1000 ML: 0.9 INJECTION, SOLUTION INTRAVENOUS at 11:01

## 2020-01-04 RX ADMIN — ACETAMINOPHEN 1000 MG: 500 TABLET ORAL at 08:01

## 2020-01-04 NOTE — DISCHARGE SUMMARY
Interventional Radiology Discharge Summary      Hospital Course: No complications    Admit Date: 1/3/2020  Discharge Date: 01/03/2020     Instructions Given to Patient: Yes  Diet: Resume prior diet  Activity: Activity as tolerated    Description of Condition on Discharge: Stable  Vital Signs (Most Recent): Temp: 97.8 °F (36.6 °C) (01/03/20 1200)  Pulse: 107 (01/03/20 1200)  Resp: 16 (01/03/20 1200)  BP: (!) 159/91 (01/03/20 1200)  SpO2: 99 % (01/03/20 1200)    Discharge Disposition: Home    Discharge Diagnosis: Right shoulder pain s/p arthrogram today     Follow-up: With referring provider      Siddharth Rosales MD  [facility]  Pager [phone]

## 2020-01-04 NOTE — TELEPHONE ENCOUNTER
Reason for Disposition   [1] Very swollen joint AND [2] no fever    Additional Information   Negative: Followed a knee injury   Negative: Thigh or calf swelling is main symptom   Negative: Knee pain is main symptom   Negative: Fever   Negative: Patient sounds very sick or weak to the triager   Negative: [1] SEVERE pain (e.g., excruciating, unable to walk) AND [2] not improved after 2 hours of pain medicine   Negative: [1] Can't move swollen joint at all AND [2] no fever   Negative: [1] Redness AND [2] painful when touched AND [3] no fever   Negative: [1] Thigh or calf pain AND [2] only 1 side AND [3] present > 1 hour   Negative: [1] Thigh, calf, or ankle swelling AND [2] only 1 side    Protocols used: KNEE SWELLING-A-AH    Pt stated on yesterday she had an arthrogram of her Rt shoulder. Pt stated today she has new onset of swelling to bilateral knees up to her groin and the back of both knees are painful to touch and it is difficult to walk, Pt denies SOB, Chest pain or fever. Pt advised to go to ED now and not to drive. Pt verbalized understanding.

## 2020-01-05 VITALS
OXYGEN SATURATION: 99 % | SYSTOLIC BLOOD PRESSURE: 109 MMHG | BODY MASS INDEX: 32.2 KG/M2 | TEMPERATURE: 98 F | WEIGHT: 175 LBS | HEIGHT: 62 IN | HEART RATE: 99 BPM | RESPIRATION RATE: 16 BRPM | DIASTOLIC BLOOD PRESSURE: 69 MMHG

## 2020-01-05 PROBLEM — N17.9 AKI (ACUTE KIDNEY INJURY): Status: RESOLVED | Noted: 2019-11-30 | Resolved: 2020-01-05

## 2020-01-05 PROBLEM — M79.89 SYMPTOM OF LEG SWELLING: Status: ACTIVE | Noted: 2020-01-05

## 2020-01-05 PROBLEM — R74.01 TRANSAMINITIS: Chronic | Status: RESOLVED | Noted: 2019-11-30 | Resolved: 2020-01-05

## 2020-01-05 LAB
ALBUMIN SERPL BCP-MCNC: 3.4 G/DL (ref 3.5–5.2)
ALP SERPL-CCNC: 54 U/L (ref 55–135)
ALT SERPL W/O P-5'-P-CCNC: 43 U/L (ref 10–44)
ANION GAP SERPL CALC-SCNC: 14 MMOL/L (ref 8–16)
AST SERPL-CCNC: 41 U/L (ref 10–40)
BASOPHILS # BLD AUTO: 0.03 K/UL (ref 0–0.2)
BASOPHILS NFR BLD: 0.5 % (ref 0–1.9)
BILIRUB SERPL-MCNC: 0.3 MG/DL (ref 0.1–1)
BILIRUB UR QL STRIP: NEGATIVE
BUN SERPL-MCNC: 14 MG/DL (ref 6–20)
CALCIUM SERPL-MCNC: 9 MG/DL (ref 8.7–10.5)
CHLORIDE SERPL-SCNC: 107 MMOL/L (ref 95–110)
CLARITY UR REFRACT.AUTO: ABNORMAL
CO2 SERPL-SCNC: 19 MMOL/L (ref 23–29)
COLOR UR AUTO: YELLOW
CREAT SERPL-MCNC: 1.4 MG/DL (ref 0.5–1.4)
DIFFERENTIAL METHOD: ABNORMAL
EOSINOPHIL # BLD AUTO: 0.2 K/UL (ref 0–0.5)
EOSINOPHIL NFR BLD: 3 % (ref 0–8)
ERYTHROCYTE [DISTWIDTH] IN BLOOD BY AUTOMATED COUNT: 14 % (ref 11.5–14.5)
EST. GFR  (AFRICAN AMERICAN): 49.8 ML/MIN/1.73 M^2
EST. GFR  (NON AFRICAN AMERICAN): 43.2 ML/MIN/1.73 M^2
ESTIMATED AVG GLUCOSE: 126 MG/DL (ref 68–131)
GLUCOSE SERPL-MCNC: 132 MG/DL (ref 70–110)
GLUCOSE UR QL STRIP: NEGATIVE
HBA1C MFR BLD HPLC: 6 % (ref 4–5.6)
HCT VFR BLD AUTO: 39.8 % (ref 37–48.5)
HGB BLD-MCNC: 12.1 G/DL (ref 12–16)
HGB UR QL STRIP: NEGATIVE
IMM GRANULOCYTES # BLD AUTO: 0.02 K/UL (ref 0–0.04)
IMM GRANULOCYTES NFR BLD AUTO: 0.3 % (ref 0–0.5)
KETONES UR QL STRIP: NEGATIVE
LEUKOCYTE ESTERASE UR QL STRIP: NEGATIVE
LYMPHOCYTES # BLD AUTO: 2.7 K/UL (ref 1–4.8)
LYMPHOCYTES NFR BLD: 40.5 % (ref 18–48)
MAGNESIUM SERPL-MCNC: 1.7 MG/DL (ref 1.6–2.6)
MCH RBC QN AUTO: 28.2 PG (ref 27–31)
MCHC RBC AUTO-ENTMCNC: 30.4 G/DL (ref 32–36)
MCV RBC AUTO: 93 FL (ref 82–98)
MONOCYTES # BLD AUTO: 0.6 K/UL (ref 0.3–1)
MONOCYTES NFR BLD: 9 % (ref 4–15)
NEUTROPHILS # BLD AUTO: 3.1 K/UL (ref 1.8–7.7)
NEUTROPHILS NFR BLD: 46.7 % (ref 38–73)
NITRITE UR QL STRIP: NEGATIVE
NRBC BLD-RTO: 0 /100 WBC
PH UR STRIP: 5 [PH] (ref 5–8)
PHOSPHATE SERPL-MCNC: 3.4 MG/DL (ref 2.7–4.5)
PLATELET # BLD AUTO: 273 K/UL (ref 150–350)
PMV BLD AUTO: 11.7 FL (ref 9.2–12.9)
POTASSIUM SERPL-SCNC: 3.9 MMOL/L (ref 3.5–5.1)
PROT SERPL-MCNC: 6.8 G/DL (ref 6–8.4)
PROT UR QL STRIP: NEGATIVE
RBC # BLD AUTO: 4.29 M/UL (ref 4–5.4)
SODIUM SERPL-SCNC: 140 MMOL/L (ref 136–145)
SP GR UR STRIP: 1.02 (ref 1–1.03)
URN SPEC COLLECT METH UR: ABNORMAL
WBC # BLD AUTO: 6.66 K/UL (ref 3.9–12.7)

## 2020-01-05 PROCEDURE — 25000003 PHARM REV CODE 250: Performed by: INTERNAL MEDICINE

## 2020-01-05 PROCEDURE — 99239 PR HOSPITAL DISCHARGE DAY,>30 MIN: ICD-10-PCS | Mod: ,,, | Performed by: INTERNAL MEDICINE

## 2020-01-05 PROCEDURE — G0378 HOSPITAL OBSERVATION PER HR: HCPCS

## 2020-01-05 PROCEDURE — 96360 HYDRATION IV INFUSION INIT: CPT

## 2020-01-05 PROCEDURE — 85025 COMPLETE CBC W/AUTO DIFF WBC: CPT

## 2020-01-05 PROCEDURE — 36415 COLL VENOUS BLD VENIPUNCTURE: CPT

## 2020-01-05 PROCEDURE — 81003 URINALYSIS AUTO W/O SCOPE: CPT

## 2020-01-05 PROCEDURE — 25000003 PHARM REV CODE 250: Performed by: PHYSICIAN ASSISTANT

## 2020-01-05 PROCEDURE — 63600175 PHARM REV CODE 636 W HCPCS: Performed by: PHYSICIAN ASSISTANT

## 2020-01-05 PROCEDURE — 83735 ASSAY OF MAGNESIUM: CPT

## 2020-01-05 PROCEDURE — 96361 HYDRATE IV INFUSION ADD-ON: CPT

## 2020-01-05 PROCEDURE — 83036 HEMOGLOBIN GLYCOSYLATED A1C: CPT

## 2020-01-05 PROCEDURE — 99239 HOSP IP/OBS DSCHRG MGMT >30: CPT | Mod: ,,, | Performed by: INTERNAL MEDICINE

## 2020-01-05 PROCEDURE — 80053 COMPREHEN METABOLIC PANEL: CPT

## 2020-01-05 PROCEDURE — 84100 ASSAY OF PHOSPHORUS: CPT

## 2020-01-05 RX ORDER — POTASSIUM CHLORIDE 20 MEQ/1
40 TABLET, EXTENDED RELEASE ORAL DAILY
Status: DISCONTINUED | OUTPATIENT
Start: 2020-01-05 | End: 2020-01-05 | Stop reason: HOSPADM

## 2020-01-05 RX ORDER — IPRATROPIUM BROMIDE AND ALBUTEROL SULFATE 2.5; .5 MG/3ML; MG/3ML
3 SOLUTION RESPIRATORY (INHALATION) EVERY 4 HOURS PRN
Status: DISCONTINUED | OUTPATIENT
Start: 2020-01-05 | End: 2020-01-05 | Stop reason: HOSPADM

## 2020-01-05 RX ORDER — DULOXETIN HYDROCHLORIDE 60 MG/1
60 CAPSULE, DELAYED RELEASE ORAL DAILY
Status: DISCONTINUED | OUTPATIENT
Start: 2020-01-05 | End: 2020-01-05 | Stop reason: HOSPADM

## 2020-01-05 RX ORDER — SODIUM CHLORIDE 9 MG/ML
1000 INJECTION, SOLUTION INTRAVENOUS CONTINUOUS
Status: DISCONTINUED | OUTPATIENT
Start: 2020-01-05 | End: 2020-01-05

## 2020-01-05 RX ORDER — SPIRONOLACTONE 25 MG/1
25 TABLET ORAL DAILY
Status: DISCONTINUED | OUTPATIENT
Start: 2020-01-05 | End: 2020-01-05 | Stop reason: HOSPADM

## 2020-01-05 RX ORDER — CLONAZEPAM 0.5 MG/1
1 TABLET ORAL 2 TIMES DAILY
Status: DISCONTINUED | OUTPATIENT
Start: 2020-01-05 | End: 2020-01-05 | Stop reason: HOSPADM

## 2020-01-05 RX ORDER — ACETAMINOPHEN 325 MG/1
650 TABLET ORAL EVERY 4 HOURS PRN
Status: DISCONTINUED | OUTPATIENT
Start: 2020-01-05 | End: 2020-01-05 | Stop reason: HOSPADM

## 2020-01-05 RX ORDER — OXYCODONE HYDROCHLORIDE 5 MG/1
5 TABLET ORAL EVERY 8 HOURS PRN
Qty: 9 TABLET | Refills: 0 | Status: SHIPPED | OUTPATIENT
Start: 2020-01-05 | End: 2020-02-11 | Stop reason: CLARIF

## 2020-01-05 RX ORDER — AMLODIPINE BESYLATE 10 MG/1
10 TABLET ORAL DAILY
Status: DISCONTINUED | OUTPATIENT
Start: 2020-01-05 | End: 2020-01-05 | Stop reason: HOSPADM

## 2020-01-05 RX ORDER — IBUPROFEN 200 MG
16 TABLET ORAL
Status: DISCONTINUED | OUTPATIENT
Start: 2020-01-05 | End: 2020-01-05 | Stop reason: HOSPADM

## 2020-01-05 RX ORDER — DEXTROSE MONOHYDRATE 100 MG/ML
12.5 INJECTION, SOLUTION INTRAVENOUS
Status: DISCONTINUED | OUTPATIENT
Start: 2020-01-05 | End: 2020-01-05 | Stop reason: HOSPADM

## 2020-01-05 RX ORDER — DEXTROSE MONOHYDRATE 100 MG/ML
25 INJECTION, SOLUTION INTRAVENOUS
Status: DISCONTINUED | OUTPATIENT
Start: 2020-01-05 | End: 2020-01-05 | Stop reason: HOSPADM

## 2020-01-05 RX ORDER — MORPHINE SULFATE ORAL SOLUTION 10 MG/5ML
5 SOLUTION ORAL EVERY 4 HOURS PRN
Status: DISCONTINUED | OUTPATIENT
Start: 2020-01-05 | End: 2020-01-05 | Stop reason: HOSPADM

## 2020-01-05 RX ORDER — PROMETHAZINE HYDROCHLORIDE 25 MG/1
25 TABLET ORAL EVERY 6 HOURS PRN
Status: DISCONTINUED | OUTPATIENT
Start: 2020-01-05 | End: 2020-01-05 | Stop reason: HOSPADM

## 2020-01-05 RX ORDER — TIZANIDINE 2 MG/1
6 TABLET ORAL EVERY 8 HOURS PRN
Status: DISCONTINUED | OUTPATIENT
Start: 2020-01-05 | End: 2020-01-05 | Stop reason: HOSPADM

## 2020-01-05 RX ORDER — IBUPROFEN 200 MG
24 TABLET ORAL
Status: DISCONTINUED | OUTPATIENT
Start: 2020-01-05 | End: 2020-01-05 | Stop reason: HOSPADM

## 2020-01-05 RX ORDER — PRAVASTATIN SODIUM 20 MG/1
20 TABLET ORAL NIGHTLY
Status: DISCONTINUED | OUTPATIENT
Start: 2020-01-05 | End: 2020-01-05 | Stop reason: HOSPADM

## 2020-01-05 RX ORDER — CLONIDINE HYDROCHLORIDE 0.1 MG/1
0.1 TABLET ORAL EVERY 12 HOURS PRN
Status: DISCONTINUED | OUTPATIENT
Start: 2020-01-05 | End: 2020-01-05

## 2020-01-05 RX ORDER — TALC
6 POWDER (GRAM) TOPICAL NIGHTLY PRN
Status: DISCONTINUED | OUTPATIENT
Start: 2020-01-05 | End: 2020-01-05 | Stop reason: HOSPADM

## 2020-01-05 RX ORDER — SODIUM CHLORIDE 0.9 % (FLUSH) 0.9 %
5 SYRINGE (ML) INJECTION
Status: DISCONTINUED | OUTPATIENT
Start: 2020-01-05 | End: 2020-01-05 | Stop reason: HOSPADM

## 2020-01-05 RX ORDER — ENOXAPARIN SODIUM 100 MG/ML
40 INJECTION SUBCUTANEOUS EVERY 24 HOURS
Status: DISCONTINUED | OUTPATIENT
Start: 2020-01-05 | End: 2020-01-05 | Stop reason: HOSPADM

## 2020-01-05 RX ORDER — QUETIAPINE FUMARATE 100 MG/1
100 TABLET, FILM COATED ORAL DAILY
Status: DISCONTINUED | OUTPATIENT
Start: 2020-01-05 | End: 2020-01-05 | Stop reason: HOSPADM

## 2020-01-05 RX ORDER — GLUCAGON 1 MG
1 KIT INJECTION
Status: DISCONTINUED | OUTPATIENT
Start: 2020-01-05 | End: 2020-01-05 | Stop reason: HOSPADM

## 2020-01-05 RX ORDER — AMOXICILLIN 250 MG
1 CAPSULE ORAL 2 TIMES DAILY
Status: DISCONTINUED | OUTPATIENT
Start: 2020-01-05 | End: 2020-01-05 | Stop reason: HOSPADM

## 2020-01-05 RX ORDER — BUPROPION HYDROCHLORIDE 150 MG/1
300 TABLET ORAL DAILY
Status: DISCONTINUED | OUTPATIENT
Start: 2020-01-05 | End: 2020-01-05 | Stop reason: HOSPADM

## 2020-01-05 RX ORDER — GABAPENTIN 300 MG/1
900 CAPSULE ORAL 3 TIMES DAILY
Status: DISCONTINUED | OUTPATIENT
Start: 2020-01-05 | End: 2020-01-05 | Stop reason: HOSPADM

## 2020-01-05 RX ORDER — PANTOPRAZOLE SODIUM 40 MG/1
40 TABLET, DELAYED RELEASE ORAL DAILY
Status: DISCONTINUED | OUTPATIENT
Start: 2020-01-05 | End: 2020-01-05 | Stop reason: HOSPADM

## 2020-01-05 RX ORDER — GEMFIBROZIL 600 MG/1
600 TABLET, FILM COATED ORAL
Status: DISCONTINUED | OUTPATIENT
Start: 2020-01-05 | End: 2020-01-05 | Stop reason: HOSPADM

## 2020-01-05 RX ORDER — HYDRALAZINE HYDROCHLORIDE 50 MG/1
50 TABLET, FILM COATED ORAL EVERY 6 HOURS PRN
Status: DISCONTINUED | OUTPATIENT
Start: 2020-01-05 | End: 2020-01-05 | Stop reason: HOSPADM

## 2020-01-05 RX ORDER — TRAMADOL HYDROCHLORIDE 50 MG/1
50 TABLET ORAL EVERY 4 HOURS PRN
Status: DISCONTINUED | OUTPATIENT
Start: 2020-01-05 | End: 2020-01-05

## 2020-01-05 RX ORDER — ONDANSETRON 8 MG/1
8 TABLET, ORALLY DISINTEGRATING ORAL EVERY 8 HOURS PRN
Status: DISCONTINUED | OUTPATIENT
Start: 2020-01-05 | End: 2020-01-05 | Stop reason: HOSPADM

## 2020-01-05 RX ADMIN — GEMFIBROZIL 600 MG: 600 TABLET ORAL at 06:01

## 2020-01-05 RX ADMIN — QUETIAPINE FUMARATE 100 MG: 100 TABLET ORAL at 09:01

## 2020-01-05 RX ADMIN — PRAVASTATIN SODIUM 20 MG: 20 TABLET ORAL at 02:01

## 2020-01-05 RX ADMIN — SODIUM CHLORIDE 1000 ML: 0.9 INJECTION, SOLUTION INTRAVENOUS at 02:01

## 2020-01-05 RX ADMIN — BUPROPION HYDROCHLORIDE 300 MG: 150 TABLET, FILM COATED, EXTENDED RELEASE ORAL at 09:01

## 2020-01-05 RX ADMIN — SPIRONOLACTONE 25 MG: 25 TABLET ORAL at 09:01

## 2020-01-05 RX ADMIN — SODIUM CHLORIDE 1000 ML: 0.9 INJECTION, SOLUTION INTRAVENOUS at 06:01

## 2020-01-05 RX ADMIN — AMLODIPINE BESYLATE 10 MG: 10 TABLET ORAL at 09:01

## 2020-01-05 RX ADMIN — SENNOSIDES AND DOCUSATE SODIUM 1 TABLET: 8.6; 5 TABLET ORAL at 09:01

## 2020-01-05 RX ADMIN — DULOXETIN HYDROCHLORIDE 60 MG: 60 CAPSULE, DELAYED RELEASE ORAL at 09:01

## 2020-01-05 RX ADMIN — GABAPENTIN 900 MG: 300 CAPSULE ORAL at 09:01

## 2020-01-05 RX ADMIN — POTASSIUM CHLORIDE 40 MEQ: 1500 TABLET, EXTENDED RELEASE ORAL at 09:01

## 2020-01-05 RX ADMIN — PANTOPRAZOLE SODIUM 40 MG: 40 TABLET, DELAYED RELEASE ORAL at 09:01

## 2020-01-05 RX ADMIN — MORPHINE SULFATE 5 MG: 10 SOLUTION ORAL at 03:01

## 2020-01-05 RX ADMIN — CLONAZEPAM 1 MG: 0.5 TABLET ORAL at 09:01

## 2020-01-05 NOTE — ASSESSMENT & PLAN NOTE
- Continue amlodipine 10mg daily.  - Spironolactone 25mg daily held secondary to TASIA.  - PRN hydralazine.

## 2020-01-05 NOTE — DISCHARGE INSTRUCTIONS
Ms. Rehman,    You were admitted for Acute Kidney Injury. Likely due to dehydration.    Your kidney function improved with IV hydration.    Recommendations:  1. Please continue your amlodipine and spironolactone for blood pressure.  2. Talk to your doctor about continuing spironolactone long term.   3. Keep yourself well hydrated  4. Avoid NSAIDs - ibuprofen, meloxicam, naproxen (Aleve) - which may impact your kidney function    5. Talk to your doctor about long-term use of tramadol and potential for taper and/or non-opioid alternatives  6. Acetaminophen (up to 3000mg daily) is safe, ok to take gabapentin as well    For acute post-procedural pain  1. OK to take 1 tablet of 5mg oxycodone every 8 hours    DO NOT TAKE WITH CLONAZEPAM. TAKE AT LEAST 2 HRS APART    DO NOT TAKE WITH TRAMADOL       Follow up  1. Please follow up with your doctor for repeat kidney labs in 1-2 weeks.     Thank you for allowing me to take part in your care   [facility]

## 2020-01-05 NOTE — ED TRIAGE NOTES
"Sherie Reyes, a 54 y.o. female presents to the ED w/ complaint of     Triage note:  Chief Complaint   Patient presents with    Groin Pain     c/o bilateral groin down to her knees started today, yesterday had a Right shoulder athrogram. hx of chronic back pain, denies n/t, incontinence     Pt reports h/a and "some swelling" to bilat thighs    Review of patient's allergies indicates:  No Known Allergies  Past Medical History:   Diagnosis Date    Anxiety     Degenerative joint disease (DJD) of hip     Diabetes type 2, controlled 7/10/2019    GERD (gastroesophageal reflux disease)     Hyperlipidemia     Hypertension     IBS (irritable bowel syndrome)     Insomnia     Lumbar disc herniation     PTSD (post-traumatic stress disorder)        "

## 2020-01-05 NOTE — ASSESSMENT & PLAN NOTE
- Creatinine elevated to 1.9 from 1.0 on 12/30, GFR down to 29.9 from >60.0.  - Patient noted to have been taking a lot of NSAIDs secondary to arthritis pain.  - Will get UA.  - Received 1L NS in ER.  Will continue IVFs.  - Patient was switched from chlorthalidone to spironolactone in July secondary to hypokalemia.  - Hold spironolactone, Mobic, and gabapentin for now.  - Follow closely.

## 2020-01-05 NOTE — ED NOTES
Attempted to call report x1. Nurse providing pt care at this time. Instructed to call back in 20 minutes.

## 2020-01-05 NOTE — H&P
[facility] Hospital Medicine  History & Physical    Patient Name: Sherie Reyes  MRN: [id number]  Admission Date: 1/4/2020  Attending Physician: Alessandra Cheney MD   Primary Care Provider: Tonia Bernal DO    [facility] Medicine Team: [facility] HOSP MED D Maylin Bain PA-C     Patient information was obtained from patient, past medical records and ER records.     Subjective:     Principal Problem:TASIA (acute kidney injury)    Chief Complaint:   Chief Complaint   Patient presents with    Groin Pain     c/o bilateral groin down to her knees started today, yesterday had a Right shoulder athrogram. hx of chronic back pain, denies n/t, incontinence        HPI: Patient is a 54 year old lady with a h/o chronic LBP, HTN, HLD, and depression.  She presents with BLE thigh swelling.  Patient states that she underwent an arthrogram to her R shoulder yesterday.  Following the procedure, she had a HA and received 500cc IVFs.  On arrival home, she was in a lot of pain so she took a muscle relaxer with great improvement in shoulder pain.  However, this morning, she woke up with BLE swelling to the medial aspect of her thighs and pain to the area.  She denies weakness/numbness, bowel/bladder incontinence.  She denies chest pain, SOB, dizziness, palpitations, fever/chills, N/V, abdominal pain.  She had one episode of diarrhea today.  She states she gets dehydrated very easily and believes she has been dehydrated since being NPO prior to procedure.  She denies taking many NSAIDS.  She states her doctor switched her to spironolactone from chlorthalidone (per chart review, in July secondary to hypokalemia).  She denies dysuria, but does note a h/o UTIs.    Past Medical History:   Diagnosis Date    Anxiety     Degenerative joint disease (DJD) of hip     Diabetes type 2, controlled 7/10/2019    GERD (gastroesophageal reflux disease)     Hyperlipidemia     Hypertension     IBS (irritable bowel  syndrome)     Insomnia     Lumbar disc herniation     PTSD (post-traumatic stress disorder)        Past Surgical History:   Procedure Laterality Date    ADENOIDECTOMY       SECTION      TONSILLECTOMY         Review of patient's allergies indicates:  No Known Allergies    No current facility-administered medications on file prior to encounter.      Current Outpatient Medications on File Prior to Encounter   Medication Sig    acetaminophen (TYLENOL) 500 MG tablet Take 1 tablet (500 mg total) by mouth every 6 (six) hours as needed for Pain (Headache).    amLODIPine (NORVASC) 10 MG tablet Take 1 tablet (10 mg total) by mouth once daily.    buPROPion (WELLBUTRIN XL) 300 MG 24 hr tablet Take 300 mg by mouth once daily.    clonazePAM (KLONOPIN) 1 MG tablet Take 1 mg by mouth 2 (two) times daily.     cloNIDine (CATAPRES) 0.1 MG tablet Take 1 tablet (0.1 mg total) by mouth every 12 (twelve) hours as needed (only for blood pressure greater than 180/120).    duloxetine (CYMBALTA) 60 MG capsule Take 60 mg by mouth once daily.    gabapentin (NEURONTIN) 300 MG capsule Take 900 mg by mouth 3 (three) times daily.     gabapentin (NEURONTIN) 600 MG tablet TK 1 T PO TID    gemfibrozil (LOPID) 600 MG tablet TAKE 1 TABLET BY MOUTH TWICE DAILY BEFORE MEALS    KURVELO 0.15-0.03 mg per tablet Take 1 tablet by mouth once daily.    LINZESS 290 mcg Cap capsule TAKE 1 CAPSULE BY MOUTH EVERY DAY    meloxicam (MOBIC) 15 MG tablet TAKE 1 TABLET BY MOUTH EVERY DAY WITH FOOD    omeprazole (PRILOSEC) 40 MG capsule Take 1 capsule (40 mg total) by mouth once daily.    pantoprazole (PROTONIX) 40 MG tablet TAKE 1 TABLET BY MOUTH EVERY DAY    POLYETHYLENE GLYCOL 3350 (MIRALAX ORAL) Take by mouth as needed.     potassium chloride SA (KLOR-CON M20) 20 MEQ tablet Take 2 tablets (40 mEq total) by mouth once daily.    pravastatin (PRAVACHOL) 20 MG tablet TAKE 1 TABLET BY MOUTH EVERY EVENING    QUEtiapine (SEROQUEL) 100 MG Tab  Take 1 tablet by mouth once daily.    spironolactone (ALDACTONE) 25 MG tablet Take 1 tablet (25 mg total) by mouth once daily.    tizanidine (ZANAFLEX) 6 mg capsule Take 6 mg by mouth 2 (two) times daily as needed for Muscle spasms.    traMADol (ULTRAM) 50 mg tablet TK 1 TO 2 TS PO TID PRN     Family History     Problem Relation (Age of Onset)    Breast cancer Paternal Aunt    Cancer Maternal Grandfather    Diabetes Father    Hypertension Mother, Father    Hypothyroidism Mother    Stroke Father (46)        Tobacco Use    Smoking status: Former Smoker     Types: Vaping w/o nicotine    Smokeless tobacco: Former User     Quit date: 10/20/2014    Tobacco comment: Vapor cigarettes   Substance and Sexual Activity    Alcohol use: No     Frequency: Monthly or less     Drinks per session: 3 or 4     Binge frequency: Never     Comment: socially    Drug use: No    Sexual activity: Not on file     Review of Systems   Constitutional: Negative for activity change, appetite change, chills, fatigue, fever and unexpected weight change.   HENT: Negative for congestion, rhinorrhea, sore throat, trouble swallowing and voice change.    Eyes: Negative for visual disturbance.   Respiratory: Negative for cough, choking, chest tightness, shortness of breath and wheezing.    Cardiovascular: Negative for chest pain, palpitations and leg swelling.   Gastrointestinal: Negative for abdominal distention, abdominal pain, anal bleeding, blood in stool, constipation, diarrhea, nausea and vomiting.   Endocrine: Negative for cold intolerance, heat intolerance, polydipsia and polyuria.   Genitourinary: Negative for dysuria, flank pain, frequency, hematuria and urgency.   Musculoskeletal: Positive for arthralgias and myalgias. Negative for back pain and joint swelling.   Skin: Negative for color change and rash.   Neurological: Negative for dizziness, seizures, syncope, facial asymmetry, speech difficulty, weakness, light-headedness, numbness  and headaches.   Hematological: Negative for adenopathy. Does not bruise/bleed easily.   Psychiatric/Behavioral: Negative for agitation, confusion, hallucinations and suicidal ideas.     Objective:     Vital Signs (Most Recent):  Temp: 97.9 °F (36.6 °C) (01/05/20 0209)  Pulse: 98 (01/05/20 0209)  Resp: 18 (01/05/20 0209)  BP: (!) 169/89 (01/05/20 0209)  SpO2: 97 % (01/04/20 1944) Vital Signs (24h Range):  Temp:  [97.7 °F (36.5 °C)-97.9 °F (36.6 °C)] 97.9 °F (36.6 °C)  Pulse:  [] 98  Resp:  [18] 18  SpO2:  [97 %] 97 %  BP: (142-175)/(88-95) 169/89     Weight: 79.4 kg (175 lb)  Body mass index is 32.01 kg/m².    Physical Exam   Constitutional: She is oriented to person, place, and time. She appears well-developed and well-nourished. No distress.   HENT:   Head: Normocephalic and atraumatic.   Eyes: Pupils are equal, round, and reactive to light.   Neck: Neck supple. Carotid bruit is not present. No thyromegaly present.   Cardiovascular: Normal rate and regular rhythm. Exam reveals no gallop.   No murmur heard.  Pulmonary/Chest: Effort normal and breath sounds normal. No respiratory distress. She has no wheezes.   Abdominal: Soft. Bowel sounds are normal. She exhibits no distension and no mass. There is no splenomegaly or hepatomegaly. There is no tenderness.   Musculoskeletal: Normal range of motion. She exhibits no edema or deformity.   Neurological: She is alert and oriented to person, place, and time. No cranial nerve deficit or sensory deficit.   Skin: Skin is warm and dry. No rash noted.   Psychiatric: She has a normal mood and affect.         CRANIAL NERVES     CN III, IV, VI   Pupils are equal, round, and reactive to light.       Significant Labs:   CBC:   Recent Labs   Lab 01/04/20 2024   WBC 8.67   HGB 13.2   HCT 42.3        CMP:   Recent Labs   Lab 01/04/20 2024      K 4.1      CO2 21*   *   BUN 13   CREATININE 1.9*   CALCIUM 9.7   PROT 7.6   ALBUMIN 3.6   BILITOT 0.3    ALKPHOS 58   AST 43*   ALT 45*   ANIONGAP 12   EGFRNONAA 29.9*     Cardiac Markers:   Recent Labs   Lab 01/04/20 2024   BNP <10     Troponin:   Recent Labs   Lab 01/04/20 2024   TROPONINI <0.006       Significant Imaging: I have reviewed all pertinent imaging results/findings within the past 24 hours.    Assessment/Plan:     * TASIA (acute kidney injury)  - Creatinine elevated to 1.9 from 1.0 on 12/30, GFR down to 29.9 from >60.0.  - Patient noted to have been taking a lot of NSAIDs secondary to arthritis pain.  - Will get UA.  - Received 1L NS in ER.  Will continue IVFs.  - Patient was switched from chlorthalidone to spironolactone in July secondary to hypokalemia.  - Hold spironolactone, Mobic, and gabapentin for now.  - Follow closely.      Symptom of leg swelling  - No evidence of DVT.  - BNP <10.  - Unclear etiology.  - Elevate extremities, supportive care.      Transaminitis  - Appears to be at patient's baseline.  - Continue to monitor.      Essential hypertension  - Continue amlodipine 10mg daily.  - Spironolactone 25mg daily held secondary to TASIA.  - PRN hydralazine.      Mixed hyperlipidemia  - Continue pravastatin 20mg qHS and gemfibrozil 600mg BID.      Chronic midline low back pain without sciatica  - Continue Cymbalta 60mg daily, PRN tizanadine.  - Gabapentin held secondary to TASIA.      VTE Risk Mitigation (From admission, onward)         Ordered     enoxaparin injection 40 mg  Daily      01/05/20 0032     IP VTE HIGH RISK PATIENT  Once      01/05/20 0032     Place sequential compression device  Until discontinued      01/05/20 0032     Place EUGENIA hose  Until discontinued      01/05/20 0032                   Maylin Bain PA-C  Department of Hospital Medicine   [facility]

## 2020-01-05 NOTE — DISCHARGE SUMMARY
Discharge Summary  Hospital Medicine  [facility]      Attending Physician on Discharge: Alessandra Cheney MD  Hospital Medicine Team: [facility] HOSP MED S  Date of Admission:  1/4/2020     Date of Discharge:  1/5/2020  Code status: Full Code    Active Hospital Problems    Diagnosis  POA    Symptom of leg swelling [M79.89]  Yes    Mixed hyperlipidemia [E78.2]  Yes     Chronic    Essential hypertension [I10]  Yes     Chronic    Chronic midline low back pain without sciatica [M54.5, G89.29]  Yes     Chronic      Resolved Hospital Problems    Diagnosis Date Resolved POA    *TASIA (acute kidney injury) [N17.9] 01/05/2020 Yes    Transaminitis [R74.0] 01/05/2020 Yes     Chronic       Consults: none     History of Present Illness:  Per VALENTIN Bain PA-C  Patient is a 54 year old lady with a h/o chronic LBP, HTN, HLD, and depression.  She presents with BLE thigh swelling.  Patient states that she underwent an arthrogram to her R shoulder yesterday.  Following the procedure, she had a HA and received 500cc IVFs.  On arrival home, she was in a lot of pain so she took a muscle relaxer with great improvement in shoulder pain.  However, this morning, she woke up with BLE swelling to the medial aspect of her thighs and pain to the area.  She denies weakness/numbness, bowel/bladder incontinence.  She denies chest pain, SOB, dizziness, palpitations, fever/chills, N/V, abdominal pain.  She had one episode of diarrhea today.  She states she gets dehydrated very easily and believes she has been dehydrated since being NPO prior to procedure.  She denies taking many NSAIDS.  She states her doctor switched her to spironolactone from chlorthalidone (per chart review, in July secondary to hypokalemia).  She denies dysuria, but does note a h/o UTIs.       Hospital Course:  Ms. Reyes was admitted to Hospital Medicine for evaluation and management of an acute kidney injury after presenting with bilateral thigh pain and  swelling. The TASIA is most consistent with a pre-renal nonoliguric acute kidney injury in the setting of poor oral fluid intake in the days preceding admission. She additionally takes NSAIDs occasionally, but denies taking any recently, so less likely renal injury due to NSAID use. She was treated with IV fluids with improvement in her creatinine from 1.9 to 1.3 and improvement in her overall fatigue and malaise. Her antihypertensives were restarted.    Her bilateral groin pain/swelling improved as well. Etiology is unclear. US of bilateral LE were negative for DVT, and she did not have pitting edema or notable rashes on exam. No groin hernias noted. Suspect she may have had muscle cramping, but this does not explain her subjective feeling of groin swelling. She will discuss this further with her PCP on recheck.     Her other chronic medical conditions remained stable. She can continue her gabapentin without changes. Due to her severe post-procedural shoulder pain, she requested a prescription for pain medications other than tramadol. On inquiring, she feels that tramadol does not work for her at all, she gets no relief from any doses. I discussed the notion of tolerance and explained to her that a medication taper rather than increase may benefit her chronic pain best since she is exhibiting some signs of opioid hyperalgesia. I wrote a 3 day Rx for 5mg oxycodone for her, advised her to hold her tramadol at this time, continue apap. Advised her to avoid taking any narcotic pain medication within 2hrs of taking her clonazepam. Also called her pharmacist at [facility] and discussed these recommendations, they will reiterate to her.     On discharge, I counseled Ms. Reyes in person and provided a written summary of the above recommendations as well as the following: Remain well hydrated (approximately 2L water a day) and follow up with her primary care physician within 1-2 weeks to discuss this hospitalization and  recheck her renal labs. I also recommended that she discuss continuing spironolactone - they had been tapering it over time. It is okay to continue amlodipine and spironolactone at this time. She should avoid all NSAIDs for the time being. She was discharged to home in good condition. All questions were answered. She was given my business card and contact number for any follow up questions.       Laboratory Values:  Recent Labs   Lab 01/04/20 2024 01/05/20 0422   WBC 8.67 6.66   HGB 13.2 12.1   HCT 42.3 39.8    273     Recent Labs   Lab 12/30/19  1443 01/04/20 2024 01/05/20 0422    141 140   K 4.2 4.1 3.9   * 108 107   CO2 21* 21* 19*   BUN 8 13 14   CREATININE 1.0 1.9* 1.4   GLU 88 125* 132*   CALCIUM 9.0 9.7 9.0   MG  --   --  1.7   PHOS  --   --  3.4     Recent Labs   Lab 12/30/19  1443 01/04/20 2024 01/05/20 0422   ALKPHOS 50* 58 54*   ALT 42 45* 43   AST 36 43* 41*   ALBUMIN 3.5 3.6 3.4*   PROT 7.0 7.6 6.8   BILITOT 0.3 0.3 0.3      No results for input(s): POCTGLUCOSE in the last 168 hours.  Recent Labs     01/04/20 2024   TROPONINI <0.006       Microbiology:  Microbiology Results (last 7 days)     ** No results found for the last 168 hours. **          Imaging:  Imaging Results          US Lower Extremity Veins Bilateral (Final result)  Result time 01/04/20 23:18:51    Final result by Charlie Shafer MD (01/04/20 23:18:51)                 Impression:      No evidence of deep venous thrombosis in either lower extremity.    Electronically signed by resident: Marcin Allison  Date:    01/04/2020  Time:    22:59    Electronically signed by: Charlie Shafer MD  Date:    01/04/2020  Time:    23:18             Narrative:    EXAMINATION:  US LOWER EXTREMITY VEINS BILATERAL    CLINICAL HISTORY:  Other specified soft tissue disorders    TECHNIQUE:  Duplex and color flow Doppler and dynamic compression was performed of the bilateral lower extremity veins was  performed.    COMPARISON:  None.    FINDINGS:  Right thigh veins: The common femoral, femoral, popliteal, upper greater saphenous, and deep femoral veins are patent and free of thrombus. The veins are normally compressible and have normal phasic flow and augmentation response.    Right calf veins: The visualized calf veins are patent.    Left thigh veins: The common femoral, femoral, popliteal, upper greater saphenous, and deep femoral veins are patent and free of thrombus. The veins are normally compressible and have normal phasic flow and augmentation response.    Left calf veins: The visualized calf veins are patent.    Miscellaneous: None.                               X-Ray Chest PA And Lateral (Final result)  Result time 01/04/20 21:17:26    Final result by Everett Rhodes MD (01/04/20 21:17:26)                 Impression:      No acute cardiopulmonary process.  No interval worsening.      Electronically signed by: Everett Rhodes MD  Date:    01/04/2020  Time:    21:17             Narrative:    EXAMINATION:  XR CHEST PA AND LATERAL    CLINICAL HISTORY:  Shortness of breath    TECHNIQUE:  PA and lateral views of the chest were performed.    COMPARISON:  03/30/2018    FINDINGS:  Trachea is patent.  Cardiac silhouette appears normal in size.  Lungs are well expanded and appear clear.  No large effusion, pneumothorax or free air below the diaphragm.  No acute osseous abnormality.                                Cardiac:  ECG Results          EKG 12-lead (In process)  Result time 01/05/20 09:12:12    In process by Interface, Lab In [facility] (01/05/20 09:12:12)                 Narrative:    Test Reason : R06.02,    Vent. Rate : 120 BPM     Atrial Rate : 120 BPM     P-R Int : 126 ms          QRS Dur : 062 ms      QT Int : 302 ms       P-R-T Axes : 067 038 070 degrees     QTc Int : 426 ms    Sinus tachycardia  Low voltage QRS  Borderline Abnormal ECG  When compared with ECG of 04-JAN-2019 19:20,  Vent. rate has  increased BY  51 BPM    Referred By: [email or web address]   SELF           Confirmed By:                               Procedures: none  * No surgery found *        Current Discharge Medication List      START taking these medications    Details   oxyCODONE (ROXICODONE) 5 MG immediate release tablet Take 1 tablet (5 mg total) by mouth every 8 (eight) hours as needed for Pain.  Qty: 9 tablet, Refills: 0    Comments: Quantity prescribed more than 7 day supply? No         CONTINUE these medications which have NOT CHANGED    Details   acetaminophen (TYLENOL) 500 MG tablet Take 1 tablet (500 mg total) by mouth every 6 (six) hours as needed for Pain (Headache).  Qty: 90 tablet, Refills: 1      amLODIPine (NORVASC) 10 MG tablet Take 1 tablet (10 mg total) by mouth once daily.  Qty: 30 tablet, Refills: 11      buPROPion (WELLBUTRIN XL) 300 MG 24 hr tablet Take 300 mg by mouth once daily.      clonazePAM (KLONOPIN) 1 MG tablet Take 1 mg by mouth 2 (two) times daily.       cloNIDine (CATAPRES) 0.1 MG tablet Take 1 tablet (0.1 mg total) by mouth every 12 (twelve) hours as needed (only for blood pressure greater than 180/120).  Qty: 60 tablet, Refills: 6      duloxetine (CYMBALTA) 60 MG capsule Take 60 mg by mouth once daily.      gabapentin (NEURONTIN) 300 MG capsule Take 900 mg by mouth 3 (three) times daily.       gabapentin (NEURONTIN) 600 MG tablet TK 1 T PO TID  Refills: 5      gemfibrozil (LOPID) 600 MG tablet TAKE 1 TABLET BY MOUTH TWICE DAILY BEFORE MEALS  Qty: 60 tablet, Refills: 5      KURVELO 0.15-0.03 mg per tablet Take 1 tablet by mouth once daily.      LINZESS 290 mcg Cap capsule TAKE 1 CAPSULE BY MOUTH EVERY DAY  Qty: 90 capsule, Refills: 1      omeprazole (PRILOSEC) 40 MG capsule Take 1 capsule (40 mg total) by mouth once daily.  Qty: 30 capsule, Refills: 11      POLYETHYLENE GLYCOL 3350 (MIRALAX ORAL) Take by mouth as needed.       potassium chloride SA (KLOR-CON M20) 20 MEQ tablet Take 2 tablets (40 mEq total) by mouth  once daily.  Qty: 30 tablet, Refills: 0    Comments: Please consider 90 day supplies to promote better adherence      pravastatin (PRAVACHOL) 20 MG tablet TAKE 1 TABLET BY MOUTH EVERY EVENING  Qty: 30 tablet, Refills: 6    Associated Diagnoses: Mixed hyperlipidemia      QUEtiapine (SEROQUEL) 100 MG Tab Take 1 tablet by mouth once daily.      spironolactone (ALDACTONE) 25 MG tablet Take 1 tablet (25 mg total) by mouth once daily.  Qty: 30 tablet, Refills: 11      tizanidine (ZANAFLEX) 6 mg capsule Take 6 mg by mouth 2 (two) times daily as needed for Muscle spasms.      traMADol (ULTRAM) 50 mg tablet TK 1 TO 2 TS PO TID PRN  Refills: 3         STOP taking these medications       meloxicam (MOBIC) 15 MG tablet Comments:   Reason for Stopping:         pantoprazole (PROTONIX) 40 MG tablet Comments:   Reason for Stopping:                 Discharge Diet:regular diet with Normal Fluid intake of 1500 - 2000 mL per day    Activity: activity as tolerated    Discharge Condition: Good    Disposition: Home or Self Care    Follow up:    Follow-up Information     Tonia Bernal DO In 1 week.    Specialty:  Internal Medicine  Why:  follow up of hospital stay, check kidney labs  Contact information:  [address] [address]  [phone]                   Tests pending at the time of discharge: none        Time spent  on the discharge of the patient including review of hospital course with the patient. reviewing discharge medications and arranging follow-up care: >30 mins    Discharge examination: Patient was seen and examined on the date of discharge and determined to be suitable for discharge.        Alessandra Cheney M.D., M.P.H.  Department of Hospital Medicine  [facility]  (pager) [phone] (spect) [phone]

## 2020-01-05 NOTE — ED PROVIDER NOTES
Encounter Date: 2020    SCRIBE #1 NOTE: I, Wendy Alfonso, am scribing for, and in the presence of,  Dr. Alvin Craft. I have scribed the following portions of the note - the EKG reading and the APC attestation.       History     Chief Complaint   Patient presents with    Groin Pain     c/o bilateral groin down to her knees started today, yesterday had a Right shoulder athrogram. hx of chronic back pain, denies n/t, incontinence     54 year old female with medical history of HTN, HLD, Chronic LBP, Depression presenting to the ED with the chief complaint of lower extremity swelling. Patient underwent an arthrogram to her right shoulder yesterday which showed a superior labral anterior posterior tear. She developed a headache during the procedure and received 500cc fluids. She reports waking up today with bilateral lower extremity swelling and pain along the medial aspect of her thighs. She additionally reports having swelling to her right arm. She has been experiencing mild SOB with exertion. She is on Meloxicam and Gabapentin for arthralgias. She denies history of CAD, CHF, DVT/PE.         Review of patient's allergies indicates:  No Known Allergies  Past Medical History:   Diagnosis Date    Anxiety     Degenerative joint disease (DJD) of hip     Diabetes type 2, controlled 7/10/2019    GERD (gastroesophageal reflux disease)     Hyperlipidemia     Hypertension     IBS (irritable bowel syndrome)     Insomnia     Lumbar disc herniation     PTSD (post-traumatic stress disorder)      Past Surgical History:   Procedure Laterality Date    ADENOIDECTOMY       SECTION      TONSILLECTOMY       Family History   Problem Relation Age of Onset    Hypertension Mother     Hypothyroidism Mother     Hypertension Father     Stroke Father 46    Diabetes Father     Cancer Maternal Grandfather     Breast cancer Paternal Aunt      Social History     Tobacco Use    Smoking status: Former Smoker     Types:  Vaping w/o nicotine    Smokeless tobacco: Former User     Quit date: 10/20/2014    Tobacco comment: Vapor cigarettes   Substance Use Topics    Alcohol use: No     Frequency: Monthly or less     Drinks per session: 3 or 4     Binge frequency: Never     Comment: socially    Drug use: No     Review of Systems   Constitutional: Negative for chills, diaphoresis and fever.   HENT: Negative for sore throat and trouble swallowing.    Eyes: Negative for pain and redness.   Respiratory: Positive for shortness of breath. Negative for cough.    Cardiovascular: Positive for leg swelling. Negative for chest pain.   Gastrointestinal: Negative for abdominal pain, constipation, diarrhea, nausea and vomiting.   Genitourinary: Negative for dysuria, flank pain and hematuria.   Musculoskeletal: Positive for arthralgias. Negative for neck pain and neck stiffness.   Skin: Negative for rash and wound.   Neurological: Negative for weakness, light-headedness and headaches.       Physical Exam     Initial Vitals [01/04/20 1944]   BP Pulse Resp Temp SpO2   (!) 175/95 (!) 120 18 97.7 °F (36.5 °C) 97 %      MAP       --         Physical Exam    Constitutional: She appears well-developed and well-nourished. She is not diaphoretic. No distress.   Obese female   HENT:   Head: Normocephalic and atraumatic.   Mouth/Throat: Oropharynx is clear and moist. No oropharyngeal exudate.   Eyes: EOM are normal. Pupils are equal, round, and reactive to light.   Neck: Normal range of motion.   Cardiovascular: Regular rhythm. Tachycardia present.    Pulmonary/Chest: No respiratory distress. She has no wheezes. She has no rales.   Abdominal: Soft. There is no tenderness.   Musculoskeletal:   Mild tenderness to medial thighs bilaterally. No asymmetry in size. No ecchymosis, erythema, or rash.  No calf tenderness or asymmetry.  Small ecchymosis to right anterior shoulder.   Neurological: She is alert and oriented to person, place, and time. She has normal  strength. No cranial nerve deficit or sensory deficit.   Skin: Skin is warm and dry. No rash noted. No erythema.       ED Course   Procedures  Labs Reviewed   CBC W/ AUTO DIFFERENTIAL - Abnormal; Notable for the following components:       Result Value    Mean Corpuscular Hemoglobin Conc 31.2 (*)     All other components within normal limits   COMPREHENSIVE METABOLIC PANEL - Abnormal; Notable for the following components:    CO2 21 (*)     Glucose 125 (*)     Creatinine 1.9 (*)     AST 43 (*)     ALT 45 (*)     eGFR if  34.4 (*)     eGFR if non  29.9 (*)     All other components within normal limits    Narrative:     add on test d dimer per dr adia cummins order# [id number]  01/04/2020    22:14    B-TYPE NATRIURETIC PEPTIDE    Narrative:     add on test d dimer per dr adia cummins order# [id number]  01/04/2020    22:14    TROPONIN I    Narrative:     add on test d dimer per dr adia cummins order# [id number]  01/04/2020    22:14    D DIMER, QUANTITATIVE   D DIMER, QUANTITATIVE    Narrative:     add on test d dimer per dr adia cummins order# [id number]  01/04/2020    22:14      EKG Readings: (Independently Interpreted)   Sinus tachycardia at 120 bpm; normal axis; normal ST segments        Imaging Results          US Lower Extremity Veins Bilateral (Final result)  Result time 01/04/20 23:18:51    Final result by Charlie Shafer MD (01/04/20 23:18:51)                 Impression:      No evidence of deep venous thrombosis in either lower extremity.    Electronically signed by resident: Marcin Allison  Date:    01/04/2020  Time:    22:59    Electronically signed by: Charlie Shafer MD  Date:    01/04/2020  Time:    23:18             Narrative:    EXAMINATION:  US LOWER EXTREMITY VEINS BILATERAL    CLINICAL HISTORY:  Other specified soft tissue disorders    TECHNIQUE:  Duplex and color flow Doppler and dynamic compression was performed of the bilateral lower extremity veins was  performed.    COMPARISON:  None.    FINDINGS:  Right thigh veins: The common femoral, femoral, popliteal, upper greater saphenous, and deep femoral veins are patent and free of thrombus. The veins are normally compressible and have normal phasic flow and augmentation response.    Right calf veins: The visualized calf veins are patent.    Left thigh veins: The common femoral, femoral, popliteal, upper greater saphenous, and deep femoral veins are patent and free of thrombus. The veins are normally compressible and have normal phasic flow and augmentation response.    Left calf veins: The visualized calf veins are patent.    Miscellaneous: None.                               X-Ray Chest PA And Lateral (Final result)  Result time 01/04/20 21:17:26    Final result by Everett Rhodes MD (01/04/20 21:17:26)                 Impression:      No acute cardiopulmonary process.  No interval worsening.      Electronically signed by: Everett Rhodes MD  Date:    01/04/2020  Time:    21:17             Narrative:    EXAMINATION:  XR CHEST PA AND LATERAL    CLINICAL HISTORY:  Shortness of breath    TECHNIQUE:  PA and lateral views of the chest were performed.    COMPARISON:  03/30/2018    FINDINGS:  Trachea is patent.  Cardiac silhouette appears normal in size.  Lungs are well expanded and appear clear.  No large effusion, pneumothorax or free air below the diaphragm.  No acute osseous abnormality.                                 Medical Decision Making:   History:   Old Medical Records: I decided to obtain old medical records.  Old Records Summarized: records from clinic visits and records from previous admission(s).  Clinical Tests:   Lab Tests: Ordered and Reviewed  Radiological Study: Ordered and Reviewed  Medical Tests: Ordered and Reviewed       APC / Resident Notes:   54 year old female with medical history of HTN, HLD, Chronic LBP, Depression presenting to the ED c/o bilateral medial thigh swelling for 1 day. Underwent CT  arthrogram to R shoulder yesterday. DDx includes but not limited to dependent edema, muscular strain, DVT, hypervolemia, CHF, cardiac arrhythmia, electrolyte disturbance, medication side effect, anasarca.     Work-up significant for TASIA, Crt 1.9 and GFR 29.9 (1.0 and >60 at baseline). D-dimer and BLE venous U/S negative. CBC, BNP, Trop unremarkable. CXR without acute intrathoracic processes. Suspect TASIA from heavy NSAID use. Given magnitude of change, will admit to medicine for IV fluids and ongoing management. Patient lower extremity swelling and pain unclear, but no obvious asymmetry or other concerning findings on physical exam. Patient expresses understanding and agreeable to the plan. I have discussed the care of this patient with my supervising physician.         Scribe Attestation:   Scribe #1: I performed the above scribed service and the documentation accurately describes the services I performed. I attest to the accuracy of the note.    Attending Attestation:     Physician Attestation Statement for NP/PA:   I discussed this assessment and plan of this patient with the NP/PA, but I did not personally examine the patient. The face to face encounter was performed by the NP/PA.    Other NP/PA Attestation Additions:    History of Present Illness: 54-year-old woman with recent orthopedic procedure presents for evaluation of worsening bilateral groin pain as well as exertional dyspnea.                                 Clinical Impression:       ICD-10-CM ICD-9-CM   1. TASIA (acute kidney injury) N17.9 584.9   2. SOB (shortness of breath) R06.02 786.05   3. Swelling of lower extremity M79.89 729.81   4. NSAID long-term use Z79.1 V58.64         Disposition:   Disposition: Admitted  Condition: George Nelson PA-C  01/05/20 0009       Alvin Craft MD  01/05/20 [id number]

## 2020-01-05 NOTE — HPI
Patient is a 54 year old lady with a h/o chronic LBP, HTN, HLD, and depression.  She presents with BLE thigh swelling.  Patient states that she underwent an arthrogram to her R shoulder yesterday.  Following the procedure, she had a HA and received 500cc IVFs.  On arrival home, she was in a lot of pain so she took a muscle relaxer with great improvement in shoulder pain.  However, this morning, she woke up with BLE swelling to the medial aspect of her thighs and pain to the area.  She denies weakness/numbness, bowel/bladder incontinence.  She denies chest pain, SOB, dizziness, palpitations, fever/chills, N/V, abdominal pain.  She had one episode of diarrhea today.  She states she gets dehydrated very easily and believes she has been dehydrated since being NPO prior to procedure.  She denies taking many NSAIDS.  She states her doctor switched her to spironolactone from chlorthalidone (per chart review, in July secondary to hypokalemia).  She denies dysuria, but does note a h/o UTIs.

## 2020-01-05 NOTE — PLAN OF CARE
Getting dressed independently, preparing for discharge. No c/o pain or discomfort. Breathing even and unlabored. Agrees to F/U with outpatient tx recommendations. Safety maintained during stay.

## 2020-01-05 NOTE — SUBJECTIVE & OBJECTIVE
Past Medical History:   Diagnosis Date    Anxiety     Degenerative joint disease (DJD) of hip     Diabetes type 2, controlled 7/10/2019    GERD (gastroesophageal reflux disease)     Hyperlipidemia     Hypertension     IBS (irritable bowel syndrome)     Insomnia     Lumbar disc herniation     PTSD (post-traumatic stress disorder)        Past Surgical History:   Procedure Laterality Date    ADENOIDECTOMY       SECTION      TONSILLECTOMY         Review of patient's allergies indicates:  No Known Allergies    No current facility-administered medications on file prior to encounter.      Current Outpatient Medications on File Prior to Encounter   Medication Sig    acetaminophen (TYLENOL) 500 MG tablet Take 1 tablet (500 mg total) by mouth every 6 (six) hours as needed for Pain (Headache).    amLODIPine (NORVASC) 10 MG tablet Take 1 tablet (10 mg total) by mouth once daily.    buPROPion (WELLBUTRIN XL) 300 MG 24 hr tablet Take 300 mg by mouth once daily.    clonazePAM (KLONOPIN) 1 MG tablet Take 1 mg by mouth 2 (two) times daily.     cloNIDine (CATAPRES) 0.1 MG tablet Take 1 tablet (0.1 mg total) by mouth every 12 (twelve) hours as needed (only for blood pressure greater than 180/120).    duloxetine (CYMBALTA) 60 MG capsule Take 60 mg by mouth once daily.    gabapentin (NEURONTIN) 300 MG capsule Take 900 mg by mouth 3 (three) times daily.     gabapentin (NEURONTIN) 600 MG tablet TK 1 T PO TID    gemfibrozil (LOPID) 600 MG tablet TAKE 1 TABLET BY MOUTH TWICE DAILY BEFORE MEALS    KURVELO 0.15-0.03 mg per tablet Take 1 tablet by mouth once daily.    LINZESS 290 mcg Cap capsule TAKE 1 CAPSULE BY MOUTH EVERY DAY    meloxicam (MOBIC) 15 MG tablet TAKE 1 TABLET BY MOUTH EVERY DAY WITH FOOD    omeprazole (PRILOSEC) 40 MG capsule Take 1 capsule (40 mg total) by mouth once daily.    pantoprazole (PROTONIX) 40 MG tablet TAKE 1 TABLET BY MOUTH EVERY DAY    POLYETHYLENE GLYCOL 3350 (MIRALAX ORAL)  Take by mouth as needed.     potassium chloride SA (KLOR-CON M20) 20 MEQ tablet Take 2 tablets (40 mEq total) by mouth once daily.    pravastatin (PRAVACHOL) 20 MG tablet TAKE 1 TABLET BY MOUTH EVERY EVENING    QUEtiapine (SEROQUEL) 100 MG Tab Take 1 tablet by mouth once daily.    spironolactone (ALDACTONE) 25 MG tablet Take 1 tablet (25 mg total) by mouth once daily.    tizanidine (ZANAFLEX) 6 mg capsule Take 6 mg by mouth 2 (two) times daily as needed for Muscle spasms.    traMADol (ULTRAM) 50 mg tablet TK 1 TO 2 TS PO TID PRN     Family History     Problem Relation (Age of Onset)    Breast cancer Paternal Aunt    Cancer Maternal Grandfather    Diabetes Father    Hypertension Mother, Father    Hypothyroidism Mother    Stroke Father (46)        Tobacco Use    Smoking status: Former Smoker     Types: Vaping w/o nicotine    Smokeless tobacco: Former User     Quit date: 10/20/2014    Tobacco comment: Vapor cigarettes   Substance and Sexual Activity    Alcohol use: No     Frequency: Monthly or less     Drinks per session: 3 or 4     Binge frequency: Never     Comment: socially    Drug use: No    Sexual activity: Not on file     Review of Systems   Constitutional: Negative for activity change, appetite change, chills, fatigue, fever and unexpected weight change.   HENT: Negative for congestion, rhinorrhea, sore throat, trouble swallowing and voice change.    Eyes: Negative for visual disturbance.   Respiratory: Negative for cough, choking, chest tightness, shortness of breath and wheezing.    Cardiovascular: Negative for chest pain, palpitations and leg swelling.   Gastrointestinal: Negative for abdominal distention, abdominal pain, anal bleeding, blood in stool, constipation, diarrhea, nausea and vomiting.   Endocrine: Negative for cold intolerance, heat intolerance, polydipsia and polyuria.   Genitourinary: Negative for dysuria, flank pain, frequency, hematuria and urgency.   Musculoskeletal: Positive for  arthralgias and myalgias. Negative for back pain and joint swelling.   Skin: Negative for color change and rash.   Neurological: Negative for dizziness, seizures, syncope, facial asymmetry, speech difficulty, weakness, light-headedness, numbness and headaches.   Hematological: Negative for adenopathy. Does not bruise/bleed easily.   Psychiatric/Behavioral: Negative for agitation, confusion, hallucinations and suicidal ideas.     Objective:     Vital Signs (Most Recent):  Temp: 97.9 °F (36.6 °C) (01/05/20 0209)  Pulse: 98 (01/05/20 0209)  Resp: 18 (01/05/20 0209)  BP: (!) 169/89 (01/05/20 0209)  SpO2: 97 % (01/04/20 1944) Vital Signs (24h Range):  Temp:  [97.7 °F (36.5 °C)-97.9 °F (36.6 °C)] 97.9 °F (36.6 °C)  Pulse:  [] 98  Resp:  [18] 18  SpO2:  [97 %] 97 %  BP: (142-175)/(88-95) 169/89     Weight: 79.4 kg (175 lb)  Body mass index is 32.01 kg/m².    Physical Exam   Constitutional: She is oriented to person, place, and time. She appears well-developed and well-nourished. No distress.   HENT:   Head: Normocephalic and atraumatic.   Eyes: Pupils are equal, round, and reactive to light.   Neck: Neck supple. Carotid bruit is not present. No thyromegaly present.   Cardiovascular: Normal rate and regular rhythm. Exam reveals no gallop.   No murmur heard.  Pulmonary/Chest: Effort normal and breath sounds normal. No respiratory distress. She has no wheezes.   Abdominal: Soft. Bowel sounds are normal. She exhibits no distension and no mass. There is no splenomegaly or hepatomegaly. There is no tenderness.   Musculoskeletal: Normal range of motion. She exhibits no edema or deformity.   Neurological: She is alert and oriented to person, place, and time. No cranial nerve deficit or sensory deficit.   Skin: Skin is warm and dry. No rash noted.   Psychiatric: She has a normal mood and affect.         CRANIAL NERVES     CN III, IV, VI   Pupils are equal, round, and reactive to light.       Significant Labs:   CBC:   Recent  Labs   Lab 01/04/20 2024   WBC 8.67   HGB 13.2   HCT 42.3        CMP:   Recent Labs   Lab 01/04/20 2024      K 4.1      CO2 21*   *   BUN 13   CREATININE 1.9*   CALCIUM 9.7   PROT 7.6   ALBUMIN 3.6   BILITOT 0.3   ALKPHOS 58   AST 43*   ALT 45*   ANIONGAP 12   EGFRNONAA 29.9*     Cardiac Markers:   Recent Labs   Lab 01/04/20 2024   BNP <10     Troponin:   Recent Labs   Lab 01/04/20 2024   TROPONINI <0.006       Significant Imaging: I have reviewed all pertinent imaging results/findings within the past 24 hours.

## 2020-01-07 ENCOUNTER — PATIENT OUTREACH (OUTPATIENT)
Dept: OTHER | Facility: OTHER | Age: 55
End: 2020-01-07

## 2020-01-07 NOTE — PROGRESS NOTES
Digital Medicine: Clinician Follow-Up    Patient recently admitted 1/4-1/5/20. Went in for bilateral thigh swelling following shoulder procedure. Also treated for TASIA, resolved with IVF and thought to be 2/2 poor oral intake prior to procedure.    The history is provided by the patient.     Follow Up  Follow-up reason(s): reading review          INTERVENTION(S)  reviewed appropriate dose schedule, encouragement/support and denied questions    PLAN  patient verbalizes understanding and continue monitoring    BP typically elevated prior to tizanidine dose and normal to low after tizanidine  Stressed importance of hydration  Would not restart chlorthalidone at this time given recent episodes of dehydration and TASIA  Continue current regimen      There are no preventive care reminders to display for this patient.    Last 5 Patient Entered Readings                                      Current 30 Day Average: 144/90     Recent Readings 1/2/2020 1/2/2020 1/1/2020 1/1/2020 12/30/2019    SBP (mmHg) 100 159 103 160 118    DBP (mmHg) 65 98 69 103 92    Pulse 74 97 81 108 91             Hypertension Medications             amLODIPine (NORVASC) 10 MG tablet Take 1 tablet (10 mg total) by mouth once daily.    cloNIDine (CATAPRES) 0.1 MG tablet Take 1 tablet (0.1 mg total) by mouth every 12 (twelve) hours as needed (only for blood pressure greater than 180/120).    spironolactone (ALDACTONE) 25 MG tablet Take 1 tablet (25 mg total) by mouth once daily.                             Medication Adherence Screening     She does not wonder if medications are working.  She knows purpose of medications.

## 2020-01-08 ENCOUNTER — TELEPHONE (OUTPATIENT)
Dept: ORTHOPEDICS | Facility: CLINIC | Age: 55
End: 2020-01-08

## 2020-01-08 NOTE — TELEPHONE ENCOUNTER
Spoke with patient. Appointment made on 1/30 with   Dr Hughes. Patient is aware of date and time.

## 2020-01-08 NOTE — TELEPHONE ENCOUNTER
----- Message from Gregoria Madrigal sent at 1/8/2020  4:26 PM CST -----  Contact: patient  Patient called to schedule an appointment specifically with Dr Hughes  And wishes to speak with a nurse regarding this matter.      she  can be reached at [phone]    Thanks  KB

## 2020-01-16 NOTE — PROGRESS NOTES
Subjective:       Patient ID: Sherie Reyes is a 54 y.o. female.    Chief Complaint: Follow-up    Patient is a 54 y.o.female who presents today for hospital follow up    Per hospital report:  History of Present Illness:    Patient is a 54 year old lady with a h/o chronic LBP, HTN, HLD, and depression.  She presents with BLE thigh swelling.  Patient states that she underwent an arthrogram to her R shoulder yesterday.  Following the procedure, she had a HA and received 500cc IVFs.  On arrival home, she was in a lot of pain so she took a muscle relaxer with great improvement in shoulder pain.  However, this morning, she woke up with BLE swelling to the medial aspect of her thighs and pain to the area.  She denies weakness/numbness, bowel/bladder incontinence.  She denies chest pain, SOB, dizziness, palpitations, fever/chills, N/V, abdominal pain.  She had one episode of diarrhea today.  She states she gets dehydrated very easily and believes she has been dehydrated since being NPO prior to procedure.  She denies taking many NSAIDS.  She states her doctor switched her to spironolactone from chlorthalidone (per chart review, in July secondary to hypokalemia).  She denies dysuria, but does note a h/o UTIs.        Hospital Course:  Ms. Reyes was admitted to Hospital Medicine for evaluation and management of an acute kidney injury after presenting with bilateral thigh pain and swelling. The TASIA is most consistent with a pre-renal nonoliguric acute kidney injury in the setting of poor oral fluid intake in the days preceding admission. She additionally takes NSAIDs occasionally, but denies taking any recently, so less likely renal injury due to NSAID use. She was treated with IV fluids with improvement in her creatinine from 1.9 to 1.3 and improvement in her overall fatigue and malaise. Her antihypertensives were restarted.     Her bilateral groin pain/swelling improved as well. Etiology is unclear. US of  bilateral LE were negative for DVT, and she did not have pitting edema or notable rashes on exam. No groin hernias noted. Suspect she may have had muscle cramping, but this does not explain her subjective feeling of groin swelling. She will discuss this further with her PCP on recheck.      Her other chronic medical conditions remained stable. She can continue her gabapentin without changes. Due to her severe post-procedural shoulder pain, she requested a prescription for pain medications other than tramadol. On inquiring, she feels that tramadol does not work for her at all, she gets no relief from any doses. I discussed the notion of tolerance and explained to her that a medication taper rather than increase may benefit her chronic pain best since she is exhibiting some signs of opioid hyperalgesia. I wrote a 3 day Rx for 5mg oxycodone for her, advised her to hold her tramadol at this time, continue apap. Advised her to avoid taking any narcotic pain medication within 2hrs of taking her clonazepam. Also called her pharmacist at [facility] and discussed these recommendations, they will reiterate to her.      On discharge, I counseled Ms. Reyes in person and provided a written summary of the above recommendations as well as the following: Remain well hydrated (approximately 2L water a day) and follow up with her primary care physician within 1-2 weeks to discuss this hospitalization and recheck her renal labs. I also recommended that she discuss continuing spironolactone - they had been tapering it over time. It is okay to continue amlodipine and spironolactone at this time. She should avoid all NSAIDs for the time being. She was discharged to home in good condition. All questions were answered. She was given my business card and contact number for any follow up questions.    Today:  - pt thinks she is still dehydrated; not urinating much. Went out on sat and drank 5 beers and never urinated afterward. Denies  muscle swelling or pain this morning. Hasn't taken any nsaids. She is drinking two large cups of water per day. She does say her mouth is dry.         Review of Systems   Constitutional: Negative for appetite change, chills, diaphoresis and fever.   HENT: Negative for congestion, ear discharge, ear pain, postnasal drip, tinnitus, trouble swallowing and voice change.    Eyes: Negative for discharge, redness and itching.   Respiratory: Negative for cough, chest tightness, shortness of breath and wheezing.    Cardiovascular: Negative for chest pain, palpitations and leg swelling.   Gastrointestinal: Negative for abdominal pain, constipation, diarrhea, nausea and vomiting.   Endocrine: Negative for cold intolerance and heat intolerance.   Genitourinary: Negative for difficulty urinating, flank pain, hematuria and urgency.   Musculoskeletal: Negative for arthralgias, gait problem, myalgias and neck stiffness.   Skin: Negative for color change and rash.   Neurological: Negative for dizziness, seizures, syncope and headaches.   Hematological: Negative for adenopathy.   Psychiatric/Behavioral: Negative for agitation, behavioral problems, confusion and sleep disturbance.       Objective:      Physical Exam   Constitutional: She is oriented to person, place, and time. She appears well-developed and well-nourished. No distress.   HENT:   Head: Normocephalic and atraumatic.   Right Ear: External ear normal.   Left Ear: External ear normal.   Nose: Nose normal.   Mouth/Throat: Oropharynx is clear and moist. No oropharyngeal exudate.   Eyes: Pupils are equal, round, and reactive to light. Conjunctivae and EOM are normal. Right eye exhibits no discharge. Left eye exhibits no discharge. No scleral icterus.   Neck: Neck supple. No JVD present. No tracheal deviation present. No thyromegaly present.   Cardiovascular: Normal rate, normal heart sounds and intact distal pulses. Exam reveals no gallop and no friction rub.   No murmur  heard.  Pulmonary/Chest: Effort normal and breath sounds normal. No stridor. No respiratory distress. She has no wheezes. She has no rales. She exhibits no tenderness.   Abdominal: Soft. Bowel sounds are normal. She exhibits no distension. There is no tenderness. There is no rebound.   Musculoskeletal: She exhibits no edema or tenderness.   Lymphadenopathy:     She has no cervical adenopathy.   Neurological: She is alert and oriented to person, place, and time.   Skin: Skin is warm and dry. No rash noted. She is not diaphoretic. No erythema.   Psychiatric: She has a normal mood and affect. Her behavior is normal.   Nursing note and vitals reviewed.      Assessment and Plan:       1. TASIA (acute kidney injury)  - continue oral hydration; recheck kidney function today; avoid nsaids  - lab today  - Comprehensive metabolic panel; Future          No follow-ups on file.

## 2020-01-20 ENCOUNTER — LAB VISIT (OUTPATIENT)
Dept: LAB | Facility: HOSPITAL | Age: 55
End: 2020-01-20
Attending: INTERNAL MEDICINE
Payer: MEDICAID

## 2020-01-20 ENCOUNTER — OFFICE VISIT (OUTPATIENT)
Dept: INTERNAL MEDICINE | Facility: CLINIC | Age: 55
End: 2020-01-20
Payer: MEDICAID

## 2020-01-20 ENCOUNTER — PATIENT MESSAGE (OUTPATIENT)
Dept: OTOLARYNGOLOGY | Facility: CLINIC | Age: 55
End: 2020-01-20

## 2020-01-20 VITALS
DIASTOLIC BLOOD PRESSURE: 80 MMHG | TEMPERATURE: 98 F | RESPIRATION RATE: 16 BRPM | HEART RATE: 94 BPM | BODY MASS INDEX: 34.77 KG/M2 | SYSTOLIC BLOOD PRESSURE: 136 MMHG | WEIGHT: 188.94 LBS | HEIGHT: 62 IN

## 2020-01-20 DIAGNOSIS — N17.9 AKI (ACUTE KIDNEY INJURY): Primary | ICD-10-CM

## 2020-01-20 DIAGNOSIS — N17.9 AKI (ACUTE KIDNEY INJURY): ICD-10-CM

## 2020-01-20 LAB
ALBUMIN SERPL BCP-MCNC: 3.8 G/DL (ref 3.5–5.2)
ALP SERPL-CCNC: 63 U/L (ref 55–135)
ALT SERPL W/O P-5'-P-CCNC: 38 U/L (ref 10–44)
ANION GAP SERPL CALC-SCNC: 13 MMOL/L (ref 8–16)
AST SERPL-CCNC: 20 U/L (ref 10–40)
BILIRUB SERPL-MCNC: 0.2 MG/DL (ref 0.1–1)
BUN SERPL-MCNC: 15 MG/DL (ref 6–20)
CALCIUM SERPL-MCNC: 10.5 MG/DL (ref 8.7–10.5)
CHLORIDE SERPL-SCNC: 107 MMOL/L (ref 95–110)
CO2 SERPL-SCNC: 19 MMOL/L (ref 23–29)
CREAT SERPL-MCNC: 1.3 MG/DL (ref 0.5–1.4)
EST. GFR  (AFRICAN AMERICAN): 54.5 ML/MIN/1.73 M^2
EST. GFR  (NON AFRICAN AMERICAN): 47.3 ML/MIN/1.73 M^2
GLUCOSE SERPL-MCNC: 87 MG/DL (ref 70–110)
POTASSIUM SERPL-SCNC: 3.8 MMOL/L (ref 3.5–5.1)
PROT SERPL-MCNC: 7.8 G/DL (ref 6–8.4)
SODIUM SERPL-SCNC: 139 MMOL/L (ref 136–145)

## 2020-01-20 PROCEDURE — 99999 PR PBB SHADOW E&M-EST. PATIENT-LVL III: ICD-10-PCS | Mod: PBBFAC,,, | Performed by: INTERNAL MEDICINE

## 2020-01-20 PROCEDURE — 80053 COMPREHEN METABOLIC PANEL: CPT

## 2020-01-20 PROCEDURE — 36415 COLL VENOUS BLD VENIPUNCTURE: CPT | Mod: PO

## 2020-01-20 PROCEDURE — 99999 PR PBB SHADOW E&M-EST. PATIENT-LVL III: CPT | Mod: PBBFAC,,, | Performed by: INTERNAL MEDICINE

## 2020-01-20 PROCEDURE — 99213 OFFICE O/P EST LOW 20 MIN: CPT | Mod: S$PBB,,, | Performed by: INTERNAL MEDICINE

## 2020-01-20 PROCEDURE — 99213 OFFICE O/P EST LOW 20 MIN: CPT | Mod: PBBFAC,PO | Performed by: INTERNAL MEDICINE

## 2020-01-20 PROCEDURE — 99213 PR OFFICE/OUTPT VISIT, EST, LEVL III, 20-29 MIN: ICD-10-PCS | Mod: S$PBB,,, | Performed by: INTERNAL MEDICINE

## 2020-01-21 ENCOUNTER — TELEPHONE (OUTPATIENT)
Dept: INTERNAL MEDICINE | Facility: CLINIC | Age: 55
End: 2020-01-21

## 2020-01-21 NOTE — TELEPHONE ENCOUNTER
----- Message from Tonia Bernal DO sent at 1/20/2020  7:17 PM CST -----  Kidney function is improving but still not at baseline; continue good oral hydration

## 2020-01-27 ENCOUNTER — PATIENT OUTREACH (OUTPATIENT)
Dept: ADMINISTRATIVE | Facility: OTHER | Age: 55
End: 2020-01-27

## 2020-01-30 ENCOUNTER — OFFICE VISIT (OUTPATIENT)
Dept: ORTHOPEDICS | Facility: CLINIC | Age: 55
End: 2020-01-30
Payer: MEDICAID

## 2020-01-30 VITALS — BODY MASS INDEX: 34.6 KG/M2 | WEIGHT: 188 LBS | HEIGHT: 62 IN

## 2020-01-30 DIAGNOSIS — S43.431D TEAR OF RIGHT GLENOID LABRUM, SUBSEQUENT ENCOUNTER: ICD-10-CM

## 2020-01-30 PROBLEM — S43.431A TEAR OF RIGHT GLENOID LABRUM: Status: ACTIVE | Noted: 2020-01-30

## 2020-01-30 PROCEDURE — 99999 PR PBB SHADOW E&M-EST. PATIENT-LVL IV: CPT | Mod: PBBFAC,,, | Performed by: ORTHOPAEDIC SURGERY

## 2020-01-30 PROCEDURE — 99214 PR OFFICE/OUTPT VISIT, EST, LEVL IV, 30-39 MIN: ICD-10-PCS | Mod: S$PBB,,, | Performed by: ORTHOPAEDIC SURGERY

## 2020-01-30 PROCEDURE — 99214 OFFICE O/P EST MOD 30 MIN: CPT | Mod: PBBFAC,PN | Performed by: ORTHOPAEDIC SURGERY

## 2020-01-30 PROCEDURE — 99214 OFFICE O/P EST MOD 30 MIN: CPT | Mod: S$PBB,,, | Performed by: ORTHOPAEDIC SURGERY

## 2020-01-30 PROCEDURE — 99999 PR PBB SHADOW E&M-EST. PATIENT-LVL IV: ICD-10-PCS | Mod: PBBFAC,,, | Performed by: ORTHOPAEDIC SURGERY

## 2020-01-30 NOTE — PROGRESS NOTES
Patient told health  today, 1/30/20, that she has held spironolactone since 1/20/20 labs.  BP continues to fluctuate, I suspect largely due to tizanidine use  Will likely continue to hold spironolactone given declined renal function and dehydration concerns but messaged Dr. Bernal to discuss    Addendum:  Dr. Bernal agrees with holding spironolactone for current time

## 2020-01-30 NOTE — PROGRESS NOTES
CC:  Right shoulder pain and labral tear        HPI:  Sherie Reyes is a very pleasant 54 y.o. female with ongoing symptoms right shoulder for the past 6-9 months  Previously we ordered an MRI scan but only a CT arthrogram was authorized  The results of the CT arthrogram show that she does have some impingement on the rotator cuff as well as a labral tear.  I went over those findings with her today  She continues to have pain in the right shoulder difficulty with use particularly elevation  No popping or clicking is reported  She also has some intermittent numbness in both hands but a previous nerve conduction study was normal and went over that with her as well         PAST MEDICAL HISTORY:   Past Medical History:   Diagnosis Date    Anxiety     Degenerative joint disease (DJD) of hip     Diabetes type 2, controlled 7/10/2019    GERD (gastroesophageal reflux disease)     Hyperlipidemia     Hypertension     IBS (irritable bowel syndrome)     Insomnia     Lumbar disc herniation     PTSD (post-traumatic stress disorder)      PAST SURGICAL HISTORY:   Past Surgical History:   Procedure Laterality Date    ADENOIDECTOMY       SECTION      TONSILLECTOMY       FAMILY HISTORY:   Family History   Problem Relation Age of Onset    Hypertension Mother     Hypothyroidism Mother     Hypertension Father     Stroke Father 46    Diabetes Father     Cancer Maternal Grandfather     Breast cancer Paternal Aunt      SOCIAL HISTORY:   Social History     Socioeconomic History    Marital status:      Spouse name: Not on file    Number of children: Not on file    Years of education: Not on file    Highest education level: Not on file   Occupational History    Not on file   Social Needs    Financial resource strain: Hard    Food insecurity:     Worry: Often true     Inability: Sometimes true    Transportation needs:     Medical: No     Non-medical: No   Tobacco Use    Smoking status: Former  Smoker     Types: Vaping w/o nicotine    Smokeless tobacco: Former User     Quit date: 10/20/2014    Tobacco comment: Vapor cigarettes   Substance and Sexual Activity    Alcohol use: No     Frequency: Monthly or less     Drinks per session: 3 or 4     Binge frequency: Never     Comment: socially    Drug use: No    Sexual activity: Not on file   Lifestyle    Physical activity:     Days per week: 3 days     Minutes per session: 120 min    Stress: Very much   Relationships    Social connections:     Talks on phone: More than three times a week     Gets together: Once a week     Attends [employer] service: Not on file     Active member of club or organization: Yes     Attends meetings of clubs or organizations: Never     Relationship status:    Other Topics Concern    Not on file   Social History Narrative    Not on file       MEDICATIONS:   Current Outpatient Medications:     acetaminophen (TYLENOL) 500 MG tablet, Take 1 tablet (500 mg total) by mouth every 6 (six) hours as needed for Pain (Headache)., Disp: 90 tablet, Rfl: 1    amLODIPine (NORVASC) 10 MG tablet, Take 1 tablet (10 mg total) by mouth once daily., Disp: 30 tablet, Rfl: 11    buPROPion (WELLBUTRIN XL) 300 MG 24 hr tablet, Take 300 mg by mouth once daily., Disp: , Rfl:     clonazePAM (KLONOPIN) 1 MG tablet, Take 1 mg by mouth 2 (two) times daily. , Disp: , Rfl:     cloNIDine (CATAPRES) 0.1 MG tablet, Take 1 tablet (0.1 mg total) by mouth every 12 (twelve) hours as needed (only for blood pressure greater than 180/120)., Disp: 60 tablet, Rfl: 6    duloxetine (CYMBALTA) 60 MG capsule, Take 60 mg by mouth once daily., Disp: , Rfl:     gabapentin (NEURONTIN) 300 MG capsule, Take 900 mg by mouth 3 (three) times daily. , Disp: , Rfl:     gabapentin (NEURONTIN) 600 MG tablet, TK 1 T PO TID, Disp: , Rfl: 5    gemfibrozil (LOPID) 600 MG tablet, TAKE 1 TABLET BY MOUTH TWICE DAILY BEFORE MEALS, Disp: 60 tablet, Rfl: 5    KURVELO 0.15-0.03 mg  "per tablet, Take 1 tablet by mouth once daily., Disp: , Rfl:     LINZESS 290 mcg Cap capsule, TAKE 1 CAPSULE BY MOUTH EVERY DAY, Disp: 90 capsule, Rfl: 1    omeprazole (PRILOSEC) 40 MG capsule, Take 1 capsule (40 mg total) by mouth once daily., Disp: 30 capsule, Rfl: 11    POLYETHYLENE GLYCOL 3350 (MIRALAX ORAL), Take by mouth as needed. , Disp: , Rfl:     potassium chloride SA (KLOR-CON M20) 20 MEQ tablet, Take 2 tablets (40 mEq total) by mouth once daily., Disp: 30 tablet, Rfl: 0    pravastatin (PRAVACHOL) 20 MG tablet, TAKE 1 TABLET BY MOUTH EVERY EVENING, Disp: 30 tablet, Rfl: 6    QUEtiapine (SEROQUEL) 100 MG Tab, Take 1 tablet by mouth once daily., Disp: , Rfl:     spironolactone (ALDACTONE) 25 MG tablet, Take 1 tablet (25 mg total) by mouth once daily., Disp: 30 tablet, Rfl: 11    tizanidine (ZANAFLEX) 6 mg capsule, Take 6 mg by mouth 2 (two) times daily as needed for Muscle spasms., Disp: , Rfl:     traMADol (ULTRAM) 50 mg tablet, TK 1 TO 2 TS PO TID PRN, Disp: , Rfl: 3    oxyCODONE (ROXICODONE) 5 MG immediate release tablet, Take 1 tablet (5 mg total) by mouth every 8 (eight) hours as needed for Pain. (Patient not taking: Reported on 1/30/2020), Disp: 9 tablet, Rfl: 0  ALLERGIES: Review of patient's allergies indicates:  No Known Allergies    Review of Systems:  Constitutional: no fever or chills  ENT: no nasal congestion or sore throat  Respiratory: no cough or shortness of breath  Cardiovascular: no chest pain or palpitations  Gastrointestinal: no nausea or vomiting, PUD, GERD, NSAID intolerance  Genitourinary: no hematuria or dysuria  Integument/Breast: no rash or pruritis  Hematologic/Lymphatic: no easy bruising or lymphadenopathy  Musculoskeletal: see HPI  Neurological: no seizures or tremors  Behavioral/Psych: no auditory or visual hallucinations      Physical Exam   Vitals:    01/30/20 1533   Weight: 85.3 kg (188 lb)   Height: 5' 2" (1.575 m)   PainSc:   8       Constitutional: Oriented to " "person, place, and time. Appears well-developed and well-nourished.   HENT:   Head: Normocephalic and atraumatic.   Nose: Nose normal.   Eyes: No scleral icterus.   Neck: Normal range of motion.   Cardiovascular: Normal rate and regular rhythm.    Pulses:       Radial pulses are 2+ on the right side, and 2+ on the left side.   Pulmonary/Chest: Effort normal and breath sounds normal.   Abdominal: Soft.   Neurological: Alert and oriented to person, place, and time.   Skin: Skin is warm.   Psychiatric: Normal mood and affect.     MUSCULOSKELETAL UPPER EXTREMITY:  Examination right shoulder no tenderness no swelling  Full range of motion  She does have a positive impingement sign and a positive supraspinatus stress test  No instability no clicking or popping is noted neurologic exam intact  Tinel sign negative at the elbow and wrist            Diagnostic Studies:  Arthrogram demonstrates labral tear and possible partial tear of the rotator cuff        Assessment:  1.  Chronic right shoulder pain.  2.  Labral tear right shoulder 3.  Possible partial tear rotator cuff    Plan:  The patient is anxious to proceed with surgery for right shoulder arthroscopy  We could address the labral tear and possibly the rotator cuff if necessary  The risks and benefits of surgery explained to the patient she understands  However I also explained that the surgery would not help the numbness that she has intermittently or the elbow pain which is related to lateral epicondylitis she understands      The risks and benefits of surgery were discussed with the patient today and they understand.  The consent was signed in the office for surgery.      Melchor Hughes MD (Jay)  [facility]  Orthopedic Upper Extremity Surgery    "

## 2020-01-30 NOTE — PROGRESS NOTES
Digital Medicine: Health  Follow-Up    Patient reports she has noticed her BP has been elevated when she does not take tiazadine.  States she is taking it as needed.  Reports reading on 1/27 was when she took tiazadine and she felt lightheaded and dizzy.  States she has not taken her spironolactone and was waiting to hear back from her PCP if it is ok to take it since she finished her labs.  Will route note and ask clinician to follow up about this.  States she takes 1/2 amlodipine in the morning and the other 1/2 at night.  Reviewed sodium intake.  States her  cooks red beans and rice, stew, tacos and baked wings.    The history is provided by the patient.     Follow Up  Follow-up reason(s): routine education      Routine Education Topics: eating patterns        INTERVENTION(S)  recommended diet modifications, reviewed monitoring technique and encouragement/support    PLAN  patient verbalizes understanding      There are no preventive care reminders to display for this patient.    Last 5 Patient Entered Readings                                      Current 30 Day Average: 132/88     Recent Readings 1/29/2020 1/27/2020 1/24/2020 1/23/2020 1/20/2020    SBP (mmHg) 158 118 137 168 100    DBP (mmHg) 104 84 95 109 73    Pulse 102 82 87 100 72                      Diet Screening   She has meals prepared by family.      Reports her  does most of the cooking at home.  States she is unsure if he monitors salt intake.  Informed patient about hidden sources of sodium (red beans, stews, taco seasonings, wing sauce).  Encouraged patient to review products her  uses to cook and check food labels.    Intervention(s): portion control, low sodium diet education, reading food labels and increasing water intake      SDOH

## 2020-01-30 NOTE — PROGRESS NOTES
BP remains at goal of < 140/90 mmHg, average 132/88 mmHg  1/20/20 CMP reviewed, renal function improving but not yet back at baseline, SCr 1.3, eGFR 47.3    Hypertension Medications             amLODIPine (NORVASC) 10 MG tablet Take 1 tablet (10 mg total) by mouth once daily.    cloNIDine (CATAPRES) 0.1 MG tablet Take 1 tablet (0.1 mg total) by mouth every 12 (twelve) hours as needed (only for blood pressure greater than 180/120).    spironolactone (ALDACTONE) 25 MG tablet Take 1 tablet (25 mg total) by mouth once daily.

## 2020-02-05 ENCOUNTER — TELEPHONE (OUTPATIENT)
Dept: SURGERY | Facility: HOSPITAL | Age: 55
End: 2020-02-05

## 2020-02-06 ENCOUNTER — TELEPHONE (OUTPATIENT)
Dept: SURGERY | Facility: HOSPITAL | Age: 55
End: 2020-02-06

## 2020-02-07 ENCOUNTER — PATIENT MESSAGE (OUTPATIENT)
Dept: ADMINISTRATIVE | Facility: OTHER | Age: 55
End: 2020-02-07

## 2020-02-11 ENCOUNTER — CLINICAL SUPPORT (OUTPATIENT)
Dept: LAB | Facility: HOSPITAL | Age: 55
End: 2020-02-11
Attending: ORTHOPAEDIC SURGERY
Payer: MEDICAID

## 2020-02-11 ENCOUNTER — HOSPITAL ENCOUNTER (OUTPATIENT)
Dept: PREADMISSION TESTING | Facility: HOSPITAL | Age: 55
Discharge: HOME | End: 2020-02-11
Attending: ORTHOPAEDIC SURGERY
Payer: MEDICAID

## 2020-02-11 DIAGNOSIS — M77.11 LATERAL EPICONDYLITIS OF RIGHT ELBOW: Primary | ICD-10-CM

## 2020-02-11 DIAGNOSIS — M77.11 LATERAL EPICONDYLITIS OF RIGHT ELBOW: ICD-10-CM

## 2020-02-11 PROCEDURE — 93010 ELECTROCARDIOGRAM REPORT: CPT | Mod: ,,, | Performed by: STUDENT IN AN ORGANIZED HEALTH CARE EDUCATION/TRAINING PROGRAM

## 2020-02-11 PROCEDURE — 93010 EKG 12-LEAD: ICD-10-PCS | Mod: ,,, | Performed by: STUDENT IN AN ORGANIZED HEALTH CARE EDUCATION/TRAINING PROGRAM

## 2020-02-11 PROCEDURE — 93005 ELECTROCARDIOGRAM TRACING: CPT

## 2020-02-11 RX ORDER — LIDOCAINE HYDROCHLORIDE 10 MG/ML
1 INJECTION, SOLUTION EPIDURAL; INFILTRATION; INTRACAUDAL; PERINEURAL ONCE
Status: CANCELLED | OUTPATIENT
Start: 2020-02-11 | End: 2020-02-11

## 2020-02-11 RX ORDER — SODIUM CHLORIDE, SODIUM LACTATE, POTASSIUM CHLORIDE, CALCIUM CHLORIDE 600; 310; 30; 20 MG/100ML; MG/100ML; MG/100ML; MG/100ML
INJECTION, SOLUTION INTRAVENOUS CONTINUOUS
Status: CANCELLED | OUTPATIENT
Start: 2020-02-11

## 2020-02-11 NOTE — DISCHARGE INSTRUCTIONS
Your surgery is scheduled for 2/18/20.    Please report to Procedure Check In Room on the 2nd FLOOR at 10:00a.m.          INSTRUCTIONS IMPORTANT!!!  ¨ Do not eat or drink after 12 midnight-including water. OK to brush teeth, no   gum, candy or mints!    ¨ Take only these medicines with a small swallow of water-morning of surgery: amlodipine and prilosec        ____  Proceed to [facility] on 2/11/20 for additional testing.        ____  Do not wear makeup, including mascara.  ____  No powder, lotions or creams to surgical area.  ____  Please remove all jewelry, including piercings and leave at home.  ____  No money or valuables needed. Please leave at home.  ____  Please bring any documents given by your doctor.  ____  If going home the same day, arrange for a ride home. You will not be able to             drive if Anesthesia was used.  ____  Wear loose fitting clothing. Allow for dressings, bandages.  ____  Stop Aspirin, Ibuprofen, Motrin and Aleve at least 3-5 days before surgery, unless otherwise instructed by your doctor, or the nurse.   You MAY use Tylenol/acetaminophen until day of surgery.  ____  Wash the surgical area with Hibiclens the night before surgery, and again the             morning of surgery.  Be sure to rinse hibiclens off completely (if instructed by   nurse).  ____  If you take diabetic medication, do not take am of surgery unless instructed by Doctor.  ____  Call MD for temperature above 101 degrees or any other signs of infection such as Urinary (bladder) infection, Upper respiratory infection, skin boils, etc.  ____ Stop taking any Fish Oil supplement or any Vitamins that contain Vitamin E at least 5 days prior to surgery.  ____ Do Not wear your contact lenses the day of your procedure.  You may wear your glasses.      ____Do not shave surgical site for 3 days prior to surgery.  ____ Practice Good hand washing before, during, and after procedure.      I have read or had read and  explained to me, and understand the above information.  Additional comments or instructions:  For additional questions call [phone]      ANESTHESIA SIDE EFFECTS  -For the first 24 hours after surgery:  Do not drive, use heavy equipment, make important decisions, or drink alcohol  -It is normal to feel sleepy for several hours.  Rest until you are more awake.  -Have someone stay with you, if needed.  They can watch for problems and help keep you safe.  -Some possible post anesthesia side effects include: nausea and vomiting, sore throat and hoarseness, sleepiness, and dizziness.        Pre-Op Bathing Instructions    Before surgery, you can play an important role in your own health.    Because skin is not sterile, we need to be sure that your skin is as free of germs as possible. By following the instructions below, you can reduce the number of germs on your skin before surgery.    IMPORTANT: You will need to shower with a special soap called Hibiclens*, available at any pharmacy.  If you are allergic to Chlorhexidine (the antiseptic in Hibiclens), use an antibacterial soap such as Dial Soap for your preoperative shower.  You will shower with Hibiclens both the night before your surgery and the morning of your surgery.  Do not use Hibiclens on the head, face or genitals to avoid injury to those areas.    STEP #1: THE NIGHT BEFORE YOUR SURGERY     1. Do not shave the area of your body where your surgery will be performed.  2. Shower and wash your hair and body as usual with your normal soap and shampoo.  3. Rinse your hair and body thoroughly after you shower to remove all soap residue.  4. With your hand, apply one packet of Hibiclens soap to the surgical site.   5. Wash the site gently for five (5) minutes. Do not scrub your skin too hard.   6. Do not wash with your regular soap after Hibiclens is used.  7. Rinse your body thoroughly.  8. Pat yourself dry with a clean, soft towel.  9. Do not use lotion, cream, or  powder.  10. Wear clean clothes.    STEP #2: THE MORNING OF YOUR SURGERY     1. Repeat Step #1.    * Not to be used by people allergic to Chlorhexidine.

## 2020-02-11 NOTE — PRE-PROCEDURE INSTRUCTIONS
[facility] - [phone]    Allergies, medical, surgical, family and psychosocial histories reviewed with patient. Periop plan of care reviewed. Preop instructions given, including medications to take and to hold. Hibiclens soap and instructions on use given. Time allotted for questions to be addressed.  Patient verbalized understanding.

## 2020-02-12 ENCOUNTER — PATIENT MESSAGE (OUTPATIENT)
Dept: ADMINISTRATIVE | Facility: OTHER | Age: 55
End: 2020-02-12

## 2020-02-12 NOTE — PRE-PROCEDURE INSTRUCTIONS
Contacted patient regarding preop labs drawn yesterday.  Verified name and .  Informed patient that her kidney function has decreased since last seeing her PCP.  Advised patient that she will need optimization from PCP prior to surgery.  Patient verbalizes understanding.

## 2020-02-13 ENCOUNTER — OFFICE VISIT (OUTPATIENT)
Dept: INTERNAL MEDICINE | Facility: CLINIC | Age: 55
End: 2020-02-13
Payer: MEDICAID

## 2020-02-13 VITALS
TEMPERATURE: 98 F | HEART RATE: 68 BPM | SYSTOLIC BLOOD PRESSURE: 152 MMHG | HEIGHT: 62 IN | RESPIRATION RATE: 18 BRPM | DIASTOLIC BLOOD PRESSURE: 98 MMHG | BODY MASS INDEX: 34.72 KG/M2 | WEIGHT: 188.69 LBS

## 2020-02-13 DIAGNOSIS — Z01.818 PRE-OP EXAMINATION: Primary | ICD-10-CM

## 2020-02-13 DIAGNOSIS — M75.101 TEAR OF RIGHT ROTATOR CUFF, UNSPECIFIED TEAR EXTENT, UNSPECIFIED WHETHER TRAUMATIC: ICD-10-CM

## 2020-02-13 DIAGNOSIS — N18.30 CHRONIC RENAL DISEASE, STAGE 3, MODERATELY DECREASED GLOMERULAR FILTRATION RATE (GFR) BETWEEN 30-59 ML/MIN/1.73 SQUARE METER: ICD-10-CM

## 2020-02-13 PROCEDURE — 99999 PR PBB SHADOW E&M-EST. PATIENT-LVL V: CPT | Mod: PBBFAC,,, | Performed by: NURSE PRACTITIONER

## 2020-02-13 PROCEDURE — 99999 PR PBB SHADOW E&M-EST. PATIENT-LVL V: ICD-10-PCS | Mod: PBBFAC,,, | Performed by: NURSE PRACTITIONER

## 2020-02-13 PROCEDURE — 99214 PR OFFICE/OUTPT VISIT, EST, LEVL IV, 30-39 MIN: ICD-10-PCS | Mod: S$PBB,,, | Performed by: NURSE PRACTITIONER

## 2020-02-13 PROCEDURE — 99215 OFFICE O/P EST HI 40 MIN: CPT | Mod: PBBFAC,PO | Performed by: NURSE PRACTITIONER

## 2020-02-13 PROCEDURE — 99214 OFFICE O/P EST MOD 30 MIN: CPT | Mod: S$PBB,,, | Performed by: NURSE PRACTITIONER

## 2020-02-13 RX ORDER — TRIAMCINOLONE ACETONIDE 40 MG/ML
40 INJECTION, SUSPENSION INTRA-ARTICULAR; INTRAMUSCULAR ONCE
Status: DISCONTINUED | OUTPATIENT
Start: 2020-02-13 | End: 2020-02-13

## 2020-02-13 NOTE — PROGRESS NOTES
[facility] Note    Chief Complaint      Chief Complaint   Patient presents with    Advice Only     meds for low kindney function before surgery     History of Present Illness      Sherie Reyes is a 54 y.o. female patient of Dr. Sal with chronic conditions of PTSD, anxiety, mixed hyperlipidemia, hypertension, CKD 3, diabetes who is new to me and presents today for medical clearance for right shoulder rotator cuff repair with Dr. Hughes on Tuesday February 18th.  Patient states he is feeling okay no real complaints, reviewed medications and history with patient.  Patient states tramadol is not working.  Patient reports is cleared by Dr. Hughes's office but needs us to clear due to Kidney status.  Blood pressure noted to be elevated in clinic as well.  Patient reports it is not getting enough pain relief with tramadol.  I believe this may be related to workman's comp situation.  Patient is an EMT.  GFR-40  BUN-12  Crea-1.5    Problem List Items Addressed This Visit     None      Visit Diagnoses     Pre-op examination    -  Primary    Tear of right rotator cuff, unspecified tear extent, unspecified whether traumatic        Chronic renal disease, stage 3, moderately decreased glomerular filtration rate (GFR) between 30-59 mL/min/1.73 square meter              Health Maintenance   Topic Date Due    Pneumococcal Vaccine (Highest Risk) (1 of 3 - PCV13) 06/20/1986    Pap Smear with HPV Cotest  06/20/1988    TETANUS VACCINE  10/23/2018    Mammogram  05/03/2021    Lipid Panel  12/30/2024    Colonoscopy  08/25/2026       Past Medical History:   Diagnosis Date    Anxiety     Degenerative joint disease (DJD) of hip     Diabetes type 2, controlled 7/10/2019    GERD (gastroesophageal reflux disease)     Hyperlipidemia     Hypertension     IBS (irritable bowel syndrome)     Insomnia     Lumbar disc herniation     PTSD (post-traumatic stress disorder)        Past Surgical History:    Procedure Laterality Date    ADENOIDECTOMY      ARTHROSCOPIC REPAIR OF ROTATOR CUFF OF SHOULDER       SECTION      TONSILLECTOMY         family history includes Breast cancer in her paternal aunt; Cancer in her maternal grandfather; Diabetes in her father; Hypertension in her father and mother; Hypothyroidism in her mother; Stroke (age of onset: 46) in her father.    Social History     Tobacco Use    Smoking status: Former Smoker     Types: Vaping w/o nicotine    Smokeless tobacco: Former User     Quit date: 10/20/2014    Tobacco comment: Vapor cigarettes   Substance Use Topics    Alcohol use: No     Frequency: Monthly or less     Drinks per session: 3 or 4     Binge frequency: Never     Comment: socially    Drug use: No       Review of Systems   Constitutional: Negative for chills and fever.   Respiratory: Negative for cough and shortness of breath.    Cardiovascular: Negative for chest pain and palpitations.   Gastrointestinal: Negative for abdominal pain, constipation, diarrhea, nausea and vomiting.   Genitourinary: Negative for dysuria, frequency, hematuria and urgency.   Musculoskeletal: Negative for myalgias.   Skin: Negative for rash.   Neurological: Negative for weakness and headaches.   Psychiatric/Behavioral: Negative for depression. The patient is not nervous/anxious.         Outpatient Encounter Medications as of 2020   Medication Sig Note Dispense Refill    amLODIPine (NORVASC) 10 MG tablet Take 1 tablet (10 mg total) by mouth once daily.  30 tablet 11    buPROPion (WELLBUTRIN XL) 300 MG 24 hr tablet Take 300 mg by mouth once daily.       clonazePAM (KLONOPIN) 1 MG tablet Take 1 mg by mouth 2 (two) times daily.        cloNIDine (CATAPRES) 0.1 MG tablet Take 1 tablet (0.1 mg total) by mouth every 12 (twelve) hours as needed (only for blood pressure greater than 180/120).  60 tablet 6    duloxetine (CYMBALTA) 60 MG capsule Take 60 mg by mouth once daily.       gabapentin  "(NEURONTIN) 300 MG capsule Take 900 mg by mouth 3 (three) times daily.        gabapentin (NEURONTIN) 600 MG tablet TK 1 T PO TID   5    gemfibrozil (LOPID) 600 MG tablet TAKE 1 TABLET BY MOUTH TWICE DAILY BEFORE MEALS  60 tablet 5    KURVELO 0.15-0.03 mg per tablet Take 1 tablet by mouth once daily. 1/19/2017: Received from: External Pharmacy Received Sig:       LINZESS 290 mcg Cap capsule TAKE 1 CAPSULE BY MOUTH EVERY DAY  90 capsule 1    omeprazole (PRILOSEC) 40 MG capsule Take 1 capsule (40 mg total) by mouth once daily.  30 capsule 11    potassium chloride SA (KLOR-CON M20) 20 MEQ tablet Take 2 tablets (40 mEq total) by mouth once daily.  30 tablet 0    pravastatin (PRAVACHOL) 20 MG tablet TAKE 1 TABLET BY MOUTH EVERY EVENING  30 tablet 6    QUEtiapine (SEROQUEL) 100 MG Tab Take 1 tablet by mouth once daily.       tizanidine (ZANAFLEX) 6 mg capsule Take 6 mg by mouth 2 (two) times daily as needed for Muscle spasms.       traMADol (ULTRAM) 50 mg tablet TK 1 TO 2 TS PO TID PRN   3    acetaminophen (TYLENOL) 500 MG tablet Take 1 tablet (500 mg total) by mouth every 6 (six) hours as needed for Pain (Headache). (Patient not taking: Reported on 2/13/2020)  90 tablet 1    POLYETHYLENE GLYCOL 3350 (MIRALAX ORAL) Take by mouth as needed.        spironolactone (ALDACTONE) 25 MG tablet Take 1 tablet (25 mg total) by mouth once daily. (Patient not taking: Reported on 2/13/2020)  30 tablet 11     Facility-Administered Encounter Medications as of 2/13/2020   Medication Dose Route Frequency Provider Last Rate Last Dose    [DISCONTINUED] triamcinolone acetonide injection 40 mg  40 mg Intramuscular Once Ayanna Soto NP            Review of patient's allergies indicates:  No Known Allergies    Physical Exam      Vital Signs  Temp: 98.1 °F (36.7 °C)  Temp src: Oral  Pulse: 68  Resp: 18  BP: (!) 152/98  BP Location: Right arm  Patient Position: Sitting  Height and Weight  Height: 5' 2" (157.5 cm)  Weight: 85.6 kg " (188 lb 11.4 oz)  BSA (Calculated - sq m): 1.93 sq meters  BMI (Calculated): 34.5  Weight in (lb) to have BMI = 25: 136.4]    Physical Exam   Constitutional: She is oriented to person, place, and time. She appears well-developed and well-nourished.   HENT:   Head: Normocephalic and atraumatic.   Right Ear: External ear normal.   Left Ear: External ear normal.   Mouth/Throat: Oropharynx is clear and moist.   Eyes: Pupils are equal, round, and reactive to light. Conjunctivae and EOM are normal.   Neck: Normal range of motion. Neck supple.   Cardiovascular: Normal rate, regular rhythm and normal heart sounds.   No murmur heard.  Pulmonary/Chest: Effort normal and breath sounds normal. She exhibits no tenderness.   Abdominal: Soft. Bowel sounds are normal. There is no tenderness. There is no guarding.   Musculoskeletal: Normal range of motion.   Lymphadenopathy:     She has no cervical adenopathy.   Neurological: She is alert and oriented to person, place, and time.   Skin: Skin is warm and dry.   Psychiatric: She has a normal mood and affect. Her behavior is normal. Judgment and thought content normal.   Nursing note and vitals reviewed.       Laboratory:  CBC:  Recent Labs   Lab Result Units 12/02/19  0404 01/04/20 2024 01/05/20  0422   WBC K/uL 7.60 8.67 6.66   RBC M/uL 4.05 4.67 4.29   Hemoglobin g/dL 11.3* 13.2 12.1   Hematocrit % 37.0 42.3 39.8   Platelets K/uL 284 296 273   Mean Corpuscular Volume fL 91 91 93   Mean Corpuscular Hemoglobin pg 27.9 28.3 28.2   Mean Corpuscular Hemoglobin Conc g/dL 30.5* 31.2* 30.4*     CMP:  Recent Labs   Lab Result Units 01/04/20 2024 01/05/20  0422 01/20/20  1059 02/11/20  1511   Glucose mg/dL 125* 132* 87 108   Calcium mg/dL 9.7 9.0 10.5 10.4   Albumin g/dL 3.6 3.4* 3.8  --    Total Protein g/dL 7.6 6.8 7.8  --    Sodium mmol/L 141 140 139 136   Potassium mmol/L 4.1 3.9 3.8 4.6   CO2 mmol/L 21* 19* 19* 20*   Chloride mmol/L 108 107 107 107   BUN, Bld mg/dL 13 14 15 12    Alkaline Phosphatase U/L 58 54* 63  --    ALT U/L 45* 43 38  --    AST U/L 43* 41* 20  --    Total Bilirubin mg/dL 0.3 0.3 0.2  --      URINALYSIS:  Recent Labs   Lab Result Units 01/05/20  0407   Color, UA  Yellow   Specific Gravity, UA  1.020   pH, UA  5.0   Protein, UA  Negative   Nitrite, UA  Negative   Leukocytes, UA  Negative      LIPIDS:  Recent Labs   Lab Result Units 12/30/19  1443   HDL mg/dL 61   Cholesterol mg/dL 161   Triglycerides mg/dL 83   LDL Cholesterol mg/dL 83.4   Hdl/Cholesterol Ratio % 37.9   Non-HDL Cholesterol mg/dL 100   Total Cholesterol/HDL Ratio  2.6     TSH:  No results for input(s): TSH in the last 2160 hours.  A1C:  Recent Labs   Lab Result Units 12/01/19  0352 12/30/19  1443 01/05/20  0422   Hemoglobin A1C % 6.5* 6.1* 6.0*         Assessment/Plan     Sherie Reyes is a 54 y.o.female with:    1. Pre-op examination    2. Tear of right rotator cuff, unspecified tear extent, unspecified whether traumatic    3. Chronic renal disease, stage 3, moderately decreased glomerular filtration rate (GFR) between 30-59 mL/min/1.73 square meter      -Continue current medications and maintain follow up with specialists.  Return to clinic as needed for any concerns    Spoke with Dr. Faulkner about patient's clearance, we are NOT clearing patient at this time and are referring to Nephrology for STAT eval and clearance.       Erin Jiminez, NP-C [facility]  [place]

## 2020-02-14 ENCOUNTER — TELEPHONE (OUTPATIENT)
Dept: ORTHOPEDICS | Facility: CLINIC | Age: 55
End: 2020-02-14

## 2020-02-14 NOTE — TELEPHONE ENCOUNTER
----- Message from Bridgette Catherine NP sent at 2/14/2020  9:18 AM CST -----  Ms. Reyes is scheduled to have surgery 2/18.  Her renal function is declining and has not been cleared for surgery.  Her PCP has sent a STAT referral to nephrology for optimization.  At this time, the nephrology appointment has not been made.  Her surgery may need to be rescheduled to allow time for her nephrology appointment.    Thank you  Bridgette Catherine

## 2020-02-14 NOTE — TELEPHONE ENCOUNTER
Spoke with pt. Re: cancelling surgery at this time r/t renal issues. Pt. Expressed understanding and will keep us informed on nephrology appointment.

## 2020-02-17 ENCOUNTER — PATIENT MESSAGE (OUTPATIENT)
Dept: INTERNAL MEDICINE | Facility: CLINIC | Age: 55
End: 2020-02-17

## 2020-02-17 DIAGNOSIS — N28.9 RENAL INSUFFICIENCY: Primary | ICD-10-CM

## 2020-02-17 NOTE — TELEPHONE ENCOUNTER
She must see nephro to be cleared. Ayanna already placed the referral. Can we check with ref team?

## 2020-02-19 ENCOUNTER — PATIENT MESSAGE (OUTPATIENT)
Dept: INTERNAL MEDICINE | Facility: CLINIC | Age: 55
End: 2020-02-19

## 2020-02-20 ENCOUNTER — PATIENT MESSAGE (OUTPATIENT)
Dept: INTERNAL MEDICINE | Facility: CLINIC | Age: 55
End: 2020-02-20

## 2020-02-20 NOTE — TELEPHONE ENCOUNTER
Would advise she hold both meds due to her kidney function at this point and try pepcid 20 mg po bid otc

## 2020-02-26 ENCOUNTER — PATIENT MESSAGE (OUTPATIENT)
Dept: INTERNAL MEDICINE | Facility: CLINIC | Age: 55
End: 2020-02-26

## 2020-02-27 ENCOUNTER — PATIENT MESSAGE (OUTPATIENT)
Dept: INTERNAL MEDICINE | Facility: CLINIC | Age: 55
End: 2020-02-27

## 2020-02-28 ENCOUNTER — TELEPHONE (OUTPATIENT)
Dept: INTERNAL MEDICINE | Facility: CLINIC | Age: 55
End: 2020-02-28

## 2020-02-28 NOTE — TELEPHONE ENCOUNTER
----- Message from Tonia Bernal DO sent at 2/27/2020 10:29 AM CST -----  Anushka: please call dr. Harkins for me since I am out of clinic. Let him know if he does accept medicaid, we basically need to optimize her kidney function prior to surgery  ----- Message -----  From: Jovan Harkins II, MD  Sent: 2/27/2020   8:23 AM CST  To: DO Tonia Avelar Good Morning.  As I do not regularly check Epic messages, I am just now seeing this communication about above referenced pt.    Please call me.    Jovan Harkins II, MD  [facility]  [phone]

## 2020-02-28 NOTE — TELEPHONE ENCOUNTER
Spoke to Dr. Harkins regarding pt and follow up/ clearance for surgery. Pt has apt next week with Dr. Hughes. Explained to Dr. Harkins that due to decline in kidney function, nephro eval and clearance were requested. He stated that he will follow up with pt, but review of pt's chart doesn't reveal contraindication to surgery at this time. He does advise to limit IV contrast if possible and to make sure pt is well hydrated the night before and after surgery if contrast is needed. He also recommends avoiding NSAIDs post surgery and advises the use of tylenol and narcotic therapy for pain.     If any further questions, you may reach out to him at: [phone]

## 2020-03-02 ENCOUNTER — PATIENT MESSAGE (OUTPATIENT)
Dept: INTERNAL MEDICINE | Facility: CLINIC | Age: 55
End: 2020-03-02

## 2020-03-03 ENCOUNTER — TELEPHONE (OUTPATIENT)
Dept: INTERNAL MEDICINE | Facility: CLINIC | Age: 55
End: 2020-03-03

## 2020-03-03 ENCOUNTER — PATIENT MESSAGE (OUTPATIENT)
Dept: INTERNAL MEDICINE | Facility: CLINIC | Age: 55
End: 2020-03-03

## 2020-03-03 DIAGNOSIS — N28.9 RENAL INSUFFICIENCY: Primary | ICD-10-CM

## 2020-03-03 NOTE — TELEPHONE ENCOUNTER
----- Message from Marizol Carreon sent at 3/3/2020 10:40 AM CST -----  Contact: self   Patient called in regards to having lads order for her Kidneys she would like a call back she has not been able to see a kidney doctor please advise.

## 2020-03-04 ENCOUNTER — PATIENT MESSAGE (OUTPATIENT)
Dept: ORTHOPEDICS | Facility: CLINIC | Age: 55
End: 2020-03-04

## 2020-03-04 NOTE — TELEPHONE ENCOUNTER
Spoke to pt and informed of lab ordered, she stated she is on her way to [facility] for other labs. Transferred cmp to [facility].

## 2020-03-05 ENCOUNTER — PATIENT OUTREACH (OUTPATIENT)
Dept: ADMINISTRATIVE | Facility: OTHER | Age: 55
End: 2020-03-05

## 2020-03-06 ENCOUNTER — PATIENT MESSAGE (OUTPATIENT)
Dept: INTERNAL MEDICINE | Facility: CLINIC | Age: 55
End: 2020-03-06

## 2020-03-06 NOTE — TELEPHONE ENCOUNTER
Her labs are improved. She is not scheduled for surgery yet just an injection. Dr lafleur wants to see her to discuss surgery. She will need a preop thirty days prior to surgery.

## 2020-03-09 ENCOUNTER — PATIENT MESSAGE (OUTPATIENT)
Dept: INTERNAL MEDICINE | Facility: CLINIC | Age: 55
End: 2020-03-09

## 2020-03-10 ENCOUNTER — PATIENT OUTREACH (OUTPATIENT)
Dept: OTHER | Facility: OTHER | Age: 55
End: 2020-03-10

## 2020-03-10 ENCOUNTER — PATIENT MESSAGE (OUTPATIENT)
Dept: INTERNAL MEDICINE | Facility: CLINIC | Age: 55
End: 2020-03-10

## 2020-03-10 DIAGNOSIS — I10 ESSENTIAL HYPERTENSION: Primary | ICD-10-CM

## 2020-03-10 NOTE — PROGRESS NOTES
Called to address BP alert and variable readings  Left voicemail requesting call back  BP average 136/91 mmHg (goal < 140/90 mmHg)  Clarify if she is still holding spironolactone    Hypertension Medications             amLODIPine (NORVASC) 10 MG tablet Take 1 tablet (10 mg total) by mouth once daily.    cloNIDine (CATAPRES) 0.1 MG tablet Take 1 tablet (0.1 mg total) by mouth every 12 (twelve) hours as needed (only for blood pressure greater than 180/120).    spironolactone (ALDACTONE) 25 MG tablet Take 1 tablet (25 mg total) by mouth once daily.

## 2020-03-12 ENCOUNTER — PATIENT OUTREACH (OUTPATIENT)
Dept: ADMINISTRATIVE | Facility: OTHER | Age: 55
End: 2020-03-12

## 2020-03-16 ENCOUNTER — PATIENT MESSAGE (OUTPATIENT)
Dept: ORTHOPEDICS | Facility: CLINIC | Age: 55
End: 2020-03-16

## 2020-03-17 RX ORDER — TRAMADOL HYDROCHLORIDE 50 MG/1
TABLET ORAL
Qty: 40 TABLET | Refills: 1 | Status: ON HOLD | OUTPATIENT
Start: 2020-03-17 | End: 2020-07-09 | Stop reason: HOSPADM

## 2020-03-18 DIAGNOSIS — M75.121 NONTRAUMATIC COMPLETE TEAR OF RIGHT ROTATOR CUFF: Primary | ICD-10-CM

## 2020-03-18 DIAGNOSIS — M75.101 ROTATOR CUFF TEAR, RIGHT: ICD-10-CM

## 2020-03-20 ENCOUNTER — PATIENT OUTREACH (OUTPATIENT)
Dept: OTHER | Facility: OTHER | Age: 55
End: 2020-03-20

## 2020-03-24 NOTE — PROGRESS NOTES
Health  attempted contact 3/20/20 and 3/23/20  BP continues to drop significantly with tizanidine use  Average 142/93 mmHg (goal < 140/90 mmHg)  Clarify if she is still holding spironolactone    Hypertension Medications             amLODIPine (NORVASC) 10 MG tablet Take 1 tablet (10 mg total) by mouth once daily.    cloNIDine (CATAPRES) 0.1 MG tablet Take 1 tablet (0.1 mg total) by mouth every 12 (twelve) hours as needed (only for blood pressure greater than 180/120).    spironolactone (ALDACTONE) 25 MG tablet Take 1 tablet (25 mg total) by mouth once daily.

## 2020-03-25 RX ORDER — AMLODIPINE BESYLATE 5 MG/1
5 TABLET ORAL 2 TIMES DAILY
Qty: 180 TABLET | Refills: 1 | Status: SHIPPED | OUTPATIENT
Start: 2020-03-25 | End: 2020-07-17

## 2020-03-25 NOTE — PROGRESS NOTES
Sent requested amlodipine 5 mg tablet to continue dose of 5 mg BID  Patient reported that she was having difficulty splitting 10 mg tablets    Addendum:  Received fax from pharmacy that above prescription requires prior authorization. Requested prior authorization form from insurance.    3/26/20 at 9:15 am: Did not receive fax form. Provided prior authorization request information via phone; submitted for review. Advised [facility] via fax.

## 2020-03-25 NOTE — PROGRESS NOTES
Digital Medicine: Health  Follow-Up    Patient reports she feels her BP has been running high.  Last 2 BP readings have been close or at goal (123/83 mmHg and 118/80 mmHg).  Reports she took these BP readings after waking up at night.  States she had a headache with elevated readings.    Reports she has been cutting amlodipine in tablet in half and taking one half in the morning and one if the evening.  States it is difficult to cut amlodipine in half and asked about 5mg tablet.  Will route note to clincian.    The history is provided by the patient.     Follow Up  Follow-up reason(s): reading review and routine education      Alert received.   Care Team received high BP alert.  Patient is experiencing symptomsof hypertension.    Routine Education Topics: eating patterns  Patient reports she may have had something salty to cause elevated BP readings.  States she did not have something salty every day, but informed patient salt can stay in her system for a few days.      INTERVENTION(S)  recommended diet modifications, reviewed monitoring technique, encouragement/support and denied questions    PLAN  patient verbalizes understanding, Clinician follow-up and continue monitoring      There are no preventive care reminders to display for this patient.    Last 5 Patient Entered Readings                                      Current 30 Day Average: 139/92     Recent Readings 3/24/2020 3/16/2020 3/16/2020 3/16/2020 3/13/2020    SBP (mmHg) 118 123 123 178 146    DBP (mmHg) 80 83 83 105 98    Pulse 75 93 93 113 103                      Diet Screening       Intervention(s): low sodium diet education, reducing sodium intake and increasing water intake      SDOH

## 2020-04-03 ENCOUNTER — PATIENT OUTREACH (OUTPATIENT)
Dept: OTHER | Facility: OTHER | Age: 55
End: 2020-04-03

## 2020-04-03 NOTE — PROGRESS NOTES
Digital Medicine: Clinician Follow-Up    Patient reports feeling flushed and having headache last night, now resolved  She attributes high BP to not taking tizanidine yesterday. She does not want to take tizanidine too late into the night as she also takes seroquel.  She continues to hold spironolactone.    The history is provided by the patient.     Follow Up  Follow-up reason(s): reading review      Alert received. Care Team received high BP alert.        INTERVENTION(S)  encouragement/support and denied questions    PLAN  patient verbalizes understanding and additional monitoring needed    Again discussed BP fluctuations, largely due in part to tizanidine  Requested more readings. If BP remains consistently elevated, discussed resuming spironolactone or adding low dose ARB      There are no preventive care reminders to display for this patient.    Last 5 Patient Entered Readings                                      Current 30 Day Average: 154/97     Recent Readings 4/3/2020 4/2/2020 3/25/2020 3/24/2020 3/16/2020    SBP (mmHg) 184 173 130 118 123    DBP (mmHg) 105 106 88 80 83    Pulse 97 104 97 75 93             Hypertension Medications             amLODIPine (NORVASC) 5 MG tablet Take 1 tablet (5 mg total) by mouth 2 (two) times daily. (new tablet strength)    cloNIDine (CATAPRES) 0.1 MG tablet Take 1 tablet (0.1 mg total) by mouth every 12 (twelve) hours as needed (only for blood pressure greater than 180/120).    spironolactone (ALDACTONE) 25 MG tablet Take 1 tablet (25 mg total) by mouth once daily. - holding                             Medication Adherence Screening     Patient knows purpose of medications.

## 2020-04-07 ENCOUNTER — PATIENT OUTREACH (OUTPATIENT)
Dept: OTHER | Facility: OTHER | Age: 55
End: 2020-04-07

## 2020-04-07 NOTE — PROGRESS NOTES
Digital Medicine: Clinician Follow-Up    Patient reports headache nearly daily. States headache starts when she wakes and lasts until BP improves (which occurs at different times of the day). She has been taking tizanidine for pain and BP control.  States all she drinks is water. Has a large metal container that she fills with water and drinks throughout the day (40 ounces).  She has spironolactone at home.  Denies significant changes in diet.  4/6 - roast, mashed potatoes and corn about 6 pm. States this is all she ate yesterday. Her  prepared the meal. She is unable to state what seasonings he used but does not believe he used salt.    The history is provided by the patient.     Follow Up  Follow-up reason(s): reading review and medication change          INTERVENTION(S)  reviewed appropriate dose schedule, recommended diet modifications, recommended med change, encouragement/support and denied questions    PLAN  patient verbalizes understanding, patient amenable to changes and continue monitoring    Appears patient requiring tizanidine less frequently for pain control, however, she has been taking it more consistently due to elevated BP  Begin spironolactone once daily (take with morning dose amlodipine)  Stressed to stay well hydrated due to prior dehydration and renal function concerns. Encouraged drinking 1.5 - 2 of her containers daily (ie. 60-80 ounces of water).  Reviewed to take tizanidine for pain only. We can adjust her BP regimen now that she requires tizanidine for pain less frequently. Reviewed that she has clonidine available as needed but would not take clonidine and tizanidine together.  Reviewed limiting salt intake, goal of < 2000 mg/day.      There are no preventive care reminders to display for this patient.    Last 5 Patient Entered Readings                                      Current 30 Day Average: 141/95     Recent Readings 4/5/2020 4/5/2020 4/5/2020 4/4/2020 4/4/2020    SBP (mmHg)  142 179 117 111 96    DBP (mmHg) 94 105 82 74 68    Pulse 100 98 84 85 84             Hypertension Medications             amLODIPine (NORVASC) 5 MG tablet Take 1 tablet (5 mg total) by mouth 2 (two) times daily. (new tablet strength)    cloNIDine (CATAPRES) 0.1 MG tablet Take 1 tablet (0.1 mg total) by mouth every 12 (twelve) hours as needed (only for blood pressure greater than 180/120).    spironolactone (ALDACTONE) 25 MG tablet Take 1 tablet (25 mg total) by mouth once daily. - not taking                             Medication Adherence Screening     Patient knows purpose of medications.

## 2020-04-14 ENCOUNTER — PATIENT OUTREACH (OUTPATIENT)
Dept: OTHER | Facility: OTHER | Age: 55
End: 2020-04-14

## 2020-04-14 NOTE — PROGRESS NOTES
Left voicemail requesting call back  Follow up resumption of spironolactone and water intake  Review use of clonidine vs. Tizanidine for high BP  BP average 128/91 mmHg    Hypertension Medications             amLODIPine (NORVASC) 5 MG tablet Take 1 tablet (5 mg total) by mouth 2 (two) times daily. (new tablet strength)    cloNIDine (CATAPRES) 0.1 MG tablet Take 1 tablet (0.1 mg total) by mouth every 12 (twelve) hours as needed (only for blood pressure greater than 180/120).    spironolactone (ALDACTONE) 25 MG tablet Take 1 tablet (25 mg total) by mouth once daily.

## 2020-04-15 ENCOUNTER — PATIENT MESSAGE (OUTPATIENT)
Dept: ORTHOPEDICS | Facility: CLINIC | Age: 55
End: 2020-04-15

## 2020-04-16 NOTE — PROGRESS NOTES
Digital Medicine: Clinician Follow-Up    Patient confirms resuming spironolactone  She is more aware of water intake. She is drinking a minimum of 40 ounces water per day and has eliminated other beverages (such as tea and soda).      The history is provided by the patient.     Follow Up  Follow-up reason(s): reading review and medication change follow-up      Patient started new medication.    Is patient tolerating med change?:  Yes      INTERVENTION(S)  reviewed appropriate dose schedule, recommended med change, encouragement/support and denied questions    PLAN  patient verbalizes understanding, patient amenable to changes and continue monitoring    BP near goal of < 140/90 mmHg  Compared to last outreach, appears patient is taking tizanidine regularly in evening for pain control  Have not seen much improvement with spironolactone and patient has history of dehydration and TASIA  Stop spironolactone at this time  Reinforced that patient should use tizanidine as prescribed for pain but not solely for BP control. Stressed for her to communicate with me if she is requiring tizanidine less for pain so can adjust BP regimen.      There are no preventive care reminders to display for this patient.    Last 5 Patient Entered Readings                                      Current 30 Day Average: 126/91     Recent Readings 4/15/2020 4/15/2020 4/15/2020 4/13/2020 4/10/2020    SBP (mmHg) 104 153 182 172 114    DBP (mmHg) 73 94 105 105 79    Pulse 82 110 113 95 80             Hypertension Medications             amLODIPine (NORVASC) 5 MG tablet Take 1 tablet (5 mg total) by mouth 2 (two) times daily. (new tablet strength)    cloNIDine (CATAPRES) 0.1 MG tablet Take 1 tablet (0.1 mg total) by mouth every 12 (twelve) hours as needed (only for blood pressure greater than 180/120).    spironolactone (ALDACTONE) 25 MG tablet Take 1 tablet (25 mg total) by mouth once daily.                 Screenings

## 2020-04-22 ENCOUNTER — PATIENT OUTREACH (OUTPATIENT)
Dept: ADMINISTRATIVE | Facility: OTHER | Age: 55
End: 2020-04-22

## 2020-04-24 DIAGNOSIS — E78.2 MIXED HYPERLIPIDEMIA: ICD-10-CM

## 2020-04-24 RX ORDER — PRAVASTATIN SODIUM 20 MG/1
20 TABLET ORAL NIGHTLY
Qty: 30 TABLET | Refills: 6 | Status: SHIPPED | OUTPATIENT
Start: 2020-04-24 | End: 2020-09-29

## 2020-04-27 ENCOUNTER — TELEPHONE (OUTPATIENT)
Dept: ORTHOPEDICS | Facility: CLINIC | Age: 55
End: 2020-04-27

## 2020-04-27 ENCOUNTER — OFFICE VISIT (OUTPATIENT)
Dept: ORTHOPEDICS | Facility: CLINIC | Age: 55
End: 2020-04-27
Payer: MEDICAID

## 2020-04-27 VITALS — BODY MASS INDEX: 34.6 KG/M2 | HEIGHT: 62 IN | WEIGHT: 188 LBS

## 2020-04-27 DIAGNOSIS — M75.121 NONTRAUMATIC COMPLETE TEAR OF RIGHT ROTATOR CUFF: ICD-10-CM

## 2020-04-27 PROCEDURE — 99214 PR OFFICE/OUTPT VISIT, EST, LEVL IV, 30-39 MIN: ICD-10-PCS | Mod: S$PBB,,, | Performed by: ORTHOPAEDIC SURGERY

## 2020-04-27 PROCEDURE — 99999 PR PBB SHADOW E&M-EST. PATIENT-LVL III: ICD-10-PCS | Mod: PBBFAC,,, | Performed by: ORTHOPAEDIC SURGERY

## 2020-04-27 PROCEDURE — 99214 OFFICE O/P EST MOD 30 MIN: CPT | Mod: S$PBB,,, | Performed by: ORTHOPAEDIC SURGERY

## 2020-04-27 PROCEDURE — 99213 OFFICE O/P EST LOW 20 MIN: CPT | Mod: PBBFAC,PN | Performed by: ORTHOPAEDIC SURGERY

## 2020-04-27 PROCEDURE — 99999 PR PBB SHADOW E&M-EST. PATIENT-LVL III: CPT | Mod: PBBFAC,,, | Performed by: ORTHOPAEDIC SURGERY

## 2020-04-27 RX ORDER — HYDROCODONE BITARTRATE AND ACETAMINOPHEN 5; 325 MG/1; MG/1
1 TABLET ORAL EVERY 4 HOURS PRN
Qty: 40 TABLET | Refills: 0 | Status: ON HOLD | OUTPATIENT
Start: 2020-04-27 | End: 2020-07-09 | Stop reason: HOSPADM

## 2020-04-27 NOTE — TELEPHONE ENCOUNTER
----- Message from Phuong Agustin sent at 4/27/2020  2:55 PM CDT -----  Type:  Pharmacy Calling to Clarify an RX    Name of Caller: Bri  Pharmacy Name:[facility]  Prescription Name:norco  What do they need to clarify?: needs diagnosis code  Best Call Back Number:[phone]  Additional Information: none

## 2020-04-27 NOTE — PROGRESS NOTES
CC:  Rotator cuff tear right shoulder        HPI:  Sherie Reyes is a very pleasant 54 y.o. female with ongoing symptoms right shoulder related to partial versus complete tear of the rotator cuff  Previous surgery was canceled because of COVID crisis she would like to go ahead and reschedule for right shoulder surgery  She continues to have pain in the shoulder difficulty with use and pain at night         PAST MEDICAL HISTORY:   Past Medical History:   Diagnosis Date    Anxiety     Degenerative joint disease (DJD) of hip     Diabetes type 2, controlled 7/10/2019    GERD (gastroesophageal reflux disease)     Hyperlipidemia     Hypertension     IBS (irritable bowel syndrome)     Insomnia     Lumbar disc herniation     PTSD (post-traumatic stress disorder)      PAST SURGICAL HISTORY:   Past Surgical History:   Procedure Laterality Date    ADENOIDECTOMY      ARTHROSCOPIC REPAIR OF ROTATOR CUFF OF SHOULDER       SECTION      TONSILLECTOMY       FAMILY HISTORY:   Family History   Problem Relation Age of Onset    Hypertension Mother     Hypothyroidism Mother     Hypertension Father     Stroke Father 46    Diabetes Father     Cancer Maternal Grandfather     Breast cancer Paternal Aunt      SOCIAL HISTORY:   Social History     Socioeconomic History    Marital status:      Spouse name: Not on file    Number of children: Not on file    Years of education: Not on file    Highest education level: Not on file   Occupational History    Not on file   Social Needs    Financial resource strain: Hard    Food insecurity:     Worry: Often true     Inability: Sometimes true    Transportation needs:     Medical: No     Non-medical: No   Tobacco Use    Smoking status: Former Smoker     Types: Vaping w/o nicotine    Smokeless tobacco: Former User     Quit date: 10/20/2014    Tobacco comment: Vapor cigarettes   Substance and Sexual Activity    Alcohol use: No     Frequency: Monthly or  less     Drinks per session: 3 or 4     Binge frequency: Never     Comment: socially    Drug use: No    Sexual activity: Not on file   Lifestyle    Physical activity:     Days per week: 3 days     Minutes per session: 120 min    Stress: Very much   Relationships    Social connections:     Talks on phone: More than three times a week     Gets together: Once a week     Attends Oriental orthodox service: Not on file     Active member of club or organization: Yes     Attends meetings of clubs or organizations: Never     Relationship status:    Other Topics Concern    Not on file   Social History Narrative    Not on file       MEDICATIONS:   Current Outpatient Medications:     amLODIPine (NORVASC) 5 MG tablet, Take 1 tablet (5 mg total) by mouth 2 (two) times daily. (new tablet strength), Disp: 180 tablet, Rfl: 1    buPROPion (WELLBUTRIN XL) 300 MG 24 hr tablet, Take 300 mg by mouth once daily., Disp: , Rfl:     clonazePAM (KLONOPIN) 1 MG tablet, Take 1 mg by mouth 2 (two) times daily. , Disp: , Rfl:     cloNIDine (CATAPRES) 0.1 MG tablet, Take 1 tablet (0.1 mg total) by mouth every 12 (twelve) hours as needed (only for blood pressure greater than 180/120)., Disp: 60 tablet, Rfl: 6    duloxetine (CYMBALTA) 60 MG capsule, Take 60 mg by mouth once daily., Disp: , Rfl:     gabapentin (NEURONTIN) 300 MG capsule, Take 900 mg by mouth 3 (three) times daily. , Disp: , Rfl:     gabapentin (NEURONTIN) 600 MG tablet, TK 1 T PO TID, Disp: , Rfl: 5    gemfibrozil (LOPID) 600 MG tablet, TAKE 1 TABLET BY MOUTH TWICE DAILY BEFORE MEALS, Disp: 60 tablet, Rfl: 5    KURVELO 0.15-0.03 mg per tablet, Take 1 tablet by mouth once daily., Disp: , Rfl:     LINZESS 290 mcg Cap capsule, TAKE 1 CAPSULE BY MOUTH EVERY DAY, Disp: 90 capsule, Rfl: 1    omeprazole (PRILOSEC) 40 MG capsule, Take 1 capsule (40 mg total) by mouth once daily., Disp: 30 capsule, Rfl: 11    POLYETHYLENE GLYCOL 3350 (MIRALAX ORAL), Take by mouth as needed.  ", Disp: , Rfl:     potassium chloride SA (KLOR-CON M20) 20 MEQ tablet, Take 2 tablets (40 mEq total) by mouth once daily., Disp: 30 tablet, Rfl: 0    pravastatin (PRAVACHOL) 20 MG tablet, Take 1 tablet (20 mg total) by mouth every evening., Disp: 30 tablet, Rfl: 6    QUEtiapine (SEROQUEL) 100 MG Tab, Take 1 tablet by mouth once daily., Disp: , Rfl:     tizanidine (ZANAFLEX) 6 mg capsule, Take 6 mg by mouth 2 (two) times daily as needed for Muscle spasms., Disp: , Rfl:     traMADoL (ULTRAM) 50 mg tablet, TK 1 TO 2 TS PO TID PRN, Disp: 40 tablet, Rfl: 1    acetaminophen (TYLENOL) 500 MG tablet, Take 1 tablet (500 mg total) by mouth every 6 (six) hours as needed for Pain (Headache). (Patient not taking: Reported on 2/13/2020), Disp: 90 tablet, Rfl: 1  ALLERGIES: Review of patient's allergies indicates:  No Known Allergies    Review of Systems:  Constitutional: no fever or chills  ENT: no nasal congestion or sore throat  Respiratory: no cough or shortness of breath  Cardiovascular: no chest pain or palpitations  Gastrointestinal: no nausea or vomiting, PUD, GERD, NSAID intolerance  Genitourinary: no hematuria or dysuria  Integument/Breast: no rash or pruritis  Hematologic/Lymphatic: no easy bruising or lymphadenopathy  Musculoskeletal: see HPI  Neurological: no seizures or tremors  Behavioral/Psych: no auditory or visual hallucinations      Physical Exam   Vitals:    04/27/20 1450   Weight: 85.3 kg (188 lb)   Height: 5' 2" (1.575 m)   PainSc:   9       Constitutional: Oriented to person, place, and time. Appears well-developed and well-nourished.   HENT:   Head: Normocephalic and atraumatic.   Nose: Nose normal.   Eyes: No scleral icterus.   Neck: Normal range of motion.   Cardiovascular: Normal rate and regular rhythm.    Pulses:       Radial pulses are 2+ on the right side, and 2+ on the left side.   Pulmonary/Chest: Effort normal and breath sounds normal.   Abdominal: Soft.   Neurological: Alert and oriented to " "person, place, and time.   Skin: Skin is warm.   Psychiatric: Normal mood and affect.     MUSCULOSKELETAL UPPER EXTREMITY:  Right shoulder demonstrates no tenderness no swelling  Range of motion slightly decreased secondary to pain  Positive impingement sign  Positive supraspinatus stress test  No instability  Neurologic exam intact            Diagnostic Studies:  Previous CT arthrogram right shoulder showed evidence of rotator cuff tear        Assessment:  Rotator cuff tear right shoulder    Plan:  We will plan surgery right shoulder arthroscopy with rotator cuff repair as an outpatient      The risks and benefits of surgery were discussed with the patient today and they understand.  The consent was signed in the office for surgery.      Melchor Hughes MD (Jay)  [facility]  Orthopedic Upper Extremity Surgery    "

## 2020-05-01 ENCOUNTER — TELEPHONE (OUTPATIENT)
Dept: ORTHOPEDICS | Facility: CLINIC | Age: 55
End: 2020-05-01

## 2020-05-01 DIAGNOSIS — Z41.9 ELECTIVE SURGERY: Primary | ICD-10-CM

## 2020-05-08 ENCOUNTER — PATIENT OUTREACH (OUTPATIENT)
Dept: OTHER | Facility: OTHER | Age: 55
End: 2020-05-08

## 2020-05-18 ENCOUNTER — PATIENT MESSAGE (OUTPATIENT)
Dept: SURGERY | Facility: HOSPITAL | Age: 55
End: 2020-05-18

## 2020-05-18 ENCOUNTER — PATIENT MESSAGE (OUTPATIENT)
Dept: INTERNAL MEDICINE | Facility: CLINIC | Age: 55
End: 2020-05-18

## 2020-05-19 ENCOUNTER — PATIENT MESSAGE (OUTPATIENT)
Dept: INTERNAL MEDICINE | Facility: CLINIC | Age: 55
End: 2020-05-19

## 2020-05-19 RX ORDER — HYDROCODONE BITARTRATE AND ACETAMINOPHEN 5; 325 MG/1; MG/1
1 TABLET ORAL EVERY 12 HOURS PRN
Qty: 40 TABLET | Refills: 0 | Status: SHIPPED | OUTPATIENT
Start: 2020-05-19 | End: 2020-06-11

## 2020-05-19 NOTE — PROGRESS NOTES
BP continues to fluctuate 2/2 tizanidine use  Average remains at goal of <140/90 mmHg at 138/89 mmHg    Continue current regimen and monitoring    Hypertension Medications             amLODIPine (NORVASC) 5 MG tablet Take 1 tablet (5 mg total) by mouth 2 (two) times daily. (new tablet strength)    cloNIDine (CATAPRES) 0.1 MG tablet Take 1 tablet (0.1 mg total) by mouth every 12 (twelve) hours as needed (only for blood pressure greater than 180/120).

## 2020-05-21 ENCOUNTER — ANESTHESIA EVENT (OUTPATIENT)
Dept: SURGERY | Facility: HOSPITAL | Age: 55
End: 2020-05-21
Payer: MEDICAID

## 2020-05-22 ENCOUNTER — TELEPHONE (OUTPATIENT)
Dept: INTERNAL MEDICINE | Facility: CLINIC | Age: 55
End: 2020-05-22

## 2020-05-22 NOTE — TELEPHONE ENCOUNTER
----- Message from Kota Miranda sent at 5/22/2020  1:16 PM CDT -----  Contact: Patient [phone]  Sooner appointment than the  can schedule.  Did you offer to schedule the next available appointment and put the patient on the wait list?:    When is the first available appointment: no slots  What is the nature of the appointment: pre op  What visit type: ep  Patient preference of timeframe to be scheduled:    Comments: Patient has surgery set for 6/09/20, is needing Pre Op, no slots with any provider for the [facility] Location, call to set appt up with patient.    Please call an advise  Thank you

## 2020-05-26 ENCOUNTER — OFFICE VISIT (OUTPATIENT)
Dept: INTERNAL MEDICINE | Facility: CLINIC | Age: 55
End: 2020-05-26
Payer: MEDICAID

## 2020-05-26 ENCOUNTER — LAB VISIT (OUTPATIENT)
Dept: LAB | Facility: HOSPITAL | Age: 55
End: 2020-05-26
Attending: INTERNAL MEDICINE
Payer: MEDICAID

## 2020-05-26 VITALS
DIASTOLIC BLOOD PRESSURE: 92 MMHG | HEIGHT: 60 IN | SYSTOLIC BLOOD PRESSURE: 138 MMHG | WEIGHT: 198 LBS | TEMPERATURE: 98 F | BODY MASS INDEX: 38.87 KG/M2 | RESPIRATION RATE: 16 BRPM | HEART RATE: 109 BPM

## 2020-05-26 DIAGNOSIS — I10 ESSENTIAL HYPERTENSION: Chronic | ICD-10-CM

## 2020-05-26 DIAGNOSIS — F41.9 ANXIETY: ICD-10-CM

## 2020-05-26 DIAGNOSIS — N18.30 CKD (CHRONIC KIDNEY DISEASE) STAGE 3, GFR 30-59 ML/MIN: ICD-10-CM

## 2020-05-26 DIAGNOSIS — Z01.818 PRE-OP EVALUATION: ICD-10-CM

## 2020-05-26 DIAGNOSIS — E78.2 MIXED HYPERLIPIDEMIA: Chronic | ICD-10-CM

## 2020-05-26 DIAGNOSIS — Z01.818 PRE-OP EVALUATION: Primary | ICD-10-CM

## 2020-05-26 DIAGNOSIS — M75.101 TEAR OF RIGHT ROTATOR CUFF, UNSPECIFIED TEAR EXTENT, UNSPECIFIED WHETHER TRAUMATIC: ICD-10-CM

## 2020-05-26 LAB
ALBUMIN SERPL BCP-MCNC: 3.4 G/DL (ref 3.5–5.2)
ALP SERPL-CCNC: 45 U/L (ref 55–135)
ALT SERPL W/O P-5'-P-CCNC: 23 U/L (ref 10–44)
ANION GAP SERPL CALC-SCNC: 11 MMOL/L (ref 8–16)
AST SERPL-CCNC: 22 U/L (ref 10–40)
BASOPHILS # BLD AUTO: 0.04 K/UL (ref 0–0.2)
BASOPHILS NFR BLD: 0.5 % (ref 0–1.9)
BILIRUB SERPL-MCNC: 0.2 MG/DL (ref 0.1–1)
BUN SERPL-MCNC: 7 MG/DL (ref 6–20)
CALCIUM SERPL-MCNC: 9.6 MG/DL (ref 8.7–10.5)
CHLORIDE SERPL-SCNC: 106 MMOL/L (ref 95–110)
CO2 SERPL-SCNC: 19 MMOL/L (ref 23–29)
CREAT SERPL-MCNC: 1 MG/DL (ref 0.5–1.4)
DIFFERENTIAL METHOD: ABNORMAL
EOSINOPHIL # BLD AUTO: 0.2 K/UL (ref 0–0.5)
EOSINOPHIL NFR BLD: 2.2 % (ref 0–8)
ERYTHROCYTE [DISTWIDTH] IN BLOOD BY AUTOMATED COUNT: 13.2 % (ref 11.5–14.5)
EST. GFR  (AFRICAN AMERICAN): >60 ML/MIN/1.73 M^2
EST. GFR  (NON AFRICAN AMERICAN): >60 ML/MIN/1.73 M^2
GLUCOSE SERPL-MCNC: 146 MG/DL (ref 70–110)
HCT VFR BLD AUTO: 39.4 % (ref 37–48.5)
HGB BLD-MCNC: 12.4 G/DL (ref 12–16)
IMM GRANULOCYTES # BLD AUTO: 0.03 K/UL (ref 0–0.04)
IMM GRANULOCYTES NFR BLD AUTO: 0.4 % (ref 0–0.5)
LYMPHOCYTES # BLD AUTO: 2.3 K/UL (ref 1–4.8)
LYMPHOCYTES NFR BLD: 28.1 % (ref 18–48)
MCH RBC QN AUTO: 29 PG (ref 27–31)
MCHC RBC AUTO-ENTMCNC: 31.5 G/DL (ref 32–36)
MCV RBC AUTO: 92 FL (ref 82–98)
MONOCYTES # BLD AUTO: 0.4 K/UL (ref 0.3–1)
MONOCYTES NFR BLD: 4.3 % (ref 4–15)
NEUTROPHILS # BLD AUTO: 5.4 K/UL (ref 1.8–7.7)
NEUTROPHILS NFR BLD: 64.5 % (ref 38–73)
NRBC BLD-RTO: 0 /100 WBC
PLATELET # BLD AUTO: 302 K/UL (ref 150–350)
PMV BLD AUTO: 11.8 FL (ref 9.2–12.9)
POTASSIUM SERPL-SCNC: 4.5 MMOL/L (ref 3.5–5.1)
PROT SERPL-MCNC: 6.8 G/DL (ref 6–8.4)
RBC # BLD AUTO: 4.28 M/UL (ref 4–5.4)
SODIUM SERPL-SCNC: 136 MMOL/L (ref 136–145)
WBC # BLD AUTO: 8.3 K/UL (ref 3.9–12.7)

## 2020-05-26 PROCEDURE — 80053 COMPREHEN METABOLIC PANEL: CPT

## 2020-05-26 PROCEDURE — 93010 EKG 12-LEAD: ICD-10-PCS | Mod: S$PBB,,, | Performed by: INTERNAL MEDICINE

## 2020-05-26 PROCEDURE — 93010 ELECTROCARDIOGRAM REPORT: CPT | Mod: S$PBB,,, | Performed by: INTERNAL MEDICINE

## 2020-05-26 PROCEDURE — 99999 PR PBB SHADOW E&M-EST. PATIENT-LVL III: CPT | Mod: PBBFAC,,, | Performed by: INTERNAL MEDICINE

## 2020-05-26 PROCEDURE — 99214 OFFICE O/P EST MOD 30 MIN: CPT | Mod: S$PBB,,, | Performed by: INTERNAL MEDICINE

## 2020-05-26 PROCEDURE — 36415 COLL VENOUS BLD VENIPUNCTURE: CPT | Mod: PO

## 2020-05-26 PROCEDURE — 93005 ELECTROCARDIOGRAM TRACING: CPT | Mod: PBBFAC,PO | Performed by: INTERNAL MEDICINE

## 2020-05-26 PROCEDURE — 99213 OFFICE O/P EST LOW 20 MIN: CPT | Mod: PBBFAC,PO | Performed by: INTERNAL MEDICINE

## 2020-05-26 PROCEDURE — 99999 PR PBB SHADOW E&M-EST. PATIENT-LVL III: ICD-10-PCS | Mod: PBBFAC,,, | Performed by: INTERNAL MEDICINE

## 2020-05-26 PROCEDURE — 85025 COMPLETE CBC W/AUTO DIFF WBC: CPT

## 2020-05-26 PROCEDURE — 99214 PR OFFICE/OUTPT VISIT, EST, LEVL IV, 30-39 MIN: ICD-10-PCS | Mod: S$PBB,,, | Performed by: INTERNAL MEDICINE

## 2020-05-26 NOTE — PROGRESS NOTES
Subjective:       Patient ID: Sherie Reyes is a 54 y.o. female.    Chief Complaint: Pre-op Exam    HPI     53 yo female here for pre-op evaluation of right rotator cuff repair    Denies CP/SOB/nausea/MI last 3 months, - heart failure, - arrhythmias, - valvular heart disease.    RCRI criteria: - IDDM, - CAD, - CVA, - chronic renal insufficiency, - heart failure, - high risk surgery    Functional status: not very physically active.  She is trying to walk more at the park.  She has done half a mile to start.      Bleeding risk - history of bleeding with prior surgeries - none    History of prior anesthetic complications - none    - tobacco, + EtOH, - Illicit substances    Chronic conditions/medication usage - Norvasc 5 mg (take), Wellbutrin 100 mg (take), Klonopin 1 mg 1 for these take if needed), clonidine 0.1 mg (take if needed), Cymbalta 60 mg (take), gabapentin 600 mg (take), gemfibrozil 600 mg (take), hydrocodone 5/325 mg (take if needed), Linzess 290 g (take), Prilosec 40 mg (take), pravastatin 20 mg (take), Seroquel 100 mg (take), Zanaflex 6 mg (take if needed), tramadol 50 mg (take if needed)    Chronic Steroid usage - 3 injections in the last year.    Review of Systems    Objective:      Physical Exam   Constitutional: She is oriented to person, place, and time. She appears well-developed and well-nourished.   HENT:   Head: Normocephalic and atraumatic.   Mouth/Throat: No oropharyngeal exudate.   Eyes: Pupils are equal, round, and reactive to light. EOM are normal. Right eye exhibits no discharge. Left eye exhibits no discharge. No scleral icterus.   Neck: Normal range of motion. Neck supple. No tracheal deviation present. No thyromegaly present.   Cardiovascular: Normal rate, regular rhythm and normal heart sounds. Exam reveals no gallop and no friction rub.   No murmur heard.  Pulmonary/Chest: Effort normal and breath sounds normal. No respiratory distress. She has no wheezes. She has no rales. She  exhibits no tenderness.   Abdominal: Soft. Bowel sounds are normal. She exhibits no distension and no mass. There is no tenderness. There is no rebound and no guarding.   Musculoskeletal: Normal range of motion. She exhibits no edema or tenderness.   Neurological: She is alert and oriented to person, place, and time.   Skin: Skin is warm and dry. No rash noted. No erythema. No pallor.   Psychiatric: She has a normal mood and affect. Her behavior is normal.   Vitals reviewed.      Assessment:       1. Pre-op evaluation    2. Tear of right rotator cuff, unspecified tear extent, unspecified whether traumatic    3. Essential hypertension    4. Mixed hyperlipidemia    5. CKD (chronic kidney disease) stage 3, GFR 30-59 ml/min    6. Anxiety        Plan:       1.  Surgery is not emergent.  Patient has no active cardiac conditions.  Surgery is not low risk.  Patient does not have greater than 4 METs.  Patient needs EKG and dobutamine stress echo prior to surgery.  2.  Surgery planned.  3.  Continue amlodipine 5 mg, clonidine 0.1 mg.  4.  Continue pravastatin 20 mg.  5.   Monitor.  6.  Continue clonidine, Cymbalta 60 mg.  7.  Check CBC, CMP, EKG in clinic.  8.  DVT prevention - Recommend use of TEDs, and early ambulation if able.  Consider short term use of anti-coagulation if appropriate post-op.  9.  Chronic medications -  Norvasc 5 mg (take), Wellbutrin 100 mg (take), Klonopin 1 mg 1 for these take if needed), clonidine 0.1 mg (take if needed), Cymbalta 60 mg (take), gabapentin 600 mg (take), gemfibrozil 600 mg (take), hydrocodone 5/325 mg (take if needed), Linzess 290 g (take), Prilosec 40 mg (take), pravastatin 20 mg (take), Seroquel 100 mg (take), Zanaflex 6 mg (take if needed), tramadol 50 mg (take if needed)  10.  Minimize urinary catheter use.

## 2020-05-29 ENCOUNTER — PATIENT MESSAGE (OUTPATIENT)
Dept: ORTHOPEDICS | Facility: CLINIC | Age: 55
End: 2020-05-29

## 2020-06-03 ENCOUNTER — HOSPITAL ENCOUNTER (OUTPATIENT)
Dept: CARDIOLOGY | Facility: CLINIC | Age: 55
Discharge: HOME | End: 2020-06-03
Attending: INTERNAL MEDICINE
Payer: MEDICAID

## 2020-06-03 VITALS
SYSTOLIC BLOOD PRESSURE: 122 MMHG | DIASTOLIC BLOOD PRESSURE: 78 MMHG | BODY MASS INDEX: 32.2 KG/M2 | WEIGHT: 175 LBS | RESPIRATION RATE: 16 BRPM | HEIGHT: 62 IN

## 2020-06-03 DIAGNOSIS — Z01.818 PRE-OP EVALUATION: ICD-10-CM

## 2020-06-03 LAB
ASCENDING AORTA: 2.58 CM
BSA FOR ECHO PROCEDURE: 1.86 M2
CV ECHO LV RWT: 0.46 CM
CV STRESS BASE HR: 86 BPM
DIASTOLIC BLOOD PRESSURE: 79 MMHG
DOP CALC LVOT AREA: 3 CM2
DOP CALC LVOT DIAMETER: 1.95 CM
DOP CALC LVOT PEAK VEL: 0.92 M/S
DOP CALC LVOT STROKE VOLUME: 43.97 CM3
DOP CALCLVOT PEAK VEL VTI: 14.73 CM
E WAVE DECELERATION TIME: 183.25 MSEC
E/A RATIO: 0.91
E/E' RATIO: 13.71 M/S
ECHO LV POSTERIOR WALL: 0.89 CM (ref 0.6–1.1)
FRACTIONAL SHORTENING: 36 % (ref 28–44)
INTERVENTRICULAR SEPTUM: 0.71 CM (ref 0.6–1.1)
LA MAJOR: 3.65 CM
LA MINOR: 3.72 CM
LA WIDTH: 3.05 CM
LEFT ATRIUM SIZE: 2.63 CM
LEFT ATRIUM VOLUME INDEX: 13.9 ML/M2
LEFT ATRIUM VOLUME: 25.12 CM3
LEFT INTERNAL DIMENSION IN SYSTOLE: 2.47 CM (ref 2.1–4)
LEFT VENTRICLE DIASTOLIC VOLUME INDEX: 35.18 ML/M2
LEFT VENTRICLE DIASTOLIC VOLUME: 63.53 ML
LEFT VENTRICLE MASS INDEX: 48 G/M2
LEFT VENTRICLE SYSTOLIC VOLUME INDEX: 12 ML/M2
LEFT VENTRICLE SYSTOLIC VOLUME: 21.67 ML
LEFT VENTRICULAR INTERNAL DIMENSION IN DIASTOLE: 3.84 CM (ref 3.5–6)
LEFT VENTRICULAR MASS: 87.43 G
LV LATERAL E/E' RATIO: 13.71 M/S
LV SEPTAL E/E' RATIO: 13.71 M/S
MV PEAK A VEL: 1.06 M/S
MV PEAK E VEL: 0.96 M/S
OHS CV CPX 1 MINUTE RECOVERY HEART RATE: 137 BPM
OHS CV CPX 85 PERCENT MAX PREDICTED HEART RATE MALE: 136
OHS CV CPX MAX PREDICTED HEART RATE: 160
OHS CV CPX PATIENT IS FEMALE: 1
OHS CV CPX PATIENT IS MALE: 0
OHS CV CPX PEAK DIASTOLIC BLOOD PRESSURE: 61 MMHG
OHS CV CPX PEAK HEAR RATE: 142 BPM
OHS CV CPX PEAK RATE PRESSURE PRODUCT: NORMAL
OHS CV CPX PEAK SYSTOLIC BLOOD PRESSURE: 153 MMHG
OHS CV CPX PERCENT MAX PREDICTED HEART RATE ACHIEVED: 89
OHS CV CPX RATE PRESSURE PRODUCT PRESENTING: NORMAL
PISA TR MAX VEL: 2.29 M/S
PULM VEIN S/D RATIO: 1.36
PV PEAK D VEL: 0.39 M/S
PV PEAK S VEL: 0.53 M/S
RA MAJOR: 3.82 CM
RA WIDTH: 2.62 CM
RIGHT VENTRICULAR END-DIASTOLIC DIMENSION: 2.58 CM
RV TISSUE DOPPLER FREE WALL SYSTOLIC VELOCITY 1 (APICAL 4 CHAMBER VIEW): 11.1 CM/S
SINUS: 2.68 CM
STJ: 2.52 CM
SYSTOLIC BLOOD PRESSURE: 140 MMHG
TDI LATERAL: 0.07 M/S
TDI SEPTAL: 0.07 M/S
TDI: 0.07 M/S
TR MAX PG: 21 MMHG
TRICUSPID ANNULAR PLANE SYSTOLIC EXCURSION: 1.9 CM

## 2020-06-03 PROCEDURE — 93351 STRESS ECHO (CUPID ONLY): ICD-10-PCS | Mod: 26,S$PBB,, | Performed by: INTERNAL MEDICINE

## 2020-06-03 PROCEDURE — 93351 STRESS TTE COMPLETE: CPT | Mod: PBBFAC | Performed by: INTERNAL MEDICINE

## 2020-06-03 RX ORDER — DOBUTAMINE HYDROCHLORIDE 200 MG/100ML
10 INJECTION INTRAVENOUS
Status: COMPLETED | OUTPATIENT
Start: 2020-06-03 | End: 2020-06-03

## 2020-06-03 RX ADMIN — DOBUTAMINE HYDROCHLORIDE 10 MCG/KG/MIN: 200 INJECTION INTRAVENOUS at 04:06

## 2020-06-04 ENCOUNTER — HOSPITAL ENCOUNTER (OUTPATIENT)
Dept: PREADMISSION TESTING | Facility: HOSPITAL | Age: 55
Discharge: HOME | End: 2020-06-04
Attending: ORTHOPAEDIC SURGERY
Payer: MEDICAID

## 2020-06-04 VITALS
HEIGHT: 62 IN | HEART RATE: 114 BPM | DIASTOLIC BLOOD PRESSURE: 83 MMHG | SYSTOLIC BLOOD PRESSURE: 148 MMHG | RESPIRATION RATE: 20 BRPM | BODY MASS INDEX: 32.2 KG/M2 | OXYGEN SATURATION: 97 % | WEIGHT: 175 LBS

## 2020-06-04 DIAGNOSIS — Z01.818 PRE-OP TESTING: Primary | ICD-10-CM

## 2020-06-04 RX ORDER — SODIUM CHLORIDE, SODIUM LACTATE, POTASSIUM CHLORIDE, CALCIUM CHLORIDE 600; 310; 30; 20 MG/100ML; MG/100ML; MG/100ML; MG/100ML
INJECTION, SOLUTION INTRAVENOUS CONTINUOUS
Status: CANCELLED | OUTPATIENT
Start: 2020-06-04

## 2020-06-04 RX ORDER — LIDOCAINE HYDROCHLORIDE 10 MG/ML
1 INJECTION, SOLUTION EPIDURAL; INFILTRATION; INTRACAUDAL; PERINEURAL ONCE
Status: CANCELLED | OUTPATIENT
Start: 2020-06-04 | End: 2020-06-04

## 2020-06-04 NOTE — ANESTHESIA PREPROCEDURE EVALUATION
2020     Sherie Reyes is a 54 y.o., female scheduled for right RCR under REG/GETA on 2020.    Past Medical History:   Diagnosis Date    Anxiety     Degenerative joint disease (DJD) of hip     Diabetes type 2, controlled 7/10/2019    GERD (gastroesophageal reflux disease)     Hyperlipidemia     Hypertension     IBS (irritable bowel syndrome)     Insomnia     Lumbar disc herniation     PTSD (post-traumatic stress disorder)      Past Surgical History:   Procedure Laterality Date    ADENOIDECTOMY      ARTHROSCOPIC REPAIR OF ROTATOR CUFF OF SHOULDER       SECTION      TONSILLECTOMY         Anesthesia Evaluation    I have reviewed the Patient Summary Reports.    I have reviewed the Nursing Notes. I have reviewed the NPO Status.   I have reviewed the Medications.     Review of Systems  Anesthesia Hx:  No problems with previous Anesthesia  Denies Family Hx of Anesthesia complications.    Social:  Former Smoker, No Alcohol Use    Hematology/Oncology:  Hematology Normal        Cardiovascular:   Hypertension, well controlled hyperlipidemia    Pulmonary:  Pulmonary Normal    Renal/:   Chronic Renal Disease, CRI    Hepatic/GI:   GERD, well controlled    Musculoskeletal:   Arthritis     Endocrine:   Diabetes, well controlled, type 2    Psych:   anxiety          Physical Exam  General:  Obesity    Airway/Jaw/Neck:  Airway Findings: Mouth Opening: Normal Tongue: Normal  General Airway Assessment: Adult  Mallampati: III  TM Distance: Normal, at least 6 cm  Jaw/Neck Findings:  Neck ROM: Normal ROM      Dental:  Dental Findings: In tact   Chest/Lungs:  Chest/Lungs Findings: Clear to auscultation, Normal Respiratory Rate     Heart/Vascular:  Heart Findings: Rate: Normal  Rhythm: Regular Rhythm        Mental Status:  Mental Status Findings:  Cooperative, Alert and Oriented       EKG  5/26/2020  Normal sinus rhythm  Normal ECG    Stress Echo 6/3/2020  · Concentric left ventricular remodeling.  · The patient reached the end of the protocol.  · During stress, the following significant arrhythmias were observed: rare PACs.  · Indeterminate central venous pressure. Estimated PA pressure is at least 21 mmHg.  · Normal left ventricular systolic function. The estimated ejection fraction is 65%.  · Grade I (mild) left ventricular diastolic dysfunction consistent with impaired relaxation.  · Mild mitral sclerosis.  · Normal right ventricular systolic function.  · Mild tricuspid regurgitation.  · The study was difficult due to patient's poor endocardial visualization.  · The ECG portion of this study is negative for myocardial ischemia.  · The stress echo portion of this study is negative for myocardial ischemia.      Anesthesia Plan  Type of Anesthesia, risks & benefits discussed:  Anesthesia Type:  general, regional  Patient's Preference:   Intra-op Monitoring Plan: standard ASA monitors  Intra-op Monitoring Plan Comments:   Post Op Pain Control Plan: multimodal analgesia and peripheral nerve block  Post Op Pain Control Plan Comments:   Induction:   IV  Beta Blocker:         Informed Consent: Patient understands risks and agrees with Anesthesia plan.  Questions answered. Anesthesia consent signed with patient.  ASA Score: 2     Day of Surgery Review of History & Physical:            Ready For Surgery From Anesthesia Perspective.

## 2020-06-04 NOTE — PLAN OF CARE
Allergies, medical, surgical, family and psychosocial histories reviewed with patient.  Periop plan of care discussed. Preop instructions given, including medications to take and to hold. Time given for questions and pt voiced understanding.    , Federico [phone]

## 2020-06-04 NOTE — DISCHARGE INSTRUCTIONS
Your surgery is scheduled for 6-9-2020    Please report to [facility] on the 1st Floor at 10:00 a.m.    THIS TIME IS SUBJECT TO CHANGE.  YOU WILL RECEIVE A PHONE CALL THE DAY BEFORE SURGERY BY 3:30 PM TO CONFIRM YOUR TIME OF ARRIVAL.  IF YOU HAVE NOT RECEIVED A PHONE CALL BY 3:30 PM THE DAY BEFORE YOUR SURGERY PLEASE CALL [phone]     INSTRUCTIONS IMPORTANT!!!  ¨ Do not eat or drink after 12 midnight-including water. OK to brush teeth, no   gum, candy or mints!    ¨ Take only these medicines with a small swallow of water-morning of surgery: amlodipine and omeprazole          ____  Do not wear makeup, including mascara.  ____  No powder, lotions or creams to surgical area.  ____  Please remove all jewelry, including piercings and leave at home.  ____  No money or valuables needed. Please leave at home.  ____  Please bring any documents given by your doctor.  ____  If going home the same day, arrange for a ride home. You will not be able to             drive if Anesthesia was used.  ____  Children under 18 years require a parent / guardian present the entire time             they are in surgery / recovery.  ____  Wear loose fitting clothing. Allow for dressings, bandages.  ____  Stop Aspirin, Ibuprofen, Motrin and Aleve at least 3-5 days before surgery, unless otherwise instructed by your doctor, or the nurse.   You MAY use Tylenol/acetaminophen until day of surgery.  ____  Wash the surgical area with Hibiclens the night before surgery, and again the             morning of surgery.  Be sure to rinse hibiclens off completely (if instructed by   nurse).  ____  If you take diabetic medication, do not take am of surgery unless instructed by Doctor.  ____  Call MD for temperature above 101 degrees or any other signs of infection such as Urinary (bladder) infection, Upper respiratory infection, skin boils, etc.  ____ Stop taking any Fish Oil supplement or any Vitamins that contain Vitamin E at least 5 days prior to  surgery.  ____ Do Not wear your contact lenses the day of your procedure.  You may wear your glasses.      ____Do not shave surgical site for 3 days prior to surgery.  ____ Practice Good hand washing before, during, and after procedure.      I have read or had read and explained to me, and understand the above information.  Additional comments or instructions:  For additional questions call [phone]      ANESTHESIA SIDE EFFECTS  -For the first 24 hours after surgery:  Do not drive, use heavy equipment, make important decisions, or drink alcohol  -It is normal to feel sleepy for several hours.  Rest until you are more awake.  -Have someone stay with you, if needed.  They can watch for problems and help keep you safe.  -Some possible post anesthesia side effects include: nausea and vomiting, sore throat and hoarseness, sleepiness, and dizziness.        Pre-Op Bathing Instructions    Before surgery, you can play an important role in your own health.    Because skin is not sterile, we need to be sure that your skin is as free of germs as possible. By following the instructions below, you can reduce the number of germs on your skin before surgery.    IMPORTANT: You will need to shower with a special soap called Hibiclens*, available at any pharmacy.  If you are allergic to Chlorhexidine (the antiseptic in Hibiclens), use an antibacterial soap such as Dial Soap for your preoperative shower.  You will shower with Hibiclens both the night before your surgery and the morning of your surgery.  Do not use Hibiclens on the head, face or genitals to avoid injury to those areas.    STEP #1: THE NIGHT BEFORE YOUR SURGERY     1. Do not shave the area of your body where your surgery will be performed.  2. Shower and wash your hair and body as usual with your normal soap and shampoo.  3. Rinse your hair and body thoroughly after you shower to remove all soap residue.  4. With your hand, apply one packet of Hibiclens soap to the  surgical site.   5. Wash the site gently for five (5) minutes. Do not scrub your skin too hard.   6. Do not wash with your regular soap after Hibiclens is used.  7. Rinse your body thoroughly.  8. Pat yourself dry with a clean, soft towel.  9. Do not use lotion, cream, or powder.  10. Wear clean clothes.    STEP #2: THE MORNING OF YOUR SURGERY     1. Repeat Step #1.    * Not to be used by people allergic to Chlorhexidine.            Anesthesia: General Anesthesia     You are watched continuously during your procedure by your anesthesia provider.     Youre due to have surgery. During surgery, youll be given medicine called anesthesia or anesthetic. This will keep you comfortable and pain-free. Your anesthesia provider will use general anesthesia.  What is general anesthesia?  General anesthesia puts you into a state like deep sleep. It goes into the bloodstream (IV anesthetics), into the lungs (gas anesthetics), or both. You feel nothing during the procedure. You will not remember it. During the procedure, the anesthesia provider monitors you continuously. He or she checks your heart rate and rhythm, blood pressure, breathing, and blood oxygen.  · IV anesthetics. IV anesthetics are given through an IV line in your arm. Theyre often given first. This is so you are asleep before a gas anesthetic is started. Some kinds of IV anesthetics relieve pain. Others relax you. Your doctor will decide which kind is best in your case.  · Gas anesthetics. Gas anesthetics are breathed into the lungs. They are often used to keep you asleep. They can be given through a facemask or a tube placed in your larynx or trachea (breathing tube).  ¨ If you have a facemask, your anesthesia provider will most likely place it over your nose and mouth while youre still awake. Youll breathe oxygen through the mask as your IV anesthetic is started. Gas anesthetic may be added through the mask.  ¨ If you have a tube in the larynx or trachea, it  will be inserted into your throat after youre asleep.  Anesthesia tools and medicines  You will likely have:  · IV anesthetics. These are put into an IV line into your bloodstream.  · Gas anesthetics. You breathe these anesthetics into your lungs, where they pass into your bloodstream.  · Pulse oximeter. This is a small clip that is attached to the end of your finger. This measures your blood oxygen level.  · Electrocardiography leads (electrodes). These are small sticky pads that are placed on your chest. They record your heart rate and rhythm.  · Blood pressure cuff. This reads your blood pressure.  Risks and possible complications  General anesthesia has some risks. These include:  · Breathing problems  · Nausea and vomiting  · Sore throat or hoarseness (usually temporary)  · Allergic reaction to the anesthetic  · Irregular heartbeat (rare)  · Cardiac arrest (rare)   Anesthesia safety  · Follow all instructions you are given for how long not to eat or drink before your procedure.  · Be sure your doctor knows what medicines and drugs you take. This includes over-the-counter medicines, herbs, supplements, alcohol or other drugs. You will be asked when those were last taken.  · Have an adult family member or friend drive you home after the procedure.  · For the first 24 hours after your surgery:  ¨ Do not drive or use heavy equipment.  ¨ Do not make important decisions or sign legal documents. If important decisions or signing legal documents is necessary during the first 24 hours after surgery, have a trusted family member or spouse act on your behalf.  ¨ Avoid alcohol.  ¨ Have a responsible adult stay with you. He or she can watch for problems and help keep you safe.  Date Last Reviewed: 12/1/2016 © 2000-2017 [employer]. [address] [place] [address], [place] [address]. All rights reserved. This information is not intended as a substitute for professional medical care. Always follow your healthcare  professional's instructions.

## 2020-06-07 ENCOUNTER — CLINICAL SUPPORT (OUTPATIENT)
Dept: URGENT CARE | Facility: CLINIC | Age: 55
End: 2020-06-07
Payer: MEDICAID

## 2020-06-07 VITALS — TEMPERATURE: 98 F

## 2020-06-07 DIAGNOSIS — Z41.9 ELECTIVE SURGERY: ICD-10-CM

## 2020-06-07 PROCEDURE — U0003 INFECTIOUS AGENT DETECTION BY NUCLEIC ACID (DNA OR RNA); SEVERE ACUTE RESPIRATORY SYNDROME CORONAVIRUS 2 (SARS-COV-2) (CORONAVIRUS DISEASE [COVID-19]), AMPLIFIED PROBE TECHNIQUE, MAKING USE OF HIGH THROUGHPUT TECHNOLOGIES AS DESCRIBED BY CMS-2020-01-R: HCPCS

## 2020-06-08 LAB — SARS-COV-2 RNA RESP QL NAA+PROBE: NOT DETECTED

## 2020-06-09 ENCOUNTER — ANESTHESIA (OUTPATIENT)
Dept: SURGERY | Facility: HOSPITAL | Age: 55
End: 2020-06-09
Payer: MEDICAID

## 2020-06-09 ENCOUNTER — HOSPITAL ENCOUNTER (OUTPATIENT)
Facility: HOSPITAL | Age: 55
Discharge: HOME | End: 2020-06-09
Attending: ORTHOPAEDIC SURGERY | Admitting: ORTHOPAEDIC SURGERY
Payer: MEDICAID

## 2020-06-09 VITALS
RESPIRATION RATE: 20 BRPM | OXYGEN SATURATION: 96 % | HEIGHT: 62 IN | WEIGHT: 175 LBS | TEMPERATURE: 99 F | HEART RATE: 87 BPM | DIASTOLIC BLOOD PRESSURE: 56 MMHG | BODY MASS INDEX: 32.2 KG/M2 | SYSTOLIC BLOOD PRESSURE: 114 MMHG

## 2020-06-09 DIAGNOSIS — Z01.818 PRE-OP TESTING: ICD-10-CM

## 2020-06-09 DIAGNOSIS — M75.101 ROTATOR CUFF TEAR, RIGHT: ICD-10-CM

## 2020-06-09 DIAGNOSIS — M75.121 NONTRAUMATIC COMPLETE TEAR OF RIGHT ROTATOR CUFF: ICD-10-CM

## 2020-06-09 LAB
B-HCG UR QL: NEGATIVE
CTP QC/QA: YES

## 2020-06-09 PROCEDURE — 29822 SHO ARTHRS SRG LMTD DBRDMT: CPT | Mod: AS,59,51,RT | Performed by: PHYSICIAN ASSISTANT

## 2020-06-09 PROCEDURE — 63600175 PHARM REV CODE 636 W HCPCS: Performed by: ORTHOPAEDIC SURGERY

## 2020-06-09 PROCEDURE — 25000003 PHARM REV CODE 250: Performed by: NURSE ANESTHETIST, CERTIFIED REGISTERED

## 2020-06-09 PROCEDURE — 29824 SHO ARTHRS SRG DSTL CLAVICLC: CPT | Mod: AS,RT,, | Performed by: PHYSICIAN ASSISTANT

## 2020-06-09 PROCEDURE — 29826 PR SHLDR ARTHROSCOP,PART ACROMIOPLAS: ICD-10-PCS | Mod: AS,RT,, | Performed by: PHYSICIAN ASSISTANT

## 2020-06-09 PROCEDURE — 37000009 HC ANESTHESIA EA ADD 15 MINS: Performed by: ORTHOPAEDIC SURGERY

## 2020-06-09 PROCEDURE — 01630 ANES OPN/ARTHR PX SHO JT NOS: CPT | Performed by: ORTHOPAEDIC SURGERY

## 2020-06-09 PROCEDURE — 63600175 PHARM REV CODE 636 W HCPCS: Performed by: STUDENT IN AN ORGANIZED HEALTH CARE EDUCATION/TRAINING PROGRAM

## 2020-06-09 PROCEDURE — 29826 SHO ARTHRS SRG DECOMPRESSION: CPT | Mod: RT,,, | Performed by: ORTHOPAEDIC SURGERY

## 2020-06-09 PROCEDURE — 63600175 PHARM REV CODE 636 W HCPCS: Performed by: NURSE PRACTITIONER

## 2020-06-09 PROCEDURE — 27201423 OPTIME MED/SURG SUP & DEVICES STERILE SUPPLY: Performed by: ORTHOPAEDIC SURGERY

## 2020-06-09 PROCEDURE — 36000711: Performed by: ORTHOPAEDIC SURGERY

## 2020-06-09 PROCEDURE — 29826 SHO ARTHRS SRG DECOMPRESSION: CPT | Mod: AS,RT,, | Performed by: PHYSICIAN ASSISTANT

## 2020-06-09 PROCEDURE — 29824 PR SHLDR ARTHROSCOP,SURG,DIS CLAVICULECTOMY: ICD-10-PCS | Mod: AS,RT,, | Performed by: PHYSICIAN ASSISTANT

## 2020-06-09 PROCEDURE — 63600175 PHARM REV CODE 636 W HCPCS: Performed by: NURSE ANESTHETIST, CERTIFIED REGISTERED

## 2020-06-09 PROCEDURE — 29824 PR SHLDR ARTHROSCOP,SURG,DIS CLAVICULECTOMY: ICD-10-PCS | Mod: RT,,, | Performed by: ORTHOPAEDIC SURGERY

## 2020-06-09 PROCEDURE — 29824 SHO ARTHRS SRG DSTL CLAVICLC: CPT | Mod: RT,,, | Performed by: ORTHOPAEDIC SURGERY

## 2020-06-09 PROCEDURE — 76942 ECHO GUIDE FOR BIOPSY: CPT | Performed by: STUDENT IN AN ORGANIZED HEALTH CARE EDUCATION/TRAINING PROGRAM

## 2020-06-09 PROCEDURE — 37000008 HC ANESTHESIA 1ST 15 MINUTES: Performed by: ORTHOPAEDIC SURGERY

## 2020-06-09 PROCEDURE — 25000003 PHARM REV CODE 250: Performed by: ORTHOPAEDIC SURGERY

## 2020-06-09 PROCEDURE — C9290 INJ, BUPIVACAINE LIPOSOME: HCPCS | Performed by: STUDENT IN AN ORGANIZED HEALTH CARE EDUCATION/TRAINING PROGRAM

## 2020-06-09 PROCEDURE — 29822 SHO ARTHRS SRG LMTD DBRDMT: CPT | Mod: 59,51,RT, | Performed by: ORTHOPAEDIC SURGERY

## 2020-06-09 PROCEDURE — 71000015 HC POSTOP RECOV 1ST HR: Performed by: ORTHOPAEDIC SURGERY

## 2020-06-09 PROCEDURE — 27200704 HC ULTRASOUND NDL/ECHOSTIM: Performed by: STUDENT IN AN ORGANIZED HEALTH CARE EDUCATION/TRAINING PROGRAM

## 2020-06-09 PROCEDURE — 81025 URINE PREGNANCY TEST: CPT | Performed by: ORTHOPAEDIC SURGERY

## 2020-06-09 PROCEDURE — 29822 PR SHLDR ARTHROSCOP,PART DEBRIDE: ICD-10-PCS | Mod: AS,59,51,RT | Performed by: PHYSICIAN ASSISTANT

## 2020-06-09 PROCEDURE — 71000016 HC POSTOP RECOV ADDL HR: Performed by: ORTHOPAEDIC SURGERY

## 2020-06-09 PROCEDURE — 64415 NJX AA&/STRD BRCH PLXS IMG: CPT | Performed by: STUDENT IN AN ORGANIZED HEALTH CARE EDUCATION/TRAINING PROGRAM

## 2020-06-09 PROCEDURE — 25000003 PHARM REV CODE 250: Performed by: STUDENT IN AN ORGANIZED HEALTH CARE EDUCATION/TRAINING PROGRAM

## 2020-06-09 PROCEDURE — 29822 PR SHLDR ARTHROSCOP,PART DEBRIDE: ICD-10-PCS | Mod: 59,51,RT, | Performed by: ORTHOPAEDIC SURGERY

## 2020-06-09 PROCEDURE — 36000710: Performed by: ORTHOPAEDIC SURGERY

## 2020-06-09 PROCEDURE — 94799 UNLISTED PULMONARY SVC/PX: CPT

## 2020-06-09 PROCEDURE — 29826 PR SHLDR ARTHROSCOP,PART ACROMIOPLAS: ICD-10-PCS | Mod: RT,,, | Performed by: ORTHOPAEDIC SURGERY

## 2020-06-09 PROCEDURE — 71000033 HC RECOVERY, INTIAL HOUR: Performed by: ORTHOPAEDIC SURGERY

## 2020-06-09 RX ORDER — ONDANSETRON 8 MG/1
8 TABLET, ORALLY DISINTEGRATING ORAL EVERY 8 HOURS PRN
Status: DISCONTINUED | OUTPATIENT
Start: 2020-06-09 | End: 2020-06-09 | Stop reason: HOSPADM

## 2020-06-09 RX ORDER — FENTANYL CITRATE 50 UG/ML
INJECTION, SOLUTION INTRAMUSCULAR; INTRAVENOUS
Status: DISCONTINUED | OUTPATIENT
Start: 2020-06-09 | End: 2020-06-09

## 2020-06-09 RX ORDER — KETOROLAC TROMETHAMINE 30 MG/ML
30 INJECTION, SOLUTION INTRAMUSCULAR; INTRAVENOUS ONCE
Status: COMPLETED | OUTPATIENT
Start: 2020-06-09 | End: 2020-06-09

## 2020-06-09 RX ORDER — PROPOFOL 10 MG/ML
VIAL (ML) INTRAVENOUS
Status: DISCONTINUED | OUTPATIENT
Start: 2020-06-09 | End: 2020-06-09

## 2020-06-09 RX ORDER — ACETAMINOPHEN 10 MG/ML
INJECTION, SOLUTION INTRAVENOUS
Status: DISCONTINUED | OUTPATIENT
Start: 2020-06-09 | End: 2020-06-09

## 2020-06-09 RX ORDER — SUCCINYLCHOLINE CHLORIDE 20 MG/ML
INJECTION INTRAMUSCULAR; INTRAVENOUS
Status: DISCONTINUED | OUTPATIENT
Start: 2020-06-09 | End: 2020-06-09

## 2020-06-09 RX ORDER — GLYCOPYRROLATE 0.2 MG/ML
INJECTION INTRAMUSCULAR; INTRAVENOUS
Status: DISCONTINUED | OUTPATIENT
Start: 2020-06-09 | End: 2020-06-09

## 2020-06-09 RX ORDER — LIDOCAINE HYDROCHLORIDE 10 MG/ML
1 INJECTION, SOLUTION EPIDURAL; INFILTRATION; INTRACAUDAL; PERINEURAL ONCE
Status: DISCONTINUED | OUTPATIENT
Start: 2020-06-09 | End: 2020-06-09 | Stop reason: HOSPADM

## 2020-06-09 RX ORDER — EPINEPHRINE 1 MG/ML
INJECTION, SOLUTION INTRACARDIAC; INTRAMUSCULAR; INTRAVENOUS; SUBCUTANEOUS
Status: DISCONTINUED | OUTPATIENT
Start: 2020-06-09 | End: 2020-06-09 | Stop reason: HOSPADM

## 2020-06-09 RX ORDER — OXYCODONE AND ACETAMINOPHEN 7.5; 325 MG/1; MG/1
1 TABLET ORAL EVERY 4 HOURS PRN
Qty: 40 TABLET | Refills: 0 | Status: ON HOLD | OUTPATIENT
Start: 2020-06-09 | End: 2020-07-09 | Stop reason: HOSPADM

## 2020-06-09 RX ORDER — OXYCODONE HYDROCHLORIDE 5 MG/1
10 TABLET ORAL EVERY 4 HOURS PRN
Status: DISCONTINUED | OUTPATIENT
Start: 2020-06-09 | End: 2020-06-09 | Stop reason: HOSPADM

## 2020-06-09 RX ORDER — BUPIVACAINE HYDROCHLORIDE 2.5 MG/ML
INJECTION, SOLUTION EPIDURAL; INFILTRATION; INTRACAUDAL
Status: DISCONTINUED | OUTPATIENT
Start: 2020-06-09 | End: 2020-06-09

## 2020-06-09 RX ORDER — LIDOCAINE HYDROCHLORIDE 20 MG/ML
INJECTION INTRAVENOUS
Status: DISCONTINUED | OUTPATIENT
Start: 2020-06-09 | End: 2020-06-09

## 2020-06-09 RX ORDER — ACETAMINOPHEN 325 MG/1
650 TABLET ORAL EVERY 4 HOURS PRN
Status: DISCONTINUED | OUTPATIENT
Start: 2020-06-09 | End: 2020-06-09 | Stop reason: HOSPADM

## 2020-06-09 RX ORDER — MIDAZOLAM HYDROCHLORIDE 1 MG/ML
INJECTION, SOLUTION INTRAMUSCULAR; INTRAVENOUS
Status: DISCONTINUED | OUTPATIENT
Start: 2020-06-09 | End: 2020-06-09

## 2020-06-09 RX ORDER — NEOSTIGMINE METHYLSULFATE 1 MG/ML
INJECTION, SOLUTION INTRAVENOUS
Status: DISCONTINUED | OUTPATIENT
Start: 2020-06-09 | End: 2020-06-09

## 2020-06-09 RX ORDER — SODIUM CHLORIDE, SODIUM LACTATE, POTASSIUM CHLORIDE, CALCIUM CHLORIDE 600; 310; 30; 20 MG/100ML; MG/100ML; MG/100ML; MG/100ML
INJECTION, SOLUTION INTRAVENOUS CONTINUOUS
Status: DISCONTINUED | OUTPATIENT
Start: 2020-06-09 | End: 2020-06-09 | Stop reason: HOSPADM

## 2020-06-09 RX ORDER — CEFAZOLIN SODIUM 2 G/50ML
2 SOLUTION INTRAVENOUS
Status: DISCONTINUED | OUTPATIENT
Start: 2020-06-09 | End: 2020-06-09 | Stop reason: HOSPADM

## 2020-06-09 RX ORDER — PHENYLEPHRINE HYDROCHLORIDE 10 MG/ML
INJECTION INTRAVENOUS
Status: DISCONTINUED | OUTPATIENT
Start: 2020-06-09 | End: 2020-06-09

## 2020-06-09 RX ORDER — ONDANSETRON 2 MG/ML
INJECTION INTRAMUSCULAR; INTRAVENOUS
Status: DISCONTINUED | OUTPATIENT
Start: 2020-06-09 | End: 2020-06-09

## 2020-06-09 RX ORDER — ROCURONIUM BROMIDE 10 MG/ML
INJECTION, SOLUTION INTRAVENOUS
Status: DISCONTINUED | OUTPATIENT
Start: 2020-06-09 | End: 2020-06-09

## 2020-06-09 RX ADMIN — SODIUM CHLORIDE, SODIUM LACTATE, POTASSIUM CHLORIDE, AND CALCIUM CHLORIDE: .6; .31; .03; .02 INJECTION, SOLUTION INTRAVENOUS at 12:06

## 2020-06-09 RX ADMIN — KETOROLAC TROMETHAMINE 30 MG: 30 INJECTION, SOLUTION INTRAMUSCULAR at 02:06

## 2020-06-09 RX ADMIN — BUPIVACAINE HYDROCHLORIDE 10 ML: 2.5 INJECTION, SOLUTION EPIDURAL; INFILTRATION; INTRACAUDAL; PERINEURAL at 12:06

## 2020-06-09 RX ADMIN — PHENYLEPHRINE HYDROCHLORIDE 100 MCG: 10 INJECTION INTRAVENOUS at 01:06

## 2020-06-09 RX ADMIN — NEOSTIGMINE METHYLSULFATE 4 MG: 1 INJECTION INTRAVENOUS at 01:06

## 2020-06-09 RX ADMIN — CEFAZOLIN SODIUM 2 G: 2 SOLUTION INTRAVENOUS at 12:06

## 2020-06-09 RX ADMIN — ONDANSETRON 4 MG: 2 INJECTION, SOLUTION INTRAMUSCULAR; INTRAVENOUS at 01:06

## 2020-06-09 RX ADMIN — FENTANYL CITRATE 100 MCG: 50 INJECTION, SOLUTION INTRAMUSCULAR; INTRAVENOUS at 12:06

## 2020-06-09 RX ADMIN — PROPOFOL 150 MG: 10 INJECTION, EMULSION INTRAVENOUS at 12:06

## 2020-06-09 RX ADMIN — PHENYLEPHRINE HYDROCHLORIDE 100 MCG: 10 INJECTION INTRAVENOUS at 12:06

## 2020-06-09 RX ADMIN — LIDOCAINE HYDROCHLORIDE 60 MG: 20 INJECTION, SOLUTION INTRAVENOUS at 12:06

## 2020-06-09 RX ADMIN — ROCURONIUM BROMIDE 50 MG: 10 INJECTION, SOLUTION INTRAVENOUS at 12:06

## 2020-06-09 RX ADMIN — ACETAMINOPHEN 1000 MG: 10 INJECTION, SOLUTION INTRAVENOUS at 01:06

## 2020-06-09 RX ADMIN — GLYCOPYRROLATE 0.6 MG: 0.2 INJECTION, SOLUTION INTRAMUSCULAR; INTRAVENOUS at 01:06

## 2020-06-09 RX ADMIN — OXYCODONE HYDROCHLORIDE 10 MG: 5 TABLET ORAL at 02:06

## 2020-06-09 RX ADMIN — MIDAZOLAM 4 MG: 1 INJECTION INTRAMUSCULAR; INTRAVENOUS at 12:06

## 2020-06-09 RX ADMIN — BUPIVACAINE 10 ML: 13.3 INJECTION, SUSPENSION, LIPOSOMAL INFILTRATION at 12:06

## 2020-06-09 NOTE — DISCHARGE INSTRUCTIONS
Discharge Instructions for Open Rotator Cuff Repair  You had a procedure called open rotator cuff repair. The rotator cuff consists of the muscles and tendons that surround your shoulder. The rotator cuff keeps the top of your upper arm bone (humerus) securely in the shoulder joint. Your doctor made an incision near your shoulder blade and repaired the torn muscles or tendons in your shoulder. Here are instructions to follow when caring for your arm at home.  Activity  · After surgery, rest your arm and relax for the rest of day.  · If you had general anesthesia (were put to sleep for the procedure), dont operate power tools or machinery, drink alcohol, or make any major decisions for at least 24 hours after surgery.  · Wear your sling, brace, or immobilizer, as directed.  · Dont drive a car until your doctor says its OK. And never drive while taking opioid pain medicine.  · Flex your wrist and wiggle your fingers often to help blood flow.  Incision care  · Check your incision daily for redness, tenderness, or drainage.  · Dont soak in a bathtub, hot tub, or pool until your doctor says its OK.  · Wait several days after your surgery to start showering, or until your doctor says it's OK. Then shower as needed. Carefully wash your incision with soap and water. Gently pat it dry. Dont rub the incision, or apply creams or lotions.  · Your incision was closed using sutures, staples, or strips of tape. If you have sutures or staples, they may need to be removed up to 2 to 3 weeks after surgery. Allow the strips of tape to fall off on their own.  Home care  · Use pain medicine as directed by your doctor.  · Apply an ice pack or bag of frozen peas--or something similar--wrapped in a thin towel on your shoulder to reduce swelling for the first 48 hours. Leave the ice pack on for 20 minutes; then take it off for 20 minutes. Repeat as needed.  · Take your temperature daily for 7 days after your surgery. Report a fever  above 100.4°F (38°C)  to your doctor. Fever may be a sign of infection.  Follow-up care  Follow up with your healthcare provider, or as advised.      When to call your healthcare provider  Call 911 right away if you have any of the following:  · Chest pain  · Shortness of breath  Otherwise, call your healthcare provider right away if you have any of these:  · Increasing shoulder pain or pain not relieved by medicine  · Pain or swelling in the arm on the side of your surgery  · Numbness, tingling, coolness, or blue-gray color of your arm or fingers on the side of your surgery  · Fever above 100.4°F (38°C), or as advised  · Shaking chills  · Drainage or oozing, redness, or warmth at the incision  · Nausea or vomiting   Date Last Reviewed: 7/1/2016 © 2000-2017 [employer]. [address] [place] [id number]. All rights reserved. This information is not intended as a substitute for professional medical care. Always follow your healthcare professional's instructions.        ANESTHESIA  -For the first 24 hours after surgery:  Do not drive, use heavy equipment, make important decisions, or drink alcohol  -It is normal to feel sleepy for several hours.  Rest until you are more awake.  -Have someone stay with you, if needed.  They can watch for problems and help keep you safe.  -Some possible post anesthesia side effects include: nausea and vomiting, sore throat and hoarseness, sleepiness, and dizziness.    PAIN  -If you have pain after surgery, pain medicine will help you feel better.  Take it as directed, before pain becomes severe.  Most pain relievers taken by mouth need at least 20-30 minutes to start working.  -Do not drive or drink alcohol while taking pain medicine.  -Pain medication can upset your stomach.  Taking them with a little food may help.  -Other ways to help control pain: elevation, ice, and relaxation  -Call your surgeon if still having unmanageable pain an hour after taking pain  medicine.  -Pain medicine can cause constipation.  Taking an over-the counter stool softener while on prescription pain medicine and drinking plenty of fluids can prevent this side effect.  -Call your surgeon if you have severe side effects like: breathing problems, trouble waking up, dizziness, confusion, or severe constipation.    NAUSEA  -Some people have nausea after surgery.  This is often because of anesthesia, pain, pain medicine, or the stress of surgery.  -Do not push yourself to eat.  Start off with clear liquids and soup.  Slowly move to solid foods.  Don't eat fatty, rich, spicy foods at first.  Eat smaller amounts.  -If you develop persistent nausea and vomiting please notify your surgeon immediately.    BLEEDING  -Different types of surgery require different types of care and dressing changes.  It is important to follow all instructions and advice from your surgeon.  Change dressing as directed.  Call your surgeon for any concerns regarding postop bleeding.    SIGNS OF INFECTION  -Signs of infection include: fever, swelling, drainage, and redness  -Notify your surgeon if you have a fever of 100.4 F (38.0 C) or higher.  -Notify your surgeon if you notice redness, swelling, increased pain, pus, or a foul smell at the incision site.          Acetaminophen; Oxycodone tablets  What is this medicine?  ACETAMINOPHEN; OXYCODONE (a set a ERIC anthony fen; ox i KOE done) is a pain reliever. It is used to treat moderate to severe pain.  How should I use this medicine?  Take this medicine by mouth with a full glass of water. Follow the directions on the prescription label. You can take it with or without food. If it upsets your stomach, take it with food. Take your medicine at regular intervals. Do not take it more often than directed.  A special MedGuide will be given to you by the pharmacist with each prescription and refill. Be sure to read this information carefully each time.  Talk to your pediatrician regarding  the use of this medicine in children. Special care may be needed.  What side effects may I notice from receiving this medicine?  Side effects that you should report to your doctor or health care professional as soon as possible:  · allergic reactions like skin rash, itching or hives, swelling of the face, lips, or tongue  · breathing problems  · confusion  · redness, blistering, peeling or loosening of the skin, including inside the mouth  · signs and symptoms of liver injury like dark yellow or brown urine; general ill feeling or flu-like symptoms; light-colored stools; loss of appetite; nausea; right upper belly pain; unusually weak or tired; yellowing of the eyes or skin  · signs and symptoms of low blood pressure like dizziness; feeling faint or lightheaded, falls; unusually weak or tired  · trouble passing urine or change in the amount of urine  Side effects that usually do not require medical attention (report to your doctor or health care professional if they continue or are bothersome):  · constipation  · dry mouth  · nausea, vomiting  · tiredness  What may interact with this medicine?  This medicine may interact with the following medications:  · alcohol  · antihistamines for allergy, cough and cold  · antiviral medicines for HIV or AIDS  · atropine  · certain antibiotics like clarithromycin, erythromycin, linezolid, rifampin  · certain medicines for anxiety or sleep  · certain medicines for bladder problems like oxybutynin, tolterodine  · certain medicines for depression like amitriptyline, fluoxetine, sertraline  · certain medicines for fungal infections like ketoconazole, itraconazole, voriconazole  · certain medicines for migraine headache like almotriptan, eletriptan, frovatriptan, naratriptan, rizatriptan, sumatriptan, zolmitriptan  · certain medicines for nausea or vomiting like dolasetron, ondansetron, palonosetron  · certain medicines for Parkinson's disease like benztropine,  trihexyphenidyl  · certain medicines for seizures like phenobarbital, phenytoin, primidone  · certain medicines for stomach problems like dicyclomine, hyoscyamine  · certain medicines for travel sickness like scopolamine  · diuretics  · general anesthetics like halothane, isoflurane, methoxyflurane, propofol  · ipratropium  · local anesthetics like lidocaine, pramoxine, tetracaine  · MAOIs like Carbex, Eldepryl, Marplan, Nardil, and Parnate  · medicines that relax muscles for surgery  · methylene blue  · nilotinib  · other medicines with acetaminophen  · other narcotic medicines for pain or cough  · phenothiazines like chlorpromazine, mesoridazine, prochlorperazine, thioridazine  What if I miss a dose?  If you miss a dose, take it as soon as you can. If it is almost time for your next dose, take only that dose. Do not take double or extra doses.  Where should I keep my medicine?  Keep out of the reach of children. This medicine can be abused. Keep your medicine in a safe place to protect it from theft. Do not share this medicine with anyone. Selling or giving away this medicine is dangerous and against the law.  This medicine may cause accidental overdose and death if it taken by other adults, children, or pets. Mix any unused medicine with a substance like cat litter or coffee grounds. Then throw the medicine away in a sealed container like a sealed bag or a coffee can with a lid. Do not use the medicine after the expiration date.  Store at room temperature between 20 and 25 degrees C (68 and 77 degrees F).  What should I tell my health care provider before I take this medicine?  They need to know if you have any of these conditions:  · brain tumor  · Crohn's disease, inflammatory bowel disease, or ulcerative colitis  · drug abuse or addiction  · head injury  · heart or circulation problems  · if you often drink alcohol  · kidney disease or problems going to the bathroom  · liver disease  · lung disease, asthma, or  breathing problems  · an unusual or allergic reaction to acetaminophen, oxycodone, other opioid analgesics, other medicines, foods, dyes, or preservatives  · pregnant or trying to get pregnant  · breast-feeding  What should I watch for while using this medicine?  Tell your doctor or health care professional if your pain does not go away, if it gets worse, or if you have new or a different type of pain. You may develop tolerance to the medicine. Tolerance means that you will need a higher dose of the medication for pain relief. Tolerance is normal and is expected if you take this medicine for a long time.  Do not suddenly stop taking your medicine because you may develop a severe reaction. Your body becomes used to the medicine. This does NOT mean you are addicted. Addiction is a behavior related to getting and using a drug for a non-medical reason. If you have pain, you have a medical reason to take pain medicine. Your doctor will tell you how much medicine to take. If your doctor wants you to stop the medicine, the dose will be slowly lowered over time to avoid any side effects.  There are different types of narcotic medicines (opiates). If you take more than one type at the same time or if you are taking another medicine that also causes drowsiness, you may have more side effects. Give your health care provider a list of all medicines you use. Your doctor will tell you how much medicine to take. Do not take more medicine than directed. Call emergency for help if you have problems breathing or unusual sleepiness.  Do not take other medicines that contain acetaminophen with this medicine. Always read labels carefully. If you have questions, ask your doctor or pharmacist.  If you take too much acetaminophen get medical help right away. Too much acetaminophen can be very dangerous and cause liver damage. Even if you do not have symptoms, it is important to get help right away.  You may get drowsy or dizzy. Do not  drive, use machinery, or do anything that needs mental alertness until you know how this medicine affects you. Do not stand or sit up quickly, especially if you are an older patient. This reduces the risk of dizzy or fainting spells. Alcohol may interfere with the effect of this medicine. Avoid alcoholic drinks.  The medicine will cause constipation. Try to have a bowel movement at least every 2 to 3 days. If you do not have a bowel movement for 3 days, call your doctor or health care professional.  Your mouth may get dry. Chewing sugarless gum or sucking hard candy, and drinking plenty or water may help. Contact your doctor if the problem does not go away or is severe.  NOTE:This sheet is a summary. It may not cover all possible information. If you have questions about this medicine, talk to your doctor, pharmacist, or health care provider. Copyright© 2017 [employer]

## 2020-06-09 NOTE — ANESTHESIA POSTPROCEDURE EVALUATION
Anesthesia Post Evaluation    Patient: Sherie Reyes    Procedure(s) Performed: Procedure(s) (LRB):  REPAIR, ROTATOR CUFF, ARTHROSCOPIC (Right)    Final Anesthesia Type: general    Patient location during evaluation: PACU  Patient participation: Yes- Able to Participate  Level of consciousness: awake and alert  Post-procedure vital signs: reviewed and stable  Pain management: adequate  Airway patency: patent    PONV status at discharge: No PONV  Anesthetic complications: no      Cardiovascular status: blood pressure returned to baseline  Respiratory status: unassisted  Hydration status: euvolemic  Follow-up not needed.          Vitals Value Taken Time   /59 6/9/2020  2:19 PM   Temp 36.7 °C (98 °F) 6/9/2020  1:50 PM   Pulse 96 6/9/2020  2:24 PM   Resp 23 6/9/2020  2:24 PM   SpO2 97 % 6/9/2020  2:24 PM   Vitals shown include unvalidated device data.      No case tracking events are documented in the log.      Pain/Jessica Score: Jessica Score: 8 (6/9/2020  2:00 PM)

## 2020-06-09 NOTE — PLAN OF CARE
Pt assisted up to bathroom with staff. Tolerated well, voided and assisted back to bed. HOB up high fowlers and encouraged pt to continue to do IS to improve O2 sat on room air.

## 2020-06-09 NOTE — PLAN OF CARE
Patient has met PACU discharge criteria, VSS, pain well controlled. Family updated by phone. Released from PACU by Dr. Godinez*

## 2020-06-09 NOTE — PLAN OF CARE
O2 sat 98% on 2 L/min NC. No distress. IS done x 15 @ 1000 to 1250 ml tidal volume. Taken off O2 again for room air trial

## 2020-06-09 NOTE — OP NOTE
Certification of Assistant at Surgery       Surgery Date: 6/9/2020     Participating Surgeons:  Surgeon(s) and Role:     * Melchor Hughes Jr., MD - Primary    Procedures:  Procedure(s) (LRB):  REPAIR, ROTATOR CUFF, ARTHROSCOPIC (Right)    Assistant Surgeon's Certification of Necessity:  I understand that section 1842 (b) (6) (d) of the Social Security Act generally prohibits Medicare Part B reasonable charge payment for the services of assistants at surgery in teaching hospitals when qualified residents are available to furnish such services. I certify that the services for which payment is claimed were medically necessary, and that no qualified resident was available to perform the services. I further understand that these services are subject to post-payment review by the Medicare carrier.      Oral Davis PA-C  **  06/09/2020  3:30 PM

## 2020-06-09 NOTE — TRANSFER OF CARE
"Anesthesia Transfer of Care Note    Patient: Sherie Reyes    Procedure(s) Performed: Procedure(s) (LRB):  REPAIR, ROTATOR CUFF, ARTHROSCOPIC (Right)    Patient location: PACU    Anesthesia Type: general    Transport from OR: Transported from OR on 6-10 L/min O2 by face mask with adequate spontaneous ventilation    Post pain: adequate analgesia    Post assessment: no apparent anesthetic complications and tolerated procedure well    Post vital signs: stable    Level of consciousness: awake, alert and oriented    Nausea/Vomiting: no nausea/vomiting    Complications: none    Transfer of care protocol was followed      Last vitals:   Visit Vitals  BP (!) 132/90 (BP Location: Left arm, Patient Position: Lying)   Pulse 110   Temp 36.7 °C (98 °F)   Resp (!) 24   Ht 5' 2" (1.575 m)   Wt 79.4 kg (175 lb)   SpO2 (!) 86%   Breastfeeding? No   BMI 32.01 kg/m²     "

## 2020-06-09 NOTE — ANESTHESIA PROCEDURE NOTES
Peripheral Block    Patient location during procedure: pre-op   Block not for primary anesthetic.  Reason for block: at surgeon's request and post-op pain management   Post-op Pain Location: right shoulder  Start time: 6/9/2020 12:09 PM  Timeout: 6/9/2020 12:06 PM   End time: 6/9/2020 12:11 PM    Staffing  Authorizing Provider: Julián Steven MD  Performing Provider: Nga Soto MD    Preanesthetic Checklist  Completed: patient identified, site marked, surgical consent, pre-op evaluation, timeout performed, IV checked, risks and benefits discussed and monitors and equipment checked  Peripheral Block  Patient position: sitting  Prep: ChloraPrep  Patient monitoring: heart rate, cardiac monitor, continuous pulse ox and frequent blood pressure checks  Block type: interscalene  Laterality: right  Injection technique: single shot  Needle  Needle type: Echogenic   Needle gauge: 21 G  Needle length: 4 in  Needle localization: anatomical landmarks and ultrasound guidance   -ultrasound image captured on disc.  Assessment  Injection assessment: negative aspiration, negative parasthesia and local visualized surrounding nerve  Paresthesia pain: none  Heart rate change: no  Slow fractionated injection: yes

## 2020-06-09 NOTE — PLAN OF CARE
Pt O2 sat steadily 94-95% on room air, increases to 96% with a few deep breaths. Denies pain, no surgical site complications, left arm sling in place, arm still numb. Pt is dressed and IV discontinued. Discharge ok'd by Dr. Roman. Criteria met. Pt more awake.

## 2020-06-09 NOTE — OP NOTE
Operative Note       Surgery Date: 6/9/2020     Surgeon(s) and Role:     * Melchor Hughes Jr., MD - Primary    Pre-op Diagnosis:  Nontraumatic complete tear of right rotator cuff [M75.121]    Post-op Diagnosis: Post-Op Diagnosis Codes:     * Nontraumatic complete tear of right rotator cuff [M75.121]    Procedure(s) (LRB):  REPAIR, ROTATOR CUFF, ARTHROSCOPIC (Right)    Anesthesia: General    Procedure in Detail/Findings:  Preop diagnosis:  Partial tear rotator cuff right shoulder                               Acromioclavicular joint arthritis right shoulder  Postop diagnosis: Same.       operative procedure:  1. Right shoulder arthroscopy and subacromial decompression.      2. Right shoulder arthroscopic debridement of partial tear rotator cuff.      3. Right shoulder arthroscopic acromioclavicular joint resection ( jose francisco Lyric).      Surgeon: Saul.      First assistant:  Ryan    Anesthesia:  General.      EBL: Minimal.      Complications: None.      Specimen:  None.      Operative procedure in detail as follows:    After operative consent was obtained patient brought the operating room placed supine operating table.  Anesthesia by general endotracheal method performed with anesthesia staff.  After the patient was asleep carefully turned lateral decubitus position stabilized on the bean bag.  The right shoulder and upper extremity then prepped and draped out in the normal sterile fashion.  The right arm suspended longitudinal traction 14 lb.  Posterolateral stab incision made with 15 blade.  Arthroscope camera inserted into the right shoulder joint diagnostic arthroscopy of the shoulder showed a mild degeneration of the labrum and a partial tear of the rotator cuff.  The scope was placed in subacromial space  And a lateral portal created with 15 blade.  The sucker shaver inserted laterally and complete bursectomy performed was a very thickened CA ligament which was divided and excise completely.  The rotator  cuff tear was less than 50% therefore was just debrided carefully leaving it intact for the most part    The bur was then brought in laterally and an acromioplasty performed from lateral to medial decompress in subacromial space all the way to the AC joint.  The AC joint was degenerative with inferior spurring the inferior distal clavicle was removed as well as the distal clavicle 5-8 mm decompressing the joint.      After smoothing all bony surfaces excess fluid and debris evacuated the instruments then removed and the portals closed using interrupted 3 O nylon suture on the skin.  Sterile dressing applied followed by a sling the patient then extubated brought to recovery room in stable condition all sponge needle counts reported as correct    Estimated Blood Loss: * No values recorded between 6/9/2020  1:02 PM and 6/9/2020  1:33 PM *           Specimens (From admission, onward)    None        Implants: * No implants in log *           Disposition: PACU - hemodynamically stable.           Condition: Good    Attestation:  I was present and scrubbed for the entire procedure.           Discharge Note    Admit Date: 6/9/2020    Attending Physician: Melchor Hughes Jr., MD     Discharge Physician: Melchor Hughes Jr., MD    Final Diagnosis: Post-Op Diagnosis Codes:     * Nontraumatic complete tear of right rotator cuff [M75.121]    Disposition: Home or Self Care    Patient Instructions:   Current Discharge Medication List      START taking these medications    Details   oxyCODONE-acetaminophen (PERCOCET) 7.5-325 mg per tablet Take 1 tablet by mouth every 4 (four) hours as needed for Pain.  Qty: 40 tablet, Refills: 0         CONTINUE these medications which have NOT CHANGED    Details   amLODIPine (NORVASC) 5 MG tablet Take 1 tablet (5 mg total) by mouth 2 (two) times daily. (new tablet strength)  Qty: 180 tablet, Refills: 1    Associated Diagnoses: Essential hypertension      buPROPion (WELLBUTRIN XL) 300 MG 24 hr  tablet Take 300 mg by mouth once daily.      clonazePAM (KLONOPIN) 1 MG tablet Take 1 mg by mouth 2 (two) times daily.       duloxetine (CYMBALTA) 60 MG capsule Take 60 mg by mouth once daily.      gabapentin (NEURONTIN) 300 MG capsule Take 900 mg by mouth 3 (three) times daily.       gemfibrozil (LOPID) 600 MG tablet TAKE 1 TABLET BY MOUTH TWICE DAILY BEFORE MEALS  Qty: 60 tablet, Refills: 5      !! HYDROcodone-acetaminophen (NORCO) 5-325 mg per tablet Take 1 tablet by mouth every 12 (twelve) hours as needed for Pain.  Qty: 40 tablet, Refills: 0    Comments: Medically necessary more than 7 days      KURVELO 0.15-0.03 mg per tablet Take 1 tablet by mouth once daily.      linaCLOtide (LINZESS) 290 mcg Cap capsule Take 1 capsule (290 mcg total) by mouth once daily.  Qty: 90 capsule, Refills: 0      omeprazole (PRILOSEC) 40 MG capsule Take 1 capsule (40 mg total) by mouth once daily.  Qty: 30 capsule, Refills: 11      potassium chloride SA (KLOR-CON M20) 20 MEQ tablet Take 2 tablets (40 mEq total) by mouth once daily.  Qty: 30 tablet, Refills: 0    Comments: Please consider 90 day supplies to promote better adherence      pravastatin (PRAVACHOL) 20 MG tablet Take 1 tablet (20 mg total) by mouth every evening.  Qty: 30 tablet, Refills: 6    Associated Diagnoses: Mixed hyperlipidemia      QUEtiapine (SEROQUEL) 100 MG Tab Take 1 tablet by mouth once daily.      tizanidine (ZANAFLEX) 6 mg capsule Take 6 mg by mouth 2 (two) times daily as needed for Muscle spasms.      traMADoL (ULTRAM) 50 mg tablet TK 1 TO 2 TS PO TID PRN  Qty: 40 tablet, Refills: 1    Comments: Quantity prescribed more than 7 day supply? Yes, quantity medically necessary      acetaminophen (TYLENOL) 500 MG tablet Take 1 tablet (500 mg total) by mouth every 6 (six) hours as needed for Pain (Headache).  Qty: 90 tablet, Refills: 1      cloNIDine (CATAPRES) 0.1 MG tablet Take 1 tablet (0.1 mg total) by mouth every 12 (twelve) hours as needed (only for blood  pressure greater than 180/120).  Qty: 60 tablet, Refills: 6      gabapentin (NEURONTIN) 600 MG tablet TK 1 T PO TID  Refills: 5      !! HYDROcodone-acetaminophen (NORCO) 5-325 mg per tablet Take 1 tablet by mouth every 4 (four) hours as needed for Pain.  Qty: 40 tablet, Refills: 0    Comments: Medically necessary more than 7 days      POLYETHYLENE GLYCOL 3350 (MIRALAX ORAL) Take by mouth as needed.        !! - Potential duplicate medications found. Please discuss with provider.          Discharge Procedure Orders (must include Diet, Follow-up, Activity)   Discharge Procedure Orders (must include Diet, Follow-up, Activity)   Diet general     Call MD for:  temperature >100.4     Call MD for:  persistent nausea and vomiting     Call MD for:  severe uncontrolled pain     Ice to affected area     Remove dressing in 24 hours     Shower on day dressing removed (No bath)        Discharge Date: No discharge date for patient encounter.

## 2020-06-09 NOTE — INTERVAL H&P NOTE
The patient has been examined and the H&P has been reviewed:    I concur with the findings and no changes have occurred since H&P was written.    Anesthesia/Surgery risks, benefits and alternative options discussed and understood by patient/family.          Active Hospital Problems    Diagnosis  POA    Rotator cuff tear, right [M75.101]  Yes      Resolved Hospital Problems   No resolved problems to display.

## 2020-06-09 NOTE — H&P
CC:  Right shoulder pain and labral tear           HPI:  Sherie Reyes is a very pleasant 54 y.o. female with ongoing symptoms right shoulder for the past 6-9 months  Previously we ordered an MRI scan but only a CT arthrogram was authorized  The results of the CT arthrogram show that she does have some impingement on the rotator cuff as well as a labral tear.  I went over those findings with her today  She continues to have pain in the right shoulder difficulty with use particularly elevation  No popping or clicking is reported  She also has some intermittent numbness in both hands but a previous nerve conduction study was normal and went over that with her as well            PAST MEDICAL HISTORY:        Past Medical History:   Diagnosis Date    Anxiety      Degenerative joint disease (DJD) of hip      Diabetes type 2, controlled 7/10/2019    GERD (gastroesophageal reflux disease)      Hyperlipidemia      Hypertension      IBS (irritable bowel syndrome)      Insomnia      Lumbar disc herniation      PTSD (post-traumatic stress disorder)        PAST SURGICAL HISTORY:         Past Surgical History:   Procedure Laterality Date    ADENOIDECTOMY         SECTION        TONSILLECTOMY          FAMILY HISTORY:         Family History   Problem Relation Age of Onset    Hypertension Mother      Hypothyroidism Mother      Hypertension Father      Stroke Father 46    Diabetes Father      Cancer Maternal Grandfather      Breast cancer Paternal Aunt        SOCIAL HISTORY:   Social History            Socioeconomic History    Marital status:        Spouse name: Not on file    Number of children: Not on file    Years of education: Not on file    Highest education level: Not on file   Occupational History    Not on file   Social Needs    Financial resource strain: Hard    Food insecurity:       Worry: Often true       Inability: Sometimes true    Transportation needs:       Medical: No        Non-medical: No   Tobacco Use    Smoking status: Former Smoker       Types: Vaping w/o nicotine    Smokeless tobacco: Former User       Quit date: 10/20/2014    Tobacco comment: Vapor cigarettes   Substance and Sexual Activity    Alcohol use: No       Frequency: Monthly or less       Drinks per session: 3 or 4       Binge frequency: Never       Comment: socially    Drug use: No    Sexual activity: Not on file   Lifestyle    Physical activity:       Days per week: 3 days       Minutes per session: 120 min    Stress: Very much   Relationships    Social connections:       Talks on phone: More than three times a week       Gets together: Once a week       Attends Taoist service: Not on file       Active member of club or organization: Yes       Attends meetings of clubs or organizations: Never       Relationship status:    Other Topics Concern    Not on file   Social History Narrative    Not on file         MEDICATIONS:   Current Outpatient Medications:     acetaminophen (TYLENOL) 500 MG tablet, Take 1 tablet (500 mg total) by mouth every 6 (six) hours as needed for Pain (Headache)., Disp: 90 tablet, Rfl: 1    amLODIPine (NORVASC) 10 MG tablet, Take 1 tablet (10 mg total) by mouth once daily., Disp: 30 tablet, Rfl: 11    buPROPion (WELLBUTRIN XL) 300 MG 24 hr tablet, Take 300 mg by mouth once daily., Disp: , Rfl:     clonazePAM (KLONOPIN) 1 MG tablet, Take 1 mg by mouth 2 (two) times daily. , Disp: , Rfl:     cloNIDine (CATAPRES) 0.1 MG tablet, Take 1 tablet (0.1 mg total) by mouth every 12 (twelve) hours as needed (only for blood pressure greater than 180/120)., Disp: 60 tablet, Rfl: 6    duloxetine (CYMBALTA) 60 MG capsule, Take 60 mg by mouth once daily., Disp: , Rfl:     gabapentin (NEURONTIN) 300 MG capsule, Take 900 mg by mouth 3 (three) times daily. , Disp: , Rfl:     gabapentin (NEURONTIN) 600 MG tablet, TK 1 T PO TID, Disp: , Rfl: 5    gemfibrozil (LOPID) 600 MG tablet, TAKE  1 TABLET BY MOUTH TWICE DAILY BEFORE MEALS, Disp: 60 tablet, Rfl: 5    KURVELO 0.15-0.03 mg per tablet, Take 1 tablet by mouth once daily., Disp: , Rfl:     LINZESS 290 mcg Cap capsule, TAKE 1 CAPSULE BY MOUTH EVERY DAY, Disp: 90 capsule, Rfl: 1    omeprazole (PRILOSEC) 40 MG capsule, Take 1 capsule (40 mg total) by mouth once daily., Disp: 30 capsule, Rfl: 11    POLYETHYLENE GLYCOL 3350 (MIRALAX ORAL), Take by mouth as needed. , Disp: , Rfl:     potassium chloride SA (KLOR-CON M20) 20 MEQ tablet, Take 2 tablets (40 mEq total) by mouth once daily., Disp: 30 tablet, Rfl: 0    pravastatin (PRAVACHOL) 20 MG tablet, TAKE 1 TABLET BY MOUTH EVERY EVENING, Disp: 30 tablet, Rfl: 6    QUEtiapine (SEROQUEL) 100 MG Tab, Take 1 tablet by mouth once daily., Disp: , Rfl:     spironolactone (ALDACTONE) 25 MG tablet, Take 1 tablet (25 mg total) by mouth once daily., Disp: 30 tablet, Rfl: 11    tizanidine (ZANAFLEX) 6 mg capsule, Take 6 mg by mouth 2 (two) times daily as needed for Muscle spasms., Disp: , Rfl:     traMADol (ULTRAM) 50 mg tablet, TK 1 TO 2 TS PO TID PRN, Disp: , Rfl: 3    oxyCODONE (ROXICODONE) 5 MG immediate release tablet, Take 1 tablet (5 mg total) by mouth every 8 (eight) hours as needed for Pain. (Patient not taking: Reported on 1/30/2020), Disp: 9 tablet, Rfl: 0  ALLERGIES: Review of patient's allergies indicates:  No Known Allergies     Review of Systems:  Constitutional: no fever or chills  ENT: no nasal congestion or sore throat  Respiratory: no cough or shortness of breath  Cardiovascular: no chest pain or palpitations  Gastrointestinal: no nausea or vomiting, PUD, GERD, NSAID intolerance  Genitourinary: no hematuria or dysuria  Integument/Breast: no rash or pruritis  Hematologic/Lymphatic: no easy bruising or lymphadenopathy  Musculoskeletal: see HPI  Neurological: no seizures or tremors  Behavioral/Psych: no auditory or visual hallucinations        Physical Exam       Vitals:     01/30/20 1533  "  Weight: 85.3 kg (188 lb)   Height: 5' 2" (1.575 m)   PainSc:   8         Constitutional: Oriented to person, place, and time. Appears well-developed and well-nourished.   HENT:   Head: Normocephalic and atraumatic.   Nose: Nose normal.   Eyes: No scleral icterus.   Neck: Normal range of motion.   Cardiovascular: Normal rate and regular rhythm.    Pulses:       Radial pulses are 2+ on the right side, and 2+ on the left side.   Pulmonary/Chest: Effort normal and breath sounds normal.   Abdominal: Soft.   Neurological: Alert and oriented to person, place, and time.   Skin: Skin is warm.   Psychiatric: Normal mood and affect.      MUSCULOSKELETAL UPPER EXTREMITY:  Examination right shoulder no tenderness no swelling  Full range of motion  She does have a positive impingement sign and a positive supraspinatus stress test  No instability no clicking or popping is noted neurologic exam intact  Tinel sign negative at the elbow and wrist                 Diagnostic Studies:  Arthrogram demonstrates labral tear and possible partial tear of the rotator cuff           Assessment:  1.  Chronic right shoulder pain.  2.  Labral tear right shoulder 3.  Possible partial tear rotator cuff     Plan:  The patient is anxious to proceed with surgery for right shoulder arthroscopy  We could address the labral tear and possibly the rotator cuff if necessary  The risks and benefits of surgery explained to the patient she understands  However I also explained that the surgery would not help the numbness that she has intermittently or the elbow pain which is related to lateral epicondylitis she understands        The risks and benefits of surgery were discussed with the patient today and they understand.  The consent was signed in the office for surgery.        Melchor Hughes MD (Jay)  [facility]  Orthopedic Upper Extremity Surgery    "

## 2020-06-09 NOTE — PLAN OF CARE
Oxygen discontinued for room air trial. Deep breathing and coughing instructions given to pt and she performed well. Called resp for IS, O2 sat 91-92% on room air

## 2020-06-10 ENCOUNTER — HOSPITAL ENCOUNTER (EMERGENCY)
Facility: HOSPITAL | Age: 55
Discharge: HOME | End: 2020-06-10
Attending: EMERGENCY MEDICINE
Payer: MEDICAID

## 2020-06-10 ENCOUNTER — TELEPHONE (OUTPATIENT)
Dept: ORTHOPEDICS | Facility: CLINIC | Age: 55
End: 2020-06-10

## 2020-06-10 ENCOUNTER — NURSE TRIAGE (OUTPATIENT)
Dept: ADMINISTRATIVE | Facility: CLINIC | Age: 55
End: 2020-06-10

## 2020-06-10 VITALS
SYSTOLIC BLOOD PRESSURE: 124 MMHG | RESPIRATION RATE: 24 BRPM | WEIGHT: 175 LBS | TEMPERATURE: 97 F | HEART RATE: 103 BPM | HEIGHT: 62 IN | DIASTOLIC BLOOD PRESSURE: 69 MMHG | OXYGEN SATURATION: 96 % | BODY MASS INDEX: 32.2 KG/M2

## 2020-06-10 DIAGNOSIS — R06.02 SOB (SHORTNESS OF BREATH): Primary | ICD-10-CM

## 2020-06-10 DIAGNOSIS — F41.9 ANXIETY: ICD-10-CM

## 2020-06-10 LAB
ALBUMIN SERPL BCP-MCNC: 3.3 G/DL (ref 3.5–5.2)
ALP SERPL-CCNC: 47 U/L (ref 55–135)
ALT SERPL W/O P-5'-P-CCNC: 20 U/L (ref 10–44)
ANION GAP SERPL CALC-SCNC: 9 MMOL/L (ref 8–16)
AST SERPL-CCNC: 23 U/L (ref 10–40)
B-HCG UR QL: NEGATIVE
BASOPHILS # BLD AUTO: 0.02 K/UL (ref 0–0.2)
BASOPHILS NFR BLD: 0.2 % (ref 0–1.9)
BILIRUB SERPL-MCNC: 0.3 MG/DL (ref 0.1–1)
BNP SERPL-MCNC: 40 PG/ML (ref 0–99)
BUN SERPL-MCNC: 11 MG/DL (ref 6–20)
CALCIUM SERPL-MCNC: 9.5 MG/DL (ref 8.7–10.5)
CHLORIDE SERPL-SCNC: 107 MMOL/L (ref 95–110)
CO2 SERPL-SCNC: 21 MMOL/L (ref 23–29)
CREAT SERPL-MCNC: 1.2 MG/DL (ref 0.5–1.4)
CTP QC/QA: YES
DIFFERENTIAL METHOD: ABNORMAL
EOSINOPHIL # BLD AUTO: 0.3 K/UL (ref 0–0.5)
EOSINOPHIL NFR BLD: 3.1 % (ref 0–8)
ERYTHROCYTE [DISTWIDTH] IN BLOOD BY AUTOMATED COUNT: 13.2 % (ref 11.5–14.5)
EST. GFR  (AFRICAN AMERICAN): >60 ML/MIN/1.73 M^2
EST. GFR  (NON AFRICAN AMERICAN): 52 ML/MIN/1.73 M^2
GLUCOSE SERPL-MCNC: 132 MG/DL (ref 70–110)
HCT VFR BLD AUTO: 35.2 % (ref 37–48.5)
HGB BLD-MCNC: 11.5 G/DL (ref 12–16)
IMM GRANULOCYTES # BLD AUTO: 0.03 K/UL (ref 0–0.04)
IMM GRANULOCYTES NFR BLD AUTO: 0.3 % (ref 0–0.5)
LYMPHOCYTES # BLD AUTO: 2.3 K/UL (ref 1–4.8)
LYMPHOCYTES NFR BLD: 25.6 % (ref 18–48)
MCH RBC QN AUTO: 28.8 PG (ref 27–31)
MCHC RBC AUTO-ENTMCNC: 32.7 G/DL (ref 32–36)
MCV RBC AUTO: 88 FL (ref 82–98)
MONOCYTES # BLD AUTO: 0.8 K/UL (ref 0.3–1)
MONOCYTES NFR BLD: 8.5 % (ref 4–15)
NEUTROPHILS # BLD AUTO: 5.6 K/UL (ref 1.8–7.7)
NEUTROPHILS NFR BLD: 62.3 % (ref 38–73)
NRBC BLD-RTO: 0 /100 WBC
PLATELET # BLD AUTO: 263 K/UL (ref 150–350)
PMV BLD AUTO: 11.4 FL (ref 9.2–12.9)
POTASSIUM SERPL-SCNC: 3.6 MMOL/L (ref 3.5–5.1)
PROT SERPL-MCNC: 6.7 G/DL (ref 6–8.4)
RBC # BLD AUTO: 4 M/UL (ref 4–5.4)
SODIUM SERPL-SCNC: 137 MMOL/L (ref 136–145)
TROPONIN I SERPL DL<=0.01 NG/ML-MCNC: <0.006 NG/ML (ref 0–0.03)
WBC # BLD AUTO: 9.02 K/UL (ref 3.9–12.7)

## 2020-06-10 PROCEDURE — 81025 URINE PREGNANCY TEST: CPT | Performed by: EMERGENCY MEDICINE

## 2020-06-10 PROCEDURE — 83880 ASSAY OF NATRIURETIC PEPTIDE: CPT

## 2020-06-10 PROCEDURE — 85025 COMPLETE CBC W/AUTO DIFF WBC: CPT

## 2020-06-10 PROCEDURE — 63600175 PHARM REV CODE 636 W HCPCS: Performed by: EMERGENCY MEDICINE

## 2020-06-10 PROCEDURE — 93010 ELECTROCARDIOGRAM REPORT: CPT | Mod: ,,, | Performed by: INTERNAL MEDICINE

## 2020-06-10 PROCEDURE — 99284 EMERGENCY DEPT VISIT MOD MDM: CPT | Mod: 25

## 2020-06-10 PROCEDURE — 25500020 PHARM REV CODE 255: Performed by: EMERGENCY MEDICINE

## 2020-06-10 PROCEDURE — 93005 ELECTROCARDIOGRAM TRACING: CPT

## 2020-06-10 PROCEDURE — 96374 THER/PROPH/DIAG INJ IV PUSH: CPT | Mod: 59

## 2020-06-10 PROCEDURE — 80053 COMPREHEN METABOLIC PANEL: CPT

## 2020-06-10 PROCEDURE — 96361 HYDRATE IV INFUSION ADD-ON: CPT

## 2020-06-10 PROCEDURE — 84484 ASSAY OF TROPONIN QUANT: CPT

## 2020-06-10 PROCEDURE — 93010 EKG 12-LEAD: ICD-10-PCS | Mod: ,,, | Performed by: INTERNAL MEDICINE

## 2020-06-10 RX ORDER — HYDROXYZINE HYDROCHLORIDE 25 MG/1
25 TABLET, FILM COATED ORAL EVERY 6 HOURS
Qty: 12 TABLET | Refills: 0 | Status: ON HOLD | OUTPATIENT
Start: 2020-06-10 | End: 2020-07-09 | Stop reason: HOSPADM

## 2020-06-10 RX ADMIN — LORAZEPAM 1 MG: 2 INJECTION INTRAMUSCULAR; INTRAVENOUS at 11:06

## 2020-06-10 RX ADMIN — IOHEXOL 100 ML: 350 INJECTION, SOLUTION INTRAVENOUS at 02:06

## 2020-06-10 RX ADMIN — SODIUM CHLORIDE, POTASSIUM CHLORIDE, SODIUM LACTATE AND CALCIUM CHLORIDE 500 ML: 600; 310; 30; 20 INJECTION, SOLUTION INTRAVENOUS at 11:06

## 2020-06-10 NOTE — TELEPHONE ENCOUNTER
Spoke with pt. Re: s/s of anxiety. Pt. States she has not taken pain medicine and shoulder is throbbing. Pt. States she will be reaching out to psychiatrist for anti-anxiety medication. I advised pt. That she is most likely feeling anxious r/t increased pain. Advised pt. To take prescribed pain medication, elevate lower extremities, and instructed on deep breathing. Pt. Expressed verbal understanding. Will follow-up with pt later today.

## 2020-06-10 NOTE — TELEPHONE ENCOUNTER
Had shoulder surgery on yesterday and has cough and sore throat from intubation. Since surgery has not been able to clear her throat, hand is swollen and shoulder is hurting. Has numbness from medication but still feel throbbing at surgical site. Also having an anxiety attack and has taken medication for anxiety. Has taken seroquel as advised by MD . Wanting to know if she should take klonopin . Advised to take only medication ordered by MD and how they are ordered.    Reason for Disposition   Caller has NON-URGENT medication question about med that PCP prescribed and triager unable to answer question    Protocols used: MEDICATION QUESTION CALL-A-AH

## 2020-06-10 NOTE — TELEPHONE ENCOUNTER
----- Message from Diamond Ayers sent at 6/10/2020  8:02 AM CDT -----  Contact: [phone]/Self   Type:  Needs Medical Advice    Who Called: Pt   Symptoms (please be specific): dry cough, swollen hand    How long has patient had these symptoms:  Yesterday   Pharmacy name and phone #:  -  Would the patient rather a call back or a response via [facility]? Call back   Best Call Back Number: [phone]/Self   Additional Information:

## 2020-06-10 NOTE — ED PROVIDER NOTES
Encounter Date: 6/10/2020    SCRIBE #1 NOTE: I, Svetlana Xavier, am scribing for, and in the presence of,  Dr. Floyd. I have scribed the entire note.       History     Chief Complaint   Patient presents with    Shortness of Breath     s/p right shoulder surgery yesterday per Dr. Hughes. c/o dry cough and SOB since surgery. States feels anxious, like she won't wake up. Presents in no distress. Mildy dyspnea noted with exertion. No fever reported.      Sherie Reyes is a 54 y.o. female who has a past medical history of anxiety, degenerative joint disease (DJD) of hip, diabetes type 2, GERD, hyperlipidemia, hypertension, IBS, insomnia, lumbar disc herniation, and PTSD.    The patient presents to the ED due to shortness of breath with associated anxiety onset suddenly yesterday. Patient states she felt like she was experiencing a panic attack last night, so she took Clonazepam with relief of symptoms. Upon waking up this morning at 07:00am, she reports symptoms had returned. Per chart review, patient had a right shoulder surgery yesterday with Dr. Hughes. Since then, she notes experiencing throat pain and coughing.      The history is provided by the patient.     Review of patient's allergies indicates:  No Known Allergies  Past Medical History:   Diagnosis Date    Anxiety     Degenerative joint disease (DJD) of hip     Diabetes type 2, controlled 7/10/2019    GERD (gastroesophageal reflux disease)     Hyperlipidemia     Hypertension     IBS (irritable bowel syndrome)     Insomnia     Lumbar disc herniation     PTSD (post-traumatic stress disorder)      Past Surgical History:   Procedure Laterality Date    ADENOIDECTOMY      ARTHROSCOPIC REPAIR OF ROTATOR CUFF OF SHOULDER      ARTHROSCOPIC REPAIR OF ROTATOR CUFF OF SHOULDER Right 6/9/2020    Procedure: REPAIR, ROTATOR CUFF, ARTHROSCOPIC;  Surgeon: Melchor Hughes Jr., MD;  Location: [facility] OR;  Service: Orthopedics;  Laterality: Right;  need opus  system (Ger MESA notified , EF)  video, notified/confirmed 2020 KB 1310     SECTION      TONSILLECTOMY       Family History   Problem Relation Age of Onset    Hypertension Mother     Hypothyroidism Mother     Arthritis Mother     Depression Mother     Heart disease Mother     Hyperlipidemia Mother     Hypertension Father     Stroke Father 46    Diabetes Father     Heart disease Father     Hyperlipidemia Father     Cancer Maternal Grandfather         Stomach Ca    Depression Maternal Grandfather     Early death Maternal Grandfather     Heart disease Maternal Grandfather     Hyperlipidemia Maternal Grandfather     Hypertension Maternal Grandfather     Mental illness Maternal Grandfather     Vision loss Maternal Grandfather         Loss during  War.    Breast cancer Paternal Aunt     Arthritis Paternal Grandmother     Depression Paternal Grandmother     Heart disease Paternal Grandmother     Cancer Paternal Aunt         Bilat breast Cancer    Heart disease Paternal Aunt     Cancer Maternal Grandmother         Lung w/ Mets    COPD Maternal Grandmother     Depression Maternal Grandmother     Early death Maternal Grandmother     Heart disease Maternal Grandmother     Hyperlipidemia Maternal Grandmother     Hypertension Maternal Grandmother     Depression Son     Learning disabilities Son     Depression Sister     Depression Daughter     Diabetes Paternal Grandfather     Early death Paternal Grandfather     Heart disease Paternal Grandfather     Hyperlipidemia Paternal Grandfather     Hypertension Paternal Grandfather     Kidney disease Paternal Grandfather      Social History     Tobacco Use    Smoking status: Former Smoker     Packs/day: 0.50     Years: 5.00     Pack years: 2.50     Types: Vaping with nicotine     Start date: 1985     Last attempt to quit: 2014     Years since quittin.0    Smokeless tobacco: Former User     Quit date:  10/20/2014    Tobacco comment: Vaping 30 ml last 5 or 6 weeks   Substance Use Topics    Alcohol use: Yes     Alcohol/week: 1.0 standard drinks     Types: 1 Cans of beer per week     Frequency: Monthly or less     Drinks per session: 3 or 4     Binge frequency: Never     Comment: Maybe 1 or 2 a month    Drug use: No     Review of Systems   Constitutional: Negative for chills and fever.   HENT: Positive for sore throat. Negative for rhinorrhea and trouble swallowing.    Eyes: Negative for visual disturbance.   Respiratory: Positive for cough and shortness of breath.    Cardiovascular: Negative for chest pain.   Gastrointestinal: Negative for abdominal pain, diarrhea and vomiting.   Genitourinary: Negative for dysuria and hematuria.   Musculoskeletal: Negative for back pain.   Skin: Negative for rash.   Neurological: Negative for numbness and headaches.   Hematological: Negative for adenopathy.   Psychiatric/Behavioral: The patient is nervous/anxious.        Physical Exam     Initial Vitals [06/10/20 1033]   BP Pulse Resp Temp SpO2   134/69 (!) 136 18 96.8 °F (36 °C) 95 %      MAP       --         Physical Exam    Nursing note and vitals reviewed.  Constitutional: She appears well-developed and well-nourished.  Non-toxic appearance. No distress.   Appears anxious.   HENT:   Head: Normocephalic and atraumatic.   Eyes: Conjunctivae and EOM are normal. Pupils are equal, round, and reactive to light. Right eye exhibits no nystagmus. Left eye exhibits no nystagmus.   Neck: Normal range of motion. Neck supple.   Cardiovascular: Regular rhythm, S1 normal and S2 normal. Tachycardia present.    No murmur heard.  Pulmonary/Chest: Breath sounds normal. No respiratory distress. She has no wheezes. She has no rales.   Abdominal: Soft. She exhibits no distension. There is no tenderness.   Neurological: She is alert. No cranial nerve deficit. GCS eye subscore is 4. GCS verbal subscore is 5. GCS motor subscore is 6.   Skin: Skin is  warm. No rash noted.   Psychiatric: She has a normal mood and affect. Her behavior is normal.         ED Course   Procedures  Labs Reviewed   COMPREHENSIVE METABOLIC PANEL - Abnormal; Notable for the following components:       Result Value    CO2 21 (*)     Glucose 132 (*)     Albumin 3.3 (*)     Alkaline Phosphatase 47 (*)     eGFR if non  52 (*)     All other components within normal limits   CBC W/ AUTO DIFFERENTIAL - Abnormal; Notable for the following components:    Hemoglobin 11.5 (*)     Hematocrit 35.2 (*)     All other components within normal limits   B-TYPE NATRIURETIC PEPTIDE   TROPONIN I   POCT URINE PREGNANCY          Imaging Results          CTA Chest Non-Coronary - PE Study (Final result)  Result time 06/10/20 15:21:52    Final result by Manny Perez MD (06/10/20 15:21:52)                 Impression:      No convincing evidence of pulmonary thromboembolism.    Elevation of the right hemidiaphragm with right basilar lung atelectasis.    Hepatomegaly with hepatic steatosis.      Electronically signed by: Manny Perez MD  Date:    06/10/2020  Time:    15:21             Narrative:    EXAMINATION:  CTA CHEST NON CORONARY    CLINICAL HISTORY:  Chest pain, acute, PE suspected, high pretest prob;    TECHNIQUE:  Low dose axial images, sagittal and coronal reformations were obtained from the thoracic inlet to the lung bases following the IV administration of 100 mL of Omnipaque 350.  Contrast timing was optimized to evaluate the pulmonary arteries.  MIP images were performed.    COMPARISON:  Chest radiograph of the same day    FINDINGS:  Mild heterogeneous enlargement of the thyroid gland.  Normal size axillary lymph nodes.  No mediastinal or axillary lymph node enlargement.  Aorta is normal in caliber.  Heart is not enlarged.  No significant pleural or pericardial fluid.    There is no evidence of pulmonary thromboembolism through the segmental branches.  The distal segmental and  subsegmental are too small to characterize.    Trachea and central airways are patent.  There is elevation of the right hemidiaphragm with a bandlike density in the right lower lobe likely relating to atelectasis.  Otherwise lungs show no evidence of significant focal consolidation, large effusion or pneumothorax.    Limited review of the upper abdomen demonstrates diffuse low-attenuation of the liver suggesting hepatic steatosis.  Liver is enlarged.  Otherwise visualized portion of the upper abdomen shows no acute abnormality.  Bones show no acute abnormalities.                               X-Ray Chest AP Portable (Final result)  Result time 06/10/20 12:05:56    Final result by Caty Hewitt MD (06/10/20 12:05:56)                 Impression:      As above      Electronically signed by: Caty Hewitt MD  Date:    06/10/2020  Time:    12:05             Narrative:    EXAMINATION:  XR CHEST AP PORTABLE    CLINICAL HISTORY:  shortness of breath;    TECHNIQUE:  Single frontal view of the chest was performed.    COMPARISON:  January 4, 2020    FINDINGS:  heart size is not enlarged.  Right hemidiaphragm is elevated.  Lungs demonstrate no focal consolidation.  Osseous structures are unremarkable.                                 Medical Decision Making:   Differential Diagnosis:   Differential Diagnosis includes, but is not limited to:  PE, MI/ACS, pneumothorax, pericardial effusion/tamonade, pneumonia, lung abscess, pericarditis/myocarditis, pleural effusion, lung mass, CHF exacerbation, asthma exacerbation, COPD exacerbation, aspirated/ingested foreign body, airway obstruction, CO poisoning, anemia, metabolic derangement, allergy/atopy, influenza, viral URI, viral syndrome.    Clinical Tests:   Lab Tests: Ordered and Reviewed  Radiological Study: Ordered and Reviewed  Medical Tests: Ordered and Reviewed  ED Management:  Labs without significant abnormality  CTA without acute findings including evidence of  PE.  Patient's cough and throat pain likely related to recent surgery.  Patient reports being out of her clonazepam.  Will discharge with short prescription of hydroxyzine.    Return precautions cuss for acutely worsening symptoms include chest pain trouble breathing fever anxiety thoughts hurting self or others any concerns.    After taking into careful account the historical factors and physical exam findings of the patient's presentation today, in conjunction with the empirical and objective data obtained on ED workup, no acute emergent medical condition has been identified. The patient appears to be low risk for an emergent medical condition and I feel it is safe and appropriate at this time for the patient to be discharged to follow-up as detailed in their discharge instructions for reevaluation and possible continued outpatient workup and management. I have discussed the specifics of the workup with the patient and the patient has verbalized understanding of the details of the workup, the diagnosis, the treatment plan, and the need for outpatient follow-up.  Although the patient has no emergent etiology today this does not preclude the development of an emergent condition so in addition, I have advised the patient that they can return to the ED and/or activate EMS at any time with worsening of their symptoms, change of their symptoms, or with any other medical complaint.  The patient remained comfortable and stable during their visit in the ED.  Discharge and follow-up instructions discussed with the patient who expressed understanding and willingness to comply with my recommendations.                        ED Course as of [place] 11 1119   Wed [place] 10, 2020   1057 EKG:  Rate of 112 sinus tachycardia.  No STEMI.  No ectopy.    [RN]   1054 Patient reports intermittent shortness of breath.  Last night her symptoms relieved with clonazepam.  On exam she is noted to be tachycardic and does appear anxious.  She has no  leg swelling calf tenderness.  Low suspicion for PE at this time given relief of symptoms with anxiety medications. Will reassess.       [RN]   1140 Patient with persistent tachycardia. Will obtain CTA to evaluate for PE.       [RN]      ED Course User Index  [RN] Melchor Floyd Jr., MD                Clinical Impression:       ICD-10-CM ICD-9-CM   1. SOB (shortness of breath) R06.02 786.05   2. Anxiety F41.9 300.00                   Scribe attestation I, Melchor Floyd,  personally performed the services described in this documentation. All medical record entries made by the scribe were at my direction and in my presence.  I have reviewed the chart and agree that the record reflects my personal performance and is accurate and complete. Melchor Floyd M.D. 11:21 AM06/11/2020    Portions of this note were dictated using voice recognition software and may contain dictation related errors in spelling/grammar/syntax not found on text review               Melchor Floyd Jr., MD  06/11/20 1121

## 2020-06-10 NOTE — ED TRIAGE NOTES
Pt reports she has been feeling SOB since yesterday. PT states she was intubated on yesterday for a shoulder surgery and she states she has been coughing and SOB since. PT states she feels she is afraid to go to sleep because she will not wake up from her sleep. PT in no acute distress.

## 2020-06-11 ENCOUNTER — HOSPITAL ENCOUNTER (EMERGENCY)
Facility: HOSPITAL | Age: 55
Discharge: HOME | End: 2020-06-11
Attending: EMERGENCY MEDICINE
Payer: MEDICAID

## 2020-06-11 ENCOUNTER — TELEPHONE (OUTPATIENT)
Dept: ORTHOPEDICS | Facility: CLINIC | Age: 55
End: 2020-06-11

## 2020-06-11 ENCOUNTER — TELEPHONE (OUTPATIENT)
Dept: INTERNAL MEDICINE | Facility: CLINIC | Age: 55
End: 2020-06-11

## 2020-06-11 VITALS
DIASTOLIC BLOOD PRESSURE: 81 MMHG | BODY MASS INDEX: 32.2 KG/M2 | RESPIRATION RATE: 18 BRPM | HEART RATE: 96 BPM | TEMPERATURE: 98 F | HEIGHT: 62 IN | OXYGEN SATURATION: 96 % | SYSTOLIC BLOOD PRESSURE: 147 MMHG | WEIGHT: 175 LBS

## 2020-06-11 DIAGNOSIS — F41.9 ANXIETY: ICD-10-CM

## 2020-06-11 DIAGNOSIS — R00.0 TACHYCARDIA: ICD-10-CM

## 2020-06-11 DIAGNOSIS — R06.02 SOB (SHORTNESS OF BREATH): Primary | ICD-10-CM

## 2020-06-11 PROCEDURE — 93010 ELECTROCARDIOGRAM REPORT: CPT | Mod: ,,, | Performed by: INTERNAL MEDICINE

## 2020-06-11 PROCEDURE — 99284 EMERGENCY DEPT VISIT MOD MDM: CPT | Mod: ,,, | Performed by: EMERGENCY MEDICINE

## 2020-06-11 PROCEDURE — 99284 EMERGENCY DEPT VISIT MOD MDM: CPT | Mod: 25

## 2020-06-11 PROCEDURE — 93010 EKG 12-LEAD: ICD-10-PCS | Mod: ,,, | Performed by: INTERNAL MEDICINE

## 2020-06-11 PROCEDURE — 96360 HYDRATION IV INFUSION INIT: CPT

## 2020-06-11 PROCEDURE — 93005 ELECTROCARDIOGRAM TRACING: CPT

## 2020-06-11 PROCEDURE — 99284 PR EMERGENCY DEPT VISIT,LEVEL IV: ICD-10-PCS | Mod: ,,, | Performed by: EMERGENCY MEDICINE

## 2020-06-11 PROCEDURE — 25000003 PHARM REV CODE 250: Performed by: PHYSICIAN ASSISTANT

## 2020-06-11 RX ORDER — LORAZEPAM 1 MG/1
0.5 TABLET ORAL EVERY 8 HOURS PRN
Qty: 4 TABLET | Refills: 0 | Status: ON HOLD | OUTPATIENT
Start: 2020-06-11 | End: 2020-07-09 | Stop reason: HOSPADM

## 2020-06-11 RX ORDER — LORAZEPAM 1 MG/1
1 TABLET ORAL
Status: COMPLETED | OUTPATIENT
Start: 2020-06-11 | End: 2020-06-11

## 2020-06-11 RX ADMIN — SODIUM CHLORIDE 1000 ML: 0.9 INJECTION, SOLUTION INTRAVENOUS at 06:06

## 2020-06-11 RX ADMIN — LORAZEPAM 1 MG: 1 TABLET ORAL at 06:06

## 2020-06-11 NOTE — ED TRIAGE NOTES
Pt to the ER with complaints of anxiety; states she has taken prescribed medication with no relief. Pt also reporting SOB as well as a nonproductive cough that started Tuesday after right rotator cuff surgery at [facility] [place]; pt was intubated for the surgery.

## 2020-06-11 NOTE — ED PROVIDER NOTES
"Encounter Date: 6/11/2020       History     Chief Complaint   Patient presents with    Shortness of Breath     rotator cuff surgery tuesday    Anxiety     Seen at [facility] yesterday for the same thing     Patient is a 54 year old female with PMH of anxiety, DM2, GERD, HLD, HTN, IBS, s/p right rotator cuff repair on 6/9/20 at [facility] who presents to the emergency department with complaints of throat irritation, dry cough dyspnea worse with exertion, bilateral lateral rib pain associated with cough, tachycardia that began after surgery.  Denies other worsening or alleviating factors.  She was evaluated in the ED yesterday with these complaints. Negative workup for PE, no acute lab abnormalities.  She was given IV fluids and Ativan in the emergency department and discharged with hydroxyzine.  She reports feeling better when she left the ED however symptoms returned when she returned home "because there wasn't a medical professional standing next to me." She reports this feels similar to panic attacks in the past "except 100 times worse." She reports taking clonazepam twice daily for anxiety as well lorazepam as needed for panic attacks.  She states she has been out of her lorazepam and has been unable to follow-up with her PCP secondary to maternity leave.         Review of patient's allergies indicates:  No Known Allergies  Past Medical History:   Diagnosis Date    Anxiety     Degenerative joint disease (DJD) of hip     Diabetes type 2, controlled 7/10/2019    GERD (gastroesophageal reflux disease)     Hyperlipidemia     Hypertension     IBS (irritable bowel syndrome)     Insomnia     Lumbar disc herniation     PTSD (post-traumatic stress disorder)      Past Surgical History:   Procedure Laterality Date    ADENOIDECTOMY      ARTHROSCOPIC DEBRIDEMENT OF ROTATOR CUFF Right 6/9/2020    Procedure: DEBRIDEMENT, ROTATOR CUFF, ARTHROSCOPIC;  Surgeon: Melchor Hughes Jr., MD;  [facility];  Service: " Orthopedics;  Laterality: Right;  need opus system (Ger MESA notified , EF)  video, notified/confirmed 2020 KB 1310    ARTHROSCOPIC EXCISION OF ACROMIOCLAVICULAR JOINT  2020    Procedure: EXCISION, ACROMIOCLAVICULAR JOINT, ARTHROSCOPIC;  Surgeon: Melchor Hughes Jr., MD;  Location: [facility] OR;  Service: Orthopedics;;    ARTHROSCOPIC REPAIR OF ROTATOR CUFF OF SHOULDER      ARTHROSCOPY OF SHOULDER WITH DECOMPRESSION OF SUBACROMIAL SPACE  2020    Procedure: ARTHROSCOPY, SHOULDER, WITH SUBACROMIAL SPACE DECOMPRESSION;  Surgeon: Melchor Hughes Jr., MD;  Location: [facility] OR;  Service: Orthopedics;;     SECTION      TONSILLECTOMY       Family History   Problem Relation Age of Onset    Hypertension Mother     Hypothyroidism Mother     Arthritis Mother     Depression Mother     Heart disease Mother     Hyperlipidemia Mother     Hypertension Father     Stroke Father 46    Diabetes Father     Heart disease Father     Hyperlipidemia Father     Cancer Maternal Grandfather         Stomach Ca    Depression Maternal Grandfather     Early death Maternal Grandfather     Heart disease Maternal Grandfather     Hyperlipidemia Maternal Grandfather     Hypertension Maternal Grandfather     Mental illness Maternal Grandfather     Vision loss Maternal Grandfather         Loss during  War.    Breast cancer Paternal Aunt     Arthritis Paternal Grandmother     Depression Paternal Grandmother     Heart disease Paternal Grandmother     Cancer Paternal Aunt         Bilat breast Cancer    Heart disease Paternal Aunt     Cancer Maternal Grandmother         Lung w/ Mets    COPD Maternal Grandmother     Depression Maternal Grandmother     Early death Maternal Grandmother     Heart disease Maternal Grandmother     Hyperlipidemia Maternal Grandmother     Hypertension Maternal Grandmother     Depression Son     Learning disabilities Son     Depression Sister     Depression Daughter      Diabetes Paternal Grandfather     Early death Paternal Grandfather     Heart disease Paternal Grandfather     Hyperlipidemia Paternal Grandfather     Hypertension Paternal Grandfather     Kidney disease Paternal Grandfather      Social History     Tobacco Use    Smoking status: Former Smoker     Packs/day: 0.50     Years: 5.00     Pack years: 2.50     Types: Vaping with nicotine     Start date: 1985     Last attempt to quit: 2014     Years since quittin.0    Smokeless tobacco: Former User     Quit date: 10/20/2014    Tobacco comment: Vaping 30 ml last 5 or 6 weeks   Substance Use Topics    Alcohol use: Yes     Alcohol/week: 1.0 standard drinks     Types: 1 Cans of beer per week     Frequency: Monthly or less     Drinks per session: 3 or 4     Binge frequency: Never     Comment: Maybe 1 or 2 a month    Drug use: No     Review of Systems   Constitutional: Negative for chills and fever.   HENT: Negative for congestion and sore throat.    Respiratory: Positive for cough and shortness of breath.    Cardiovascular: Positive for chest pain (lateral rib pain). Negative for leg swelling.   Gastrointestinal: Negative for abdominal pain, diarrhea, nausea and vomiting.   Genitourinary: Negative for dysuria.   Musculoskeletal: Negative for back pain.   Skin: Negative for rash.   Neurological: Negative for weakness and headaches.   Hematological: Does not bruise/bleed easily.       Physical Exam     Initial Vitals [20 1642]   BP Pulse Resp Temp SpO2   (!) 169/82 (!) 130 20 97.9 °F (36.6 °C) 97 %      MAP       --         Physical Exam    Nursing note and vitals reviewed.  Constitutional: She appears well-developed and well-nourished. She is not diaphoretic. No distress.   HENT:   Head: Normocephalic and atraumatic.   Mouth/Throat: Oropharynx is clear and moist.   Eyes: Conjunctivae and EOM are normal. Pupils are equal, round, and reactive to light.   Neck: Normal range of motion. Neck supple.    Cardiovascular: Regular rhythm, normal heart sounds and intact distal pulses. Tachycardia present.  Exam reveals no gallop and no friction rub.    No murmur heard.  Pulmonary/Chest: Breath sounds normal. She has no wheezes. She has no rhonchi. She has no rales.   Abdominal: Soft. Bowel sounds are normal. There is no tenderness.   Musculoskeletal: Normal range of motion.   Neurological: She is alert and oriented to person, place, and time. She has normal strength. No cranial nerve deficit or sensory deficit. GCS score is 15. GCS eye subscore is 4. GCS verbal subscore is 5. GCS motor subscore is 6.   Skin: Skin is warm and dry. Capillary refill takes less than 2 seconds.   Sutures clean, dry, no erythema    Psychiatric: She has a normal mood and affect. Her behavior is normal. Judgment and thought content normal.         ED Course   Procedures  Labs Reviewed - No data to display  EKG Readings: (Independently Interpreted)   Initial Reading: No STEMI. Rhythm: Sinus Tachycardia. Heart Rate: 104.            Medical Decision Making:   History:   Old Medical Records: I decided to obtain old medical records.  Initial Assessment:   Emergent evaluation of a 54-year-old female who presents to the emergency department with complaints of dyspnea, worse with exertion, dry cough, bilateral lateral rib pain associated with cough, throat irritation that began 2 days ago after rotator cuff repair.  Patient was evaluated in the emergency department yesterday with a negative pulmonary embolism workup.  She was given IV fluids and Ativan with improvement of symptoms upon discharge.  She reports that symptoms returned as soon as she got home last night.  Patient is afebrile, tachycardic, and nontoxic appearing.   Differential Diagnosis:   DDx includes but is not limited to anxiety, PE, pneumonia, pneumothorax, ACS.  ED Management:  EKG reveals sinus tachycardia with no evidence of ischemic changes. She had a negative PE workup  yesterday.I have considered but do not suspect dangerous etiology for patient's complaints.  Symptoms are consistent with panic attacks. Symptoms have improved with IV fluids and po ativan. Will discharge with a few tabs of ativan until patient is able to follow up with PCP. She is comfortable with the plan. All questions answered. The patient was instructed to follow up with a primary care provider in 2 days or to return to the emergency department for worsening symptoms. The treatment plan was discussed with the patient who demonstrated understanding and comfort with plan. The patient's history, physical exam, and plan of care was discussed with and agreed upon with my supervising physician.                                    Clinical Impression:     1. SOB (shortness of breath)    2. Tachycardia    3. Anxiety                  ED Disposition Condition    Discharge Stable        ED Prescriptions     Medication Sig Dispense Start Date End Date Auth. Provider    LORazepam (ATIVAN) 1 MG tablet Take 0.5 tablets (0.5 mg total) by mouth every 8 (eight) hours as needed for Anxiety. 4 tablet 6/11/2020  Lay Webber PA-C        Follow-up Information     Follow up With Specialties Details Why Contact Info    Tonia Bernal,  Internal Medicine Schedule an appointment as soon as possible for a visit in 3 days  [address] [place] [address]  [phone]      [facility] Emergency Medicine Go to  If symptoms worsen [address] [address] [phone]                                     Lay Webber PA-C  06/11/20 8507

## 2020-06-11 NOTE — PROVIDER PROGRESS NOTES - EMERGENCY DEPT.
Emergency Department TeleTRIAGE Encounter Note      CHIEF COMPLAINT    Chief Complaint   Patient presents with    Shortness of Breath     rotator cuff surgery tuesday    Anxiety     Seen at [facility] yesterday for the same thing       VITAL SIGNS   Initial Vitals [06/11/20 1642]   BP Pulse Resp Temp SpO2   (!) 169/82 (!) 130 20 97.9 °F (36.6 °C) 97 %      MAP       --            ALLERGIES    Review of patient's allergies indicates:  No Known Allergies    PROVIDER TRIAGE NOTE  Patient with past medical history type 2 diabetes, anxiety, GERD, hyperlipidemia, hypertension presents to the ED for evaluation of shortness of breath and anxiety.  Patient states she had a rotator cuff repair 2 days ago.  Since then she has been short of breath with short walks and she has had a nonproductive cough.  She also reports having 4-5 panic attacks per day and being unable to sleep.  Patient is anxious that she has a PE after having the surgery.    Patient was seen at [facility] yesterday.  Labs without significant abnormality.  CTA showed no evidence of PE.  Cough was thought to be likely related to recent surgery.  Patient was given short prescription of hydroxyzine to help with anxiety.          ORDERS  Labs Reviewed - No data to display    ED Orders (720h ago, onward)    Start Ordered     Status Ordering Provider    06/11/20 1700 06/11/20 1659  EKG 12-lead  Once      Ordered JEFFRY CASTREJON            Virtual Visit Note: The provider triage portion of this emergency department evaluation and documentation was performed via [email or web address], a HIPAA-compliant telemedicine application, in concert with a tele-presenter in the room. A face to face patient evaluation with one of my colleagues will occur once the patient is placed in an emergency department room.      DISCLAIMER: This note was prepared with Mashape voice recognition transcription software. Garbled syntax, mangled pronouns, and other bizarre constructions may be  attributed to that software system.

## 2020-06-11 NOTE — TELEPHONE ENCOUNTER
Spoke with pt., still reports SOB now doing ADLs such as walking to restroom, dressing. Advised pt. To seek  Medical attention now. Expressed verbal understanding, pt. States  will bring her.

## 2020-06-11 NOTE — TELEPHONE ENCOUNTER
----- Message from Jonmaksim Verdugot sent at 6/11/2020  9:02 AM CDT -----  Contact: [phone] / self   Patient would like to speak with your office regarding her recent ER visit, pt states she has been having panic attacks and is concerned. Please Advise.

## 2020-06-11 NOTE — TELEPHONE ENCOUNTER
----- Message from Yennifer Ziegler sent at 6/11/2020  9:28 AM CDT -----  Contact: Self [phone]  Patient is calling to talk to nurse in regards to since she had her procedure she has been having anxiety and short of breath and cough and also having trouble sleeping.

## 2020-06-11 NOTE — TELEPHONE ENCOUNTER
Pt stated that she had surgery Tuesday morning and has been coughing to the point of rib pain. Any movement causes pain, and she stated that she has been in a panic attack since. Pt stated that she had previously been on ativan from Dr. Glynn. She is requesting a refill of that. Pt already on klonopin, booked apt today to further discuss anxiety.

## 2020-06-11 NOTE — TELEPHONE ENCOUNTER
----- Message from Bridger Sandra sent at 6/11/2020  2:00 PM CDT -----  Contact: patient  Type:  Patient Returning Call    Who Called: patient  Who Left Message for Patient: Arlette  Does the patient know what this is regarding?: yes  Would the patient rather a call back or a response via [employer]?  Call back  Best Call Back Number: [phone]  Additional Information:  Her surgery complications is what the call was about

## 2020-06-11 NOTE — TELEPHONE ENCOUNTER
----- Message from Cristian Carreon sent at 6/11/2020 12:16 PM CDT -----  Contact: Bridgette ([facility])   Bridgette state the patient have anxiety, shortness of breath and not her normal self and want to schedule an appointment for the patient to be seen as soon as possible. Patient have Medicaid and I am unable to schedule. Please call patient an advise.

## 2020-06-12 ENCOUNTER — TELEPHONE (OUTPATIENT)
Dept: INTERNAL MEDICINE | Facility: CLINIC | Age: 55
End: 2020-06-12

## 2020-06-12 NOTE — TELEPHONE ENCOUNTER
----- Message from Maya Petersen sent at 6/12/2020  2:29 PM CDT -----  Contact: Pt self Mobile/Home [phone]   Requesting an RX refill or new RX.  Is this a refill or new RX:  Refill  RX name and strength: LORazepam (ATIVAN) 1 MG tablet  Directions (copy/paste from chart):  N/A  Is this a 30 day or 90 day RX:  30  Local pharmacy or [facility]  [id number] AT [facility]  Pharmacy name and phone # [phone] Fax# [phone]

## 2020-06-12 NOTE — TELEPHONE ENCOUNTER
----- Message from Natalia Kam sent at 6/12/2020 10:46 AM CDT -----  Rx Refill/Request     Is this a New Rx:--Yes--      Rx Name and Strength:    1.LORazepam (ATIVAN) 1 MG tablet    Preferred Pharmacy with phone number:[facility]--[phone]--  [address] [facility] [address]     Communication Preference:--Sherie--[phone]--    Additional Information: New Rx is needed for medication listed above. Per pt was informed to call PCP to get medication. Please call to advise.

## 2020-06-12 NOTE — TELEPHONE ENCOUNTER
Spoke to pt and informed that she cannot be on ativan and klonopin together. Booked apt to further discuss anxiety and treatment options.

## 2020-06-12 NOTE — DISCHARGE INSTRUCTIONS
Take medication as needed for anxiety. Do not drive or operate machinery while taking this medication. Do not drink alcohol or take with other narcotics. Follow up with your PCP or return to the ER for new or worsening symptoms.

## 2020-06-18 ENCOUNTER — TELEPHONE (OUTPATIENT)
Dept: INTERNAL MEDICINE | Facility: CLINIC | Age: 55
End: 2020-06-18

## 2020-06-18 ENCOUNTER — NURSE TRIAGE (OUTPATIENT)
Dept: ADMINISTRATIVE | Facility: CLINIC | Age: 55
End: 2020-06-18

## 2020-06-18 NOTE — TELEPHONE ENCOUNTER
Pt states she accidentally missed her appointment today by going to wrong location. Pt states she had shoulder surgery last week. Pt c/o bilateral hand and bilateral feet swelling since having surgery. Pt also c/o cough and SOB. Pt also c/o anxiety. Pt advised per protocol to ED now and pt verbalizes understanding. Pt states she is unsure if she will go to ED now.     Reason for Disposition   Difficulty breathing    Additional Information   Negative: Bluish (or gray) lips or face   Negative: Severe difficulty breathing (e.g., struggling for each breath, speaks in single words)   Negative: Rapid onset of cough and has hives   Negative: Coughing started suddenly after medicine, an allergic food or bee sting   Negative: Difficulty breathing after exposure to flames, smoke, or fumes   Negative: Sounds like a life-threatening emergency to the triager   Negative: Chest pain present when not coughing    Protocols used: COUGH-A-OH

## 2020-06-18 NOTE — TELEPHONE ENCOUNTER
----- Message from Cristian Carreon sent at 6/18/2020 10:32 AM CDT -----  Contact: Self  [phone]  Patient state she missed her appointment due to going to the wrong clinic. Patient is asking for to be seen as soon as possible. Please call and advise.

## 2020-06-18 NOTE — TELEPHONE ENCOUNTER
----- Message from Luís Neely sent at 10/10/2019 12:47 PM CDT -----  Contact: Estefany aguila/Canelo Dsouza  Office  [phone]  Estefany is calling to speak with you concerning the patient previous surgery  Please call back to assist at [phone]   Patient with mildly incomplete epiglottic closure attributed to  NG placement./Mild Patient with incomplete epiglottic deflection attributed to NG placement./Mild

## 2020-06-19 ENCOUNTER — TELEPHONE (OUTPATIENT)
Dept: INTERNAL MEDICINE | Facility: CLINIC | Age: 55
End: 2020-06-19

## 2020-06-19 ENCOUNTER — TELEPHONE (OUTPATIENT)
Dept: ORTHOPEDICS | Facility: CLINIC | Age: 55
End: 2020-06-19

## 2020-06-19 DIAGNOSIS — M25.511 RIGHT SHOULDER PAIN, UNSPECIFIED CHRONICITY: Primary | ICD-10-CM

## 2020-06-19 NOTE — TELEPHONE ENCOUNTER
----- Message from Mali Hemanth sent at 6/19/2020  2:44 PM CDT -----  Regarding: Refil Change  Contact: Patient @ [phone]  Requesting an RX refill or new RX.  Is this a refill or new RX:  Refill  RX name and strength: LORazepam (ATIVAN) 1 MG tablet  Directions (copy/paste from chart):    Is this a 30 day or 90 day RX:    Local pharmacy or mail order pharmacy:  Local  Pharmacy name and phone # [id number] - [place] [place] - [address] [facility] AT [facility]  Comments:  The clonazePAM (KLONOPIN) 1 MG tablet is not working. Patient is asking for the other RX. Patient is having panic attacks since she has been taking the clonazePAM (KLONOPIN) 1 MG tablet

## 2020-06-19 NOTE — TELEPHONE ENCOUNTER
Spoke to pt and informed that due to [place] law pt would have to wait until apt prior to medication being switched.     Pt wanted to know if she can increase klonopin in the meantime. She is having uncontrolled anxiety attacks.

## 2020-06-19 NOTE — TELEPHONE ENCOUNTER
Do not see that either med has been prescribed by Dr. Glynn, she will need appt to discuss  May go to [facility] pr[facility]

## 2020-06-22 ENCOUNTER — OFFICE VISIT (OUTPATIENT)
Dept: ORTHOPEDICS | Facility: CLINIC | Age: 55
End: 2020-06-22
Payer: MEDICAID

## 2020-06-22 ENCOUNTER — HOSPITAL ENCOUNTER (OUTPATIENT)
Dept: RADIOLOGY | Facility: HOSPITAL | Age: 55
Discharge: HOME | End: 2020-06-22
Attending: ORTHOPAEDIC SURGERY
Payer: MEDICAID

## 2020-06-22 ENCOUNTER — NURSE TRIAGE (OUTPATIENT)
Dept: ADMINISTRATIVE | Facility: CLINIC | Age: 55
End: 2020-06-22

## 2020-06-22 VITALS — HEIGHT: 62 IN | WEIGHT: 175 LBS | BODY MASS INDEX: 32.2 KG/M2

## 2020-06-22 DIAGNOSIS — M75.111 INCOMPLETE TEAR OF RIGHT ROTATOR CUFF, UNSPECIFIED WHETHER TRAUMATIC: Primary | ICD-10-CM

## 2020-06-22 DIAGNOSIS — M75.121 NONTRAUMATIC COMPLETE TEAR OF RIGHT ROTATOR CUFF: ICD-10-CM

## 2020-06-22 DIAGNOSIS — M25.511 RIGHT SHOULDER PAIN, UNSPECIFIED CHRONICITY: ICD-10-CM

## 2020-06-22 PROCEDURE — 99214 OFFICE O/P EST MOD 30 MIN: CPT | Mod: PBBFAC,25,PN | Performed by: ORTHOPAEDIC SURGERY

## 2020-06-22 PROCEDURE — 99024 POSTOP FOLLOW-UP VISIT: CPT | Mod: ,,, | Performed by: ORTHOPAEDIC SURGERY

## 2020-06-22 PROCEDURE — 99999 PR PBB SHADOW E&M-EST. PATIENT-LVL IV: ICD-10-PCS | Mod: PBBFAC,,, | Performed by: ORTHOPAEDIC SURGERY

## 2020-06-22 PROCEDURE — 99024 PR POST-OP FOLLOW-UP VISIT: ICD-10-PCS | Mod: ,,, | Performed by: ORTHOPAEDIC SURGERY

## 2020-06-22 PROCEDURE — 99999 PR PBB SHADOW E&M-EST. PATIENT-LVL IV: CPT | Mod: PBBFAC,,, | Performed by: ORTHOPAEDIC SURGERY

## 2020-06-22 PROCEDURE — 73030 X-RAY EXAM OF SHOULDER: CPT | Mod: TC,PN,RT

## 2020-06-22 PROCEDURE — 73030 XR SHOULDER COMPLETE 2 OR MORE VIEWS RIGHT: ICD-10-PCS | Mod: 26,RT,, | Performed by: RADIOLOGY

## 2020-06-22 PROCEDURE — 73030 X-RAY EXAM OF SHOULDER: CPT | Mod: 26,RT,, | Performed by: RADIOLOGY

## 2020-06-22 RX ORDER — HYDROCODONE BITARTRATE AND ACETAMINOPHEN 5; 325 MG/1; MG/1
1 TABLET ORAL EVERY 8 HOURS PRN
Qty: 40 TABLET | Refills: 0 | Status: SHIPPED | OUTPATIENT
Start: 2020-06-22 | End: 2020-07-02

## 2020-06-22 NOTE — TELEPHONE ENCOUNTER
Pt seen in clinic day 13 post procedure.  Pt did not report cough, fever or difficulty breathing since procedure.  No contact attempts regarding day 13 of [facility] Symptom Tracker required per protocol.      Reason for Disposition   Caller has already spoken with the PCP (or office), and has no further questions    Protocols used: NO CONTACT OR DUPLICATE CONTACT CALL-A-OH

## 2020-06-22 NOTE — PROGRESS NOTES
Subjective:      Patient ID: Sherie Reyes is a 55 y.o. female.  Chief Complaint: Post-op Evaluation of the Right Shoulder      HPI  Sherie Reyes is a  55 y.o. female presenting today for post op visit.  She is s/p right shoulder arthroscopy and debridement partial tear rotator cuff  Two weeks postop  Doing well except for some difficulty sleeping due to panic attacks.     Review of patient's allergies indicates:  No Known Allergies      Current Outpatient Medications   Medication Sig Dispense Refill    amLODIPine (NORVASC) 5 MG tablet Take 1 tablet (5 mg total) by mouth 2 (two) times daily. (new tablet strength) 180 tablet 1    buPROPion (WELLBUTRIN XL) 300 MG 24 hr tablet Take 300 mg by mouth once daily.      clonazePAM (KLONOPIN) 1 MG tablet Take 1 mg by mouth 2 (two) times daily.       cloNIDine (CATAPRES) 0.1 MG tablet Take 1 tablet (0.1 mg total) by mouth every 12 (twelve) hours as needed (only for blood pressure greater than 180/120). 60 tablet 6    duloxetine (CYMBALTA) 60 MG capsule Take 60 mg by mouth once daily.      gabapentin (NEURONTIN) 300 MG capsule Take 900 mg by mouth 3 (three) times daily.       gabapentin (NEURONTIN) 600 MG tablet TK 1 T PO TID  5    gemfibrozil (LOPID) 600 MG tablet TAKE 1 TABLET BY MOUTH TWICE DAILY BEFORE MEALS 60 tablet 5    hydrOXYzine HCL (ATARAX) 25 MG tablet Take 1 tablet (25 mg total) by mouth every 6 (six) hours. 12 tablet 0    KURVELO 0.15-0.03 mg per tablet Take 1 tablet by mouth once daily.      linaCLOtide (LINZESS) 290 mcg Cap capsule Take 1 capsule (290 mcg total) by mouth once daily. 90 capsule 0    LORazepam (ATIVAN) 1 MG tablet Take 0.5 tablets (0.5 mg total) by mouth every 8 (eight) hours as needed for Anxiety. 4 tablet 0    omeprazole (PRILOSEC) 40 MG capsule Take 1 capsule (40 mg total) by mouth once daily. 30 capsule 11    oxyCODONE-acetaminophen (PERCOCET) 7.5-325 mg per tablet Take 1 tablet by mouth every 4 (four) hours as  needed for Pain. 40 tablet 0    POLYETHYLENE GLYCOL 3350 (MIRALAX ORAL) Take by mouth as needed.       potassium chloride SA (KLOR-CON M20) 20 MEQ tablet Take 2 tablets (40 mEq total) by mouth once daily. 30 tablet 0    pravastatin (PRAVACHOL) 20 MG tablet Take 1 tablet (20 mg total) by mouth every evening. 30 tablet 6    QUEtiapine (SEROQUEL) 100 MG Tab Take 1 tablet by mouth once daily.      tizanidine (ZANAFLEX) 6 mg capsule Take 6 mg by mouth 2 (two) times daily as needed for Muscle spasms.      traMADoL (ULTRAM) 50 mg tablet TK 1 TO 2 TS PO TID PRN 40 tablet 1    HYDROcodone-acetaminophen (NORCO) 5-325 mg per tablet Take 1 tablet by mouth every 4 (four) hours as needed for Pain. 40 tablet 0    HYDROcodone-acetaminophen (NORCO) 5-325 mg per tablet Take 1 tablet by mouth every 8 (eight) hours as needed for Pain. 40 tablet 0     No current facility-administered medications for this visit.        Past Medical History:   Diagnosis Date    Anxiety     Degenerative joint disease (DJD) of hip     Diabetes type 2, controlled 7/10/2019    GERD (gastroesophageal reflux disease)     Hyperlipidemia     Hypertension     IBS (irritable bowel syndrome)     Insomnia     Lumbar disc herniation     PTSD (post-traumatic stress disorder)        Past Surgical History:   Procedure Laterality Date    ADENOIDECTOMY      ARTHROSCOPIC DEBRIDEMENT OF ROTATOR CUFF Right 6/9/2020    Procedure: DEBRIDEMENT, ROTATOR CUFF, ARTHROSCOPIC;  Surgeon: Melchor Hughes Jr., MD;  Location: [facility] OR;  Service: Orthopedics;  Laterality: Right;  need opus system (Ger MESA notified 5/26, EF)  video, notified/confirmed 6/8/2020 KB 1312    ARTHROSCOPIC EXCISION OF ACROMIOCLAVICULAR JOINT  6/9/2020    Procedure: EXCISION, ACROMIOCLAVICULAR JOINT, ARTHROSCOPIC;  Surgeon: Melchor Hughes Jr., MD;  Location: [facility] OR;  Service: Orthopedics;;    ARTHROSCOPIC REPAIR OF ROTATOR CUFF OF SHOULDER      ARTHROSCOPY OF SHOULDER WITH  "DECOMPRESSION OF SUBACROMIAL SPACE  2020    Procedure: ARTHROSCOPY, SHOULDER, WITH SUBACROMIAL SPACE DECOMPRESSION;  Surgeon: Melchor Hughes Jr., MD;  Location: [facility];  Service: Orthopedics;;     SECTION      TONSILLECTOMY         OBJECTIVE:   PHYSICAL EXAM:  Height: 5' 2" (157.5 cm) Weight: 79.4 kg (175 lb)  Vitals:    20 1405   Weight: 79.4 kg (175 lb)   Height: 5' 2" (1.575 m)   PainSc:   8     Ortho/SPM Exam  Examination right shoulder incisions look good incisions well healed sutures in place range of motion slightly decreased but she has pretty good motion passively    RADIOGRAPHS:  None  Comments: I have personally reviewed the imaging and I agree with the above radiologist's report.    ASSESSMENT/PLAN:     IMPRESSION:  Status post right shoulder arthroscopy    PLAN:  Sutures removed recommended routine wound care I did refill pain medicine but recommended she contact her PCP for any anti in [place] [facility]  I have [facility] some OT at [facility]  Wean out of the sling increase activities no heavy lifting    FOLLOW UP:  4 weeks    Disclaimer: This note has been generated using voice-recognition software. There may be typographical errors that have been missed during proof-reading.    "

## 2020-06-23 ENCOUNTER — CLINICAL SUPPORT (OUTPATIENT)
Dept: REHABILITATION | Facility: HOSPITAL | Age: 55
End: 2020-06-23
Attending: ORTHOPAEDIC SURGERY
Payer: MEDICAID

## 2020-06-23 DIAGNOSIS — M25.511 ACUTE PAIN OF RIGHT SHOULDER: ICD-10-CM

## 2020-06-23 DIAGNOSIS — M75.111 INCOMPLETE TEAR OF RIGHT ROTATOR CUFF, UNSPECIFIED WHETHER TRAUMATIC: ICD-10-CM

## 2020-06-23 DIAGNOSIS — R29.898 SHOULDER WEAKNESS: ICD-10-CM

## 2020-06-23 DIAGNOSIS — M25.611 DECREASED RANGE OF MOTION OF RIGHT SHOULDER: ICD-10-CM

## 2020-06-23 PROBLEM — M25.619 DECREASED RANGE OF MOTION (ROM) OF SHOULDER: Status: ACTIVE | Noted: 2020-06-23

## 2020-06-23 PROCEDURE — 97161 PT EVAL LOW COMPLEX 20 MIN: CPT

## 2020-06-23 NOTE — PLAN OF CARE
[facility]  Physical Therapy Initial Evaluation    Name: Sherie Reyes  Clinic Number: [id number]    Therapy Diagnosis:   Encounter Diagnoses   Name Primary?    Incomplete tear of right rotator cuff, unspecified whether traumatic     Acute pain of right shoulder     Shoulder weakness     Decreased range of motion of right shoulder      Physician: Melchor Hughes Jr., *    Physician Orders: PT Eval and Treat   Medical Diagnosis from Referral: M75.111 (ICD-10-CM) - Incomplete tear of right rotator cuff, unspecified whether traumatic  Evaluation Date: 2020  Authorization Period Expiration:   Plan of Care Expiration: 2020  Visit # / Visits authorized:     Time In: 500 (pt arrived late)  Time Out: 530  Total Billable Time: 0 minutes    Visit: 82   Total: 82    Precautions: Standard and ARTHROSCOPIC DEBRIDEMENT OF R ROTATOR CUFF 2020    Subjective   Date of onset: Over a year ago  History of current condition - Sherie reports: her shoulder started hurting over a year ago and she believes that it is from the 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 Patient had a debridement of her right rotator cuff. Patient reports she went to the doctor yesterday and he told her that she can move her shoulder as much as she can, but to listen to her symptoms.      Medical History:   Past Medical History:   Diagnosis Date    Anxiety     Degenerative joint disease (DJD) of hip     Diabetes type 2, controlled 7/10/2019    GERD (gastroesophageal reflux disease)     Hyperlipidemia     Hypertension     IBS (irritable bowel syndrome)     Insomnia     Lumbar disc herniation     PTSD (post-traumatic stress disorder)        Surgical History:   Sherie Reyes  has a past surgical history that includes  section; Adenoidectomy; Tonsillectomy; Arthroscopic repair of rotator cuff of shoulder; Arthroscopic debridement of rotator cuff  (Right, 6/9/2020); Arthroscopy of shoulder with decompression of subacromial space (6/9/2020); and Arthroscopic excision of acromioclavicular joint (6/9/2020).    Medications:   Sherie has a current medication list which includes the following prescription(s): amlodipine, bupropion, clonazepam, clonidine, duloxetine, gabapentin, gabapentin, gemfibrozil, hydrocodone-acetaminophen, hydrocodone-acetaminophen, hydroxyzine hcl, kurvelo (28), linaclotide, lorazepam, omeprazole, oxycodone-acetaminophen, polyethylene glycol 3350, potassium chloride sa, pravastatin, quetiapine, tizanidine, and tramadol.    Allergies:   Review of patient's allergies indicates:  No Known Allergies     Imaging, : See imaging report    Prior Therapy: Yes for her L shoulder   Social History:  lives with an adult   Occupation: Retired   Prior Level of Function: Independent   Current Level of Function: Independent     Pain:  Current 0/10, worst 7/10, best 0/10   Location: right shoulder   Description: Aching, Dull, Throbbing and Sharp  Aggravating Factors: Lifting and Reaching w/ R shouder   Easing Factors: rest    Pts goals: To improve mobility of shoulder and decrease pain    Objective     Shoulder Right Right Right Left Left Pain/Dysfunction with Movement    AROM PROM MMT AROM MMT    Flexion 30 120 NT WNL 4/5    Abduction 30 90 NT WNL 4/5    Internal rotation 30 70 NT WNL 4/5    External Rotation 20 60 NT WNL 4/5            CMS Impairment/Limitation/Restriction for FOTO Shoulder Survey    Therapist reviewed FOTO scores for Sherie Reyes on 6/23/2020.   FOTO documents entered into [employer] - see Media section.    Limitation Score: 66%  Category: Mobility           TREATMENT     Education provided:   - Purpose of PT      Assessment   Sherie is a 55 y.o. female referred to outpatient Physical Therapy with a medical diagnosis of incomplete tear of R rotator cuff. Pt presents with increased R shoulder pain, decreased R shoulder ROM, and  decreased R and L shoulder strength.     Pt prognosis is Good.   Pt will benefit from skilled outpatient Physical Therapy to address the deficits stated above and in the chart below, provide pt/family education, and to maximize pt's level of independence.     Plan of care discussed with patient: Yes  Pt's spiritual, cultural and educational needs considered and patient is agreeable to the plan of care and goals as stated below:     Anticipated Barriers for therapy: None    Medical Necessity is demonstrated by the following  History  Co-morbidities and personal factors that may impact the plan of care Co-morbidities:   anxiety and HTN    Personal Factors:   no deficits     moderate   Examination  Body Structures and Functions, activity limitations and participation restrictions that may impact the plan of care Body Regions:   upper extremities    Body Systems:    ROM  strength  motor control  motor learning    Participation Restrictions:   None    Activity limitations:   Learning and applying knowledge  no deficits    General Tasks and Commands  no deficits    Communication  no deficits    Mobility  no deficits    Self care  no deficits    Domestic Life  no deficits    Interactions/Relationships  no deficits    Life Areas  no deficits    Community and Social Life  no deficits         high   Clinical Presentation stable and uncomplicated low   Decision Making/ Complexity Score: low     Goals:  Short Term Goals (4 Weeks):   1. Pt will be independent with HEP to supplement PT in improving functional use of R UE.  2. Pt will increase pain free R shoulder elevation PROM to >/= 160 deg to improve functional mobility of UE  3. Pt will increase R shoulder ER PROM in 90 deg abduction to >/=30 deg to improve functional mobility of UE    Long Term Goals (8 Weeks):   1. Pt will improve FOTO score to </=30% limited to decrease perceived limitation with Rcarrying, moving, and handling objects.  2. Pt will increase R shoulder PROM  to WNL in all planes to improve functional use of R RUE.  3. Pt will increase R shoulder AROM to WFL in all planes to improve functional use of R RUE.  4. Pt will improve R shoulder MMTs to = L to promote equal use of B UEs in performing functional tasks.  5. Pt will lift 10 lb objects without pain to promote functional QOL.  6. Pt to report pain </= 0/10 with ADLs and IADLs using R UE to improve functional QOL.      Plan   Plan of care Certification: 6/23/2020 to 9/4/2020.    Outpatient Physical Therapy 3 times weekly for 10 weeks to include the following interventions: Gait Training, Manual Therapy, Moist Heat/ Ice, Neuromuscular Re-ed, Patient Education, Self Care, Therapeutic Activites and Therapeutic Exercise.     Jon Nieves, PT

## 2020-06-23 NOTE — PROGRESS NOTES
Digital Medicine: Health  Follow-Up    States she has been having panic attacks since her surgery.  Reports she is going to see Psychiatry tomorrow.    The history is provided by the patient.   Follow Up  Follow-up reason(s): routine education      Routine Education Topics: physical activity        INTERVENTION(S)  recommend physical activity, encouragement/support and denied questions    PLAN  patient verbalizes understanding and continue monitoring      There are no preventive care reminders to display for this patient.    Last 5 Patient Entered Readings                                      Current 30 Day Average: 115/81     Recent Readings 6/23/2020 6/23/2020 6/21/2020 6/12/2020 6/1/2020    SBP (mmHg) 102 109 142 134 102    DBP (mmHg) 74 81 95 90 72    Pulse 90 86 110 83 77                      Physical Activity Screening   When asked if exercising, patient responded: yes    Patient participates in the following activities: physical therapy/rehab    Reports she has been recovering from shoulder surgery.  States she has been in a lot of pain.  Reports she is starting PT today.      SDOH

## 2020-06-24 ENCOUNTER — OFFICE VISIT (OUTPATIENT)
Dept: INTERNAL MEDICINE | Facility: CLINIC | Age: 55
End: 2020-06-24
Payer: MEDICAID

## 2020-06-24 ENCOUNTER — TELEPHONE (OUTPATIENT)
Dept: ORTHOPEDICS | Facility: CLINIC | Age: 55
End: 2020-06-24

## 2020-06-24 VITALS
SYSTOLIC BLOOD PRESSURE: 132 MMHG | HEIGHT: 62 IN | DIASTOLIC BLOOD PRESSURE: 76 MMHG | BODY MASS INDEX: 36.6 KG/M2 | TEMPERATURE: 97 F | RESPIRATION RATE: 16 BRPM | WEIGHT: 198.88 LBS | HEART RATE: 113 BPM

## 2020-06-24 DIAGNOSIS — F41.9 ANXIETY: Primary | ICD-10-CM

## 2020-06-24 DIAGNOSIS — F41.0 PANIC ATTACKS: ICD-10-CM

## 2020-06-24 PROCEDURE — 99999 PR PBB SHADOW E&M-EST. PATIENT-LVL V: ICD-10-PCS | Mod: PBBFAC,,, | Performed by: INTERNAL MEDICINE

## 2020-06-24 PROCEDURE — 99213 OFFICE O/P EST LOW 20 MIN: CPT | Mod: S$PBB,,, | Performed by: INTERNAL MEDICINE

## 2020-06-24 PROCEDURE — 99215 OFFICE O/P EST HI 40 MIN: CPT | Mod: PBBFAC,PO | Performed by: INTERNAL MEDICINE

## 2020-06-24 PROCEDURE — 99213 PR OFFICE/OUTPT VISIT, EST, LEVL III, 20-29 MIN: ICD-10-PCS | Mod: S$PBB,,, | Performed by: INTERNAL MEDICINE

## 2020-06-24 PROCEDURE — 99999 PR PBB SHADOW E&M-EST. PATIENT-LVL V: CPT | Mod: PBBFAC,,, | Performed by: INTERNAL MEDICINE

## 2020-06-24 NOTE — TELEPHONE ENCOUNTER
----- Message from Araceli Hernandez sent at 6/24/2020  2:13 PM CDT -----  Contact: [phone]  Pt is requesting a callback in regards to a follow-up on the pt's medication being sent over to the Pharmacy.    Please call

## 2020-06-24 NOTE — PROGRESS NOTES
Subjective:       Patient ID: Sherie Reyes is a 55 y.o. female.    Chief Complaint: Anxiety    HPI   Pt with anxiety is here c/o almost nightly panic attacks since her shoulder surgery on 6/9/20. She has been taking Seroquel qHS which is not helping. Pt does have an appt with Psyc today. No SI/HI.   Review of Systems   Constitutional: Positive for activity change. Negative for appetite change, chills, diaphoresis, fatigue, fever and unexpected weight change.   HENT: Negative for hearing loss, postnasal drip, rhinorrhea, sinus pressure/congestion, sneezing, sore throat, trouble swallowing and voice change.    Eyes: Negative for discharge and visual disturbance.   Respiratory: Negative for cough, chest tightness, shortness of breath and wheezing.    Cardiovascular: Positive for palpitations. Negative for chest pain and leg swelling.   Gastrointestinal: Negative for abdominal pain, blood in stool, constipation, diarrhea, nausea and vomiting.   Endocrine: Negative for polydipsia and polyuria.   Genitourinary: Negative for difficulty urinating, dysuria, hematuria and menstrual problem.   Musculoskeletal: Positive for arthralgias, joint swelling and neck pain. Negative for myalgias.   Integumentary:  Negative for rash and wound.   Allergic/Immunologic: Negative for environmental allergies and food allergies.   Neurological: Positive for weakness. Negative for headaches.   Hematological: Negative for adenopathy. Does not bruise/bleed easily.   Psychiatric/Behavioral: Positive for dysphoric mood. Negative for confusion.         Objective:      Physical Exam  Constitutional:       General: She is not in acute distress.     Appearance: She is well-developed. She is not diaphoretic.   HENT:      Head: Normocephalic and atraumatic.      Right Ear: External ear normal.      Left Ear: External ear normal.      Nose: Nose normal.      Mouth/Throat:      Pharynx: No oropharyngeal exudate.   Eyes:      General: No scleral  icterus.        Right eye: No discharge.         Left eye: No discharge.      Conjunctiva/sclera: Conjunctivae normal.      Pupils: Pupils are equal, round, and reactive to light.   Neck:      Musculoskeletal: Neck supple.      Vascular: No JVD.   Cardiovascular:      Rate and Rhythm: Normal rate and regular rhythm.      Heart sounds: Normal heart sounds.   Pulmonary:      Effort: Pulmonary effort is normal. No respiratory distress.      Breath sounds: Normal breath sounds. No wheezing or rales.   Lymphadenopathy:      Cervical: No cervical adenopathy.   Skin:     General: Skin is warm and dry.      Coloration: Skin is not pale.      Findings: No rash.   Neurological:      Mental Status: She is alert and oriented to person, place, and time.         Assessment:       1. Anxiety    2. Panic attacks        Plan:    1/2. Pt advised to f/u with [facility] today for medical management

## 2020-06-29 ENCOUNTER — PATIENT MESSAGE (OUTPATIENT)
Dept: INTERNAL MEDICINE | Facility: CLINIC | Age: 55
End: 2020-06-29

## 2020-07-08 PROBLEM — F32.A DEPRESSION: Status: ACTIVE | Noted: 2020-07-08

## 2020-07-09 ENCOUNTER — TELEPHONE (OUTPATIENT)
Dept: INTERNAL MEDICINE | Facility: CLINIC | Age: 55
End: 2020-07-09

## 2020-07-09 NOTE — TELEPHONE ENCOUNTER
----- Message from Ramonita Diaz sent at 7/9/2020  2:33 PM CDT -----  Type:  Needs Medical Advice    Who Called: tamy     Would the patient rather a call back or a response via [facility]? Call back     Best Call Back Number: [phone]    Additional Information: tamy would like a sooner appt then 8/4

## 2020-07-10 RX ORDER — POTASSIUM CHLORIDE 20 MEQ/1
TABLET, EXTENDED RELEASE ORAL
Qty: 30 TABLET | Refills: 7 | Status: SHIPPED | OUTPATIENT
Start: 2020-07-10 | End: 2020-08-07

## 2020-07-10 NOTE — TELEPHONE ENCOUNTER
----- Message from Samara Whittington sent at 7/10/2020 11:28 AM CDT -----  Contact: [phone]  Patient would like to speak to the nurse in regards to a possible new RX. Please call and advise

## 2020-07-10 NOTE — TELEPHONE ENCOUNTER
Pt was admitted to [facility] and was prscribed new med and taken off meds, pt is now wanting to know if she can take Tizanidine for body pain, she doesn't need RX, just wants to know if she can take without it without any contradictions to what she is currently on. Please advise

## 2020-07-10 NOTE — TELEPHONE ENCOUNTER
Left pt a voicemail to call office back ,so I can relay message to pt about doctor recommendations.

## 2020-07-12 ENCOUNTER — PATIENT MESSAGE (OUTPATIENT)
Dept: INTERNAL MEDICINE | Facility: CLINIC | Age: 55
End: 2020-07-12

## 2020-07-13 ENCOUNTER — TELEPHONE (OUTPATIENT)
Dept: INTERNAL MEDICINE | Facility: CLINIC | Age: 55
End: 2020-07-13

## 2020-07-13 ENCOUNTER — NURSE TRIAGE (OUTPATIENT)
Dept: ADMINISTRATIVE | Facility: CLINIC | Age: 55
End: 2020-07-13

## 2020-07-13 NOTE — TELEPHONE ENCOUNTER
Patient contacted for [facility] Post discharge call. Patient reporting diarrhea x 15 times in the last 24 hours and severe abdominal pain 7/10.  Disposition go to ED now.  Patient states that she will not go to ED.  Offered anywhere care and patient accepted. Patient also has appointment tomorrow with Dr. Glass.    Reason for Disposition   SEVERE abdominal pain (e.g., excruciating)    Additional Information   Negative: Shock suspected (e.g., cold/pale/clammy skin, too weak to stand)   Negative: Difficult to awaken or acting confused (e.g., disoriented, slurred speech)   Negative: Sounds like a life-threatening emergency to the triager   Negative: Vomiting also present and worse than the diarrhea   Negative: Blood in stool and without diarrhea    Protocols used: [facility]

## 2020-07-13 NOTE — TELEPHONE ENCOUNTER
----- Message from Yulisa Good sent at 7/11/2020 10:27 AM CDT -----  Regarding: medication information  Contact: self [phone]  Pt states she would like to talk directly to a dr in reference to her medication that she is on. Pt states all the medications that she is on are interacting with each other. Please call and advise because pt states she does not feel well. Advised pt to go to urgent care if severe.

## 2020-07-13 NOTE — TELEPHONE ENCOUNTER
Spoke to pt a told her that Dr Sanchez said that she should not take the the Seroquel along with the tizanidine. Patient has a lot of question about med she all ready on and what she what she can  take for her body pain and insomnia. Suggested that she come in to see one of our other physician.   I told her I would get back with her. With the fist available appointment..

## 2020-07-14 ENCOUNTER — PATIENT MESSAGE (OUTPATIENT)
Dept: INTERNAL MEDICINE | Facility: CLINIC | Age: 55
End: 2020-07-14

## 2020-07-14 ENCOUNTER — OFFICE VISIT (OUTPATIENT)
Dept: INTERNAL MEDICINE | Facility: CLINIC | Age: 55
End: 2020-07-14
Payer: MEDICAID

## 2020-07-14 VITALS
RESPIRATION RATE: 16 BRPM | SYSTOLIC BLOOD PRESSURE: 138 MMHG | TEMPERATURE: 98 F | OXYGEN SATURATION: 98 % | HEART RATE: 104 BPM | HEIGHT: 62 IN | BODY MASS INDEX: 34.12 KG/M2 | WEIGHT: 185.44 LBS | DIASTOLIC BLOOD PRESSURE: 88 MMHG

## 2020-07-14 DIAGNOSIS — R41.0 DELIRIUM: Primary | ICD-10-CM

## 2020-07-14 DIAGNOSIS — F32.A DEPRESSION, UNSPECIFIED DEPRESSION TYPE: ICD-10-CM

## 2020-07-14 DIAGNOSIS — R19.7 DIARRHEA, UNSPECIFIED TYPE: ICD-10-CM

## 2020-07-14 DIAGNOSIS — F41.9 ANXIETY: ICD-10-CM

## 2020-07-14 DIAGNOSIS — F43.10 PTSD (POST-TRAUMATIC STRESS DISORDER): ICD-10-CM

## 2020-07-14 DIAGNOSIS — I10 ESSENTIAL HYPERTENSION: Chronic | ICD-10-CM

## 2020-07-14 DIAGNOSIS — F51.01 PRIMARY INSOMNIA: ICD-10-CM

## 2020-07-14 DIAGNOSIS — E11.9 CONTROLLED TYPE 2 DIABETES MELLITUS WITHOUT COMPLICATION, WITHOUT LONG-TERM CURRENT USE OF INSULIN: ICD-10-CM

## 2020-07-14 PROCEDURE — 99999 PR PBB SHADOW E&M-EST. PATIENT-LVL IV: ICD-10-PCS | Mod: PBBFAC,,, | Performed by: INTERNAL MEDICINE

## 2020-07-14 PROCEDURE — 99215 OFFICE O/P EST HI 40 MIN: CPT | Mod: S$PBB,,, | Performed by: INTERNAL MEDICINE

## 2020-07-14 PROCEDURE — 99215 PR OFFICE/OUTPT VISIT, EST, LEVL V, 40-54 MIN: ICD-10-PCS | Mod: S$PBB,,, | Performed by: INTERNAL MEDICINE

## 2020-07-14 PROCEDURE — 99999 PR PBB SHADOW E&M-EST. PATIENT-LVL IV: CPT | Mod: PBBFAC,,, | Performed by: INTERNAL MEDICINE

## 2020-07-14 PROCEDURE — 99214 OFFICE O/P EST MOD 30 MIN: CPT | Mod: PBBFAC,PO | Performed by: INTERNAL MEDICINE

## 2020-07-14 RX ORDER — VENLAFAXINE HYDROCHLORIDE 75 MG/1
75 CAPSULE, EXTENDED RELEASE ORAL DAILY
Qty: 30 CAPSULE | Refills: 11 | Status: SHIPPED | OUTPATIENT
Start: 2020-07-14 | End: 2020-08-05

## 2020-07-14 RX ORDER — ASCORBIC ACID 125 MG
10 TABLET,CHEWABLE ORAL DAILY PRN
COMMUNITY

## 2020-07-14 NOTE — PROGRESS NOTES
"Subjective:       Patient ID: Sherie Reyes is a 55 y.o. female.    Chief Complaint: Follow-up and Medication Management    HPI   Pt with PTSD, Depression/anxiety is here for f/u. She was admitted from 7/8/20-7/10/20 for severe panic attack at [facility]. Per records, "Per Dr. Mendoza, the pt did not appear to be her normal self and was suspected of overdosing on pain meds (she recently had an ortho surgery). During today's interview, pt could not clearly articulate how this specific attack/confusion was brought on. She did complain that she has not sleeping well (~4-5 hrs/ night) recently. Did not appear to have symptoms of alina. When pressed, she admits that she has not been taking her psych meds for a few days. She persistently asked whether she could vape. Denied SI/HI/AVH at the time of admission."    It was felt her delirium was from polypharmacy and resolved after most of her meds were stopped. Currently she is feeling much better overall. No SI/HI.   Review of Systems   Constitutional: Positive for activity change and unexpected weight change. Negative for appetite change, chills, diaphoresis, fatigue and fever.   HENT: Negative for nasal congestion, hearing loss, mouth sores, postnasal drip, rhinorrhea, sinus pressure/congestion, sneezing, sore throat, trouble swallowing and voice change.    Eyes: Negative for pain, discharge and visual disturbance.   Respiratory: Negative for cough, chest tightness, shortness of breath and wheezing.    Cardiovascular: Positive for palpitations. Negative for chest pain and leg swelling.   Gastrointestinal: Positive for diarrhea and nausea. Negative for abdominal pain, blood in stool, constipation and vomiting.   Endocrine: Negative for cold intolerance, heat intolerance and polyuria.   Genitourinary: Negative for difficulty urinating, dysuria, frequency, hematuria, menstrual problem and urgency.   Musculoskeletal: Positive for arthralgias, joint swelling " and neck pain. Negative for myalgias.   Integumentary:  Negative for rash and wound.   Allergic/Immunologic: Negative for environmental allergies and food allergies.   Neurological: Positive for weakness and headaches. Negative for dizziness, tremors, seizures, syncope and light-headedness.   Hematological: Negative for adenopathy. Does not bruise/bleed easily.   Psychiatric/Behavioral: Positive for dysphoric mood and sleep disturbance. Negative for confusion, self-injury and suicidal ideas. The patient is nervous/anxious.          Objective:      Physical Exam  Constitutional:       General: She is not in acute distress.     Appearance: She is well-developed. She is not diaphoretic.   HENT:      Head: Normocephalic and atraumatic.      Right Ear: External ear normal.      Left Ear: External ear normal.      Nose: Nose normal.      Mouth/Throat:      Pharynx: No oropharyngeal exudate.   Eyes:      General: No scleral icterus.        Right eye: No discharge.         Left eye: No discharge.      Conjunctiva/sclera: Conjunctivae normal.      Pupils: Pupils are equal, round, and reactive to light.   Neck:      Musculoskeletal: Neck supple.      Vascular: No JVD.   Cardiovascular:      Rate and Rhythm: Normal rate and regular rhythm.      Heart sounds: Normal heart sounds.   Pulmonary:      Effort: Pulmonary effort is normal. No respiratory distress.      Breath sounds: Normal breath sounds. No wheezing or rales.   Abdominal:      General: Bowel sounds are normal.      Palpations: Abdomen is soft.   Lymphadenopathy:      Cervical: No cervical adenopathy.   Skin:     General: Skin is warm and dry.      Coloration: Skin is not pale.      Findings: No rash.   Neurological:      Mental Status: She is alert and oriented to person, place, and time.         Assessment:       1. Delirium    2. Anxiety    3. Depression, unspecified depression type    4. PTSD (post-traumatic stress disorder)    5. Primary insomnia    6. Controlled  type 2 diabetes mellitus without complication, without long-term current use of insulin    7. Essential hypertension    8. Diarrhea, unspecified type        Plan:    1. Resolved, suspected 2/2 polypharmacy   2-4. Increase Effexor XR to 75 mg daily and may continue Klonopin BID PRN panic attacks   5. Pt advised to continue Seroquel at 200-300 mg qHS   6/7. Stable    8. Check stool studies especially for C. Diff as pt was just on Abx for a UTI    Over 1/2 of 40 minute visit spent reviewing pt's medical records, education/discussion of pt's medical conditions and medical management

## 2020-07-15 ENCOUNTER — PATIENT MESSAGE (OUTPATIENT)
Dept: INTERNAL MEDICINE | Facility: CLINIC | Age: 55
End: 2020-07-15

## 2020-07-15 ENCOUNTER — LAB VISIT (OUTPATIENT)
Dept: LAB | Facility: HOSPITAL | Age: 55
End: 2020-07-15
Attending: INTERNAL MEDICINE
Payer: MEDICAID

## 2020-07-15 DIAGNOSIS — R19.7 DIARRHEA, UNSPECIFIED TYPE: ICD-10-CM

## 2020-07-15 PROCEDURE — 87324 CLOSTRIDIUM AG IA: CPT

## 2020-07-15 PROCEDURE — 87425 ROTAVIRUS AG IA: CPT

## 2020-07-15 PROCEDURE — 89055 LEUKOCYTE ASSESSMENT FECAL: CPT

## 2020-07-15 PROCEDURE — 87046 STOOL CULTR AEROBIC BACT EA: CPT

## 2020-07-15 PROCEDURE — 87209 SMEAR COMPLEX STAIN: CPT

## 2020-07-15 PROCEDURE — 87449 NOS EACH ORGANISM AG IA: CPT

## 2020-07-15 PROCEDURE — 87045 FECES CULTURE AEROBIC BACT: CPT

## 2020-07-15 PROCEDURE — 87427 SHIGA-LIKE TOXIN AG IA: CPT

## 2020-07-15 PROCEDURE — 87329 GIARDIA AG IA: CPT

## 2020-07-15 NOTE — TELEPHONE ENCOUNTER
----- Message from Yulissa Heck sent at 7/15/2020  8:45 AM CDT -----  Contact: Pt [phone]  Patient is requesting a call about coming to the clinic to speak to someone about some medications today.    Please call and advise.    Thank You

## 2020-07-16 LAB
C DIFF GDH STL QL: NEGATIVE
C DIFF TOX A+B STL QL IA: NEGATIVE
CRYPTOSP AG STL QL IA: NEGATIVE
G LAMBLIA AG STL QL IA: NEGATIVE
RV AG STL QL IA.RAPID: NEGATIVE
WBC #/AREA STL HPF: NORMAL /[HPF]

## 2020-07-17 ENCOUNTER — OFFICE VISIT (OUTPATIENT)
Dept: INTERNAL MEDICINE | Facility: CLINIC | Age: 55
End: 2020-07-17
Payer: MEDICAID

## 2020-07-17 ENCOUNTER — CLINICAL SUPPORT (OUTPATIENT)
Dept: REHABILITATION | Facility: HOSPITAL | Age: 55
End: 2020-07-17
Attending: ORTHOPAEDIC SURGERY
Payer: MEDICAID

## 2020-07-17 VITALS
HEIGHT: 62 IN | WEIGHT: 183.44 LBS | DIASTOLIC BLOOD PRESSURE: 90 MMHG | BODY MASS INDEX: 33.76 KG/M2 | SYSTOLIC BLOOD PRESSURE: 170 MMHG | TEMPERATURE: 99 F

## 2020-07-17 DIAGNOSIS — M25.511 ACUTE PAIN OF RIGHT SHOULDER: ICD-10-CM

## 2020-07-17 DIAGNOSIS — R29.898 SHOULDER WEAKNESS: ICD-10-CM

## 2020-07-17 DIAGNOSIS — M25.611 DECREASED RANGE OF MOTION OF RIGHT SHOULDER: ICD-10-CM

## 2020-07-17 DIAGNOSIS — I10 ESSENTIAL HYPERTENSION: Primary | Chronic | ICD-10-CM

## 2020-07-17 LAB
E COLI SXT1 STL QL IA: NEGATIVE
E COLI SXT2 STL QL IA: NEGATIVE

## 2020-07-17 PROCEDURE — 99213 OFFICE O/P EST LOW 20 MIN: CPT | Mod: S$PBB,,, | Performed by: INTERNAL MEDICINE

## 2020-07-17 PROCEDURE — 99213 PR OFFICE/OUTPT VISIT, EST, LEVL III, 20-29 MIN: ICD-10-PCS | Mod: S$PBB,,, | Performed by: INTERNAL MEDICINE

## 2020-07-17 PROCEDURE — 99213 OFFICE O/P EST LOW 20 MIN: CPT | Mod: PBBFAC,PO | Performed by: INTERNAL MEDICINE

## 2020-07-17 PROCEDURE — 99999 PR PBB SHADOW E&M-EST. PATIENT-LVL III: CPT | Mod: PBBFAC,,, | Performed by: INTERNAL MEDICINE

## 2020-07-17 PROCEDURE — 97110 THERAPEUTIC EXERCISES: CPT

## 2020-07-17 PROCEDURE — 99999 PR PBB SHADOW E&M-EST. PATIENT-LVL III: ICD-10-PCS | Mod: PBBFAC,,, | Performed by: INTERNAL MEDICINE

## 2020-07-17 RX ORDER — LOSARTAN POTASSIUM 100 MG/1
100 TABLET ORAL DAILY
Qty: 90 TABLET | Refills: 3 | Status: SHIPPED | OUTPATIENT
Start: 2020-07-17 | End: 2021-05-31 | Stop reason: SDUPTHER

## 2020-07-17 RX ORDER — AMLODIPINE BESYLATE 10 MG/1
10 TABLET ORAL DAILY
Qty: 90 TABLET | Refills: 3 | Status: SHIPPED | OUTPATIENT
Start: 2020-07-17 | End: 2021-05-31 | Stop reason: SDUPTHER

## 2020-07-17 NOTE — PROGRESS NOTES
Physical Therapy Treatment Note     [facility]  [facility] Number: [id number]    Therapy Diagnosis:   Encounter Diagnoses   Name Primary?    Acute pain of right shoulder     Shoulder weakness     Decreased range of motion of right shoulder      Physician: Melchor Hughes Jr., *    Visit Date: 7/17/2020    Physician Orders: PT Eval and Treat   Medical Diagnosis from Referral: M75.111 (ICD-10-CM) - Incomplete tear of right rotator cuff, unspecified whether traumatic  Evaluation Date: 6/23/2020  Authorization Period Expiration: TBD  Plan of Care Expiration: 9/4/2020  Visit # / Visits authorized: 2/ TBD     Time In: 300 (arrived late)  Time Out: 315  Total Billable Time: 15 minutes     Visit: 30  Total: 112     Precautions: Standard and ARTHROSCOPIC DEBRIDEMENT OF R ROTATOR CUFF 6/09/2020    Subjective     Pt reports: she is hurting everywhere because her doctors cut her off from some of her medications .      Pain: 2/10  Location: right shoulder      Objective     Sherie received therapeutic exercises to develop strength, endurance, ROM, flexibility and posture for 15 minutes including:    Pulleys flex/abd 3 min ea  Scap Squeezes on 1/2 roll 20x 5 sec         Home Exercises Provided and Patient Education Provided     Education provided:   - Proper posture  - scap squeezes     Assessment     Patient reported to therapy 30 minutes late and was only able to complete a few exercises. Patient did not have any adverse effects from pulleys. Patient was able to achieve full range of motion with pulleys in both directions.   Sherie is progressing well towards her goals.   Pt prognosis is Good.     Pt will continue to benefit from skilled outpatient physical therapy to address the deficits listed in the problem list box on initial evaluation, provide pt/family education and to maximize pt's level of independence in the home and community environment.     Pt's spiritual, cultural and educational needs considered  and pt agreeable to plan of care and goals.     Anticipated barriers to physical therapy: None    Goals:   Short Term Goals (4 Weeks):   1. Pt will be independent with HEP to supplement PT in improving functional use of R UE.  2. Pt will increase pain free R shoulder elevation PROM to >/= 160 deg to improve functional mobility of UE  3. Pt will increase R shoulder ER PROM in 90 deg abduction to >/=30 deg to improve functional mobility of UE     Long Term Goals (8 Weeks):   1. Pt will improve FOTO score to </=30% limited to decrease perceived limitation with Rcarrying, moving, and handling objects.  2. Pt will increase R shoulder PROM to WNL in all planes to improve functional use of R RUE.  3. Pt will increase R shoulder AROM to WFL in all planes to improve functional use of R RUE.  4. Pt will improve R shoulder MMTs to = L to promote equal use of B UEs in performing functional tasks.  5. Pt will lift 10 lb objects without pain to promote functional QOL.  6. Pt to report pain </= 0/10 with ADLs and IADLs using R UE to improve functional QOL.        Plan   Plan of care Certification: 6/23/2020 to 9/4/2020.     Outpatient Physical Therapy 3 times weekly for 10 weeks to include the following interventions: Gait Training, Manual Therapy, Moist Heat/ Ice, Neuromuscular Re-ed, Patient Education, Self Care, Therapeutic Activites and Therapeutic Exercise.     Jon Nieves, PT

## 2020-07-17 NOTE — PROGRESS NOTES
Subjective:       Patient ID: Sherie Reyes is a 55 y.o. female.    Chief Complaint: Follow-up    HPI   Pt here for f/u regarding elevated BP. Her BP readings at home have been running high at home over the last week. She is currently on Norvasc 5 mg BID.  Review of Systems   Constitutional: Positive for activity change and unexpected weight change. Negative for appetite change, chills, diaphoresis, fatigue and fever.   HENT: Negative for hearing loss, postnasal drip, rhinorrhea, sinus pressure/congestion, sneezing, sore throat, trouble swallowing and voice change.    Eyes: Negative for discharge and visual disturbance.   Respiratory: Negative for cough, chest tightness, shortness of breath and wheezing.    Cardiovascular: Negative for chest pain, palpitations and leg swelling.   Gastrointestinal: Positive for diarrhea. Negative for abdominal pain, blood in stool, constipation, nausea and vomiting.   Endocrine: Negative for polydipsia and polyuria.   Genitourinary: Negative for difficulty urinating, dysuria, hematuria and menstrual problem.   Musculoskeletal: Positive for arthralgias, joint swelling and neck pain. Negative for myalgias.   Integumentary:  Negative for rash and wound.   Allergic/Immunologic: Negative for environmental allergies and food allergies.   Neurological: Positive for weakness and headaches.   Hematological: Negative for adenopathy. Does not bruise/bleed easily.   Psychiatric/Behavioral: Negative for confusion and dysphoric mood.         Objective:      Physical Exam  Constitutional:       General: She is not in acute distress.     Appearance: She is well-developed. She is not diaphoretic.   HENT:      Head: Normocephalic and atraumatic.      Right Ear: External ear normal.      Left Ear: External ear normal.      Nose: Nose normal.      Mouth/Throat:      Pharynx: No oropharyngeal exudate.   Eyes:      General: No scleral icterus.        Right eye: No discharge.         Left eye: No  discharge.      Conjunctiva/sclera: Conjunctivae normal.      Pupils: Pupils are equal, round, and reactive to light.   Neck:      Musculoskeletal: Neck supple.      Vascular: No JVD.   Cardiovascular:      Rate and Rhythm: Normal rate and regular rhythm.      Heart sounds: Normal heart sounds.   Pulmonary:      Effort: Pulmonary effort is normal. No respiratory distress.      Breath sounds: Normal breath sounds. No wheezing or rales.   Lymphadenopathy:      Cervical: No cervical adenopathy.   Skin:     General: Skin is warm and dry.      Coloration: Skin is not pale.      Findings: No rash.   Neurological:      Mental Status: She is alert and oriented to person, place, and time.         Assessment:       1. Essential hypertension        Plan:    1. Rx Losartan 100 mg daily and continue Norvasc 10 mg daily

## 2020-07-18 LAB — O+P STL MICRO: NORMAL

## 2020-07-20 LAB — BACTERIA STL CULT: NORMAL

## 2020-07-20 RX ORDER — TRAMADOL HYDROCHLORIDE 50 MG/1
50 TABLET ORAL EVERY 6 HOURS PRN
Qty: 30 TABLET | Refills: 0 | Status: SHIPPED | OUTPATIENT
Start: 2020-07-20 | End: 2020-07-30

## 2020-07-22 ENCOUNTER — PATIENT OUTREACH (OUTPATIENT)
Dept: OTHER | Facility: OTHER | Age: 55
End: 2020-07-22

## 2020-07-22 ENCOUNTER — CLINICAL SUPPORT (OUTPATIENT)
Dept: REHABILITATION | Facility: HOSPITAL | Age: 55
End: 2020-07-22
Attending: ORTHOPAEDIC SURGERY
Payer: MEDICAID

## 2020-07-22 DIAGNOSIS — M25.611 DECREASED RANGE OF MOTION OF RIGHT SHOULDER: ICD-10-CM

## 2020-07-22 DIAGNOSIS — R29.898 SHOULDER WEAKNESS: ICD-10-CM

## 2020-07-22 DIAGNOSIS — M25.511 ACUTE PAIN OF RIGHT SHOULDER: ICD-10-CM

## 2020-07-22 PROCEDURE — 97110 THERAPEUTIC EXERCISES: CPT

## 2020-07-22 NOTE — PROGRESS NOTES
Physical Therapy Treatment Note     [facility]  Cl[facility] Number: [id number]    Therapy Diagnosis:   Encounter Diagnoses   Name Primary?    Acute pain of right shoulder     Shoulder weakness     Decreased range of motion of right shoulder      Physician: Melchor Hughes Jr., *    Visit Date: 7/22/2020    Physician Orders: PT Eval and Treat   Medical Diagnosis from Referral: M75.111 (ICD-10-CM) - Incomplete tear of right rotator cuff, unspecified whether traumatic  Evaluation Date: 6/23/2020  Authorization Period Expiration: TBD  Plan of Care Expiration: 9/4/2020  Visit # / Visits authorized: 3/ TBD     Time In: 1225 (arrived late)    Time Out: 100  Total Billable Time: 15 minutes     Visit: 30  Total: 142     Precautions: Standard and ARTHROSCOPIC DEBRIDEMENT OF R ROTATOR CUFF 6/09/2020    Subjective     Pt reports: she was sore after session but is feeling ok today.      Pain: 2/10  Location: right shoulder      Objective     Sherie received therapeutic exercises to develop strength, endurance, ROM, flexibility and posture for 15 minutes including:    Pulleys flex/abd 3 min ea  Scap Squeezes on 1/2 roll 20x 5 sec   Seated Flexion/Abd/ER Stretch  Wand Flexion NP        Home Exercises Provided and Patient Education Provided     Education provided:   - Proper posture  - scap squeezes     Assessment   Patient was only able to complete 15 minutes of exercises due to being on the phone with her doctor the remainder of the session discussing her medications. This is the second session in a row that she has not completed a full session of exercises. Cont to progress as tolerated.   Sherie is progressing well towards her goals.   Pt prognosis is Good.     Pt will continue to benefit from skilled outpatient physical therapy to address the deficits listed in the problem list box on initial evaluation, provide pt/family education and to maximize pt's level of independence in the home and community environment.      Pt's spiritual, cultural and educational needs considered and pt agreeable to plan of care and goals.     Anticipated barriers to physical therapy: None    Goals:   Short Term Goals (4 Weeks):   1. Pt will be independent with HEP to supplement PT in improving functional use of R UE.  2. Pt will increase pain free R shoulder elevation PROM to >/= 160 deg to improve functional mobility of UE  3. Pt will increase R shoulder ER PROM in 90 deg abduction to >/=30 deg to improve functional mobility of UE     Long Term Goals (8 Weeks):   1. Pt will improve FOTO score to </=30% limited to decrease perceived limitation with Rcarrying, moving, and handling objects.  2. Pt will increase R shoulder PROM to WNL in all planes to improve functional use of R RUE.  3. Pt will increase R shoulder AROM to WFL in all planes to improve functional use of R RUE.  4. Pt will improve R shoulder MMTs to = L to promote equal use of B UEs in performing functional tasks.  5. Pt will lift 10 lb objects without pain to promote functional QOL.  6. Pt to report pain </= 0/10 with ADLs and IADLs using R UE to improve functional QOL.        Plan   Plan of care Certification: 6/23/2020 to 9/4/2020.     Outpatient Physical Therapy 3 times weekly for 10 weeks to include the following interventions: Gait Training, Manual Therapy, Moist Heat/ Ice, Neuromuscular Re-ed, Patient Education, Self Care, Therapeutic Activites and Therapeutic Exercise.     Jon Nieves, PT

## 2020-07-22 NOTE — PROGRESS NOTES
"Digital Medicine: Clinician Follow-Up    She reports that she has been having dizziness and headaches. She states that she stopped tizanidine. She takes full amlodipine in the AM and cuts losartan and takes that medication in the afternoon sometimes. She is afraid that losartan 100 mg will dramatically drop her BP because "it sounds like a lot". She states that she has not taken her losartan today. She reports that sometimes she checks her BP while in bed.      The history is provided by the patient.   Follow-up reason(s): medication change follow-up and routine follow up.   Care Team received high BP alert.      Patient is experiencing signs/symptoms of hypotension.  Patient is experiencing signs/symptoms of hypertension.    Patient did make medication change.    Is patient tolerating med change?: yes      Additional Follow-up details: She has been altering the way that she takes her antihypertensive regimen.      Last 5 Patient Entered Readings                                      Current 30 Day Average: 150/94     Recent Readings 7/22/2020 7/22/2020 7/21/2020 7/21/2020 7/21/2020    SBP (mmHg) 162 178 162 161 157    DBP (mmHg) 101 132 100 94 99    Pulse 118 103 90 97 97             Screenings    ASSESSMENT(S)  Patients BP average is 150/94 mmHg, which is above goal. Patient's BP goal is less than or equal to 140/90 per 2017 ACC/AHA Hypertension Guidelines.       PLAN  Additional monitoring needed: Informed patient to refrain from checking her BP prior to taking her medications. Explained why she is taking 2 different blood pressure medications.  Continue current therapy: She is to take half tablet of amlodipine and losartan in the AM and take remaining halves in the PM after dinner.  Provided patient education: Informed patient to refrain from thinking about losartan 100 mg as 10x more than amlodipine 10 mg. Explained mechanism of actions and manufacturing differences regarding medication strength. Reviewed " proper BP measurement techniques and encouraged her to sit at her dining table to check her BP and only after taking her antihypertensive medications.    Patient verbalizes understanding. Patient did not express questions or concerns and patient has contact information if needed.      Explained to the patient that the Digital Medicine team is not available for emergencies. Advised patient call [facility] ([phone] or [phone]) or [phone] if needed.             Topic    Eye Exam          Hypertension Medications             amLODIPine (NORVASC) 10 MG tablet Take 1 tablet (10 mg total) by mouth once daily.    losartan (COZAAR) 100 MG tablet Take 1 tablet (100 mg total) by mouth once daily.

## 2020-07-24 ENCOUNTER — CLINICAL SUPPORT (OUTPATIENT)
Dept: REHABILITATION | Facility: HOSPITAL | Age: 55
End: 2020-07-24
Attending: ORTHOPAEDIC SURGERY
Payer: MEDICAID

## 2020-07-24 DIAGNOSIS — R29.898 SHOULDER WEAKNESS: ICD-10-CM

## 2020-07-24 DIAGNOSIS — M25.511 ACUTE PAIN OF RIGHT SHOULDER: ICD-10-CM

## 2020-07-24 DIAGNOSIS — M25.611 DECREASED RANGE OF MOTION OF RIGHT SHOULDER: ICD-10-CM

## 2020-07-24 PROCEDURE — 97110 THERAPEUTIC EXERCISES: CPT

## 2020-07-24 NOTE — PROGRESS NOTES
Physical Therapy Treatment Note     [facility]  Clinic Number: [id number]    Therapy Diagnosis:   Encounter Diagnoses   Name Primary?    Acute pain of right shoulder     Shoulder weakness     Decreased range of motion of right shoulder      Physician: Melchor Hughes Jr., *    Visit Date: 7/24/2020    Physician Orders: PT Eval and Treat   Medical Diagnosis from Referral: M75.111 (ICD-10-CM) - Incomplete tear of right rotator cuff, unspecified whether traumatic  Evaluation Date: 6/23/2020  Authorization Period Expiration: TBD  Plan of Care Expiration: 9/4/2020  Visit # / Visits authorized: 4/ TBD     Time In: 248 (arrived late)   Time Out: 321  Total Billable Time: 33 minutes     Visit: 60  Total: 202     Precautions: Standard and ARTHROSCOPIC DEBRIDEMENT OF R ROTATOR CUFF 6/09/2020    Subjective     Pt reports: Patient reports she continues to be in pain and is trying to get pain medication from her doctor.      Pain: 2/10  Location: right shoulder      Objective     Sherie received therapeutic exercises to develop strength, endurance, ROM, flexibility and posture for 33 minutes including:    Pulleys flex/abd 3 min ea   Scap Squeezes on 1/2 roll 20x 5 sec   Seated Flexion/Abd/ER Stretch NP  Wand Flexion 10  Ball Roll Outs 20x Flexion/Abduction        Home Exercises Provided and Patient Education Provided     Education provided:   - Proper posture  - scap squeezes     Assessment   Patient continue to arrive late and we are not able to complete a full therapy visit because of tardiness. Patient had increased pain with wand flexion and pulley abduction. However, patient was able to tolerate ball roll outs without any adverse effects/ pain. Cont to progress as tolerated.   Sherie is progressing well towards her goals.   Pt prognosis is Good.     Pt will continue to benefit from skilled outpatient physical therapy to address the deficits listed in the problem list box on initial evaluation, provide  pt/family education and to maximize pt's level of independence in the home and community environment.     Pt's spiritual, cultural and educational needs considered and pt agreeable to plan of care and goals.     Anticipated barriers to physical therapy: None    Goals:   Short Term Goals (4 Weeks):   1. Pt will be independent with HEP to supplement PT in improving functional use of R UE.  2. Pt will increase pain free R shoulder elevation PROM to >/= 160 deg to improve functional mobility of UE  3. Pt will increase R shoulder ER PROM in 90 deg abduction to >/=30 deg to improve functional mobility of UE     Long Term Goals (8 Weeks):   1. Pt will improve FOTO score to </=30% limited to decrease perceived limitation with Rcarrying, moving, and handling objects.  2. Pt will increase R shoulder PROM to WNL in all planes to improve functional use of R RUE.  3. Pt will increase R shoulder AROM to WFL in all planes to improve functional use of R RUE.  4. Pt will improve R shoulder MMTs to = L to promote equal use of B UEs in performing functional tasks.  5. Pt will lift 10 lb objects without pain to promote functional QOL.  6. Pt to report pain </= 0/10 with ADLs and IADLs using R UE to improve functional QOL.        Plan   Plan of care Certification: 6/23/2020 to 9/4/2020.     Outpatient Physical Therapy 3 times weekly for 10 weeks to include the following interventions: Gait Training, Manual Therapy, Moist Heat/ Ice, Neuromuscular Re-ed, Patient Education, Self Care, Therapeutic Activites and Therapeutic Exercise.     Jon Nieves, PT

## 2020-07-28 ENCOUNTER — CLINICAL SUPPORT (OUTPATIENT)
Dept: REHABILITATION | Facility: HOSPITAL | Age: 55
End: 2020-07-28
Attending: ORTHOPAEDIC SURGERY
Payer: MEDICAID

## 2020-07-28 DIAGNOSIS — R29.898 SHOULDER WEAKNESS: ICD-10-CM

## 2020-07-28 DIAGNOSIS — M25.611 DECREASED RANGE OF MOTION OF RIGHT SHOULDER: ICD-10-CM

## 2020-07-28 DIAGNOSIS — M25.511 ACUTE PAIN OF RIGHT SHOULDER: ICD-10-CM

## 2020-07-28 PROBLEM — M25.512 CHRONIC PAIN OF BOTH SHOULDERS: Status: RESOLVED | Noted: 2017-08-28 | Resolved: 2020-07-28

## 2020-07-28 PROBLEM — G89.29 CHRONIC PAIN OF BOTH SHOULDERS: Status: RESOLVED | Noted: 2017-08-28 | Resolved: 2020-07-28

## 2020-07-28 PROBLEM — M25.612 DECREASED RANGE OF MOTION OF LEFT SHOULDER: Status: RESOLVED | Noted: 2017-11-27 | Resolved: 2020-07-28

## 2020-07-28 PROCEDURE — 97110 THERAPEUTIC EXERCISES: CPT

## 2020-07-28 NOTE — PROGRESS NOTES
Physical Therapy Treatment Note     [facility]  Cl[facility] Number: [id number]    Therapy Diagnosis:   Encounter Diagnoses   Name Primary?    Acute pain of right shoulder     Shoulder weakness     Decreased range of motion of right shoulder      Physician: Melchor Hughes Jr., *    Visit Date: 7/28/2020    Physician Orders: PT Eval and Treat   Medical Diagnosis from Referral: M75.111 (ICD-10-CM) - Incomplete tear of right rotator cuff, unspecified whether traumatic  Evaluation Date: 6/23/2020  Authorization Period Expiration: TBD  Plan of Care Expiration: 9/4/2020  Visit # / Visits authorized: 5/ TBD     Time In: 1:50 pm  Time Out: 2:30 pm  Total Billable Time: 40 minutes     Visit: 90  Total: 292     Precautions: Standard and ARTHROSCOPIC DEBRIDEMENT OF R ROTATOR CUFF 6/09/2020    Subjective     Pt reports: she is in pain before starting therapy so all of the exercises hurt her. Pt instructed to go gently and don't push into pain.       Pain: 2/10  Location: right shoulder      Objective     Sherie received therapeutic exercises to develop strength, endurance, ROM, flexibility and posture for 40 minutes including:    Pulleys flex/abd 3 min ea   Scap Squeezes on 1/2 roll 20x 5 sec   Wall slides 20x  Wand Flexion/Abd/ER 2x10  Ball Roll Outs 20x Flexion/Abduction        Home Exercises Provided and Patient Education Provided     Education provided:   - Proper posture  - scap squeezes     Assessment   Patient able to perform most exercises with little pain and difficulty. Pt had most pain with pulleys and wand abduction, so instructed to just go to her tolerance and not to push past pain. Cont to progress as tolerated.   Sherie is progressing well towards her goals.   Pt prognosis is Good.     Pt will continue to benefit from skilled outpatient physical therapy to address the deficits listed in the problem list box on initial evaluation, provide pt/family education and to maximize pt's level of  independence in the home and community environment.     Pt's spiritual, cultural and educational needs considered and pt agreeable to plan of care and goals.     Anticipated barriers to physical therapy: None    Goals:   Short Term Goals (4 Weeks):   1. Pt will be independent with HEP to supplement PT in improving functional use of R UE.  2. Pt will increase pain free R shoulder elevation PROM to >/= 160 deg to improve functional mobility of UE  3. Pt will increase R shoulder ER PROM in 90 deg abduction to >/=30 deg to improve functional mobility of UE     Long Term Goals (8 Weeks):   1. Pt will improve FOTO score to </=30% limited to decrease perceived limitation with Rcarrying, moving, and handling objects.  2. Pt will increase R shoulder PROM to WNL in all planes to improve functional use of R RUE.  3. Pt will increase R shoulder AROM to WFL in all planes to improve functional use of R RUE.  4. Pt will improve R shoulder MMTs to = L to promote equal use of B UEs in performing functional tasks.  5. Pt will lift 10 lb objects without pain to promote functional QOL.  6. Pt to report pain </= 0/10 with ADLs and IADLs using R UE to improve functional QOL.        Plan   Plan of care Certification: 6/23/2020 to 9/4/2020.     Outpatient Physical Therapy 3 times weekly for 10 weeks to include the following interventions: Gait Training, Manual Therapy, Moist Heat/ Ice, Neuromuscular Re-ed, Patient Education, Self Care, Therapeutic Activites and Therapeutic Exercise.     Guera Aguayo, PT

## 2020-07-30 ENCOUNTER — CLINICAL SUPPORT (OUTPATIENT)
Dept: REHABILITATION | Facility: HOSPITAL | Age: 55
End: 2020-07-30
Attending: ORTHOPAEDIC SURGERY
Payer: MEDICAID

## 2020-07-30 DIAGNOSIS — M25.611 DECREASED RANGE OF MOTION OF RIGHT SHOULDER: ICD-10-CM

## 2020-07-30 DIAGNOSIS — R29.898 SHOULDER WEAKNESS: ICD-10-CM

## 2020-07-30 DIAGNOSIS — M25.511 ACUTE PAIN OF RIGHT SHOULDER: ICD-10-CM

## 2020-07-30 PROCEDURE — 97110 THERAPEUTIC EXERCISES: CPT

## 2020-07-30 NOTE — PROGRESS NOTES
Physical Therapy Treatment Note     [facility]  Cl[facility] Number: [id number]    Therapy Diagnosis:   Encounter Diagnoses   Name Primary?    Acute pain of right shoulder     Shoulder weakness     Decreased range of motion of right shoulder      Physician: Melchor Hughes Jr., *    Visit Date: 7/30/2020    Physician Orders: PT Eval and Treat   Medical Diagnosis from Referral: M75.111 (ICD-10-CM) - Incomplete tear of right rotator cuff, unspecified whether traumatic  Evaluation Date: 6/23/2020  Authorization Period Expiration: TBD  Plan of Care Expiration: 9/4/2020  Visit # / Visits authorized: 6/ TBD     Time In: 405  Time Out: 445  Total Billable Time: 40 minutes     Precautions: Standard and ARTHROSCOPIC DEBRIDEMENT OF R ROTATOR CUFF 6/09/2020    Subjective     Pt reports: she is feeling better today. Patient reports she was able to get pain medication from her doctor to help reduce pain.       Pain: 2/10  Location: right shoulder      Objective     Sherie received therapeutic exercises to develop strength, endurance, ROM, flexibility and posture for 40 minutes including:    Pulleys flex/abd 3 min ea   Scap Squeezes on 1/2 roll 20x 5 sec   Isometrics Flex/ER/IR/ABD  Wall slides 20x  Wand Flexion/Abd/ER 2x10  Ball Roll Outs 20x Flexion/Abduction  Iso Walkouts ER/IR 10x Red TB         Home Exercises Provided and Patient Education Provided     Education provided:   - Proper posture  - scap squeezes     Assessment   Patient was able to be progressed today with isometric exercises for muscle recruitment and activation. Patient did not have any adverse effects today from therapy. Continue to progress    Sherie is progressing well towards her goals.   Pt prognosis is Good.     Pt will continue to benefit from skilled outpatient physical therapy to address the deficits listed in the problem list box on initial evaluation, provide pt/family education and to maximize pt's level of independence in the home and  community environment.     Pt's spiritual, cultural and educational needs considered and pt agreeable to plan of care and goals.     Anticipated barriers to physical therapy: None    Goals:   Short Term Goals (4 Weeks):   1. Pt will be independent with HEP to supplement PT in improving functional use of R UE.  2. Pt will increase pain free R shoulder elevation PROM to >/= 160 deg to improve functional mobility of UE  3. Pt will increase R shoulder ER PROM in 90 deg abduction to >/=30 deg to improve functional mobility of UE     Long Term Goals (8 Weeks):   1. Pt will improve FOTO score to </=30% limited to decrease perceived limitation with Rcarrying, moving, and handling objects.  2. Pt will increase R shoulder PROM to WNL in all planes to improve functional use of R RUE.  3. Pt will increase R shoulder AROM to WFL in all planes to improve functional use of R RUE.  4. Pt will improve R shoulder MMTs to = L to promote equal use of B UEs in performing functional tasks.  5. Pt will lift 10 lb objects without pain to promote functional QOL.  6. Pt to report pain </= 0/10 with ADLs and IADLs using R UE to improve functional QOL.        Plan   Plan of care Certification: 6/23/2020 to 9/4/2020.     Outpatient Physical Therapy 3 times weekly for 10 weeks to include the following interventions: Gait Training, Manual Therapy, Moist Heat/ Ice, Neuromuscular Re-ed, Patient Education, Self Care, Therapeutic Activites and Therapeutic Exercise.     Jon Nieves, PT

## 2020-07-31 ENCOUNTER — TELEPHONE (OUTPATIENT)
Dept: INTERNAL MEDICINE | Facility: CLINIC | Age: 55
End: 2020-07-31

## 2020-07-31 RX ORDER — OMEPRAZOLE 40 MG/1
40 CAPSULE, DELAYED RELEASE ORAL DAILY
Qty: 30 CAPSULE | Refills: 11 | Status: SHIPPED | OUTPATIENT
Start: 2020-07-31 | End: 2021-07-10 | Stop reason: SDUPTHER

## 2020-08-03 ENCOUNTER — PATIENT OUTREACH (OUTPATIENT)
Dept: OTHER | Facility: OTHER | Age: 55
End: 2020-08-03

## 2020-08-03 DIAGNOSIS — I10 ESSENTIAL HYPERTENSION: Primary | ICD-10-CM

## 2020-08-03 RX ORDER — TIZANIDINE 4 MG/1
4 TABLET ORAL 2 TIMES DAILY PRN
COMMUNITY
End: 2023-09-28

## 2020-08-03 RX ORDER — TRAMADOL HYDROCHLORIDE 50 MG/1
50 TABLET ORAL EVERY 12 HOURS PRN
COMMUNITY
End: 2023-02-07

## 2020-08-03 RX ORDER — DICLOFENAC SODIUM 75 MG/1
75 TABLET, DELAYED RELEASE ORAL 2 TIMES DAILY
COMMUNITY
End: 2020-08-28

## 2020-08-03 RX ORDER — OMEPRAZOLE 40 MG/1
40 CAPSULE, DELAYED RELEASE ORAL DAILY
COMMUNITY
End: 2020-08-03

## 2020-08-03 NOTE — PROGRESS NOTES
Digital Medicine: Clinician Follow-Up    Patient continues to take BP reading prior to BP measurement as she is afraid BP will drop too low.  She reports as of 8/2/20 she started taking losartan 100 mg QAM and amlodipine 10 mg QPM. She finds amlodipine tablet difficult to cut.  She reports resuming tizanidine about 1 week ago. She waits at least 2 hours after taking BP before taking tizanidine. She will take tizanidine 4 mg consistently about 2:30/3 - 5:30 pm. She does not always take a second dose, if she does it may be 2 mg or 4 mg.   She started Effexor in July.    The history is provided by the patient.   Follow-up reason(s): routine follow up.         Last 5 Patient Entered Readings                                      Current 30 Day Average: 149/92     Recent Readings 8/3/2020 8/3/2020 8/3/2020 8/2/2020 8/2/2020    SBP (mmHg) 136 152 133 128 136    DBP (mmHg) 89 95 84 83 90    Pulse 106 97 82 85 91             Screenings    ASSESSMENT(S)  Patients BP average is 149/92 mmHg, which is above goal. Patient's BP goal is less than or equal to 140/90 per 2017 ACC/AHA Hypertension Guidelines.   BP trending down with initiation of losartan.     PLAN  Labs ordered: BMP s/p losartan initiation. Assess need for KCl. Expected Lab Date: 8/5/2020  Continue current therapy: Take BP medications at consistent time day to day. She will likely continue to take BP prior to medications. Stressed to also submit a reading at least 1 hour after BP medications.  Provided patient education: Spent extensive timing on medication reconciliation and reviewing timing of medications. Encouraged her to follow up with pain mangement if she needs clarification on pain regimen.     Patient verbalizes understanding. Patient did not express questions or concerns and patient has contact information if needed.            Hypertension Medications             amLODIPine (NORVASC) 10 MG tablet Take 1 tablet (10 mg total) by mouth once daily.     losartan (COZAAR) 100 MG tablet Take 1 tablet (100 mg total) by mouth once daily.

## 2020-08-04 ENCOUNTER — CLINICAL SUPPORT (OUTPATIENT)
Dept: REHABILITATION | Facility: HOSPITAL | Age: 55
End: 2020-08-04
Attending: ORTHOPAEDIC SURGERY
Payer: MEDICAID

## 2020-08-04 DIAGNOSIS — M25.511 ACUTE PAIN OF RIGHT SHOULDER: ICD-10-CM

## 2020-08-04 DIAGNOSIS — M25.611 DECREASED RANGE OF MOTION OF RIGHT SHOULDER: ICD-10-CM

## 2020-08-04 DIAGNOSIS — R29.898 SHOULDER WEAKNESS: ICD-10-CM

## 2020-08-04 PROCEDURE — 97110 THERAPEUTIC EXERCISES: CPT

## 2020-08-04 NOTE — PROGRESS NOTES
Physical Therapy Treatment Note     [facility]  Clinic Number: [id number]    Therapy Diagnosis:   Encounter Diagnoses   Name Primary?    Acute pain of right shoulder     Shoulder weakness     Decreased range of motion of right shoulder      Physician: Melchor Hughes Jr., *    Visit Date: 8/4/2020    Physician Orders: PT Eval and Treat   Medical Diagnosis from Referral: M75.111 (ICD-10-CM) - Incomplete tear of right rotator cuff, unspecified whether traumatic  Evaluation Date: 6/23/2020  Authorization Period Expiration: TBD  Plan of Care Expiration: 9/4/2020  Visit # / Visits authorized: 7/ TBD     Time In: 534  Time Out: 614  Total Billable Time: 40 minutes     Precautions: Standard and ARTHROSCOPIC DEBRIDEMENT OF R ROTATOR CUFF 6/09/2020    Subjective     Pt reports: she is still having pain in her shoulder and everywhere. Patient reports she is having trouble getting her prescription medications      Pain: 2/10  Location: right shoulder      Objective     Sherie received therapeutic exercises to develop strength, endurance, ROM, flexibility and posture for 40 minutes including:    Pulleys flex/abd 3 min ea   Seated PROM flexion/ER/Abduction  Scap Squeezes on 1/2 roll 20x 5 sec   Isometrics Flex/ER/IR/ABD  Wall slides 20x  Wand Flexion/Abd/ER 2x10  Iso Walkouts ER/IR 10x Red TB         Home Exercises Provided and Patient Education Provided     Education provided:   - Proper posture  - scap squeezes     Assessment   Patient tolerated all therex without adverse effects. Patient continues to report pain with wand flexion past 140 and pulley abduction. However, patient did not complain of worsening symptoms after therapy session.   Sherie is progressing well towards her goals.   Pt prognosis is Good.     Pt will continue to benefit from skilled outpatient physical therapy to address the deficits listed in the problem list box on initial evaluation, provide pt/family education and to maximize pt's  level of independence in the home and community environment.     Pt's spiritual, cultural and educational needs considered and pt agreeable to plan of care and goals.     Anticipated barriers to physical therapy: None    Goals:   Short Term Goals (4 Weeks):   1. Pt will be independent with HEP to supplement PT in improving functional use of R UE.  2. Pt will increase pain free R shoulder elevation PROM to >/= 160 deg to improve functional mobility of UE  3. Pt will increase R shoulder ER PROM in 90 deg abduction to >/=30 deg to improve functional mobility of UE     Long Term Goals (8 Weeks):   1. Pt will improve FOTO score to </=30% limited to decrease perceived limitation with Rcarrying, moving, and handling objects.  2. Pt will increase R shoulder PROM to WNL in all planes to improve functional use of R RUE.  3. Pt will increase R shoulder AROM to WFL in all planes to improve functional use of R RUE.  4. Pt will improve R shoulder MMTs to = L to promote equal use of B UEs in performing functional tasks.  5. Pt will lift 10 lb objects without pain to promote functional QOL.  6. Pt to report pain </= 0/10 with ADLs and IADLs using R UE to improve functional QOL.        Plan   Plan of care Certification: 6/23/2020 to 9/4/2020.     Outpatient Physical Therapy 3 times weekly for 10 weeks to include the following interventions: Gait Training, Manual Therapy, Moist Heat/ Ice, Neuromuscular Re-ed, Patient Education, Self Care, Therapeutic Activites and Therapeutic Exercise.     Jon Nieves, PT

## 2020-08-05 ENCOUNTER — OFFICE VISIT (OUTPATIENT)
Dept: INTERNAL MEDICINE | Facility: CLINIC | Age: 55
End: 2020-08-05
Payer: MEDICAID

## 2020-08-05 ENCOUNTER — LAB VISIT (OUTPATIENT)
Dept: LAB | Facility: HOSPITAL | Age: 55
End: 2020-08-05
Attending: INTERNAL MEDICINE
Payer: MEDICAID

## 2020-08-05 VITALS
BODY MASS INDEX: 34.08 KG/M2 | DIASTOLIC BLOOD PRESSURE: 60 MMHG | OXYGEN SATURATION: 98 % | TEMPERATURE: 97 F | RESPIRATION RATE: 16 BRPM | HEART RATE: 116 BPM | HEIGHT: 62 IN | SYSTOLIC BLOOD PRESSURE: 122 MMHG | WEIGHT: 185.19 LBS

## 2020-08-05 DIAGNOSIS — F32.A DEPRESSION, UNSPECIFIED DEPRESSION TYPE: ICD-10-CM

## 2020-08-05 DIAGNOSIS — I10 ESSENTIAL HYPERTENSION: Primary | Chronic | ICD-10-CM

## 2020-08-05 DIAGNOSIS — I10 ESSENTIAL HYPERTENSION: ICD-10-CM

## 2020-08-05 DIAGNOSIS — F41.9 ANXIETY: ICD-10-CM

## 2020-08-05 LAB
ANION GAP SERPL CALC-SCNC: 9 MMOL/L (ref 8–16)
BUN SERPL-MCNC: 8 MG/DL (ref 6–20)
CALCIUM SERPL-MCNC: 9.1 MG/DL (ref 8.7–10.5)
CHLORIDE SERPL-SCNC: 108 MMOL/L (ref 95–110)
CO2 SERPL-SCNC: 21 MMOL/L (ref 23–29)
CREAT SERPL-MCNC: 1 MG/DL (ref 0.5–1.4)
EST. GFR  (AFRICAN AMERICAN): >60 ML/MIN/1.73 M^2
EST. GFR  (NON AFRICAN AMERICAN): >60 ML/MIN/1.73 M^2
GLUCOSE SERPL-MCNC: 151 MG/DL (ref 70–110)
POTASSIUM SERPL-SCNC: 4.6 MMOL/L (ref 3.5–5.1)
SODIUM SERPL-SCNC: 138 MMOL/L (ref 136–145)

## 2020-08-05 PROCEDURE — 99999 PR PBB SHADOW E&M-EST. PATIENT-LVL IV: CPT | Mod: PBBFAC,,, | Performed by: INTERNAL MEDICINE

## 2020-08-05 PROCEDURE — 99214 OFFICE O/P EST MOD 30 MIN: CPT | Mod: S$PBB,,, | Performed by: INTERNAL MEDICINE

## 2020-08-05 PROCEDURE — 99214 OFFICE O/P EST MOD 30 MIN: CPT | Mod: PBBFAC,PO | Performed by: INTERNAL MEDICINE

## 2020-08-05 PROCEDURE — 99999 PR PBB SHADOW E&M-EST. PATIENT-LVL IV: ICD-10-PCS | Mod: PBBFAC,,, | Performed by: INTERNAL MEDICINE

## 2020-08-05 PROCEDURE — 80048 BASIC METABOLIC PNL TOTAL CA: CPT

## 2020-08-05 PROCEDURE — 99214 PR OFFICE/OUTPT VISIT, EST, LEVL IV, 30-39 MIN: ICD-10-PCS | Mod: S$PBB,,, | Performed by: INTERNAL MEDICINE

## 2020-08-05 PROCEDURE — 36415 COLL VENOUS BLD VENIPUNCTURE: CPT | Mod: PO

## 2020-08-05 RX ORDER — VENLAFAXINE HYDROCHLORIDE 150 MG/1
150 CAPSULE, EXTENDED RELEASE ORAL DAILY
Qty: 90 CAPSULE | Refills: 3 | Status: SHIPPED | OUTPATIENT
Start: 2020-08-05 | End: 2021-08-05

## 2020-08-05 NOTE — PROGRESS NOTES
Subjective:       Patient ID: Sherie Reyes is a 55 y.o. female.    Chief Complaint: Follow-up    HPI   Pt with anxiety/depression is here for f/u regarding HTN. Losartan was added to her Norvasc at last visit. BP readings at home are running WNLs. No med SE's.   Review of Systems   Constitutional: Negative for activity change, appetite change, chills, diaphoresis, fatigue, fever and unexpected weight change.   HENT: Negative for postnasal drip, rhinorrhea, sinus pressure/congestion, sneezing, sore throat, trouble swallowing and voice change.    Respiratory: Negative for cough, shortness of breath and wheezing.    Cardiovascular: Negative for chest pain, palpitations and leg swelling.   Gastrointestinal: Negative for abdominal pain, blood in stool, constipation, diarrhea, nausea and vomiting.   Genitourinary: Negative for dysuria.   Musculoskeletal: Negative for arthralgias and myalgias.   Integumentary:  Negative for rash and wound.   Allergic/Immunologic: Negative for environmental allergies and food allergies.   Hematological: Negative for adenopathy. Does not bruise/bleed easily.   Psychiatric/Behavioral: Positive for dysphoric mood. Negative for decreased concentration, self-injury and suicidal ideas. The patient is nervous/anxious.          Objective:      Physical Exam  Constitutional:       General: She is not in acute distress.     Appearance: She is well-developed. She is not diaphoretic.   HENT:      Head: Normocephalic and atraumatic.      Right Ear: External ear normal.      Left Ear: External ear normal.      Nose: Nose normal.      Mouth/Throat:      Pharynx: No oropharyngeal exudate.   Eyes:      General: No scleral icterus.        Right eye: No discharge.         Left eye: No discharge.      Conjunctiva/sclera: Conjunctivae normal.      Pupils: Pupils are equal, round, and reactive to light.   Neck:      Musculoskeletal: Neck supple.      Vascular: No JVD.   Cardiovascular:      Rate and  Rhythm: Normal rate and regular rhythm.      Heart sounds: Normal heart sounds.   Pulmonary:      Effort: Pulmonary effort is normal. No respiratory distress.      Breath sounds: Normal breath sounds. No wheezing or rales.   Abdominal:      General: Bowel sounds are normal.      Palpations: Abdomen is soft.   Lymphadenopathy:      Cervical: No cervical adenopathy.   Skin:     General: Skin is warm and dry.      Coloration: Skin is not pale.      Findings: No rash.   Neurological:      Mental Status: She is alert and oriented to person, place, and time.         Assessment:       1. Essential hypertension    2. Anxiety    3. Depression, unspecified depression type        Plan:    1. Stable on Losartan/Norvasc    2/3. Increase Effexor XR to 150 mg daily

## 2020-08-06 ENCOUNTER — OFFICE VISIT (OUTPATIENT)
Dept: ORTHOPEDICS | Facility: CLINIC | Age: 55
End: 2020-08-06
Payer: MEDICAID

## 2020-08-06 VITALS — HEIGHT: 62 IN | BODY MASS INDEX: 34.04 KG/M2 | WEIGHT: 185 LBS

## 2020-08-06 DIAGNOSIS — M75.110 INCOMPLETE TEAR OF ROTATOR CUFF, UNSPECIFIED LATERALITY, UNSPECIFIED WHETHER TRAUMATIC: Primary | ICD-10-CM

## 2020-08-06 PROCEDURE — 99999 PR PBB SHADOW E&M-EST. PATIENT-LVL IV: CPT | Mod: PBBFAC,,, | Performed by: ORTHOPAEDIC SURGERY

## 2020-08-06 PROCEDURE — 99214 OFFICE O/P EST MOD 30 MIN: CPT | Mod: PBBFAC,PN | Performed by: ORTHOPAEDIC SURGERY

## 2020-08-06 PROCEDURE — 99999 PR PBB SHADOW E&M-EST. PATIENT-LVL IV: ICD-10-PCS | Mod: PBBFAC,,, | Performed by: ORTHOPAEDIC SURGERY

## 2020-08-06 PROCEDURE — 99024 POSTOP FOLLOW-UP VISIT: CPT | Mod: ,,, | Performed by: ORTHOPAEDIC SURGERY

## 2020-08-06 PROCEDURE — 99024 PR POST-OP FOLLOW-UP VISIT: ICD-10-PCS | Mod: ,,, | Performed by: ORTHOPAEDIC SURGERY

## 2020-08-06 NOTE — PROGRESS NOTES
Subjective:      Patient ID: Sherie Reyes is a 55 y.o. female.  Chief Complaint: Post-op Evaluation of the Right Shoulder      HPI  Sherie Reyes is a  55 y.o. female presenting today for post op visit.  She is s/p right shoulder arthroscopy and debridement partial tear rotator cuff now 8 weeks postop doing well except for occasional pain in the shoulder  She is currently in therapy would like to continue with this.     Review of patient's allergies indicates:  No Known Allergies      Current Outpatient Medications   Medication Sig Dispense Refill    amLODIPine (NORVASC) 10 MG tablet Take 1 tablet (10 mg total) by mouth once daily. 90 tablet 3    clonazePAM (KLONOPIN) 1 MG tablet TAKE 1 TABLET BY MOUTH TWICE DAILY 60 tablet 1    diclofenac (VOLTAREN) 75 MG EC tablet Take 75 mg by mouth 2 (two) times daily.      gemfibrozil (LOPID) 600 MG tablet TAKE 1 TABLET BY MOUTH TWICE DAILY BEFORE MEALS 60 tablet 5    KURVELO 0.15-0.03 mg per tablet Take 1 tablet by mouth once daily.      linaCLOtide (LINZESS) 290 mcg Cap capsule Take 1 capsule (290 mcg total) by mouth once daily. 90 capsule 0    losartan (COZAAR) 100 MG tablet Take 1 tablet (100 mg total) by mouth once daily. 90 tablet 3    melatonin 5 mg Chew Take 10 mg by mouth daily as needed.      omeprazole (PRILOSEC) 40 MG capsule Take 1 capsule (40 mg total) by mouth once daily. 30 capsule 11    POLYETHYLENE GLYCOL 3350 (MIRALAX ORAL) Take by mouth as needed.       potassium chloride SA (K-DUR,KLOR-CON) 20 MEQ tablet TAKE 2 TABLETS BY MOUTH ONCE DAILY 30 tablet 7    pravastatin (PRAVACHOL) 20 MG tablet Take 1 tablet (20 mg total) by mouth every evening. 30 tablet 6    QUEtiapine (SEROQUEL) 200 MG Tab Take 1 tablet (200 mg total) by mouth every evening. 30 tablet 2    tiZANidine (ZANAFLEX) 4 MG tablet Take 4 mg by mouth 2 (two) times daily as needed.      traMADoL (ULTRAM) 50 mg tablet Take 50 mg by mouth 2 (two) times a day.       "venlafaxine (EFFEXOR-XR) 150 MG Cp24 Take 1 capsule (150 mg total) by mouth once daily. 90 capsule 3    QUEtiapine (SEROQUEL) 200 MG Tab Take 1 tablet (200 mg total) by mouth nightly as needed (insomnia). (Patient not taking: Reported on 2020) 30 tablet 2     No current facility-administered medications for this visit.        Past Medical History:   Diagnosis Date    Anxiety     Degenerative joint disease (DJD) of hip     Diabetes type 2, controlled 7/10/2019    GERD (gastroesophageal reflux disease)     Hyperlipidemia     Hypertension     IBS (irritable bowel syndrome)     Insomnia     Lumbar disc herniation     PTSD (post-traumatic stress disorder)        Past Surgical History:   Procedure Laterality Date    ADENOIDECTOMY      ARTHROSCOPIC DEBRIDEMENT OF ROTATOR CUFF Right 2020    Procedure: DEBRIDEMENT, ROTATOR CUFF, ARTHROSCOPIC;  Surgeon: Melchor Hughes Jr., MD;  Location: [facility] OR;  Service: Orthopedics;  Laterality: Right;  need opus system (Ger MES[employer] notified , )  video, notified/confirmed 2020 KB 1310    ARTHROSCOPIC EXCISION OF ACROMIOCLAVICULAR JOINT  2020    Procedure: EXCISION, ACROMIOCLAVICULAR JOINT, ARTHROSCOPIC;  Surgeon: Melchor Hughes Jr., MD;  Location: [facility] OR;  Service: Orthopedics;;    ARTHROSCOPIC REPAIR OF ROTATOR CUFF OF SHOULDER      ARTHROSCOPY OF SHOULDER WITH DECOMPRESSION OF SUBACROMIAL SPACE  2020    Procedure: ARTHROSCOPY, SHOULDER, WITH SUBACROMIAL SPACE DECOMPRESSION;  Surgeon: Melchor Hughes Jr., MD;  Location: [facility] OR;  Service: Orthopedics;;     SECTION      TONSILLECTOMY         OBJECTIVE:   PHYSICAL EXAM:  Height: 5' 2" (157.5 cm) Weight: 83.9 kg (185 lb)  Vitals:    20 1431   Weight: 83.9 kg (185 lb)   Height: 5' 2" (1.575 m)   PainSc:   8     Ortho/SPM Exam  Examination right shoulder no tenderness no swelling incisions well-healed range of motion excellent strength a little bit weak  No " instability  Neurologic exam normal    RADIOGRAPHS:  None  Comments: I have personally reviewed the imaging and I agree with the above radiologist's report.    ASSESSMENT/PLAN:     IMPRESSION:  Status post right shoulder arthroscopy and debridement  Continue therapy advance activities as tolerated anti-inflammatory medication by mouth  PLAN:  As above  In terms of work she could do light duty work no lifting more than 5 lb    FOLLOW UP:  4-6 weeks    Disclaimer: This note has been generated using voice-recognition software. There may be typographical errors that have been missed during proof-reading.

## 2020-08-07 RX ORDER — POTASSIUM CHLORIDE 20 MEQ/1
20 TABLET, EXTENDED RELEASE ORAL DAILY
Qty: 90 TABLET | Refills: 1
Start: 2020-08-07 | End: 2020-09-02 | Stop reason: CLARIF

## 2020-08-07 NOTE — PROGRESS NOTES
As discussed with Dr. Glass, patient advised to decrease KCl to 20 mEq. K+ WNL but has increased s/p losartan initiation.

## 2020-08-12 ENCOUNTER — CLINICAL SUPPORT (OUTPATIENT)
Dept: REHABILITATION | Facility: HOSPITAL | Age: 55
End: 2020-08-12
Attending: ORTHOPAEDIC SURGERY
Payer: MEDICAID

## 2020-08-12 DIAGNOSIS — M25.511 ACUTE PAIN OF RIGHT SHOULDER: ICD-10-CM

## 2020-08-12 DIAGNOSIS — M25.611 DECREASED RANGE OF MOTION OF RIGHT SHOULDER: ICD-10-CM

## 2020-08-12 DIAGNOSIS — R29.898 SHOULDER WEAKNESS: ICD-10-CM

## 2020-08-12 PROCEDURE — 97110 THERAPEUTIC EXERCISES: CPT

## 2020-08-12 NOTE — PROGRESS NOTES
Physical Therapy Treatment Note     Name: Sherie Posey [facility]  Clinic Number: [id number]    Therapy Diagnosis:   Encounter Diagnoses   Name Primary?    Acute pain of right shoulder     Shoulder weakness     Decreased range of motion of right shoulder      Physician: Melchor Hughes Jr., *    Visit Date: 8/12/2020    Physician Orders: PT Eval and Treat   Medical Diagnosis from Referral: M75.111 (ICD-10-CM) - Incomplete tear of right rotator cuff, unspecified whether traumatic  Evaluation Date: 6/23/2020  Authorization Period Expiration: TBD  Plan of Care Expiration: 9/4/2020  Visit # / Visits authorized: 8/ TBD     Time In: 233  Time Out: 315  Total Billable Time: 42 minutes     Precautions: Standard and ARTHROSCOPIC DEBRIDEMENT OF R ROTATOR CUFF 6/09/2020    Subjective     Pt reports: Patient reports she is feeling a lot better compared to last session. Patient reports she feels that the pain medication she is on is starting to work.       Pain: 2/10  Location: right shoulder      Objective     Sherie received therapeutic exercises to develop strength, endurance, ROM, flexibility and posture for 42 minutes including:    Pulleys flex/abd 3 min ea   Seated PROM flexion/ER/Abduction  Standing Rows Black TB 3x10  Standing ER Red TB 3x10  Wall slides 20x  Wand Flexion/Abd/ER 2x10          Home Exercises Provided and Patient Education Provided     Education provided:   - Proper posture  - scap squeezes     Assessment   Patient was progressed today with shoulder strengthening exercises today and was without adverse effects. Patient is slowly progressing with doing exercises to her pain tolerance.   Sherie is progressing well towards her goals.   Pt prognosis is Good.     Pt will continue to benefit from skilled outpatient physical therapy to address the deficits listed in the problem list box on initial evaluation, provide pt/family education and to maximize pt's level of independence in the home and community  environment.     Pt's spiritual, cultural and educational needs considered and pt agreeable to plan of care and goals.     Anticipated barriers to physical therapy: None    Goals:   Short Term Goals (4 Weeks):   1. Pt will be independent with HEP to supplement PT in improving functional use of R UE.  2. Pt will increase pain free R shoulder elevation PROM to >/= 160 deg to improve functional mobility of UE  3. Pt will increase R shoulder ER PROM in 90 deg abduction to >/=30 deg to improve functional mobility of UE     Long Term Goals (8 Weeks):   1. Pt will improve FOTO score to </=30% limited to decrease perceived limitation with Rcarrying, moving, and handling objects.  2. Pt will increase R shoulder PROM to WNL in all planes to improve functional use of R RUE.  3. Pt will increase R shoulder AROM to WFL in all planes to improve functional use of R RUE.  4. Pt will improve R shoulder MMTs to = L to promote equal use of B UEs in performing functional tasks.  5. Pt will lift 10 lb objects without pain to promote functional QOL.  6. Pt to report pain </= 0/10 with ADLs and IADLs using R UE to improve functional QOL.        Plan   Plan of care Certification: 6/23/2020 to 9/4/2020.     Outpatient Physical Therapy 3 times weekly for 10 weeks to include the following interventions: Gait Training, Manual Therapy, Moist Heat/ Ice, Neuromuscular Re-ed, Patient Education, Self Care, Therapeutic Activites and Therapeutic Exercise.     Jon Nieves, PT

## 2020-08-19 ENCOUNTER — TELEPHONE (OUTPATIENT)
Dept: INTERNAL MEDICINE | Facility: CLINIC | Age: 55
End: 2020-08-19

## 2020-08-19 ENCOUNTER — NURSE TRIAGE (OUTPATIENT)
Dept: ADMINISTRATIVE | Facility: CLINIC | Age: 55
End: 2020-08-19

## 2020-08-19 ENCOUNTER — TELEPHONE (OUTPATIENT)
Dept: ORTHOPEDICS | Facility: CLINIC | Age: 55
End: 2020-08-19

## 2020-08-19 NOTE — TELEPHONE ENCOUNTER
----- Message from Yulissa Maria R sent at 8/19/2020  1:57 PM CDT -----  Contact: Pt [phone]  Patient was in the the hospital back on last month. Wants to know if Dr. Bernal would take the below medications so she can get them refilled. Patient wants to know if Dr. Bernal can look at all the medications from 07/08-07/10. She not sure if she suppose to be on any other medications.    QUEtiapine (SEROQUEL) 200 MG Tab  clonazePAM tablet 1 mg     venlafaxine (EFFEXOR-XR) 150 MG Cp24    [id number] - [place], [place] - [address] AT [address]      Please call and advise.    Thank You

## 2020-08-19 NOTE — TELEPHONE ENCOUNTER
Spoke to pt who wanted to know if Dr. Glynn would be able to take over these prescriptions when she gets back. She stated that Dr. Glass has been in the meantime. Advised to call office next month as she will be back then. To further discuss. She verbalized understanding.

## 2020-08-19 NOTE — TELEPHONE ENCOUNTER
----- Message from Magui Grey sent at 8/19/2020  1:41 PM CDT -----  Type:  Needs Medical Advice    Who Called: pt  Advice Regarding: injured right shoulder last night  Would the patient rather a call back or a response via [facility]? call  Best Call Back Number: [phone]  Additional Information: n/a

## 2020-08-20 ENCOUNTER — TELEPHONE (OUTPATIENT)
Dept: ORTHOPEDICS | Facility: CLINIC | Age: 55
End: 2020-08-20

## 2020-08-20 RX ORDER — SPIRONOLACTONE 25 MG/1
TABLET ORAL
Qty: 30 TABLET | Refills: 0 | Status: SHIPPED | OUTPATIENT
Start: 2020-08-20 | End: 2020-09-17

## 2020-08-20 NOTE — TELEPHONE ENCOUNTER
"Patient reports she is due for her night blood pressure medication. She reports her blood pressure is currently 104/76. She reports she is due for her amlodipine 10 mg and klonopin 1 mg. Reports she does have some amlodipine 5 mg that was filled.   I have paged Dr. Lopez who reports the patient can take the 5 mg Amlodipine and hold the Klonopin for the night.   Patient verbalizes understanding. She will call back with any questions, concerns or changes in symptoms.     Reason for Disposition   [1] Systolic BP  AND [2] taking blood pressure medications AND [3] NOT dizzy, lightheaded or weak   [1] Caller has URGENT medication question about med that PCP or specialist prescribed AND [2] triager unable to answer question    Additional Information   Negative: Started suddenly after an allergic medicine, an allergic food, or bee sting   Negative: Shock suspected (e.g., cold/pale/clammy skin, too weak to stand, low BP, rapid pulse)   Negative: Difficult to awaken or acting confused (e.g., disoriented, slurred speech)   Negative: Fainted   Negative: [1] Systolic BP < 90 AND [2] dizzy, lightheaded, or weak   Negative: Chest pain   Negative: Bleeding (e.g., vomiting blood, rectal bleeding or tarry stools, severe vaginal bleeding)(Exception: fainted from sight of small amount of blood; small cut or abrasion)   Negative: Extra heart beats or heart is beating fast  (i.e., "palpitations")   Negative: Sounds like a life-threatening emergency to the triager   Negative: [1] Systolic BP < 80 AND [2] NOT dizzy, lightheaded or weak   Negative: Abdominal pain   Negative: Major surgery in the past month   Negative: Fever > 100.4 F (38.0 C)   Negative: [1] Drinking very little AND [2] dehydration suspected (e.g., no urine > 12 hours, very dry mouth, very lightheaded)   Negative: [1] Fall in systolic BP > 20 mm Hg from normal AND [2] dizzy, lightheaded, or weak   Negative: Patient sounds very sick or weak to the " triager   Negative: [1] Systolic BP < 90 AND [2] NOT dizzy, lightheaded or weak   Negative: [1] Systolic BP  AND [2] taking blood pressure medications AND [3] dizzy, lightheaded or weak    Protocols used: LOW BLOOD PRESSURE-A-AH, MEDICATION QUESTION CALL-A-AH

## 2020-08-20 NOTE — TELEPHONE ENCOUNTER
----- Message from Sara Archibald sent at 8/20/2020  2:37 PM CDT -----  Regarding: call back  Contact: tamy  Type:  Needs Medical Advice    Who Called: patient  Reason: would like a call back from missed call  Would the patient rather a call back or a response via [facility]? Call back  Best Call Back Number: [phone]  Additional Information: tripped over her dog and wanted to know if she should go to the E.R. or schedule an appointment. Injured her shoulder.

## 2020-08-20 NOTE — TELEPHONE ENCOUNTER
"Spoke with pt. Re: fall, pt. States, "it has gotten better and I don't think I need to go to the ER, I'm just a little sore." advised pt. To apply ice q3-4h for 15-20 minutes and call back if her symptoms worsen. Pt. Expressed verbal understanding.   "

## 2020-08-21 ENCOUNTER — TELEPHONE (OUTPATIENT)
Dept: INTERNAL MEDICINE | Facility: CLINIC | Age: 55
End: 2020-08-21

## 2020-08-21 NOTE — TELEPHONE ENCOUNTER
Patient has been taking these new meds ??and has had dark stools/ upset stomach , instructed to call early in the morning Saturday @ 7 am to be seen to be evaluated by staff doctor

## 2020-08-21 NOTE — TELEPHONE ENCOUNTER
----- Message from Leanne Agustin sent at 8/21/2020  2:27 PM CDT -----  Contact: Self [phone]  Would like to receive medical advice.    Symptoms (please be specific):  black stool    How long has the patient had these symptoms:  last night     Pharmacy name/number (copy/paste from chart):  [facility]    Would they like a call back or a response via [facility]:  call back    Additional information:  Calling to speak with the nurse regarding pt has been having black stool. Patient states she has been having an upset stomach and along with diarrhea. Patient is requesting a call back regarding message.

## 2020-08-22 ENCOUNTER — OFFICE VISIT (OUTPATIENT)
Dept: INTERNAL MEDICINE | Facility: CLINIC | Age: 55
End: 2020-08-22
Payer: MEDICAID

## 2020-08-22 ENCOUNTER — LAB VISIT (OUTPATIENT)
Dept: LAB | Facility: HOSPITAL | Age: 55
End: 2020-08-22
Attending: INTERNAL MEDICINE
Payer: MEDICAID

## 2020-08-22 VITALS
OXYGEN SATURATION: 97 % | SYSTOLIC BLOOD PRESSURE: 140 MMHG | HEIGHT: 62 IN | RESPIRATION RATE: 16 BRPM | HEART RATE: 117 BPM | WEIGHT: 184.5 LBS | TEMPERATURE: 97 F | DIASTOLIC BLOOD PRESSURE: 82 MMHG | BODY MASS INDEX: 33.95 KG/M2

## 2020-08-22 DIAGNOSIS — K92.1 MELENA: ICD-10-CM

## 2020-08-22 DIAGNOSIS — R10.9 ABDOMINAL PAIN, UNSPECIFIED ABDOMINAL LOCATION: ICD-10-CM

## 2020-08-22 DIAGNOSIS — K92.1 MELENA: Primary | ICD-10-CM

## 2020-08-22 LAB
ALBUMIN SERPL BCP-MCNC: 3.3 G/DL (ref 3.5–5.2)
ALP SERPL-CCNC: 43 U/L (ref 55–135)
ALT SERPL W/O P-5'-P-CCNC: 20 U/L (ref 10–44)
ANION GAP SERPL CALC-SCNC: 9 MMOL/L (ref 8–16)
AST SERPL-CCNC: 20 U/L (ref 10–40)
BASOPHILS # BLD AUTO: 0.03 K/UL (ref 0–0.2)
BASOPHILS NFR BLD: 0.4 % (ref 0–1.9)
BILIRUB SERPL-MCNC: 0.2 MG/DL (ref 0.1–1)
BUN SERPL-MCNC: 8 MG/DL (ref 6–20)
CALCIUM SERPL-MCNC: 9.2 MG/DL (ref 8.7–10.5)
CHLORIDE SERPL-SCNC: 107 MMOL/L (ref 95–110)
CO2 SERPL-SCNC: 20 MMOL/L (ref 23–29)
CREAT SERPL-MCNC: 1 MG/DL (ref 0.5–1.4)
DIFFERENTIAL METHOD: ABNORMAL
EOSINOPHIL # BLD AUTO: 0.1 K/UL (ref 0–0.5)
EOSINOPHIL NFR BLD: 0.7 % (ref 0–8)
ERYTHROCYTE [DISTWIDTH] IN BLOOD BY AUTOMATED COUNT: 13.2 % (ref 11.5–14.5)
EST. GFR  (AFRICAN AMERICAN): >60 ML/MIN/1.73 M^2
EST. GFR  (NON AFRICAN AMERICAN): >60 ML/MIN/1.73 M^2
GLUCOSE SERPL-MCNC: 165 MG/DL (ref 70–110)
HCT VFR BLD AUTO: 39.6 % (ref 37–48.5)
HGB BLD-MCNC: 12.5 G/DL (ref 12–16)
IMM GRANULOCYTES # BLD AUTO: 0.03 K/UL (ref 0–0.04)
IMM GRANULOCYTES NFR BLD AUTO: 0.4 % (ref 0–0.5)
LYMPHOCYTES # BLD AUTO: 2 K/UL (ref 1–4.8)
LYMPHOCYTES NFR BLD: 24 % (ref 18–48)
MCH RBC QN AUTO: 28.8 PG (ref 27–31)
MCHC RBC AUTO-ENTMCNC: 31.6 G/DL (ref 32–36)
MCV RBC AUTO: 91 FL (ref 82–98)
MONOCYTES # BLD AUTO: 0.6 K/UL (ref 0.3–1)
MONOCYTES NFR BLD: 6.8 % (ref 4–15)
NEUTROPHILS # BLD AUTO: 5.6 K/UL (ref 1.8–7.7)
NEUTROPHILS NFR BLD: 67.7 % (ref 38–73)
NRBC BLD-RTO: 0 /100 WBC
PLATELET # BLD AUTO: 265 K/UL (ref 150–350)
PMV BLD AUTO: 12.1 FL (ref 9.2–12.9)
POTASSIUM SERPL-SCNC: 3.9 MMOL/L (ref 3.5–5.1)
PROT SERPL-MCNC: 6.6 G/DL (ref 6–8.4)
RBC # BLD AUTO: 4.34 M/UL (ref 4–5.4)
SODIUM SERPL-SCNC: 136 MMOL/L (ref 136–145)
WBC # BLD AUTO: 8.26 K/UL (ref 3.9–12.7)

## 2020-08-22 PROCEDURE — 85025 COMPLETE CBC W/AUTO DIFF WBC: CPT

## 2020-08-22 PROCEDURE — 36415 COLL VENOUS BLD VENIPUNCTURE: CPT | Mod: PO

## 2020-08-22 PROCEDURE — 80053 COMPREHEN METABOLIC PANEL: CPT

## 2020-08-22 PROCEDURE — 99999 PR PBB SHADOW E&M-EST. PATIENT-LVL V: ICD-10-PCS | Mod: PBBFAC,,, | Performed by: INTERNAL MEDICINE

## 2020-08-22 PROCEDURE — 99215 OFFICE O/P EST HI 40 MIN: CPT | Mod: PBBFAC,PO | Performed by: INTERNAL MEDICINE

## 2020-08-22 PROCEDURE — 99214 OFFICE O/P EST MOD 30 MIN: CPT | Mod: S$PBB,,, | Performed by: INTERNAL MEDICINE

## 2020-08-22 PROCEDURE — 99214 PR OFFICE/OUTPT VISIT, EST, LEVL IV, 30-39 MIN: ICD-10-PCS | Mod: S$PBB,,, | Performed by: INTERNAL MEDICINE

## 2020-08-22 PROCEDURE — 99999 PR PBB SHADOW E&M-EST. PATIENT-LVL V: CPT | Mod: PBBFAC,,, | Performed by: INTERNAL MEDICINE

## 2020-08-22 RX ORDER — DICYCLOMINE HYDROCHLORIDE 10 MG/1
10 CAPSULE ORAL 4 TIMES DAILY PRN
Qty: 60 CAPSULE | Refills: 1 | Status: SHIPPED | OUTPATIENT
Start: 2020-08-22 | End: 2020-09-15

## 2020-08-22 NOTE — PROGRESS NOTES
"Subjective:       Patient ID: Sherie Reyes is a 55 y.o. female.    Chief Complaint: Melena    HPI    She presents for evaluation of possible GI bleed.   She reports upset stomach with diclofenac and potassium. She tries to eat at least a small meal prior to meds. She has had the abdominal pain and diarrhea for the past month. She took pepto bismol two nights ago. She noticed black stool yesterday. No bright red blood in the stool. No orthostatic symptoms. + fatigue and sleepiness     HR elevated in clinic today- she attributes this to rushing around this morning. 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     Review of Systems   Constitutional: Positive for fatigue. Negative for chills and fever.   Gastrointestinal: Positive for abdominal pain, constipation and diarrhea. Negative for blood in stool and vomiting.   Neurological: Negative for dizziness and light-headedness.   Psychiatric/Behavioral: The patient is nervous/anxious.        Objective:        Vitals:    08/22/20 1030 08/22/20 1036   BP: (!) 140/82    BP Location: Left arm    Patient Position: Sitting    BP Method: Large (Manual)    Pulse: (!) 123 (!) 117   Resp: 16    Temp: 97.3 °F (36.3 °C)    TempSrc: Other (see comments)    SpO2: 97%    Weight: 83.7 kg (184 lb 8.4 oz)    Height: 5' 2" (1.575 m)        Body mass index is 33.75 kg/m².    Physical Exam  Constitutional:       General: She is not in acute distress.     Appearance: She is well-developed. She is not diaphoretic.   HENT:      Head: Normocephalic and atraumatic.      Nose: Nose normal.   Eyes:      General:         Right eye: No discharge.         Left eye: No discharge.      Conjunctiva/sclera: Conjunctivae normal.   Neck:      Musculoskeletal: Neck supple.   Cardiovascular:      Rate and Rhythm: Regular rhythm. Tachycardia present.      Heart sounds: Normal heart sounds.   Pulmonary:      Effort: Pulmonary effort is " normal.      Breath sounds: Normal breath sounds.   Abdominal:      General: Bowel sounds are normal.      Palpations: Abdomen is soft.      Tenderness: There is generalized abdominal tenderness. There is no guarding or rebound.   Genitourinary:     Rectum: Normal. Guaiac result negative.   Skin:     General: Skin is warm and dry.      Findings: No erythema.   Neurological:      Mental Status: She is alert and oriented to person, place, and time.   Psychiatric:         Behavior: Behavior normal.         Thought Content: Thought content normal.         Assessment:     1. Melena    2. Abdominal pain, unspecified abdominal location           Plan:         1. Melena  - FOBT negative. Likely due to pepto bismol.   - discussed ER precautions  - CBC auto differential; Future  - Comprehensive metabolic panel; Future    2. Abdominal pain, unspecified abdominal location  - dicyclomine (BENTYL) 10 MG capsule; Take 1 capsule (10 mg total) by mouth 4 (four) times daily as needed (abdominal pain).  Dispense: 60 capsule; Refill: 1           Patient note was created using MModal Dictation.  Any errors in syntax or even information may not have been identified and edited on initial review prior to signing this note.

## 2020-08-24 ENCOUNTER — TELEPHONE (OUTPATIENT)
Dept: INTERNAL MEDICINE | Facility: CLINIC | Age: 55
End: 2020-08-24

## 2020-08-24 NOTE — TELEPHONE ENCOUNTER
----- Message from Maya Payne LPN sent at 8/21/2020  4:42 PM CDT -----  Contact: Pt [phone]    ----- Message -----  From: Yulissa Heck  Sent: 8/21/2020   4:02 PM CDT  To: Maria Luisa [facility]STRADA Staff    Patient requesting a another return call.    Please call and advise.    Thank You

## 2020-08-24 NOTE — PROGRESS NOTES
BP avg 137/88 mmHg which is below goal <140/90 mmHg.  Patient has been communicating with clinician frequently the last couple of weeks.  Will follow up in 2 weeks.

## 2020-08-25 ENCOUNTER — DOCUMENTATION ONLY (OUTPATIENT)
Dept: REHABILITATION | Facility: HOSPITAL | Age: 55
End: 2020-08-25

## 2020-08-25 ENCOUNTER — TELEPHONE (OUTPATIENT)
Dept: ORTHOPEDICS | Facility: CLINIC | Age: 55
End: 2020-08-25

## 2020-08-25 NOTE — TELEPHONE ENCOUNTER
----- Message from Moise Peguero sent at 8/25/2020  3:37 PM CDT -----  Regarding: appointment access   called in to speak with the nurse at the office regarding an appointment.  mention that she was told by physical thearpy to schedule an appointment with  before continuing her therapy. She would like a call back from the office regarding this matter and can be reached at    [phone]

## 2020-08-25 NOTE — PROGRESS NOTES
"Pt arrived to therapy session and stated that last Thursday she tripped over her dog and then slipped on her dog's bed and fell and hurt her (R) shoulder. Pt stated that when she fell she fell into table and her (R) arm was put in "chicken wing" position and her (R) arm was stuck and her  had to come help her get up. Pt stated that her (R) shoulder has been hurting worse and since this fall her (R) elbow has been hurting. Pt stated that she went back to wearing her sling and has been icing her (R) shoulder. Instructed pt to schedule a follow up apt with MD to have (R) shoulder and elbow examined and also recommended that we hold physical therapy until she sees MD. Pt verbalized understanding and was agreeable to this plan. Therefore physical therapy session was held today.   "

## 2020-08-26 ENCOUNTER — PATIENT OUTREACH (OUTPATIENT)
Dept: ADMINISTRATIVE | Facility: OTHER | Age: 55
End: 2020-08-26

## 2020-08-26 DIAGNOSIS — E11.9 DIABETES MELLITUS WITHOUT COMPLICATION: Primary | ICD-10-CM

## 2020-08-26 NOTE — PROGRESS NOTES
Health Maintenance Due   Topic Date Due    Foot Exam  06[birth year]20/1977    Eye Exam  06[birth year]20/1977    HIV Screening  06/20/1982    Pneumococcal Vaccine (Medium Risk) (1 of 1 - PPSV23) 06/20/1986    Cervical Cancer Screening  06/20/1988    Shingles Vaccine (1 of 2) 06/20/2017    TETANUS VACCINE  10/23/2018     Updates were requested from care everywhere.  Chart was reviewed for overdue Proactive [facility] Encounters (DOREEN) topics (CRS, Breast Cancer Screening, Eye exam)  Health Maintenance has been updated.  LINKS immunization registry triggered.  Immunizations were reconciled.

## 2020-08-28 ENCOUNTER — HOSPITAL ENCOUNTER (OUTPATIENT)
Dept: RADIOLOGY | Facility: HOSPITAL | Age: 55
Discharge: HOME | End: 2020-08-28
Attending: ORTHOPAEDIC SURGERY
Payer: MEDICAID

## 2020-08-28 ENCOUNTER — OFFICE VISIT (OUTPATIENT)
Dept: ORTHOPEDICS | Facility: CLINIC | Age: 55
End: 2020-08-28
Payer: MEDICAID

## 2020-08-28 VITALS — BODY MASS INDEX: 33.95 KG/M2 | WEIGHT: 184.5 LBS | HEIGHT: 62 IN

## 2020-08-28 DIAGNOSIS — M25.512 CHRONIC LEFT SHOULDER PAIN: ICD-10-CM

## 2020-08-28 DIAGNOSIS — M79.609 ACUTE EXTREMITY PAIN: ICD-10-CM
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
## 2020-08-28 DIAGNOSIS — G89.29 CHRONIC LEFT SHOULDER PAIN: ICD-10-CM

## 2020-08-28 PROCEDURE — 73100 X-RAY EXAM OF WRIST: CPT | Mod: TC,PN,RT

## 2020-08-28 PROCEDURE — 73100 X-RAY EXAM OF WRIST: CPT | Mod: 26,RT,, | Performed by: RADIOLOGY

## 2020-08-28 PROCEDURE — 99214 OFFICE O/P EST MOD 30 MIN: CPT | Mod: 24,25,S$PBB, | Performed by: ORTHOPAEDIC SURGERY

## 2020-08-28 PROCEDURE — 73080 XR ELBOW COMPLETE 3 VIEW RIGHT: ICD-10-PCS | Mod: 26,RT,, | Performed by: RADIOLOGY

## 2020-08-28 PROCEDURE — 20610 DRAIN/INJ JOINT/BURSA W/O US: CPT | Mod: 79,S$PBB,LT, | Performed by: ORTHOPAEDIC SURGERY

## 2020-08-28 PROCEDURE — 73030 XR SHOULDER TRAUMA 3 VIEW RIGHT: ICD-10-PCS | Mod: 26,RT,, | Performed by: RADIOLOGY

## 2020-08-28 PROCEDURE — 73030 X-RAY EXAM OF SHOULDER: CPT | Mod: TC,PN,RT

## 2020-08-28 PROCEDURE — 20610 PR DRAIN/INJECT LARGE JOINT/BURSA: ICD-10-PCS | Mod: 79,S$PBB,LT, | Performed by: ORTHOPAEDIC SURGERY

## 2020-08-28 PROCEDURE — 99999 PR PBB SHADOW E&M-EST. PATIENT-LVL V: CPT | Mod: PBBFAC,,, | Performed by: ORTHOPAEDIC SURGERY

## 2020-08-28 PROCEDURE — 73030 X-RAY EXAM OF SHOULDER: CPT | Mod: 26,RT,, | Performed by: RADIOLOGY

## 2020-08-28 PROCEDURE — 73100 XR WRIST 2 VIEW RIGHT: ICD-10-PCS | Mod: 26,RT,, | Performed by: RADIOLOGY

## 2020-08-28 PROCEDURE — 99999 PR PBB SHADOW E&M-EST. PATIENT-LVL V: ICD-10-PCS | Mod: PBBFAC,,, | Performed by: ORTHOPAEDIC SURGERY

## 2020-08-28 PROCEDURE — 73080 X-RAY EXAM OF ELBOW: CPT | Mod: 26,RT,, | Performed by: RADIOLOGY

## 2020-08-28 PROCEDURE — 99214 PR OFFICE/OUTPT VISIT, EST, LEVL IV, 30-39 MIN: ICD-10-PCS | Mod: 24,25,S$PBB, | Performed by: ORTHOPAEDIC SURGERY

## 2020-08-28 PROCEDURE — 73080 X-RAY EXAM OF ELBOW: CPT | Mod: TC,PN,RT

## 2020-08-28 PROCEDURE — 99215 OFFICE O/P EST HI 40 MIN: CPT | Mod: PBBFAC,25,PN | Performed by: ORTHOPAEDIC SURGERY

## 2020-08-28 RX ORDER — TRIAMCINOLONE ACETONIDE 40 MG/ML
40 INJECTION, SUSPENSION INTRA-ARTICULAR; INTRAMUSCULAR
Status: DISCONTINUED | OUTPATIENT
Start: 2020-08-28 | End: 2020-08-28 | Stop reason: HOSPADM

## 2020-08-28 RX ORDER — INDOMETHACIN 50 MG/1
50 CAPSULE ORAL
Qty: 42 CAPSULE | Refills: 0 | Status: SHIPPED | OUTPATIENT
Start: 2020-08-28 | End: 2021-02-18

## 2020-08-28 RX ADMIN — TRIAMCINOLONE ACETONIDE 40 MG: 40 INJECTION, SUSPENSION INTRA-ARTICULAR; INTRAMUSCULAR at 03:08
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
## 2020-08-30 ENCOUNTER — PATIENT MESSAGE (OUTPATIENT)
Dept: INTERNAL MEDICINE | Facility: CLINIC | Age: 55
End: 2020-08-30

## 2020-09-02 ENCOUNTER — TELEPHONE (OUTPATIENT)
Dept: INTERNAL MEDICINE | Facility: CLINIC | Age: 55
End: 2020-09-02

## 2020-09-02 RX ORDER — CLONAZEPAM 1 MG/1
TABLET ORAL
Qty: 60 TABLET | Refills: 1 | Status: SHIPPED | OUTPATIENT
Start: 2020-09-02

## 2020-09-02 RX ORDER — POTASSIUM CHLORIDE 20 MEQ/15ML
20 SOLUTION ORAL DAILY
Qty: 473 ML | Refills: 0 | Status: SHIPPED | OUTPATIENT
Start: 2020-09-02 | End: 2020-09-16 | Stop reason: SDUPTHER

## 2020-09-02 NOTE — TELEPHONE ENCOUNTER
Spoke to pt and let her know New rx for potassium oral solution sent to pharmacy due to pharmacy stating  There's an interaction with bentyl and KCl tablets that can increase risk of GI lesions. The pharmacy recommended to Ms. Reyes that she separate the doses as much as possible. Depending on expected length of duration of Bentyl, please consider changing her to a different formulation of KCl (such as powder or oral solution  She expressed understanding

## 2020-09-02 NOTE — TELEPHONE ENCOUNTER
----- Message from Anushka Keane LPN sent at 9/2/2020  2:35 PM CDT -----  Regarding: FW: Return call  Contact: Patient @ [phone]    ----- Message -----  From: Mali Saxena  Sent: 9/2/2020  12:13 PM CDT  To: Gabe TALAVERA Staff  Subject: Return call                                      Patient is returning a phone call.  Who left a message for the patient: Makayla  Does patient know what this is regarding:  Return call Ok to move her appointment  Comments:

## 2020-09-02 NOTE — TELEPHONE ENCOUNTER
----- Message from Maryse Gilliam MA sent at 9/2/2020  4:05 PM CDT -----    ----- Message -----  From: Clementina Acosta, Yaniv  Sent: 9/2/2020   3:59 PM CDT  To: Jennifer Christianson Staff    Dr. Rodriguez,  There's an interaction with bentyl and KCl tablets that can increase risk of GI lesions. I have recommended to Ms. Reyes that she separate the doses as much as possible. Depending on expected length of duration of Bentyl, please consider changing her to a different formulation of KCl (such as powder or oral solution).      Clementina Acosta, PharmD  [facility] Medicine Clinician  [phone]

## 2020-09-02 NOTE — PROGRESS NOTES
BP near goal of < 140/90 mmHg, average 142/88 mmHg  Potential interaction with bentyl and KCl. Advised patient to separate doses. Sent message to Dr. Rodriguez to consider changing KCl to non-tablet formulation if patient is going to remain on Bentyl.

## 2020-09-08 ENCOUNTER — CLINICAL SUPPORT (OUTPATIENT)
Dept: REHABILITATION | Facility: HOSPITAL | Age: 55
End: 2020-09-08
Attending: ORTHOPAEDIC SURGERY
Payer: MEDICAID

## 2020-09-08 DIAGNOSIS — M25.511 ACUTE PAIN OF RIGHT SHOULDER: ICD-10-CM

## 2020-09-08 DIAGNOSIS — M25.611 DECREASED RANGE OF MOTION OF RIGHT SHOULDER: ICD-10-CM

## 2020-09-08 DIAGNOSIS — R29.898 SHOULDER WEAKNESS: ICD-10-CM

## 2020-09-08 PROCEDURE — 97110 THERAPEUTIC EXERCISES: CPT

## 2020-09-08 NOTE — PLAN OF CARE
Physical Therapy Treatment Note/Update Plan of Care     Name: Sherie Posey [facility]  Clinic Number: [id number]    Therapy Diagnosis:   Encounter Diagnoses   Name Primary?    Acute pain of right shoulder     Shoulder weakness     Decreased range of motion of right shoulder      Physician: Melchor Hughes Jr., *    Visit Date: 9/8/2020    Physician Orders: PT Eval and Treat   Medical Diagnosis from Referral: M75.111 (ICD-10-CM) - Incomplete tear of right rotator cuff, unspecified whether traumatic  Evaluation Date: 6/23/2020  Authorization Period Expiration: 10/31/202   Plan of Care Expiration: 11/18/2020  Visit # / Visits authorized: 9/ 36     Time In: 448  Time Out: 527  Total Billable Time: 39 minutes     Precautions: Standard and ARTHROSCOPIC DEBRIDEMENT OF R ROTATOR CUFF 6/09/2020    Subjective     Pt reports: falling three weeks ago and caught her arm and was stuck on a coffee table. Patient reports it hurt her shoulder from the fall. Patient reports she had imaging and was cleared by her doctor to return to therapy. Patient reports she is feeling a lot better today from the rest.     Pain: 2/10  Location: right shoulder      Objective     Sherie received therapeutic exercises to develop strength, endurance, ROM, flexibility and posture for 39 minutes including:    Shoulder Right Right Right Left Left Pain/Dysfunction with Movement     AROM PROM MMT AROM MMT     Flexion 115 120 3-/5 WNL 4/5     Abduction 110 130 3-/5 WNL 4/5     Internal rotation 65 70 3-/5 WNL 4/5     External Rotation 55 65 3-/5 WNL 4/5             Pulleys flex/abd 3 min ea   Seated PROM flexion/ER/Abduction  Standing Rows Black TB 3x10  Standing ER Red TB 3x10  Wall slides 20x  Wand Flexion/Abd/ER 2x10  Pec Stretch 30 sec x 3            Home Exercises Provided and Patient Education Provided     Education provided:   - Proper posture  - scap squeezes     Assessment   Patient has shown progress with shoulder range of motion and strength  compared to initial evaluation despite falling on her arm three weeks ago. Patient had slight pain with wall slides, but did not complain of any adverse effects when leaving therapy session. Patient was educated to use ice instead of heat when feeling sore after therapy.   Sherie is progressing well towards her goals.   Pt prognosis is Good.     Pt will continue to benefit from skilled outpatient physical therapy to address the deficits listed in the problem list box on initial evaluation, provide pt/family education and to maximize pt's level of independence in the home and community environment.     Pt's spiritual, cultural and educational needs considered and pt agreeable to plan of care and goals.     Anticipated barriers to physical therapy: None    Goals:   Short Term Goals (4 Weeks):   1. Pt will be independent with HEP to supplement PT in improving functional use of R UE.  2. Pt will increase pain free R shoulder elevation PROM to >/= 160 deg to improve functional mobility of UE  3. Pt will increase R shoulder ER PROM in 90 deg abduction to >/=30 deg to improve functional mobility of UE     Long Term Goals (8 Weeks):   1. Pt will improve FOTO score to </=30% limited to decrease perceived limitation with Rcarrying, moving, and handling objects.  2. Pt will increase R shoulder PROM to WNL in all planes to improve functional use of R RUE.  3. Pt will increase R shoulder AROM to WFL in all planes to improve functional use of R RUE.  4. Pt will improve R shoulder MMTs to = L to promote equal use of B UEs in performing functional tasks.  5. Pt will lift 10 lb objects without pain to promote functional QOL.  6. Pt to report pain </= 0/10 with ADLs and IADLs using R UE to improve functional QOL.        Plan   Extend to 11/18/2020     Outpatient Physical Therapy 3 times weekly for 10 weeks to include the following interventions: Gait Training, Manual Therapy, Moist Heat/ Ice, Neuromuscular Re-ed, Patient Education,  Self Care, Therapeutic Activites and Therapeutic Exercise.     Jon Nieves, PT

## 2020-09-08 NOTE — PROGRESS NOTES
Physical Therapy Treatment Note/Update Plan of Care     [facility]  Cl[facility] Number: [id number]    Therapy Diagnosis:   Encounter Diagnoses   Name Primary?    Acute pain of right shoulder     Shoulder weakness     Decreased range of motion of right shoulder      Physician: Melchor Hughes Jr., *    Visit Date: 9/8/2020    Physician Orders: PT Eval and Treat   Medical Diagnosis from Referral: M75.111 (ICD-10-CM) - Incomplete tear of right rotator cuff, unspecified whether traumatic  Evaluation Date: 6/23/2020  Authorization Period Expiration: 10/31/202   Plan of Care Expiration: 11/18/2020  Visit # / Visits authorized: 9/ 36     Time In: 448  Time Out: 527  Total Billable Time: 39 minutes     Precautions: Standard and ARTHROSCOPIC DEBRIDEMENT OF R ROTATOR CUFF 6/09/2020    Subjective     Pt reports: falling three weeks ago and caught her arm and was stuck on a coffee table. Patient reports it hurt her shoulder from the fall. Patient reports she had imaging and was cleared by her doctor to return to therapy. Patient reports she is feeling a lot better today from the rest.     Pain: 2/10  Location: right shoulder      Objective     Sherie received therapeutic exercises to develop strength, endurance, ROM, flexibility and posture for 39 minutes including:    Shoulder Right Right Right Left Left Pain/Dysfunction with Movement     AROM PROM MMT AROM MMT     Flexion 115 120 3-/5 WNL 4/5     Abduction 110 130 3-/5 WNL 4/5     Internal rotation 65 70 3-/5 WNL 4/5     External Rotation 55 65 3-/5 WNL 4/5             Pulleys flex/abd 3 min ea   Seated PROM flexion/ER/Abduction  Standing Rows Black TB 3x10  Standing ER Red TB 3x10  Wall slides 20x  Wand Flexion/Abd/ER 2x10  Pec Stretch 30 sec x 3            Home Exercises Provided and Patient Education Provided     Education provided:   - Proper posture  - scap squeezes     Assessment   Patient has shown progress with shoulder range of motion and strength  compared to initial evaluation despite falling on her arm three weeks ago. Patient had slight pain with wall slides, but did not complain of any adverse effects when leaving therapy session. Patient was educated to use ice instead of heat when feeling sore after therapy.   Sherie is progressing well towards her goals.   Pt prognosis is Good.     Pt will continue to benefit from skilled outpatient physical therapy to address the deficits listed in the problem list box on initial evaluation, provide pt/family education and to maximize pt's level of independence in the home and community environment.     Pt's spiritual, cultural and educational needs considered and pt agreeable to plan of care and goals.     Anticipated barriers to physical therapy: None    Goals:   Short Term Goals (4 Weeks):   1. Pt will be independent with HEP to supplement PT in improving functional use of R UE.  2. Pt will increase pain free R shoulder elevation PROM to >/= 160 deg to improve functional mobility of UE  3. Pt will increase R shoulder ER PROM in 90 deg abduction to >/=30 deg to improve functional mobility of UE     Long Term Goals (8 Weeks):   1. Pt will improve FOTO score to </=30% limited to decrease perceived limitation with Rcarrying, moving, and handling objects.  2. Pt will increase R shoulder PROM to WNL in all planes to improve functional use of R RUE.  3. Pt will increase R shoulder AROM to WFL in all planes to improve functional use of R RUE.  4. Pt will improve R shoulder MMTs to = L to promote equal use of B UEs in performing functional tasks.  5. Pt will lift 10 lb objects without pain to promote functional QOL.  6. Pt to report pain </= 0/10 with ADLs and IADLs using R UE to improve functional QOL.        Plan   Extend to 11/18/2020     Outpatient Physical Therapy 3 times weekly for 10 weeks to include the following interventions: Gait Training, Manual Therapy, Moist Heat/ Ice, Neuromuscular Re-ed, Patient Education,  Self Care, Therapeutic Activites and Therapeutic Exercise.     Jon Nieves, PT

## 2020-09-10 ENCOUNTER — PATIENT OUTREACH (OUTPATIENT)
Dept: OTHER | Facility: OTHER | Age: 55
End: 2020-09-10

## 2020-09-10 NOTE — PROGRESS NOTES
"Digital Medicine: Health  Follow-Up    The history is provided by the patient.             Reason for review: Blood pressure not at goal        Topics Covered on Call: physical activity and technique    Additional Follow-up details: Reviewed technique since she has been messaging clinician about it.  States she does not want to "cheat the machine" States she takes her medication every 2 hours, but takes her BP reading before taking medication since she does not want it to drop too low.      Reports she takes about 3-6 deep breaths before taking BP readings. States she when does not take deep breaths she notices higher readings.  States she takes 3-6 breaths and takes a second BP reading and noticed it is about 20pts lower.        Diet-Not assessed          Physical Activity-Change      She exercises for 25 minutes per day 7 day(s) a week.     She added walking to Her physical activity routine.        Additional physical activity details: Reports she has been walking in the evening for 20-25min.      Medication Adherence-Medication Adherence not addressed.      Substance, Sleep, Stress-Not assessed      Continue current diet/physical activity routine. Walking  Reviewed Device Techniques.     Addressed any questions or concerns and patient has my contact information if needed prior to next outreach. Patient verbalizes understanding.      Explained the importance of self-monitoring and medication adherence. Encouraged the patient to communicate with their health  for lifestyle modifications to help improve or maintain a healthy lifestyle.                Topic    Eye Exam        Last 5 Patient Entered Readings                                      Current 30 Day Average: 143/86     Recent Readings 9/10/2020 9/10/2020 9/10/2020 9/10/2020 9/9/2020    SBP (mmHg) 139 140 125 124 138    DBP (mmHg) 84 80 80 76 91    Pulse 96 84 92 89 82               "

## 2020-09-15 ENCOUNTER — TELEPHONE (OUTPATIENT)
Dept: INTERNAL MEDICINE | Facility: CLINIC | Age: 55
End: 2020-09-15

## 2020-09-15 DIAGNOSIS — E78.2 MIXED HYPERLIPIDEMIA: Primary | ICD-10-CM

## 2020-09-15 NOTE — TELEPHONE ENCOUNTER
Please review attached message, pt had previously thought medication was upsetting stomach and was told to hold as trial.

## 2020-09-15 NOTE — TELEPHONE ENCOUNTER
----- Message from Amalia Floyd sent at 9/15/2020  2:28 PM CDT -----  Regarding: f/u on medication  Contact: [phone]  Needs Advice    Reason for call:      Pt was talking off of gemfibrozil and feels much better not taking medication. Pt states that she is not having that many upset stomachs and it is not a all day thing anymore. She states that the liquid potassium is working much better for her as well and she is still taking the pravastatin.     Communication Preference: [phone]    Additional Information:    Pt was calling to follow up and inform the provider how she was doing. Pt would like to know if she needs to come back in before February.

## 2020-09-16 RX ORDER — POTASSIUM CHLORIDE 20 MEQ/15ML
20 SOLUTION ORAL DAILY
Qty: 473 ML | Refills: 0 | Status: SHIPPED | OUTPATIENT
Start: 2020-09-16 | End: 2020-10-26 | Stop reason: SDUPTHER

## 2020-09-16 NOTE — TELEPHONE ENCOUNTER
Spoke to pt and informed that she doesn't need to be seen prior to February apt (already booked). Advised to continue to hold gemfibrozil and recheck level in 3 months (booked apt). Pt stated that her liquid potassium didn't come with refills and that she was switched to liquid when started on bentyl (by Dr. Rodriguez) due to drug interaction but would zandra to continue with liquid if possible. Advised to call back when due for refill as Dr. Nation may want labs prior to refill. She verbalized understanding.

## 2020-09-17 ENCOUNTER — OFFICE VISIT (OUTPATIENT)
Dept: ORTHOPEDICS | Facility: CLINIC | Age: 55
End: 2020-09-17
Payer: MEDICAID

## 2020-09-17 DIAGNOSIS — M75.111 INCOMPLETE TEAR OF RIGHT ROTATOR CUFF, UNSPECIFIED WHETHER TRAUMATIC: Primary | ICD-10-CM

## 2020-09-17 PROCEDURE — 99024 PR POST-OP FOLLOW-UP VISIT: ICD-10-PCS | Mod: ,,, | Performed by: PHYSICIAN ASSISTANT

## 2020-09-17 PROCEDURE — 99024 POSTOP FOLLOW-UP VISIT: CPT | Mod: ,,, | Performed by: PHYSICIAN ASSISTANT

## 2020-09-17 PROCEDURE — 99999 PR PBB SHADOW E&M-EST. PATIENT-LVL IV: ICD-10-PCS | Mod: PBBFAC,,, | Performed by: PHYSICIAN ASSISTANT

## 2020-09-17 PROCEDURE — 99214 OFFICE O/P EST MOD 30 MIN: CPT | Mod: PBBFAC,PN | Performed by: PHYSICIAN ASSISTANT

## 2020-09-17 PROCEDURE — 99999 PR PBB SHADOW E&M-EST. PATIENT-LVL IV: CPT | Mod: PBBFAC,,, | Performed by: PHYSICIAN ASSISTANT

## 2020-09-17 RX ORDER — MELOXICAM 15 MG/1
TABLET ORAL
COMMUNITY
Start: 2020-08-26 | End: 2021-09-17

## 2020-09-17 RX ORDER — QUETIAPINE FUMARATE 100 MG/1
TABLET, FILM COATED ORAL
COMMUNITY
Start: 2020-09-02 | End: 2020-11-25

## 2020-09-17 RX ORDER — TRAZODONE HYDROCHLORIDE 100 MG/1
TABLET ORAL
COMMUNITY
Start: 2020-09-02

## 2020-09-17 RX ORDER — DICLOFENAC SODIUM 10 MG/G
2 GEL TOPICAL 4 TIMES DAILY
Qty: 1 TUBE | Refills: 2 | Status: SHIPPED | OUTPATIENT
Start: 2020-09-17 | End: 2021-01-11 | Stop reason: SDUPTHER

## 2020-09-18 NOTE — PROGRESS NOTES
Subjective:      Patient ID: Sherie Reyes is a 55 y.o. female.    Chief Complaint: Shoulder Pain (right )      HPI: Sherie Reyes is a 55-year-old female in clinic today for follow-up of bilateral shoulder pain.  Patient was last seen about 3 weeks ago following a fall because right shoulder, right elbow, and right wrist pain.  Patient states that this pain has improved greatly since last visit.  Also at last visit patient received corticosteroid injection of the left shoulder and patient report the injection is working well for symptomatic control.  Patient has returned to physical therapy and is making progress there.  Patient has no new complaints at this time.      Past Medical History:   Diagnosis Date    Anxiety     Degenerative joint disease (DJD) of hip     Diabetes type 2, controlled 7/10/2019    GERD (gastroesophageal reflux disease)     Hyperlipidemia     Hypertension     IBS (irritable bowel syndrome)     Insomnia     Lumbar disc herniation     PTSD (post-traumatic stress disorder)        Current Outpatient Medications:     amLODIPine (NORVASC) 10 MG tablet, Take 1 tablet (10 mg total) by mouth once daily., Disp: 90 tablet, Rfl: 3    clonazePAM (KLONOPIN) 1 MG tablet, TAKE 1 TABLET BY MOUTH TWICE DAILY, Disp: 60 tablet, Rfl: 1    dicyclomine (BENTYL) 10 MG capsule, TAKE ONE CAPSULE BY MOUTH FOUR TIMES DAILY AS NEEDED, Disp: 60 capsule, Rfl: 1    KURVELO 0.15-0.03 mg per tablet, Take 1 tablet by mouth once daily., Disp: , Rfl:     LINZESS 290 mcg Cap capsule, TAKE 1 CAPSULE(290 MCG) BY MOUTH EVERY DAY, Disp: 90 capsule, Rfl: 0    losartan (COZAAR) 100 MG tablet, Take 1 tablet (100 mg total) by mouth once daily., Disp: 90 tablet, Rfl: 3    meloxicam (MOBIC) 15 MG tablet, TK 1 T PO QD, Disp: , Rfl:     omeprazole (PRILOSEC) 40 MG capsule, Take 1 capsule (40 mg total) by mouth once daily., Disp: 30 capsule, Rfl: 11    potassium chloride 10% (KAYCIEL) 20 mEq/15 mL oral solution,  "Take 15 mLs (20 mEq total) by mouth once daily., Disp: 473 mL, Rfl: 0    pravastatin (PRAVACHOL) 20 MG tablet, Take 1 tablet (20 mg total) by mouth every evening., Disp: 30 tablet, Rfl: 6    QUEtiapine (SEROQUEL) 100 MG Tab, TK 1 AND 1/2 TS PO HS, Disp: , Rfl:     QUEtiapine (SEROQUEL) 200 MG Tab, Take 1 tablet (200 mg total) by mouth every evening., Disp: 30 tablet, Rfl: 2    QUEtiapine (SEROQUEL) 200 MG Tab, Take 1 tablet (200 mg total) by mouth nightly as needed (insomnia)., Disp: 30 tablet, Rfl: 2    tiZANidine (ZANAFLEX) 4 MG tablet, Take 4 mg by mouth 2 (two) times daily as needed., Disp: , Rfl:     traMADoL (ULTRAM) 50 mg tablet, Take 50 mg by mouth 2 (two) times a day., Disp: , Rfl:     traZODone (DESYREL) 100 MG tablet, TK 1 T PO  QHS, Disp: , Rfl:     venlafaxine (EFFEXOR-XR) 150 MG Cp24, Take 1 capsule (150 mg total) by mouth once daily., Disp: 90 capsule, Rfl: 3    diclofenac sodium (VOLTAREN) 1 % Gel, Apply 2 g topically 4 (four) times daily., Disp: 1 Tube, Rfl: 2    indomethacin (INDOCIN) 50 MG capsule, Take 1 capsule (50 mg total) by mouth 3 (three) times daily with meals., Disp: 42 capsule, Rfl: 0    melatonin 5 mg Chew, Take 10 mg by mouth daily as needed., Disp: , Rfl:     POLYETHYLENE GLYCOL 3350 (MIRALAX ORAL), Take by mouth as needed. , Disp: , Rfl:   Review of patient's allergies indicates:  No Known Allergies    Ht (P) 5' 2" (1.575 m)   Wt (P) 83.7 kg (184 lb 8.4 oz)   BMI (P) 33.75 kg/m²     ROS      Objective:    Ortho Exam   Right shoulder:  ROM is satisfactory  No pain with internal/external rotation  Sensation and pulses intact    Left shoulder is normal on exam today    GEN: Well developed, well nourished female. AAOX3. No acute distress.   Normocephalic, atraumatic.   GAL  Breathing unlabored.  Mood and affect appropriate.        Assessment:     Imaging:  No new imaging ordered today        1. Incomplete tear of right rotator cuff, unspecified whether traumatic        "   Plan:       Recommended the patient add Voltaren gel to her treatment for these problems.  Patient likes this idea of adding a topical.  Also suggested the patient continue with her physical therapy and she is making good progress.    Orders Placed This Encounter    diclofenac sodium (VOLTAREN) 1 % Gel     Follow up in about 6 weeks (around 10/29/2020).

## 2020-09-22 ENCOUNTER — CLINICAL SUPPORT (OUTPATIENT)
Dept: REHABILITATION | Facility: HOSPITAL | Age: 55
End: 2020-09-22
Attending: ORTHOPAEDIC SURGERY
Payer: MEDICAID

## 2020-09-22 DIAGNOSIS — M25.611 DECREASED RANGE OF MOTION OF RIGHT SHOULDER: ICD-10-CM

## 2020-09-22 DIAGNOSIS — M25.511 ACUTE PAIN OF RIGHT SHOULDER: ICD-10-CM

## 2020-09-22 DIAGNOSIS — R29.898 SHOULDER WEAKNESS: ICD-10-CM

## 2020-09-22 PROCEDURE — 97110 THERAPEUTIC EXERCISES: CPT

## 2020-09-22 NOTE — PROGRESS NOTES
Physical Therapy Treatment Note     [facility]  Clinic Number: [id number]    Therapy Diagnosis:   Encounter Diagnoses   Name Primary?    Acute pain of right shoulder     Shoulder weakness     Decreased range of motion of right shoulder      Physician: Melchor Hughes Jr., *    Visit Date: 9/22/2020    Physician Orders: PT Eval and Treat   Medical Diagnosis from Referral: M75.111 (ICD-10-CM) - Incomplete tear of right rotator cuff, unspecified whether traumatic  Evaluation Date: 6/23/2020  Authorization Period Expiration: 10/31/202   Plan of Care Expiration: 11/18/2020  Visit # / Visits authorized: 10/ 36     Time In: 315  Time Out: 400  Total Billable Time: 45 minutes     Precautions: Standard and ARTHROSCOPIC DEBRIDEMENT OF R ROTATOR CUFF 6/09/2020    Subjective     Pt reports: she is feeling ok today and she started a new gel that she is putting on her shoulder and elbows for pain. Patient reports she has no new complaints.     Pain: 2/10  Location: right shoulder      Objective     Sherie received therapeutic exercises to develop strength, endurance, ROM, flexibility and posture for 45 minutes including:    Pulleys flex/abd 3 min ea   Seated PROM flexion/ER/Abduction  Standing Rows Black TB 3x10  Standing ER Red TB 3x10  Wall slides 20x  Wand Flexion/Abd/ER 2x10  Pec Stretch 30 sec x 3  No Money Red TB 3x10            Home Exercises Provided and Patient Education Provided     Education provided:   - Proper posture  - scap squeezes     Assessment   Patient was inconsistent with vocalizing her pain. Patient reported pain with PROM into abduction of R shoulder, however, patient did not report any pain with pulley PROM on the R shoulder during therapy session. Patient did not report any worsening of symptoms after therapy session. Cont to progress as tolerated.   Sherie is progressing well towards her goals.   Pt prognosis is Good.     Pt will continue to benefit from skilled outpatient physical  therapy to address the deficits listed in the problem list box on initial evaluation, provide pt/family education and to maximize pt's level of independence in the home and community environment.     Pt's spiritual, cultural and educational needs considered and pt agreeable to plan of care and goals.     Anticipated barriers to physical therapy: None    Goals:   Short Term Goals (4 Weeks):   1. Pt will be independent with HEP to supplement PT in improving functional use of R UE.  2. Pt will increase pain free R shoulder elevation PROM to >/= 160 deg to improve functional mobility of UE  3. Pt will increase R shoulder ER PROM in 90 deg abduction to >/=30 deg to improve functional mobility of UE     Long Term Goals (8 Weeks):   1. Pt will improve FOTO score to </=30% limited to decrease perceived limitation with Rcarrying, moving, and handling objects.  2. Pt will increase R shoulder PROM to WNL in all planes to improve functional use of R RUE.  3. Pt will increase R shoulder AROM to WFL in all planes to improve functional use of R RUE.  4. Pt will improve R shoulder MMTs to = L to promote equal use of B UEs in performing functional tasks.  5. Pt will lift 10 lb objects without pain to promote functional QOL.  6. Pt to report pain </= 0/10 with ADLs and IADLs using R UE to improve functional QOL.        Plan   Extend to 11/18/2020     Outpatient Physical Therapy 3 times weekly for 10 weeks to include the following interventions: Gait Training, Manual Therapy, Moist Heat/ Ice, Neuromuscular Re-ed, Patient Education, Self Care, Therapeutic Activites and Therapeutic Exercise.     Jon Nieves, PT

## 2020-09-28 ENCOUNTER — PATIENT OUTREACH (OUTPATIENT)
Dept: OTHER | Facility: OTHER | Age: 55
End: 2020-09-28

## 2020-09-28 NOTE — PROGRESS NOTES
Left voicemail requesting call back  Called to address low BP alerts  Per patient comments, appears she may have held some doses of BP medications  Consider decreasing losartan to 50 mg daily

## 2020-09-30 NOTE — PROGRESS NOTES
"Digital Medicine: Clinician Follow-Up    Patient initially unable to explain low BP readings. States she ate a cinnamon roll instead of a sandwich (usually puts potato chips on sandwich, turkey deli meat, small amount of gregory, wheat bread). She has resumed eating sandwich and typically eats daily. States she gets thirsty and feels "dry" when BP is low. She does not think she drank less water or was dehydrated leading up to lower BP readings.    The history is provided by the patient.   Follow-up reason(s): Alert received.   Care Team received low BP alert.      Hypertension    Readings are trending down   Patient is not experiencing signs/symptoms of hypotension. Was experiencing LH and weakness with low BP readings. Improved with eating chips and Sprite.          Last 5 Patient Entered Readings                                      Current 30 Day Average: 133/78     Recent Readings 9/30/2020 9/30/2020 9/30/2020 9/30/2020 9/30/2020    SBP (mmHg) 127 127 124 111 135    DBP (mmHg) 82 82 72 70 78    Pulse 99 99 91 105 110                 Depression Screening  Did not address depression screening.    Sleep Apnea Screening    Did not address sleep apnea screening.     Medication Affordability Screening  Did not address medication affordability screening.     Medication Adherence-Medication adherence was assessed.        Patient did not hold BP medication based on low readings but did hold some doses of clonazepam, linzess and bentyl based on timing of lower BP readings      ASSESSMENT(S)  Patients BP average is 133/78 mmHg, which is at goal. Patient's BP goal is less than or equal to 140/90 per 2017 ACC/AHA Hypertension Guidelines.    Goal < 140/90 mmHg  Decreased sodium intake may have contributed to lower BP readings. Suspected dehydration may have contributed based on patterns in past, however, patient does not believe this to be the case.  Suspect tizanidine and clonazepam use also contribute to BP " fluctuations.      Hypertension Plan  Additional monitoring needed.  Continue current therapy. Advised patient she can decrease losartan to 50 mg daily on days with lower sodium intake       Addressed patient questions and patient has my contact information if needed prior to next outreach. Patient verbalizes understanding.                Hypertension Medications             amLODIPine (NORVASC) 10 MG tablet Take 1 tablet (10 mg total) by mouth once daily.    losartan (COZAAR) 100 MG tablet Take 1 tablet (100 mg total) by mouth once daily.

## 2020-10-06 ENCOUNTER — CLINICAL SUPPORT (OUTPATIENT)
Dept: REHABILITATION | Facility: HOSPITAL | Age: 55
End: 2020-10-06
Attending: ORTHOPAEDIC SURGERY
Payer: MEDICAID

## 2020-10-06 DIAGNOSIS — R29.898 SHOULDER WEAKNESS: ICD-10-CM

## 2020-10-06 DIAGNOSIS — M25.611 DECREASED RANGE OF MOTION OF RIGHT SHOULDER: ICD-10-CM

## 2020-10-06 DIAGNOSIS — M25.511 ACUTE PAIN OF RIGHT SHOULDER: ICD-10-CM

## 2020-10-06 PROCEDURE — 97110 THERAPEUTIC EXERCISES: CPT

## 2020-10-06 NOTE — PROGRESS NOTES
Physical Therapy Treatment Note     [facility]  Clinic Number: [id number]    Therapy Diagnosis:   Encounter Diagnoses   Name Primary?    Acute pain of right shoulder     Shoulder weakness     Decreased range of motion of right shoulder      Physician: Melchor Hughes Jr., *    Visit Date: 10/6/2020    Physician Orders: PT Eval and Treat   Medical Diagnosis from Referral: M75.111 (ICD-10-CM) - Incomplete tear of right rotator cuff, unspecified whether traumatic  Evaluation Date: 6/23/2020  Authorization Period Expiration: 10/31/202   Plan of Care Expiration: 11/18/2020  Visit # / Visits authorized: 11/ 36     Time In: 402  Time Out: 440  Total Billable Time: 38 minutes     Precautions: Standard and ARTHROSCOPIC DEBRIDEMENT OF R ROTATOR CUFF 6/09/2020    Subjective     Pt reports: she feels good today and feels like she has more motion in her shoulder.     Pain: 0/10  Location: right shoulder      Objective     Sherie received therapeutic exercises to develop strength, endurance, ROM, flexibility and posture for 38 minutes including:    Pulleys flex/abd 3 min ea   Wall Slides with lift off 2x10  Standing Rows Black TB 3x10  Standing ER Blue TB 3x10  Prone T 3x8  Prone Y 3x8  Pec Stretch 30 sec x 3    NP  Seated PROM flexion/ER/Abduction  Wall slides 20x  Wand Flexion/Abd/ER 2x10  No Money Red TB 3x10            Home Exercises Provided and Patient Education Provided     Education provided:   - Proper posture  - scap squeezes     Assessment   Patient was able to be progressed today, without increased pain or adverse effects. Patient left therapy feeling good. Patient was educated to put ice on her shoulder when she gets home. Cont to progress as tolerated.   Sherie is progressing well towards her goals.   Pt prognosis is Good.     Pt will continue to benefit from skilled outpatient physical therapy to address the deficits listed in the problem list box on initial evaluation, provide pt/family  education and to maximize pt's level of independence in the home and community environment.     Pt's spiritual, cultural and educational needs considered and pt agreeable to plan of care and goals.     Anticipated barriers to physical therapy: None    Goals:   Short Term Goals (4 Weeks):   1. Pt will be independent with HEP to supplement PT in improving functional use of R UE.  2. Pt will increase pain free R shoulder elevation PROM to >/= 160 deg to improve functional mobility of UE  3. Pt will increase R shoulder ER PROM in 90 deg abduction to >/=30 deg to improve functional mobility of UE     Long Term Goals (8 Weeks):   1. Pt will improve FOTO score to </=30% limited to decrease perceived limitation with Rcarrying, moving, and handling objects.  2. Pt will increase R shoulder PROM to WNL in all planes to improve functional use of R RUE.  3. Pt will increase R shoulder AROM to WFL in all planes to improve functional use of R RUE.  4. Pt will improve R shoulder MMTs to = L to promote equal use of B UEs in performing functional tasks.  5. Pt will lift 10 lb objects without pain to promote functional QOL.  6. Pt to report pain </= 0/10 with ADLs and IADLs using R UE to improve functional QOL.        Plan   Extend to 11/18/2020     Outpatient Physical Therapy 3 times weekly for 10 weeks to include the following interventions: Gait Training, Manual Therapy, Moist Heat/ Ice, Neuromuscular Re-ed, Patient Education, Self Care, Therapeutic Activites and Therapeutic Exercise.     Jon Nieves, PT

## 2020-10-08 ENCOUNTER — CLINICAL SUPPORT (OUTPATIENT)
Dept: REHABILITATION | Facility: HOSPITAL | Age: 55
End: 2020-10-08
Attending: ORTHOPAEDIC SURGERY
Payer: MEDICAID

## 2020-10-08 DIAGNOSIS — M25.511 ACUTE PAIN OF RIGHT SHOULDER: ICD-10-CM

## 2020-10-08 DIAGNOSIS — M25.611 DECREASED RANGE OF MOTION OF RIGHT SHOULDER: ICD-10-CM

## 2020-10-08 DIAGNOSIS — R29.898 SHOULDER WEAKNESS: ICD-10-CM

## 2020-10-08 PROCEDURE — 97110 THERAPEUTIC EXERCISES: CPT

## 2020-10-08 NOTE — PROGRESS NOTES
Physical Therapy Treatment Note     [facility]  Cl[facility] Number: [id number]    Therapy Diagnosis:   Encounter Diagnoses   Name Primary?    Acute pain of right shoulder     Shoulder weakness     Decreased range of motion of right shoulder      Physician: Melchor Hughes Jr., *    Visit Date: 10/8/2020    Physician Orders: PT Eval and Treat   Medical Diagnosis from Referral: M75.111 (ICD-10-CM) - Incomplete tear of right rotator cuff, unspecified whether traumatic  Evaluation Date: 6/23/2020  Authorization Period Expiration: 10/31/202   Plan of Care Expiration: 11/18/2020  Visit # / Visits authorized: 12/ 36     Time In: 317  Time Out: 400  Total Billable Time: 43 minutes     Precautions: Standard and ARTHROSCOPIC DEBRIDEMENT OF R ROTATOR CUFF 6/09/2020    Subjective     Pt reports: she was really sore after last therapy session and wants to take it easy today.    Pain: 0/10  Location: right shoulder      Objective     Sherie received therapeutic exercises to develop strength, endurance, ROM, flexibility and posture for 43 minutes including:    Pulleys flex/abd 3 min ea   Wand Flexion 30x  Wand press 30x  Wand serratus punches 30x  Wall Slides with lift off 2x10  Standing Ys 1#  Standing Rows Black TB 3x10  Standing ER Blue TB 3x10  Prone T 3x8 NP  Prone Y 3x8 NP  Pec Stretch 30 sec x 3    NP  Seated PROM flexion/ER/Abduction  Wall slides 20x  Wand Flexion/Abd/ER 2x10  No Money Red TB 3x10            Home Exercises Provided and Patient Education Provided     Education provided:   - Proper posture  - scap squeezes     Assessment   Patient presented to therapy with increased soreness. Patient was regressed with therapy today to limit the amount of soreness from last session. Patient had no adverse effects from today's session.   Sherie is progressing well towards her goals.   Pt prognosis is Good.     Pt will continue to benefit from skilled outpatient physical therapy to address the deficits listed in  the problem list box on initial evaluation, provide pt/family education and to maximize pt's level of independence in the home and community environment.     Pt's spiritual, cultural and educational needs considered and pt agreeable to plan of care and goals.     Anticipated barriers to physical therapy: None    Goals:   Short Term Goals (4 Weeks):   1. Pt will be independent with HEP to supplement PT in improving functional use of R UE.  2. Pt will increase pain free R shoulder elevation PROM to >/= 160 deg to improve functional mobility of UE  3. Pt will increase R shoulder ER PROM in 90 deg abduction to >/=30 deg to improve functional mobility of UE     Long Term Goals (8 Weeks):   1. Pt will improve FOTO score to </=30% limited to decrease perceived limitation with Rcarrying, moving, and handling objects.  2. Pt will increase R shoulder PROM to WNL in all planes to improve functional use of R RUE.  3. Pt will increase R shoulder AROM to WFL in all planes to improve functional use of R RUE.  4. Pt will improve R shoulder MMTs to = L to promote equal use of B UEs in performing functional tasks.  5. Pt will lift 10 lb objects without pain to promote functional QOL.  6. Pt to report pain </= 0/10 with ADLs and IADLs using R UE to improve functional QOL.        Plan   Extend to 11/18/2020     Outpatient Physical Therapy 3 times weekly for 10 weeks to include the following interventions: Gait Training, Manual Therapy, Moist Heat/ Ice, Neuromuscular Re-ed, Patient Education, Self Care, Therapeutic Activites and Therapeutic Exercise.     Jon Nieves, PT

## 2020-10-11 DIAGNOSIS — R10.9 ABDOMINAL PAIN, UNSPECIFIED ABDOMINAL LOCATION: ICD-10-CM

## 2020-10-11 RX ORDER — DICYCLOMINE HYDROCHLORIDE 10 MG/1
CAPSULE ORAL
Qty: 60 CAPSULE | Refills: 1 | OUTPATIENT
Start: 2020-10-11

## 2020-10-12 RX ORDER — DICYCLOMINE HYDROCHLORIDE 10 MG/1
10 CAPSULE ORAL 4 TIMES DAILY PRN
Qty: 60 CAPSULE | Refills: 1 | Status: SHIPPED | OUTPATIENT
Start: 2020-10-12 | End: 2020-11-08 | Stop reason: SDUPTHER

## 2020-10-12 NOTE — TELEPHONE ENCOUNTER
----- Message from Maya S Nicola sent at 10/12/2020  1:41 PM CDT -----  Regarding: Pt self Mobile/Home [phone]  Requesting an RX refill or new RX.  Is this a refill or new RX:  New  RX name and strength: dicyclomine (BENTYL) 10 MG capsule  Is this a 30 day or 90 day RX:  90  Pharmacy name and phone # [id number] - [place] - [address] AT [address]  [phone] Fax# [phone]   Comments:  Patient said that when you were out she went to see Dr. Meghan Rodriguez at Urgent Care and she prescribed this script for the patient but the patient would like for you to fill it for her please. Patient would like for you to give her a call when her script is filled please and she said if there's any problem to give her a call.

## 2020-10-13 ENCOUNTER — CLINICAL SUPPORT (OUTPATIENT)
Dept: REHABILITATION | Facility: HOSPITAL | Age: 55
End: 2020-10-13
Attending: ORTHOPAEDIC SURGERY
Payer: MEDICAID

## 2020-10-13 DIAGNOSIS — M25.611 DECREASED RANGE OF MOTION OF RIGHT SHOULDER: ICD-10-CM

## 2020-10-13 DIAGNOSIS — M25.511 ACUTE PAIN OF RIGHT SHOULDER: ICD-10-CM

## 2020-10-13 DIAGNOSIS — R29.898 SHOULDER WEAKNESS: ICD-10-CM

## 2020-10-13 PROCEDURE — 97110 THERAPEUTIC EXERCISES: CPT

## 2020-10-13 NOTE — PROGRESS NOTES
Physical Therapy Treatment Note     [facility]  Cl[facility] Number: [id number]    Therapy Diagnosis:   Encounter Diagnoses   Name Primary?    Acute pain of right shoulder     Shoulder weakness     Decreased range of motion of right shoulder      Physician: Melchor Hughes Jr., *    Visit Date: 10/13/2020    Physician Orders: PT Eval and Treat   Medical Diagnosis from Referral: M75.111 (ICD-10-CM) - Incomplete tear of right rotator cuff, unspecified whether traumatic  Evaluation Date: 6/23/2020  Authorization Period Expiration: 10/31/202   Plan of Care Expiration: 11/18/2020  Visit # / Visits authorized: 13/ 36     Time In: 404  Time Out: 444  Total Billable Time: 40 minutes     Precautions: Standard and ARTHROSCOPIC DEBRIDEMENT OF R ROTATOR CUFF 6/09/2020    Subjective     Pt reports: she may have slept wrong on her shoulder today and her shoulder is hurting today.     Pain: 7/10  Location: right shoulder      Objective     Sherie received therapeutic exercises to develop strength, endurance, ROM, flexibility and posture for 40 minutes including:      Pulleys flex/abd 3 min ea   Shoulder Presses 3x10 2#   UBE 3/3  Wand Flexion 30x  Wand press 30x 3 #   Wall Slides with lift off 2x10      Not Performed   Wand serratus punches 30x  Standing Ys 1#  Standing Rows Black TB 3x10  Standing ER Blue TB 3x10  Prone T 3x8 NP  Prone Y 3x8 NP  Pec Stretch 30 sec x 3  Seated PROM flexion/ER/Abduction  No Money Red TB 3x10            Home Exercises Provided and Patient Education Provided     Education provided:   - Proper posture  - scap squeezes     Assessment   Patient continues to tolerate therapy, but was not able to be progressed due to pain in shoulders and back today. Patient had no adverse effects from today's session. Patient continues to have pain with PROM and AROM flexion. Patient was educated to perform all therex within a pain free range   Sherie is progressing well towards her goals.   Pt prognosis is  Good.     Pt will continue to benefit from skilled outpatient physical therapy to address the deficits listed in the problem list box on initial evaluation, provide pt/family education and to maximize pt's level of independence in the home and community environment.     Pt's spiritual, cultural and educational needs considered and pt agreeable to plan of care and goals.     Anticipated barriers to physical therapy: None    Goals:   Short Term Goals (4 Weeks):   1. Pt will be independent with HEP to supplement PT in improving functional use of R UE.  2. Pt will increase pain free R shoulder elevation PROM to >/= 160 deg to improve functional mobility of UE  3. Pt will increase R shoulder ER PROM in 90 deg abduction to >/=30 deg to improve functional mobility of UE     Long Term Goals (8 Weeks):   1. Pt will improve FOTO score to </=30% limited to decrease perceived limitation with Rcarrying, moving, and handling objects.  2. Pt will increase R shoulder PROM to WNL in all planes to improve functional use of R RUE.  3. Pt will increase R shoulder AROM to WFL in all planes to improve functional use of R RUE.  4. Pt will improve R shoulder MMTs to = L to promote equal use of B UEs in performing functional tasks.  5. Pt will lift 10 lb objects without pain to promote functional QOL.  6. Pt to report pain </= 0/10 with ADLs and IADLs using R UE to improve functional QOL.        Plan   Extend to 11/18/2020     Outpatient Physical Therapy 3 times weekly for 10 weeks to include the following interventions: Gait Training, Manual Therapy, Moist Heat/ Ice, Neuromuscular Re-ed, Patient Education, Self Care, Therapeutic Activites and Therapeutic Exercise.     Jon Nieves, PT

## 2020-10-22 ENCOUNTER — PATIENT OUTREACH (OUTPATIENT)
Dept: OTHER | Facility: OTHER | Age: 55
End: 2020-10-22

## 2020-10-26 NOTE — TELEPHONE ENCOUNTER
----- Message from Georgina Hernandez sent at 10/26/2020  3:47 PM CDT -----  Requesting an RX refill or new RX.  Is this a refill or new RX: refill  RX name and strength:potassium chloride 10% (KAYCIEL) 20 mEq/15 mL oral solution  Is this a 30 day or 90 day RX: 30  Pharmacy name and phone # (copy/paste from chart):  [id number] [place] [address] AT [address] [phone] (Phone)  [phone] (Fax)      Comments:

## 2020-10-27 ENCOUNTER — PATIENT OUTREACH (OUTPATIENT)
Dept: ADMINISTRATIVE | Facility: OTHER | Age: 55
End: 2020-10-27

## 2020-10-27 RX ORDER — POTASSIUM CHLORIDE 20 MEQ/15ML
20 SOLUTION ORAL DAILY
Qty: 473 ML | Refills: 0 | Status: SHIPPED | OUTPATIENT
Start: 2020-10-27 | End: 2020-10-27 | Stop reason: SDUPTHER

## 2020-10-27 RX ORDER — POTASSIUM CHLORIDE 20 MEQ/15ML
20 SOLUTION ORAL DAILY
Qty: 473 ML | Refills: 0 | Status: SHIPPED | OUTPATIENT
Start: 2020-10-27 | End: 2020-11-28 | Stop reason: SDUPTHER

## 2020-10-27 NOTE — PROGRESS NOTES
Updates were requested from care everywhere.  Chart was reviewed for overdue Proactive [facility] Encounters (DOREEN) topics (CRS, Breast Cancer Screening, Eye exam)  Health Maintenance has been updated.  LINKS not responding.

## 2020-10-27 NOTE — TELEPHONE ENCOUNTER
----- Message from Mali Saxena sent at 10/27/2020  1:21 PM CDT -----  Regarding: Rx  Contact: [phone] @ Patient  Requesting an RX refill or new RX.  Is this a refill or new RX:   RX name and strength:potassium chloride 10% (KAYCIEL) 20 mEq/15 mL oral solution  Is this a 30 day or 90 day RX:   Pharmacy name and phone # [id number] - [place] - [address] AT [facility] [facility]  Comments: Patient need other Refill before 12/16/2020

## 2020-10-28 NOTE — PROGRESS NOTES
Digital Medicine: Health  Follow-Up    The history is provided by the patient.             Reason for review: Blood pressure at goal        Topics Covered on Call: physical activity    Additional Follow-up details: Patient reports she has not taken tizanadine for about 1 week.  States she is unsure of what else could be causing at goal BP readings.            Diet-no change to diet    No change to diet.        Physical Activity-Change  She removed walking from Her physical activity.    She identified the following barriers to physical activity: weather        Additional physical activity details: Reports she has not been walking much due to the weather. States she recently got a treadmill and is finding a place to put it in her house.      Medication Adherence-Medication adherence was assessed.      Substance, Sleep, Stress-Not assessed      Continue current diet/physical activity routine.  Provided patient education. Encouraged patient to start back walking.       Addressed patient questions and patient has my contact information if needed prior to next outreach. Patient verbalizes understanding.      Explained the importance of self-monitoring and medication adherence. Encouraged the patient to communicate with their health  for lifestyle modifications to help improve or maintain a healthy lifestyle.                   Topic    Eye Exam          Last 5 Patient Entered Readings                                      Current 30 Day Average: 126/76     Recent Readings 10/28/2020 10/28/2020 10/28/2020 10/28/2020 10/28/2020    SBP (mmHg) 119 124 112 119 130    DBP (mmHg) 67 66 76 74 76    Pulse 96 92 91 92 85

## 2020-10-29 NOTE — PROGRESS NOTES
BP readings have been more stable  Decreased reported use of tizanidine per health  note  BP average 127/76 mmHg  Continue current regimen and monitoring

## 2020-11-09 DIAGNOSIS — K59.00 CONSTIPATION, UNSPECIFIED CONSTIPATION TYPE: Primary | ICD-10-CM

## 2020-11-09 RX ORDER — LINACLOTIDE 290 UG/1
CAPSULE, GELATIN COATED ORAL
Qty: 90 CAPSULE | Refills: 0 | Status: SHIPPED | OUTPATIENT
Start: 2020-11-09 | End: 2021-02-04

## 2020-11-23 ENCOUNTER — TELEPHONE (OUTPATIENT)
Dept: ORTHOPEDICS | Facility: CLINIC | Age: 55
End: 2020-11-23
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
## 2020-11-25 ENCOUNTER — HOSPITAL ENCOUNTER (OUTPATIENT)
Dept: RADIOLOGY | Facility: HOSPITAL | Age: 55
Discharge: HOME | End: 2020-11-25
Attending: ORTHOPAEDIC SURGERY
Payer: MEDICAID

## 2020-11-25 ENCOUNTER — OFFICE VISIT (OUTPATIENT)
Dept: ORTHOPEDICS | Facility: CLINIC | Age: 55
End: 2020-11-25
Payer: MEDICAID

## 2020-11-25 VITALS
HEART RATE: 107 BPM | DIASTOLIC BLOOD PRESSURE: 76 MMHG | BODY MASS INDEX: 33.65 KG/M2 | WEIGHT: 184 LBS | SYSTOLIC BLOOD PRESSURE: 112 MMHG

## 2020-11-25 DIAGNOSIS — M54.2 NECK AND SHOULDER PAIN: Primary | ICD-10-CM

## 2020-11-25 DIAGNOSIS — M54.2 NECK AND SHOULDER PAIN: ICD-10-CM

## 2020-11-25 DIAGNOSIS — M75.111 INCOMPLETE TEAR OF RIGHT ROTATOR CUFF, UNSPECIFIED WHETHER TRAUMATIC: ICD-10-CM

## 2020-11-25 DIAGNOSIS — M25.519 NECK AND SHOULDER PAIN: ICD-10-CM

## 2020-11-25 DIAGNOSIS — M25.519 NECK AND SHOULDER PAIN: Primary | ICD-10-CM

## 2020-11-25 PROCEDURE — 99215 OFFICE O/P EST HI 40 MIN: CPT | Mod: PBBFAC,25,PN | Performed by: ORTHOPAEDIC SURGERY

## 2020-11-25 PROCEDURE — 99213 OFFICE O/P EST LOW 20 MIN: CPT | Mod: S$PBB,,, | Performed by: ORTHOPAEDIC SURGERY

## 2020-11-25 PROCEDURE — 99213 PR OFFICE/OUTPT VISIT, EST, LEVL III, 20-29 MIN: ICD-10-PCS | Mod: S$PBB,,, | Performed by: ORTHOPAEDIC SURGERY

## 2020-11-25 PROCEDURE — 99999 PR PBB SHADOW E&M-EST. PATIENT-LVL V: ICD-10-PCS | Mod: PBBFAC,,, | Performed by: ORTHOPAEDIC SURGERY

## 2020-11-25 PROCEDURE — 72050 XR CERVICAL SPINE AP LAT WITH FLEX EXTEN: ICD-10-PCS | Mod: 26,,, | Performed by: RADIOLOGY

## 2020-11-25 PROCEDURE — 72050 X-RAY EXAM NECK SPINE 4/5VWS: CPT | Mod: TC,PN

## 2020-11-25 PROCEDURE — 99999 PR PBB SHADOW E&M-EST. PATIENT-LVL V: CPT | Mod: PBBFAC,,, | Performed by: ORTHOPAEDIC SURGERY

## 2020-11-25 PROCEDURE — 72050 X-RAY EXAM NECK SPINE 4/5VWS: CPT | Mod: 26,,, | Performed by: RADIOLOGY

## 2020-11-25 RX ORDER — VENLAFAXINE 75 MG/1
TABLET ORAL
COMMUNITY
Start: 2020-10-07 | End: 2021-09-17 | Stop reason: SDUPTHER

## 2020-11-25 RX ORDER — TRIAMCINOLONE ACETONIDE 40 MG/ML
40 INJECTION, SUSPENSION INTRA-ARTICULAR; INTRAMUSCULAR
Status: DISCONTINUED | OUTPATIENT
Start: 2020-11-25 | End: 2020-11-25 | Stop reason: HOSPADM

## 2020-11-25 RX ORDER — VENLAFAXINE HYDROCHLORIDE 75 MG/1
CAPSULE, EXTENDED RELEASE ORAL
COMMUNITY
Start: 2020-10-19 | End: 2022-08-12

## 2020-11-25 RX ORDER — PANTOPRAZOLE SODIUM 40 MG/1
TABLET, DELAYED RELEASE ORAL
COMMUNITY
Start: 2020-09-16 | End: 2021-09-17 | Stop reason: ALTCHOICE

## 2020-11-25 RX ORDER — TIZANIDINE HYDROCHLORIDE 4 MG/1
CAPSULE, GELATIN COATED ORAL
COMMUNITY
Start: 2020-11-22 | End: 2021-09-17

## 2020-11-25 RX ADMIN — TRIAMCINOLONE ACETONIDE 40 MG: 40 INJECTION, SUSPENSION INTRA-ARTICULAR; INTRAMUSCULAR at 02:11

## 2020-11-25 NOTE — PROCEDURES
Large Joint Aspiration/Injection    Date/Time: 11/25/2020 2:00 PM  Performed by: Lucinda Castro PA-C  Authorized by: Lucinda Castro PA-C     Medications:  40 mg triamcinolone acetonide 40 mg/mL    PROCEDURE NOTE:  I have explained the risks, benefits, and alternatives of the procedure in detail.  The patient voices understanding and all questions have been answered.  The patient agrees to proceed as planned, consents to injection. Pause for timeout. After a sterile prep of the skin performed in the normal fashion, the right shoulder is injected from the posterior approach using a 22 gauge needle with a combination of 2cc 1% lidocaine and 40 mg of kenalog. The patient is cautioned and immediate relief of pain is secondary to the local anesthetic and will be temporary.  After the anesthetic wears off there may be a increase in pain that may last for a few hours or a few days and they should use ice to help alleviate this flair up of pain.

## 2020-11-29 RX ORDER — POTASSIUM CHLORIDE 20 MEQ/15ML
20 SOLUTION ORAL DAILY
Qty: 473 ML | Refills: 0 | Status: SHIPPED | OUTPATIENT
Start: 2020-11-29 | End: 2020-12-21 | Stop reason: SDUPTHER

## 2020-12-03 ENCOUNTER — TELEPHONE (OUTPATIENT)
Dept: ORTHOPEDICS | Facility: CLINIC | Age: 55
End: 2020-12-03

## 2020-12-03 NOTE — TELEPHONE ENCOUNTER
----- Message from Misti Lyons sent at 12/3/2020  3:47 PM CST -----  Contact: self [phone]  Patient is calling to speak with you about the doctor you are referring her to. Please call

## 2020-12-04 ENCOUNTER — PATIENT OUTREACH (OUTPATIENT)
Dept: ADMINISTRATIVE | Facility: OTHER | Age: 55
End: 2020-12-04

## 2020-12-08 ENCOUNTER — OFFICE VISIT (OUTPATIENT)
Dept: NEUROSURGERY | Facility: CLINIC | Age: 55
End: 2020-12-08
Payer: MEDICAID

## 2020-12-08 VITALS
SYSTOLIC BLOOD PRESSURE: 135 MMHG | HEIGHT: 62 IN | DIASTOLIC BLOOD PRESSURE: 83 MMHG | HEART RATE: 96 BPM | BODY MASS INDEX: 33.65 KG/M2

## 2020-12-08 DIAGNOSIS — M54.12 CERVICAL RADICULOPATHY: ICD-10-CM

## 2020-12-08 DIAGNOSIS — R29.2 HYPERREFLEXIA: ICD-10-CM

## 2020-12-08 DIAGNOSIS — M54.2 NECK AND SHOULDER PAIN: ICD-10-CM

## 2020-12-08 DIAGNOSIS — R29.2 HOFFMAN SIGN PRESENT: ICD-10-CM

## 2020-12-08 DIAGNOSIS — M47.812 CERVICAL SPONDYLOSIS WITHOUT MYELOPATHY: Primary | ICD-10-CM

## 2020-12-08 DIAGNOSIS — M50.30 DDD (DEGENERATIVE DISC DISEASE), CERVICAL: ICD-10-CM

## 2020-12-08 DIAGNOSIS — M25.519 NECK AND SHOULDER PAIN: ICD-10-CM

## 2020-12-08 PROCEDURE — 99204 OFFICE O/P NEW MOD 45 MIN: CPT | Mod: S$PBB,,, | Performed by: PHYSICIAN ASSISTANT

## 2020-12-08 PROCEDURE — 99204 PR OFFICE/OUTPT VISIT, NEW, LEVL IV, 45-59 MIN: ICD-10-PCS | Mod: S$PBB,,, | Performed by: PHYSICIAN ASSISTANT

## 2020-12-08 PROCEDURE — 99999 PR PBB SHADOW E&M-EST. PATIENT-LVL V: ICD-10-PCS | Mod: PBBFAC,,, | Performed by: PHYSICIAN ASSISTANT

## 2020-12-08 PROCEDURE — 99999 PR PBB SHADOW E&M-EST. PATIENT-LVL V: CPT | Mod: PBBFAC,,, | Performed by: PHYSICIAN ASSISTANT

## 2020-12-08 PROCEDURE — 99215 OFFICE O/P EST HI 40 MIN: CPT | Mod: PBBFAC,PN | Performed by: PHYSICIAN ASSISTANT

## 2020-12-08 NOTE — LETTER
December 8, 2020      Lucinda Castro PA-C  [address]  [address]  [place] [facility] [address]           [place] - Neurosurgery  [address]  [place] [facility] [address]  Phone: [phone]          Patient: Sherie Reyes   MR Number: [id number]   YOB: 1965   Date of Visit: 12/8/2020       Dear Lucinda Castro:    Thank you for referring Sherie Reyes to me for evaluation. Attached you will find relevant portions of my assessment and plan of care.    If you have questions, please do not hesitate to call me. I look forward to following Sherie Reyes along with you.    Sincerely,    Elke Armando PA-C    Enclosure  CC:  No Recipients    If you would like to receive this communication electronically, please contact [email or web address] or [phone] to request more information on [employer] access.    For providers and/or their staff who would like to refer a patient to [facility], please contact us through our one-stop-shop provider referral line, [facility], at [phone].    If you feel you have received this communication in error or would no longer like to receive these types of communications, please e-mail [email or web address]

## 2020-12-08 NOTE — PROGRESS NOTES
Subjective:     Patient ID:  Sherie Reyes is a 55 y.o. female.    [place]    Chief Complaint: Neck and right arm pain    HPI    Sherie Reyes is a 55 y.o. female who presents with the above CC.  Patient had surgery on her right shoulder about 6 months ago.  Since then she has had pain in the neck with radiation down the right arm to the middle, ring, and thumb.  Pain is worse when laying on her right side and better when laying on her left side.  No left arm pain or paresthesias.     She has a right shoulder injection which helped the right shoulder pain to the elbow but the other areas have persisted.    When she sits or stand for awhile she gets dizzy and headaches.    The neck pain is rated 9/10 and the arm pain 10/10.    Patient has not had PT for her neck.  No cervical ESIs.  No spine surgery.  Patient is currently following with a pain management MD Dr. Kapadia and is on tizanidine, tramadol, and mobic.    Patient denies any recent accidents or trauma, no saddle anesthesias, and no bowel or bladder incontinence.    Patient has difficulty with balance or gait, difficulty tying shoes or buttoning clothes, is dropping things,  has difficulty opening containers, and has had no change in handwriting.  These symptoms are new.      Review of Systems:    Review of Systems   Constitutional: Positive for chills, diaphoresis and malaise/fatigue. Negative for fever and weight loss.   HENT: Positive for sinus pain and sore throat. Negative for congestion, ear discharge, ear pain, hearing loss, nosebleeds and tinnitus.    Eyes: Positive for pain. Negative for blurred vision, double vision, photophobia, discharge and redness.   Respiratory: Positive for sputum production. Negative for cough, hemoptysis, shortness of breath, wheezing and stridor.    Cardiovascular: Positive for leg swelling. Negative for chest pain, palpitations, orthopnea and PND.   Gastrointestinal: Positive for constipation, diarrhea and  heartburn. Negative for abdominal pain, blood in stool, melena, nausea and vomiting.   Genitourinary: Positive for flank pain. Negative for dysuria, frequency, hematuria and urgency.   Musculoskeletal: Positive for back pain, falls, joint pain, myalgias and neck pain.   Skin: Negative for itching and rash.   Neurological: Positive for dizziness, tingling, weakness and headaches. Negative for tremors, sensory change, speech change, seizures and loss of consciousness.   Endo/Heme/Allergies: Negative for environmental allergies and polydipsia. Does not bruise/bleed easily.   Psychiatric/Behavioral: Positive for depression. Negative for hallucinations, memory loss and substance abuse. The patient is nervous/anxious and has insomnia.        Past Medical History:   Diagnosis Date    Anxiety     Degenerative joint disease (DJD) of hip     Diabetes type 2, controlled 7/10/2019    GERD (gastroesophageal reflux disease)     Hyperlipidemia     Hypertension     IBS (irritable bowel syndrome)     Insomnia     Lumbar disc herniation     PTSD (post-traumatic stress disorder)      Past Surgical History:   Procedure Laterality Date    ADENOIDECTOMY      ARTHROSCOPIC DEBRIDEMENT OF ROTATOR CUFF Right 6/9/2020    Procedure: DEBRIDEMENT, ROTATOR CUFF, ARTHROSCOPIC;  Surgeon: Melchor Hughes Jr., MD;  Location: [facility] OR;  Service: Orthopedics;  Laterality: Right;  need opus system (Ger MESA notified 5/26, EF)  video, notified/confirmed 6/8/2020 KB 1310    ARTHROSCOPIC EXCISION OF ACROMIOCLAVICULAR JOINT  6/9/2020    Procedure: EXCISION, ACROMIOCLAVICULAR JOINT, ARTHROSCOPIC;  Surgeon: Melchor Hughes Jr., MD;  Location: [facility] OR;  Service: Orthopedics;;    ARTHROSCOPIC REPAIR OF ROTATOR CUFF OF SHOULDER      ARTHROSCOPY OF SHOULDER WITH DECOMPRESSION OF SUBACROMIAL SPACE  6/9/2020    Procedure: ARTHROSCOPY, SHOULDER, WITH SUBACROMIAL SPACE DECOMPRESSION;  Surgeon: Melchor Hughes Jr., MD;  Location: [facility] OR;   Service: Orthopedics;;     SECTION      TONSILLECTOMY       Current Outpatient Medications on File Prior to Visit   Medication Sig Dispense Refill    amLODIPine (NORVASC) 10 MG tablet Take 1 tablet (10 mg total) by mouth once daily. 90 tablet 3    clonazePAM (KLONOPIN) 1 MG tablet TAKE 1 TABLET BY MOUTH TWICE DAILY 60 tablet 1    diclofenac sodium (VOLTAREN) 1 % Gel Apply 2 g topically 4 (four) times daily. 1 Tube 2    dicyclomine (BENTYL) 10 MG capsule TAKE ONE CAPSULE BY MOUTH FOUR TIMES DAILY AS NEEDED 60 capsule 1    KURVELO 0.15-0.03 mg per tablet Take 1 tablet by mouth once daily.      LINZESS 290 mcg Cap capsule TAKE 1 CAPSULE(290 MCG) BY MOUTH EVERY DAY 90 capsule 0    losartan (COZAAR) 100 MG tablet Take 1 tablet (100 mg total) by mouth once daily. 90 tablet 3    melatonin 5 mg Chew Take 10 mg by mouth daily as needed.      meloxicam (MOBIC) 15 MG tablet TK 1 T PO QD      omeprazole (PRILOSEC) 40 MG capsule Take 1 capsule (40 mg total) by mouth once daily. 30 capsule 11    pantoprazole (PROTONIX) 40 MG tablet TK 1 T PO QD      POLYETHYLENE GLYCOL 3350 (MIRALAX ORAL) Take by mouth as needed.       potassium chloride 10% (KAYCIEL) 20 mEq/15 mL oral solution Take 15 mLs (20 mEq total) by mouth once daily. 473 mL 0    pravastatin (PRAVACHOL) 20 MG tablet TAKE 1 TABLET(20 MG) BY MOUTH EVERY EVENING 30 tablet 6    QUEtiapine (SEROQUEL) 200 MG Tab Take 1 tablet (200 mg total) by mouth every evening. 30 tablet 2    tiZANidine (ZANAFLEX) 4 MG tablet Take 4 mg by mouth 2 (two) times daily as needed.      tiZANidine 4 mg Cap       traMADoL (ULTRAM) 50 mg tablet Take 50 mg by mouth 2 (two) times a day.      traZODone (DESYREL) 100 MG tablet TK 1 T PO  QHS      venlafaxine (EFFEXOR) 75 MG tablet       venlafaxine (EFFEXOR-XR) 150 MG Cp24 Take 1 capsule (150 mg total) by mouth once daily. 90 capsule 3    venlafaxine (EFFEXOR-XR) 75 MG 24 hr capsule TK ONE C PO QD WITH 150MG TO EQUAL 225       indomethacin (INDOCIN) 50 MG capsule Take 1 capsule (50 mg total) by mouth 3 (three) times daily with meals. 42 capsule 0    [DISCONTINUED] dicyclomine (BENTYL) 10 MG capsule Take 1 capsule (10 mg total) by mouth 4 (four) times daily as needed. 60 capsule 1     No current facility-administered medications on file prior to visit.      Review of patient's allergies indicates:  No Known Allergies  Social History     Socioeconomic History    Marital status:      Spouse name: Not on file    Number of children: Not on file    Years of education: Not on file    Highest education level: Not on file   Occupational History    Not on file   Social Needs    Financial resource strain: Somewhat hard    Food insecurity     Worry: Sometimes true     Inability: Sometimes true    Transportation needs     Medical: No     Non-medical: No   Tobacco Use    Smoking status: Former Smoker     Packs/day: 0.50     Years: 5.00     Pack years: 2.50     Types: Vaping with nicotine     Start date: 1985     Quit date: 2014     Years since quittin.5    Smokeless tobacco: Former User     Quit date: 10/20/2014    Tobacco comment: Vaping 30 ml last 5 or 6 weeks   Substance and Sexual Activity    Alcohol use: Yes     Alcohol/week: 1.0 standard drinks     Types: 1 Cans of beer per week     Frequency: Monthly or less     Drinks per session: 1 or 2     Binge frequency: Less than monthly     Comment: Maybe 1 or 2 a month    Drug use: No    Sexual activity: Yes     Partners: Male     Birth control/protection: OCP   Lifestyle    Physical activity     Days per week: 3 days     Minutes per session: 40 min    Stress: Rather much   Relationships    Social connections     Talks on phone: More than three times a week     Gets together: Once a week     Attends Mandaeism service: Not on file     Active member of club or organization: No     Attends meetings of clubs or organizations: Never     Relationship status:   "  Other Topics Concern    Not on file   Social History Narrative    Not on file     Family History   Problem Relation Age of Onset    Hypertension Mother     Hypothyroidism Mother     Arthritis Mother     Depression Mother     Heart disease Mother     Hyperlipidemia Mother     Hypertension Father     Stroke Father 46    Diabetes Father     Heart disease Father     Hyperlipidemia Father     Cancer Maternal Grandfather         Stomach Ca    Depression Maternal Grandfather     Early death Maternal Grandfather     Heart disease Maternal Grandfather     Hyperlipidemia Maternal Grandfather     Hypertension Maternal Grandfather     Mental illness Maternal Grandfather     Vision loss Maternal Grandfather         Loss during yhe War.    Breast cancer Paternal Aunt     Arthritis Paternal Grandmother     Depression Paternal Grandmother     Heart disease Paternal Grandmother     Cancer Paternal Aunt         Bilat breast Cancer    Heart disease Paternal Aunt     Cancer Maternal Grandmother         Lung w/ Mets    COPD Maternal Grandmother     Depression Maternal Grandmother     Early death Maternal Grandmother     Heart disease Maternal Grandmother     Hyperlipidemia Maternal Grandmother     Hypertension Maternal Grandmother     Depression Son     Learning disabilities Son     Depression Sister     Depression Daughter     Diabetes Paternal Grandfather     Early death Paternal Grandfather     Heart disease Paternal Grandfather     Hyperlipidemia Paternal Grandfather     Hypertension Paternal Grandfather     Kidney disease Paternal Grandfather        Objective:      Vitals:    12/08/20 1522   BP: 135/83   Pulse: 96   Height: 5' 2" (1.575 m)   PainSc:   8         Physical Exam:      General:  Sherie Reyes is well-developed, well-nourished, appears stated age, in no acute distress, alert and oriented to person, place, and time.    Pulmonary/Chest:  Respiratory effort " normal  Abdominal: Exhibits no distension  Psychiatric:  Normal mood and affect.  Behavior is normal.  Judgement and thought content normal      Musculoskeletal:    Patient is not able to walk heel to toe.    Cervical ROM:   Pain in cervical spine flexion, extension, left rotation, and right rotation, left lateral bending, and right lateral bending.    Cervical Spine Inspection:  Normal with no surgical scars with no visible rashes.    Cervical Spine Palpation:  No tenderness to cervical spine palpation.      Neurological:     Muscle strength against resistance:     Right Left   Deltoid  5 / 5 5 / 5   Biceps  5 / 5 5 / 5   Triceps 5 / 5 5 / 5   Wrist flexion  5 / 5 5 / 5   Wrist extension 5 / 5 5 / 5   Finger abduction 5 / 5 5 / 5   Thumb opposition 5 / 5 5 / 5   Handgrip 5 / 5 5 / 5   Hip flexion  5 / 5 5 / 5   Hip extension 5 / 5 5 / 5   Hip abduction 5 / 5 5 / 5   Hip adduction 5/ 5 5 / 5   Knee extension  5 / 5 5 / 5   Knee flexion  5 / 5 5 / 5   Dorsiflexion  5 / 5 5 / 5   EHL  5 / 5 5 / 5   Plantar flexion  5 / 5 5 / 5   Inversion of the feet 5 / 5 5 / 5   Eversion of the feet 5 / 5 5 / 5       Reflexes:     Right Left   Triceps 3+ 3+   Biceps 3+ 3+   Brachioradialis 3+ 3+   Patellar 3+ 3+   Achilles 2+ 2+     Babinski: Negative bilaterally  Clonus:  Negative bilaterally  Jean: Negative on the right.  Positive on the left.  Positive lhermittes sign      On gross examination of the bilateral lower extremities, patient has full painfree ROM with no signs of clubbing, cyanosis, edema, and weakness.       XRAY Interpretation:     Cervical spine xrays were personally reviewed today.  No fractures.  Very slight movement on flexion and extension at C4-5.  C5-6 and C6-7 DDD and spondylosis.    Assessment:          1. Cervical spondylosis without myelopathy    2. Neck and shoulder pain    3. DDD (degenerative disc disease), cervical    4. Cervical radiculopathy    5. Hyperreflexia    6. Jean sign present             Plan:          Orders Placed This Encounter    MRI Cervical Spine Without Contrast     C5-6, C6-7 DDD/spondylosis with right cervical radiculopathy.  Patient with hyperreflexia and garrett with myleopathic symptoms.    -MRI cervical spine to assess for cord compression given myelopathic symptoms and exam findings  -FU after for results        Follow-Up:  Follow up in about 2 weeks (around 12/22/2020). If there are any questions prior to this, the patient was instructed to contact the office.       HARSH Flaherty, PA-C  Neurosurgery  [facility]

## 2020-12-09 ENCOUNTER — TELEPHONE (OUTPATIENT)
Dept: NEUROSURGERY | Facility: CLINIC | Age: 55
End: 2020-12-09

## 2020-12-09 NOTE — TELEPHONE ENCOUNTER
----- Message from Riya Smith MA sent at 12/9/2020  9:47 AM CST -----  Regarding: Pt Advice Request  Hey, you saw pt yesterday and she would like to know what can she do or take in the meantime for her headaches and dizziness. Please advise. Thanks, Riya.

## 2020-12-09 NOTE — TELEPHONE ENCOUNTER
She will need to contact her primary care physician or her pain management physician regarding any medications.    HARSH Flaherty, PA-C  Neurosurgery  [facility]

## 2020-12-10 ENCOUNTER — PATIENT OUTREACH (OUTPATIENT)
Dept: OTHER | Facility: OTHER | Age: 55
End: 2020-12-10

## 2020-12-10 NOTE — PROGRESS NOTES
Digital Medicine: Health  Follow-Up    The history is provided by the patient.             Reason for review: Blood pressure at goal        Topics Covered on Call: physical activity and Diet    Additional Follow-up details: Patient reports she is having neck pain and went to neurosurgery.  They suggested taking a muscle relaxer, but patient has had trouble with tizanidine dropping her blood pressure.  States she took 1/2 tizanidine yesterday and noticed her SBP was close to 100.  She is confused on when to take it and how much.  Encouraged patient to reach out to pain management, but will route note to clinician.            Diet-no change to diet    No change to diet.  Patient reports eating or drinking the following: Patient continues to monitor salt intake, but she eats salty foods when BP is low. States she had half a ham sandwich with potato chips on it. States she had to eat a handful of pistachios to get BP up to normal levels.    Intervention(s): DASH diet/sodium reduction education      Physical Activity-no change to routine  No change to exercise routine.       Additional physical activity details: Patient has not found a place to set up treadmill, but would like her goals up to date. Updating goals.      Medication Adherence-Medication adherence was asssessed.  Patient continue taking medication as prescribed.          Patient is cutting amlodipine doses in half on some days. Patient confused how to take medication when she starts tizanidine.  Substance, Sleep, Stress-Not assessed      Continue current diet/physical activity routine.       Addressed patient questions and patient has my contact information if needed prior to next outreach. Patient verbalizes understanding.      Explained the importance of self-monitoring and medication adherence. Encouraged the patient to communicate with their health  for lifestyle modifications to help improve or maintain a healthy lifestyle.                    Topic    Eye Exam          Last 5 Patient Entered Readings                                      Current 30 Day Average: 124/78     Recent Readings 12/10/2020 12/9/2020 12/9/2020 12/9/2020 12/9/2020    SBP (mmHg) 122 121 111 101 105    DBP (mmHg) 74 74 71 65 66    Pulse 90 87 90 95 90

## 2020-12-15 ENCOUNTER — PATIENT MESSAGE (OUTPATIENT)
Dept: INTERNAL MEDICINE | Facility: CLINIC | Age: 55
End: 2020-12-15

## 2020-12-15 DIAGNOSIS — R53.83 FATIGUE, UNSPECIFIED TYPE: Primary | ICD-10-CM

## 2020-12-15 DIAGNOSIS — E11.9 CONTROLLED TYPE 2 DIABETES MELLITUS WITHOUT COMPLICATION, WITHOUT LONG-TERM CURRENT USE OF INSULIN: ICD-10-CM

## 2020-12-16 ENCOUNTER — PATIENT MESSAGE (OUTPATIENT)
Dept: INTERNAL MEDICINE | Facility: CLINIC | Age: 55
End: 2020-12-16

## 2020-12-16 ENCOUNTER — LAB VISIT (OUTPATIENT)
Dept: LAB | Facility: HOSPITAL | Age: 55
End: 2020-12-16
Attending: INTERNAL MEDICINE
Payer: MEDICAID

## 2020-12-16 DIAGNOSIS — R53.83 FATIGUE, UNSPECIFIED TYPE: ICD-10-CM

## 2020-12-16 DIAGNOSIS — E78.2 MIXED HYPERLIPIDEMIA: ICD-10-CM

## 2020-12-16 DIAGNOSIS — E11.9 CONTROLLED TYPE 2 DIABETES MELLITUS WITHOUT COMPLICATION, WITHOUT LONG-TERM CURRENT USE OF INSULIN: ICD-10-CM

## 2020-12-16 LAB
ESTIMATED AVG GLUCOSE: 128 MG/DL (ref 68–131)
HBA1C MFR BLD HPLC: 6.1 % (ref 4–5.6)

## 2020-12-16 PROCEDURE — 83001 ASSAY OF GONADOTROPIN (FSH): CPT

## 2020-12-16 PROCEDURE — 82670 ASSAY OF TOTAL ESTRADIOL: CPT

## 2020-12-16 PROCEDURE — 83002 ASSAY OF GONADOTROPIN (LH): CPT

## 2020-12-16 PROCEDURE — 36415 COLL VENOUS BLD VENIPUNCTURE: CPT | Mod: PO

## 2020-12-16 PROCEDURE — 84443 ASSAY THYROID STIM HORMONE: CPT

## 2020-12-16 PROCEDURE — 80061 LIPID PANEL: CPT

## 2020-12-16 PROCEDURE — 83036 HEMOGLOBIN GLYCOSYLATED A1C: CPT

## 2020-12-17 LAB
CHOLEST SERPL-MCNC: 195 MG/DL (ref 120–199)
CHOLEST/HDLC SERPL: 4.1 {RATIO} (ref 2–5)
ESTRADIOL SERPL-MCNC: 11 PG/ML
FSH SERPL-ACNC: 2.2 MIU/ML
HDLC SERPL-MCNC: 47 MG/DL (ref 40–75)
HDLC SERPL: 24.1 % (ref 20–50)
LDLC SERPL CALC-MCNC: 108.4 MG/DL (ref 63–159)
LH SERPL-ACNC: <0.2 MIU/ML
NONHDLC SERPL-MCNC: 148 MG/DL
TRIGL SERPL-MCNC: 198 MG/DL (ref 30–150)
TSH SERPL DL<=0.005 MIU/L-ACNC: 0.64 UIU/ML (ref 0.4–4)

## 2020-12-21 ENCOUNTER — PATIENT MESSAGE (OUTPATIENT)
Dept: INTERNAL MEDICINE | Facility: CLINIC | Age: 55
End: 2020-12-21

## 2020-12-21 ENCOUNTER — PATIENT MESSAGE (OUTPATIENT)
Dept: OTHER | Facility: OTHER | Age: 55
End: 2020-12-21

## 2020-12-21 RX ORDER — POTASSIUM CHLORIDE 20 MEQ/15ML
20 SOLUTION ORAL DAILY
Qty: 473 ML | Refills: 11 | Status: SHIPPED | OUTPATIENT
Start: 2020-12-21 | End: 2021-12-06 | Stop reason: SDUPTHER

## 2020-12-22 ENCOUNTER — PATIENT MESSAGE (OUTPATIENT)
Dept: INTERNAL MEDICINE | Facility: CLINIC | Age: 55
End: 2020-12-22

## 2020-12-23 ENCOUNTER — PATIENT OUTREACH (OUTPATIENT)
Dept: OTHER | Facility: OTHER | Age: 55
End: 2020-12-23

## 2020-12-23 ENCOUNTER — HOSPITAL ENCOUNTER (OUTPATIENT)
Dept: RADIOLOGY | Facility: HOSPITAL | Age: 55
Discharge: HOME | End: 2020-12-23
Attending: PHYSICIAN ASSISTANT
Payer: MEDICAID

## 2020-12-23 DIAGNOSIS — R29.2 HOFFMAN SIGN PRESENT: ICD-10-CM

## 2020-12-23 DIAGNOSIS — M54.12 CERVICAL RADICULOPATHY: ICD-10-CM

## 2020-12-23 DIAGNOSIS — M47.812 CERVICAL SPONDYLOSIS WITHOUT MYELOPATHY: ICD-10-CM

## 2020-12-23 DIAGNOSIS — R29.2 HYPERREFLEXIA: ICD-10-CM

## 2020-12-23 DIAGNOSIS — M25.519 NECK AND SHOULDER PAIN: ICD-10-CM

## 2020-12-23 DIAGNOSIS — M54.2 NECK AND SHOULDER PAIN: ICD-10-CM

## 2020-12-23 DIAGNOSIS — M50.30 DDD (DEGENERATIVE DISC DISEASE), CERVICAL: ICD-10-CM

## 2020-12-23 PROCEDURE — 72141 MRI CERVICAL SPINE WITHOUT CONTRAST: ICD-10-PCS | Mod: 26,,, | Performed by: RADIOLOGY

## 2020-12-23 PROCEDURE — 72141 MRI NECK SPINE W/O DYE: CPT | Mod: TC

## 2020-12-23 PROCEDURE — 72141 MRI NECK SPINE W/O DYE: CPT | Mod: 26,,, | Performed by: RADIOLOGY

## 2020-12-23 NOTE — PROGRESS NOTES
"Digital Medicine: Clinician Follow-Up    Patient reports shoulder/arm pain and dizziness/balance issues related to degenerative disc disease. She notes neurosurgeon recommended use of tizanidine. However, she notes limited use of tizanidine as experiences fatigue with decreased BP and having things she needed to complete. She notes she takes 2 mg of tizanidine when she does take it. She notes SBP never < 100 mmHg with tizanidine 2 mg vs. 4 mg. States she called pain management as advised by neurosurgeon but unable to afford cost of extra appointment. She has follow up with pain management in 2/2021.    States she ideally would take tizanidine early afternoon.   She is concerned about taking multiple medications that could lower BP.  She reports timing of BP medications vary but usually losartan when she wakes up and amlodipine in evening ~ 10 pm (seroquel ~ midnight). States she took her BP medication early yesterday but 12/21 she slept late and took medications ~ 2 pm (but this is rare).  She takes her BP readings prior to medication. States she is trying to get her BP to be "normal" before taking her medication.    The history is provided by the patient.   Follow-up reason(s): routine follow up.     Hypertension    Patient's blood pressure readings are labile.   Patient is not experiencing signs/symptoms of hypotension.  Patient is not experiencing signs/symptoms of hypertension.            Last 5 Patient Entered Readings                                      Current 30 Day Average: 124/82     Recent Readings 12/23/2020 12/22/2020 12/22/2020 12/22/2020 12/22/2020    SBP (mmHg) 132 124 137 127 133    DBP (mmHg) 89 83 93 87 97    Pulse 105 91 95 92 91                 Depression Screening  Did not address depression screening.    Sleep Apnea Screening    Did not address sleep apnea screening.     Medication Affordability Screening  Did not address medication affordability screening.     Medication Adherence-Medication " adherence was assessed.          ASSESSMENT(S)  Patients BP average is 124/82 mmHg, which is at goal. Patient's BP goal is less than or equal to 140/90.    Hypertension Plan  Hypertension Medication Change. While resuming tizanidine, advised to take losartan 50 mg in morning. Take tizanidine 2 mg early afternoon. Take amlodipine 10 mg ~ 10 pm. If SBP > 140 mmHg between tizanidine & amlodipine doses, take second losartan 50 mg.  Discussed again with patient that we typically recommend taking BP measurements after medications, however, due to her fear of hypotension, she takes her BP reading prior to medications.    Discussed with patient to take losartan 100 mg if she knows she will not take tizanidine that day. If she is uncertain if she will take tizanidine, advised losartan 50 mg daily and take second 50 mg in afternoon if she does not take tizanidine (or if takes tizanidine and SBP elevated as noted above).    Encouraged patient to discuss potential alternatives at 2/2021 pain management appointment, however, she notes she has tried different therapies and none as effective as tizanidine. Discussed balancing BP control and pain management, and balancing BP drops with tizanidine.     Addressed patient questions and patient has my contact information if needed prior to next outreach. Patient verbalizes understanding.             There are no preventive care reminders to display for this patient.      Hypertension Medications             amLODIPine (NORVASC) 10 MG tablet Take 1 tablet (10 mg total) by mouth once daily.    losartan (COZAAR) 100 MG tablet Take 1 tablet (100 mg total) by mouth once daily.

## 2020-12-29 ENCOUNTER — OFFICE VISIT (OUTPATIENT)
Dept: NEUROSURGERY | Facility: CLINIC | Age: 55
End: 2020-12-29
Payer: MEDICAID

## 2020-12-29 ENCOUNTER — TELEPHONE (OUTPATIENT)
Dept: NEUROSURGERY | Facility: CLINIC | Age: 55
End: 2020-12-29

## 2020-12-29 ENCOUNTER — PATIENT MESSAGE (OUTPATIENT)
Dept: NEUROSURGERY | Facility: CLINIC | Age: 55
End: 2020-12-29

## 2020-12-29 VITALS
DIASTOLIC BLOOD PRESSURE: 82 MMHG | SYSTOLIC BLOOD PRESSURE: 120 MMHG | HEIGHT: 62 IN | BODY MASS INDEX: 33.87 KG/M2 | WEIGHT: 184.06 LBS | HEART RATE: 93 BPM

## 2020-12-29 DIAGNOSIS — M48.02 CERVICAL SPINAL STENOSIS: ICD-10-CM

## 2020-12-29 DIAGNOSIS — M54.12 CERVICAL RADICULOPATHY: ICD-10-CM

## 2020-12-29 DIAGNOSIS — M47.12 CERVICAL SPONDYLOSIS WITH MYELOPATHY: ICD-10-CM

## 2020-12-29 DIAGNOSIS — M50.00 HNP (HERNIATED NUCLEUS PULPOSUS) WITH MYELOPATHY, CERVICAL: ICD-10-CM

## 2020-12-29 DIAGNOSIS — M50.30 DDD (DEGENERATIVE DISC DISEASE), CERVICAL: ICD-10-CM

## 2020-12-29 DIAGNOSIS — M54.2 NECK PAIN: Primary | ICD-10-CM

## 2020-12-29 PROCEDURE — 99999 PR PBB SHADOW E&M-EST. PATIENT-LVL V: CPT | Mod: PBBFAC,,, | Performed by: PHYSICIAN ASSISTANT

## 2020-12-29 PROCEDURE — 99214 OFFICE O/P EST MOD 30 MIN: CPT | Mod: S$PBB,,, | Performed by: PHYSICIAN ASSISTANT

## 2020-12-29 PROCEDURE — 99215 OFFICE O/P EST HI 40 MIN: CPT | Mod: PBBFAC,PN | Performed by: PHYSICIAN ASSISTANT

## 2020-12-29 PROCEDURE — 99999 PR PBB SHADOW E&M-EST. PATIENT-LVL V: ICD-10-PCS | Mod: PBBFAC,,, | Performed by: PHYSICIAN ASSISTANT

## 2020-12-29 PROCEDURE — 99214 PR OFFICE/OUTPT VISIT, EST, LEVL IV, 30-39 MIN: ICD-10-PCS | Mod: S$PBB,,, | Performed by: PHYSICIAN ASSISTANT

## 2020-12-29 NOTE — TELEPHONE ENCOUNTER
I usually recommend NSAIDs for headaches associated with neck pain.  She is already on mobic per her chart. She could discuss further medications for headaches with her PCP or pain management.    Thanks,  Elke Armando, [facility], PA-C  Neurosurgery  [facility]

## 2020-12-29 NOTE — TELEPHONE ENCOUNTER
----- Message from Riya Smith MA sent at 12/29/2020  2:46 PM CST -----  Regarding: Meds for Headache  Pt wanted to know if you recommend anything that she can take for her headaches or do you think her headaches are linked to her neck pain. Please advise, thanks.

## 2020-12-29 NOTE — PROGRESS NOTES
"Subjective:     Patient ID:  Sherie Reyes is a 55 y.o. female.    [place]    Chief Complaint:  Neck pain and right arm pain    HPI    Sherie Reyes is a 55 y.o. female who presents for MRI follow up.    Patient had surgery on her right shoulder about 6 months ago.  Since then she has had pain in the neck with radiation down the right arm to the middle, ring, and thumb.  Pain is worse when laying on her right side and better when laying on her left side.  No left arm pain or paresthesias.      She has a right shoulder injection which helped the right shoulder pain to the elbow but the other areas have persisted.     When she sits or stand for awhile she gets dizzy and headaches.     The neck pain is rated 9/10 and the arm pain 10/10.     Patient has not had PT for her neck.  No cervical ESIs.  No spine surgery.  Patient is currently following with a pain management MD Dr. Kapadia and is on tizanidine, tramadol, and mobic.     Patient denies any recent accidents or trauma, no saddle anesthesias, and no bowel or bladder incontinence.     Patient has difficulty with balance or gait, difficulty tying shoes or buttoning clothes, is dropping things,  has difficulty opening containers, and has had no change in handwriting.  These symptoms are new.      Review of Systems:  Constitution: Negative for chills, fever, night sweats and weight loss.   Musculoskeletal: Negative for falls.   Gastrointestinal: Negative for bowel incontinence, nausea and vomiting.   Genitourinary: Negative for bladder incontinence.   Neurological: Negative for disturbances in coordination and loss of balance.      Objective:      Vitals:    12/29/20 1356   BP: 120/82   Pulse: 93   Weight: 83.5 kg (184 lb 1.4 oz)   Height: 5' 2" (1.575 m)   PainSc:   8         Physical Exam:    General:  Sherie Reyes is well-developed, well-nourished, appears stated age, in no acute distress, alert and oriented to person, place, and " time.    Patient sits comfortably in the exam room and answers questions appropriately. Grossly patient is able to move bilateral upper extremities without difficulty.     XRAY Interpretation:      Cervical spine xrays were personally reviewed today.  No fractures.  Very slight movement on flexion and extension at C4-5.  C5-6 and C6-7 DDD and spondylosis.     MRI Interpretation:     Cervical spine MRI was personally reviewed today.  Patient with multilevel degenerative disc disease and spondylosis.  Disc herniation at C5-6 with central spinal stenosis and bilateral neural foraminal stenosis right greater than left.  Bilateral neural foraminal stenosis at C6-7.      Assessment:          1. Neck pain    2. Cervical spondylosis with myelopathy    3. DDD (degenerative disc disease), cervical    4. Cervical radiculopathy    5. HNP (herniated nucleus pulposus) with myelopathy, cervical    6. Cervical spinal stenosis            Plan:          Orders Placed This Encounter    IR Epidural Transfor 1st Cerv Thor Bilat     C5-6 C6-7 HNP.  C5-6 central spinal stenosis.  Bilateral neural foraminal stenosis C5-6 and C6-7.    Patient with myelopathic symptoms and signs.  Will have her follow-up with one of our neurosurgeons for a surgical evaluation.    Bilateral C6-7 transforaminal epidural steroid injections for symptomatic relief.  Patient has Medicaid so the injections will be ordered through [facility] on [facility].    Follow-Up:  Follow up in about 1 month (around 1/29/2021). If there are any questions prior to this, the patient was instructed to contact the office.       Elke Armando, [facility], PA-C  Neurosurgery  [facility]

## 2021-01-05 ENCOUNTER — PATIENT OUTREACH (OUTPATIENT)
Dept: ADMINISTRATIVE | Facility: OTHER | Age: 56
End: 2021-01-05

## 2021-01-05 DIAGNOSIS — Z12.31 ENCOUNTER FOR SCREENING MAMMOGRAM FOR MALIGNANT NEOPLASM OF BREAST: Primary | ICD-10-CM

## 2021-01-06 ENCOUNTER — OFFICE VISIT (OUTPATIENT)
Dept: NEUROSURGERY | Facility: CLINIC | Age: 56
End: 2021-01-06
Payer: MEDICAID

## 2021-01-06 VITALS
SYSTOLIC BLOOD PRESSURE: 124 MMHG | HEIGHT: 62 IN | HEART RATE: 114 BPM | BODY MASS INDEX: 33.67 KG/M2 | DIASTOLIC BLOOD PRESSURE: 84 MMHG

## 2021-01-06 DIAGNOSIS — M50.20 CERVICAL DISC HERNIATION: ICD-10-CM

## 2021-01-06 DIAGNOSIS — M54.12 CERVICAL RADICULOPATHY: Primary | ICD-10-CM

## 2021-01-06 PROCEDURE — 99214 PR OFFICE/OUTPT VISIT, EST, LEVL IV, 30-39 MIN: ICD-10-PCS | Mod: S$PBB,,, | Performed by: NEUROLOGICAL SURGERY

## 2021-01-06 PROCEDURE — 99214 OFFICE O/P EST MOD 30 MIN: CPT | Mod: S$PBB,,, | Performed by: NEUROLOGICAL SURGERY

## 2021-01-06 PROCEDURE — 99999 PR PBB SHADOW E&M-EST. PATIENT-LVL V: ICD-10-PCS | Mod: PBBFAC,,, | Performed by: NEUROLOGICAL SURGERY

## 2021-01-06 PROCEDURE — 99215 OFFICE O/P EST HI 40 MIN: CPT | Mod: PBBFAC,PN | Performed by: NEUROLOGICAL SURGERY

## 2021-01-06 PROCEDURE — 99999 PR PBB SHADOW E&M-EST. PATIENT-LVL V: CPT | Mod: PBBFAC,,, | Performed by: NEUROLOGICAL SURGERY

## 2021-01-11 ENCOUNTER — TELEPHONE (OUTPATIENT)
Dept: ORTHOPEDICS | Facility: CLINIC | Age: 56
End: 2021-01-11

## 2021-01-11 RX ORDER — DICLOFENAC SODIUM 10 MG/G
2 GEL TOPICAL 4 TIMES DAILY
Qty: 1 TUBE | Refills: 2 | Status: SHIPPED | OUTPATIENT
Start: 2021-01-11 | End: 2021-05-04 | Stop reason: SDUPTHER

## 2021-01-12 ENCOUNTER — TELEPHONE (OUTPATIENT)
Dept: PAIN MEDICINE | Facility: CLINIC | Age: 56
End: 2021-01-12

## 2021-01-12 DIAGNOSIS — M54.12 CERVICAL RADICULOPATHY: Primary | ICD-10-CM

## 2021-01-25 ENCOUNTER — PATIENT OUTREACH (OUTPATIENT)
Dept: ADMINISTRATIVE | Facility: HOSPITAL | Age: 56
End: 2021-01-25

## 2021-01-26 ENCOUNTER — TELEPHONE (OUTPATIENT)
Dept: PAIN MEDICINE | Facility: CLINIC | Age: 56
End: 2021-01-26

## 2021-01-26 ENCOUNTER — HOSPITAL ENCOUNTER (OUTPATIENT)
Dept: RADIOLOGY | Facility: HOSPITAL | Age: 56
Discharge: HOME | End: 2021-01-26
Attending: INTERNAL MEDICINE
Payer: MEDICAID

## 2021-01-26 DIAGNOSIS — Z12.31 ENCOUNTER FOR SCREENING MAMMOGRAM FOR MALIGNANT NEOPLASM OF BREAST: ICD-10-CM

## 2021-01-26 PROCEDURE — 77063 BREAST TOMOSYNTHESIS BI: CPT | Mod: 26,,, | Performed by: RADIOLOGY

## 2021-01-26 PROCEDURE — 77067 MAMMO DIGITAL SCREENING BILAT WITH TOMO: ICD-10-PCS | Mod: 26,,, | Performed by: RADIOLOGY

## 2021-01-26 PROCEDURE — 77063 MAMMO DIGITAL SCREENING BILAT WITH TOMO: ICD-10-PCS | Mod: 26,,, | Performed by: RADIOLOGY

## 2021-01-26 PROCEDURE — 77067 SCR MAMMO BI INCL CAD: CPT | Mod: TC,PO

## 2021-01-26 PROCEDURE — 77067 SCR MAMMO BI INCL CAD: CPT | Mod: 26,,, | Performed by: RADIOLOGY

## 2021-01-27 ENCOUNTER — PATIENT MESSAGE (OUTPATIENT)
Dept: ADMINISTRATIVE | Facility: OTHER | Age: 56
End: 2021-01-27

## 2021-01-27 ENCOUNTER — HOSPITAL ENCOUNTER (OUTPATIENT)
Facility: HOSPITAL | Age: 56
Discharge: HOME | End: 2021-01-27
Attending: PHYSICAL MEDICINE & REHABILITATION | Admitting: PHYSICAL MEDICINE & REHABILITATION
Payer: MEDICAID

## 2021-01-27 VITALS
SYSTOLIC BLOOD PRESSURE: 111 MMHG | BODY MASS INDEX: 33.86 KG/M2 | DIASTOLIC BLOOD PRESSURE: 59 MMHG | HEIGHT: 62 IN | TEMPERATURE: 97 F | WEIGHT: 184 LBS | RESPIRATION RATE: 16 BRPM | OXYGEN SATURATION: 100 % | HEART RATE: 86 BPM

## 2021-01-27 DIAGNOSIS — M54.12 CERVICAL RADICULOPATHY: Primary | ICD-10-CM

## 2021-01-27 DIAGNOSIS — R52 PAIN: ICD-10-CM

## 2021-01-27 PROCEDURE — 62321 NJX INTERLAMINAR CRV/THRC: CPT | Mod: ,,, | Performed by: PHYSICAL MEDICINE & REHABILITATION

## 2021-01-27 PROCEDURE — 25500020 PHARM REV CODE 255: Performed by: PHYSICAL MEDICINE & REHABILITATION

## 2021-01-27 PROCEDURE — 62321 PR INJ CERV/THORAC, W/GUIDANCE: ICD-10-PCS | Mod: ,,, | Performed by: PHYSICAL MEDICINE & REHABILITATION

## 2021-01-27 PROCEDURE — 62321 NJX INTERLAMINAR CRV/THRC: CPT | Performed by: PHYSICAL MEDICINE & REHABILITATION

## 2021-01-27 PROCEDURE — 99152 MOD SED SAME PHYS/QHP 5/>YRS: CPT | Performed by: PHYSICAL MEDICINE & REHABILITATION

## 2021-01-27 PROCEDURE — 63600175 PHARM REV CODE 636 W HCPCS: Performed by: PHYSICAL MEDICINE & REHABILITATION

## 2021-01-27 PROCEDURE — 25000003 PHARM REV CODE 250: Performed by: PHYSICAL MEDICINE & REHABILITATION

## 2021-01-27 RX ORDER — MIDAZOLAM HYDROCHLORIDE 1 MG/ML
INJECTION, SOLUTION INTRAMUSCULAR; INTRAVENOUS
Status: DISCONTINUED | OUTPATIENT
Start: 2021-01-27 | End: 2021-01-27 | Stop reason: HOSPADM

## 2021-01-27 RX ORDER — DEXAMETHASONE SODIUM PHOSPHATE 10 MG/ML
INJECTION INTRAMUSCULAR; INTRAVENOUS
Status: DISCONTINUED | OUTPATIENT
Start: 2021-01-27 | End: 2021-01-27 | Stop reason: HOSPADM

## 2021-01-27 RX ORDER — FENTANYL CITRATE 50 UG/ML
INJECTION, SOLUTION INTRAMUSCULAR; INTRAVENOUS
Status: DISCONTINUED | OUTPATIENT
Start: 2021-01-27 | End: 2021-01-27 | Stop reason: HOSPADM

## 2021-01-27 RX ORDER — LIDOCAINE HYDROCHLORIDE 10 MG/ML
INJECTION INFILTRATION; PERINEURAL
Status: DISCONTINUED | OUTPATIENT
Start: 2021-01-27 | End: 2021-01-27 | Stop reason: HOSPADM

## 2021-01-27 RX ORDER — SODIUM CHLORIDE 9 MG/ML
INJECTION, SOLUTION INTRAVENOUS CONTINUOUS
Status: DISCONTINUED | OUTPATIENT
Start: 2021-01-27 | End: 2023-11-02

## 2021-01-27 RX ORDER — SODIUM BICARBONATE 1 MEQ/ML
SYRINGE (ML) INTRAVENOUS
Status: DISCONTINUED | OUTPATIENT
Start: 2021-01-27 | End: 2021-01-27 | Stop reason: HOSPADM

## 2021-01-27 RX ORDER — LIDOCAINE HYDROCHLORIDE 10 MG/ML
INJECTION, SOLUTION EPIDURAL; INFILTRATION; INTRACAUDAL; PERINEURAL
Status: DISCONTINUED | OUTPATIENT
Start: 2021-01-27 | End: 2021-01-27 | Stop reason: HOSPADM

## 2021-01-28 ENCOUNTER — TELEPHONE (OUTPATIENT)
Dept: PAIN MEDICINE | Facility: CLINIC | Age: 56
End: 2021-01-28

## 2021-02-02 ENCOUNTER — LAB VISIT (OUTPATIENT)
Dept: LAB | Facility: HOSPITAL | Age: 56
End: 2021-02-02
Attending: INTERNAL MEDICINE
Payer: MEDICAID

## 2021-02-02 ENCOUNTER — OFFICE VISIT (OUTPATIENT)
Dept: INTERNAL MEDICINE | Facility: CLINIC | Age: 56
End: 2021-02-02
Payer: MEDICAID

## 2021-02-02 VITALS
DIASTOLIC BLOOD PRESSURE: 80 MMHG | HEART RATE: 72 BPM | BODY MASS INDEX: 34 KG/M2 | WEIGHT: 184.75 LBS | HEIGHT: 62 IN | RESPIRATION RATE: 16 BRPM | OXYGEN SATURATION: 98 % | SYSTOLIC BLOOD PRESSURE: 136 MMHG

## 2021-02-02 DIAGNOSIS — N18.30 STAGE 3 CHRONIC KIDNEY DISEASE, UNSPECIFIED WHETHER STAGE 3A OR 3B CKD: ICD-10-CM

## 2021-02-02 DIAGNOSIS — F51.01 PRIMARY INSOMNIA: ICD-10-CM

## 2021-02-02 DIAGNOSIS — I10 ESSENTIAL HYPERTENSION: ICD-10-CM

## 2021-02-02 DIAGNOSIS — E11.9 CONTROLLED TYPE 2 DIABETES MELLITUS WITHOUT COMPLICATION, WITHOUT LONG-TERM CURRENT USE OF INSULIN: ICD-10-CM

## 2021-02-02 DIAGNOSIS — E78.2 MIXED HYPERLIPIDEMIA: ICD-10-CM

## 2021-02-02 DIAGNOSIS — E11.9 CONTROLLED TYPE 2 DIABETES MELLITUS WITHOUT COMPLICATION, WITHOUT LONG-TERM CURRENT USE OF INSULIN: Primary | ICD-10-CM

## 2021-02-02 DIAGNOSIS — F32.A DEPRESSION, UNSPECIFIED DEPRESSION TYPE: ICD-10-CM

## 2021-02-02 DIAGNOSIS — F41.9 ANXIETY: ICD-10-CM

## 2021-02-02 PROCEDURE — 99214 OFFICE O/P EST MOD 30 MIN: CPT | Mod: S$PBB,,, | Performed by: INTERNAL MEDICINE

## 2021-02-02 PROCEDURE — 36415 COLL VENOUS BLD VENIPUNCTURE: CPT | Mod: PO

## 2021-02-02 PROCEDURE — 99215 OFFICE O/P EST HI 40 MIN: CPT | Mod: PBBFAC,PO | Performed by: INTERNAL MEDICINE

## 2021-02-02 PROCEDURE — 99999 PR PBB SHADOW E&M-EST. PATIENT-LVL V: CPT | Mod: PBBFAC,,, | Performed by: INTERNAL MEDICINE

## 2021-02-02 PROCEDURE — 99214 PR OFFICE/OUTPT VISIT, EST, LEVL IV, 30-39 MIN: ICD-10-PCS | Mod: S$PBB,,, | Performed by: INTERNAL MEDICINE

## 2021-02-02 PROCEDURE — 80053 COMPREHEN METABOLIC PANEL: CPT

## 2021-02-02 PROCEDURE — 99999 PR PBB SHADOW E&M-EST. PATIENT-LVL V: ICD-10-PCS | Mod: PBBFAC,,, | Performed by: INTERNAL MEDICINE

## 2021-02-02 RX ORDER — ACETAMINOPHEN 500 MG
500 TABLET ORAL EVERY 6 HOURS PRN
COMMUNITY

## 2021-02-03 ENCOUNTER — PATIENT MESSAGE (OUTPATIENT)
Dept: INTERNAL MEDICINE | Facility: CLINIC | Age: 56
End: 2021-02-03

## 2021-02-03 ENCOUNTER — CLINICAL SUPPORT (OUTPATIENT)
Dept: REHABILITATION | Facility: OTHER | Age: 56
End: 2021-02-03
Attending: INTERNAL MEDICINE
Payer: MEDICAID

## 2021-02-03 DIAGNOSIS — M50.20 CERVICAL DISC HERNIATION: ICD-10-CM

## 2021-02-03 DIAGNOSIS — R29.898 DECREASED STRENGTH OF TRUNK AND BACK: ICD-10-CM

## 2021-02-03 DIAGNOSIS — M54.12 CERVICAL RADICULOPATHY: ICD-10-CM

## 2021-02-03 DIAGNOSIS — N18.30 STAGE 3 CHRONIC KIDNEY DISEASE, UNSPECIFIED WHETHER STAGE 3A OR 3B CKD: Primary | ICD-10-CM

## 2021-02-03 DIAGNOSIS — R29.3 POOR POSTURE: ICD-10-CM

## 2021-02-03 LAB
ALBUMIN SERPL BCP-MCNC: 3.3 G/DL (ref 3.5–5.2)
ALP SERPL-CCNC: 47 U/L (ref 55–135)
ALT SERPL W/O P-5'-P-CCNC: 14 U/L (ref 10–44)
ANION GAP SERPL CALC-SCNC: 13 MMOL/L (ref 8–16)
AST SERPL-CCNC: 13 U/L (ref 10–40)
BILIRUB SERPL-MCNC: 0.2 MG/DL (ref 0.1–1)
BUN SERPL-MCNC: 7 MG/DL (ref 6–20)
CALCIUM SERPL-MCNC: 8.7 MG/DL (ref 8.7–10.5)
CHLORIDE SERPL-SCNC: 108 MMOL/L (ref 95–110)
CO2 SERPL-SCNC: 17 MMOL/L (ref 23–29)
CREAT SERPL-MCNC: 0.8 MG/DL (ref 0.5–1.4)
EST. GFR  (AFRICAN AMERICAN): >60 ML/MIN/1.73 M^2
EST. GFR  (NON AFRICAN AMERICAN): >60 ML/MIN/1.73 M^2
GLUCOSE SERPL-MCNC: 92 MG/DL (ref 70–110)
POTASSIUM SERPL-SCNC: 3.6 MMOL/L (ref 3.5–5.1)
PROT SERPL-MCNC: 6.5 G/DL (ref 6–8.4)
SODIUM SERPL-SCNC: 138 MMOL/L (ref 136–145)

## 2021-02-03 PROCEDURE — 97162 PT EVAL MOD COMPLEX 30 MIN: CPT

## 2021-02-03 PROCEDURE — 97110 THERAPEUTIC EXERCISES: CPT

## 2021-02-04 ENCOUNTER — PATIENT MESSAGE (OUTPATIENT)
Dept: INTERNAL MEDICINE | Facility: CLINIC | Age: 56
End: 2021-02-04

## 2021-02-04 PROBLEM — R29.898 DECREASED STRENGTH OF TRUNK AND BACK: Status: ACTIVE | Noted: 2021-02-04

## 2021-02-04 PROBLEM — R29.3 POOR POSTURE: Status: ACTIVE | Noted: 2021-02-04

## 2021-02-05 ENCOUNTER — CLINICAL SUPPORT (OUTPATIENT)
Dept: REHABILITATION | Facility: OTHER | Age: 56
End: 2021-02-05
Attending: INTERNAL MEDICINE
Payer: MEDICAID

## 2021-02-05 ENCOUNTER — PATIENT OUTREACH (OUTPATIENT)
Dept: ADMINISTRATIVE | Facility: OTHER | Age: 56
End: 2021-02-05

## 2021-02-05 DIAGNOSIS — R29.3 POOR POSTURE: Primary | ICD-10-CM

## 2021-02-05 PROCEDURE — 97110 THERAPEUTIC EXERCISES: CPT

## 2021-02-08 ENCOUNTER — OFFICE VISIT (OUTPATIENT)
Dept: NEUROSURGERY | Facility: CLINIC | Age: 56
End: 2021-02-08
Payer: MEDICAID

## 2021-02-08 VITALS
SYSTOLIC BLOOD PRESSURE: 139 MMHG | BODY MASS INDEX: 34 KG/M2 | DIASTOLIC BLOOD PRESSURE: 85 MMHG | HEIGHT: 62 IN | WEIGHT: 184.75 LBS | HEART RATE: 117 BPM

## 2021-02-08 DIAGNOSIS — M54.12 CERVICAL RADICULOPATHY: Primary | ICD-10-CM

## 2021-02-08 DIAGNOSIS — M50.20 CERVICAL DISC HERNIATION: ICD-10-CM

## 2021-02-08 DIAGNOSIS — M54.2 NECK PAIN: ICD-10-CM

## 2021-02-08 PROCEDURE — 99214 OFFICE O/P EST MOD 30 MIN: CPT | Mod: PBBFAC,PN | Performed by: NEUROLOGICAL SURGERY

## 2021-02-08 PROCEDURE — 99999 PR PBB SHADOW E&M-EST. PATIENT-LVL IV: CPT | Mod: PBBFAC,,, | Performed by: NEUROLOGICAL SURGERY

## 2021-02-08 PROCEDURE — 99214 OFFICE O/P EST MOD 30 MIN: CPT | Mod: S$PBB,,, | Performed by: NEUROLOGICAL SURGERY

## 2021-02-08 PROCEDURE — 99214 PR OFFICE/OUTPT VISIT, EST, LEVL IV, 30-39 MIN: ICD-10-PCS | Mod: S$PBB,,, | Performed by: NEUROLOGICAL SURGERY

## 2021-02-08 PROCEDURE — 99999 PR PBB SHADOW E&M-EST. PATIENT-LVL IV: ICD-10-PCS | Mod: PBBFAC,,, | Performed by: NEUROLOGICAL SURGERY

## 2021-02-10 ENCOUNTER — PATIENT OUTREACH (OUTPATIENT)
Dept: ADMINISTRATIVE | Facility: HOSPITAL | Age: 56
End: 2021-02-10

## 2021-02-16 ENCOUNTER — PATIENT MESSAGE (OUTPATIENT)
Dept: INTERNAL MEDICINE | Facility: CLINIC | Age: 56
End: 2021-02-16

## 2021-02-17 ENCOUNTER — PATIENT OUTREACH (OUTPATIENT)
Dept: ADMINISTRATIVE | Facility: HOSPITAL | Age: 56
End: 2021-02-17

## 2021-02-18 ENCOUNTER — OFFICE VISIT (OUTPATIENT)
Dept: PAIN MEDICINE | Facility: CLINIC | Age: 56
End: 2021-02-18
Payer: MEDICAID

## 2021-02-18 VITALS — WEIGHT: 184.75 LBS | BODY MASS INDEX: 33.79 KG/M2

## 2021-02-18 DIAGNOSIS — M50.30 DDD (DEGENERATIVE DISC DISEASE), CERVICAL: Primary | ICD-10-CM

## 2021-02-18 DIAGNOSIS — G89.29 CHRONIC NECK PAIN: ICD-10-CM

## 2021-02-18 DIAGNOSIS — M47.812 CERVICAL SPONDYLOSIS: ICD-10-CM

## 2021-02-18 DIAGNOSIS — R29.2: ICD-10-CM

## 2021-02-18 DIAGNOSIS — M54.12 CERVICAL RADICULOPATHY: ICD-10-CM

## 2021-02-18 DIAGNOSIS — M54.2 CHRONIC NECK PAIN: ICD-10-CM

## 2021-02-18 PROCEDURE — 99214 OFFICE O/P EST MOD 30 MIN: CPT | Mod: S$PBB,,, | Performed by: PHYSICAL MEDICINE & REHABILITATION

## 2021-02-18 PROCEDURE — 99214 PR OFFICE/OUTPT VISIT, EST, LEVL IV, 30-39 MIN: ICD-10-PCS | Mod: S$PBB,,, | Performed by: PHYSICAL MEDICINE & REHABILITATION

## 2021-02-18 PROCEDURE — 99999 PR PBB SHADOW E&M-EST. PATIENT-LVL III: ICD-10-PCS | Mod: PBBFAC,,, | Performed by: PHYSICAL MEDICINE & REHABILITATION

## 2021-02-18 PROCEDURE — 99999 PR PBB SHADOW E&M-EST. PATIENT-LVL III: CPT | Mod: PBBFAC,,, | Performed by: PHYSICAL MEDICINE & REHABILITATION

## 2021-02-18 PROCEDURE — 99213 OFFICE O/P EST LOW 20 MIN: CPT | Mod: PBBFAC,PO | Performed by: PHYSICAL MEDICINE & REHABILITATION

## 2021-02-22 ENCOUNTER — LAB VISIT (OUTPATIENT)
Dept: LAB | Facility: HOSPITAL | Age: 56
End: 2021-02-22
Attending: INTERNAL MEDICINE
Payer: MEDICAID

## 2021-02-22 DIAGNOSIS — N18.30 STAGE 3 CHRONIC KIDNEY DISEASE, UNSPECIFIED WHETHER STAGE 3A OR 3B CKD: ICD-10-CM

## 2021-02-22 PROCEDURE — 36415 COLL VENOUS BLD VENIPUNCTURE: CPT | Mod: PO

## 2021-02-22 PROCEDURE — 80048 BASIC METABOLIC PNL TOTAL CA: CPT

## 2021-02-23 ENCOUNTER — PATIENT MESSAGE (OUTPATIENT)
Dept: INTERNAL MEDICINE | Facility: CLINIC | Age: 56
End: 2021-02-23

## 2021-02-23 DIAGNOSIS — E11.22 CKD STAGE 3 SECONDARY TO DIABETES: Primary | ICD-10-CM

## 2021-02-23 DIAGNOSIS — N18.30 CKD STAGE 3 SECONDARY TO DIABETES: Primary | ICD-10-CM

## 2021-02-23 LAB
ANION GAP SERPL CALC-SCNC: 15 MMOL/L (ref 8–16)
BUN SERPL-MCNC: 8 MG/DL (ref 6–20)
CALCIUM SERPL-MCNC: 9.3 MG/DL (ref 8.7–10.5)
CHLORIDE SERPL-SCNC: 106 MMOL/L (ref 95–110)
CO2 SERPL-SCNC: 18 MMOL/L (ref 23–29)
CREAT SERPL-MCNC: 1 MG/DL (ref 0.5–1.4)
EST. GFR  (AFRICAN AMERICAN): >60 ML/MIN/1.73 M^2
EST. GFR  (NON AFRICAN AMERICAN): >60 ML/MIN/1.73 M^2
GLUCOSE SERPL-MCNC: 119 MG/DL (ref 70–110)
POTASSIUM SERPL-SCNC: 3.9 MMOL/L (ref 3.5–5.1)
SODIUM SERPL-SCNC: 139 MMOL/L (ref 136–145)

## 2021-02-24 ENCOUNTER — PATIENT MESSAGE (OUTPATIENT)
Dept: INTERNAL MEDICINE | Facility: CLINIC | Age: 56
End: 2021-02-24

## 2021-02-25 ENCOUNTER — OFFICE VISIT (OUTPATIENT)
Dept: ORTHOPEDICS | Facility: CLINIC | Age: 56
End: 2021-02-25
Payer: MEDICAID

## 2021-02-25 VITALS — WEIGHT: 184 LBS | HEIGHT: 62 IN | BODY MASS INDEX: 33.86 KG/M2

## 2021-02-25 DIAGNOSIS — M75.101 TEAR OF RIGHT ROTATOR CUFF, UNSPECIFIED TEAR EXTENT, UNSPECIFIED WHETHER TRAUMATIC: Primary | ICD-10-CM

## 2021-02-25 PROCEDURE — 20551 PR INJECT TENDON ORIGIN/INSERT: ICD-10-PCS | Mod: 51,S$PBB,RT, | Performed by: ORTHOPAEDIC SURGERY

## 2021-02-25 PROCEDURE — 99999 PR PBB SHADOW E&M-EST. PATIENT-LVL III: ICD-10-PCS | Mod: PBBFAC,,, | Performed by: ORTHOPAEDIC SURGERY

## 2021-02-25 PROCEDURE — 99213 OFFICE O/P EST LOW 20 MIN: CPT | Mod: PBBFAC,PN | Performed by: ORTHOPAEDIC SURGERY

## 2021-02-25 PROCEDURE — 99213 PR OFFICE/OUTPT VISIT, EST, LEVL III, 20-29 MIN: ICD-10-PCS | Mod: 25,S$PBB,, | Performed by: ORTHOPAEDIC SURGERY

## 2021-02-25 PROCEDURE — 20610 DRAIN/INJ JOINT/BURSA W/O US: CPT | Mod: 50,S$PBB,, | Performed by: ORTHOPAEDIC SURGERY

## 2021-02-25 PROCEDURE — 99999 PR PBB SHADOW E&M-EST. PATIENT-LVL III: CPT | Mod: PBBFAC,,, | Performed by: ORTHOPAEDIC SURGERY

## 2021-02-25 PROCEDURE — 20551 NJX 1 TENDON ORIGIN/INSJ: CPT | Mod: 51,S$PBB,RT, | Performed by: ORTHOPAEDIC SURGERY

## 2021-02-25 PROCEDURE — 20551 NJX 1 TENDON ORIGIN/INSJ: CPT | Mod: PBBFAC,PN | Performed by: ORTHOPAEDIC SURGERY

## 2021-02-25 PROCEDURE — 20610 DRAIN/INJ JOINT/BURSA W/O US: CPT | Mod: 50,PBBFAC,PN | Performed by: ORTHOPAEDIC SURGERY

## 2021-02-25 PROCEDURE — 20610 PR DRAIN/INJECT LARGE JOINT/BURSA: ICD-10-PCS | Mod: 50,S$PBB,, | Performed by: ORTHOPAEDIC SURGERY

## 2021-02-25 PROCEDURE — 99213 OFFICE O/P EST LOW 20 MIN: CPT | Mod: 25,S$PBB,, | Performed by: ORTHOPAEDIC SURGERY

## 2021-02-25 RX ORDER — TRIAMCINOLONE ACETONIDE 40 MG/ML
40 INJECTION, SUSPENSION INTRA-ARTICULAR; INTRAMUSCULAR
Status: COMPLETED | OUTPATIENT
Start: 2021-02-25 | End: 2021-02-25

## 2021-02-25 RX ORDER — TRIAMCINOLONE ACETONIDE 40 MG/ML
20 INJECTION, SUSPENSION INTRA-ARTICULAR; INTRAMUSCULAR
Status: COMPLETED | OUTPATIENT
Start: 2021-02-25 | End: 2021-02-25

## 2021-02-25 RX ADMIN — TRIAMCINOLONE ACETONIDE 40 MG: 40 INJECTION, SUSPENSION INTRA-ARTICULAR; INTRAMUSCULAR at 01:02

## 2021-02-25 RX ADMIN — TRIAMCINOLONE ACETONIDE 20 MG: 40 INJECTION, SUSPENSION INTRA-ARTICULAR; INTRAMUSCULAR at 01:02

## 2021-03-01 ENCOUNTER — OFFICE VISIT (OUTPATIENT)
Dept: OPTOMETRY | Facility: CLINIC | Age: 56
End: 2021-03-01
Payer: MEDICAID

## 2021-03-01 DIAGNOSIS — H40.053 OCULAR HYPERTENSION, BILATERAL: ICD-10-CM

## 2021-03-01 DIAGNOSIS — H52.4 MYOPIA OF BOTH EYES WITH REGULAR ASTIGMATISM AND PRESBYOPIA: ICD-10-CM

## 2021-03-01 DIAGNOSIS — H52.223 MYOPIA OF BOTH EYES WITH REGULAR ASTIGMATISM AND PRESBYOPIA: ICD-10-CM

## 2021-03-01 DIAGNOSIS — H40.023 OAG (OPEN ANGLE GLAUCOMA) SUSPECT, HIGH RISK, BILATERAL: ICD-10-CM

## 2021-03-01 DIAGNOSIS — H52.13 MYOPIA OF BOTH EYES WITH REGULAR ASTIGMATISM AND PRESBYOPIA: ICD-10-CM

## 2021-03-01 DIAGNOSIS — E11.9 DIABETES MELLITUS TYPE 2 WITHOUT RETINOPATHY: Primary | ICD-10-CM

## 2021-03-01 DIAGNOSIS — E11.9 CONTROLLED TYPE 2 DIABETES MELLITUS WITHOUT COMPLICATION, WITHOUT LONG-TERM CURRENT USE OF INSULIN: ICD-10-CM

## 2021-03-01 PROCEDURE — 92015 PR REFRACTION: ICD-10-PCS | Mod: ,,, | Performed by: OPTOMETRIST

## 2021-03-01 PROCEDURE — 99999 PR PBB SHADOW E&M-EST. PATIENT-LVL II: ICD-10-PCS | Mod: PBBFAC,,, | Performed by: OPTOMETRIST

## 2021-03-01 PROCEDURE — 92020 GONIOSCOPY: CPT | Mod: PBBFAC,PO | Performed by: OPTOMETRIST

## 2021-03-01 PROCEDURE — 92015 DETERMINE REFRACTIVE STATE: CPT | Mod: ,,, | Performed by: OPTOMETRIST

## 2021-03-01 PROCEDURE — 92020 GONIOSCOPY: CPT | Mod: S$PBB,,, | Performed by: OPTOMETRIST

## 2021-03-01 PROCEDURE — 92004 PR EYE EXAM, NEW PATIENT,COMPREHESV: ICD-10-PCS | Mod: S$PBB,,, | Performed by: OPTOMETRIST

## 2021-03-01 PROCEDURE — 92004 COMPRE OPH EXAM NEW PT 1/>: CPT | Mod: S$PBB,,, | Performed by: OPTOMETRIST

## 2021-03-01 PROCEDURE — 99212 OFFICE O/P EST SF 10 MIN: CPT | Mod: PBBFAC,PO | Performed by: OPTOMETRIST

## 2021-03-01 PROCEDURE — 92020 PR SPECIAL EYE EVAL,GONIOSCOPY: ICD-10-PCS | Mod: S$PBB,,, | Performed by: OPTOMETRIST

## 2021-03-01 PROCEDURE — 99999 PR PBB SHADOW E&M-EST. PATIENT-LVL II: CPT | Mod: PBBFAC,,, | Performed by: OPTOMETRIST

## 2021-03-10 ENCOUNTER — CLINICAL SUPPORT (OUTPATIENT)
Dept: REHABILITATION | Facility: OTHER | Age: 56
End: 2021-03-10
Attending: INTERNAL MEDICINE
Payer: MEDICAID

## 2021-03-10 DIAGNOSIS — R29.3 POOR POSTURE: Primary | ICD-10-CM

## 2021-03-10 PROCEDURE — 97110 THERAPEUTIC EXERCISES: CPT

## 2021-03-12 ENCOUNTER — CLINICAL SUPPORT (OUTPATIENT)
Dept: REHABILITATION | Facility: OTHER | Age: 56
End: 2021-03-12
Attending: INTERNAL MEDICINE
Payer: MEDICAID

## 2021-03-12 DIAGNOSIS — R29.3 POOR POSTURE: Primary | ICD-10-CM

## 2021-03-12 PROCEDURE — 97110 THERAPEUTIC EXERCISES: CPT

## 2021-03-15 ENCOUNTER — PATIENT MESSAGE (OUTPATIENT)
Dept: INTERNAL MEDICINE | Facility: CLINIC | Age: 56
End: 2021-03-15

## 2021-03-15 ENCOUNTER — HOSPITAL ENCOUNTER (OUTPATIENT)
Dept: RADIOLOGY | Facility: HOSPITAL | Age: 56
Discharge: HOME | End: 2021-03-15
Attending: INTERNAL MEDICINE
Payer: MEDICAID

## 2021-03-15 DIAGNOSIS — E11.22 CKD STAGE 3 SECONDARY TO DIABETES: ICD-10-CM

## 2021-03-15 DIAGNOSIS — N18.30 CKD STAGE 3 SECONDARY TO DIABETES: ICD-10-CM

## 2021-03-15 PROCEDURE — 76770 US EXAM ABDO BACK WALL COMP: CPT | Mod: TC

## 2021-03-15 PROCEDURE — 76770 US EXAM ABDO BACK WALL COMP: CPT | Mod: 26,,, | Performed by: RADIOLOGY

## 2021-03-15 PROCEDURE — 76770 US RETROPERITONEAL COMPLETE: ICD-10-PCS | Mod: 26,,, | Performed by: RADIOLOGY

## 2021-03-16 ENCOUNTER — PATIENT MESSAGE (OUTPATIENT)
Dept: INTERNAL MEDICINE | Facility: CLINIC | Age: 56
End: 2021-03-16

## 2021-03-17 ENCOUNTER — CLINICAL SUPPORT (OUTPATIENT)
Dept: REHABILITATION | Facility: OTHER | Age: 56
End: 2021-03-17
Attending: INTERNAL MEDICINE
Payer: MEDICAID

## 2021-03-17 ENCOUNTER — PATIENT MESSAGE (OUTPATIENT)
Dept: INTERNAL MEDICINE | Facility: CLINIC | Age: 56
End: 2021-03-17

## 2021-03-17 DIAGNOSIS — R29.3 POOR POSTURE: Primary | ICD-10-CM

## 2021-03-17 PROCEDURE — 97110 THERAPEUTIC EXERCISES: CPT

## 2021-03-18 ENCOUNTER — CLINICAL SUPPORT (OUTPATIENT)
Dept: REHABILITATION | Facility: OTHER | Age: 56
End: 2021-03-18
Attending: INTERNAL MEDICINE
Payer: MEDICAID

## 2021-03-18 DIAGNOSIS — R29.3 POOR POSTURE: Primary | ICD-10-CM

## 2021-03-18 PROCEDURE — 97110 THERAPEUTIC EXERCISES: CPT | Mod: CQ

## 2021-03-24 ENCOUNTER — CLINICAL SUPPORT (OUTPATIENT)
Dept: REHABILITATION | Facility: OTHER | Age: 56
End: 2021-03-24
Attending: INTERNAL MEDICINE
Payer: MEDICAID

## 2021-03-24 DIAGNOSIS — R29.3 POOR POSTURE: Primary | ICD-10-CM

## 2021-03-24 PROCEDURE — 97110 THERAPEUTIC EXERCISES: CPT

## 2021-03-30 ENCOUNTER — CLINICAL SUPPORT (OUTPATIENT)
Dept: REHABILITATION | Facility: OTHER | Age: 56
End: 2021-03-30
Attending: INTERNAL MEDICINE
Payer: MEDICAID

## 2021-03-30 PROCEDURE — 97110 THERAPEUTIC EXERCISES: CPT | Mod: CQ

## 2021-03-31 ENCOUNTER — PATIENT OUTREACH (OUTPATIENT)
Dept: ADMINISTRATIVE | Facility: OTHER | Age: 56
End: 2021-03-31

## 2021-04-01 ENCOUNTER — TELEPHONE (OUTPATIENT)
Dept: INTERNAL MEDICINE | Facility: CLINIC | Age: 56
End: 2021-04-01

## 2021-04-05 ENCOUNTER — CLINICAL SUPPORT (OUTPATIENT)
Dept: OPHTHALMOLOGY | Facility: CLINIC | Age: 56
End: 2021-04-05
Payer: MEDICAID

## 2021-04-05 ENCOUNTER — OFFICE VISIT (OUTPATIENT)
Dept: OPTOMETRY | Facility: CLINIC | Age: 56
End: 2021-04-05
Payer: MEDICAID

## 2021-04-05 DIAGNOSIS — H40.023 OAG (OPEN ANGLE GLAUCOMA) SUSPECT, HIGH RISK, BILATERAL: ICD-10-CM

## 2021-04-05 DIAGNOSIS — H40.053 OCULAR HYPERTENSION, BILATERAL: ICD-10-CM

## 2021-04-05 DIAGNOSIS — H40.013 OAG (OPEN ANGLE GLAUCOMA) SUSPECT, LOW RISK, BILATERAL: ICD-10-CM

## 2021-04-05 DIAGNOSIS — H40.053 OCULAR HYPERTENSION, BILATERAL: Primary | ICD-10-CM

## 2021-04-05 PROCEDURE — 99213 PR OFFICE/OUTPT VISIT, EST, LEVL III, 20-29 MIN: ICD-10-PCS | Mod: S$PBB,,, | Performed by: OPTOMETRIST

## 2021-04-05 PROCEDURE — 76514 ECHO EXAM OF EYE THICKNESS: CPT | Mod: 26,S$PBB,, | Performed by: OPTOMETRIST

## 2021-04-05 PROCEDURE — 99212 OFFICE O/P EST SF 10 MIN: CPT | Mod: PBBFAC,PO,25 | Performed by: OPTOMETRIST

## 2021-04-05 PROCEDURE — 99999 PR PBB SHADOW E&M-EST. PATIENT-LVL II: CPT | Mod: PBBFAC,,, | Performed by: OPTOMETRIST

## 2021-04-05 PROCEDURE — 76514 PR  US, EYE, FOR CORNEAL THICKNESS: ICD-10-PCS | Mod: 26,S$PBB,, | Performed by: OPTOMETRIST

## 2021-04-05 PROCEDURE — 76514 ECHO EXAM OF EYE THICKNESS: CPT | Mod: PBBFAC,PO | Performed by: OPTOMETRIST

## 2021-04-05 PROCEDURE — 99213 OFFICE O/P EST LOW 20 MIN: CPT | Mod: S$PBB,,, | Performed by: OPTOMETRIST

## 2021-04-05 PROCEDURE — 99999 PR PBB SHADOW E&M-EST. PATIENT-LVL II: ICD-10-PCS | Mod: PBBFAC,,, | Performed by: OPTOMETRIST

## 2021-04-07 ENCOUNTER — CLINICAL SUPPORT (OUTPATIENT)
Dept: REHABILITATION | Facility: OTHER | Age: 56
End: 2021-04-07
Attending: INTERNAL MEDICINE
Payer: MEDICAID

## 2021-04-07 DIAGNOSIS — R29.3 POOR POSTURE: Primary | ICD-10-CM

## 2021-04-07 PROCEDURE — 97110 THERAPEUTIC EXERCISES: CPT | Mod: CQ

## 2021-04-09 ENCOUNTER — CLINICAL SUPPORT (OUTPATIENT)
Dept: REHABILITATION | Facility: OTHER | Age: 56
End: 2021-04-09
Attending: INTERNAL MEDICINE
Payer: MEDICAID

## 2021-04-09 DIAGNOSIS — R29.3 POOR POSTURE: Primary | ICD-10-CM

## 2021-04-09 PROCEDURE — 97110 THERAPEUTIC EXERCISES: CPT

## 2021-04-16 ENCOUNTER — CLINICAL SUPPORT (OUTPATIENT)
Dept: REHABILITATION | Facility: OTHER | Age: 56
End: 2021-04-16
Attending: INTERNAL MEDICINE
Payer: MEDICAID

## 2021-04-16 DIAGNOSIS — R29.3 POOR POSTURE: Primary | ICD-10-CM

## 2021-04-16 PROCEDURE — 97110 THERAPEUTIC EXERCISES: CPT

## 2021-04-18 ENCOUNTER — PATIENT MESSAGE (OUTPATIENT)
Dept: OTHER | Facility: OTHER | Age: 56
End: 2021-04-18

## 2021-04-20 ENCOUNTER — CLINICAL SUPPORT (OUTPATIENT)
Dept: REHABILITATION | Facility: OTHER | Age: 56
End: 2021-04-20
Attending: INTERNAL MEDICINE
Payer: MEDICAID

## 2021-04-20 DIAGNOSIS — R29.3 POOR POSTURE: Primary | ICD-10-CM

## 2021-04-20 PROCEDURE — 97140 MANUAL THERAPY 1/> REGIONS: CPT | Mod: CQ

## 2021-04-20 PROCEDURE — 97110 THERAPEUTIC EXERCISES: CPT | Mod: CQ

## 2021-04-22 ENCOUNTER — OFFICE VISIT (OUTPATIENT)
Dept: ORTHOPEDICS | Facility: CLINIC | Age: 56
End: 2021-04-22
Payer: MEDICAID

## 2021-04-22 ENCOUNTER — HOSPITAL ENCOUNTER (OUTPATIENT)
Dept: RADIOLOGY | Facility: HOSPITAL | Age: 56
Discharge: HOME | End: 2021-04-22
Attending: PHYSICIAN ASSISTANT
Payer: MEDICAID

## 2021-04-22 VITALS — HEIGHT: 62 IN | WEIGHT: 184.06 LBS | BODY MASS INDEX: 33.87 KG/M2

## 2021-04-22 DIAGNOSIS — M25.522 LEFT ELBOW PAIN: Primary | ICD-10-CM

## 2021-04-22 DIAGNOSIS — M25.522 LEFT ELBOW PAIN: ICD-10-CM

## 2021-04-22 DIAGNOSIS — M75.101 TEAR OF RIGHT ROTATOR CUFF, UNSPECIFIED TEAR EXTENT, UNSPECIFIED WHETHER TRAUMATIC: ICD-10-CM

## 2021-04-22 PROCEDURE — 73080 XR ELBOW COMPLETE 3 VIEW LEFT: ICD-10-PCS | Mod: 26,LT,, | Performed by: SPECIALIST

## 2021-04-22 PROCEDURE — 73080 X-RAY EXAM OF ELBOW: CPT | Mod: TC,PN,LT

## 2021-04-22 PROCEDURE — 99213 OFFICE O/P EST LOW 20 MIN: CPT | Mod: S$PBB,,, | Performed by: PHYSICIAN ASSISTANT

## 2021-04-22 PROCEDURE — 99214 OFFICE O/P EST MOD 30 MIN: CPT | Mod: PBBFAC,25,PN | Performed by: PHYSICIAN ASSISTANT

## 2021-04-22 PROCEDURE — 99999 PR PBB SHADOW E&M-EST. PATIENT-LVL IV: CPT | Mod: PBBFAC,,, | Performed by: PHYSICIAN ASSISTANT

## 2021-04-22 PROCEDURE — 99213 PR OFFICE/OUTPT VISIT, EST, LEVL III, 20-29 MIN: ICD-10-PCS | Mod: S$PBB,,, | Performed by: PHYSICIAN ASSISTANT

## 2021-04-22 PROCEDURE — 73080 X-RAY EXAM OF ELBOW: CPT | Mod: 26,LT,, | Performed by: SPECIALIST

## 2021-04-22 PROCEDURE — 99999 PR PBB SHADOW E&M-EST. PATIENT-LVL IV: ICD-10-PCS | Mod: PBBFAC,,, | Performed by: PHYSICIAN ASSISTANT

## 2021-04-27 ENCOUNTER — CLINICAL SUPPORT (OUTPATIENT)
Dept: REHABILITATION | Facility: OTHER | Age: 56
End: 2021-04-27
Attending: INTERNAL MEDICINE
Payer: MEDICAID

## 2021-04-27 DIAGNOSIS — R29.3 POOR POSTURE: Primary | ICD-10-CM

## 2021-04-27 PROCEDURE — 97110 THERAPEUTIC EXERCISES: CPT

## 2021-04-27 PROCEDURE — 97140 MANUAL THERAPY 1/> REGIONS: CPT

## 2021-04-29 ENCOUNTER — CLINICAL SUPPORT (OUTPATIENT)
Dept: REHABILITATION | Facility: OTHER | Age: 56
End: 2021-04-29
Attending: INTERNAL MEDICINE
Payer: MEDICAID

## 2021-04-29 DIAGNOSIS — R29.3 POOR POSTURE: Primary | ICD-10-CM

## 2021-04-29 PROCEDURE — 97110 THERAPEUTIC EXERCISES: CPT

## 2021-05-03 ENCOUNTER — PATIENT MESSAGE (OUTPATIENT)
Dept: OTHER | Facility: OTHER | Age: 56
End: 2021-05-03

## 2021-05-03 ENCOUNTER — PATIENT OUTREACH (OUTPATIENT)
Dept: ADMINISTRATIVE | Facility: OTHER | Age: 56
End: 2021-05-03

## 2021-05-04 ENCOUNTER — OFFICE VISIT (OUTPATIENT)
Dept: NEUROSURGERY | Facility: CLINIC | Age: 56
End: 2021-05-04
Payer: MEDICAID

## 2021-05-04 VITALS
HEIGHT: 62 IN | SYSTOLIC BLOOD PRESSURE: 123 MMHG | WEIGHT: 184 LBS | RESPIRATION RATE: 12 BRPM | HEART RATE: 107 BPM | BODY MASS INDEX: 33.86 KG/M2 | DIASTOLIC BLOOD PRESSURE: 72 MMHG

## 2021-05-04 DIAGNOSIS — G89.29 CHRONIC RIGHT SHOULDER PAIN: Primary | ICD-10-CM

## 2021-05-04 DIAGNOSIS — M50.20 CERVICAL DISC HERNIATION: ICD-10-CM

## 2021-05-04 DIAGNOSIS — R52 PAIN: ICD-10-CM

## 2021-05-04 DIAGNOSIS — M25.511 CHRONIC RIGHT SHOULDER PAIN: Primary | ICD-10-CM

## 2021-05-04 DIAGNOSIS — M54.2 NECK PAIN: Primary | ICD-10-CM

## 2021-05-04 DIAGNOSIS — M75.101 TEAR OF RIGHT ROTATOR CUFF, UNSPECIFIED TEAR EXTENT, UNSPECIFIED WHETHER TRAUMATIC: ICD-10-CM

## 2021-05-04 DIAGNOSIS — M54.12 CERVICAL RADICULOPATHY: ICD-10-CM

## 2021-05-04 PROCEDURE — 99213 OFFICE O/P EST LOW 20 MIN: CPT | Mod: S$PBB,,, | Performed by: NEUROLOGICAL SURGERY

## 2021-05-04 PROCEDURE — 99214 OFFICE O/P EST MOD 30 MIN: CPT | Mod: PBBFAC,PN | Performed by: NEUROLOGICAL SURGERY

## 2021-05-04 PROCEDURE — 99213 PR OFFICE/OUTPT VISIT, EST, LEVL III, 20-29 MIN: ICD-10-PCS | Mod: S$PBB,,, | Performed by: NEUROLOGICAL SURGERY

## 2021-05-04 PROCEDURE — 99999 PR PBB SHADOW E&M-EST. PATIENT-LVL IV: CPT | Mod: PBBFAC,,, | Performed by: NEUROLOGICAL SURGERY

## 2021-05-04 PROCEDURE — 99999 PR PBB SHADOW E&M-EST. PATIENT-LVL IV: ICD-10-PCS | Mod: PBBFAC,,, | Performed by: NEUROLOGICAL SURGERY

## 2021-05-05 ENCOUNTER — LAB VISIT (OUTPATIENT)
Dept: LAB | Facility: HOSPITAL | Age: 56
End: 2021-05-05
Attending: INTERNAL MEDICINE
Payer: MEDICAID

## 2021-05-05 ENCOUNTER — OFFICE VISIT (OUTPATIENT)
Dept: INTERNAL MEDICINE | Facility: CLINIC | Age: 56
End: 2021-05-05
Payer: MEDICAID

## 2021-05-05 VITALS
BODY MASS INDEX: 34.29 KG/M2 | OXYGEN SATURATION: 98 % | SYSTOLIC BLOOD PRESSURE: 125 MMHG | HEART RATE: 105 BPM | WEIGHT: 186.31 LBS | DIASTOLIC BLOOD PRESSURE: 82 MMHG | TEMPERATURE: 98 F | HEIGHT: 62 IN

## 2021-05-05 DIAGNOSIS — E11.9 CONTROLLED TYPE 2 DIABETES MELLITUS WITHOUT COMPLICATION, WITHOUT LONG-TERM CURRENT USE OF INSULIN: ICD-10-CM

## 2021-05-05 DIAGNOSIS — E78.2 MIXED HYPERLIPIDEMIA: ICD-10-CM

## 2021-05-05 DIAGNOSIS — R53.83 FATIGUE, UNSPECIFIED TYPE: ICD-10-CM

## 2021-05-05 DIAGNOSIS — N18.30 CKD STAGE 3 SECONDARY TO DIABETES: ICD-10-CM

## 2021-05-05 DIAGNOSIS — I10 ESSENTIAL HYPERTENSION: ICD-10-CM

## 2021-05-05 DIAGNOSIS — E11.9 CONTROLLED TYPE 2 DIABETES MELLITUS WITHOUT COMPLICATION, WITHOUT LONG-TERM CURRENT USE OF INSULIN: Primary | ICD-10-CM

## 2021-05-05 DIAGNOSIS — E11.22 CKD STAGE 3 SECONDARY TO DIABETES: ICD-10-CM

## 2021-05-05 LAB
25(OH)D3+25(OH)D2 SERPL-MCNC: 18 NG/ML (ref 30–96)
ALBUMIN SERPL BCP-MCNC: 3.5 G/DL (ref 3.5–5.2)
ALP SERPL-CCNC: 42 U/L (ref 55–135)
ALT SERPL W/O P-5'-P-CCNC: 38 U/L (ref 10–44)
ANION GAP SERPL CALC-SCNC: 10 MMOL/L (ref 8–16)
AST SERPL-CCNC: 33 U/L (ref 10–40)
BILIRUB SERPL-MCNC: 0.4 MG/DL (ref 0.1–1)
BUN SERPL-MCNC: 7 MG/DL (ref 6–20)
CALCIUM SERPL-MCNC: 9.2 MG/DL (ref 8.7–10.5)
CHLORIDE SERPL-SCNC: 103 MMOL/L (ref 95–110)
CHOLEST SERPL-MCNC: 187 MG/DL (ref 120–199)
CHOLEST/HDLC SERPL: 3.9 {RATIO} (ref 2–5)
CO2 SERPL-SCNC: 22 MMOL/L (ref 23–29)
CREAT SERPL-MCNC: 1 MG/DL (ref 0.5–1.4)
EST. GFR  (AFRICAN AMERICAN): >60 ML/MIN/1.73 M^2
EST. GFR  (NON AFRICAN AMERICAN): >60 ML/MIN/1.73 M^2
ESTIMATED AVG GLUCOSE: 131 MG/DL (ref 68–131)
GLUCOSE SERPL-MCNC: 103 MG/DL (ref 70–110)
HBA1C MFR BLD: 6.2 % (ref 4–5.6)
HDLC SERPL-MCNC: 48 MG/DL (ref 40–75)
HDLC SERPL: 25.7 % (ref 20–50)
LDLC SERPL CALC-MCNC: 108.8 MG/DL (ref 63–159)
NONHDLC SERPL-MCNC: 139 MG/DL
POTASSIUM SERPL-SCNC: 3.9 MMOL/L (ref 3.5–5.1)
PROT SERPL-MCNC: 6.9 G/DL (ref 6–8.4)
SODIUM SERPL-SCNC: 135 MMOL/L (ref 136–145)
TRIGL SERPL-MCNC: 151 MG/DL (ref 30–150)
TSH SERPL DL<=0.005 MIU/L-ACNC: 1.64 UIU/ML (ref 0.4–4)
VIT B12 SERPL-MCNC: 173 PG/ML (ref 210–950)

## 2021-05-05 PROCEDURE — 90471 IMMUNIZATION ADMIN: CPT | Mod: PBBFAC,PO

## 2021-05-05 PROCEDURE — 80061 LIPID PANEL: CPT | Performed by: INTERNAL MEDICINE

## 2021-05-05 PROCEDURE — 99999 PR PBB SHADOW E&M-EST. PATIENT-LVL V: ICD-10-PCS | Mod: PBBFAC,,, | Performed by: INTERNAL MEDICINE

## 2021-05-05 PROCEDURE — 83036 HEMOGLOBIN GLYCOSYLATED A1C: CPT | Performed by: INTERNAL MEDICINE

## 2021-05-05 PROCEDURE — 84443 ASSAY THYROID STIM HORMONE: CPT | Performed by: INTERNAL MEDICINE

## 2021-05-05 PROCEDURE — 99214 OFFICE O/P EST MOD 30 MIN: CPT | Mod: S$PBB,,, | Performed by: INTERNAL MEDICINE

## 2021-05-05 PROCEDURE — 82306 VITAMIN D 25 HYDROXY: CPT | Performed by: INTERNAL MEDICINE

## 2021-05-05 PROCEDURE — 99215 OFFICE O/P EST HI 40 MIN: CPT | Mod: PBBFAC,PO | Performed by: INTERNAL MEDICINE

## 2021-05-05 PROCEDURE — 99214 PR OFFICE/OUTPT VISIT, EST, LEVL IV, 30-39 MIN: ICD-10-PCS | Mod: S$PBB,,, | Performed by: INTERNAL MEDICINE

## 2021-05-05 PROCEDURE — 80053 COMPREHEN METABOLIC PANEL: CPT | Performed by: INTERNAL MEDICINE

## 2021-05-05 PROCEDURE — 36415 COLL VENOUS BLD VENIPUNCTURE: CPT | Mod: PO | Performed by: INTERNAL MEDICINE

## 2021-05-05 PROCEDURE — 99999 PR PBB SHADOW E&M-EST. PATIENT-LVL V: CPT | Mod: PBBFAC,,, | Performed by: INTERNAL MEDICINE

## 2021-05-05 PROCEDURE — 82607 VITAMIN B-12: CPT | Performed by: INTERNAL MEDICINE

## 2021-05-05 RX ORDER — PHENTERMINE HYDROCHLORIDE 37.5 MG/1
37.5 TABLET ORAL DAILY
COMMUNITY
Start: 2021-04-06 | End: 2021-09-17

## 2021-05-05 RX ORDER — DICLOFENAC SODIUM 10 MG/G
2 GEL TOPICAL 4 TIMES DAILY
Qty: 1 TUBE | Refills: 2 | Status: SHIPPED | OUTPATIENT
Start: 2021-05-05 | End: 2021-07-23 | Stop reason: SDUPTHER

## 2021-05-06 ENCOUNTER — PATIENT MESSAGE (OUTPATIENT)
Dept: INTERNAL MEDICINE | Facility: CLINIC | Age: 56
End: 2021-05-06

## 2021-05-06 ENCOUNTER — CLINICAL SUPPORT (OUTPATIENT)
Dept: REHABILITATION | Facility: OTHER | Age: 56
End: 2021-05-06
Attending: INTERNAL MEDICINE
Payer: MEDICAID

## 2021-05-06 DIAGNOSIS — R29.3 POOR POSTURE: Primary | ICD-10-CM

## 2021-05-06 PROCEDURE — 97110 THERAPEUTIC EXERCISES: CPT | Mod: CQ

## 2021-05-06 RX ORDER — SYRINGE WITH NEEDLE, 1 ML 25GX5/8"
SYRINGE, EMPTY DISPOSABLE MISCELLANEOUS
Qty: 100 SYRINGE | Refills: 3 | Status: SHIPPED | OUTPATIENT
Start: 2021-05-06 | End: 2021-05-20 | Stop reason: SDUPTHER

## 2021-05-06 RX ORDER — CYANOCOBALAMIN 1000 UG/ML
INJECTION, SOLUTION INTRAMUSCULAR; SUBCUTANEOUS
Qty: 30 ML | Refills: 3 | Status: SHIPPED | OUTPATIENT
Start: 2021-05-06 | End: 2021-09-10

## 2021-05-07 ENCOUNTER — PATIENT MESSAGE (OUTPATIENT)
Dept: INTERNAL MEDICINE | Facility: CLINIC | Age: 56
End: 2021-05-07

## 2021-05-10 ENCOUNTER — PATIENT MESSAGE (OUTPATIENT)
Dept: INTERNAL MEDICINE | Facility: CLINIC | Age: 56
End: 2021-05-10

## 2021-05-11 ENCOUNTER — CLINICAL SUPPORT (OUTPATIENT)
Dept: REHABILITATION | Facility: OTHER | Age: 56
End: 2021-05-11
Attending: INTERNAL MEDICINE
Payer: MEDICAID

## 2021-05-11 ENCOUNTER — PATIENT MESSAGE (OUTPATIENT)
Dept: INTERNAL MEDICINE | Facility: CLINIC | Age: 56
End: 2021-05-11

## 2021-05-11 DIAGNOSIS — R29.3 POOR POSTURE: Primary | ICD-10-CM

## 2021-05-11 PROCEDURE — 97110 THERAPEUTIC EXERCISES: CPT

## 2021-05-13 ENCOUNTER — CLINICAL SUPPORT (OUTPATIENT)
Dept: REHABILITATION | Facility: OTHER | Age: 56
End: 2021-05-13
Attending: INTERNAL MEDICINE
Payer: MEDICAID

## 2021-05-13 DIAGNOSIS — R29.3 POOR POSTURE: Primary | ICD-10-CM

## 2021-05-13 PROCEDURE — 97110 THERAPEUTIC EXERCISES: CPT

## 2021-05-18 ENCOUNTER — CLINICAL SUPPORT (OUTPATIENT)
Dept: REHABILITATION | Facility: OTHER | Age: 56
End: 2021-05-18
Attending: INTERNAL MEDICINE
Payer: MEDICAID

## 2021-05-18 DIAGNOSIS — R29.3 POOR POSTURE: Primary | ICD-10-CM

## 2021-05-18 PROCEDURE — 97140 MANUAL THERAPY 1/> REGIONS: CPT

## 2021-05-18 PROCEDURE — 97110 THERAPEUTIC EXERCISES: CPT

## 2021-05-19 ENCOUNTER — PATIENT MESSAGE (OUTPATIENT)
Dept: INTERNAL MEDICINE | Facility: CLINIC | Age: 56
End: 2021-05-19

## 2021-05-19 DIAGNOSIS — E11.9 CONTROLLED TYPE 2 DIABETES MELLITUS WITHOUT COMPLICATION, WITHOUT LONG-TERM CURRENT USE OF INSULIN: ICD-10-CM

## 2021-05-19 DIAGNOSIS — E53.8 B12 DEFICIENCY: ICD-10-CM

## 2021-05-19 DIAGNOSIS — E11.22 CKD STAGE 3 SECONDARY TO DIABETES: Primary | ICD-10-CM

## 2021-05-19 DIAGNOSIS — N18.30 CKD STAGE 3 SECONDARY TO DIABETES: Primary | ICD-10-CM

## 2021-05-20 ENCOUNTER — HOSPITAL ENCOUNTER (OUTPATIENT)
Dept: RADIOLOGY | Facility: HOSPITAL | Age: 56
Discharge: HOME | End: 2021-05-20
Attending: ORTHOPAEDIC SURGERY
Payer: MEDICAID

## 2021-05-20 ENCOUNTER — PATIENT MESSAGE (OUTPATIENT)
Dept: INTERNAL MEDICINE | Facility: CLINIC | Age: 56
End: 2021-05-20

## 2021-05-20 ENCOUNTER — OFFICE VISIT (OUTPATIENT)
Dept: ORTHOPEDICS | Facility: CLINIC | Age: 56
End: 2021-05-20
Payer: MEDICAID

## 2021-05-20 VITALS
BODY MASS INDEX: 34.02 KG/M2 | WEIGHT: 186 LBS | HEART RATE: 102 BPM | SYSTOLIC BLOOD PRESSURE: 124 MMHG | DIASTOLIC BLOOD PRESSURE: 82 MMHG

## 2021-05-20 DIAGNOSIS — M25.512 BILATERAL SHOULDER PAIN, UNSPECIFIED CHRONICITY: Primary | ICD-10-CM

## 2021-05-20 DIAGNOSIS — M25.519 SHOULDER PAIN, UNSPECIFIED CHRONICITY, UNSPECIFIED LATERALITY: ICD-10-CM

## 2021-05-20 DIAGNOSIS — M25.511 BILATERAL SHOULDER PAIN, UNSPECIFIED CHRONICITY: Primary | ICD-10-CM

## 2021-05-20 DIAGNOSIS — M25.511 BILATERAL SHOULDER PAIN, UNSPECIFIED CHRONICITY: ICD-10-CM

## 2021-05-20 DIAGNOSIS — M25.512 BILATERAL SHOULDER PAIN, UNSPECIFIED CHRONICITY: ICD-10-CM

## 2021-05-20 PROCEDURE — 73030 X-RAY EXAM OF SHOULDER: CPT | Mod: 26,50,, | Performed by: RADIOLOGY

## 2021-05-20 PROCEDURE — 73030 X-RAY EXAM OF SHOULDER: CPT | Mod: TC,50,PN

## 2021-05-20 PROCEDURE — 99213 OFFICE O/P EST LOW 20 MIN: CPT | Mod: PBBFAC,25,PN | Performed by: ORTHOPAEDIC SURGERY

## 2021-05-20 PROCEDURE — 99999 PR PBB SHADOW E&M-EST. PATIENT-LVL III: ICD-10-PCS | Mod: PBBFAC,,, | Performed by: ORTHOPAEDIC SURGERY

## 2021-05-20 PROCEDURE — 73030 XR SHOULDER COMPLETE 2 OR MORE VIEWS BILATERAL: ICD-10-PCS | Mod: 26,50,, | Performed by: RADIOLOGY

## 2021-05-20 PROCEDURE — 99213 OFFICE O/P EST LOW 20 MIN: CPT | Mod: S$PBB,,, | Performed by: ORTHOPAEDIC SURGERY

## 2021-05-20 PROCEDURE — 99213 PR OFFICE/OUTPT VISIT, EST, LEVL III, 20-29 MIN: ICD-10-PCS | Mod: S$PBB,,, | Performed by: ORTHOPAEDIC SURGERY

## 2021-05-20 PROCEDURE — 99999 PR PBB SHADOW E&M-EST. PATIENT-LVL III: CPT | Mod: PBBFAC,,, | Performed by: ORTHOPAEDIC SURGERY

## 2021-05-20 RX ORDER — SYRINGE WITH NEEDLE, 1 ML 25GX5/8"
SYRINGE, EMPTY DISPOSABLE MISCELLANEOUS
Qty: 100 SYRINGE | Refills: 3 | Status: SHIPPED | OUTPATIENT
Start: 2021-05-20 | End: 2021-09-24

## 2021-05-21 ENCOUNTER — CLINICAL SUPPORT (OUTPATIENT)
Dept: REHABILITATION | Facility: OTHER | Age: 56
End: 2021-05-21
Attending: INTERNAL MEDICINE
Payer: MEDICAID

## 2021-05-21 DIAGNOSIS — R29.3 POOR POSTURE: Primary | ICD-10-CM

## 2021-05-21 PROCEDURE — 97110 THERAPEUTIC EXERCISES: CPT

## 2021-05-25 ENCOUNTER — PATIENT MESSAGE (OUTPATIENT)
Dept: INTERNAL MEDICINE | Facility: CLINIC | Age: 56
End: 2021-05-25

## 2021-05-25 ENCOUNTER — CLINICAL SUPPORT (OUTPATIENT)
Dept: REHABILITATION | Facility: OTHER | Age: 56
End: 2021-05-25
Attending: INTERNAL MEDICINE
Payer: MEDICAID

## 2021-05-25 DIAGNOSIS — R29.3 POOR POSTURE: Primary | ICD-10-CM

## 2021-05-25 PROCEDURE — 97110 THERAPEUTIC EXERCISES: CPT

## 2021-05-25 PROCEDURE — 97750 PHYSICAL PERFORMANCE TEST: CPT

## 2021-05-27 ENCOUNTER — HOSPITAL ENCOUNTER (OUTPATIENT)
Dept: RADIOLOGY | Facility: HOSPITAL | Age: 56
Discharge: HOME | End: 2021-05-27
Attending: ORTHOPAEDIC SURGERY
Payer: MEDICAID

## 2021-05-27 DIAGNOSIS — M25.519 SHOULDER PAIN, UNSPECIFIED CHRONICITY, UNSPECIFIED LATERALITY: ICD-10-CM

## 2021-05-27 PROCEDURE — 73221 MRI SHOULDER WITHOUT CONTRAST RIGHT: ICD-10-PCS | Mod: 26,RT,, | Performed by: RADIOLOGY

## 2021-05-27 PROCEDURE — 73221 MRI JOINT UPR EXTREM W/O DYE: CPT | Mod: 26,RT,, | Performed by: RADIOLOGY

## 2021-05-27 PROCEDURE — 73221 MRI JOINT UPR EXTREM W/O DYE: CPT | Mod: TC,RT

## 2021-05-31 RX ORDER — AMLODIPINE BESYLATE 10 MG/1
10 TABLET ORAL DAILY
Qty: 90 TABLET | Refills: 3 | Status: SHIPPED | OUTPATIENT
Start: 2021-05-31 | End: 2022-03-21 | Stop reason: SDUPTHER

## 2021-05-31 RX ORDER — LOSARTAN POTASSIUM 100 MG/1
100 TABLET ORAL DAILY
Qty: 90 TABLET | Refills: 3 | Status: SHIPPED | OUTPATIENT
Start: 2021-05-31 | End: 2022-04-18

## 2021-06-01 ENCOUNTER — TELEPHONE (OUTPATIENT)
Dept: ORTHOPEDICS | Facility: CLINIC | Age: 56
End: 2021-06-01

## 2021-06-03 ENCOUNTER — PATIENT MESSAGE (OUTPATIENT)
Dept: ORTHOPEDICS | Facility: CLINIC | Age: 56
End: 2021-06-03

## 2021-06-16 ENCOUNTER — PATIENT OUTREACH (OUTPATIENT)
Dept: ADMINISTRATIVE | Facility: OTHER | Age: 56
End: 2021-06-16

## 2021-06-17 ENCOUNTER — TELEPHONE (OUTPATIENT)
Dept: ORTHOPEDICS | Facility: CLINIC | Age: 56
End: 2021-06-17

## 2021-06-22 ENCOUNTER — PATIENT MESSAGE (OUTPATIENT)
Dept: ADMINISTRATIVE | Facility: OTHER | Age: 56
End: 2021-06-22

## 2021-06-24 ENCOUNTER — OFFICE VISIT (OUTPATIENT)
Dept: ORTHOPEDICS | Facility: CLINIC | Age: 56
End: 2021-06-24
Payer: MEDICAID

## 2021-06-24 VITALS — BODY MASS INDEX: 34.02 KG/M2 | WEIGHT: 186 LBS

## 2021-06-24 DIAGNOSIS — M25.511 CHRONIC RIGHT SHOULDER PAIN: Primary | ICD-10-CM

## 2021-06-24 DIAGNOSIS — G89.29 CHRONIC RIGHT SHOULDER PAIN: Primary | ICD-10-CM

## 2021-06-24 PROBLEM — M75.121 NONTRAUMATIC COMPLETE TEAR OF RIGHT ROTATOR CUFF: Status: RESOLVED | Noted: 2020-04-27 | Resolved: 2021-06-24

## 2021-06-24 PROBLEM — M75.101 ROTATOR CUFF TEAR, RIGHT: Status: RESOLVED | Noted: 2020-06-09 | Resolved: 2021-06-24

## 2021-06-24 PROCEDURE — 99213 OFFICE O/P EST LOW 20 MIN: CPT | Mod: S$PBB,,, | Performed by: ORTHOPAEDIC SURGERY

## 2021-06-24 PROCEDURE — 99999 PR PBB SHADOW E&M-EST. PATIENT-LVL II: ICD-10-PCS | Mod: PBBFAC,,, | Performed by: ORTHOPAEDIC SURGERY

## 2021-06-24 PROCEDURE — 99213 PR OFFICE/OUTPT VISIT, EST, LEVL III, 20-29 MIN: ICD-10-PCS | Mod: S$PBB,,, | Performed by: ORTHOPAEDIC SURGERY

## 2021-06-24 PROCEDURE — 99212 OFFICE O/P EST SF 10 MIN: CPT | Mod: PBBFAC,PN | Performed by: ORTHOPAEDIC SURGERY

## 2021-06-24 PROCEDURE — 99999 PR PBB SHADOW E&M-EST. PATIENT-LVL II: CPT | Mod: PBBFAC,,, | Performed by: ORTHOPAEDIC SURGERY

## 2021-07-09 ENCOUNTER — TELEPHONE (OUTPATIENT)
Dept: NEUROSURGERY | Facility: CLINIC | Age: 56
End: 2021-07-09

## 2021-07-10 DIAGNOSIS — K59.00 CONSTIPATION, UNSPECIFIED CONSTIPATION TYPE: ICD-10-CM

## 2021-07-12 RX ORDER — OMEPRAZOLE 40 MG/1
40 CAPSULE, DELAYED RELEASE ORAL DAILY
Qty: 30 CAPSULE | Refills: 11 | Status: SHIPPED | OUTPATIENT
Start: 2021-07-12 | End: 2022-05-12

## 2021-07-13 ENCOUNTER — OFFICE VISIT (OUTPATIENT)
Dept: NEUROSURGERY | Facility: CLINIC | Age: 56
End: 2021-07-13
Payer: MEDICAID

## 2021-07-13 DIAGNOSIS — M25.511 BILATERAL SHOULDER PAIN, UNSPECIFIED CHRONICITY: ICD-10-CM

## 2021-07-13 DIAGNOSIS — M54.9 DORSALGIA, UNSPECIFIED: ICD-10-CM

## 2021-07-13 DIAGNOSIS — M25.512 BILATERAL SHOULDER PAIN, UNSPECIFIED CHRONICITY: ICD-10-CM

## 2021-07-13 DIAGNOSIS — M54.2 CERVICALGIA: ICD-10-CM

## 2021-07-13 DIAGNOSIS — M47.816 LUMBAR SPONDYLOSIS: ICD-10-CM

## 2021-07-13 DIAGNOSIS — M51.9 LUMBAR DISC DISEASE: ICD-10-CM

## 2021-07-13 DIAGNOSIS — M54.2 NECK PAIN: Primary | ICD-10-CM

## 2021-07-13 DIAGNOSIS — M54.12 CERVICAL RADICULOPATHY: ICD-10-CM

## 2021-07-13 PROCEDURE — 99213 OFFICE O/P EST LOW 20 MIN: CPT | Mod: S$PBB,,, | Performed by: NEUROLOGICAL SURGERY

## 2021-07-13 PROCEDURE — 99213 PR OFFICE/OUTPT VISIT, EST, LEVL III, 20-29 MIN: ICD-10-PCS | Mod: S$PBB,,, | Performed by: NEUROLOGICAL SURGERY

## 2021-07-13 PROCEDURE — 99999 PR PBB SHADOW E&M-EST. PATIENT-LVL II: CPT | Mod: PBBFAC,,, | Performed by: NEUROLOGICAL SURGERY

## 2021-07-13 PROCEDURE — 99999 PR PBB SHADOW E&M-EST. PATIENT-LVL II: ICD-10-PCS | Mod: PBBFAC,,, | Performed by: NEUROLOGICAL SURGERY

## 2021-07-13 PROCEDURE — 99212 OFFICE O/P EST SF 10 MIN: CPT | Mod: PBBFAC | Performed by: NEUROLOGICAL SURGERY

## 2021-07-20 ENCOUNTER — HOSPITAL ENCOUNTER (OUTPATIENT)
Dept: RADIOLOGY | Facility: HOSPITAL | Age: 56
Discharge: HOME | End: 2021-07-20
Attending: NEUROLOGICAL SURGERY
Payer: MEDICAID

## 2021-07-20 DIAGNOSIS — M54.9 DORSALGIA, UNSPECIFIED: ICD-10-CM

## 2021-07-20 PROCEDURE — 72110 X-RAY EXAM L-2 SPINE 4/>VWS: CPT | Mod: TC

## 2021-07-20 PROCEDURE — 72110 XR LUMBAR SPINE 5 VIEW WITH FLEX AND EXT: ICD-10-PCS | Mod: 26,,, | Performed by: RADIOLOGY

## 2021-07-20 PROCEDURE — 72110 X-RAY EXAM L-2 SPINE 4/>VWS: CPT | Mod: 26,,, | Performed by: RADIOLOGY

## 2021-07-23 DIAGNOSIS — G89.29 CHRONIC RIGHT SHOULDER PAIN: ICD-10-CM

## 2021-07-23 DIAGNOSIS — R52 PAIN: ICD-10-CM

## 2021-07-23 DIAGNOSIS — M25.511 CHRONIC RIGHT SHOULDER PAIN: ICD-10-CM

## 2021-07-23 DIAGNOSIS — M75.101 TEAR OF RIGHT ROTATOR CUFF, UNSPECIFIED TEAR EXTENT, UNSPECIFIED WHETHER TRAUMATIC: ICD-10-CM

## 2021-07-23 RX ORDER — DICLOFENAC SODIUM 10 MG/G
2 GEL TOPICAL 4 TIMES DAILY
Qty: 1 TUBE | Refills: 2 | Status: SHIPPED | OUTPATIENT
Start: 2021-07-23 | End: 2022-02-22 | Stop reason: SDUPTHER

## 2021-07-27 DIAGNOSIS — R10.9 ABDOMINAL PAIN, UNSPECIFIED ABDOMINAL LOCATION: ICD-10-CM

## 2021-07-28 RX ORDER — DICYCLOMINE HYDROCHLORIDE 10 MG/1
10 CAPSULE ORAL 4 TIMES DAILY PRN
Qty: 120 CAPSULE | Refills: 1 | Status: SHIPPED | OUTPATIENT
Start: 2021-07-28 | End: 2022-04-25

## 2021-08-06 ENCOUNTER — HOSPITAL ENCOUNTER (OUTPATIENT)
Dept: RADIOLOGY | Facility: HOSPITAL | Age: 56
Discharge: HOME | End: 2021-08-06
Attending: NEUROLOGICAL SURGERY
Payer: MEDICAID

## 2021-08-06 DIAGNOSIS — M54.9 DORSALGIA, UNSPECIFIED: ICD-10-CM

## 2021-08-06 DIAGNOSIS — M54.2 CERVICALGIA: ICD-10-CM

## 2021-08-06 PROCEDURE — 72141 MRI NECK SPINE W/O DYE: CPT | Mod: 26,,, | Performed by: RADIOLOGY

## 2021-08-06 PROCEDURE — 72148 MRI LUMBAR SPINE W/O DYE: CPT | Mod: 26,,, | Performed by: INTERNAL MEDICINE

## 2021-08-06 PROCEDURE — 72141 MRI CERVICAL SPINE WITHOUT CONTRAST: ICD-10-PCS | Mod: 26,,, | Performed by: RADIOLOGY

## 2021-08-06 PROCEDURE — 72148 MRI LUMBAR SPINE W/O DYE: CPT | Mod: TC

## 2021-08-06 PROCEDURE — 72131 CT LUMBAR SPINE W/O DYE: CPT | Mod: 26,,, | Performed by: RADIOLOGY

## 2021-08-06 PROCEDURE — 72131 CT LUMBAR SPINE W/O DYE: CPT | Mod: TC

## 2021-08-06 PROCEDURE — 72131 CT LUMBAR SPINE WITHOUT CONTRAST: ICD-10-PCS | Mod: 26,,, | Performed by: RADIOLOGY

## 2021-08-06 PROCEDURE — 72141 MRI NECK SPINE W/O DYE: CPT | Mod: TC

## 2021-08-06 PROCEDURE — 72148 MRI LUMBAR SPINE WITHOUT CONTRAST: ICD-10-PCS | Mod: 26,,, | Performed by: INTERNAL MEDICINE

## 2021-08-11 ENCOUNTER — LAB VISIT (OUTPATIENT)
Dept: LAB | Facility: HOSPITAL | Age: 56
End: 2021-08-11
Attending: INTERNAL MEDICINE
Payer: MEDICAID

## 2021-08-11 DIAGNOSIS — E11.22 CKD STAGE 3 SECONDARY TO DIABETES: ICD-10-CM

## 2021-08-11 DIAGNOSIS — E11.9 CONTROLLED TYPE 2 DIABETES MELLITUS WITHOUT COMPLICATION, WITHOUT LONG-TERM CURRENT USE OF INSULIN: ICD-10-CM

## 2021-08-11 DIAGNOSIS — N18.30 CKD STAGE 3 SECONDARY TO DIABETES: ICD-10-CM

## 2021-08-11 DIAGNOSIS — E53.8 B12 DEFICIENCY: ICD-10-CM

## 2021-08-11 LAB
25(OH)D3+25(OH)D2 SERPL-MCNC: 50 NG/ML (ref 30–96)
ALBUMIN SERPL BCP-MCNC: 3.2 G/DL (ref 3.5–5.2)
ALP SERPL-CCNC: 41 U/L (ref 55–135)
ALT SERPL W/O P-5'-P-CCNC: 23 U/L (ref 10–44)
ANION GAP SERPL CALC-SCNC: 10 MMOL/L (ref 8–16)
AST SERPL-CCNC: 22 U/L (ref 10–40)
BILIRUB SERPL-MCNC: 0.2 MG/DL (ref 0.1–1)
BUN SERPL-MCNC: 11 MG/DL (ref 6–20)
CALCIUM SERPL-MCNC: 9.5 MG/DL (ref 8.7–10.5)
CHLORIDE SERPL-SCNC: 105 MMOL/L (ref 95–110)
CHOLEST SERPL-MCNC: 185 MG/DL (ref 120–199)
CHOLEST/HDLC SERPL: 5 {RATIO} (ref 2–5)
CO2 SERPL-SCNC: 21 MMOL/L (ref 23–29)
CREAT SERPL-MCNC: 0.9 MG/DL (ref 0.5–1.4)
EST. GFR  (AFRICAN AMERICAN): >60 ML/MIN/1.73 M^2
EST. GFR  (NON AFRICAN AMERICAN): >60 ML/MIN/1.73 M^2
ESTIMATED AVG GLUCOSE: 128 MG/DL (ref 68–131)
GLUCOSE SERPL-MCNC: 100 MG/DL (ref 70–110)
HBA1C MFR BLD: 6.1 % (ref 4–5.6)
HDLC SERPL-MCNC: 37 MG/DL (ref 40–75)
HDLC SERPL: 20 % (ref 20–50)
LDLC SERPL CALC-MCNC: 85.8 MG/DL (ref 63–159)
NONHDLC SERPL-MCNC: 148 MG/DL
POTASSIUM SERPL-SCNC: 3.6 MMOL/L (ref 3.5–5.1)
PROT SERPL-MCNC: 6.5 G/DL (ref 6–8.4)
SODIUM SERPL-SCNC: 136 MMOL/L (ref 136–145)
TRIGL SERPL-MCNC: 311 MG/DL (ref 30–150)
VIT B12 SERPL-MCNC: 289 PG/ML (ref 210–950)

## 2021-08-11 PROCEDURE — 82306 VITAMIN D 25 HYDROXY: CPT | Performed by: INTERNAL MEDICINE

## 2021-08-11 PROCEDURE — 80061 LIPID PANEL: CPT | Performed by: INTERNAL MEDICINE

## 2021-08-11 PROCEDURE — 82607 VITAMIN B-12: CPT | Performed by: INTERNAL MEDICINE

## 2021-08-11 PROCEDURE — 83036 HEMOGLOBIN GLYCOSYLATED A1C: CPT | Performed by: INTERNAL MEDICINE

## 2021-08-11 PROCEDURE — 36415 COLL VENOUS BLD VENIPUNCTURE: CPT | Mod: PO | Performed by: INTERNAL MEDICINE

## 2021-08-11 PROCEDURE — 80053 COMPREHEN METABOLIC PANEL: CPT | Performed by: INTERNAL MEDICINE

## 2021-08-12 ENCOUNTER — TELEPHONE (OUTPATIENT)
Dept: INTERNAL MEDICINE | Facility: CLINIC | Age: 56
End: 2021-08-12

## 2021-08-12 ENCOUNTER — PATIENT MESSAGE (OUTPATIENT)
Dept: INTERNAL MEDICINE | Facility: CLINIC | Age: 56
End: 2021-08-12

## 2021-08-12 NOTE — TELEPHONE ENCOUNTER
----- Message from Samara Whittington sent at 7/31/2020 11:36 AM CDT -----  Contact: [phone]  Would like to get medical advice.  Symptoms (please be specific):  heart burn  How long has patient had these symptoms:  last night  Pharmacy name and phone #:  [id number] - [place] - [address] AT [facility] [address] [phone] (Phone)  [phone] (Fax)  Any drug allergies (copy from chart):      Would the patient rather a call back or a response via [facility]?:  Call back  Comments:  Patient states she has been taking an over the counter medication and is not helping.       
Prilosec refilled and continue Pepcid BID  Pertain to a GERD diet   
Pt is taking 2 pepcid complete a day and stating that it isn't helping and wanted to know what else she can take or if there was something else that is recommended.       
Spoke to pt and informed of medication and advice as recommended by Dr. Glass.   
Benefits, risks, and possible complications of procedure explained to patient/caregiver who verbalized understanding and gave verbal consent.

## 2021-08-17 ENCOUNTER — PATIENT MESSAGE (OUTPATIENT)
Dept: INTERNAL MEDICINE | Facility: CLINIC | Age: 56
End: 2021-08-17

## 2021-08-18 ENCOUNTER — PATIENT MESSAGE (OUTPATIENT)
Dept: INTERNAL MEDICINE | Facility: CLINIC | Age: 56
End: 2021-08-18

## 2021-08-18 ENCOUNTER — TELEPHONE (OUTPATIENT)
Dept: ORTHOPEDICS | Facility: CLINIC | Age: 56
End: 2021-08-18

## 2021-09-03 ENCOUNTER — PATIENT MESSAGE (OUTPATIENT)
Dept: NEUROSURGERY | Facility: CLINIC | Age: 56
End: 2021-09-03

## 2021-09-14 ENCOUNTER — CLINICAL SUPPORT (OUTPATIENT)
Dept: URGENT CARE | Facility: CLINIC | Age: 56
End: 2021-09-14
Payer: MEDICAID

## 2021-09-14 DIAGNOSIS — Z20.822 ENCOUNTER FOR LABORATORY TESTING FOR COVID-19 VIRUS: Primary | ICD-10-CM

## 2021-09-14 LAB
CTP QC/QA: YES
SARS-COV-2 RDRP RESP QL NAA+PROBE: NEGATIVE

## 2021-09-14 PROCEDURE — 87635: ICD-10-PCS | Mod: QW,S$GLB,, | Performed by: PHYSICIAN ASSISTANT

## 2021-09-14 PROCEDURE — 87635 SARS-COV-2 COVID-19 AMP PRB: CPT | Mod: QW,S$GLB,, | Performed by: PHYSICIAN ASSISTANT

## 2021-09-15 ENCOUNTER — PATIENT MESSAGE (OUTPATIENT)
Dept: NEUROSURGERY | Facility: CLINIC | Age: 56
End: 2021-09-15

## 2021-09-15 ENCOUNTER — PATIENT MESSAGE (OUTPATIENT)
Dept: INTERNAL MEDICINE | Facility: CLINIC | Age: 56
End: 2021-09-15

## 2021-09-16 ENCOUNTER — OFFICE VISIT (OUTPATIENT)
Dept: ORTHOPEDICS | Facility: CLINIC | Age: 56
End: 2021-09-16
Payer: MEDICAID

## 2021-09-16 VITALS — BODY MASS INDEX: 34.24 KG/M2 | HEIGHT: 62 IN | WEIGHT: 186.06 LBS

## 2021-09-16 DIAGNOSIS — G89.29 CHRONIC RIGHT SHOULDER PAIN: ICD-10-CM

## 2021-09-16 DIAGNOSIS — M77.11 LATERAL EPICONDYLITIS OF RIGHT ELBOW: Primary | ICD-10-CM

## 2021-09-16 DIAGNOSIS — M25.511 CHRONIC RIGHT SHOULDER PAIN: ICD-10-CM

## 2021-09-16 PROCEDURE — 20551 NJX 1 TENDON ORIGIN/INSJ: CPT | Mod: PBBFAC,PN,59,RT | Performed by: ORTHOPAEDIC SURGERY

## 2021-09-16 PROCEDURE — 20551 PR INJECT TENDON ORIGIN/INSERT: ICD-10-PCS | Mod: S$PBB,59,51,RT | Performed by: ORTHOPAEDIC SURGERY

## 2021-09-16 PROCEDURE — 20610 PR DRAIN/INJECT LARGE JOINT/BURSA: ICD-10-PCS | Mod: S$PBB,RT,, | Performed by: ORTHOPAEDIC SURGERY

## 2021-09-16 PROCEDURE — 99214 OFFICE O/P EST MOD 30 MIN: CPT | Mod: PBBFAC,PN,25 | Performed by: ORTHOPAEDIC SURGERY

## 2021-09-16 PROCEDURE — 99213 OFFICE O/P EST LOW 20 MIN: CPT | Mod: S$PBB,25,, | Performed by: ORTHOPAEDIC SURGERY

## 2021-09-16 PROCEDURE — 20610 DRAIN/INJ JOINT/BURSA W/O US: CPT | Mod: PBBFAC,PN,RT | Performed by: ORTHOPAEDIC SURGERY

## 2021-09-16 PROCEDURE — 99213 PR OFFICE/OUTPT VISIT, EST, LEVL III, 20-29 MIN: ICD-10-PCS | Mod: S$PBB,25,, | Performed by: ORTHOPAEDIC SURGERY

## 2021-09-16 PROCEDURE — 20610 DRAIN/INJ JOINT/BURSA W/O US: CPT | Mod: S$PBB,RT,, | Performed by: ORTHOPAEDIC SURGERY

## 2021-09-16 PROCEDURE — 99999 PR PBB SHADOW E&M-EST. PATIENT-LVL IV: CPT | Mod: PBBFAC,,, | Performed by: ORTHOPAEDIC SURGERY

## 2021-09-16 PROCEDURE — 20551 NJX 1 TENDON ORIGIN/INSJ: CPT | Mod: S$PBB,59,51,RT | Performed by: ORTHOPAEDIC SURGERY

## 2021-09-16 PROCEDURE — 99999 PR PBB SHADOW E&M-EST. PATIENT-LVL IV: ICD-10-PCS | Mod: PBBFAC,,, | Performed by: ORTHOPAEDIC SURGERY

## 2021-09-16 RX ORDER — TRIAMCINOLONE ACETONIDE 40 MG/ML
40 INJECTION, SUSPENSION INTRA-ARTICULAR; INTRAMUSCULAR
Status: COMPLETED | OUTPATIENT
Start: 2021-09-16 | End: 2021-09-16

## 2021-09-16 RX ORDER — TRIAMCINOLONE ACETONIDE 40 MG/ML
20 INJECTION, SUSPENSION INTRA-ARTICULAR; INTRAMUSCULAR
Status: COMPLETED | OUTPATIENT
Start: 2021-09-16 | End: 2021-09-16

## 2021-09-16 RX ADMIN — TRIAMCINOLONE ACETONIDE 40 MG: 40 INJECTION, SUSPENSION INTRA-ARTICULAR; INTRAMUSCULAR at 04:09

## 2021-09-16 RX ADMIN — TRIAMCINOLONE ACETONIDE 20 MG: 40 INJECTION, SUSPENSION INTRA-ARTICULAR; INTRAMUSCULAR at 04:09

## 2021-09-17 ENCOUNTER — TELEPHONE (OUTPATIENT)
Dept: ORTHOPEDICS | Facility: CLINIC | Age: 56
End: 2021-09-17

## 2021-09-17 ENCOUNTER — OFFICE VISIT (OUTPATIENT)
Dept: INTERNAL MEDICINE | Facility: CLINIC | Age: 56
End: 2021-09-17
Payer: MEDICAID

## 2021-09-17 ENCOUNTER — PATIENT MESSAGE (OUTPATIENT)
Dept: INTERNAL MEDICINE | Facility: CLINIC | Age: 56
End: 2021-09-17

## 2021-09-17 VITALS
HEART RATE: 76 BPM | BODY MASS INDEX: 34.12 KG/M2 | WEIGHT: 185.44 LBS | DIASTOLIC BLOOD PRESSURE: 81 MMHG | SYSTOLIC BLOOD PRESSURE: 134 MMHG | TEMPERATURE: 98 F | RESPIRATION RATE: 18 BRPM | HEIGHT: 62 IN

## 2021-09-17 DIAGNOSIS — I10 ESSENTIAL HYPERTENSION: ICD-10-CM

## 2021-09-17 DIAGNOSIS — E11.22 CKD STAGE 3 SECONDARY TO DIABETES: ICD-10-CM

## 2021-09-17 DIAGNOSIS — N18.30 CKD STAGE 3 SECONDARY TO DIABETES: ICD-10-CM

## 2021-09-17 DIAGNOSIS — M79.601 RIGHT ARM PAIN: ICD-10-CM

## 2021-09-17 DIAGNOSIS — E78.2 MIXED HYPERLIPIDEMIA: ICD-10-CM

## 2021-09-17 DIAGNOSIS — E11.9 CONTROLLED TYPE 2 DIABETES MELLITUS WITHOUT COMPLICATION, WITHOUT LONG-TERM CURRENT USE OF INSULIN: Primary | ICD-10-CM

## 2021-09-17 PROCEDURE — 99214 PR OFFICE/OUTPT VISIT, EST, LEVL IV, 30-39 MIN: ICD-10-PCS | Mod: S$PBB,,, | Performed by: HOSPITALIST

## 2021-09-17 PROCEDURE — 99999 PR PBB SHADOW E&M-EST. PATIENT-LVL V: ICD-10-PCS | Mod: PBBFAC,,, | Performed by: HOSPITALIST

## 2021-09-17 PROCEDURE — 99214 OFFICE O/P EST MOD 30 MIN: CPT | Mod: S$PBB,,, | Performed by: HOSPITALIST

## 2021-09-17 PROCEDURE — 99999 PR PBB SHADOW E&M-EST. PATIENT-LVL V: CPT | Mod: PBBFAC,,, | Performed by: HOSPITALIST

## 2021-09-17 PROCEDURE — 99215 OFFICE O/P EST HI 40 MIN: CPT | Mod: PBBFAC,PO | Performed by: HOSPITALIST

## 2021-09-17 RX ORDER — VENLAFAXINE HYDROCHLORIDE 150 MG/1
150 CAPSULE, EXTENDED RELEASE ORAL DAILY
COMMUNITY

## 2021-09-17 RX ORDER — HYDROCODONE BITARTRATE AND ACETAMINOPHEN 5; 325 MG/1; MG/1
1 TABLET ORAL EVERY 8 HOURS PRN
Qty: 21 TABLET | Refills: 0 | Status: SHIPPED | OUTPATIENT
Start: 2021-09-17 | End: 2022-03-08

## 2021-09-20 ENCOUNTER — PATIENT MESSAGE (OUTPATIENT)
Dept: INTERNAL MEDICINE | Facility: CLINIC | Age: 56
End: 2021-09-20

## 2021-09-21 ENCOUNTER — PATIENT MESSAGE (OUTPATIENT)
Dept: INTERNAL MEDICINE | Facility: CLINIC | Age: 56
End: 2021-09-21

## 2021-09-22 ENCOUNTER — PATIENT MESSAGE (OUTPATIENT)
Dept: INTERNAL MEDICINE | Facility: CLINIC | Age: 56
End: 2021-09-22

## 2021-10-18 ENCOUNTER — OFFICE VISIT (OUTPATIENT)
Dept: INTERNAL MEDICINE | Facility: CLINIC | Age: 56
End: 2021-10-18
Payer: MEDICAID

## 2021-10-18 VITALS
OXYGEN SATURATION: 98 % | SYSTOLIC BLOOD PRESSURE: 110 MMHG | WEIGHT: 181.88 LBS | DIASTOLIC BLOOD PRESSURE: 90 MMHG | HEIGHT: 62 IN | HEART RATE: 98 BPM | TEMPERATURE: 98 F | BODY MASS INDEX: 33.47 KG/M2

## 2021-10-18 DIAGNOSIS — R73.03 PREDIABETES: ICD-10-CM

## 2021-10-18 DIAGNOSIS — R05.9 COUGH: ICD-10-CM

## 2021-10-18 DIAGNOSIS — K59.1 FUNCTIONAL DIARRHEA: ICD-10-CM

## 2021-10-18 DIAGNOSIS — I10 ESSENTIAL HYPERTENSION: ICD-10-CM

## 2021-10-18 DIAGNOSIS — R50.9 FEVER, UNSPECIFIED FEVER CAUSE: ICD-10-CM

## 2021-10-18 DIAGNOSIS — M79.10 MUSCLE PAIN: ICD-10-CM

## 2021-10-18 DIAGNOSIS — J06.9 URTI (ACUTE UPPER RESPIRATORY INFECTION): Primary | ICD-10-CM

## 2021-10-18 DIAGNOSIS — R11.0 NAUSEA: ICD-10-CM

## 2021-10-18 PROBLEM — N18.30 CKD (CHRONIC KIDNEY DISEASE) STAGE 3, GFR 30-59 ML/MIN: Status: RESOLVED | Noted: 2019-04-05 | Resolved: 2021-10-18

## 2021-10-18 PROBLEM — E11.9 DIABETES TYPE 2, CONTROLLED: Status: RESOLVED | Noted: 2019-07-10 | Resolved: 2021-10-18

## 2021-10-18 PROCEDURE — 99214 PR OFFICE/OUTPT VISIT, EST, LEVL IV, 30-39 MIN: ICD-10-PCS | Mod: S$PBB,,, | Performed by: NURSE PRACTITIONER

## 2021-10-18 PROCEDURE — 99215 OFFICE O/P EST HI 40 MIN: CPT | Mod: PBBFAC,PO | Performed by: NURSE PRACTITIONER

## 2021-10-18 PROCEDURE — 99999 PR PBB SHADOW E&M-EST. PATIENT-LVL V: CPT | Mod: PBBFAC,,, | Performed by: NURSE PRACTITIONER

## 2021-10-18 PROCEDURE — U0005 INFEC AGEN DETEC AMPLI PROBE: HCPCS | Performed by: NURSE PRACTITIONER

## 2021-10-18 PROCEDURE — U0003 INFECTIOUS AGENT DETECTION BY NUCLEIC ACID (DNA OR RNA); SEVERE ACUTE RESPIRATORY SYNDROME CORONAVIRUS 2 (SARS-COV-2) (CORONAVIRUS DISEASE [COVID-19]), AMPLIFIED PROBE TECHNIQUE, MAKING USE OF HIGH THROUGHPUT TECHNOLOGIES AS DESCRIBED BY CMS-2020-01-R: HCPCS | Performed by: NURSE PRACTITIONER

## 2021-10-18 PROCEDURE — 99214 OFFICE O/P EST MOD 30 MIN: CPT | Mod: S$PBB,,, | Performed by: NURSE PRACTITIONER

## 2021-10-18 PROCEDURE — 99999 PR PBB SHADOW E&M-EST. PATIENT-LVL V: ICD-10-PCS | Mod: PBBFAC,,, | Performed by: NURSE PRACTITIONER

## 2021-10-18 RX ORDER — LIDOCAINE 36 MG/1
1 PATCH TOPICAL DAILY PRN
COMMUNITY
Start: 2021-10-07 | End: 2023-12-15

## 2021-10-18 RX ORDER — GABAPENTIN 300 MG/1
900 CAPSULE ORAL 3 TIMES DAILY
COMMUNITY
Start: 2021-10-06 | End: 2022-08-12

## 2021-10-18 RX ORDER — CYANOCOBALAMIN 1000 UG/ML
1000 INJECTION, SOLUTION INTRAMUSCULAR; SUBCUTANEOUS
COMMUNITY
Start: 2021-10-01 | End: 2022-11-17 | Stop reason: SDUPTHER

## 2021-10-18 RX ORDER — AZITHROMYCIN 250 MG/1
TABLET, FILM COATED ORAL
Qty: 6 TABLET | Refills: 0 | Status: SHIPPED | OUTPATIENT
Start: 2021-10-18 | End: 2021-10-23

## 2021-10-19 LAB
SARS-COV-2 RNA RESP QL NAA+PROBE: NOT DETECTED
SARS-COV-2- CYCLE NUMBER: NORMAL

## 2021-10-27 ENCOUNTER — OFFICE VISIT (OUTPATIENT)
Dept: NEUROSURGERY | Facility: CLINIC | Age: 56
End: 2021-10-27
Payer: MEDICAID

## 2021-10-27 VITALS
DIASTOLIC BLOOD PRESSURE: 83 MMHG | SYSTOLIC BLOOD PRESSURE: 129 MMHG | HEIGHT: 62 IN | HEART RATE: 91 BPM | BODY MASS INDEX: 33.27 KG/M2

## 2021-10-27 DIAGNOSIS — M47.816 LUMBAR SPONDYLOSIS: ICD-10-CM

## 2021-10-27 DIAGNOSIS — M25.551 RIGHT HIP PAIN: ICD-10-CM

## 2021-10-27 DIAGNOSIS — M54.2 NECK PAIN: Primary | ICD-10-CM

## 2021-10-27 PROCEDURE — 99214 OFFICE O/P EST MOD 30 MIN: CPT | Mod: S$PBB,,, | Performed by: PHYSICIAN ASSISTANT

## 2021-10-27 PROCEDURE — 99215 OFFICE O/P EST HI 40 MIN: CPT | Mod: PBBFAC | Performed by: PHYSICIAN ASSISTANT

## 2021-10-27 PROCEDURE — 99214 PR OFFICE/OUTPT VISIT, EST, LEVL IV, 30-39 MIN: ICD-10-PCS | Mod: S$PBB,,, | Performed by: PHYSICIAN ASSISTANT

## 2021-10-27 PROCEDURE — 99999 PR PBB SHADOW E&M-EST. PATIENT-LVL V: ICD-10-PCS | Mod: PBBFAC,,, | Performed by: PHYSICIAN ASSISTANT

## 2021-10-27 PROCEDURE — 99999 PR PBB SHADOW E&M-EST. PATIENT-LVL V: CPT | Mod: PBBFAC,,, | Performed by: PHYSICIAN ASSISTANT

## 2021-11-10 ENCOUNTER — PATIENT OUTREACH (OUTPATIENT)
Dept: ADMINISTRATIVE | Facility: OTHER | Age: 56
End: 2021-11-10
Payer: MEDICAID

## 2021-11-11 ENCOUNTER — OFFICE VISIT (OUTPATIENT)
Dept: ORTHOPEDICS | Facility: CLINIC | Age: 56
End: 2021-11-11
Payer: MEDICAID

## 2021-11-11 VITALS — BODY MASS INDEX: 33.11 KG/M2 | WEIGHT: 181 LBS

## 2021-11-11 DIAGNOSIS — M77.11 LATERAL EPICONDYLITIS OF RIGHT ELBOW: Primary | ICD-10-CM

## 2021-11-11 DIAGNOSIS — M25.511 CHRONIC RIGHT SHOULDER PAIN: ICD-10-CM

## 2021-11-11 DIAGNOSIS — G89.29 CHRONIC RIGHT SHOULDER PAIN: ICD-10-CM

## 2021-11-11 PROCEDURE — 99214 OFFICE O/P EST MOD 30 MIN: CPT | Mod: PBBFAC,PN | Performed by: ORTHOPAEDIC SURGERY

## 2021-11-11 PROCEDURE — 99999 PR PBB SHADOW E&M-EST. PATIENT-LVL IV: ICD-10-PCS | Mod: PBBFAC,,, | Performed by: ORTHOPAEDIC SURGERY

## 2021-11-11 PROCEDURE — 99213 PR OFFICE/OUTPT VISIT, EST, LEVL III, 20-29 MIN: ICD-10-PCS | Mod: S$PBB,,, | Performed by: ORTHOPAEDIC SURGERY

## 2021-11-11 PROCEDURE — 99999 PR PBB SHADOW E&M-EST. PATIENT-LVL IV: CPT | Mod: PBBFAC,,, | Performed by: ORTHOPAEDIC SURGERY

## 2021-11-11 PROCEDURE — 99213 OFFICE O/P EST LOW 20 MIN: CPT | Mod: S$PBB,,, | Performed by: ORTHOPAEDIC SURGERY

## 2021-11-11 RX ORDER — MELOXICAM 15 MG/1
15 TABLET ORAL DAILY
Qty: 30 TABLET | Refills: 0 | Status: SHIPPED | OUTPATIENT
Start: 2021-11-11 | End: 2021-12-11

## 2021-11-17 ENCOUNTER — LAB VISIT (OUTPATIENT)
Dept: LAB | Facility: HOSPITAL | Age: 56
End: 2021-11-17
Attending: HOSPITALIST
Payer: MEDICAID

## 2021-11-17 DIAGNOSIS — E78.2 MIXED HYPERLIPIDEMIA: ICD-10-CM

## 2021-11-17 LAB
CHOLEST SERPL-MCNC: 183 MG/DL (ref 120–199)
CHOLEST/HDLC SERPL: 3.1 {RATIO} (ref 2–5)
HDLC SERPL-MCNC: 59 MG/DL (ref 40–75)
HDLC SERPL: 32.2 % (ref 20–50)
LDLC SERPL CALC-MCNC: 95.6 MG/DL (ref 63–159)
NONHDLC SERPL-MCNC: 124 MG/DL
TRIGL SERPL-MCNC: 142 MG/DL (ref 30–150)

## 2021-11-17 PROCEDURE — 36415 COLL VENOUS BLD VENIPUNCTURE: CPT | Mod: PO | Performed by: HOSPITALIST

## 2021-11-17 PROCEDURE — 80061 LIPID PANEL: CPT | Performed by: HOSPITALIST

## 2021-11-18 ENCOUNTER — PATIENT MESSAGE (OUTPATIENT)
Dept: INTERNAL MEDICINE | Facility: CLINIC | Age: 56
End: 2021-11-18
Payer: MEDICAID

## 2021-11-19 ENCOUNTER — PATIENT MESSAGE (OUTPATIENT)
Dept: INTERNAL MEDICINE | Facility: CLINIC | Age: 56
End: 2021-11-19
Payer: MEDICAID

## 2021-11-19 DIAGNOSIS — R73.03 PREDIABETES: Primary | ICD-10-CM

## 2021-12-09 ENCOUNTER — PATIENT MESSAGE (OUTPATIENT)
Dept: OTHER | Facility: OTHER | Age: 56
End: 2021-12-09
Payer: MEDICAID

## 2021-12-16 DIAGNOSIS — E78.2 MIXED HYPERLIPIDEMIA: ICD-10-CM

## 2021-12-21 RX ORDER — PRAVASTATIN SODIUM 20 MG/1
TABLET ORAL
Qty: 90 TABLET | Refills: 1 | Status: SHIPPED | OUTPATIENT
Start: 2021-12-21 | End: 2022-06-15

## 2022-01-05 ENCOUNTER — PATIENT OUTREACH (OUTPATIENT)
Dept: ADMINISTRATIVE | Facility: OTHER | Age: 57
End: 2022-01-05
Payer: MEDICAID

## 2022-01-05 DIAGNOSIS — Z12.31 ENCOUNTER FOR SCREENING MAMMOGRAM FOR MALIGNANT NEOPLASM OF BREAST: Primary | ICD-10-CM

## 2022-01-05 NOTE — PROGRESS NOTES
Health Maintenance Due   Topic Date Due    HIV Screening  Never done    Shingles Vaccine (1 of 2) Never done    TETANUS VACCINE  10/23/2018    COVID-19 Vaccine (2 - Pfizer 3-dose series) 09/15/2021    Mammogram  01/26/2022    Foot Exam  02/02/2022     Updates were requested from care everywhere.  Chart was reviewed for overdue Proactive [facility] Encounters (DOREEN) topics (CRS, Breast Cancer Screening, Eye exam)  Health Maintenance has been updated.  LINKS immunization registry triggered.  Immunizations were reconciled.  Order placed for mammogram.

## 2022-01-06 ENCOUNTER — TELEPHONE (OUTPATIENT)
Dept: ORTHOPEDICS | Facility: CLINIC | Age: 57
End: 2022-01-06
Payer: MEDICAID

## 2022-01-06 NOTE — TELEPHONE ENCOUNTER
----- Message from Luís Neely sent at 1/6/2022  2:57 PM CST -----  Contact: patient/  [phone]  Patient calling to speak with you regarding rescheduling her appointment   Please advise

## 2022-01-07 ENCOUNTER — OFFICE VISIT (OUTPATIENT)
Dept: ORTHOPEDICS | Facility: CLINIC | Age: 57
End: 2022-01-07
Payer: MEDICAID

## 2022-01-07 DIAGNOSIS — M25.519 NECK AND SHOULDER PAIN: Primary | ICD-10-CM

## 2022-01-07 DIAGNOSIS — M54.2 NECK AND SHOULDER PAIN: Primary | ICD-10-CM

## 2022-01-07 PROCEDURE — 3072F PR LOW RISK FOR RETINOPATHY: ICD-10-PCS | Mod: CPTII,95,, | Performed by: ORTHOPAEDIC SURGERY

## 2022-01-07 PROCEDURE — 3072F LOW RISK FOR RETINOPATHY: CPT | Mod: CPTII,95,, | Performed by: ORTHOPAEDIC SURGERY

## 2022-01-07 PROCEDURE — 99213 PR OFFICE/OUTPT VISIT, EST, LEVL III, 20-29 MIN: ICD-10-PCS | Mod: 95,,, | Performed by: ORTHOPAEDIC SURGERY

## 2022-01-07 PROCEDURE — 99213 OFFICE O/P EST LOW 20 MIN: CPT | Mod: 95,,, | Performed by: ORTHOPAEDIC SURGERY

## 2022-01-07 NOTE — PROGRESS NOTES
Established Patient - Audio Only Telehealth Visit     The patient location is: home  The chief complaint leading to consultation is: chronic bilateral shoulder pain  Visit type: Virtual visit with audio only (telephone)  Total time spent with patient: 25       The reason for the audio only service rather than synchronous audio and video virtual visit was related to technical difficulties or patient preference/necessity.     Each patient to whom I provide medical services by telemedicine is:  (1) informed of the relationship between the physician and patient and the respective role of any other health care provider with respect to management of the patient; and (2) notified that they may decline to receive medical services by telemedicine and may withdraw from such care at any time. Patient verbally consented to receive this service via voice-only telephone call.       HPI: Sherie Reyes has a long history of bilateral shoulder pain that has been refractory to multiple injections, arthroscopic debridements, and physical therapy. She is 4 years s/p left shoulder decompression and debridement and 1.5 years s/p right shoulder. She continues to complain of bilateral shoulder pain with associated neck pain and arm numbness and tingling.   At her last visit with Dr. Hughes 8 weeks ago he no longer recommends injections and documents that repeated surgery has about a 50/50% chance of success. She seemed to be surprised by this information.      Assessment and plan:  She is no longer interested in any surgical intervention  I recommended pain management  She states she has been seeing pain management since 2014 but does not feel that she has made any progress and the doctor does not address shoulder pain  I recommended a second opinion for pain management, especially for the shoulders but also for the cervical disc disease note on MRI from 08/2021   We discussed that she is also welcomed to get a second opinion for  the shoulders, but there is not much more that we can offer her   She understands and would like a referral to [facility] pain [facility].                           This service was not originating from a related E/M service provided within the previous 7 days nor will  to an E/M service or procedure within the next 24 hours or my soonest available appointment.  Prevailing standard of care was able to be met in this audio-only visit.

## 2022-01-14 ENCOUNTER — TELEPHONE (OUTPATIENT)
Dept: ORTHOPEDICS | Facility: CLINIC | Age: 57
End: 2022-01-14
Payer: MEDICAID

## 2022-01-14 NOTE — TELEPHONE ENCOUNTER
"----- Message from Miya Lombardo sent at 1/14/2022  4:44 PM CST -----  Good afternoon,  This patient has a referral from Dr Lucinda Castro to Pain Clinic dated 01/07/22.  Diagnosis "Neck and shoulder pain"  Patient has Medicaid.  On Dec 14, I was told that when a provider wants a Medicaid patient to see a Pain Specialist within [facility], the referring Provider needs to have a peer to peer approach so the Pain Physician can determine if they are able to take the patient in.  The other option would be for the patient to go externally.    Thank you      "
[employer]

## 2022-01-15 ENCOUNTER — PATIENT MESSAGE (OUTPATIENT)
Dept: ADMINISTRATIVE | Facility: OTHER | Age: 57
End: 2022-01-15
Payer: MEDICAID

## 2022-01-16 ENCOUNTER — PATIENT MESSAGE (OUTPATIENT)
Dept: INTERNAL MEDICINE | Facility: CLINIC | Age: 57
End: 2022-01-16
Payer: MEDICAID

## 2022-01-16 RX ORDER — ALBUTEROL SULFATE 90 UG/1
2 AEROSOL, METERED RESPIRATORY (INHALATION) EVERY 6 HOURS PRN
Qty: 18 G | Refills: 0 | Status: SHIPPED | OUTPATIENT
Start: 2022-01-16 | End: 2024-03-04 | Stop reason: SDUPTHER

## 2022-01-16 RX ORDER — AZITHROMYCIN 250 MG/1
TABLET, FILM COATED ORAL
Qty: 6 TABLET | Refills: 0 | Status: SHIPPED | OUTPATIENT
Start: 2022-01-16 | End: 2022-01-21

## 2022-01-16 RX ORDER — BENZONATATE 100 MG/1
100 CAPSULE ORAL 3 TIMES DAILY PRN
Qty: 90 CAPSULE | Refills: 1 | Status: SHIPPED | OUTPATIENT
Start: 2022-01-16 | End: 2022-01-26

## 2022-01-17 ENCOUNTER — PATIENT MESSAGE (OUTPATIENT)
Dept: INTERNAL MEDICINE | Facility: CLINIC | Age: 57
End: 2022-01-17
Payer: MEDICAID

## 2022-01-17 RX ORDER — BENZONATATE 100 MG/1
100 CAPSULE ORAL 3 TIMES DAILY PRN
Qty: 90 CAPSULE | Refills: 1 | Status: SHIPPED | OUTPATIENT
Start: 2022-01-17 | End: 2022-01-27

## 2022-01-18 ENCOUNTER — TELEPHONE (OUTPATIENT)
Dept: ORTHOPEDICS | Facility: CLINIC | Age: 57
End: 2022-01-18
Payer: MEDICAID

## 2022-01-18 NOTE — TELEPHONE ENCOUNTER
"----- Message from Miya Lombardo sent at 1/18/2022  9:26 AM CST -----  Good morning, I was working a referral from Dr Castro to pain Clinic for a Medicaid patient. I was not able to schedule him, so I sent a message to their office requesting assistance scheduling.  This is the message that I got back from their office.  I'm a little confuse because is a ep, but maybe through your office patient can get schedule.   Thank you!!      ----- Message -----  From: Leanne Strong MA  Sent: 1/18/2022   7:43 AM CST  To: Miya Lombardo    Good morning,    Our office does not accept medicaid. Dr. Castro will need to contact Dr. Chang to discuss why this pt needs to be seen.     [place]  ----- Message -----  From: Miya Lombardo  Sent: 1/14/2022   5:10 PM CST  To: [facility] Staff    Good afternoon,   Patient has a referral to Pain Clinic from Dr Lucinda Castro dated 01/07/22  Diagnosis "Neck and shoulder pain"  Patient has Medicaid but she was seeing by Dr. Rodriguez in 2021.    Could you please assist scheduling patient.  Thank you        "

## 2022-01-20 ENCOUNTER — PATIENT MESSAGE (OUTPATIENT)
Dept: INTERNAL MEDICINE | Facility: CLINIC | Age: 57
End: 2022-01-20
Payer: MEDICAID

## 2022-01-21 ENCOUNTER — PATIENT MESSAGE (OUTPATIENT)
Dept: INTERNAL MEDICINE | Facility: CLINIC | Age: 57
End: 2022-01-21
Payer: MEDICAID

## 2022-01-21 RX ORDER — AZELASTINE 1 MG/ML
1 SPRAY, METERED NASAL 2 TIMES DAILY
Qty: 30 ML | Refills: 0 | Status: SHIPPED | OUTPATIENT
Start: 2022-01-21 | End: 2024-03-04 | Stop reason: SDUPTHER

## 2022-01-21 RX ORDER — AMOXICILLIN AND CLAVULANATE POTASSIUM 875; 125 MG/1; MG/1
1 TABLET, FILM COATED ORAL 2 TIMES DAILY
Qty: 14 TABLET | Refills: 0 | Status: SHIPPED | OUTPATIENT
Start: 2022-01-21 | End: 2022-01-28

## 2022-01-21 RX ORDER — METHYLPREDNISOLONE 4 MG/1
TABLET ORAL
Qty: 21 EACH | Refills: 0 | Status: SHIPPED | OUTPATIENT
Start: 2022-01-21 | End: 2022-02-11

## 2022-01-25 ENCOUNTER — PATIENT MESSAGE (OUTPATIENT)
Dept: ADMINISTRATIVE | Facility: OTHER | Age: 57
End: 2022-01-25
Payer: MEDICAID

## 2022-01-25 NOTE — PROGRESS NOTES
Subjective:       Patient ID: Sherie Reyes is a 56 y.o. female.    Chief Complaint: Follow-up (6 mo) and Joint Pain    Patient is a 56 y.o.female who presents today for annual    Labs reviewed  mammo due  Eye: due; dr blas; due in april  Gyn: dr david Coulter due aug 2026    Took a fall yesterday and has a skin tear to her right forearm  Review of Systems   Constitutional: Negative for appetite change, chills, diaphoresis and fever.   HENT: Negative for congestion, ear discharge, ear pain, postnasal drip, tinnitus, trouble swallowing and voice change.    Eyes: Negative for discharge, redness and itching.   Respiratory: Negative for cough, chest tightness, shortness of breath and wheezing.    Cardiovascular: Negative for chest pain, palpitations and leg swelling.   Gastrointestinal: Negative for abdominal pain, constipation, diarrhea, nausea and vomiting.   Endocrine: Negative for cold intolerance and heat intolerance.   Genitourinary: Negative for difficulty urinating, flank pain, hematuria and urgency.   Musculoskeletal: Negative for arthralgias, gait problem, myalgias and neck stiffness.   Skin: Negative for color change and rash.   Neurological: Negative for dizziness, seizures, syncope and headaches.   Hematological: Negative for adenopathy.   Psychiatric/Behavioral: Negative for agitation, behavioral problems, confusion and sleep disturbance.       Objective:      Physical Exam  Vitals and nursing note reviewed.   Constitutional:       General: She is not in acute distress.     Appearance: She is well-developed and well-nourished. She is not diaphoretic.   HENT:      Head: Normocephalic and atraumatic.      Right Ear: External ear normal.      Left Ear: External ear normal.      Nose: Nose normal.      Mouth/Throat:      Mouth: Oropharynx is clear and moist.      Pharynx: No oropharyngeal exudate.   Eyes:      General: No scleral icterus.        Right eye: No discharge.         Left eye: No  discharge.      Extraocular Movements: EOM normal.      Conjunctiva/sclera: Conjunctivae normal.      Pupils: Pupils are equal, round, and reactive to light.   Neck:      Thyroid: No thyromegaly.      Vascular: No JVD.      Trachea: No tracheal deviation.   Cardiovascular:      Rate and Rhythm: Normal rate.      Pulses: Intact distal pulses.      Heart sounds: Normal heart sounds. No murmur heard.  No friction rub. No gallop.    Pulmonary:      Effort: Pulmonary effort is normal. No respiratory distress.      Breath sounds: Normal breath sounds. No stridor. No wheezing or rales.   Chest:      Chest wall: No tenderness.   Abdominal:      General: Bowel sounds are normal. There is no distension.      Palpations: Abdomen is soft.      Tenderness: There is no abdominal tenderness. There is no rebound.   Musculoskeletal:         General: No tenderness or edema.      Cervical back: Neck supple.   Lymphadenopathy:      Cervical: No cervical adenopathy.   Skin:     General: Skin is warm and dry.      Findings: No erythema or rash.          Neurological:      Mental Status: She is alert and oriented to person, place, and time.   Psychiatric:         Mood and Affect: Mood and affect normal.         Behavior: Behavior normal.         Assessment and Plan:       1. Annual physical exam  Labs reviewed  - Comprehensive Metabolic Panel; Future  - Hemoglobin A1C; Future  - Lipid Panel; Future    2. Controlled type 2 diabetes mellitus without complication, without long-term current use of insulin  Stable; controlled  - Comprehensive Metabolic Panel; Future  - Hemoglobin A1C; Future  - Lipid Panel; Future    3. Primary insomnia  stable  - Comprehensive Metabolic Panel; Future  - Hemoglobin A1C; Future  - Lipid Panel; Future    5. Depression, unspecified depression type  Stable on meds  - Comprehensive Metabolic Panel; Future  - Hemoglobin A1C; Future  - Lipid Panel; Future    6. Visit for screening mammogram  - Mammo Digital Screening  Bilat w/ Lucas; Future  - Comprehensive Metabolic Panel; Future  - Hemoglobin A1C; Future  - Lipid Panel; Future          No follow-ups on file.

## 2022-01-26 ENCOUNTER — PATIENT MESSAGE (OUTPATIENT)
Dept: INTERNAL MEDICINE | Facility: CLINIC | Age: 57
End: 2022-01-26
Payer: MEDICAID

## 2022-02-04 ENCOUNTER — LAB VISIT (OUTPATIENT)
Dept: LAB | Facility: HOSPITAL | Age: 57
End: 2022-02-04
Attending: INTERNAL MEDICINE
Payer: MEDICAID

## 2022-02-04 DIAGNOSIS — R73.03 PREDIABETES: ICD-10-CM

## 2022-02-04 LAB
ALBUMIN SERPL BCP-MCNC: 3.5 G/DL (ref 3.5–5.2)
ALP SERPL-CCNC: 39 U/L (ref 55–135)
ALT SERPL W/O P-5'-P-CCNC: 28 U/L (ref 10–44)
ANION GAP SERPL CALC-SCNC: 13 MMOL/L (ref 8–16)
AST SERPL-CCNC: 20 U/L (ref 10–40)
BILIRUB SERPL-MCNC: 0.4 MG/DL (ref 0.1–1)
BUN SERPL-MCNC: 8 MG/DL (ref 6–20)
CALCIUM SERPL-MCNC: 9.6 MG/DL (ref 8.7–10.5)
CHLORIDE SERPL-SCNC: 103 MMOL/L (ref 95–110)
CHOLEST SERPL-MCNC: 183 MG/DL (ref 120–199)
CHOLEST/HDLC SERPL: 4.1 {RATIO} (ref 2–5)
CO2 SERPL-SCNC: 22 MMOL/L (ref 23–29)
CREAT SERPL-MCNC: 0.9 MG/DL (ref 0.5–1.4)
EST. GFR  (AFRICAN AMERICAN): >60 ML/MIN/1.73 M^2
EST. GFR  (NON AFRICAN AMERICAN): >60 ML/MIN/1.73 M^2
ESTIMATED AVG GLUCOSE: 134 MG/DL (ref 68–131)
GLUCOSE SERPL-MCNC: 120 MG/DL (ref 70–110)
HBA1C MFR BLD: 6.3 % (ref 4–5.6)
HDLC SERPL-MCNC: 45 MG/DL (ref 40–75)
HDLC SERPL: 24.6 % (ref 20–50)
LDLC SERPL CALC-MCNC: 98 MG/DL (ref 63–159)
NONHDLC SERPL-MCNC: 138 MG/DL
POTASSIUM SERPL-SCNC: 4.1 MMOL/L (ref 3.5–5.1)
PROT SERPL-MCNC: 6.7 G/DL (ref 6–8.4)
SODIUM SERPL-SCNC: 138 MMOL/L (ref 136–145)
TRIGL SERPL-MCNC: 200 MG/DL (ref 30–150)

## 2022-02-04 PROCEDURE — 36415 COLL VENOUS BLD VENIPUNCTURE: CPT | Mod: PO | Performed by: INTERNAL MEDICINE

## 2022-02-04 PROCEDURE — 80061 LIPID PANEL: CPT | Performed by: INTERNAL MEDICINE

## 2022-02-04 PROCEDURE — 83036 HEMOGLOBIN GLYCOSYLATED A1C: CPT | Performed by: INTERNAL MEDICINE

## 2022-02-04 PROCEDURE — 80053 COMPREHEN METABOLIC PANEL: CPT | Performed by: INTERNAL MEDICINE

## 2022-02-08 ENCOUNTER — OFFICE VISIT (OUTPATIENT)
Dept: INTERNAL MEDICINE | Facility: CLINIC | Age: 57
End: 2022-02-08
Payer: MEDICAID

## 2022-02-08 VITALS
WEIGHT: 190.25 LBS | DIASTOLIC BLOOD PRESSURE: 84 MMHG | HEIGHT: 62 IN | RESPIRATION RATE: 16 BRPM | HEART RATE: 102 BPM | TEMPERATURE: 98 F | SYSTOLIC BLOOD PRESSURE: 128 MMHG | BODY MASS INDEX: 35.01 KG/M2 | OXYGEN SATURATION: 98 %

## 2022-02-08 DIAGNOSIS — Z12.31 VISIT FOR SCREENING MAMMOGRAM: ICD-10-CM

## 2022-02-08 DIAGNOSIS — F32.A DEPRESSION, UNSPECIFIED DEPRESSION TYPE: ICD-10-CM

## 2022-02-08 DIAGNOSIS — F51.01 PRIMARY INSOMNIA: ICD-10-CM

## 2022-02-08 DIAGNOSIS — E11.9 CONTROLLED TYPE 2 DIABETES MELLITUS WITHOUT COMPLICATION, WITHOUT LONG-TERM CURRENT USE OF INSULIN: ICD-10-CM

## 2022-02-08 DIAGNOSIS — Z00.00 ANNUAL PHYSICAL EXAM: Primary | ICD-10-CM

## 2022-02-08 PROCEDURE — 3079F DIAST BP 80-89 MM HG: CPT | Mod: CPTII,,, | Performed by: INTERNAL MEDICINE

## 2022-02-08 PROCEDURE — 3072F PR LOW RISK FOR RETINOPATHY: ICD-10-PCS | Mod: CPTII,,, | Performed by: INTERNAL MEDICINE

## 2022-02-08 PROCEDURE — 99396 PR PREVENTIVE VISIT,EST,40-64: ICD-10-PCS | Mod: S$PBB,,, | Performed by: INTERNAL MEDICINE

## 2022-02-08 PROCEDURE — 1159F MED LIST DOCD IN RCRD: CPT | Mod: CPTII,,, | Performed by: INTERNAL MEDICINE

## 2022-02-08 PROCEDURE — 1160F RVW MEDS BY RX/DR IN RCRD: CPT | Mod: CPTII,,, | Performed by: INTERNAL MEDICINE

## 2022-02-08 PROCEDURE — 4010F ACE/ARB THERAPY RXD/TAKEN: CPT | Mod: CPTII,,, | Performed by: INTERNAL MEDICINE

## 2022-02-08 PROCEDURE — 1160F PR REVIEW ALL MEDS BY PRESCRIBER/CLIN PHARMACIST DOCUMENTED: ICD-10-PCS | Mod: CPTII,,, | Performed by: INTERNAL MEDICINE

## 2022-02-08 PROCEDURE — 3079F PR MOST RECENT DIASTOLIC BLOOD PRESSURE 80-89 MM HG: ICD-10-PCS | Mod: CPTII,,, | Performed by: INTERNAL MEDICINE

## 2022-02-08 PROCEDURE — 3074F SYST BP LT 130 MM HG: CPT | Mod: CPTII,,, | Performed by: INTERNAL MEDICINE

## 2022-02-08 PROCEDURE — 3074F PR MOST RECENT SYSTOLIC BLOOD PRESSURE < 130 MM HG: ICD-10-PCS | Mod: CPTII,,, | Performed by: INTERNAL MEDICINE

## 2022-02-08 PROCEDURE — 3044F HG A1C LEVEL LT 7.0%: CPT | Mod: CPTII,,, | Performed by: INTERNAL MEDICINE

## 2022-02-08 PROCEDURE — 99215 OFFICE O/P EST HI 40 MIN: CPT | Mod: PBBFAC,PO | Performed by: INTERNAL MEDICINE

## 2022-02-08 PROCEDURE — 4010F PR ACE/ARB THEARPY RXD/TAKEN: ICD-10-PCS | Mod: CPTII,,, | Performed by: INTERNAL MEDICINE

## 2022-02-08 PROCEDURE — 99999 PR PBB SHADOW E&M-EST. PATIENT-LVL V: ICD-10-PCS | Mod: PBBFAC,,, | Performed by: INTERNAL MEDICINE

## 2022-02-08 PROCEDURE — 3008F BODY MASS INDEX DOCD: CPT | Mod: CPTII,,, | Performed by: INTERNAL MEDICINE

## 2022-02-08 PROCEDURE — 99999 PR PBB SHADOW E&M-EST. PATIENT-LVL V: CPT | Mod: PBBFAC,,, | Performed by: INTERNAL MEDICINE

## 2022-02-08 PROCEDURE — 3044F PR MOST RECENT HEMOGLOBIN A1C LEVEL <7.0%: ICD-10-PCS | Mod: CPTII,,, | Performed by: INTERNAL MEDICINE

## 2022-02-08 PROCEDURE — 3008F PR BODY MASS INDEX (BMI) DOCUMENTED: ICD-10-PCS | Mod: CPTII,,, | Performed by: INTERNAL MEDICINE

## 2022-02-08 PROCEDURE — 99396 PREV VISIT EST AGE 40-64: CPT | Mod: S$PBB,,, | Performed by: INTERNAL MEDICINE

## 2022-02-08 PROCEDURE — 1159F PR MEDICATION LIST DOCUMENTED IN MEDICAL RECORD: ICD-10-PCS | Mod: CPTII,,, | Performed by: INTERNAL MEDICINE

## 2022-02-08 PROCEDURE — 3072F LOW RISK FOR RETINOPATHY: CPT | Mod: CPTII,,, | Performed by: INTERNAL MEDICINE

## 2022-02-08 RX ORDER — MUPIROCIN 20 MG/G
OINTMENT TOPICAL 2 TIMES DAILY
Qty: 30 G | Refills: 1 | Status: SHIPPED | OUTPATIENT
Start: 2022-02-08 | End: 2022-08-24 | Stop reason: SDUPTHER

## 2022-02-09 ENCOUNTER — PATIENT MESSAGE (OUTPATIENT)
Dept: INTERNAL MEDICINE | Facility: CLINIC | Age: 57
End: 2022-02-09
Payer: MEDICAID

## 2022-02-09 DIAGNOSIS — M25.432 SWELLING OF LEFT WRIST: Primary | ICD-10-CM

## 2022-02-10 ENCOUNTER — HOSPITAL ENCOUNTER (OUTPATIENT)
Dept: RADIOLOGY | Facility: OTHER | Age: 57
Discharge: HOME | End: 2022-02-10
Attending: INTERNAL MEDICINE
Payer: MEDICAID

## 2022-02-10 ENCOUNTER — PATIENT MESSAGE (OUTPATIENT)
Dept: INTERNAL MEDICINE | Facility: CLINIC | Age: 57
End: 2022-02-10
Payer: MEDICAID

## 2022-02-10 DIAGNOSIS — M25.432 SWELLING OF LEFT WRIST: ICD-10-CM

## 2022-02-10 PROCEDURE — 73110 X-RAY EXAM OF WRIST: CPT | Mod: 26,LT,, | Performed by: RADIOLOGY

## 2022-02-10 PROCEDURE — 73110 X-RAY EXAM OF WRIST: CPT | Mod: TC,FY,LT

## 2022-02-10 PROCEDURE — 73110 XR WRIST COMPLETE 3 VIEWS LEFT: ICD-10-PCS | Mod: 26,LT,, | Performed by: RADIOLOGY

## 2022-02-10 NOTE — TELEPHONE ENCOUNTER
Message sent to referral coordinator to schedule  Xray    Pt was sent a message letting her know referral coordinators will call her to schedule this apt

## 2022-02-15 ENCOUNTER — TELEPHONE (OUTPATIENT)
Dept: ORTHOPEDICS | Facility: CLINIC | Age: 57
End: 2022-02-15
Payer: MEDICAID

## 2022-02-15 NOTE — TELEPHONE ENCOUNTER
----- Message from Diamond Cotton sent at 2/15/2022  2:27 PM CST -----  Type:  Needs Medical Advice    Who Called: pt called  Symptoms (please be specific): bilateral shoulder pain  How long has patient had these symptoms:    Pharmacy name and phone #:    Would the patient rather a call back or a response via [facility]? Call back  Best Call Back Number: [phone]  Additional Information: pt calling to get an appt. Tried to schedule pt no appt available. Please call pt to advice

## 2022-02-18 ENCOUNTER — TELEPHONE (OUTPATIENT)
Dept: ORTHOPEDICS | Facility: CLINIC | Age: 57
End: 2022-02-18
Payer: MEDICAID

## 2022-02-18 DIAGNOSIS — M25.521 RIGHT ELBOW PAIN: Primary | ICD-10-CM

## 2022-02-18 NOTE — TELEPHONE ENCOUNTER
Pt said her main concern is discussing her left wrist and right elbow. Right elbow x-rays ordered and scheduled.

## 2022-02-18 NOTE — TELEPHONE ENCOUNTER
----- Message from Kat Cazares PA-C sent at 2/18/2022 10:30 AM CST -----  She has an appt on Tuesday. Please clarify what she is being seen for- appt says bilateral wrists and elbows, but phone call says bilateral shoulder pain.     If it's for the shoulders, I am happy to see her but don't have anything more to offer than when she saw Lucinda last month. She recommended pain management and/or second opinion.     If it's for bilateral elbows and wrist pain, then she will need right elbow and right wrist XRs prior to seeing me.     Thanks!

## 2022-02-22 ENCOUNTER — PATIENT OUTREACH (OUTPATIENT)
Dept: ADMINISTRATIVE | Facility: OTHER | Age: 57
End: 2022-02-22
Payer: MEDICAID

## 2022-02-22 ENCOUNTER — TELEPHONE (OUTPATIENT)
Dept: ORTHOPEDICS | Facility: CLINIC | Age: 57
End: 2022-02-22
Payer: MEDICAID

## 2022-02-22 DIAGNOSIS — M75.101 TEAR OF RIGHT ROTATOR CUFF, UNSPECIFIED TEAR EXTENT, UNSPECIFIED WHETHER TRAUMATIC: ICD-10-CM

## 2022-02-22 DIAGNOSIS — G89.29 CHRONIC RIGHT SHOULDER PAIN: ICD-10-CM

## 2022-02-22 DIAGNOSIS — M25.511 CHRONIC RIGHT SHOULDER PAIN: ICD-10-CM

## 2022-02-22 DIAGNOSIS — R52 PAIN: ICD-10-CM

## 2022-02-22 RX ORDER — DICLOFENAC SODIUM 10 MG/G
2 GEL TOPICAL 4 TIMES DAILY
Qty: 4 EACH | Refills: 2 | Status: SHIPPED | OUTPATIENT
Start: 2022-02-22 | End: 2023-12-15

## 2022-02-22 NOTE — TELEPHONE ENCOUNTER
----- Message from Caty Trent LPN sent at 2/22/2022 10:22 AM CST -----  Regarding: Sooner appointment  Hey! I am rescheduling Kat's patients for today. I rescheduled this patient to 3/8/22 with Kat, but she wants to see if she can see either Mindy or Dr. Hughes sooner in [place].     Thanks,    Caty

## 2022-02-22 NOTE — PROGRESS NOTES
Health Maintenance Due   Topic Date Due    HIV Screening  Never done    Shingles Vaccine (1 of 2) Never done    TETANUS VACCINE  10/23/2018    COVID-19 Vaccine (2 - Pfizer 3-dose series) 09/15/2021    Foot Exam  02/02/2022    Eye Exam  03/01/2022     Updates were requested from care everywhere.  Chart was reviewed for overdue Proactive [facility] Encounters (DOREEN) topics (CRS, Breast Cancer Screening, Eye exam)  Health Maintenance has been updated.  LINKS immunization registry triggered.  Immunizations were reconciled.

## 2022-02-28 ENCOUNTER — HOSPITAL ENCOUNTER (OUTPATIENT)
Dept: RADIOLOGY | Facility: HOSPITAL | Age: 57
Discharge: HOME | End: 2022-02-28
Attending: INTERNAL MEDICINE
Payer: MEDICAID

## 2022-02-28 DIAGNOSIS — Z12.31 VISIT FOR SCREENING MAMMOGRAM: ICD-10-CM

## 2022-02-28 PROCEDURE — 77067 SCR MAMMO BI INCL CAD: CPT | Mod: 26,,, | Performed by: RADIOLOGY

## 2022-02-28 PROCEDURE — 77063 BREAST TOMOSYNTHESIS BI: CPT | Mod: 26,,, | Performed by: RADIOLOGY

## 2022-02-28 PROCEDURE — 77063 MAMMO DIGITAL SCREENING BILAT WITH TOMO: ICD-10-PCS | Mod: 26,,, | Performed by: RADIOLOGY

## 2022-02-28 PROCEDURE — 77067 SCR MAMMO BI INCL CAD: CPT | Mod: TC,PO

## 2022-02-28 PROCEDURE — 77067 MAMMO DIGITAL SCREENING BILAT WITH TOMO: ICD-10-PCS | Mod: 26,,, | Performed by: RADIOLOGY

## 2022-02-28 PROCEDURE — 77063 BREAST TOMOSYNTHESIS BI: CPT | Mod: TC,PO

## 2022-03-03 NOTE — PROGRESS NOTES
Subjective:      Patient ID: Sherie Reyes is a 56 y.o. female.    Chief Complaint: No chief complaint on file.      HPI  (French)    Has seen both Dr. Hughes and Lucinda in the past for her bilateral shoulder pain that has been refractory to multiple injections, arthroscopic debridements, and physical therapy. She is 4 years s/p left shoulder decompression and debridement and 1.5 years s/p right shoulder surgery.    Lucinda discussed at her last visit that Dr. Hughes did not recommend further injections. He told her previously that repeat surgery had about a 50/50% chance of success.    Here today with complaints of left wrist and right elbow pain.     4 week history of constant left wrist pain with no known injury. She had initial swelling and limited ROM. Pain is along ulnar side. She is right hand dominant. Pain is throbbing in nature and worse with moving the wrist. She rates her pain as an 8 on a scale of 1-10.     Also with chronic right elbow pain x years. Pain is more lateral in nature. No known injury. She's had injections in the past- they help sometimes. She has tried tennis elbow strap. No OT for her elbow.     She takes medical marijuana. She is using voltaren gel- it's not helping. Some relief with compression sleeve on wrist.       Past Medical History:   Diagnosis Date    Anxiety     Degenerative joint disease (DJD) of hip     Diabetes type 2, controlled 7/10/2019    GERD (gastroesophageal reflux disease)     Hyperlipidemia     Hypertension     IBS (irritable bowel syndrome)     Insomnia     Lumbar disc herniation     PTSD (post-traumatic stress disorder)          Current Outpatient Medications:     acetaminophen (TYLENOL) 500 MG tablet, Take 500 mg by mouth every 6 (six) hours as needed for Pain., Disp: , Rfl:     albuterol (PROAIR HFA) 90 mcg/actuation inhaler, Inhale 2 puffs into the lungs every 6 (six) hours as needed for Wheezing. Rescue, Disp: 18 g, Rfl: 0    amLODIPine  (NORVASC) 10 MG tablet, Take 1 tablet (10 mg total) by mouth once daily., Disp: 90 tablet, Rfl: 3    azelastine (ASTELIN) 137 mcg (0.1 %) nasal spray, 1 spray (137 mcg total) by Nasal route 2 (two) times daily., Disp: 30 mL, Rfl: 0    clonazePAM (KLONOPIN) 1 MG tablet, TAKE 1 TABLET BY MOUTH TWICE DAILY, Disp: 60 tablet, Rfl: 1    cyanocobalamin 1,000 mcg/mL injection, Inject 1,000 mcg into the muscle every 7 days., Disp: , Rfl:     diclofenac sodium (VOLTAREN) 1 % Gel, Apply 2 g topically 4 (four) times daily., Disp: 4 each, Rfl: 2    dicyclomine (BENTYL) 10 MG capsule, Take 1 capsule (10 mg total) by mouth 4 (four) times daily as needed (abdominal pain)., Disp: 120 capsule, Rfl: 1    gabapentin (NEURONTIN) 300 MG capsule, Take 900 mg by mouth 3 (three) times daily., Disp: , Rfl:     KURVELO 0.15-0.03 mg per tablet, Take 1 tablet by mouth once daily., Disp: , Rfl:     LINZESS 290 mcg Cap capsule, TAKE 1 CAPSULE(290 MCG) BY MOUTH EVERY DAY, Disp: 90 capsule, Rfl: 3    losartan (COZAAR) 100 MG tablet, Take 1 tablet (100 mg total) by mouth once daily., Disp: 90 tablet, Rfl: 3    melatonin 5 mg Chew, Take 10 mg by mouth daily as needed., Disp: , Rfl:     mupirocin (BACTROBAN) 2 % ointment, Apply topically 2 (two) times daily., Disp: 30 g, Rfl: 1    omeprazole (PRILOSEC) 40 MG capsule, Take 1 capsule (40 mg total) by mouth once daily., Disp: 30 capsule, Rfl: 11    POLYETHYLENE GLYCOL 3350 (MIRALAX ORAL), Take by mouth as needed. , Disp: , Rfl:     potassium chloride 10% (KAYCIEL) 20 mEq/15 mL oral solution, TAKE 15 ML BY MOUTH EVERY DAY, Disp: 473 mL, Rfl: 11    pravastatin (PRAVACHOL) 20 MG tablet, TAKE 1 TABLET(20 MG) BY MOUTH EVERY EVENING, Disp: 90 tablet, Rfl: 1    tiZANidine (ZANAFLEX) 4 MG tablet, Take 4 mg by mouth 2 (two) times daily as needed., Disp: , Rfl:     traZODone (DESYREL) 100 MG tablet, TK 1 T PO  QHS, Disp: , Rfl:     venlafaxine (EFFEXOR-XR) 150 MG Cp24, Take 150 mg by mouth once  "daily., Disp: , Rfl:     venlafaxine (EFFEXOR-XR) 75 MG 24 hr capsule, TK ONE C PO QD WITH 150MG TO EQUAL 225, Disp: , Rfl:     ZTLIDO 1.8 % PtMd, Apply 1 patch topically daily as needed., Disp: , Rfl:     QUEtiapine (SEROQUEL) 200 MG Tab, Take 1 tablet (200 mg total) by mouth every evening., Disp: 30 tablet, Rfl: 2    traMADoL (ULTRAM) 50 mg tablet, Take 50 mg by mouth 2 (two) times a day., Disp: , Rfl:   No current facility-administered medications for this visit.    Facility-Administered Medications Ordered in Other Visits:     0.9%  NaCl infusion, , Intravenous, Continuous, Gregoria Rodriguez MD    Review of patient's allergies indicates:  No Known Allergies    Review of Systems   Constitutional: Negative for chills, fever, night sweats and weight gain.   Gastrointestinal: Negative for bowel incontinence, nausea and vomiting.   Genitourinary: Negative for bladder incontinence.   Neurological: Negative for disturbances in coordination and loss of balance.         Objective:        /74   Ht 5' 2" (1.575 m)   Wt 86.2 kg (190 lb)   BMI 34.75 kg/m²     Ortho/SPM Exam    RIGHT Hand/Wrist Examination:    Observation/Inspection:  Swelling  none    Deformity  none  Discoloration  none     Scars   none    Atrophy  none    HAND/WRIST EXAMINATION:  Finkelstein's Test   Neg  WHAT Test    Neg  Snuff box tenderness   Neg  Hook of Hamate Tenderness  Neg  CMC grind    Neg    Neurovascular Exam:  Digits WWP, brisk CR < 3s throughout  NVI motor/LTS to M/R/U nerves, radial pulse 2+  Tinel's Test - Carpal Tunnel  Neg  Tinel's Test - Cubital Tunnel  Neg  Phalen's Test    Neg  Median Nerve Compression Test Neg    Good ROM of hand and fingers.       LEFT Hand/Wrist Examination:    Observation/Inspection:  Swelling  none    Deformity  none  Discoloration  none     Scars   none    Atrophy  none    HAND/WRIST EXAMINATION:  Finkelstein's Test   Neg  WHAT Test    Neg  Snuff box tenderness   Neg  Hook of Hamate " Tenderness  Neg  CMC grind    Neg    Neurovascular Exam:  Digits WWP, brisk CR < 3s throughout  NVI motor/LTS to M/R/U nerves, radial pulse 2+  Tinel's Test - Carpal Tunnel  Neg  Tinel's Test - Cubital Tunnel  Neg  Phalen's Test    Neg  Median Nerve Compression Test Neg    Good ROM of fingers. She has mild pain with radial/ulnar deviation of wrist. Good flexion/extension.     Mild tenderness ulnar side of wrist.     RIGHT ELBOW EXAM:  Good ROM with minimal pain.     Tenderness To Palpation:  Tenderness at the lateral epicondyle.  No tenderness at the olecranon.  No tenderness at the distal humerus or proximal radius/ulna.  No tenderness at the radial head.    Special Tests:  No laxity of the MCL at 0 and 30 degrees.    No laxity of the LCL at 0 and 30 degrees.    3rd digit extension resistance test - positive  Resisted supination - positive  Resisted pronation - negative  Resisted wrist extension (Cozen's test) - positive  Resisted wrist flexion - negative    LEFT ELBOW EXAM:  Good ROM with no pain    Tenderness To Palpation:  No tenderness at the medial epicondyle or lateral epicondyle.  No tenderness at the olecranon.  No tenderness at the distal humerus or proximal radius/ulna.  No tenderness at the radial head.    Special Tests:  No laxity of the MCL at 0 and 30 degrees.    No laxity of the LCL at 0 and 30 degrees.    3rd digit extension resistance test - negative  Resisted supination - positive  Resisted pronation -positive  Resisted wrist extension (Cozen's test) - negative  Resisted wrist flexion - negative      XRAY INTERPRETATION:   X-rays of left wrist dated 2/10/22 are personally reviewed and show no fracture or dislocation.     X-rays of right elbow dated 3/8/22 are personally reviewed and show no fracture or dislocation.         Assessment:       Encounter Diagnoses   Name Primary?    Lateral epicondylitis of right elbow Yes    Left wrist pain           Plan:       Diagnoses and all orders for this  visit:    Lateral epicondylitis of right elbow    Left wrist pain      4 week history of constant left wrist pain with no known injury. She had initial swelling and limited ROM. Pain is along ulnar side. She is right hand dominant.     XRs of left wrist look good. Mild ulnar sided tenderness.     Also with chronic right lateral elbow pain x years. Pain is improving. Diagnosed with tennis elbow in the past and exam is consistent with this.     Treatment options reviewed with patient along with above left wrist/right elbow xrays. Following plan made:     - Recommend OT for right elbow/left wrist. She is not interested.   - She has tennis elbow strap and will use this.   - Given gel wrist brace for left wrist to use prn.   - No relief with various NSAIDs in the past. Will hold on further medications.   - Follow up with Dr. Hughes in 4-6 weeks.     Follow up in about 6 weeks (around 4/19/2022).

## 2022-03-08 ENCOUNTER — OFFICE VISIT (OUTPATIENT)
Dept: ORTHOPEDICS | Facility: CLINIC | Age: 57
End: 2022-03-08
Payer: MEDICAID

## 2022-03-08 ENCOUNTER — TELEPHONE (OUTPATIENT)
Dept: ORTHOPEDICS | Facility: CLINIC | Age: 57
End: 2022-03-08
Payer: MEDICAID

## 2022-03-08 ENCOUNTER — PATIENT MESSAGE (OUTPATIENT)
Dept: OTHER | Facility: OTHER | Age: 57
End: 2022-03-08
Payer: MEDICAID

## 2022-03-08 VITALS
SYSTOLIC BLOOD PRESSURE: 110 MMHG | BODY MASS INDEX: 34.96 KG/M2 | WEIGHT: 190 LBS | HEIGHT: 62 IN | DIASTOLIC BLOOD PRESSURE: 74 MMHG

## 2022-03-08 DIAGNOSIS — M25.532 LEFT WRIST PAIN: ICD-10-CM

## 2022-03-08 DIAGNOSIS — M77.11 LATERAL EPICONDYLITIS OF RIGHT ELBOW: Primary | ICD-10-CM

## 2022-03-08 PROCEDURE — 3074F PR MOST RECENT SYSTOLIC BLOOD PRESSURE < 130 MM HG: ICD-10-PCS | Mod: CPTII,,, | Performed by: PHYSICIAN ASSISTANT

## 2022-03-08 PROCEDURE — 1160F RVW MEDS BY RX/DR IN RCRD: CPT | Mod: CPTII,,, | Performed by: PHYSICIAN ASSISTANT

## 2022-03-08 PROCEDURE — 3008F PR BODY MASS INDEX (BMI) DOCUMENTED: ICD-10-PCS | Mod: CPTII,,, | Performed by: PHYSICIAN ASSISTANT

## 2022-03-08 PROCEDURE — 1160F PR REVIEW ALL MEDS BY PRESCRIBER/CLIN PHARMACIST DOCUMENTED: ICD-10-PCS | Mod: CPTII,,, | Performed by: PHYSICIAN ASSISTANT

## 2022-03-08 PROCEDURE — 3044F HG A1C LEVEL LT 7.0%: CPT | Mod: CPTII,,, | Performed by: PHYSICIAN ASSISTANT

## 2022-03-08 PROCEDURE — 99214 PR OFFICE/OUTPT VISIT, EST, LEVL IV, 30-39 MIN: ICD-10-PCS | Mod: S$PBB,,, | Performed by: PHYSICIAN ASSISTANT

## 2022-03-08 PROCEDURE — 3072F LOW RISK FOR RETINOPATHY: CPT | Mod: CPTII,,, | Performed by: PHYSICIAN ASSISTANT

## 2022-03-08 PROCEDURE — 99214 OFFICE O/P EST MOD 30 MIN: CPT | Mod: S$PBB,,, | Performed by: PHYSICIAN ASSISTANT

## 2022-03-08 PROCEDURE — 99999 PR PBB SHADOW E&M-EST. PATIENT-LVL V: CPT | Mod: PBBFAC,,, | Performed by: PHYSICIAN ASSISTANT

## 2022-03-08 PROCEDURE — 3008F BODY MASS INDEX DOCD: CPT | Mod: CPTII,,, | Performed by: PHYSICIAN ASSISTANT

## 2022-03-08 PROCEDURE — 3072F PR LOW RISK FOR RETINOPATHY: ICD-10-PCS | Mod: CPTII,,, | Performed by: PHYSICIAN ASSISTANT

## 2022-03-08 PROCEDURE — 1159F PR MEDICATION LIST DOCUMENTED IN MEDICAL RECORD: ICD-10-PCS | Mod: CPTII,,, | Performed by: PHYSICIAN ASSISTANT

## 2022-03-08 PROCEDURE — 3078F PR MOST RECENT DIASTOLIC BLOOD PRESSURE < 80 MM HG: ICD-10-PCS | Mod: CPTII,,, | Performed by: PHYSICIAN ASSISTANT

## 2022-03-08 PROCEDURE — 3044F PR MOST RECENT HEMOGLOBIN A1C LEVEL <7.0%: ICD-10-PCS | Mod: CPTII,,, | Performed by: PHYSICIAN ASSISTANT

## 2022-03-08 PROCEDURE — 99215 OFFICE O/P EST HI 40 MIN: CPT | Mod: PBBFAC,PN | Performed by: PHYSICIAN ASSISTANT

## 2022-03-08 PROCEDURE — 99999 PR PBB SHADOW E&M-EST. PATIENT-LVL V: ICD-10-PCS | Mod: PBBFAC,,, | Performed by: PHYSICIAN ASSISTANT

## 2022-03-08 PROCEDURE — 4010F PR ACE/ARB THEARPY RXD/TAKEN: ICD-10-PCS | Mod: CPTII,,, | Performed by: PHYSICIAN ASSISTANT

## 2022-03-08 PROCEDURE — 3074F SYST BP LT 130 MM HG: CPT | Mod: CPTII,,, | Performed by: PHYSICIAN ASSISTANT

## 2022-03-08 PROCEDURE — 3078F DIAST BP <80 MM HG: CPT | Mod: CPTII,,, | Performed by: PHYSICIAN ASSISTANT

## 2022-03-08 PROCEDURE — 1159F MED LIST DOCD IN RCRD: CPT | Mod: CPTII,,, | Performed by: PHYSICIAN ASSISTANT

## 2022-03-08 PROCEDURE — 4010F ACE/ARB THERAPY RXD/TAKEN: CPT | Mod: CPTII,,, | Performed by: PHYSICIAN ASSISTANT

## 2022-03-08 NOTE — PATIENT INSTRUCTIONS
It was nice to meet you today! I am sorry that you are hurting.     Xrays of your left wrist and right elbow look good.     Elbow pain is likely due to tennis elbow. Wear your strap and it should improve.     Wear the wrist brace as needed for wrist pain.     Let me know if you change your mind and want to do some therapy for the wrist/elbow. I think it will help.     Dr. Hughes will see you back in 6 weeks. His staff will call you with an appointment. Please stay in touch and call me if you need anything. You can also send me a message in [facility].     Kat   [phone]

## 2022-03-18 NOTE — TELEPHONE ENCOUNTER
No new care gaps identified.  Powered by [employer]. Reference number: [id number].   3/18/2022 4:12:52 PM CDT

## 2022-03-21 RX ORDER — AMLODIPINE BESYLATE 10 MG/1
TABLET ORAL
Qty: 90 TABLET | Refills: 3 | Status: SHIPPED | OUTPATIENT
Start: 2022-03-21 | End: 2022-12-28

## 2022-03-21 NOTE — TELEPHONE ENCOUNTER
Refill Routing Note   Medication(s) are not appropriate for processing by [facility] for the following reason(s):      - Drug-Disease Interaction (Hypotension)    [facility] action(s):  Defer Medication-related problems identified: Drug-disease interaction        --->Care Gap information included in message below if applicable.   Medication reconciliation completed: No   Automatic Epic Generated Protocol Data:        Requested Prescriptions   Pending Prescriptions Disp Refills    amLODIPine (NORVASC) 10 MG tablet [Pharmacy Med Name: AMLODIPINE BESYLATE 10MG TABLETS] 90 tablet 3     Sig: TAKE 1 TABLET(10 MG) BY MOUTH EVERY DAY       Cardiovascular:  Calcium Channel Blockers Passed - 3/18/2022  4:11 PM        Passed - Patient is at least 18 years old        Passed - Last BP in normal range within 360 days     BP Readings from Last 3 Encounters:   03/08/22 110/74   02/08/22 128/84   10/27/21 129/83               Passed - Valid encounter within last 15 months     Recent Visits  Date Type Provider Dept   02/08/22 Office Visit Tonia Bernal DO [facility] Internal Medicine   05/05/21 Office Visit Tonia Bernal DO [facility] Internal Medicine   02/02/21 Office Visit Tonia Bernal DO [facility] Internal Medicine   Showing recent visits within past 720 days and meeting all other requirements  Future Appointments  No visits were found meeting these conditions.  Showing future appointments within next 150 days and meeting all other requirements      Future Appointments              In 1 month Melchor Hughes Jr., MD [facility] - Orthopedics, [employer]    In 1 month RAFI, LENORA [place] [facility] - Lab, [place]    In 1 month Tonia Bernal DO [place] [facility] - Internal Medicine, [place]                      Appointments  past 12m or future 3m with PCP    Date Provider   Last Visit   2/8/2022 Tonia Bernal DO   Next Visit   5/10/2022 Tonia Bernal DO   ED visits in past 90 days: 0        Note  composed:7:33 AM 03/21/2022

## 2022-03-28 ENCOUNTER — PATIENT MESSAGE (OUTPATIENT)
Dept: OTHER | Facility: OTHER | Age: 57
End: 2022-03-28
Payer: MEDICAID

## 2022-04-17 NOTE — TELEPHONE ENCOUNTER
No new care gaps identified.  Powered by [employer]. Reference number: [id number].   4/17/2022 8:07:21 AM CDT

## 2022-04-18 RX ORDER — LOSARTAN POTASSIUM 100 MG/1
TABLET ORAL
Qty: 90 TABLET | Refills: 3 | Status: SHIPPED | OUTPATIENT
Start: 2022-04-18 | End: 2022-12-28 | Stop reason: SDUPTHER

## 2022-04-18 NOTE — TELEPHONE ENCOUNTER
Refill Authorization Note   Sherie Hutchinsonren  is requesting a refill authorization.  Brief Assessment and Rationale for Refill:  Approve     Medication Therapy Plan:       Medication Reconciliation Completed: No   Comments:     No Care Gaps recommended.     Note composed:12:44 PM 04/18/2022

## 2022-04-22 DIAGNOSIS — R10.9 ABDOMINAL PAIN, UNSPECIFIED ABDOMINAL LOCATION: ICD-10-CM

## 2022-04-25 RX ORDER — DICYCLOMINE HYDROCHLORIDE 10 MG/1
CAPSULE ORAL
Qty: 120 CAPSULE | Refills: 1 | Status: SHIPPED | OUTPATIENT
Start: 2022-04-25 | End: 2022-07-20 | Stop reason: SDUPTHER

## 2022-04-26 ENCOUNTER — PATIENT OUTREACH (OUTPATIENT)
Dept: ADMINISTRATIVE | Facility: OTHER | Age: 57
End: 2022-04-26
Payer: MEDICAID

## 2022-04-26 DIAGNOSIS — E11.9 DIABETES MELLITUS WITHOUT COMPLICATION: Primary | ICD-10-CM

## 2022-04-26 NOTE — PROGRESS NOTES
Health Maintenance Due   Topic Date Due    HIV Screening  Never done    Shingles Vaccine (1 of 2) Never done    TETANUS VACCINE  10/23/2018    COVID-19 Vaccine (2 - Pfizer 3-dose series) 09/15/2021    Foot Exam  02/02/2022    Eye Exam  03/01/2022    Diabetes Urine Screening  05/05/2022     Updates were requested from care everywhere.  Chart was reviewed for overdue [facility]active [facility] ([facility]) topics (CRS, Breast Cancer Screening, Eye exam)  Health Maintenance has been updated.  LINKS immunization registry triggered.  Immunizations were reconciled.  Order placed for diabetic eye screening photo.

## 2022-04-27 ENCOUNTER — OFFICE VISIT (OUTPATIENT)
Dept: ORTHOPEDICS | Facility: CLINIC | Age: 57
End: 2022-04-27
Payer: MEDICAID

## 2022-04-27 VITALS — BODY MASS INDEX: 34.96 KG/M2 | WEIGHT: 190 LBS | HEIGHT: 62 IN

## 2022-04-27 DIAGNOSIS — M77.11 LATERAL EPICONDYLITIS OF RIGHT ELBOW: Primary | ICD-10-CM

## 2022-04-27 PROCEDURE — 99999 PR PBB SHADOW E&M-EST. PATIENT-LVL III: ICD-10-PCS | Mod: PBBFAC,,, | Performed by: ORTHOPAEDIC SURGERY

## 2022-04-27 PROCEDURE — 3072F PR LOW RISK FOR RETINOPATHY: ICD-10-PCS | Mod: CPTII,,, | Performed by: ORTHOPAEDIC SURGERY

## 2022-04-27 PROCEDURE — 3008F BODY MASS INDEX DOCD: CPT | Mod: CPTII,,, | Performed by: ORTHOPAEDIC SURGERY

## 2022-04-27 PROCEDURE — 1159F MED LIST DOCD IN RCRD: CPT | Mod: CPTII,,, | Performed by: ORTHOPAEDIC SURGERY

## 2022-04-27 PROCEDURE — 4010F PR ACE/ARB THEARPY RXD/TAKEN: ICD-10-PCS | Mod: CPTII,,, | Performed by: ORTHOPAEDIC SURGERY

## 2022-04-27 PROCEDURE — 99214 PR OFFICE/OUTPT VISIT, EST, LEVL IV, 30-39 MIN: ICD-10-PCS | Mod: S$PBB,,, | Performed by: ORTHOPAEDIC SURGERY

## 2022-04-27 PROCEDURE — 99999 PR PBB SHADOW E&M-EST. PATIENT-LVL III: CPT | Mod: PBBFAC,,, | Performed by: ORTHOPAEDIC SURGERY

## 2022-04-27 PROCEDURE — 1159F PR MEDICATION LIST DOCUMENTED IN MEDICAL RECORD: ICD-10-PCS | Mod: CPTII,,, | Performed by: ORTHOPAEDIC SURGERY

## 2022-04-27 PROCEDURE — 3072F LOW RISK FOR RETINOPATHY: CPT | Mod: CPTII,,, | Performed by: ORTHOPAEDIC SURGERY

## 2022-04-27 PROCEDURE — 3044F HG A1C LEVEL LT 7.0%: CPT | Mod: CPTII,,, | Performed by: ORTHOPAEDIC SURGERY

## 2022-04-27 PROCEDURE — 99213 OFFICE O/P EST LOW 20 MIN: CPT | Mod: PBBFAC,PN | Performed by: ORTHOPAEDIC SURGERY

## 2022-04-27 PROCEDURE — 3008F PR BODY MASS INDEX (BMI) DOCUMENTED: ICD-10-PCS | Mod: CPTII,,, | Performed by: ORTHOPAEDIC SURGERY

## 2022-04-27 PROCEDURE — 3044F PR MOST RECENT HEMOGLOBIN A1C LEVEL <7.0%: ICD-10-PCS | Mod: CPTII,,, | Performed by: ORTHOPAEDIC SURGERY

## 2022-04-27 PROCEDURE — 99214 OFFICE O/P EST MOD 30 MIN: CPT | Mod: S$PBB,,, | Performed by: ORTHOPAEDIC SURGERY

## 2022-04-27 PROCEDURE — 4010F ACE/ARB THERAPY RXD/TAKEN: CPT | Mod: CPTII,,, | Performed by: ORTHOPAEDIC SURGERY

## 2022-04-27 RX ORDER — CEFAZOLIN SODIUM 2 G/50ML
2 SOLUTION INTRAVENOUS
Status: CANCELLED | OUTPATIENT
Start: 2022-04-27

## 2022-04-27 RX ORDER — MELOXICAM 15 MG/1
15 TABLET ORAL DAILY PRN
COMMUNITY
Start: 2022-02-03 | End: 2023-12-13 | Stop reason: SDUPTHER

## 2022-04-27 NOTE — H&P (VIEW-ONLY)
CC:  Lateral epicondylitis right elbow chronic        HPI:  Sherie Reyes is a very pleasant 56 y.o. female with ongoing symptoms right shoulder for more than a year  Her symptoms are consistent with lateral epicondylitis  We have treated her with physical therapy bracing activity modification as well as injections from time to time  She continues to have pain in the right elbow and difficulty with use particularly lifting activities  She would like to consider surgery for the right elbow  No numbness or tingling reported         PAST MEDICAL HISTORY:   Past Medical History:   Diagnosis Date    Anxiety     Degenerative joint disease (DJD) of hip     Diabetes type 2, controlled 7/10/2019    GERD (gastroesophageal reflux disease)     Hyperlipidemia     Hypertension     IBS (irritable bowel syndrome)     Insomnia     Lumbar disc herniation     PTSD (post-traumatic stress disorder)      PAST SURGICAL HISTORY:   Past Surgical History:   Procedure Laterality Date    ADENOIDECTOMY      ARTHROSCOPIC DEBRIDEMENT OF ROTATOR CUFF Right 2020    Procedure: DEBRIDEMENT, ROTATOR CUFF, ARTHROSCOPIC;  Surgeon: Melchor Hughes Jr., MD;  Location: [facility] OR;  Service: Orthopedics;  Laterality: Right;  need opus system (Ger MESA notified , )  video, notified/confirmed 2020 KB 1310    ARTHROSCOPIC EXCISION OF ACROMIOCLAVICULAR JOINT  2020    Procedure: EXCISION, ACROMIOCLAVICULAR JOINT, ARTHROSCOPIC;  Surgeon: Melchor Hughes Jr., MD;  Location: [facility] OR;  Service: Orthopedics;;    ARTHROSCOPIC REPAIR OF ROTATOR CUFF OF SHOULDER      ARTHROSCOPY OF SHOULDER WITH DECOMPRESSION OF SUBACROMIAL SPACE  2020    Procedure: ARTHROSCOPY, SHOULDER, WITH SUBACROMIAL SPACE DECOMPRESSION;  Surgeon: Melchor Hughes Jr., MD;  Location: [facility] OR;  Service: Orthopedics;;     SECTION      EPIDURAL STEROID INJECTION INTO CERVICAL SPINE N/A 2021    Procedure:  Injection-steroid-epidural-cervical--C7-T1 IL ZAYNAB;  Surgeon: Gregoria Rodriguez MD;  Location: [facility] PAIN MGT;  Service: Pain Management;  Laterality: N/A;    TONSILLECTOMY       FAMILY HISTORY:   Family History   Problem Relation Age of Onset    Hypertension Mother     Hypothyroidism Mother     Arthritis Mother     Depression Mother     Heart disease Mother     Hyperlipidemia Mother     Hypertension Father     Stroke Father 46    Diabetes Father     Heart disease Father     Hyperlipidemia Father     Cancer Maternal Grandfather         Stomach Ca    Depression Maternal Grandfather     Early death Maternal Grandfather     Heart disease Maternal Grandfather     Hyperlipidemia Maternal Grandfather     Hypertension Maternal Grandfather     Mental illness Maternal Grandfather     Vision loss Maternal Grandfather         Loss during yhe War.    Breast cancer Paternal Aunt     Arthritis Paternal Grandmother     Depression Paternal Grandmother     Heart disease Paternal Grandmother     Cancer Paternal Aunt         Bilat breast Cancer    Heart disease Paternal Aunt     Cancer Maternal Grandmother         Lung w/ Mets    COPD Maternal Grandmother     Depression Maternal Grandmother     Early death Maternal Grandmother     Heart disease Maternal Grandmother     Hyperlipidemia Maternal Grandmother     Hypertension Maternal Grandmother     Depression Son     Learning disabilities Son     Depression Sister     Depression Daughter     Diabetes Paternal Grandfather     Early death Paternal Grandfather     Heart disease Paternal Grandfather     Hyperlipidemia Paternal Grandfather     Hypertension Paternal Grandfather     Kidney disease Paternal Grandfather      SOCIAL HISTORY:   Social History     Socioeconomic History    Marital status:    Tobacco Use    Smoking status: Former Smoker     Packs/day: 0.50     Years: 5.00     Pack years: 2.50     Types: Vaping with nicotine     Start  date: 1985     Quit date: 2014     Years since quittin.8    Smokeless tobacco: Former User     Quit date: 10/20/2014    Tobacco comment: Vaping 30 ml last 5 or 6 weeks   Substance and Sexual Activity    Alcohol use: Yes     Alcohol/week: 1.0 standard drink     Types: 1 Cans of beer per week     Comment: Maybe 1 or 2 a month    Drug use: No    Sexual activity: Yes     Partners: Male     Birth control/protection: OCP     Social Determinants of Health     Financial Resource Strain: High Risk    Difficulty of Paying Living Expenses: Hard   Food Insecurity: Food Insecurity Present    Worried About Running Out of Food in the Last Year: Often true    Ran Out of Food in the Last Year: Often true   Transportation Needs: No Transportation Needs    Lack of Transportation (Medical): No    Lack of Transportation (Non-Medical): No   Physical Activity: Inactive    Days of Exercise per Week: 0 days    Minutes of Exercise per Session: 0 min   Stress: Stress Concern Present    Feeling of Stress : Rather much   Social Connections: Unknown    Frequency of Communication with Friends and Family: Twice a week    Frequency of Social Gatherings with Friends and Family: Once a week    Active Member of Clubs or Organizations: No    Attends Club or Organization Meetings: Never    Marital Status:    Housing Stability: High Risk    Unable to Pay for Housing in the Last Year: Yes    Number of Places Lived in the Last Year: 1    Unstable Housing in the Last Year: No       MEDICATIONS:   Current Outpatient Medications:     acetaminophen (TYLENOL) 500 MG tablet, Take 500 mg by mouth every 6 (six) hours as needed for Pain., Disp: , Rfl:     albuterol (PROAIR HFA) 90 mcg/actuation inhaler, Inhale 2 puffs into the lungs every 6 (six) hours as needed for Wheezing. Rescue, Disp: 18 g, Rfl: 0    amLODIPine (NORVASC) 10 MG tablet, TAKE 1 TABLET(10 MG) BY MOUTH EVERY DAY, Disp: 90 tablet, Rfl: 3    azelastine  (ASTELIN) 137 mcg (0.1 %) nasal spray, 1 spray (137 mcg total) by Nasal route 2 (two) times daily., Disp: 30 mL, Rfl: 0    clonazePAM (KLONOPIN) 1 MG tablet, TAKE 1 TABLET BY MOUTH TWICE DAILY, Disp: 60 tablet, Rfl: 1    cyanocobalamin 1,000 mcg/mL injection, Inject 1,000 mcg into the muscle every 7 days., Disp: , Rfl:     diclofenac sodium (VOLTAREN) 1 % Gel, Apply 2 g topically 4 (four) times daily., Disp: 4 each, Rfl: 2    dicyclomine (BENTYL) 10 MG capsule, TAKE ONE CAPSULE BY MOUTH FOUR TIMES DAILY AS NEEDED FOR ABDOMINAL PAIN, Disp: 120 capsule, Rfl: 1    gabapentin (NEURONTIN) 300 MG capsule, Take 900 mg by mouth 3 (three) times daily., Disp: , Rfl:     KURVELO 0.15-0.03 mg per tablet, Take 1 tablet by mouth once daily., Disp: , Rfl:     LINZESS 290 mcg Cap capsule, TAKE 1 CAPSULE(290 MCG) BY MOUTH EVERY DAY, Disp: 90 capsule, Rfl: 3    losartan (COZAAR) 100 MG tablet, TAKE 1 TABLET(100 MG) BY MOUTH EVERY DAY, Disp: 90 tablet, Rfl: 3    melatonin 5 mg Chew, Take 10 mg by mouth daily as needed., Disp: , Rfl:     meloxicam (MOBIC) 15 MG tablet, 1 tablet, Disp: , Rfl:     mupirocin (BACTROBAN) 2 % ointment, Apply topically 2 (two) times daily., Disp: 30 g, Rfl: 1    omeprazole (PRILOSEC) 40 MG capsule, Take 1 capsule (40 mg total) by mouth once daily., Disp: 30 capsule, Rfl: 11    POLYETHYLENE GLYCOL 3350 (MIRALAX ORAL), Take by mouth as needed. , Disp: , Rfl:     potassium chloride 10% (KAYCIEL) 20 mEq/15 mL oral solution, TAKE 15 ML BY MOUTH EVERY DAY, Disp: 473 mL, Rfl: 11    pravastatin (PRAVACHOL) 20 MG tablet, TAKE 1 TABLET(20 MG) BY MOUTH EVERY EVENING, Disp: 90 tablet, Rfl: 1    tiZANidine (ZANAFLEX) 4 MG tablet, Take 4 mg by mouth 2 (two) times daily as needed., Disp: , Rfl:     traMADoL (ULTRAM) 50 mg tablet, Take 50 mg by mouth 2 (two) times a day., Disp: , Rfl:     traZODone (DESYREL) 100 MG tablet, TK 1 T PO  QHS, Disp: , Rfl:     venlafaxine (EFFEXOR-XR) 150 MG Cp24, Take 150 mg  "by mouth once daily., Disp: , Rfl:     venlafaxine (EFFEXOR-XR) 75 MG 24 hr capsule, TK ONE C PO QD WITH 150MG TO EQUAL 225, Disp: , Rfl:     ZTLIDO 1.8 % PtMd, Apply 1 patch topically daily as needed., Disp: , Rfl:     QUEtiapine (SEROQUEL) 200 MG Tab, Take 1 tablet (200 mg total) by mouth every evening., Disp: 30 tablet, Rfl: 2  No current facility-administered medications for this visit.    Facility-Administered Medications Ordered in Other Visits:     0.9%  NaCl infusion, , Intravenous, Continuous, Gregoria Rodriguez MD  ALLERGIES: Review of patient's allergies indicates:  No Known Allergies    Review of Systems:  Constitutional: no fever or chills  ENT: no nasal congestion or sore throat  Respiratory: no cough or shortness of breath  Cardiovascular: no chest pain or palpitations  Gastrointestinal: no nausea or vomiting, PUD, GERD, NSAID intolerance  Genitourinary: no hematuria or dysuria  Integument/Breast: no rash or pruritis  Hematologic/Lymphatic: no easy bruising or lymphadenopathy  Musculoskeletal: see HPI  Neurological: no seizures or tremors  Behavioral/Psych: no auditory or visual hallucinations      Physical Exam   Vitals:    04/27/22 1327   Weight: 86.2 kg (190 lb)   Height: 5' 2" (1.575 m)   PainSc:   9       Constitutional: Oriented to person, place, and time. Appears well-developed and well-nourished.   HENT:   Head: Normocephalic and atraumatic.   Nose: Nose normal.   Eyes: No scleral icterus.   Neck: Normal range of motion.   Cardiovascular: Normal rate and regular rhythm.    Pulses:       Radial pulses are 2+ on the right side, and 2+ on the left side.   Pulmonary/Chest: Effort normal and breath sounds normal.   Abdominal: Soft.   Neurological: Alert and oriented to person, place, and time.   Skin: Skin is warm.   Psychiatric: Normal mood and affect.     MUSCULOSKELETAL UPPER EXTREMITY:  Examination right elbow demonstrates tenderness laterally slight swelling range of motion full some pain on " "terminal extension and resisted wrist extension  No instability Tinel sign negative            Diagnostic Studies:  Previous x-rays show no evidence of abnormality right shoulder        Assessment:  Lateral epicondylitis right elbow chronic unresponsive to conservative treat    Plan:  She would like to set up surgery for the right elbow to include right elbow lateral epicondylar release and repair  The risks and benefits of surgery explained to the patient she understands      The risks and benefits of surgery were discussed with the patient today and they understand.  The consent was signed in the office for surgery.      Melchor Hughes MD (Jay)  [facility]  Orthopedic Upper Extremity Surgery      "

## 2022-04-27 NOTE — PROGRESS NOTES
CC:  Lateral epicondylitis right elbow chronic        HPI:  Sherie Reyes is a very pleasant 56 y.o. female with ongoing symptoms right shoulder for more than a year  Her symptoms are consistent with lateral epicondylitis  We have treated her with physical therapy bracing activity modification as well as injections from time to time  She continues to have pain in the right elbow and difficulty with use particularly lifting activities  She would like to consider surgery for the right elbow  No numbness or tingling reported         PAST MEDICAL HISTORY:   Past Medical History:   Diagnosis Date    Anxiety     Degenerative joint disease (DJD) of hip     Diabetes type 2, controlled 7/10/2019    GERD (gastroesophageal reflux disease)     Hyperlipidemia     Hypertension     IBS (irritable bowel syndrome)     Insomnia     Lumbar disc herniation     PTSD (post-traumatic stress disorder)      PAST SURGICAL HISTORY:   Past Surgical History:   Procedure Laterality Date    ADENOIDECTOMY      ARTHROSCOPIC DEBRIDEMENT OF ROTATOR CUFF Right 2020    Procedure: DEBRIDEMENT, ROTATOR CUFF, ARTHROSCOPIC;  Surgeon: Melchor Hughes Jr., MD;  Location: [facility] OR;  Service: Orthopedics;  Laterality: Right;  need opus system (Ger MESA notified , )  video, notified/confirmed 2020 KB 1310    ARTHROSCOPIC EXCISION OF ACROMIOCLAVICULAR JOINT  2020    Procedure: EXCISION, ACROMIOCLAVICULAR JOINT, ARTHROSCOPIC;  Surgeon: Melchor Hughes Jr., MD;  Location: [facility] OR;  Service: Orthopedics;;    ARTHROSCOPIC REPAIR OF ROTATOR CUFF OF SHOULDER      ARTHROSCOPY OF SHOULDER WITH DECOMPRESSION OF SUBACROMIAL SPACE  2020    Procedure: ARTHROSCOPY, SHOULDER, WITH SUBACROMIAL SPACE DECOMPRESSION;  Surgeon: Melchor Hughes Jr., MD;  Location: [facility] OR;  Service: Orthopedics;;     SECTION      EPIDURAL STEROID INJECTION INTO CERVICAL SPINE N/A 2021    Procedure:  Injection-steroid-epidural-cervical--C7-T1 IL ZAYNAB;  Surgeon: Gregoria Rodriguez MD;  Location: [facility]IN [facility];  Service: Pain Management;  Laterality: N/A;    TONSILLECTOMY       FAMILY HISTORY:   Family History   Problem Relation Age of Onset    Hypertension Mother     Hypothyroidism Mother     Arthritis Mother     Depression Mother     Heart disease Mother     Hyperlipidemia Mother     Hypertension Father     Stroke Father 46    Diabetes Father     Heart disease Father     Hyperlipidemia Father     Cancer Maternal Grandfather         Stomach Ca    Depression Maternal Grandfather     Early death Maternal Grandfather     Heart disease Maternal Grandfather     Hyperlipidemia Maternal Grandfather     Hypertension Maternal Grandfather     Mental illness Maternal Grandfather     Vision loss Maternal Grandfather         Loss during yhe War.    Breast cancer Paternal Aunt     Arthritis Paternal Grandmother     Depression Paternal Grandmother     Heart disease Paternal Grandmother     Cancer Paternal Aunt         Bilat breast Cancer    Heart disease Paternal Aunt     Cancer Maternal Grandmother         Lung w/ Mets    COPD Maternal Grandmother     Depression Maternal Grandmother     Early death Maternal Grandmother     Heart disease Maternal Grandmother     Hyperlipidemia Maternal Grandmother     Hypertension Maternal Grandmother     Depression Son     Learning disabilities Son     Depression Sister     Depression Daughter     Diabetes Paternal Grandfather     Early death Paternal Grandfather     Heart disease Paternal Grandfather     Hyperlipidemia Paternal Grandfather     Hypertension Paternal Grandfather     Kidney disease Paternal Grandfather      SOCIAL HISTORY:   Social History     Socioeconomic History    Marital status:    Tobacco Use    Smoking status: Former Smoker     Packs/day: 0.50     Years: 5.00     Pack years: 2.50     Types: Vaping with nicotine     Start  date: 1985     Quit date: 2014     Years since quittin.8    Smokeless tobacco: Former User     Quit date: 10/20/2014    Tobacco comment: Vaping 30 ml last 5 or 6 weeks   Substance and Sexual Activity    Alcohol use: Yes     Alcohol/week: 1.0 standard drink     Types: 1 Cans of beer per week     Comment: Maybe 1 or 2 a month    Drug use: No    Sexual activity: Yes     Partners: Male     Birth control/protection: OCP     Social Determinants of Health     Financial Resource Strain: High Risk    Difficulty of Paying Living Expenses: Hard   Food Insecurity: Food Insecurity Present    Worried About Running Out of Food in the Last Year: Often true    Ran Out of Food in the Last Year: Often true   Transportation Needs: No Transportation Needs    Lack of Transportation (Medical): No    Lack of Transportation (Non-Medical): No   Physical Activity: Inactive    Days of Exercise per Week: 0 days    Minutes of Exercise per Session: 0 min   Stress: Stress Concern Present    Feeling of Stress : Rather much   Social Connections: Unknown    Frequency of Communication with Friends and Family: Twice a week    Frequency of Social Gatherings with Friends and Family: Once a week    Active Member of Clubs or Organizations: No    Attends Club or Organization Meetings: Never    Marital Status:    Housing Stability: High Risk    Unable to Pay for Housing in the Last Year: Yes    Number of Places Lived in the Last Year: 1    Unstable Housing in the Last Year: No       MEDICATIONS:   Current Outpatient Medications:     acetaminophen (TYLENOL) 500 MG tablet, Take 500 mg by mouth every 6 (six) hours as needed for Pain., Disp: , Rfl:     albuterol (PROAIR HFA) 90 mcg/actuation inhaler, Inhale 2 puffs into the lungs every 6 (six) hours as needed for Wheezing. Rescue, Disp: 18 g, Rfl: 0    amLODIPine (NORVASC) 10 MG tablet, TAKE 1 TABLET(10 MG) BY MOUTH EVERY DAY, Disp: 90 tablet, Rfl: 3    azelastine  (ASTELIN) 137 mcg (0.1 %) nasal spray, 1 spray (137 mcg total) by Nasal route 2 (two) times daily., Disp: 30 mL, Rfl: 0    clonazePAM (KLONOPIN) 1 MG tablet, TAKE 1 TABLET BY MOUTH TWICE DAILY, Disp: 60 tablet, Rfl: 1    cyanocobalamin 1,000 mcg/mL injection, Inject 1,000 mcg into the muscle every 7 days., Disp: , Rfl:     diclofenac sodium (VOLTAREN) 1 % Gel, Apply 2 g topically 4 (four) times daily., Disp: 4 each, Rfl: 2    dicyclomine (BENTYL) 10 MG capsule, TAKE ONE CAPSULE BY MOUTH FOUR TIMES DAILY AS NEEDED FOR ABDOMINAL PAIN, Disp: 120 capsule, Rfl: 1    gabapentin (NEURONTIN) 300 MG capsule, Take 900 mg by mouth 3 (three) times daily., Disp: , Rfl:     KURVELO 0.15-0.03 mg per tablet, Take 1 tablet by mouth once daily., Disp: , Rfl:     LINZESS 290 mcg Cap capsule, TAKE 1 CAPSULE(290 MCG) BY MOUTH EVERY DAY, Disp: 90 capsule, Rfl: 3    losartan (COZAAR) 100 MG tablet, TAKE 1 TABLET(100 MG) BY MOUTH EVERY DAY, Disp: 90 tablet, Rfl: 3    melatonin 5 mg Chew, Take 10 mg by mouth daily as needed., Disp: , Rfl:     meloxicam (MOBIC) 15 MG tablet, 1 tablet, Disp: , Rfl:     mupirocin (BACTROBAN) 2 % ointment, Apply topically 2 (two) times daily., Disp: 30 g, Rfl: 1    omeprazole (PRILOSEC) 40 MG capsule, Take 1 capsule (40 mg total) by mouth once daily., Disp: 30 capsule, Rfl: 11    POLYETHYLENE GLYCOL 3350 (MIRALAX ORAL), Take by mouth as needed. , Disp: , Rfl:     potassium chloride 10% (KAYCIEL) 20 mEq/15 mL oral solution, TAKE 15 ML BY MOUTH EVERY DAY, Disp: 473 mL, Rfl: 11    pravastatin (PRAVACHOL) 20 MG tablet, TAKE 1 TABLET(20 MG) BY MOUTH EVERY EVENING, Disp: 90 tablet, Rfl: 1    tiZANidine (ZANAFLEX) 4 MG tablet, Take 4 mg by mouth 2 (two) times daily as needed., Disp: , Rfl:     traMADoL (ULTRAM) 50 mg tablet, Take 50 mg by mouth 2 (two) times a day., Disp: , Rfl:     traZODone (DESYREL) 100 MG tablet, TK 1 T PO  QHS, Disp: , Rfl:     venlafaxine (EFFEXOR-XR) 150 MG Cp24, Take 150 mg  "by mouth once daily., Disp: , Rfl:     venlafaxine (EFFEXOR-XR) 75 MG 24 hr capsule, TK ONE C PO QD WITH 150MG TO EQUAL 225, Disp: , Rfl:     ZTLIDO 1.8 % PtMd, Apply 1 patch topically daily as needed., Disp: , Rfl:     QUEtiapine (SEROQUEL) 200 MG Tab, Take 1 tablet (200 mg total) by mouth every evening., Disp: 30 tablet, Rfl: 2  No current facility-administered medications for this visit.    Facility-Administered Medications Ordered in Other Visits:     0.9%  NaCl infusion, , Intravenous, Continuous, Gregoria Rodriguez MD  ALLERGIES: Review of patient's allergies indicates:  No Known Allergies    Review of Systems:  Constitutional: no fever or chills  ENT: no nasal congestion or sore throat  Respiratory: no cough or shortness of breath  Cardiovascular: no chest pain or palpitations  Gastrointestinal: no nausea or vomiting, PUD, GERD, NSAID intolerance  Genitourinary: no hematuria or dysuria  Integument/Breast: no rash or pruritis  Hematologic/Lymphatic: no easy bruising or lymphadenopathy  Musculoskeletal: see HPI  Neurological: no seizures or tremors  Behavioral/Psych: no auditory or visual hallucinations      Physical Exam   Vitals:    04/27/22 1327   Weight: 86.2 kg (190 lb)   Height: 5' 2" (1.575 m)   PainSc:   9       Constitutional: Oriented to person, place, and time. Appears well-developed and well-nourished.   HENT:   Head: Normocephalic and atraumatic.   Nose: Nose normal.   Eyes: No scleral icterus.   Neck: Normal range of motion.   Cardiovascular: Normal rate and regular rhythm.    Pulses:       Radial pulses are 2+ on the right side, and 2+ on the left side.   Pulmonary/Chest: Effort normal and breath sounds normal.   Abdominal: Soft.   Neurological: Alert and oriented to person, place, and time.   Skin: Skin is warm.   Psychiatric: Normal mood and affect.     MUSCULOSKELETAL UPPER EXTREMITY:  Examination right elbow demonstrates tenderness laterally slight swelling range of motion full some pain on " "terminal extension and resisted wrist extension  No instability Tinel sign negative            Diagnostic Studies:  Previous x-rays show no evidence of abnormality right shoulder        Assessment:  Lateral epicondylitis right elbow chronic unresponsive to conservative treat    Plan:  She would like to set up surgery for the right elbow to include right elbow lateral epicondylar release and repair  The risks and benefits of surgery explained to the patient she understands      The risks and benefits of surgery were discussed with the patient today and they understand.  The consent was signed in the office for surgery.      Melchor Hughes MD (Jay)  [facility]  Orthopedic Upper Extremity Surgery      "

## 2022-04-28 NOTE — PROGRESS NOTES
Subjective:       Patient ID: Sherie Reyes is a 56 y.o. female.    Chief Complaint: Follow-up (3 mo)    Patient is a 56 y.o.female who presents today for f/u.    Having surgery on 5/20 on her elbow.       Labs reviewed  mammo feb 2022  Eye: due now; dr hair; wearing readers  Gyn: dr david Coulter due aug 2026    Pain management: dr purcell  Review of Systems   Constitutional: Negative for appetite change, chills, diaphoresis and fever.   HENT: Negative for congestion, ear discharge, ear pain, postnasal drip, tinnitus, trouble swallowing and voice change.    Eyes: Negative for discharge, redness and itching.   Respiratory: Negative for cough, chest tightness, shortness of breath and wheezing.    Cardiovascular: Negative for chest pain, palpitations and leg swelling.   Gastrointestinal: Negative for abdominal pain, constipation, diarrhea, nausea and vomiting.   Endocrine: Negative for cold intolerance and heat intolerance.   Genitourinary: Negative for difficulty urinating, flank pain, hematuria and urgency.   Musculoskeletal: Positive for arthralgias and myalgias. Negative for gait problem and neck stiffness.   Skin: Negative for color change and rash.   Neurological: Negative for dizziness, seizures, syncope and headaches.   Hematological: Negative for adenopathy.   Psychiatric/Behavioral: Negative for agitation, behavioral problems, confusion and sleep disturbance.       Objective:      Physical Exam  Vitals and nursing note reviewed.   Constitutional:       General: She is not in acute distress.     Appearance: She is well-developed. She is not diaphoretic.   HENT:      Head: Normocephalic and atraumatic.      Right Ear: External ear normal.      Left Ear: External ear normal.      Nose: Nose normal.      Mouth/Throat:      Pharynx: No oropharyngeal exudate.   Eyes:      General: No scleral icterus.        Right eye: No discharge.         Left eye: No discharge.      Conjunctiva/sclera: Conjunctivae  normal.      Pupils: Pupils are equal, round, and reactive to light.   Neck:      Thyroid: No thyromegaly.      Vascular: No JVD.      Trachea: No tracheal deviation.   Cardiovascular:      Rate and Rhythm: Normal rate.      Heart sounds: Normal heart sounds. No murmur heard.    No friction rub. No gallop.   Pulmonary:      Effort: Pulmonary effort is normal. No respiratory distress.      Breath sounds: Normal breath sounds. No stridor. No wheezing or rales.   Chest:      Chest wall: No tenderness.   Abdominal:      General: Bowel sounds are normal. There is no distension.      Palpations: Abdomen is soft.      Tenderness: There is no abdominal tenderness. There is no rebound.   Musculoskeletal:         General: No tenderness.      Cervical back: Neck supple.   Lymphadenopathy:      Cervical: No cervical adenopathy.   Skin:     General: Skin is warm and dry.      Findings: No erythema or rash.   Neurological:      Mental Status: She is alert and oriented to person, place, and time.   Psychiatric:         Behavior: Behavior normal.         Assessment and Plan:       1. Controlled type 2 diabetes mellitus without complication, without long-term current use of insulin  Labs reviewed  Stable; increase protein intake ; only eating one meal per day    2. Depression, unspecified depression type  Stable; controlled    3. Essential hypertension  Stable; controlled    4. Mixed hyperlipidemia  Low fat diet; on statin; taking fish oil            No follow-ups on file.

## 2022-05-09 ENCOUNTER — LAB VISIT (OUTPATIENT)
Dept: LAB | Facility: HOSPITAL | Age: 57
End: 2022-05-09
Attending: INTERNAL MEDICINE
Payer: MEDICAID

## 2022-05-09 DIAGNOSIS — F32.A DEPRESSION, UNSPECIFIED DEPRESSION TYPE: ICD-10-CM

## 2022-05-09 DIAGNOSIS — F51.01 PRIMARY INSOMNIA: ICD-10-CM

## 2022-05-09 DIAGNOSIS — E11.9 CONTROLLED TYPE 2 DIABETES MELLITUS WITHOUT COMPLICATION, WITHOUT LONG-TERM CURRENT USE OF INSULIN: ICD-10-CM

## 2022-05-09 DIAGNOSIS — Z00.00 ANNUAL PHYSICAL EXAM: ICD-10-CM

## 2022-05-09 DIAGNOSIS — Z12.31 VISIT FOR SCREENING MAMMOGRAM: ICD-10-CM

## 2022-05-09 LAB
ALBUMIN SERPL BCP-MCNC: 3.6 G/DL (ref 3.5–5.2)
ALP SERPL-CCNC: 38 U/L (ref 55–135)
ALT SERPL W/O P-5'-P-CCNC: 29 U/L (ref 10–44)
ANION GAP SERPL CALC-SCNC: 10 MMOL/L (ref 8–16)
AST SERPL-CCNC: 23 U/L (ref 10–40)
BILIRUB SERPL-MCNC: 0.3 MG/DL (ref 0.1–1)
BUN SERPL-MCNC: 9 MG/DL (ref 6–20)
CALCIUM SERPL-MCNC: 9.2 MG/DL (ref 8.7–10.5)
CHLORIDE SERPL-SCNC: 107 MMOL/L (ref 95–110)
CHOLEST SERPL-MCNC: 176 MG/DL (ref 120–199)
CHOLEST/HDLC SERPL: 4.3 {RATIO} (ref 2–5)
CO2 SERPL-SCNC: 21 MMOL/L (ref 23–29)
CREAT SERPL-MCNC: 0.9 MG/DL (ref 0.5–1.4)
EST. GFR  (AFRICAN AMERICAN): >60 ML/MIN/1.73 M^2
EST. GFR  (NON AFRICAN AMERICAN): >60 ML/MIN/1.73 M^2
ESTIMATED AVG GLUCOSE: 134 MG/DL (ref 68–131)
GLUCOSE SERPL-MCNC: 136 MG/DL (ref 70–110)
HBA1C MFR BLD: 6.3 % (ref 4–5.6)
HDLC SERPL-MCNC: 41 MG/DL (ref 40–75)
HDLC SERPL: 23.3 % (ref 20–50)
LDLC SERPL CALC-MCNC: 94.6 MG/DL (ref 63–159)
NONHDLC SERPL-MCNC: 135 MG/DL
POTASSIUM SERPL-SCNC: 3.8 MMOL/L (ref 3.5–5.1)
PROT SERPL-MCNC: 6.9 G/DL (ref 6–8.4)
SODIUM SERPL-SCNC: 138 MMOL/L (ref 136–145)
TRIGL SERPL-MCNC: 202 MG/DL (ref 30–150)

## 2022-05-09 PROCEDURE — 83036 HEMOGLOBIN GLYCOSYLATED A1C: CPT | Performed by: INTERNAL MEDICINE

## 2022-05-09 PROCEDURE — 36415 COLL VENOUS BLD VENIPUNCTURE: CPT | Mod: PO | Performed by: INTERNAL MEDICINE

## 2022-05-09 PROCEDURE — 80061 LIPID PANEL: CPT | Performed by: INTERNAL MEDICINE

## 2022-05-09 PROCEDURE — 80053 COMPREHEN METABOLIC PANEL: CPT | Performed by: INTERNAL MEDICINE

## 2022-05-10 ENCOUNTER — OFFICE VISIT (OUTPATIENT)
Dept: INTERNAL MEDICINE | Facility: CLINIC | Age: 57
End: 2022-05-10
Payer: MEDICAID

## 2022-05-10 VITALS
HEART RATE: 97 BPM | WEIGHT: 186.94 LBS | SYSTOLIC BLOOD PRESSURE: 130 MMHG | HEIGHT: 62 IN | RESPIRATION RATE: 16 BRPM | BODY MASS INDEX: 34.4 KG/M2 | OXYGEN SATURATION: 98 % | DIASTOLIC BLOOD PRESSURE: 80 MMHG | TEMPERATURE: 98 F

## 2022-05-10 DIAGNOSIS — E78.2 MIXED HYPERLIPIDEMIA: ICD-10-CM

## 2022-05-10 DIAGNOSIS — E11.9 CONTROLLED TYPE 2 DIABETES MELLITUS WITHOUT COMPLICATION, WITHOUT LONG-TERM CURRENT USE OF INSULIN: Primary | ICD-10-CM

## 2022-05-10 DIAGNOSIS — I10 ESSENTIAL HYPERTENSION: ICD-10-CM

## 2022-05-10 DIAGNOSIS — F32.A DEPRESSION, UNSPECIFIED DEPRESSION TYPE: ICD-10-CM

## 2022-05-10 PROCEDURE — 3075F SYST BP GE 130 - 139MM HG: CPT | Mod: CPTII,,, | Performed by: INTERNAL MEDICINE

## 2022-05-10 PROCEDURE — 99214 PR OFFICE/OUTPT VISIT, EST, LEVL IV, 30-39 MIN: ICD-10-PCS | Mod: S$PBB,,, | Performed by: INTERNAL MEDICINE

## 2022-05-10 PROCEDURE — 99999 PR PBB SHADOW E&M-EST. PATIENT-LVL V: CPT | Mod: PBBFAC,,, | Performed by: INTERNAL MEDICINE

## 2022-05-10 PROCEDURE — 1160F RVW MEDS BY RX/DR IN RCRD: CPT | Mod: CPTII,,, | Performed by: INTERNAL MEDICINE

## 2022-05-10 PROCEDURE — 3044F PR MOST RECENT HEMOGLOBIN A1C LEVEL <7.0%: ICD-10-PCS | Mod: CPTII,,, | Performed by: INTERNAL MEDICINE

## 2022-05-10 PROCEDURE — 3061F PR NEG MICROALBUMINURIA RESULT DOCUMENTED/REVIEW: ICD-10-PCS | Mod: CPTII,,, | Performed by: INTERNAL MEDICINE

## 2022-05-10 PROCEDURE — 99214 OFFICE O/P EST MOD 30 MIN: CPT | Mod: S$PBB,,, | Performed by: INTERNAL MEDICINE

## 2022-05-10 PROCEDURE — 1160F PR REVIEW ALL MEDS BY PRESCRIBER/CLIN PHARMACIST DOCUMENTED: ICD-10-PCS | Mod: CPTII,,, | Performed by: INTERNAL MEDICINE

## 2022-05-10 PROCEDURE — 3075F PR MOST RECENT SYSTOLIC BLOOD PRESS GE 130-139MM HG: ICD-10-PCS | Mod: CPTII,,, | Performed by: INTERNAL MEDICINE

## 2022-05-10 PROCEDURE — 4010F ACE/ARB THERAPY RXD/TAKEN: CPT | Mod: CPTII,,, | Performed by: INTERNAL MEDICINE

## 2022-05-10 PROCEDURE — 1159F PR MEDICATION LIST DOCUMENTED IN MEDICAL RECORD: ICD-10-PCS | Mod: CPTII,,, | Performed by: INTERNAL MEDICINE

## 2022-05-10 PROCEDURE — 4010F PR ACE/ARB THEARPY RXD/TAKEN: ICD-10-PCS | Mod: CPTII,,, | Performed by: INTERNAL MEDICINE

## 2022-05-10 PROCEDURE — 3079F PR MOST RECENT DIASTOLIC BLOOD PRESSURE 80-89 MM HG: ICD-10-PCS | Mod: CPTII,,, | Performed by: INTERNAL MEDICINE

## 2022-05-10 PROCEDURE — 99215 OFFICE O/P EST HI 40 MIN: CPT | Mod: PBBFAC,PO | Performed by: INTERNAL MEDICINE

## 2022-05-10 PROCEDURE — 3044F HG A1C LEVEL LT 7.0%: CPT | Mod: CPTII,,, | Performed by: INTERNAL MEDICINE

## 2022-05-10 PROCEDURE — 3008F BODY MASS INDEX DOCD: CPT | Mod: CPTII,,, | Performed by: INTERNAL MEDICINE

## 2022-05-10 PROCEDURE — 3079F DIAST BP 80-89 MM HG: CPT | Mod: CPTII,,, | Performed by: INTERNAL MEDICINE

## 2022-05-10 PROCEDURE — 1159F MED LIST DOCD IN RCRD: CPT | Mod: CPTII,,, | Performed by: INTERNAL MEDICINE

## 2022-05-10 PROCEDURE — 3066F PR DOCUMENTATION OF TREATMENT FOR NEPHROPATHY: ICD-10-PCS | Mod: CPTII,,, | Performed by: INTERNAL MEDICINE

## 2022-05-10 PROCEDURE — 3008F PR BODY MASS INDEX (BMI) DOCUMENTED: ICD-10-PCS | Mod: CPTII,,, | Performed by: INTERNAL MEDICINE

## 2022-05-10 PROCEDURE — 3061F NEG MICROALBUMINURIA REV: CPT | Mod: CPTII,,, | Performed by: INTERNAL MEDICINE

## 2022-05-10 PROCEDURE — 3072F PR LOW RISK FOR RETINOPATHY: ICD-10-PCS | Mod: CPTII,,, | Performed by: INTERNAL MEDICINE

## 2022-05-10 PROCEDURE — 3072F LOW RISK FOR RETINOPATHY: CPT | Mod: CPTII,,, | Performed by: INTERNAL MEDICINE

## 2022-05-10 PROCEDURE — 3066F NEPHROPATHY DOC TX: CPT | Mod: CPTII,,, | Performed by: INTERNAL MEDICINE

## 2022-05-10 PROCEDURE — 99999 PR PBB SHADOW E&M-EST. PATIENT-LVL V: ICD-10-PCS | Mod: PBBFAC,,, | Performed by: INTERNAL MEDICINE

## 2022-05-12 RX ORDER — OMEPRAZOLE 40 MG/1
CAPSULE, DELAYED RELEASE ORAL
Qty: 30 CAPSULE | Refills: 11 | Status: SHIPPED | OUTPATIENT
Start: 2022-05-12 | End: 2023-03-24

## 2022-05-12 NOTE — TELEPHONE ENCOUNTER
Refill Routing Note   Medication(s) are not appropriate for processing by [facility] for the following reason(s):      - Required indication for medication not on problem list (GERD)    [facility] action(s):  Defer Medication-related problems identified: No indication for a medication        Medication reconciliation completed: No     Appointments  past 12m or future 3m with PCP    Date Provider   Last Visit   5/10/2022 Tonia Bernal, DO   Next Visit   8/16/2022 Tonia Bernal, DO   ED visits in past 90 days: 0        Note composed:11:09 AM 05/12/2022

## 2022-05-12 NOTE — TELEPHONE ENCOUNTER
No new care gaps identified.  [facility] Embedded Care Gaps. Reference number: [id number]. 5/12/2022   8:07:31 AM MILLICENTT

## 2022-05-13 ENCOUNTER — PATIENT MESSAGE (OUTPATIENT)
Dept: INTERNAL MEDICINE | Facility: CLINIC | Age: 57
End: 2022-05-13
Payer: MEDICAID

## 2022-05-15 ENCOUNTER — PATIENT MESSAGE (OUTPATIENT)
Dept: INTERNAL MEDICINE | Facility: CLINIC | Age: 57
End: 2022-05-15
Payer: MEDICAID

## 2022-05-20 ENCOUNTER — HOSPITAL ENCOUNTER (OUTPATIENT)
Facility: HOSPITAL | Age: 57
Discharge: HOME | End: 2022-05-20
Attending: ORTHOPAEDIC SURGERY | Admitting: ORTHOPAEDIC SURGERY
Payer: MEDICAID

## 2022-05-20 ENCOUNTER — ANESTHESIA (OUTPATIENT)
Dept: SURGERY | Facility: HOSPITAL | Age: 57
End: 2022-05-20
Payer: MEDICAID

## 2022-05-20 ENCOUNTER — ANESTHESIA EVENT (OUTPATIENT)
Dept: SURGERY | Facility: HOSPITAL | Age: 57
End: 2022-05-20
Payer: MEDICAID

## 2022-05-20 VITALS
SYSTOLIC BLOOD PRESSURE: 118 MMHG | RESPIRATION RATE: 16 BRPM | HEIGHT: 62 IN | WEIGHT: 185 LBS | TEMPERATURE: 98 F | DIASTOLIC BLOOD PRESSURE: 66 MMHG | OXYGEN SATURATION: 98 % | BODY MASS INDEX: 34.04 KG/M2 | HEART RATE: 74 BPM

## 2022-05-20 DIAGNOSIS — M77.11 LATERAL EPICONDYLITIS OF RIGHT ELBOW: ICD-10-CM

## 2022-05-20 LAB — POCT GLUCOSE: 131 MG/DL (ref 70–110)

## 2022-05-20 PROCEDURE — 36000706: Performed by: ORTHOPAEDIC SURGERY

## 2022-05-20 PROCEDURE — 63600175 PHARM REV CODE 636 W HCPCS: Performed by: ORTHOPAEDIC SURGERY

## 2022-05-20 PROCEDURE — 63600175 PHARM REV CODE 636 W HCPCS: Performed by: NURSE ANESTHETIST, CERTIFIED REGISTERED

## 2022-05-20 PROCEDURE — 71000015 HC POSTOP RECOV 1ST HR: Performed by: ORTHOPAEDIC SURGERY

## 2022-05-20 PROCEDURE — 24359 REPAIR ELBOW DEB/ATTCH OPEN: CPT | Mod: RT,,, | Performed by: ORTHOPAEDIC SURGERY

## 2022-05-20 PROCEDURE — 36000707: Performed by: ORTHOPAEDIC SURGERY

## 2022-05-20 PROCEDURE — 24359 PR TENOTOMY ELBOW LATERAL/MEDIAL DEBRIDE REPAIR: ICD-10-PCS | Mod: RT,,, | Performed by: ORTHOPAEDIC SURGERY

## 2022-05-20 PROCEDURE — 25000003 PHARM REV CODE 250: Performed by: NURSE ANESTHETIST, CERTIFIED REGISTERED

## 2022-05-20 PROCEDURE — 76942 ECHO GUIDE FOR BIOPSY: CPT | Performed by: STUDENT IN AN ORGANIZED HEALTH CARE EDUCATION/TRAINING PROGRAM

## 2022-05-20 PROCEDURE — 71000016 HC POSTOP RECOV ADDL HR: Performed by: ORTHOPAEDIC SURGERY

## 2022-05-20 PROCEDURE — 37000009 HC ANESTHESIA EA ADD 15 MINS: Performed by: ORTHOPAEDIC SURGERY

## 2022-05-20 PROCEDURE — 01712 ANES PX NRV MSC UPR A&E TNOT: CPT | Performed by: ORTHOPAEDIC SURGERY

## 2022-05-20 PROCEDURE — 25000003 PHARM REV CODE 250: Performed by: ORTHOPAEDIC SURGERY

## 2022-05-20 PROCEDURE — 63600175 PHARM REV CODE 636 W HCPCS: Performed by: STUDENT IN AN ORGANIZED HEALTH CARE EDUCATION/TRAINING PROGRAM

## 2022-05-20 PROCEDURE — 37000008 HC ANESTHESIA 1ST 15 MINUTES: Performed by: ORTHOPAEDIC SURGERY

## 2022-05-20 RX ORDER — KETOROLAC TROMETHAMINE 30 MG/ML
30 INJECTION, SOLUTION INTRAMUSCULAR; INTRAVENOUS ONCE
Status: COMPLETED | OUTPATIENT
Start: 2022-05-20 | End: 2022-05-20

## 2022-05-20 RX ORDER — PROPOFOL 10 MG/ML
VIAL (ML) INTRAVENOUS CONTINUOUS PRN
Status: DISCONTINUED | OUTPATIENT
Start: 2022-05-20 | End: 2022-05-20

## 2022-05-20 RX ORDER — ONDANSETRON 2 MG/ML
4 INJECTION INTRAMUSCULAR; INTRAVENOUS ONCE
Status: COMPLETED | OUTPATIENT
Start: 2022-05-20 | End: 2022-05-20

## 2022-05-20 RX ORDER — CEFAZOLIN SODIUM 2 G/50ML
2 SOLUTION INTRAVENOUS
Status: DISCONTINUED | OUTPATIENT
Start: 2022-05-20 | End: 2022-05-20 | Stop reason: HOSPADM

## 2022-05-20 RX ORDER — ACETAMINOPHEN 325 MG/1
650 TABLET ORAL EVERY 4 HOURS PRN
Status: DISCONTINUED | OUTPATIENT
Start: 2022-05-20 | End: 2022-05-20 | Stop reason: HOSPADM

## 2022-05-20 RX ORDER — OXYCODONE AND ACETAMINOPHEN 7.5; 325 MG/1; MG/1
1 TABLET ORAL EVERY 4 HOURS PRN
Qty: 30 TABLET | Refills: 0 | Status: SHIPPED | OUTPATIENT
Start: 2022-05-20 | End: 2022-07-14

## 2022-05-20 RX ORDER — ONDANSETRON 2 MG/ML
INJECTION INTRAMUSCULAR; INTRAVENOUS
Status: DISCONTINUED | OUTPATIENT
Start: 2022-05-20 | End: 2022-05-20

## 2022-05-20 RX ORDER — ROPIVACAINE HYDROCHLORIDE 5 MG/ML
INJECTION, SOLUTION EPIDURAL; INFILTRATION; PERINEURAL
Status: DISCONTINUED | OUTPATIENT
Start: 2022-05-20 | End: 2022-05-20

## 2022-05-20 RX ORDER — MIDAZOLAM HYDROCHLORIDE 1 MG/ML
INJECTION, SOLUTION INTRAMUSCULAR; INTRAVENOUS
Status: DISCONTINUED | OUTPATIENT
Start: 2022-05-20 | End: 2022-05-20

## 2022-05-20 RX ORDER — DEXMEDETOMIDINE HYDROCHLORIDE 100 UG/ML
INJECTION, SOLUTION INTRAVENOUS
Status: DISCONTINUED | OUTPATIENT
Start: 2022-05-20 | End: 2022-05-20

## 2022-05-20 RX ORDER — ONDANSETRON 8 MG/1
8 TABLET, ORALLY DISINTEGRATING ORAL EVERY 8 HOURS PRN
Status: DISCONTINUED | OUTPATIENT
Start: 2022-05-20 | End: 2022-05-20 | Stop reason: HOSPADM

## 2022-05-20 RX ORDER — LIDOCAINE HYDROCHLORIDE 20 MG/ML
INJECTION INTRAVENOUS
Status: DISCONTINUED | OUTPATIENT
Start: 2022-05-20 | End: 2022-05-20

## 2022-05-20 RX ORDER — PROPOFOL 10 MG/ML
VIAL (ML) INTRAVENOUS
Status: DISCONTINUED | OUTPATIENT
Start: 2022-05-20 | End: 2022-05-20

## 2022-05-20 RX ORDER — FENTANYL CITRATE 50 UG/ML
INJECTION, SOLUTION INTRAMUSCULAR; INTRAVENOUS
Status: DISCONTINUED | OUTPATIENT
Start: 2022-05-20 | End: 2022-05-20

## 2022-05-20 RX ORDER — OXYCODONE HYDROCHLORIDE 5 MG/1
10 TABLET ORAL EVERY 4 HOURS PRN
Status: DISCONTINUED | OUTPATIENT
Start: 2022-05-20 | End: 2022-05-20 | Stop reason: HOSPADM

## 2022-05-20 RX ADMIN — OXYCODONE 10 MG: 5 TABLET ORAL at 05:05

## 2022-05-20 RX ADMIN — ONDANSETRON 4 MG: 2 INJECTION INTRAMUSCULAR; INTRAVENOUS at 05:05

## 2022-05-20 RX ADMIN — ROPIVACAINE HYDROCHLORIDE 20 ML: 5 INJECTION, SOLUTION EPIDURAL; INFILTRATION; PERINEURAL at 02:05

## 2022-05-20 RX ADMIN — SODIUM CHLORIDE, SODIUM LACTATE, POTASSIUM CHLORIDE, AND CALCIUM CHLORIDE: .6; .31; .03; .02 INJECTION, SOLUTION INTRAVENOUS at 03:05

## 2022-05-20 RX ADMIN — LIDOCAINE HYDROCHLORIDE 50 MG: 20 INJECTION, SOLUTION INTRAVENOUS at 03:05

## 2022-05-20 RX ADMIN — FENTANYL CITRATE 25 MCG: 50 INJECTION, SOLUTION INTRAMUSCULAR; INTRAVENOUS at 03:05

## 2022-05-20 RX ADMIN — KETOROLAC TROMETHAMINE 30 MG: 30 INJECTION, SOLUTION INTRAMUSCULAR at 05:05

## 2022-05-20 RX ADMIN — MIDAZOLAM 2 MG: 1 INJECTION INTRAMUSCULAR; INTRAVENOUS at 02:05

## 2022-05-20 RX ADMIN — DEXMEDETOMIDINE HYDROCHLORIDE 12 MCG: 100 INJECTION, SOLUTION, CONCENTRATE INTRAVENOUS at 03:05

## 2022-05-20 RX ADMIN — PROPOFOL 100 MCG/KG/MIN: 10 INJECTION, EMULSION INTRAVENOUS at 03:05

## 2022-05-20 RX ADMIN — FENTANYL CITRATE 50 MCG: 50 INJECTION, SOLUTION INTRAMUSCULAR; INTRAVENOUS at 03:05

## 2022-05-20 RX ADMIN — CEFAZOLIN SODIUM 2 G: 2 SOLUTION INTRAVENOUS at 03:05

## 2022-05-20 RX ADMIN — MIDAZOLAM 2 MG: 1 INJECTION INTRAMUSCULAR; INTRAVENOUS at 03:05

## 2022-05-20 RX ADMIN — ONDANSETRON 8 MG: 2 INJECTION, SOLUTION INTRAMUSCULAR; INTRAVENOUS at 04:05

## 2022-05-20 RX ADMIN — PROPOFOL 50 MG: 10 INJECTION, EMULSION INTRAVENOUS at 03:05

## 2022-05-20 RX ADMIN — DEXMEDETOMIDINE HYDROCHLORIDE 8 MCG: 100 INJECTION, SOLUTION, CONCENTRATE INTRAVENOUS at 03:05

## 2022-05-20 NOTE — ANESTHESIA PROCEDURE NOTES
Peripheral Block    Patient location during procedure: pre-op    Reason for block: primary anesthetic    Diagnosis: Tennis elbow   Start time: 5/20/2022 2:06 PM  Timeout: 5/20/2022 2:05 PM   End time: 5/20/2022 2:08 PM    Staffing  Authorizing Provider: Mercedes Daly MD  Performing Provider: Danielle Chavez MD    Preanesthetic Checklist  Completed: patient identified, IV checked, site marked, risks and benefits discussed, surgical consent, monitors and equipment checked, pre-op evaluation and timeout performed  Peripheral Block  Patient position: supine  Prep: ChloraPrep  Patient monitoring: heart rate, continuous pulse ox and frequent blood pressure checks  Block type: supraclavicular  Laterality: right  Injection technique: single shot  Needle  Needle type: Echogenic   Needle gauge: 21 G  Needle length: 3.5 in  Needle localization: ultrasound guidance   -ultrasound image captured on disc.  Assessment  Injection assessment: negative aspiration, positive parasthesia and local visualized surrounding nerve  Paresthesia pain: immediately resolved  Heart rate change: no  Slow fractionated injection: yes  Pain Tolerance: comfortable throughout block and no complaints      Additional Notes  VSS. Sammy AKINS present and monitoring vitals during. Tolerated well without complications. Some paresthesia noted which resolved after needle redirection.

## 2022-05-20 NOTE — TRANSFER OF CARE
"Anesthesia Transfer of Care Note    Patient: Sherie Reyes    Procedure(s) Performed: Procedure(s) (LRB):  TENOTOMY,ELBOW,WITH DEBRIDEMENT AND TENDON REPAIR (Right)    Patient location: OPS    Anesthesia Type: MAC    Transport from OR: Transported from OR on room air with adequate spontaneous ventilation    Post pain: adequate analgesia    Post assessment: no apparent anesthetic complications and tolerated procedure well    Post vital signs: stable    Level of consciousness: awake, alert and oriented    Nausea/Vomiting: no nausea/vomiting    Complications: none    Transfer of care protocol was followed      Last vitals:   Visit Vitals  /61   Pulse 87   Temp (!) 30.6 °C (87 °F) (Skin)   Resp 18   Ht 5' 2" (1.575 m)   Wt 83.9 kg (185 lb)   SpO2 96%   Breastfeeding No   BMI 33.84 kg/m²     "

## 2022-05-20 NOTE — ANESTHESIA POSTPROCEDURE EVALUATION
Anesthesia Post Evaluation    Patient: Sherie Reyes    Procedure(s) Performed: Procedure(s) (LRB):  TENOTOMY,ELBOW,WITH DEBRIDEMENT AND TENDON REPAIR (Right)    Final Anesthesia Type: MAC      Patient location during evaluation: OPS  Patient participation: Yes- Able to Participate  Level of consciousness: awake and alert and oriented  Post-procedure vital signs: reviewed and stable  Pain management: adequate  Airway patency: patent    PONV status at discharge: No PONV  Anesthetic complications: no      Cardiovascular status: blood pressure returned to baseline and hemodynamically stable  Respiratory status: unassisted, spontaneous ventilation and room air  Hydration status: euvolemic  Follow-up not needed.          Vitals Value Taken Time   BP  05/20/22 1627   Temp  05/20/22 1627   Pulse  05/20/22 1627   Resp  05/20/22 1627   SpO2  05/20/22 1627         No case tracking events are documented in the log.      Pain/Jessica Score: No data recorded

## 2022-05-20 NOTE — OP NOTE
[facility] - [facility] (Hospital)  Operative Note      Date of Procedure: 5/20/2022     Procedure: Procedure(s) (LRB):  TENOTOMY,ELBOW,WITH DEBRIDEMENT AND TENDON REPAIR (Right)     Surgeon(s) and Role:     * Melchor Hughes Jr., MD - Primary    Assisting Surgeon: None    Pre-Operative Diagnosis: Lateral epicondylitis of right elbow [M77.11]    Post-Operative Diagnosis: Post-Op Diagnosis Codes:     * Lateral epicondylitis of right elbow [M77.11]    Anesthesia: Regional    Operative Findings (including complications, if any):  Lateral epicondylitis right elbow    Description of Technical Procedures:     Preop diagnosis:  Lateral epicondylitis right elbow    Postop diagnosis:  Same.    Operative procedure:  Lateral epicondylar release and repair right elbow (tennis elbow repair).    Surgeon:  Saul.    Anesthesia:  Regional block.    EBL:  Minimal.    Specimen none.    EBL:  Minimal.    Complications:  None.    Operative procedure in detail as follows:    After operative consent was obtained the patient brought the operating room placed supine operating table.  Anesthesia by regional block performed by the anesthesia staff.  A tourniquet applied to the right arm right upper extremity prepped and draped out in the normal sterile fashion.  Esmarch used to exsanguinate the limb tourniquet inflated 225 mmHg.  A lateral oblique incision made centered over the lateral epicondyle with 15 blade.  Full-thickness skin flaps raised hemostasis achieved with Bovie.  The deep fascia was opened and released.  The ECRL and ECRB reflected off of the bone with the Bovie and the [place] blade.  There was noted to be some degenerative tendon in the area this was excised completely.  Normal tendon was left attached to the bone.  The bone was roughened up.  Hemostasis achieved with Bovie.  The wound irrigated.  The good tendon was then repaired side to side directly over the bone with 0 Vicryl suture.  Subcutaneous tissue closed with 3-0 Vicryl  Steri-Strips on the skin.  Sterile dressing applied followed by a bulky arm wrap.  Tourniquet deflated patient brought to the recovery room stable condition all sponge needle counts reported as correct no complications    Significant Surgical Tasks Conducted by the Assistant(s), if Applicable: none    Estimated Blood Loss (EBL): * No values recorded between 5/20/2022  3:37 PM and 5/20/2022  4:18 PM *           Implants: * No implants in log *    Specimens:   Specimen (24h ago, onward)            None                  Condition: Good    Disposition: PACU - hemodynamically stable.    Attestation: I was present and scrubbed for the entire procedure.    Discharge Note    OUTCOME: Patient tolerated treatment/procedure well without complication and is now ready for discharge.    DISPOSITION: Home or Self Care    FINAL DIAGNOSIS:  <principal problem not specified>    FOLLOWUP: In clinic    DISCHARGE INSTRUCTIONS:    Discharge Procedure Orders   Diet general     Call MD for:  temperature >100.4     Call MD for:  persistent nausea and vomiting     Call MD for:  severe uncontrolled pain     Keep surgical extremity elevated     Change dressing (specify)   Order Comments: Dressing change: 5 days

## 2022-05-20 NOTE — ANESTHESIA PREPROCEDURE EVALUATION
2022     Sherie Reyes is a 56 y.o., female with T2DM, HTN, GERD scheduled for right elbow tenotomy with debridement and tendon repair    Past Medical History:   Diagnosis Date    Anxiety     Degenerative joint disease (DJD) of hip     Diabetes type 2, controlled 7/10/2019    GERD (gastroesophageal reflux disease)     Hyperlipidemia     Hypertension     IBS (irritable bowel syndrome)     Insomnia     Lumbar disc herniation     PTSD (post-traumatic stress disorder)      Past Surgical History:   Procedure Laterality Date    ADENOIDECTOMY      ARTHROSCOPIC DEBRIDEMENT OF ROTATOR CUFF Right 2020    Procedure: DEBRIDEMENT, ROTATOR CUFF, ARTHROSCOPIC;  Surgeon: Melchor Hughes Jr., MD;  Location: [facility] OR;  Service: Orthopedics;  Laterality: Right;  need opus system (Ger MESA notified , EF)  video, notified/confirmed 2020 KB 1310    ARTHROSCOPIC EXCISION OF ACROMIOCLAVICULAR JOINT  2020    Procedure: EXCISION, ACROMIOCLAVICULAR JOINT, ARTHROSCOPIC;  Surgeon: Melchor Hughes Jr., MD;  Location: [facility] OR;  Service: Orthopedics;;    ARTHROSCOPIC REPAIR OF ROTATOR CUFF OF SHOULDER      ARTHROSCOPY OF SHOULDER WITH DECOMPRESSION OF SUBACROMIAL SPACE  2020    Procedure: ARTHROSCOPY, SHOULDER, WITH SUBACROMIAL SPACE DECOMPRESSION;  Surgeon: Melchor Hughes Jr., MD;  Location: [facility] OR;  Service: Orthopedics;;     SECTION      EPIDURAL STEROID INJECTION INTO CERVICAL SPINE N/A 2021    Procedure: Injection-steroid-epidural-cervical--C7-T1 IL ZAYNAB;  Surgeon: Gregoria Rodriguez MD;  Location: [facility] PAIN MGT;  Service: Pain Management;  Laterality: N/A;    TONSILLECTOMY         Anesthesia Evaluation    I have reviewed the Patient Summary Reports.    I have reviewed the Nursing Notes. I have reviewed the NPO Status.   I have reviewed the Medications.     Review of  Systems  Anesthesia Hx:  No problems with previous Anesthesia  Denies Family Hx of Anesthesia complications.    Social:  Former Smoker, No Alcohol Use    Hematology/Oncology:  Hematology Normal        Cardiovascular:   Hypertension, well controlled hyperlipidemia    Pulmonary:  Pulmonary Normal    Renal/:   Chronic Renal Disease, CRI    Hepatic/GI:   GERD, well controlled    Musculoskeletal:   Arthritis     Endocrine:   Diabetes, well controlled, type 2    Psych:   anxiety          Physical Exam  General:  Obesity      Airway/Jaw/Neck:  Airway Findings: Mouth Opening: Normal   Tongue: Normal   General Airway Assessment: Adult Mallampati: III  TM Distance: Normal, at least 6 cm   Jaw/Neck Findings:  Neck ROM: Normal ROM       Dental:  Dental Findings: In tact     Chest/Lungs:  Chest/Lungs Findings: Clear to auscultation, Normal Respiratory Rate      Heart/Vascular:  Heart Findings: Rate: Normal  Rhythm: Regular Rhythm        Mental Status:  Mental Status Findings:  Cooperative, Alert and Oriented       EKG 5/26/2020  Normal sinus rhythm  Normal ECG    Stress Echo 6/3/2020  · Concentric left ventricular remodeling.  · The patient reached the end of the protocol.  · During stress, the following significant arrhythmias were observed: rare PACs.  · Indeterminate central venous pressure. Estimated PA pressure is at least 21 mmHg.  · Normal left ventricular systolic function. The estimated ejection fraction is 65%.  · Grade I (mild) left ventricular diastolic dysfunction consistent with impaired relaxation.  · Mild mitral sclerosis.  · Normal right ventricular systolic function.  · Mild tricuspid regurgitation.  · The study was difficult due to patient's poor endocardial visualization.  · The ECG portion of this study is negative for myocardial ischemia.  · The stress echo portion of this study is negative for myocardial ischemia.      Anesthesia Plan  Type of Anesthesia, risks & benefits discussed:  Anesthesia Type:   regional, MAC    Patient's Preference:   Plan Factors:          Intra-op Monitoring Plan: standard ASA monitors  Intra-op Monitoring Plan Comments:   Post Op Pain Control Plan: peripheral nerve block  Post Op Pain Control Plan Comments:     Induction:   IV  Beta Blocker:         Informed Consent: Informed consent signed with the Patient and all parties understand the risks and agree with anesthesia plan.  All questions answered.  Anesthesia consent signed with patient.  ASA Score: 2     Day of Surgery Review of History & Physical:              Ready For Surgery From Anesthesia Perspective.           Physical Exam  General: Obesity    Airway:  Mallampati: III   Mouth Opening: Normal  TM Distance: Normal, at least 6 cm  Tongue: Normal  Neck ROM: Normal ROM    Dental:  In tact    Chest/Lungs:  Clear to auscultation, Normal Respiratory Rate    Heart:  Rate: Normal  Rhythm: Regular Rhythm          Anesthesia Plan  Type of Anesthesia, risks & benefits discussed:    Anesthesia Type: regional, MAC  Intra-op Monitoring Plan: standard ASA monitors  Post Op Pain Control Plan: peripheral nerve block  Induction:  IV  Informed Consent: Informed consent signed with the Patient and all parties understand the risks and agree with anesthesia plan.  All questions answered.   ASA Score: 2    Ready For Surgery From Anesthesia Perspective.       .

## 2022-06-02 ENCOUNTER — OFFICE VISIT (OUTPATIENT)
Dept: ORTHOPEDICS | Facility: CLINIC | Age: 57
End: 2022-06-02
Payer: MEDICAID

## 2022-06-02 VITALS — HEIGHT: 62 IN | WEIGHT: 185 LBS | BODY MASS INDEX: 34.04 KG/M2

## 2022-06-02 DIAGNOSIS — M77.11 LATERAL EPICONDYLITIS OF RIGHT ELBOW: Primary | ICD-10-CM

## 2022-06-02 DIAGNOSIS — K59.00 CONSTIPATION, UNSPECIFIED CONSTIPATION TYPE: ICD-10-CM

## 2022-06-02 PROCEDURE — 1159F PR MEDICATION LIST DOCUMENTED IN MEDICAL RECORD: ICD-10-PCS | Mod: CPTII,,, | Performed by: ORTHOPAEDIC SURGERY

## 2022-06-02 PROCEDURE — 3061F PR NEG MICROALBUMINURIA RESULT DOCUMENTED/REVIEW: ICD-10-PCS | Mod: CPTII,,, | Performed by: ORTHOPAEDIC SURGERY

## 2022-06-02 PROCEDURE — 99024 PR POST-OP FOLLOW-UP VISIT: ICD-10-PCS | Mod: ,,, | Performed by: ORTHOPAEDIC SURGERY

## 2022-06-02 PROCEDURE — 4010F PR ACE/ARB THEARPY RXD/TAKEN: ICD-10-PCS | Mod: CPTII,,, | Performed by: ORTHOPAEDIC SURGERY

## 2022-06-02 PROCEDURE — 3008F PR BODY MASS INDEX (BMI) DOCUMENTED: ICD-10-PCS | Mod: CPTII,,, | Performed by: ORTHOPAEDIC SURGERY

## 2022-06-02 PROCEDURE — 3044F HG A1C LEVEL LT 7.0%: CPT | Mod: CPTII,,, | Performed by: ORTHOPAEDIC SURGERY

## 2022-06-02 PROCEDURE — 4010F ACE/ARB THERAPY RXD/TAKEN: CPT | Mod: CPTII,,, | Performed by: ORTHOPAEDIC SURGERY

## 2022-06-02 PROCEDURE — 3072F LOW RISK FOR RETINOPATHY: CPT | Mod: CPTII,,, | Performed by: ORTHOPAEDIC SURGERY

## 2022-06-02 PROCEDURE — 1159F MED LIST DOCD IN RCRD: CPT | Mod: CPTII,,, | Performed by: ORTHOPAEDIC SURGERY

## 2022-06-02 PROCEDURE — 3066F NEPHROPATHY DOC TX: CPT | Mod: CPTII,,, | Performed by: ORTHOPAEDIC SURGERY

## 2022-06-02 PROCEDURE — 3008F BODY MASS INDEX DOCD: CPT | Mod: CPTII,,, | Performed by: ORTHOPAEDIC SURGERY

## 2022-06-02 PROCEDURE — 99999 PR PBB SHADOW E&M-EST. PATIENT-LVL II: CPT | Mod: PBBFAC,,, | Performed by: ORTHOPAEDIC SURGERY

## 2022-06-02 PROCEDURE — 99999 PR PBB SHADOW E&M-EST. PATIENT-LVL II: ICD-10-PCS | Mod: PBBFAC,,, | Performed by: ORTHOPAEDIC SURGERY

## 2022-06-02 PROCEDURE — 99024 POSTOP FOLLOW-UP VISIT: CPT | Mod: ,,, | Performed by: ORTHOPAEDIC SURGERY

## 2022-06-02 PROCEDURE — 3066F PR DOCUMENTATION OF TREATMENT FOR NEPHROPATHY: ICD-10-PCS | Mod: CPTII,,, | Performed by: ORTHOPAEDIC SURGERY

## 2022-06-02 PROCEDURE — 3044F PR MOST RECENT HEMOGLOBIN A1C LEVEL <7.0%: ICD-10-PCS | Mod: CPTII,,, | Performed by: ORTHOPAEDIC SURGERY

## 2022-06-02 PROCEDURE — 3061F NEG MICROALBUMINURIA REV: CPT | Mod: CPTII,,, | Performed by: ORTHOPAEDIC SURGERY

## 2022-06-02 PROCEDURE — 3072F PR LOW RISK FOR RETINOPATHY: ICD-10-PCS | Mod: CPTII,,, | Performed by: ORTHOPAEDIC SURGERY

## 2022-06-02 PROCEDURE — 99212 OFFICE O/P EST SF 10 MIN: CPT | Mod: PBBFAC,PN | Performed by: ORTHOPAEDIC SURGERY

## 2022-06-02 RX ORDER — LINACLOTIDE 290 UG/1
CAPSULE, GELATIN COATED ORAL
Qty: 90 CAPSULE | Refills: 3 | Status: SHIPPED | OUTPATIENT
Start: 2022-06-02 | End: 2022-07-21

## 2022-06-02 RX ORDER — TRAMADOL HYDROCHLORIDE 50 MG/1
50 TABLET ORAL EVERY 6 HOURS PRN
Qty: 30 TABLET | Refills: 0 | Status: SHIPPED | OUTPATIENT
Start: 2022-06-02 | End: 2022-06-12

## 2022-06-02 NOTE — PROGRESS NOTES
Subjective:      Patient ID: Sherie Reyes is a 56 y.o. female.  Chief Complaint: Post-op Evaluation (2 wk follow up, right elbow ( Sx 5/20))      BETHANY Reyes is a  56 y.o. female presenting today for post op visit.  She is s/p right elbow lateral epicondylar release and repair 2 weeks postop she is doing well taken out the splint today.     Review of patient's allergies indicates:  No Known Allergies      Current Outpatient Medications   Medication Sig Dispense Refill    acetaminophen (TYLENOL) 500 MG tablet Take 500 mg by mouth every 6 (six) hours as needed for Pain.      albuterol (PROAIR HFA) 90 mcg/actuation inhaler Inhale 2 puffs into the lungs every 6 (six) hours as needed for Wheezing. Rescue 18 g 0    amLODIPine (NORVASC) 10 MG tablet TAKE 1 TABLET(10 MG) BY MOUTH EVERY DAY 90 tablet 3    azelastine (ASTELIN) 137 mcg (0.1 %) nasal spray 1 spray (137 mcg total) by Nasal route 2 (two) times daily. 30 mL 0    clonazePAM (KLONOPIN) 1 MG tablet TAKE 1 TABLET BY MOUTH TWICE DAILY 60 tablet 1    cyanocobalamin 1,000 mcg/mL injection Inject 1,000 mcg into the muscle every 7 days.      diclofenac sodium (VOLTAREN) 1 % Gel Apply 2 g topically 4 (four) times daily. 4 each 2    dicyclomine (BENTYL) 10 MG capsule TAKE ONE CAPSULE BY MOUTH FOUR TIMES DAILY AS NEEDED FOR ABDOMINAL PAIN 120 capsule 1    gabapentin (NEURONTIN) 300 MG capsule Take 900 mg by mouth 3 (three) times daily.      KURVELO 0.15-0.03 mg per tablet Take 1 tablet by mouth once daily.      LINZESS 290 mcg Cap capsule TAKE 1 CAPSULE(290 MCG) BY MOUTH EVERY DAY 90 capsule 3    losartan (COZAAR) 100 MG tablet TAKE 1 TABLET(100 MG) BY MOUTH EVERY DAY 90 tablet 3    melatonin 5 mg Chew Take 10 mg by mouth daily as needed.      meloxicam (MOBIC) 15 MG tablet 1 tablet      mupirocin (BACTROBAN) 2 % ointment Apply topically 2 (two) times daily. 30 g 1    omeprazole (PRILOSEC) 40 MG capsule TAKE 1 CAPSULE(40 MG) BY MOUTH  EVERY DAY 30 capsule 11    oxyCODONE-acetaminophen (PERCOCET) 7.5-325 mg per tablet Take 1 tablet by mouth every 4 (four) hours as needed for Pain. 30 tablet 0    POLYETHYLENE GLYCOL 3350 (MIRALAX ORAL) Take by mouth as needed.       potassium chloride 10% (KAYCIEL) 20 mEq/15 mL oral solution TAKE 15 ML BY MOUTH EVERY  mL 11    pravastatin (PRAVACHOL) 20 MG tablet TAKE 1 TABLET(20 MG) BY MOUTH EVERY EVENING 90 tablet 1    tiZANidine (ZANAFLEX) 4 MG tablet Take 4 mg by mouth 2 (two) times daily as needed.      traMADoL (ULTRAM) 50 mg tablet Take 50 mg by mouth 2 (two) times a day.      traZODone (DESYREL) 100 MG tablet TK 1 T PO  QHS      venlafaxine (EFFEXOR-XR) 150 MG Cp24 Take 150 mg by mouth once daily.      venlafaxine (EFFEXOR-XR) 75 MG 24 hr capsule TK ONE C PO QD WITH 150MG TO EQUAL 225      ZTLIDO 1.8 % PtMd Apply 1 patch topically daily as needed.      QUEtiapine (SEROQUEL) 200 MG Tab Take 1 tablet (200 mg total) by mouth every evening. 30 tablet 2     No current facility-administered medications for this visit.     Facility-Administered Medications Ordered in Other Visits   Medication Dose Route Frequency Provider Last Rate Last Admin    0.9%  NaCl infusion   Intravenous Continuous Gregoria Rodriguez MD           Past Medical History:   Diagnosis Date    Anxiety     Degenerative joint disease (DJD) of hip     Diabetes type 2, controlled 7/10/2019    GERD (gastroesophageal reflux disease)     Hyperlipidemia     Hypertension     IBS (irritable bowel syndrome)     Insomnia     Lumbar disc herniation     PTSD (post-traumatic stress disorder)        Past Surgical History:   Procedure Laterality Date    ADENOIDECTOMY      ARTHROSCOPIC DEBRIDEMENT OF ROTATOR CUFF Right 6/9/2020    Procedure: DEBRIDEMENT, ROTATOR CUFF, ARTHROSCOPIC;  Surgeon: Melchor Hughes Jr., MD;  Location: [facility];  Service: Orthopedics;  Laterality: Right;  need opus system (Ger MESA notified 5/26,  "EF)  video, notified/confirmed 2020 KB 3614    ARTHROSCOPIC EXCISION OF ACROMIOCLAVICULAR JOINT  2020    Procedure: EXCISION, ACROMIOCLAVICULAR JOINT, ARTHROSCOPIC;  Surgeon: Melchor Hughes Jr., MD;  Location: [facility] OR;  Service: Orthopedics;;    ARTHROSCOPIC REPAIR OF ROTATOR CUFF OF SHOULDER      ARTHROSCOPY OF SHOULDER WITH DECOMPRESSION OF SUBACROMIAL SPACE  2020    Procedure: ARTHROSCOPY, SHOULDER, WITH SUBACROMIAL SPACE DECOMPRESSION;  Surgeon: Melchor Hughes Jr., MD;  Location: [facility] OR;  Service: Orthopedics;;     SECTION      EPIDURAL STEROID INJECTION INTO CERVICAL SPINE N/A 2021    Procedure: Injection-steroid-epidural-cervical--C7-T1 IL ZAYNAB;  Surgeon: Gregoria Rodriguez MD;  Location: [facility] PAIN MGT;  Service: Pain Management;  Laterality: N/A;    TENOTOMY, ELBOW, WITH DEBRIDEMENT AND TENDON REPAIR Right 2022    Procedure: TENOTOMY,ELBOW,WITH DEBRIDEMENT AND TENDON REPAIR;  Surgeon: Melchor Hughes Jr., MD;  Location: [facility] OR;  Service: Orthopedics;  Laterality: Right;    TONSILLECTOMY         OBJECTIVE:   PHYSICAL EXAM:  Height: 5' 2" (157.5 cm) Weight: 83.9 kg (185 lb)  Vitals:    22 1443   Weight: 83.9 kg (185 lb)   Height: 5' 2" (1.575 m)   PainSc:   8     Ortho/SPM Exam  Examination right elbow Steri-Strips in place a small blister is noted which appears to be healing underneath the Steri-Strips no evidence of infection  Slight swelling range of motion elbow limited neurologic exam intact    RADIOGRAPHS:  None  Comments: I have personally reviewed the imaging and I agree with the above radiologist's report.    ASSESSMENT/PLAN:     IMPRESSION:  Status post lateral epicondylar release and repair right elbow    PLAN:  Routine wound care soap and water gently soak off the strips  No heavy lifting  She was given a forearm wrist brace for support  Ultram for pain      FOLLOW UP:  3-4 weeks at which time we may order some therapy    Disclaimer: This note has been " generated using voice-recognition software. There may be typographical errors that have been missed during proof-reading.

## 2022-06-02 NOTE — TELEPHONE ENCOUNTER
Refill Routing Note   Medication(s) are not appropriate for processing by [facility] for the following reason(s):      - Outside of protocol    [facility] action(s):  Route          Medication reconciliation completed: No     Appointments  past 12m or future 3m with PCP    Date Provider   Last Visit   5/10/2022 Tonia Bernal, DO   Next Visit   8/16/2022 Tonia Bernal, DO   ED visits in past 90 days: 0        Note composed:11:27 AM 06/02/2022

## 2022-06-15 DIAGNOSIS — E78.2 MIXED HYPERLIPIDEMIA: ICD-10-CM

## 2022-06-15 RX ORDER — PRAVASTATIN SODIUM 20 MG/1
TABLET ORAL
Qty: 90 TABLET | Refills: 1 | Status: SHIPPED | OUTPATIENT
Start: 2022-06-15 | End: 2023-02-22

## 2022-06-15 NOTE — TELEPHONE ENCOUNTER
No new care gaps identified.  [facility] Embedded Care Gaps. Reference number: [id number]. 6/15/2022   8:07:26 AM MILLICENTT

## 2022-06-15 NOTE — TELEPHONE ENCOUNTER
Refill Routing Note   Medication(s) are not appropriate for processing by [facility] for the following reason(s):      - Patient has been seen in the ED/Hospital since the last PCP visit    [facility] action(s):  Defer          Medication reconciliation completed: No     Appointments  past 12m or future 3m with PCP    Date Provider   Last Visit   5/10/2022 Tonia Bernal, DO   Next Visit   8/16/2022 Tonia Bernal, DO   ED visits in past 90 days: 0        Note composed:11:56 AM 06/15/2022

## 2022-07-14 ENCOUNTER — OFFICE VISIT (OUTPATIENT)
Dept: ORTHOPEDICS | Facility: CLINIC | Age: 57
End: 2022-07-14
Payer: MEDICAID

## 2022-07-14 VITALS — WEIGHT: 185 LBS | HEIGHT: 62 IN | BODY MASS INDEX: 34.04 KG/M2

## 2022-07-14 DIAGNOSIS — M77.11 LATERAL EPICONDYLITIS OF RIGHT ELBOW: Primary | ICD-10-CM

## 2022-07-14 PROCEDURE — 3066F NEPHROPATHY DOC TX: CPT | Mod: CPTII,,, | Performed by: ORTHOPAEDIC SURGERY

## 2022-07-14 PROCEDURE — 4010F PR ACE/ARB THEARPY RXD/TAKEN: ICD-10-PCS | Mod: CPTII,,, | Performed by: ORTHOPAEDIC SURGERY

## 2022-07-14 PROCEDURE — 3066F PR DOCUMENTATION OF TREATMENT FOR NEPHROPATHY: ICD-10-PCS | Mod: CPTII,,, | Performed by: ORTHOPAEDIC SURGERY

## 2022-07-14 PROCEDURE — 99213 OFFICE O/P EST LOW 20 MIN: CPT | Mod: PBBFAC,PN | Performed by: ORTHOPAEDIC SURGERY

## 2022-07-14 PROCEDURE — 3008F BODY MASS INDEX DOCD: CPT | Mod: CPTII,,, | Performed by: ORTHOPAEDIC SURGERY

## 2022-07-14 PROCEDURE — 99999 PR PBB SHADOW E&M-EST. PATIENT-LVL III: CPT | Mod: PBBFAC,,, | Performed by: ORTHOPAEDIC SURGERY

## 2022-07-14 PROCEDURE — 3061F PR NEG MICROALBUMINURIA RESULT DOCUMENTED/REVIEW: ICD-10-PCS | Mod: CPTII,,, | Performed by: ORTHOPAEDIC SURGERY

## 2022-07-14 PROCEDURE — 3072F PR LOW RISK FOR RETINOPATHY: ICD-10-PCS | Mod: CPTII,,, | Performed by: ORTHOPAEDIC SURGERY

## 2022-07-14 PROCEDURE — 1159F MED LIST DOCD IN RCRD: CPT | Mod: CPTII,,, | Performed by: ORTHOPAEDIC SURGERY

## 2022-07-14 PROCEDURE — 99024 PR POST-OP FOLLOW-UP VISIT: ICD-10-PCS | Mod: ,,, | Performed by: ORTHOPAEDIC SURGERY

## 2022-07-14 PROCEDURE — 4010F ACE/ARB THERAPY RXD/TAKEN: CPT | Mod: CPTII,,, | Performed by: ORTHOPAEDIC SURGERY

## 2022-07-14 PROCEDURE — 3008F PR BODY MASS INDEX (BMI) DOCUMENTED: ICD-10-PCS | Mod: CPTII,,, | Performed by: ORTHOPAEDIC SURGERY

## 2022-07-14 PROCEDURE — 99999 PR PBB SHADOW E&M-EST. PATIENT-LVL III: ICD-10-PCS | Mod: PBBFAC,,, | Performed by: ORTHOPAEDIC SURGERY

## 2022-07-14 PROCEDURE — 3044F HG A1C LEVEL LT 7.0%: CPT | Mod: CPTII,,, | Performed by: ORTHOPAEDIC SURGERY

## 2022-07-14 PROCEDURE — 3072F LOW RISK FOR RETINOPATHY: CPT | Mod: CPTII,,, | Performed by: ORTHOPAEDIC SURGERY

## 2022-07-14 PROCEDURE — 1159F PR MEDICATION LIST DOCUMENTED IN MEDICAL RECORD: ICD-10-PCS | Mod: CPTII,,, | Performed by: ORTHOPAEDIC SURGERY

## 2022-07-14 PROCEDURE — 3044F PR MOST RECENT HEMOGLOBIN A1C LEVEL <7.0%: ICD-10-PCS | Mod: CPTII,,, | Performed by: ORTHOPAEDIC SURGERY

## 2022-07-14 PROCEDURE — 99024 POSTOP FOLLOW-UP VISIT: CPT | Mod: ,,, | Performed by: ORTHOPAEDIC SURGERY

## 2022-07-14 PROCEDURE — 3061F NEG MICROALBUMINURIA REV: CPT | Mod: CPTII,,, | Performed by: ORTHOPAEDIC SURGERY

## 2022-07-14 RX ORDER — SERTRALINE HYDROCHLORIDE 100 MG/1
100 TABLET, FILM COATED ORAL DAILY
COMMUNITY
Start: 2022-07-07 | End: 2022-10-12

## 2022-07-14 RX ORDER — CHLORZOXAZONE 500 MG/1
500 TABLET ORAL DAILY PRN
COMMUNITY
Start: 2022-06-21

## 2022-07-14 NOTE — PROGRESS NOTES
Subjective:      Patient ID: Sherie Reyes is a 57 y.o. female.  Chief Complaint: Post-op Evaluation (Right elbow (Sx 5/20))      HPI  Sherie Reyes is a  57 y.o. female presenting today for post op visit.  She is s/p lateral epicondylar release and repair of the right elbow now about 7 weeks postop  She is doing well pain is minimal  She has been using a wrist brace  .     Review of patient's allergies indicates:  No Known Allergies      Current Outpatient Medications   Medication Sig Dispense Refill    acetaminophen (TYLENOL) 500 MG tablet Take 500 mg by mouth every 6 (six) hours as needed for Pain.      albuterol (PROAIR HFA) 90 mcg/actuation inhaler Inhale 2 puffs into the lungs every 6 (six) hours as needed for Wheezing. Rescue 18 g 0    amLODIPine (NORVASC) 10 MG tablet TAKE 1 TABLET(10 MG) BY MOUTH EVERY DAY 90 tablet 3    azelastine (ASTELIN) 137 mcg (0.1 %) nasal spray 1 spray (137 mcg total) by Nasal route 2 (two) times daily. 30 mL 0    chlorzoxazone (PARAFON FORTE) 500 mg Tab Take 500 mg by mouth daily as needed.      clonazePAM (KLONOPIN) 1 MG tablet TAKE 1 TABLET BY MOUTH TWICE DAILY 60 tablet 1    cyanocobalamin 1,000 mcg/mL injection Inject 1,000 mcg into the muscle every 7 days.      diclofenac sodium (VOLTAREN) 1 % Gel Apply 2 g topically 4 (four) times daily. 4 each 2    dicyclomine (BENTYL) 10 MG capsule TAKE ONE CAPSULE BY MOUTH FOUR TIMES DAILY AS NEEDED FOR ABDOMINAL PAIN 120 capsule 1    gabapentin (NEURONTIN) 300 MG capsule Take 900 mg by mouth 3 (three) times daily.      KURVELO 0.15-0.03 mg per tablet Take 1 tablet by mouth once daily.      LINZESS 290 mcg Cap capsule TAKE 1 CAPSULE(290 MCG) BY MOUTH EVERY DAY 90 capsule 3    losartan (COZAAR) 100 MG tablet TAKE 1 TABLET(100 MG) BY MOUTH EVERY DAY 90 tablet 3    melatonin 5 mg Chew Take 10 mg by mouth daily as needed.      meloxicam (MOBIC) 15 MG tablet 1 tablet      mupirocin (BACTROBAN) 2 % ointment Apply  topically 2 (two) times daily. 30 g 1    omeprazole (PRILOSEC) 40 MG capsule TAKE 1 CAPSULE(40 MG) BY MOUTH EVERY DAY 30 capsule 11    POLYETHYLENE GLYCOL 3350 (MIRALAX ORAL) Take by mouth as needed.       potassium chloride 10% (KAYCIEL) 20 mEq/15 mL oral solution TAKE 15 ML BY MOUTH EVERY  mL 11    pravastatin (PRAVACHOL) 20 MG tablet TAKE 1 TABLET(20 MG) BY MOUTH EVERY EVENING 90 tablet 1    sertraline (ZOLOFT) 100 MG tablet Take 100 mg by mouth once daily.      tiZANidine (ZANAFLEX) 4 MG tablet Take 4 mg by mouth 2 (two) times daily as needed.      traMADoL (ULTRAM) 50 mg tablet Take 50 mg by mouth 2 (two) times a day.      traZODone (DESYREL) 100 MG tablet TK 1 T PO  QHS      venlafaxine (EFFEXOR-XR) 150 MG Cp24 Take 150 mg by mouth once daily.      ZTLIDO 1.8 % PtMd Apply 1 patch topically daily as needed.      QUEtiapine (SEROQUEL) 200 MG Tab Take 1 tablet (200 mg total) by mouth every evening. 30 tablet 2    venlafaxine (EFFEXOR-XR) 75 MG 24 hr capsule TK ONE C PO QD WITH 150MG TO EQUAL 225       No current facility-administered medications for this visit.     Facility-Administered Medications Ordered in Other Visits   Medication Dose Route Frequency Provider Last Rate Last Admin    0.9%  NaCl infusion   Intravenous Continuous Gregoria Rodriguez MD           Past Medical History:   Diagnosis Date    Anxiety     Degenerative joint disease (DJD) of hip     Diabetes type 2, controlled 7/10/2019    GERD (gastroesophageal reflux disease)     Hyperlipidemia     Hypertension     IBS (irritable bowel syndrome)     Insomnia     Lumbar disc herniation     PTSD (post-traumatic stress disorder)        Past Surgical History:   Procedure Laterality Date    ADENOIDECTOMY      ARTHROSCOPIC DEBRIDEMENT OF ROTATOR CUFF Right 6/9/2020    Procedure: DEBRIDEMENT, ROTATOR CUFF, ARTHROSCOPIC;  Surgeon: Melchor Hughes Jr., MD;  Location: [facility];  Service: Orthopedics;  Laterality: Right;  need opus  "system (Ger MESA notified , EF)  video, notified/confirmed 2020 KB 1314    ARTHROSCOPIC EXCISION OF ACROMIOCLAVICULAR JOINT  2020    Procedure: EXCISION, ACROMIOCLAVICULAR JOINT, ARTHROSCOPIC;  Surgeon: Melchor Hughes Jr., MD;  Location: [facility] OR;  Service: Orthopedics;;    ARTHROSCOPIC REPAIR OF ROTATOR CUFF OF SHOULDER      ARTHROSCOPY OF SHOULDER WITH DECOMPRESSION OF SUBACROMIAL SPACE  2020    Procedure: ARTHROSCOPY, SHOULDER, WITH SUBACROMIAL SPACE DECOMPRESSION;  Surgeon: Melchor Hughes Jr., MD;  Location: [facility] OR;  Service: Orthopedics;;     SECTION      EPIDURAL STEROID INJECTION INTO CERVICAL SPINE N/A 2021    Procedure: Injection-steroid-epidural-cervical--C7-T1 IL ZAYNAB;  Surgeon: Gregoria Rodriguez MD;  Location: [facility] PAIN MGT;  Service: Pain Management;  Laterality: N/A;    TENOTOMY, ELBOW, WITH DEBRIDEMENT AND TENDON REPAIR Right 2022    Procedure: TENOTOMY,ELBOW,WITH DEBRIDEMENT AND TENDON REPAIR;  Surgeon: Melchor Hughes Jr., MD;  Location: [facility] OR;  Service: Orthopedics;  Laterality: Right;    TONSILLECTOMY         OBJECTIVE:   PHYSICAL EXAM:  Height: 5' 2" (157.5 cm) Weight: 83.9 kg (185 lb)  Vitals:    22 1303   Weight: 83.9 kg (185 lb)   Height: 5' 2" (1.575 m)   PainSc:   7   PainLoc: Elbow     Ortho/SPM Exam  Examination right elbow the incision is well healed no swelling no tenderness no evidence of infection  Range of motion elbow slightly decreased  strength decreased right hand      RADIOGRAPHS:  None  Comments: I have personally reviewed the imaging and I agree with the above radiologist's report.    ASSESSMENT/PLAN:     IMPRESSION:  Status post lateral epicondylar release and repair right elbow    PLAN:  I want her to start weaning out of the wrist brace now  I have also ordered some OT for stretching strengthening of the elbow and forearm muscles  Advil or Motrin by mouth  Advance activities as tolerated but no heavy " lifting    FOLLOW UP:  4-6 weeks    Disclaimer: This note has been generated using voice-recognition software. There may be typographical errors that have been missed during proof-reading.

## 2022-08-02 NOTE — PROGRESS NOTES
Subjective:       Patient ID: Sherie Reyes is a 57 y.o. female.    Chief Complaint: Follow-up    Patient is a 57 y.o.female who presents today for follow up    Labs reviewed  mammo due  Eye: due; dr blas; due in april  Gyn: dr david Coulter due aug 2026    Review of Systems   Constitutional: Negative for appetite change, chills, diaphoresis and fever.   HENT: Negative for congestion, ear discharge, ear pain, postnasal drip, tinnitus, trouble swallowing and voice change.    Eyes: Negative for discharge, redness and itching.   Respiratory: Negative for cough, chest tightness, shortness of breath and wheezing.    Cardiovascular: Negative for chest pain, palpitations and leg swelling.   Gastrointestinal: Negative for abdominal pain, constipation, diarrhea, nausea and vomiting.   Endocrine: Negative for cold intolerance and heat intolerance.   Genitourinary: Negative for difficulty urinating, flank pain, hematuria and urgency.   Musculoskeletal: Negative for arthralgias, gait problem, myalgias and neck stiffness.   Skin: Negative for color change and rash.   Neurological: Negative for dizziness, seizures, syncope and headaches.   Hematological: Negative for adenopathy.   Psychiatric/Behavioral: Negative for agitation, behavioral problems, confusion and sleep disturbance.       Objective:      Physical Exam  Vitals and nursing note reviewed.   Constitutional:       General: She is not in acute distress.     Appearance: She is well-developed. She is not diaphoretic.   HENT:      Head: Normocephalic and atraumatic.      Right Ear: External ear normal.      Left Ear: External ear normal.      Nose: Nose normal.      Mouth/Throat:      Pharynx: No oropharyngeal exudate.   Eyes:      General: No scleral icterus.        Right eye: No discharge.         Left eye: No discharge.      Conjunctiva/sclera: Conjunctivae normal.      Pupils: Pupils are equal, round, and reactive to light.   Neck:      Thyroid: No  thyromegaly.      Vascular: No JVD.      Trachea: No tracheal deviation.   Cardiovascular:      Rate and Rhythm: Normal rate.      Heart sounds: Normal heart sounds. No murmur heard.    No friction rub. No gallop.   Pulmonary:      Effort: Pulmonary effort is normal. No respiratory distress.      Breath sounds: Normal breath sounds. No stridor. No wheezing or rales.   Chest:      Chest wall: No tenderness.   Abdominal:      General: Bowel sounds are normal. There is no distension.      Palpations: Abdomen is soft.      Tenderness: There is no abdominal tenderness. There is no rebound.   Musculoskeletal:         General: No tenderness.      Cervical back: Neck supple.   Lymphadenopathy:      Cervical: No cervical adenopathy.   Skin:     General: Skin is warm and dry.      Findings: No erythema or rash.   Neurological:      Mental Status: She is alert and oriented to person, place, and time.   Psychiatric:         Behavior: Behavior normal.         Assessment and Plan:       1. Controlled type 2 diabetes mellitus without complication, without long-term current use of insulin    - CBC Auto Differential; Future  - Comprehensive Metabolic Panel; Future  - Hemoglobin A1C; Future  - Lipid Panel; Future  - TSH; Future  - Urinalysis; Future  - Vitamin D; Future  - THYROID PEROXIDASE ANTIBODY; Future  - Methylmalonic Acid, Serum; Future    2. Mixed hyperlipidemia    - CBC Auto Differential; Future  - Comprehensive Metabolic Panel; Future  - Hemoglobin A1C; Future  - Lipid Panel; Future  - TSH; Future  - Urinalysis; Future  - Vitamin D; Future  - THYROID PEROXIDASE ANTIBODY; Future  - Methylmalonic Acid, Serum; Future    3. Essential hypertension    - CBC Auto Differential; Future  - Comprehensive Metabolic Panel; Future  - Hemoglobin A1C; Future  - Lipid Panel; Future  - TSH; Future  - Urinalysis; Future  - Vitamin D; Future  - THYROID PEROXIDASE ANTIBODY; Future  - Methylmalonic Acid, Serum; Future              No follow-ups  on file.

## 2022-08-08 ENCOUNTER — TELEPHONE (OUTPATIENT)
Dept: INTERNAL MEDICINE | Facility: CLINIC | Age: 57
End: 2022-08-08
Payer: MEDICAID

## 2022-08-08 DIAGNOSIS — E11.9 CONTROLLED TYPE 2 DIABETES MELLITUS WITHOUT COMPLICATION, WITHOUT LONG-TERM CURRENT USE OF INSULIN: Primary | ICD-10-CM

## 2022-08-08 NOTE — TELEPHONE ENCOUNTER
Pt has standing orders for a a1c and microalbumin/crt. Orders added. Pt scheduled for lab today.  Pt scheduled for a 3 month f/u.  Please advise if any other lab needed.

## 2022-08-08 NOTE — TELEPHONE ENCOUNTER
----- Message from Meghan Rios MA sent at 8/8/2022  9:32 AM CDT -----  Regarding: lab orders needed  Good morning! Pt has a lab & urine specimen appt scheduled for tomorrow Tuesday 8/8/22 at the [facility] with no orders attached. Please place/link orders for labs to be done, thank you!

## 2022-08-09 ENCOUNTER — LAB VISIT (OUTPATIENT)
Dept: LAB | Facility: HOSPITAL | Age: 57
End: 2022-08-09
Attending: INTERNAL MEDICINE
Payer: MEDICAID

## 2022-08-09 DIAGNOSIS — E11.9 TYPE 2 DIABETES MELLITUS WITHOUT COMPLICATION, WITHOUT LONG-TERM CURRENT USE OF INSULIN: ICD-10-CM

## 2022-08-09 LAB
ALBUMIN/CREAT UR: 21.1 UG/MG (ref 0–30)
CREAT UR-MCNC: 341 MG/DL (ref 15–325)
MICROALBUMIN UR DL<=1MG/L-MCNC: 72 UG/ML

## 2022-08-09 PROCEDURE — 82570 ASSAY OF URINE CREATININE: CPT | Performed by: INTERNAL MEDICINE

## 2022-08-09 PROCEDURE — 82043 UR ALBUMIN QUANTITATIVE: CPT | Performed by: INTERNAL MEDICINE

## 2022-08-10 ENCOUNTER — PATIENT MESSAGE (OUTPATIENT)
Dept: INTERNAL MEDICINE | Facility: CLINIC | Age: 57
End: 2022-08-10
Payer: MEDICAID

## 2022-08-11 ENCOUNTER — PATIENT MESSAGE (OUTPATIENT)
Dept: INTERNAL MEDICINE | Facility: CLINIC | Age: 57
End: 2022-08-11
Payer: MEDICAID

## 2022-08-11 ENCOUNTER — TELEPHONE (OUTPATIENT)
Dept: INTERNAL MEDICINE | Facility: CLINIC | Age: 57
End: 2022-08-11
Payer: MEDICAID

## 2022-08-11 NOTE — TELEPHONE ENCOUNTER
----- Message from Marilou Woodson sent at 8/11/2022  2:01 PM CDT -----  Contact: [phone]  No blue slot available to schedule an appointment for the patient.  Patient is established with which PCP: Gabe  Reason for the visit: -  Would the patient like a call back, or a response through their [facility] portal?:  call

## 2022-08-11 NOTE — TELEPHONE ENCOUNTER
Spoke to pt. Pt stated that she has what looks like mosquito bites everywhere. She is also itching. She denies the possibility of bedbugs, fleas, or mosquitos in her home.  Pt scheduled to see Dr. Whittington tomorrow, 8/12/22 at 7:30 AM.

## 2022-08-12 ENCOUNTER — OFFICE VISIT (OUTPATIENT)
Dept: INTERNAL MEDICINE | Facility: CLINIC | Age: 57
End: 2022-08-12
Payer: MEDICAID

## 2022-08-12 VITALS
OXYGEN SATURATION: 97 % | BODY MASS INDEX: 31.52 KG/M2 | TEMPERATURE: 98 F | SYSTOLIC BLOOD PRESSURE: 106 MMHG | RESPIRATION RATE: 16 BRPM | DIASTOLIC BLOOD PRESSURE: 76 MMHG | HEART RATE: 88 BPM | HEIGHT: 62 IN | WEIGHT: 171.31 LBS

## 2022-08-12 DIAGNOSIS — R21 RASH AND NONSPECIFIC SKIN ERUPTION: Primary | ICD-10-CM

## 2022-08-12 DIAGNOSIS — L25.9 CONTACT DERMATITIS, UNSPECIFIED CONTACT DERMATITIS TYPE, UNSPECIFIED TRIGGER: ICD-10-CM

## 2022-08-12 PROCEDURE — 1159F PR MEDICATION LIST DOCUMENTED IN MEDICAL RECORD: ICD-10-PCS | Mod: CPTII,,, | Performed by: INTERNAL MEDICINE

## 2022-08-12 PROCEDURE — 4010F ACE/ARB THERAPY RXD/TAKEN: CPT | Mod: CPTII,,, | Performed by: INTERNAL MEDICINE

## 2022-08-12 PROCEDURE — 3008F PR BODY MASS INDEX (BMI) DOCUMENTED: ICD-10-PCS | Mod: CPTII,,, | Performed by: INTERNAL MEDICINE

## 2022-08-12 PROCEDURE — 3066F NEPHROPATHY DOC TX: CPT | Mod: CPTII,,, | Performed by: INTERNAL MEDICINE

## 2022-08-12 PROCEDURE — 3044F PR MOST RECENT HEMOGLOBIN A1C LEVEL <7.0%: ICD-10-PCS | Mod: CPTII,,, | Performed by: INTERNAL MEDICINE

## 2022-08-12 PROCEDURE — 99214 OFFICE O/P EST MOD 30 MIN: CPT | Mod: PBBFAC,PO | Performed by: INTERNAL MEDICINE

## 2022-08-12 PROCEDURE — 3008F BODY MASS INDEX DOCD: CPT | Mod: CPTII,,, | Performed by: INTERNAL MEDICINE

## 2022-08-12 PROCEDURE — 3072F LOW RISK FOR RETINOPATHY: CPT | Mod: CPTII,,, | Performed by: INTERNAL MEDICINE

## 2022-08-12 PROCEDURE — 99999 PR PBB SHADOW E&M-EST. PATIENT-LVL IV: CPT | Mod: PBBFAC,,, | Performed by: INTERNAL MEDICINE

## 2022-08-12 PROCEDURE — 3074F SYST BP LT 130 MM HG: CPT | Mod: CPTII,,, | Performed by: INTERNAL MEDICINE

## 2022-08-12 PROCEDURE — 3066F PR DOCUMENTATION OF TREATMENT FOR NEPHROPATHY: ICD-10-PCS | Mod: CPTII,,, | Performed by: INTERNAL MEDICINE

## 2022-08-12 PROCEDURE — 96372 THER/PROPH/DIAG INJ SC/IM: CPT | Mod: PBBFAC,PO

## 2022-08-12 PROCEDURE — 1160F RVW MEDS BY RX/DR IN RCRD: CPT | Mod: CPTII,,, | Performed by: INTERNAL MEDICINE

## 2022-08-12 PROCEDURE — 3078F PR MOST RECENT DIASTOLIC BLOOD PRESSURE < 80 MM HG: ICD-10-PCS | Mod: CPTII,,, | Performed by: INTERNAL MEDICINE

## 2022-08-12 PROCEDURE — 3078F DIAST BP <80 MM HG: CPT | Mod: CPTII,,, | Performed by: INTERNAL MEDICINE

## 2022-08-12 PROCEDURE — 1160F PR REVIEW ALL MEDS BY PRESCRIBER/CLIN PHARMACIST DOCUMENTED: ICD-10-PCS | Mod: CPTII,,, | Performed by: INTERNAL MEDICINE

## 2022-08-12 PROCEDURE — 99999 PR PBB SHADOW E&M-EST. PATIENT-LVL IV: ICD-10-PCS | Mod: PBBFAC,,, | Performed by: INTERNAL MEDICINE

## 2022-08-12 PROCEDURE — 1159F MED LIST DOCD IN RCRD: CPT | Mod: CPTII,,, | Performed by: INTERNAL MEDICINE

## 2022-08-12 PROCEDURE — 3072F PR LOW RISK FOR RETINOPATHY: ICD-10-PCS | Mod: CPTII,,, | Performed by: INTERNAL MEDICINE

## 2022-08-12 PROCEDURE — 4010F PR ACE/ARB THEARPY RXD/TAKEN: ICD-10-PCS | Mod: CPTII,,, | Performed by: INTERNAL MEDICINE

## 2022-08-12 PROCEDURE — 3061F PR NEG MICROALBUMINURIA RESULT DOCUMENTED/REVIEW: ICD-10-PCS | Mod: CPTII,,, | Performed by: INTERNAL MEDICINE

## 2022-08-12 PROCEDURE — 3061F NEG MICROALBUMINURIA REV: CPT | Mod: CPTII,,, | Performed by: INTERNAL MEDICINE

## 2022-08-12 PROCEDURE — 3074F PR MOST RECENT SYSTOLIC BLOOD PRESSURE < 130 MM HG: ICD-10-PCS | Mod: CPTII,,, | Performed by: INTERNAL MEDICINE

## 2022-08-12 PROCEDURE — 99213 OFFICE O/P EST LOW 20 MIN: CPT | Mod: S$PBB,,, | Performed by: INTERNAL MEDICINE

## 2022-08-12 PROCEDURE — 99213 PR OFFICE/OUTPT VISIT, EST, LEVL III, 20-29 MIN: ICD-10-PCS | Mod: S$PBB,,, | Performed by: INTERNAL MEDICINE

## 2022-08-12 PROCEDURE — 3044F HG A1C LEVEL LT 7.0%: CPT | Mod: CPTII,,, | Performed by: INTERNAL MEDICINE

## 2022-08-12 RX ORDER — METHYLPREDNISOLONE 4 MG/1
TABLET ORAL
Qty: 21 EACH | Refills: 0 | Status: SHIPPED | OUTPATIENT
Start: 2022-08-12 | End: 2022-09-02

## 2022-08-12 RX ORDER — TRIAMCINOLONE ACETONIDE 40 MG/ML
40 INJECTION, SUSPENSION INTRA-ARTICULAR; INTRAMUSCULAR
Status: COMPLETED | OUTPATIENT
Start: 2022-08-12 | End: 2022-08-12

## 2022-08-12 RX ORDER — HYDROXYZINE HYDROCHLORIDE 25 MG/1
25 TABLET, FILM COATED ORAL 3 TIMES DAILY PRN
Qty: 30 TABLET | Refills: 0 | Status: SHIPPED | OUTPATIENT
Start: 2022-08-12 | End: 2022-08-19 | Stop reason: SDUPTHER

## 2022-08-12 RX ORDER — AMOXICILLIN 500 MG
1 CAPSULE ORAL DAILY
COMMUNITY

## 2022-08-12 RX ADMIN — TRIAMCINOLONE ACETONIDE 40 MG: 40 INJECTION, SUSPENSION INTRA-ARTICULAR; INTRAMUSCULAR at 08:08

## 2022-08-12 NOTE — PROGRESS NOTES
Subjective:     Sherie Reyes is a 57 y.o. female who presents for   Chief Complaint   Patient presents with    Rash     body    Itching     body       HPI       Rash: presents for evaluation of a rash involving the arms, legs, chest and back. Rash started 1 week ago. Rash has not changed over time. Rash is pruritic. Associated symptoms: none. Patient denies: abdominal pain, cough, fever and irritability. Patient has not had new exposures that she is aware of (soaps, lotions, laundry detergents, foods, medications, plants, insects or animals).    Started taking Zoloft 1 month ago. She uses no new supplements. Her  washes her clothes and she thinks that Gain products are used on her clothes but she is unaware of any new product use.      Review of Systems   Constitutional: Negative for chills, diaphoresis and fever.   HENT: Negative for congestion and sinus pressure.    Eyes: Negative for discharge, redness and itching.   Respiratory: Negative for cough and shortness of breath.    Cardiovascular: Negative for chest pain and palpitations.   Gastrointestinal: Negative for abdominal pain, diarrhea, nausea and vomiting.   Musculoskeletal: Positive for arthralgias (chronic) and back pain (chronic). Negative for myalgias.   Skin: Positive for rash (scattered across upper body, lower body and extremities). Negative for wound.   Neurological: Negative for dizziness, tremors and headaches.          Objective:     Physical Exam  Vitals reviewed.   Constitutional:       General: She is awake. She is not in acute distress.     Appearance: Normal appearance. She is well-developed and well-groomed.   HENT:      Head: Normocephalic and atraumatic.      Right Ear: Hearing and external ear normal.      Left Ear: Hearing and external ear normal.      Nose: Nose normal. No congestion.      Mouth/Throat:      Mouth: Mucous membranes are moist.   Eyes:      General: Lids are normal. Vision grossly intact.    Cardiovascular:      Rate and Rhythm: Normal rate and regular rhythm.      Heart sounds: Normal heart sounds. No murmur heard.  Pulmonary:      Effort: Pulmonary effort is normal.      Breath sounds: Normal breath sounds. No decreased breath sounds or wheezing.   Abdominal:      General: Bowel sounds are normal. There is no distension.   Musculoskeletal:         General: Normal range of motion.      Cervical back: Normal range of motion.      Right lower leg: No edema.      Left lower leg: No edema.   Skin:     General: Skin is warm and dry.      Findings: Abrasion, ecchymosis (left inner thigh), erythema and rash present. No lesion. Rash is not pustular.      Comments: + excoriations on arms and legs, no pustules are seen, some raised lesions are present and some lesions with eschar are present   Neurological:      Mental Status: She is alert and oriented to person, place, and time.   Psychiatric:         Attention and Perception: Attention normal.         Mood and Affect: Mood normal.         Behavior: Behavior is cooperative.            Assessment:      1. Rash and nonspecific skin eruption    2. Contact dermatitis, unspecified contact dermatitis type, unspecified trigger           Plan:     1. Rash and nonspecific skin eruption  - hydrOXYzine HCL (ATARAX) 25 MG tablet; Take 1 tablet (25 mg total) by mouth 3 (three) times daily as needed for Itching.  Dispense: 30 tablet; Refill: 0  - continue OTC hydrocortisone cream for smaller areas of itching    2. Contact dermatitis, unspecified contact dermatitis type, unspecified trigger  - stop using all skin products, wash clothing and bedding in gentle detergents, use Aveeno oatmeal bath soak  - use hydroxyzine in place of benadryl, kenalog 40mg IM x1  - may use medrol if no improvement in the next few days  - hydrOXYzine HCL (ATARAX) 25 MG tablet; Take 1 tablet (25 mg total) by mouth 3 (three) times daily as needed for Itching.  Dispense: 30 tablet; Refill: 0  -  triamcinolone acetonide injection 40 mg  - methylPREDNISolone (MEDROL DOSEPACK) 4 mg tablet; use as directed  Dispense: 21 each; Refill: 0      Call if there is no improvement in symptoms    __________________________    Farhana Whittington MD, PharmD  [facility]- Internal Medicine  American Board of Obesity Medicine diplomate  Office [phone]

## 2022-08-12 NOTE — PATIENT INSTRUCTIONS
Aveeno oatmeal bath soak    Stop all skin products    Use Dreft detergent to wash clothes and bedding    Use a gentle shampoo/condition for hair    Use Atarax (hydroxyzine) in place of Benadryl. May also use 1/2 pill at night. It does cause drowsiness.    May start Medrol (steroid) tomorrow if there is not much improvement in rash. Medrol should not cause drowsiness.

## 2022-08-13 ENCOUNTER — PATIENT MESSAGE (OUTPATIENT)
Dept: INTERNAL MEDICINE | Facility: CLINIC | Age: 57
End: 2022-08-13
Payer: MEDICAID

## 2022-08-15 ENCOUNTER — CLINICAL SUPPORT (OUTPATIENT)
Dept: REHABILITATION | Facility: HOSPITAL | Age: 57
End: 2022-08-15
Attending: ORTHOPAEDIC SURGERY
Payer: MEDICAID

## 2022-08-15 DIAGNOSIS — M77.11 LATERAL EPICONDYLITIS OF RIGHT ELBOW: ICD-10-CM

## 2022-08-15 PROCEDURE — 97165 OT EVAL LOW COMPLEX 30 MIN: CPT

## 2022-08-15 NOTE — PATIENT INSTRUCTIONS
[facility]  OCCUPATIONAL THERAPY  HOME EXERCISE PROGRAM     Hold for 30s, 3 reps, 3x/day     Complete the following strengthening exercises 2x/day.  Hold each position x3 seconds then relax. Complete 10 repetitions each.     Resisted Wrist Extension   With forearm resting palm down, resist upward movement   of hand with other hand.       Resisted Wrist Flexion   With forearm resting palm up, resist upward movement   of hand with other hand.       Resisted Wrist Radial Deviation  With forearm resting with thumb up, use other hand to   resist upward movement of hand at wrist.       Resisted Wrist Ulnar Deviation  With forearm resting thumb up, use other hand to resist   downward movement of hand at wrist.        Therapist: GERONIMO Lennon/AZAM

## 2022-08-16 ENCOUNTER — OFFICE VISIT (OUTPATIENT)
Dept: INTERNAL MEDICINE | Facility: CLINIC | Age: 57
End: 2022-08-16
Payer: MEDICAID

## 2022-08-16 VITALS
TEMPERATURE: 97 F | DIASTOLIC BLOOD PRESSURE: 78 MMHG | RESPIRATION RATE: 20 BRPM | BODY MASS INDEX: 30.95 KG/M2 | SYSTOLIC BLOOD PRESSURE: 124 MMHG | OXYGEN SATURATION: 97 % | WEIGHT: 168.19 LBS | HEIGHT: 62 IN | HEART RATE: 86 BPM

## 2022-08-16 DIAGNOSIS — E11.9 CONTROLLED TYPE 2 DIABETES MELLITUS WITHOUT COMPLICATION, WITHOUT LONG-TERM CURRENT USE OF INSULIN: Primary | ICD-10-CM

## 2022-08-16 DIAGNOSIS — E78.2 MIXED HYPERLIPIDEMIA: ICD-10-CM

## 2022-08-16 DIAGNOSIS — I10 ESSENTIAL HYPERTENSION: ICD-10-CM

## 2022-08-16 PROCEDURE — 1159F PR MEDICATION LIST DOCUMENTED IN MEDICAL RECORD: ICD-10-PCS | Mod: CPTII,,, | Performed by: INTERNAL MEDICINE

## 2022-08-16 PROCEDURE — 3078F PR MOST RECENT DIASTOLIC BLOOD PRESSURE < 80 MM HG: ICD-10-PCS | Mod: CPTII,,, | Performed by: INTERNAL MEDICINE

## 2022-08-16 PROCEDURE — 4010F PR ACE/ARB THEARPY RXD/TAKEN: ICD-10-PCS | Mod: CPTII,,, | Performed by: INTERNAL MEDICINE

## 2022-08-16 PROCEDURE — 1159F MED LIST DOCD IN RCRD: CPT | Mod: CPTII,,, | Performed by: INTERNAL MEDICINE

## 2022-08-16 PROCEDURE — 1160F RVW MEDS BY RX/DR IN RCRD: CPT | Mod: CPTII,,, | Performed by: INTERNAL MEDICINE

## 2022-08-16 PROCEDURE — 99214 PR OFFICE/OUTPT VISIT, EST, LEVL IV, 30-39 MIN: ICD-10-PCS | Mod: S$PBB,,, | Performed by: INTERNAL MEDICINE

## 2022-08-16 PROCEDURE — 3066F NEPHROPATHY DOC TX: CPT | Mod: CPTII,,, | Performed by: INTERNAL MEDICINE

## 2022-08-16 PROCEDURE — 3078F DIAST BP <80 MM HG: CPT | Mod: CPTII,,, | Performed by: INTERNAL MEDICINE

## 2022-08-16 PROCEDURE — 3044F HG A1C LEVEL LT 7.0%: CPT | Mod: CPTII,,, | Performed by: INTERNAL MEDICINE

## 2022-08-16 PROCEDURE — 4010F ACE/ARB THERAPY RXD/TAKEN: CPT | Mod: CPTII,,, | Performed by: INTERNAL MEDICINE

## 2022-08-16 PROCEDURE — 1160F PR REVIEW ALL MEDS BY PRESCRIBER/CLIN PHARMACIST DOCUMENTED: ICD-10-PCS | Mod: CPTII,,, | Performed by: INTERNAL MEDICINE

## 2022-08-16 PROCEDURE — 3072F LOW RISK FOR RETINOPATHY: CPT | Mod: CPTII,,, | Performed by: INTERNAL MEDICINE

## 2022-08-16 PROCEDURE — 3074F SYST BP LT 130 MM HG: CPT | Mod: CPTII,,, | Performed by: INTERNAL MEDICINE

## 2022-08-16 PROCEDURE — 99999 PR PBB SHADOW E&M-EST. PATIENT-LVL V: ICD-10-PCS | Mod: PBBFAC,,, | Performed by: INTERNAL MEDICINE

## 2022-08-16 PROCEDURE — 3008F PR BODY MASS INDEX (BMI) DOCUMENTED: ICD-10-PCS | Mod: CPTII,,, | Performed by: INTERNAL MEDICINE

## 2022-08-16 PROCEDURE — 99214 OFFICE O/P EST MOD 30 MIN: CPT | Mod: S$PBB,,, | Performed by: INTERNAL MEDICINE

## 2022-08-16 PROCEDURE — 99999 PR PBB SHADOW E&M-EST. PATIENT-LVL V: CPT | Mod: PBBFAC,,, | Performed by: INTERNAL MEDICINE

## 2022-08-16 PROCEDURE — 3061F NEG MICROALBUMINURIA REV: CPT | Mod: CPTII,,, | Performed by: INTERNAL MEDICINE

## 2022-08-16 PROCEDURE — 3061F PR NEG MICROALBUMINURIA RESULT DOCUMENTED/REVIEW: ICD-10-PCS | Mod: CPTII,,, | Performed by: INTERNAL MEDICINE

## 2022-08-16 PROCEDURE — 3008F BODY MASS INDEX DOCD: CPT | Mod: CPTII,,, | Performed by: INTERNAL MEDICINE

## 2022-08-16 PROCEDURE — 99215 OFFICE O/P EST HI 40 MIN: CPT | Mod: PBBFAC,PO | Performed by: INTERNAL MEDICINE

## 2022-08-16 PROCEDURE — 3072F PR LOW RISK FOR RETINOPATHY: ICD-10-PCS | Mod: CPTII,,, | Performed by: INTERNAL MEDICINE

## 2022-08-16 PROCEDURE — 3044F PR MOST RECENT HEMOGLOBIN A1C LEVEL <7.0%: ICD-10-PCS | Mod: CPTII,,, | Performed by: INTERNAL MEDICINE

## 2022-08-16 PROCEDURE — 3074F PR MOST RECENT SYSTOLIC BLOOD PRESSURE < 130 MM HG: ICD-10-PCS | Mod: CPTII,,, | Performed by: INTERNAL MEDICINE

## 2022-08-16 PROCEDURE — 3066F PR DOCUMENTATION OF TREATMENT FOR NEPHROPATHY: ICD-10-PCS | Mod: CPTII,,, | Performed by: INTERNAL MEDICINE

## 2022-08-18 ENCOUNTER — CLINICAL SUPPORT (OUTPATIENT)
Dept: REHABILITATION | Facility: HOSPITAL | Age: 57
End: 2022-08-18
Payer: MEDICAID

## 2022-08-18 DIAGNOSIS — M77.11 LATERAL EPICONDYLITIS OF RIGHT ELBOW: Primary | ICD-10-CM

## 2022-08-18 PROCEDURE — 97110 THERAPEUTIC EXERCISES: CPT | Mod: CO

## 2022-08-18 PROCEDURE — 97140 MANUAL THERAPY 1/> REGIONS: CPT | Mod: CO

## 2022-08-18 NOTE — PLAN OF CARE
[facility]  Occupational Therapy Initial Evaluation    Date: 8/15/2022  [facility]  Clinic Number: [id number]    Therapy Diagnosis:   Encounter Diagnosis   Name Primary?    Lateral epicondylitis of right elbow      Physician: Melchor Hughes Jr., *    Physician Orders: Eval and treat;  Medical Diagnosis: M77.11 (ICD-10-CM) - Lateral epicondylitis of right elbow  Surgical Procedure and Date: 5/20/2022, Right TENOTOMY,ELBOW,WITH DEBRIDEMENT AND TENDON REPAIR Date of Injury/Onset: gradual onset   Evaluation Date: 8/15/2022  Insurance Authorization Period Expiration: 8/15/2023   Plan of Care Expiration: 8 weeks; 10/14/2022  Progress Note Due: 4 weeks; 9/16/2022   Date of Return to MD: 8/31/2022  Visit # / Visits authorized: 1 / 1  FOTO: initial eval     Precautions:  Standard diabetes     Time In: 12:10 PM   Time Out: 01:00 PM  Total Appointment Time (timed & untimed codes): 50 minutes      SUBJECTIVE     Date of Onset: pain for over a year; sx in May 2022    History of Current Condition/Mechanism of Injury: Sherie reports:  Still limited in strength; severe tightness     Falls: 0    Involved Side: Right   Dominant Side: Right  Imaging: x-ray EXAMINATION:  XR ELBOW COMPLETE 3 VIEW RIGHT     CLINICAL HISTORY:  . Pain in right elbow     TECHNIQUE:  AP, lateral, and oblique views of the right elbow were performed.     FINDINGS:  There is no fracture, dislocation, or bony erosion.     Impression:     As above.        Electronically signed by: Lenard Burton MD  Prior Therapy: n/a  Occupation:  Paramedic  Working presently: disability   Duties: retired 8 years     Functional Limitations/Social History:    Previous functional status includes: Independent with all ADLs.     Current Functional Status   Home/Living environment: lives with their family      Limitation of Functional Status as follows:   ADLs/IADLs:     - Feeding: using R; unable to cut     - Bathing: mod I; more  difficulty with getting out/in tub     - Dressing/Grooming: difficulty blow drying hair, putting on makeup     - Driving: uses L      Leisure: camping, swimming     Pain:  Functional Pain Scale Rating 0-10: Current 8/10, worst 10/10, best 7/10   Location: lateral elbow along incision   Description: Aching, Throbbing, Tight and Sharp  Aggravating Factors: overuse  Easing Factors: rest    Patient's Goals for Therapy: Pt would like less pain with move movement     Medical History:   Past Medical History:   Diagnosis Date    Anxiety     Degenerative joint disease (DJD) of hip     Diabetes type 2, controlled 7/10/2019    GERD (gastroesophageal reflux disease)     Hyperlipidemia     Hypertension     IBS (irritable bowel syndrome)     Insomnia     Lumbar disc herniation     PTSD (post-traumatic stress disorder)        Surgical History:    has a past surgical history that includes  section; Adenoidectomy; Tonsillectomy; Arthroscopic repair of rotator cuff of shoulder; Arthroscopic debridement of rotator cuff (Right, 2020); Arthroscopy of shoulder with decompression of subacromial space (2020); Arthroscopic excision of acromioclavicular joint (2020); Epidural steroid injection into cervical spine (N/A, 2021); and tenotomy, elbow, with debridement and tendon repair (Right, 2022).    Medications:   has a current medication list which includes the following prescription(s): acetaminophen, albuterol, amlodipine, azelastine, chlorzoxazone, clonazepam, cyanocobalamin, diclofenac sodium, dicyclomine, hydroxyzine hcl, kurvelo (28), linzess, losartan, melatonin, meloxicam, methylprednisolone, mupirocin, mv-mn/iron/folic acid/herb 190, fish oil-omega-3 fatty acids, omeprazole, polyethylene glycol 3350, potassium chloride 10%, pravastatin, quetiapine, sertraline, tizanidine, tramadol, trazodone, venlafaxine, and ztlido, and the following Facility-Administered Medications: sodium chloride  0.9%.    Allergies:   Review of patient's allergies indicates:  No Known Allergies       OBJECTIVE     Observation/Appearance: 5 cm incision; skin intact     Edema. Measured in centimeters.   8/18/2022 8/18/2022    Left Right   2in. Above elbow     2in. Below elbow     Wrist Crease 26 26   Figure of 8     MCPs         Elbow and Wrist ROM. Measured in degrees.   8/18/2022 8/18/2022    Left Right   Elbow Ext/Flex 10/145 7/138   Supination/Pronation 75/WNL 65/69   Wrist Ext/Flex 74/50 40/55   Wrist RD/UD 15/31 15/30     Hand ROM. Measured in degrees.   8/18/2022 8/18/2022    Left Right        Index: MP   28/              PIP                    DIP                RODRIGUES          Long:  MP  34/              PIP                DIP                RODRIGUES        Strength (Dynamometer) and Pinch Strength (Pinch Gauge)  Measured in pounds.   8/18/2022 8/18/2022    Left Right   Rung II 12.5 19   Key Pinch     3pt Pinch 2 1   2pt Pinch       Sensation   8/15/2022 8/15/2022    Left Right   [place] Sunitha     Normal 1.65-2.83     Diminished Light Touch 3.22-3.61     Diminished Protective 3.84-4.31     Loss of Protective 4.56-6.65     Untestable >6.65     2 Point Discrimination     Static     Dynamic       Manual Muscle Test   8/18/2022 8/18/2022    Left Right   Wrist Extension  3+   3   Wrist Flexion     Radial Deviation     Ulnar Deviation     Supination     Pronation     Elbow Extension     Elbow Flexion             Limitation/Restriction for FOTO initial eval; wrist Survey    Therapist reviewed FOTO scores for Sherie Reyes on 8/15/2022.   FOTO documents entered into [employer] - see Media section.    Limitation Score: 59%         Treatment   Total Treatment time (time-based codes) separate from Evaluation: 13 minutes    Sherie received the treatments listed below:     Supervised modalities after being cleared for contradictions: Hot Pack - 5 min      Therapeutic exercises to develop endurance and ROM for 8 minutes,  including:  - astym stretches holding for 30s x 3 reps   - isometric       Patient Education and Home Exercises      Education provided:   - scar massage    Written Home Exercises Provided: yes.  Exercises were reviewed and Sherie was able to demonstrate them prior to the end of the session.  Sherie demonstrated good  understanding of the education provided. See EMR under Patient Instructions for exercises provided during therapy sessions.     Pt was advised to perform these exercises free of pain, and to stop performing them if pain occurs.    Patient/Family Education: role of OT, goals for OT, scheduling/cancellations - pt verbalized understanding. Discussed insurance limitations with patient.    ASSESSMENT     Sherie Reyes is a 57 y.o. female referred to outpatient occupational therapy and presents with a medical diagnosis of M77.11 (ICD-10-CM) - Lateral epicondylitis of right elbow.  Patient presents with the following therapy deficits: Decreased  strength, Decreased pinch strength, Decreased muscle strength, Increased pain and Edema and demonstrates limitations as described in the chart below. Following medical record review it is determined that pt will benefit from occupational therapy services in order to maximize pain free and/or functional use of right elbow. The following goals were discussed with the patient and patient is in agreement with them as to be addressed in the treatment plan. The patient's rehab potential is Good.     Anticipated barriers to occupational therapy: n/a  Pt has no cultural, educational or language barriers to learning provided.    Profile and History Assessment of Occupational Performance Level of Clinical Decision Making Complexity Score   Occupational Profile:   Sherie Reyes is a 57 y.o. female who lives with their family and is on disability Sherie Reyes has difficulty with  ADLs and IADLs as listed previously, which  Affecting [facility]  abilities.      Comorbidities:    has a past medical history of Anxiety, Degenerative joint disease (DJD) of hip, Diabetes type 2, controlled, GERD (gastroesophageal reflux disease), Hyperlipidemia, Hypertension, IBS (irritable bowel syndrome), Insomnia, Lumbar disc herniation, and PTSD (post-traumatic stress disorder).    Medical and Therapy History Review:   Brief               Performance Deficits    Physical:  Muscle Power/Strength  Muscle Endurance  Skin Integrity/Scar Formation  Edema   Strength  Pinch Strength  Pain    Cognitive:  No Deficits    Psychosocial:    Habits  Routines  Rituals     Clinical Decision Making:  low    Assessment Process:  Problem-Focused Assessments    Modification/Need for Assistance:  Not Necessary    Intervention Selection:  Several Treatment Options       low  Based on PMHX, co morbidities , data from assessments and functional level of assistance required with task and clinical presentation directly impacting function.         Goals:   The following goals were discussed with the patient and patient is in agreement with them as to be addressed in the treatment plan.   Long Term Goals (LTGs); to be met by discharge.  LTG #1: Pt will report a pain level of 1-2 out of 10 with functional use of R hand.    LTG #2: Pt will demo improved FOTO score by 10-15 points.   LTG #3: Pt will return to prior level of function for ADLs and household management.     Short Term Goals (STGs); to be met within 4 weeks (9/16/2022).  STG #1: Pt will report 3-4 out of 10 pain level with functional use of R hand.  STG #2: Pt will report/demo improved  strength by 5#  STG #3: Pt will report/demo MMT 4/5   STG #4: Pt will demonstrate independence with issued HEP.       PLAN   Plan of Care Certification: 8/15/2022 to 10/14/2022.     Outpatient Occupational Therapy 2 times weekly for 8 weeks to include the following interventions: Fluidotherapy, Manual therapy/joint mobilizations, Modalities for pain  management, US 3 mhz, Therapeutic exercises/activities., Strengthening, Edema Control and Energy Conservation.      Maryse Butler OT      I CERTIFY THE NEED FOR THESE SERVICES FURNISHED UNDER THIS PLAN OF TREATMENT AND WHILE UNDER MY CARE  Physician's comments:      Physician's Signature: ___________________________________________________

## 2022-08-18 NOTE — PROGRESS NOTES
"    [facility]  Occupational Therapy Treatment Note    Date: 8/18/2022  Name: Sherie Reyes  Clinic Number: [id number]    Therapy Diagnosis:   Encounter Diagnosis   Name Primary?    Lateral epicondylitis of right elbow Yes     Physician: Melchor Hughes Jr., *    Physician Orders: Eval and treat;  Medical Diagnosis: M77.11 (ICD-10-CM) - Lateral epicondylitis of right elbow  Surgical Procedure and Date: 5/20/2022, Right TENOTOMY,ELBOW,WITH DEBRIDEMENT AND TENDON REPAIR Date of Injury/Onset: gradual onset   Evaluation Date: 8/15/2022  Insurance Authorization Period Expiration: 8/15/2023   Plan of Care Expiration: 8 weeks; 10/14/2022  Progress Note Due: 4 weeks; 9/16/2022   Date of Return to MD: 8/31/2022  Visit # / Visits authorized: 1 / 25  FOTO: initial eval      Precautions:  Standard diabetes      Time In: 2 PM   Time Out: 2:50 PM  Total Appointment Time (timed & untimed codes): 50 minutes         SUBJECTIVE     Pt reports: "I always have pain."  She was compliant with home exercise program given last session.   Response to previous treatment:first tx  Functional change: none    Pain: 8/10  Location: right elbow      OBJECTIVE   Objective Measures updated at progress report unless specified.    Edema. Measured in centimeters.    8/18/2022 8/18/2022     Left Right   2in. Above elbow       2in. Below elbow       Wrist Crease 26 26   Figure of 8       MCPs             Elbow and Wrist ROM. Measured in degrees.    8/18/2022 8/18/2022     Left Right   Elbow Ext/Flex 10/145 7/138   Supination/Pronation 75/WNL 65/69   Wrist Ext/Flex 74/50 40/55   Wrist RD/UD 15/31 15/30      Hand ROM. Measured in degrees.    8/18/2022 8/18/2022     Left Right           Index: MP    28/              PIP                      DIP                  RODRIGUES               Long:  MP   34/              PIP                  DIP                  RODRIGUES           Strength (Dynamometer) and Pinch Strength (Pinch " Gauge)  Measured in pounds.    8/18/2022 8/18/2022     Left Right   Rung II 12.5 19   Key Pinch       3pt Pinch 2 1   2pt Pinch          Sensation    8/15/2022 8/15/2022     Left Right   [place] Sunitha       Normal 1.65-2.83       Diminished Light Touch 3.22-3.61       Diminished Protective 3.84-4.31       Loss of Protective 4.56-6.65       Untestable >6.65       2 Point Discrimination       Static       Dynamic          Manual Muscle Test    8/18/2022 8/18/2022     Left Right   Wrist Extension  3+    3   Wrist Flexion       Radial Deviation       Ulnar Deviation       Supination       Pronation       Elbow Extension       Elbow Flexion                   Limitation/Restriction for FOTO initial eval; wrist Survey     Therapist reviewed FOTO scores for Sherie Reyes on 8/15/2022.   FOTO documents entered into [employer] - see Media section.     Limitation Score: 59%             Treatment     Sherie received the treatments listed below:     Supervised modalities after being cleared for contradictions: Hot Pack - Patient received Moist Heat  x 10 min to R elbow  to increase blood flow, circulation and tissue elasticity prior to therex      Manual therapy techniques: Soft tissue Mobilization were applied to the: R elbow and FA for 8 minutes, including:  STM and scar massage to lateral elbow and along proximal radius     Therapeutic exercises to develop endurance and ROM for 32 minutes, including:  - astym stretches holding for 30s x 2 reps (pre/post session)  - isometric red therabar flex/ext x10  - frowns/smiles yellow therabar x10 ea  - elbow AROM 3 ways x10 ea  - Wrist AROM over wedge with med dowel x20 ea way  - isospheres x2 min       Patient Education and Home Exercises      Education provided:   - HEP  - Progress towards goals     Written Home Exercises Provided: Patient instructed to cont prior HEP.  Exercises were reviewed and Sherie was able to demonstrate them prior to the end of the session.  Sherie  demonstrated good  understanding of the HEP provided. See EMR under Patient Instructions for exercises provided during therapy sessions.      ASSESSMENT     Pt would continue to benefit from skilled OT. Attended first f/u tx today. Tolerated session well however demo'd some difficulty with strengthening exercises 2* wrist and elbow weakness. Will progress as tolerated.     Sherie is progressing well towards her goals and there are no updates to goals at this time. Pt prognosis is Good.     Pt will continue to benefit from skilled outpatient occupational therapy to address the deficits listed in the problem list on initial evaluation, provide pt/family education and to maximize pt's level of independence in the home and community environment.     Pt's spiritual, cultural and educational needs considered and pt agreeable to plan of care and goals.    Anticipated barriers to occupational therapy: n/a    Goals:   The following goals were discussed with the patient and patient is in agreement with them as to be addressed in the treatment plan.   Long Term Goals (LTGs); to be met by discharge.  LTG #1: Pt will report a pain level of 1-2 out of 10 with functional use of R hand. ------progressing not met 8/18/2022  LTG #2: Pt will demo improved FOTO score by 10-15 points. ------progressing not met 8/18/2022  LTG #3: Pt will return to prior level of function for ADLs and household management. ------progressing not met 8/18/2022     Short Term Goals (STGs); to be met within 4 weeks (9/16/2022).  STG #1: Pt will report 3-4 out of 10 pain level with functional use of R hand. ------progressing not met 8/18/2022  STG #2: Pt will report/demo improved  strength by 5# ------progressing not met 8/18/2022  STG #3: Pt will report/demo MMT 4/5 ------progressing not met 8/18/2022  STG #4: Pt will demonstrate independence with issued HEP. ------progressing not met 8/18/2022         PLAN     Continue skilled occupational therapy with  individualized plan of care focusing on increasing strength and participation in daily activities.    Updates/Grading for next session: continue as tolerated     KIAH Hoang/AZAM          Client Care conference completed with evaluating therapist in regards to this patients POC as evidenced by co signature of supervising therapist.

## 2022-08-19 ENCOUNTER — PATIENT MESSAGE (OUTPATIENT)
Dept: INTERNAL MEDICINE | Facility: CLINIC | Age: 57
End: 2022-08-19
Payer: MEDICAID

## 2022-08-21 ENCOUNTER — PATIENT MESSAGE (OUTPATIENT)
Dept: INTERNAL MEDICINE | Facility: CLINIC | Age: 57
End: 2022-08-21
Payer: MEDICAID

## 2022-08-21 DIAGNOSIS — L29.9 PRURITIC CONDITION: Primary | ICD-10-CM

## 2022-08-22 ENCOUNTER — TELEPHONE (OUTPATIENT)
Dept: ALLERGY | Facility: CLINIC | Age: 57
End: 2022-08-22
Payer: MEDICAID

## 2022-08-22 NOTE — TELEPHONE ENCOUNTER
----- Message from Tato Ge sent at 8/22/2022 12:48 PM CDT -----  Regarding: appt  Contact: @ [phone]  Patient calling to schedule a NP appt from the referral, nicholas call

## 2022-08-23 ENCOUNTER — HOSPITAL ENCOUNTER (EMERGENCY)
Facility: HOSPITAL | Age: 57
Discharge: HOME | End: 2022-08-23
Attending: EMERGENCY MEDICINE
Payer: MEDICAID

## 2022-08-23 ENCOUNTER — PATIENT MESSAGE (OUTPATIENT)
Dept: INTERNAL MEDICINE | Facility: CLINIC | Age: 57
End: 2022-08-23
Payer: MEDICAID

## 2022-08-23 VITALS
DIASTOLIC BLOOD PRESSURE: 80 MMHG | TEMPERATURE: 97 F | WEIGHT: 168.19 LBS | RESPIRATION RATE: 16 BRPM | HEART RATE: 105 BPM | BODY MASS INDEX: 30.77 KG/M2 | SYSTOLIC BLOOD PRESSURE: 131 MMHG | OXYGEN SATURATION: 97 %

## 2022-08-23 DIAGNOSIS — R21 RASH AND NONSPECIFIC SKIN ERUPTION: Primary | ICD-10-CM

## 2022-08-23 PROCEDURE — 63600175 PHARM REV CODE 636 W HCPCS: Performed by: PHYSICIAN ASSISTANT

## 2022-08-23 PROCEDURE — 99284 EMERGENCY DEPT VISIT MOD MDM: CPT

## 2022-08-23 PROCEDURE — 99284 PR EMERGENCY DEPT VISIT,LEVEL IV: ICD-10-PCS | Mod: ,,, | Performed by: EMERGENCY MEDICINE

## 2022-08-23 PROCEDURE — 96372 THER/PROPH/DIAG INJ SC/IM: CPT | Performed by: PHYSICIAN ASSISTANT

## 2022-08-23 PROCEDURE — 99284 EMERGENCY DEPT VISIT MOD MDM: CPT | Mod: ,,, | Performed by: EMERGENCY MEDICINE

## 2022-08-23 RX ORDER — DIPHENHYDRAMINE HYDROCHLORIDE 50 MG/ML
25 INJECTION INTRAMUSCULAR; INTRAVENOUS
Status: COMPLETED | OUTPATIENT
Start: 2022-08-23 | End: 2022-08-23

## 2022-08-23 RX ORDER — PERMETHRIN 50 MG/G
CREAM TOPICAL
Qty: 60 G | Refills: 0 | Status: SHIPPED | OUTPATIENT
Start: 2022-08-23 | End: 2022-10-12

## 2022-08-23 RX ORDER — PREDNISONE 20 MG/1
40 TABLET ORAL
Status: COMPLETED | OUTPATIENT
Start: 2022-08-23 | End: 2022-08-23

## 2022-08-23 RX ORDER — PREDNISONE 10 MG/1
TABLET ORAL
Qty: 24 TABLET | Refills: 0 | Status: SHIPPED | OUTPATIENT
Start: 2022-08-23 | End: 2022-09-03

## 2022-08-23 RX ADMIN — DIPHENHYDRAMINE HYDROCHLORIDE 25 MG: 50 INJECTION, SOLUTION INTRAMUSCULAR; INTRAVENOUS at 09:08

## 2022-08-23 RX ADMIN — PREDNISONE 40 MG: 20 TABLET ORAL at 09:08

## 2022-08-23 NOTE — ED PROVIDER NOTES
"Encounter Date: 8/23/2022       History     Chief Complaint   Patient presents with    Urticaria     Itching, whelps X3 weeks. States she is covered from "head to toe." Was prescribed a prednisolone  pack and symptoms went away. Pt. States when she finished taking the medications symptoms came back.      57-year-old female with past medical history of anxiety, DM, GERD, hypertension, hyperlipidemia who presents to the emergency department with chief complaint of rash.  It began 3 weeks ago.  She denies any new products or lotions prior to onset.  She does report that she began taking Zoloft about 6 weeks ago.  This is recently discontinued by her PCP.  She reports the rash looked similar to mosquito bites.  It is diffuse, involving the face, trunk, and extremities.  It is not painful but very itchy.  She was treated with a Medrol Dosepak and hydroxyzine by her PCP.  The rash improved while taking the Medrol Dosepak, although returned after it was completed.  She does not feel that she is getting any relief with the hydroxyzine or Benadryl.  She denies any upper respiratory symptoms or fever.  She denies symptoms of this nature in the past.  She states that her son has recently complained of itching to his arm, but that no one else in the household has a similar rash.  It does not involve the palms.  She states that she did noticed 1 lesion to the sole of her left foot, but this has since resolved.  She denies any other worsening or alleviating factors.        Review of patient's allergies indicates:  No Known Allergies  Past Medical History:   Diagnosis Date    Anxiety     Degenerative joint disease (DJD) of hip     Diabetes type 2, controlled 7/10/2019    GERD (gastroesophageal reflux disease)     Hyperlipidemia     Hypertension     IBS (irritable bowel syndrome)     Insomnia     Lumbar disc herniation     PTSD (post-traumatic stress disorder)      Past Surgical History:   Procedure Laterality Date    " ADENOIDECTOMY      ARTHROSCOPIC DEBRIDEMENT OF ROTATOR CUFF Right 2020    Procedure: DEBRIDEMENT, ROTATOR CUFF, ARTHROSCOPIC;  Surgeon: Melchor Hughes Jr., MD;  Location: [facility] OR;  Service: Orthopedics;  Laterality: Right;  need opus system (Ger MESA notified , EF)  video, notified/confirmed 2020 KB [id number]    ARTHROSCOPIC EXCISION OF ACROMIOCLAVICULAR JOINT  2020    Procedure: EXCISION, ACROMIOCLAVICULAR JOINT, ARTHROSCOPIC;  Surgeon: Melchor Hughes Jr., MD;  Location: [facility] OR;  Service: Orthopedics;;    ARTHROSCOPIC REPAIR OF ROTATOR CUFF OF SHOULDER      ARTHROSCOPY OF SHOULDER WITH DECOMPRESSION OF SUBACROMIAL SPACE  2020    Procedure: ARTHROSCOPY, SHOULDER, WITH SUBACROMIAL SPACE DECOMPRESSION;  Surgeon: Melchor Hughes Jr., MD;  Location: [facility] OR;  Service: Orthopedics;;     SECTION      EPIDURAL STEROID INJECTION INTO CERVICAL SPINE N/A 2021    Procedure: Injection-steroid-epidural-cervical--C7-T1 IL ZAYNAB;  Surgeon: Gregoria Rodriguez MD;  Location: [facility] PAIN MGT;  Service: Pain Management;  Laterality: N/A;    TENOTOMY, ELBOW, WITH DEBRIDEMENT AND TENDON REPAIR Right 2022    Procedure: TENOTOMY,ELBOW,WITH DEBRIDEMENT AND TENDON REPAIR;  Surgeon: Melchor Hughes Jr., MD;  Location: [facility] OR;  Service: Orthopedics;  Laterality: Right;    TONSILLECTOMY       Family History   Problem Relation Age of Onset    Hypertension Mother     Hypothyroidism Mother     Arthritis Mother     Depression Mother     Heart disease Mother     Hyperlipidemia Mother     Hypertension Father     Stroke Father 46    Diabetes Father     Heart disease Father     Hyperlipidemia Father     Cancer Maternal Grandfather         Stomach Ca    Depression Maternal Grandfather     Early death Maternal Grandfather     Heart disease Maternal Grandfather     Hyperlipidemia Maternal Grandfather     Hypertension Maternal Grandfather     Mental illness Maternal Grandfather     Vision  loss Maternal Grandfather         Loss during  War.    Breast cancer Paternal Aunt     Arthritis Paternal Grandmother     Depression Paternal Grandmother     Heart disease Paternal Grandmother     Cancer Paternal Aunt         Bilat breast Cancer    Heart disease Paternal Aunt     Cancer Maternal Grandmother         Lung w/ Mets    COPD Maternal Grandmother     Depression Maternal Grandmother     Early death Maternal Grandmother     Heart disease Maternal Grandmother     Hyperlipidemia Maternal Grandmother     Hypertension Maternal Grandmother     Depression Son     Learning disabilities Son     Depression Sister     Depression Daughter     Diabetes Paternal Grandfather     Early death Paternal Grandfather     Heart disease Paternal Grandfather     Hyperlipidemia Paternal Grandfather     Hypertension Paternal Grandfather     Kidney disease Paternal Grandfather      Social History     Tobacco Use    Smoking status: Former Smoker     Packs/day: 0.50     Years: 5.00     Pack years: 2.50     Types: Vaping with nicotine     Start date: 1985     Quit date: 2014     Years since quittin.2    Smokeless tobacco: Former User     Quit date: 10/20/2014    Tobacco comment: Vaping 30 ml last 5 or 6 weeks   Substance Use Topics    Alcohol use: Yes     Alcohol/week: 1.0 standard drink     Types: 1 Cans of beer per week     Comment: Maybe 1 or 2 a month    Drug use: No     Review of Systems   Constitutional: Negative for fever.   HENT: Negative for sore throat.    Respiratory: Negative for shortness of breath.    Cardiovascular: Negative for chest pain.   Gastrointestinal: Negative for nausea.   Genitourinary: Negative for dysuria.   Musculoskeletal: Negative for back pain.   Skin: Positive for rash.   Neurological: Negative for weakness.   Hematological: Does not bruise/bleed easily.       Physical Exam     Initial Vitals [22 0816]   BP Pulse Resp Temp SpO2   131/80 105 16 97.4 °F  (36.3 °C) 97 %      MAP       --         Physical Exam    Nursing note and vitals reviewed.  Constitutional: She appears well-developed and well-nourished. She is not diaphoretic. No distress.   HENT:   Head: Normocephalic and atraumatic.   Mouth/Throat: Oropharynx is clear and moist.   Eyes: Conjunctivae and EOM are normal. Pupils are equal, round, and reactive to light.   Neck: Neck supple.   Normal range of motion.  Cardiovascular: Normal rate, regular rhythm, normal heart sounds and intact distal pulses. Exam reveals no gallop and no friction rub.    No murmur heard.  Pulmonary/Chest: Breath sounds normal. She has no wheezes. She has no rhonchi. She has no rales.   Abdominal: Abdomen is soft. Bowel sounds are normal. There is no abdominal tenderness.   Musculoskeletal:         General: Normal range of motion.      Cervical back: Normal range of motion and neck supple.     Neurological: She is alert and oriented to person, place, and time. She has normal strength. No cranial nerve deficit or sensory deficit. GCS score is 15. GCS eye subscore is 4. GCS verbal subscore is 5. GCS motor subscore is 6.   Skin: Skin is warm and dry. Capillary refill takes less than 2 seconds.   Diffuse, excoriated papular rash noted to trunk, upper and lower extremities.  There is no involvement of the palms or soles.   Psychiatric: She has a normal mood and affect. Her behavior is normal. Judgment and thought content normal.         ED Course   Procedures  Labs Reviewed - No data to display       Imaging Results    None          Medications   diphenhydrAMINE injection 25 mg (25 mg Intramuscular Given 8/23/22 0945)   predniSONE tablet 40 mg (40 mg Oral Given 8/23/22 0945)     Medical Decision Making:   History:   Old Medical Records: I decided to obtain old medical records.  Initial Assessment:   Emergent evaluation of a 57-year-old female who presents to the emergency department with chief complaint of rash that began 3 weeks ago.   She was treated with hydroxyzine and a Medrol Dosepak.  Rash improved while on steroids, although returned after she completed the course.  It is itchy, but not painful.  Denies fever or other systemic symptoms.  Patient is afebrile, hemodynamically stable, nontoxic appearing.    Differential Diagnosis:   Differential diagnosis includes but is not limited to contact dermatitis, scabies, bedbugs, Monkeypox, EM, SJS, syphilis.   ED Management:  Patient with diffuse papular rash with excoriations. No oral lesions. No involvement in the palms or soles.   She has recently started Zoloft, but otherwise no new products.   The rash is not classic for scabies, however is very itchy. Will try permethrin cream and treat with steroids.   Will place dermatology referral.   Return precautions given. All questions answered.   The patient was instructed to follow up with dermatology or to return to the emergency department for worsening symptoms. The treatment plan was discussed with the patient who demonstrated understanding and comfort with plan. The patient's history, physical exam, and plan of care was discussed with and agreed upon with my supervising physician.                         Clinical Impression:   Final diagnoses:  [R21] Rash and nonspecific skin eruption (Primary)          ED Disposition Condition    Discharge Stable        ED Prescriptions     Medication Sig Dispense Start Date End Date Auth. Provider    predniSONE (DELTASONE) 10 MG tablet Take 4 tablets (40 mg total) by mouth once daily for 3 days, THEN 2 tablets (20 mg total) once daily for 4 days, THEN 1 tablet (10 mg total) once daily for 4 days. Take 4 tabs x 3 days, then  Take 2 tabs x 3 days, then   Take 1 tab x 3 days.. 24 tablet 8/23/2022 9/3/2022 Lay Webber PA-C    permethrin (ELIMITE) 5 % cream Apply to affected area once 60 g 8/23/2022  Lay Webber PA-C        Follow-up Information     Follow up With Specialties Details Why Contact  Info Additional Information    [facility] - Emergency Dept Emergency Medicine Go to  If symptoms worsen [address]  [facility] [id number] [phone]     [facility] - Dermatology [address] [place] [address] Dermatology Schedule an appointment as soon as possible for a visit in 1 week  [address]  [facility] [id number] [phone] Dermatology - Main Building, Clinic 11th Floor Please park in [facility]. Use Clinic elevators 12 & 13 to get to the 11th floor           Lay Webber PA-C  08/23/22 1519

## 2022-08-23 NOTE — DISCHARGE INSTRUCTIONS
Please take steroids as prescribed.   Use permethrin cream as prescribed. Cover your entire body in the cream before going to bed. Wash off after 12 hours.   Continue hydroxyzine as needed. I have placed a referral for dermatology.   Follow up with dermatology or return to the ER for any new or worsening symptoms.

## 2022-08-23 NOTE — ED NOTES
"Pt with rash x 3 weeks.  Pt states that she has been on steroids, benadryl and hydroxyzine without relief.  Pt denies SOB.  Pt with raised rash and scabs scattered to entire body and limbs. Pt states they start out as "welps", then she scratches them, they become scabs.       LOC: The patient is awake, alert and aware of environment with an appropriate affect, the patient is oriented x 3 and speaking appropriately.  APPEARANCE: Patient resting comfortably and in no acute distress, patient is clean and well groomed, patient's clothing is properly fastened.  SKIN: scattered rash to body, limbs and head.  MUSCULOSKELETAL: Patient moving all extremities spontaneously, no obvious swelling or deformities noted.  RESPIRATORY: Airway is open and patent, respirations are spontaneous, patient has a normal effort and rate, no accessory muscle use noted.  ABDOMEN: Soft and non tender to palpation, no distention noted.  "

## 2022-08-29 ENCOUNTER — CLINICAL SUPPORT (OUTPATIENT)
Dept: REHABILITATION | Facility: HOSPITAL | Age: 57
End: 2022-08-29
Payer: MEDICAID

## 2022-08-29 DIAGNOSIS — M62.81 DECREASED MUSCLE STRENGTH: ICD-10-CM

## 2022-08-29 DIAGNOSIS — M25.521 RIGHT ELBOW PAIN: ICD-10-CM

## 2022-08-29 PROCEDURE — 97530 THERAPEUTIC ACTIVITIES: CPT

## 2022-08-29 NOTE — PROGRESS NOTES
"      [facility]  Occupational Therapy Treatment Note    Date: 8/29/2022  Name: Sherie Reyes  Clinic Number: [id number]    Therapy Diagnosis:   Encounter Diagnoses   Name Primary?    Right elbow pain     Decreased muscle strength        Physician: Melchor Hughes Jr., *    Physician Orders: Eval and treat;  Medical Diagnosis: M77.11 (ICD-10-CM) - Lateral epicondylitis of right elbow  Surgical Procedure and Date: 5/20/2022, Right TENOTOMY,ELBOW,WITH DEBRIDEMENT AND TENDON REPAIR Date of Injury/Onset: gradual onset   Evaluation Date: 8/15/2022  Insurance Authorization Period Expiration: 8/15/2023   Plan of Care Expiration: 8 weeks; 10/14/2022  Progress Note Due: 4 weeks; 9/16/2022   Date of Return to MD: 8/31/2022  Visit # / Visits authorized: 2 / 25  FOTO: initial eval      Precautions:  Standard diabetes      Time In: 3:25 PM   Time Out: 04:25 PM  Total Appointment Time (timed & untimed codes): 60 minutes         SUBJECTIVE     Pt reports: "I had a rash and the doctor's didn't know where it was coming from"   She was compliant with home exercise program given last session.   Response to previous treatment: cont to be tightness  Functional change: none    Pain: 8/10  Location: right elbow      OBJECTIVE   Objective Measures updated at progress report unless specified.    Edema. Measured in centimeters.    8/18/2022 8/18/2022     Left Right   2in. Above elbow       2in. Below elbow       Wrist Crease 26 26   Figure of 8       MCPs             Elbow and Wrist ROM. Measured in degrees.    8/18/2022 8/18/2022     Left Right   Elbow Ext/Flex 10/145 7/138   Supination/Pronation 75/WNL 65/69   Wrist Ext/Flex 74/50 40/55   Wrist RD/UD 15/31 15/30      Hand ROM. Measured in degrees.    8/18/2022 8/18/2022     Left Right           Index: MP    28/              PIP                      DIP                  RODRIGUES               Long:  MP   34/              PIP                  DIP                "   RODRIGUES           Strength (Dynamometer) and Pinch Strength (Pinch Gauge)  Measured in pounds.    8/18/2022 8/18/2022     Left Right   Rung II 12.5 19   Key Pinch       3pt Pinch 2 1   2pt Pinch          Sensation    8/15/2022 8/15/2022     Left Right   [place] Sun[place]a       Normal 1.65-2.83       Diminished Light Touch 3.22-3.61       Diminished Protective 3.84-4.31       Loss of Protective 4.56-6.65       Untestable >6.65       2 Point Discrimination       Static       Dynamic          Manual Muscle Test    8/18/2022 8/18/2022     Left Right   Wrist Extension  3+    3   Wrist Flexion       Radial Deviation       Ulnar Deviation       Supination       Pronation       Elbow Extension       Elbow Flexion                   Limitation/Restriction for FOTO initial eval; wrist Survey     Therapist reviewed FOTO scores for Sherie Reyes on 8/15/2022.   FOTO documents entered into [employer] - see Media section.     Limitation Score: 59%             Treatment     Sherie received the treatments listed below:     Supervised modalities after being cleared for contradictions: Hot Pack - Patient received Moist Heat  x 10 min to R elbow  to increase blood flow, circulation and tissue elasticity prior to therex      Manual therapy techniques: Soft tissue Mobilization were applied to the: R elbow and FA for 10 minutes, including:  STM and scar massage to lateral elbow and along proximal radius     Therapeutic exercises to develop endurance and ROM for 32 minutes, including:  - astym stretches holding for 30s x 3 reps (pre/post session)  - isometric red therabar flex/ext x10  - eccentric red flexbar x 10 reps   - frowns/smiles yellow therabar x10 ea  - elbow AROM 3 ways x10 ea  - Wrist AROM over wedge with med dowel x20 ea way  - isospheres x 2 min       Patient Education and Home Exercises      Education provided:   - HEP  - Progress towards goals     Written Home Exercises Provided: Patient instructed to cont prior  HEP.  Exercises were reviewed and Sherie was able to demonstrate them prior to the end of the session.  Sherie demonstrated good  understanding of the HEP provided. See EMR under Patient Instructions for exercises provided during therapy sessions.      ASSESSMENT     Pt would continue to benefit from skilled OT. Mod tenderness near scar. Advised to perform stretches to promote tissue elasticity.  Introduced isometric strengthening without difficulty. Will progress as tolerated.     Sherie is progressing well towards her goals and there are no updates to goals at this time. Pt prognosis is Good.     Pt will continue to benefit from skilled outpatient occupational therapy to address the deficits listed in the problem list on initial evaluation, provide pt/family education and to maximize pt's level of independence in the home and community environment.     Pt's spiritual, cultural and educational needs considered and pt agreeable to plan of care and goals.    Anticipated barriers to occupational therapy: n/a    Goals:   The following goals were discussed with the patient and patient is in agreement with them as to be addressed in the treatment plan.   Long Term Goals (LTGs); to be met by discharge.  LTG #1: Pt will report a pain level of 1-2 out of 10 with functional use of R hand. ------progressing not met 8/29/2022  LTG #2: Pt will demo improved FOTO score by 10-15 points. ------progressing not met 8/29/2022  LTG #3: Pt will return to prior level of function for ADLs and household management. ------progressing not met 8/29/2022     Short Term Goals (STGs); to be met within 4 weeks (9/16/2022).  STG #1: Pt will report 3-4 out of 10 pain level with functional use of R hand. ------progressing not met 8/29/2022  STG #2: Pt will report/demo improved  strength by 5# ------progressing not met 8/29/2022  STG #3: Pt will report/demo MMT 4/5 ------progressing not met 8/29/2022  STG #4: Pt will demonstrate independence with  issued HEP. ------progressing not met 8/29/2022         PLAN     Continue skilled occupational therapy with individualized plan of care focusing on increasing strength and participation in daily activities.    Updates/Grading for next session: continue as tolerated     Maryse Butler OT          Client Care conference completed with evaluating therapist in regards to this patients POC as evidenced by co signature of supervising therapist.

## 2022-08-31 ENCOUNTER — OFFICE VISIT (OUTPATIENT)
Dept: ORTHOPEDICS | Facility: CLINIC | Age: 57
End: 2022-08-31
Payer: MEDICAID

## 2022-08-31 VITALS — HEIGHT: 62 IN | BODY MASS INDEX: 30.95 KG/M2 | WEIGHT: 168.19 LBS

## 2022-08-31 DIAGNOSIS — M77.11 LATERAL EPICONDYLITIS OF RIGHT ELBOW: Primary | ICD-10-CM

## 2022-08-31 PROBLEM — M25.521 RIGHT ELBOW PAIN: Status: ACTIVE | Noted: 2022-08-31

## 2022-08-31 PROBLEM — M62.81 DECREASED MUSCLE STRENGTH: Status: ACTIVE | Noted: 2022-08-31

## 2022-08-31 PROCEDURE — 3072F PR LOW RISK FOR RETINOPATHY: ICD-10-PCS | Mod: CPTII,,, | Performed by: PHYSICIAN ASSISTANT

## 2022-08-31 PROCEDURE — 3008F BODY MASS INDEX DOCD: CPT | Mod: CPTII,,, | Performed by: PHYSICIAN ASSISTANT

## 2022-08-31 PROCEDURE — 3061F PR NEG MICROALBUMINURIA RESULT DOCUMENTED/REVIEW: ICD-10-PCS | Mod: CPTII,,, | Performed by: PHYSICIAN ASSISTANT

## 2022-08-31 PROCEDURE — 4010F ACE/ARB THERAPY RXD/TAKEN: CPT | Mod: CPTII,,, | Performed by: PHYSICIAN ASSISTANT

## 2022-08-31 PROCEDURE — 3066F NEPHROPATHY DOC TX: CPT | Mod: CPTII,,, | Performed by: PHYSICIAN ASSISTANT

## 2022-08-31 PROCEDURE — 3066F PR DOCUMENTATION OF TREATMENT FOR NEPHROPATHY: ICD-10-PCS | Mod: CPTII,,, | Performed by: PHYSICIAN ASSISTANT

## 2022-08-31 PROCEDURE — 99213 PR OFFICE/OUTPT VISIT, EST, LEVL III, 20-29 MIN: ICD-10-PCS | Mod: S$PBB,,, | Performed by: PHYSICIAN ASSISTANT

## 2022-08-31 PROCEDURE — 1160F RVW MEDS BY RX/DR IN RCRD: CPT | Mod: CPTII,,, | Performed by: PHYSICIAN ASSISTANT

## 2022-08-31 PROCEDURE — 99999 PR PBB SHADOW E&M-EST. PATIENT-LVL IV: CPT | Mod: PBBFAC,,, | Performed by: PHYSICIAN ASSISTANT

## 2022-08-31 PROCEDURE — 3044F PR MOST RECENT HEMOGLOBIN A1C LEVEL <7.0%: ICD-10-PCS | Mod: CPTII,,, | Performed by: PHYSICIAN ASSISTANT

## 2022-08-31 PROCEDURE — 99999 PR PBB SHADOW E&M-EST. PATIENT-LVL IV: ICD-10-PCS | Mod: PBBFAC,,, | Performed by: PHYSICIAN ASSISTANT

## 2022-08-31 PROCEDURE — 4010F PR ACE/ARB THEARPY RXD/TAKEN: ICD-10-PCS | Mod: CPTII,,, | Performed by: PHYSICIAN ASSISTANT

## 2022-08-31 PROCEDURE — 3044F HG A1C LEVEL LT 7.0%: CPT | Mod: CPTII,,, | Performed by: PHYSICIAN ASSISTANT

## 2022-08-31 PROCEDURE — 1159F PR MEDICATION LIST DOCUMENTED IN MEDICAL RECORD: ICD-10-PCS | Mod: CPTII,,, | Performed by: PHYSICIAN ASSISTANT

## 2022-08-31 PROCEDURE — 3008F PR BODY MASS INDEX (BMI) DOCUMENTED: ICD-10-PCS | Mod: CPTII,,, | Performed by: PHYSICIAN ASSISTANT

## 2022-08-31 PROCEDURE — 99214 OFFICE O/P EST MOD 30 MIN: CPT | Mod: PBBFAC,PN | Performed by: PHYSICIAN ASSISTANT

## 2022-08-31 PROCEDURE — 1160F PR REVIEW ALL MEDS BY PRESCRIBER/CLIN PHARMACIST DOCUMENTED: ICD-10-PCS | Mod: CPTII,,, | Performed by: PHYSICIAN ASSISTANT

## 2022-08-31 PROCEDURE — 1159F MED LIST DOCD IN RCRD: CPT | Mod: CPTII,,, | Performed by: PHYSICIAN ASSISTANT

## 2022-08-31 PROCEDURE — 3061F NEG MICROALBUMINURIA REV: CPT | Mod: CPTII,,, | Performed by: PHYSICIAN ASSISTANT

## 2022-08-31 PROCEDURE — 99213 OFFICE O/P EST LOW 20 MIN: CPT | Mod: S$PBB,,, | Performed by: PHYSICIAN ASSISTANT

## 2022-08-31 PROCEDURE — 3072F LOW RISK FOR RETINOPATHY: CPT | Mod: CPTII,,, | Performed by: PHYSICIAN ASSISTANT

## 2022-08-31 NOTE — PROGRESS NOTES
Subjective:      Patient ID: Sherie Reyes is a 57 y.o. female.  Chief Complaint: No chief complaint on file.      HPI  Sherie Reyes is a  57 y.o. female presenting today for post op visit.  She is s/p lateral epicondylar release and repair of the right elbow now about 3 months postop  She is doing well pain is minimal      The pt notes that she has only been able to attend a couple sessions of PT 2/2 life events, but she is eager to continue.    Review of patient's allergies indicates:  No Known Allergies      Current Outpatient Medications   Medication Sig Dispense Refill    mupirocin (BACTROBAN) 2 % ointment APPLY TOPICALLY TO THE AFFECTED AREA TWICE DAILY 22 g 1    acetaminophen (TYLENOL) 500 MG tablet Take 500 mg by mouth every 6 (six) hours as needed for Pain.      albuterol (PROAIR HFA) 90 mcg/actuation inhaler Inhale 2 puffs into the lungs every 6 (six) hours as needed for Wheezing. Rescue 18 g 0    amLODIPine (NORVASC) 10 MG tablet TAKE 1 TABLET(10 MG) BY MOUTH EVERY DAY 90 tablet 3    azelastine (ASTELIN) 137 mcg (0.1 %) nasal spray 1 spray (137 mcg total) by Nasal route 2 (two) times daily. 30 mL 0    chlorzoxazone (PARAFON FORTE) 500 mg Tab Take 500 mg by mouth daily as needed.      clonazePAM (KLONOPIN) 1 MG tablet TAKE 1 TABLET BY MOUTH TWICE DAILY 60 tablet 1    cyanocobalamin 1,000 mcg/mL injection Inject 1,000 mcg into the muscle every 30 days.      diclofenac sodium (VOLTAREN) 1 % Gel Apply 2 g topically 4 (four) times daily. 4 each 2    dicyclomine (BENTYL) 10 MG capsule TAKE 1 CAPSULE BY MOUTH FOUR TIMES DAILY AS NEEDED FOR ABDOMINAL PAIN 120 capsule 1    hydrOXYzine HCL (ATARAX) 25 MG tablet Take 1 tablet (25 mg total) by mouth 3 (three) times daily as needed for Itching. 30 tablet 0    KURVELO 0.15-0.03 mg per tablet Take 1 tablet by mouth once daily.      LINZESS 290 mcg Cap capsule TAKE 1 CAPSULE(290 MCG) BY MOUTH BEFORE BREAKFAST 90 capsule 3    losartan (COZAAR) 100 MG  tablet TAKE 1 TABLET(100 MG) BY MOUTH EVERY DAY 90 tablet 3    melatonin 5 mg Chew Take 10 mg by mouth daily as needed.      meloxicam (MOBIC) 15 MG tablet Take 15 mg by mouth daily as needed.      methylPREDNISolone (MEDROL DOSEPACK) 4 mg tablet use as directed 21 each 0    mv-mn/iron/folic acid/herb 190 (VITAMIN D3 COMPLETE ORAL) Take by mouth once daily at 6am.      omega-3 fatty acids/fish oil (FISH OIL-OMEGA-3 FATTY ACIDS) 300-1,000 mg capsule Take 1 capsule by mouth once daily.      omeprazole (PRILOSEC) 40 MG capsule TAKE 1 CAPSULE(40 MG) BY MOUTH EVERY DAY 30 capsule 11    permethrin (ELIMITE) 5 % cream Apply to affected area once 60 g 0    POLYETHYLENE GLYCOL 3350 (MIRALAX ORAL) Take by mouth as needed.       potassium chloride 10% (KAYCIEL) 20 mEq/15 mL oral solution TAKE 15 ML BY MOUTH EVERY  mL 11    pravastatin (PRAVACHOL) 20 MG tablet TAKE 1 TABLET(20 MG) BY MOUTH EVERY EVENING 90 tablet 1    predniSONE (DELTASONE) 10 MG tablet Take 4 tablets (40 mg total) by mouth once daily for 3 days, THEN 2 tablets (20 mg total) once daily for 4 days, THEN 1 tablet (10 mg total) once daily for 4 days. Take 4 tabs x 3 days, then  Take 2 tabs x 3 days, then   Take 1 tab x 3 days.. 24 tablet 0    QUEtiapine (SEROQUEL) 200 MG Tab Take 1 tablet (200 mg total) by mouth every evening. 30 tablet 2    sertraline (ZOLOFT) 100 MG tablet Take 100 mg by mouth once daily.      tiZANidine (ZANAFLEX) 4 MG tablet Take 4 mg by mouth 2 (two) times daily as needed.      traMADoL (ULTRAM) 50 mg tablet Take 50 mg by mouth every 12 (twelve) hours as needed.      traZODone (DESYREL) 100 MG tablet TK 1 T PO  QHS      venlafaxine (EFFEXOR-XR) 150 MG Cp24 Take 150 mg by mouth once daily.      ZTLIDO 1.8 % PtMd Apply 1 patch topically daily as needed.       No current facility-administered medications for this visit.     Facility-Administered Medications Ordered in Other Visits   Medication Dose Route Frequency Provider Last Rate Last  "Admin    0.9%  NaCl infusion   Intravenous Continuous Gregoria Rodriguez MD           Past Medical History:   Diagnosis Date    Anxiety     Degenerative joint disease (DJD) of hip     Diabetes type 2, controlled 7/10/2019    GERD (gastroesophageal reflux disease)     Hyperlipidemia     Hypertension     IBS (irritable bowel syndrome)     Insomnia     Lumbar disc herniation     PTSD (post-traumatic stress disorder)        Past Surgical History:   Procedure Laterality Date    ADENOIDECTOMY      ARTHROSCOPIC DEBRIDEMENT OF ROTATOR CUFF Right 2020    Procedure: DEBRIDEMENT, ROTATOR CUFF, ARTHROSCOPIC;  Surgeon: Melchor Hughes Jr., MD;  Location: [facility] OR;  Service: Orthopedics;  Laterality: Right;  need opus system (Ger MESA notified , EF)  video, notified/confirmed 2020 KB 1310    ARTHROSCOPIC EXCISION OF ACROMIOCLAVICULAR JOINT  2020    Procedure: EXCISION, ACROMIOCLAVICULAR JOINT, ARTHROSCOPIC;  Surgeon: Melchor Hughes Jr., MD;  Location: [facility] OR;  Service: Orthopedics;;    ARTHROSCOPIC REPAIR OF ROTATOR CUFF OF SHOULDER      ARTHROSCOPY OF SHOULDER WITH DECOMPRESSION OF SUBACROMIAL SPACE  2020    Procedure: ARTHROSCOPY, SHOULDER, WITH SUBACROMIAL SPACE DECOMPRESSION;  Surgeon: Melchor Hughes Jr., MD;  Location: [facility] OR;  Service: Orthopedics;;     SECTION      EPIDURAL STEROID INJECTION INTO CERVICAL SPINE N/A 2021    Procedure: Injection-steroid-epidural-cervical--C7-T1 IL ZAYNAB;  Surgeon: Gregoria Rodriguez MD;  Location: [facility] PAIN [facility]GT;  Service: Pain Management;  Laterality: N/A;    TENOTOMY, ELBOW, WITH DEBRIDEMENT AND TENDON REPAIR Right 2022    Procedure: TENOTOMY,ELBOW,WITH DEBRIDEMENT AND TENDON REPAIR;  Surgeon: Melchor Hughes Jr., MD;  Location: [facility] OR;  Service: Orthopedics;  Laterality: Right;    TONSILLECTOMY         OBJECTIVE:   PHYSICAL EXAM:       Ht 5' 2" (1.575 m)   Wt 76.3 kg (168 lb 3.4 oz)   BMI 30.77 kg/m²      Ortho/SPM Exam  Examination right elbow " the incision is well healed no swelling no tenderness no evidence of infection  Range of motion elbow full with some tenderness to supination/pronation.   strength mildly decreased right hand      ASSESSMENT/PLAN:     IMPRESSION:  Status post lateral epicondylar release and repair right elbow    PLAN:    Pt encouraged to continue PT for ROM, strengthening, and scar massage  Advil or Motrin by mouth  Advance activities as tolerated    FOLLOW UP:  6 weeks for re-evaluation of ROM following completion of therapy

## 2022-09-01 ENCOUNTER — TELEPHONE (OUTPATIENT)
Dept: DERMATOLOGY | Facility: CLINIC | Age: 57
End: 2022-09-01
Payer: MEDICAID

## 2022-09-01 NOTE — TELEPHONE ENCOUNTER
Spoke with pt, inform her that we currently do not have medicaid slots available and give her [facility] number    ----- Message from Iza Anthony sent at 9/1/2022  3:45 PM CDT -----  Regarding: appt  Contact: [phone]  Pt seen in ED requesting NP follow up with derm. Informed no np seen at this time. Requested message sent. Pls call

## 2022-09-06 ENCOUNTER — CLINICAL SUPPORT (OUTPATIENT)
Dept: REHABILITATION | Facility: HOSPITAL | Age: 57
End: 2022-09-06
Payer: MEDICAID

## 2022-09-06 ENCOUNTER — PATIENT MESSAGE (OUTPATIENT)
Dept: INTERNAL MEDICINE | Facility: CLINIC | Age: 57
End: 2022-09-06
Payer: MEDICAID

## 2022-09-06 DIAGNOSIS — M62.81 DECREASED MUSCLE STRENGTH: ICD-10-CM

## 2022-09-06 DIAGNOSIS — M25.521 RIGHT ELBOW PAIN: Primary | ICD-10-CM

## 2022-09-06 PROCEDURE — 97530 THERAPEUTIC ACTIVITIES: CPT

## 2022-09-06 NOTE — PROGRESS NOTES
"      [facility]  Occupational Therapy Treatment Note    Date: 9/6/2022  Name: Sherie Reyes  Clinic Number: [id number]    Therapy Diagnosis:   Encounter Diagnoses   Name Primary?    Right elbow pain Yes    Decreased muscle strength          Physician: Melchor Hughes Jr., *    Physician Orders: Eval and treat;  Medical Diagnosis: M77.11 (ICD-10-CM) - Lateral epicondylitis of right elbow  Surgical Procedure and Date: 5/20/2022, Right TENOTOMY,ELBOW,WITH DEBRIDEMENT AND TENDON REPAIR Date of Injury/Onset: gradual onset   Evaluation Date: 8/15/2022  Insurance Authorization Period Expiration: 8/15/2023   Plan of Care Expiration: 8 weeks; 10/14/2022  Progress Note Due: 4 weeks; 9/16/2022   Date of Return to MD: 8/31/2022  Visit # / Visits authorized: 3 / 25  FOTO: initial eval      Precautions:  Standard diabetes      Time In:  02:01 PM   Time Out: 02:52 PM  Total Appointment Time (timed & untimed codes): 51 minutes         SUBJECTIVE     Pt reports "I've been in pain. I have been trying to help move my dog but it hurts a lot.  It started hurting on this side (medial) "   She was compliant with home exercise program given last session.   Response to previous treatment: cont to be tightness  Functional change: none    Pain: 9/10  Location: right elbow      OBJECTIVE   Objective Measures updated at progress report unless specified.    Edema. Measured in centimeters.    8/18/2022 8/18/2022     Left Right   2in. Above elbow       2in. Below elbow       Wrist Crease 26 26   Figure of 8       MCPs             Elbow and Wrist ROM. Measured in degrees.    8/18/2022 8/18/2022     Left Right   Elbow Ext/Flex 10/145 7/138   Supination/Pronation 75/WNL 65/69   Wrist Ext/Flex 74/50 40/55   Wrist RD/UD 15/31 15/30      Hand ROM. Measured in degrees.    8/18/2022 8/18/2022     Left Right           Index: MP    28/              PIP                      DIP                  RODRIGUES               Long:  MP "   34/              PIP                  DIP                  RODRIGUES           Strength (Dynamometer) and Pinch Strength (Pinch Gauge)  Measured in pounds.    8/18/2022 8/18/2022     Left Right   Rung II 12.5 19   Key Pinch       3pt Pinch 2 1   2pt Pinch          Sensation    8/15/2022 8/15/2022     Left Right   [place]       Normal 1.65-2.83       Diminished Light Touch 3.22-3.61       Diminished Protective 3.84-4.31       Loss of Protective 4.56-6.65       Untestable >6.65       2 Point Discrimination       Static       Dynamic          Manual Muscle Test    8/18/2022 8/18/2022     Left Right   Wrist Extension  3+    3   Wrist Flexion       Radial Deviation       Ulnar Deviation       Supination       Pronation       Elbow Extension       Elbow Flexion                   Limitation/Restriction for FOTO initial eval; wrist Survey     Therapist reviewed FOTO scores for Sherie Reyes on 8/15/2022.   FOTO documents entered into [employer] - see Media section.     Limitation Score: 59%             Treatment     Sherie received the treatments listed below:     Supervised modalities after being cleared for contradictions: Hot Pack - Patient received Moist Heat  x 10 min to R elbow  to increase blood flow, circulation and tissue elasticity prior to therex      Manual therapy techniques: Soft tissue Mobilization were applied to the: R elbow and FA for 12 minutes, including:  STM and scar massage to lateral elbow and along proximal radius       Therapeutic exercises to develop endurance and ROM for 31 minutes, including:  - astym stretches holding for 30s x 3 reps (pre/post session)  - isometric red therabar flex/ext and sup/pro x 10  - radial nerve mobilization (elbow flexed, passively flex wrist - extend elbow, return to flexion, wrist neutral)   - eccentric red flexbar x 10 reps   - frowns/smiles yellow therabar x10 ea (NT)  - elbow AROM 3 ways x 10 ea  - eccentric Wrist AROM (ext/RD)   - isospheres x 2 min   -  scapula retraction/depression  x 10 reps       Patient Education and Home Exercises      Education provided:   - HEP  - avoid heavy activity   - Progress towards goals     Written Home Exercises Provided: Patient instructed to cont prior HEP.  Exercises were reviewed and Sherie was able to demonstrate them prior to the end of the session.  Sherie demonstrated good  understanding of the HEP provided. See EMR under Patient Instructions for exercises provided during therapy sessions.      ASSESSMENT     Pt would continue to benefit from skilled OT. Cont to have mod TTP on lateral side of scar. Able to keaton eccentric exercises. Will benefit from increased stretching to lengthen extensors as well as improve postural stability.      Sherie is progressing well towards her goals and there are no updates to goals at this time. Pt prognosis is Good.     Pt will continue to benefit from skilled outpatient occupational therapy to address the deficits listed in the problem list on initial evaluation, provide pt/family education and to maximize pt's level of independence in the home and community environment.     Pt's spiritual, cultural and educational needs considered and pt agreeable to plan of care and goals.    Anticipated barriers to occupational therapy: n/a    Goals:   The following goals were discussed with the patient and patient is in agreement with them as to be addressed in the treatment plan.   Long Term Goals (LTGs); to be met by discharge.  LTG #1: Pt will report a pain level of 1-2 out of 10 with functional use of R hand. ------progressing not met 9/6/2022  LTG #2: Pt will demo improved FOTO score by 10-15 points. ------progressing not met 9/6/2022  LTG #3: Pt will return to prior level of function for ADLs and household management. ------progressing not met 9/6/2022     Short Term Goals (STGs); to be met within 4 weeks (9/16/2022).  STG #1: Pt will report 3-4 out of 10 pain level with functional use of R hand.  ------progressing not met 9/6/2022  STG #2: Pt will report/demo improved  strength by 5# ------progressing not met 9/6/2022  STG #3: Pt will report/demo MMT 4/5 ------progressing not met 9/6/2022  STG #4: Pt will demonstrate independence with issued HEP. ------progressing not met 9/6/2022         PLAN     Continue skilled occupational therapy with individualized plan of care focusing on increasing strength and participation in daily activities.    Updates/Grading for next session: continue as tolerated     Maryse Butler, OT

## 2022-09-12 ENCOUNTER — CLINICAL SUPPORT (OUTPATIENT)
Dept: REHABILITATION | Facility: HOSPITAL | Age: 57
End: 2022-09-12
Payer: MEDICAID

## 2022-09-12 DIAGNOSIS — M25.521 RIGHT ELBOW PAIN: Primary | ICD-10-CM

## 2022-09-12 DIAGNOSIS — M62.81 DECREASED MUSCLE STRENGTH: ICD-10-CM

## 2022-09-12 PROCEDURE — 97530 THERAPEUTIC ACTIVITIES: CPT | Mod: CO

## 2022-09-12 NOTE — PROGRESS NOTES
"      [facility]  Occupational Therapy Treatment Note    Date: 9/12/2022  Name: Sherie Reyes  Clinic Number: [id number]    Therapy Diagnosis:   Encounter Diagnoses   Name Primary?    Right elbow pain Yes    Decreased muscle strength            Physician: Melchor Hughes Jr., *    Physician Orders: Eval and treat;  Medical Diagnosis: M77.11 (ICD-10-CM) - Lateral epicondylitis of right elbow  Surgical Procedure and Date: 5/20/2022, Right TENOTOMY,ELBOW,WITH DEBRIDEMENT AND TENDON REPAIR Date of Injury/Onset: gradual onset   Evaluation Date: 8/15/2022  Insurance Authorization Period Expiration: 8/15/2023   Plan of Care Expiration: 8 weeks; 10/14/2022  Progress Note Due: 4 weeks; 9/16/2022   Date of Return to MD: 8/31/2022  Visit # / Visits authorized: 4/ 25  FOTO: initial eval      Precautions:  Standard diabetes      Time In:  3:25 PM   Time Out: 4:18 PM  Total Appointment Time (timed & untimed codes): 53 minutes         SUBJECTIVE     Pt reports "Whatever Maryse did last time, I felt a lot better after."  She was compliant with home exercise program given last session.   Response to previous treatment: improved pain   Functional change: none    Pain: 7/10  Location: right elbow      OBJECTIVE   Objective Measures updated at progress report unless specified.    Edema. Measured in centimeters.    8/18/2022 8/18/2022     Left Right   2in. Above elbow       2in. Below elbow       Wrist Crease 26 26   Figure of 8       MCPs             Elbow and Wrist ROM. Measured in degrees.    8/18/2022 8/18/2022     Left Right   Elbow Ext/Flex 10/145 7/138   Supination/Pronation 75/WNL 65/69   Wrist Ext/Flex 74/50 40/55   Wrist RD/UD 15/31 15/30      Hand ROM. Measured in degrees.    8/18/2022 8/18/2022     Left Right           Index: MP    28/              PIP                      DIP                  RODRIGUES               Long:  MP   34/              PIP                  DIP                  RODRIGUES     "       Strength (Dynamometer) and Pinch Strength (Pinch Gauge)  Measured in pounds.    8/18/2022 8/18/2022     Left Right   Rung II 12.5 19   Key Pinch       3pt Pinch 2 1   2pt Pinch          Sensation    8/15/2022 8/15/2022     Left Right   [place]       Normal 1.65-2.83       Diminished Light Touch 3.22-3.61       Diminished Protective 3.84-4.31       Loss of Protective 4.56-6.65       Untestable >6.65       2 Point Discrimination       Static       Dynamic          Manual Muscle Test    8/18/2022 8/18/2022     Left Right   Wrist Extension  3+    3   Wrist Flexion       Radial Deviation       Ulnar Deviation       Supination       Pronation       Elbow Extension       Elbow Flexion                   Limitation/Restriction for FOTO initial eval; wrist Survey     Therapist reviewed FOTO scores for Sherie Reyes on 8/15/2022.   FOTO documents entered into [employer] - see Media section.     Limitation Score: 59%             Treatment     Sherie received the treatments listed below:     Supervised modalities after being cleared for contradictions: Hot Pack - Patient received Moist Heat  x 10 min to R elbow  to increase blood flow, circulation and tissue elasticity prior to therex      Manual therapy techniques: Soft tissue Mobilization were applied to the: R elbow and FA for 10 minutes, including:  STM and scar massage to lateral elbow and along proximal radius       Therapeutic exercises to develop endurance and ROM for 33 minutes, including:   - astym stretches holding for 30s x 3 reps (pre/post session)  - isometric red therabar flex/ext and sup/pro x 10  - radial nerve mobilization (elbow flexed, passively flex wrist - extend elbow, return to flexion, wrist neutral)   - eccentric red flexbar x 10 reps   - frowns/smiles yellow therabar x10 ea (NT)  - elbow AROM 3 ways x 10 ea  - eccentric Wrist AROM 1 # (ext/RD) x15   - isospheres x 2 min   - scapula retraction/depression  x 10 reps   - wrist maze x 2  min      Patient Education and Home Exercises      Education provided:   - HEP  - avoid heavy activity   - Progress towards goals     Written Home Exercises Provided: Patient instructed to cont prior HEP.  Exercises were reviewed and Sherie was able to demonstrate them prior to the end of the session.  Sherie demonstrated good  understanding of the HEP provided. See EMR under Patient Instructions for exercises provided during therapy sessions.      ASSESSMENT     Pt would continue to benefit from skilled OT. Tolerated tx well with R wrist exercises, however somewhat limited by pain in other areas non related to tx diagnosis. Will continue as tolerated.    Sherie is progressing well towards her goals and there are no updates to goals at this time. Pt prognosis is Good.     Pt will continue to benefit from skilled outpatient occupational therapy to address the deficits listed in the problem list on initial evaluation, provide pt/family education and to maximize pt's level of independence in the home and community environment.     Pt's spiritual, cultural and educational needs considered and pt agreeable to plan of care and goals.    Anticipated barriers to occupational therapy: n/a    Goals:   The following goals were discussed with the patient and patient is in agreement with them as to be addressed in the treatment plan.   Long Term Goals (LTGs); to be met by discharge.  LTG #1: Pt will report a pain level of 1-2 out of 10 with functional use of R hand. ------progressing not met 9/12/2022  LTG #2: Pt will demo improved FOTO score by 10-15 points. ------progressing not met 9/12/2022  LTG #3: Pt will return to prior level of function for ADLs and household management. ------progressing not met 9/12/2022     Short Term Goals (STGs); to be met within 4 weeks (9/16/2022).  STG #1: Pt will report 3-4 out of 10 pain level with functional use of R hand. ------progressing not met 9/12/2022  STG #2: Pt will report/demo improved   strength by 5# ------progressing not met 9/12/2022  STG #3: Pt will report/demo MMT 4/5 ------progressing not met 9/12/2022  STG #4: Pt will demonstrate independence with issued HEP. ------progressing not met 9/12/2022         PLAN     Continue skilled occupational therapy with individualized plan of care focusing on increasing strength and participation in daily activities.    Updates/Grading for next session: continue as tolerated     KIAH Hoang/AZAM

## 2022-09-14 ENCOUNTER — CLINICAL SUPPORT (OUTPATIENT)
Dept: REHABILITATION | Facility: HOSPITAL | Age: 57
End: 2022-09-14
Payer: MEDICAID

## 2022-09-14 DIAGNOSIS — M62.81 DECREASED MUSCLE STRENGTH: ICD-10-CM

## 2022-09-14 DIAGNOSIS — M25.521 RIGHT ELBOW PAIN: Primary | ICD-10-CM

## 2022-09-14 PROCEDURE — 97530 THERAPEUTIC ACTIVITIES: CPT | Mod: CO

## 2022-09-14 NOTE — PROGRESS NOTES
[facility]  Occupational Therapy Treatment Note    Date: 9/14/2022  Name: Sherie Reyes  Clinic Number: [id number]    Therapy Diagnosis:   Encounter Diagnoses   Name Primary?    Right elbow pain Yes    Decreased muscle strength              Physician: Melchor Hughes Jr., *    Physician Orders: Eval and treat;  Medical Diagnosis: M77.11 (ICD-10-CM) - Lateral epicondylitis of right elbow  Surgical Procedure and Date: 5/20/2022, Right TENOTOMY,ELBOW,WITH DEBRIDEMENT AND TENDON REPAIR Date of Injury/Onset: gradual onset   Evaluation Date: 8/15/2022  Insurance Authorization Period Expiration: 8/15/2023   Plan of Care Expiration: 8 weeks; 10/14/2022  Progress Note Due: 4 weeks; 9/16/2022   Date of Return to MD: 8/31/2022  Visit # / Visits authorized: 5/ 25  FOTO: initial eval      Precautions:  Standard diabetes      Time In:  3:30 PM   Time Out: 4:25 PM  Total Appointment Time (timed & untimed codes): 55 minutes         SUBJECTIVE     Pt reports continued pain and difficulty with exercises from overall body pain  She was compliant with home exercise program given last session.   Response to previous treatment: improved pain   Functional change: none    Pain: 7/10  Location: right elbow      OBJECTIVE   Objective Measures updated at progress report unless specified.    Edema. Measured in centimeters.    8/18/2022 8/18/2022     Left Right   2in. Above elbow       2in. Below elbow       Wrist Crease 26 26   Figure of 8       MCPs             Elbow and Wrist ROM. Measured in degrees.    8/18/2022 8/18/2022     Left Right   Elbow Ext/Flex 10/145 7/138   Supination/Pronation 75/WNL 65/69   Wrist Ext/Flex 74/50 40/55   Wrist RD/UD 15/31 15/30      Hand ROM. Measured in degrees.    8/18/2022 8/18/2022     Left Right           Index: MP    28/              PIP                      DIP                  RODRIGUES               Long:  MP   34/              PIP                  DIP                   RODRIGUES           Strength (Dynamometer) and Pinch Strength (Pinch Gauge)  Measured in pounds.    8/18/2022 8/18/2022     Left Right   Rung II 12.5 19   Key Pinch       3pt Pinch 2 1   2pt Pinch          Sensation    8/15/2022 8/15/2022     Left Right   [place] Sunitha       Normal 1.65-2.83       Diminished Light Touch 3.22-3.61       Diminished Protective 3.84-4.31       Loss of Protective 4.56-6.65       Untestable >6.65       2 Point Discrimination       Static       Dynamic          Manual Muscle Test    8/18/2022 8/18/2022     Left Right   Wrist Extension  3+    3   Wrist Flexion       Radial Deviation       Ulnar Deviation       Supination       Pronation       Elbow Extension       Elbow Flexion                   Limitation/Restriction for FOTO initial eval; wrist Survey     Therapist reviewed FOTO scores for Sherie Reyes on 8/15/2022.   FOTO documents entered into [employer] - see Media section.     Limitation Score: 59%             Treatment     Sherie received the treatments listed below:     Supervised modalities after being cleared for contradictions: Hot Pack - Patient received Moist Heat  x 10 min to R elbow  to increase blood flow, circulation and tissue elasticity prior to therex      Manual therapy techniques: Soft tissue Mobilization were applied to the: R elbow and FA for 10 minutes, including:  STM and scar massage to lateral elbow and along proximal radius       Therapeutic exercises to develop endurance and ROM for 35 minutes, including:   - astym stretches holding for 30s x 3 reps (pre/post session)  - isometric red therabar flex/ext and sup/pro x 10  - radial nerve mobilization (elbow flexed, passively flex wrist - extend elbow, return to flexion, wrist neutral)  (NT)  - eccentric red flexbar x 10 reps   - frowns/smiles yellow therabar x10 ea (NT)  - elbow AROM 3 ways x 10 ea  - eccentric Wrist AROM 1 # (ext/RD) x15   - isospheres x 2 min   - scapula retraction/depression  x 10 reps  (NT)  - wrist maze x 2 min  - true balance x2 min       Patient Education and Home Exercises      Education provided:   - HEP  - avoid heavy activity   - Progress towards goals     Written Home Exercises Provided: Patient instructed to cont prior HEP.  Exercises were reviewed and Sherie was able to demonstrate them prior to the end of the session.  Sherie demonstrated good  understanding of the HEP provided. See EMR under Patient Instructions for exercises provided during therapy sessions.      ASSESSMENT     Pt would continue to benefit from skilled OT. Tolerated tx well. Limited wrist stability noted during true balance exercise. Will continue as tolerated.     Sherie is progressing well towards her goals and there are no updates to goals at this time. Pt prognosis is Good.     Pt will continue to benefit from skilled outpatient occupational therapy to address the deficits listed in the problem list on initial evaluation, provide pt/family education and to maximize pt's level of independence in the home and community environment.     Pt's spiritual, cultural and educational needs considered and pt agreeable to plan of care and goals.    Anticipated barriers to occupational therapy: n/a    Goals:   The following goals were discussed with the patient and patient is in agreement with them as to be addressed in the treatment plan.   Long Term Goals (LTGs); to be met by discharge.  LTG #1: Pt will report a pain level of 1-2 out of 10 with functional use of R hand. ------progressing not met 9/14/2022  LTG #2: Pt will demo improved FOTO score by 10-15 points. ------progressing not met 9/14/2022  LTG #3: Pt will return to prior level of function for ADLs and household management. ------progressing not met 9/14/2022     Short Term Goals (STGs); to be met within 4 weeks (9/16/2022).  STG #1: Pt will report 3-4 out of 10 pain level with functional use of R hand. ------progressing not met 9/14/2022  STG #2: Pt will report/demo  improved  strength by 5# ------progressing not met 9/14/2022  STG #3: Pt will report/demo MMT 4/5 ------progressing not met 9/14/2022  STG #4: Pt will demonstrate independence with issued HEP. ------progressing not met 9/14/2022         PLAN     Continue skilled occupational therapy with individualized plan of care focusing on increasing strength and participation in daily activities.    Updates/Grading for next session: continue as tolerated, updated measures    KIAH Hoang/AZAM

## 2022-09-19 ENCOUNTER — CLINICAL SUPPORT (OUTPATIENT)
Dept: REHABILITATION | Facility: HOSPITAL | Age: 57
End: 2022-09-19
Payer: MEDICAID

## 2022-09-19 DIAGNOSIS — M25.521 RIGHT ELBOW PAIN: Primary | ICD-10-CM

## 2022-09-19 DIAGNOSIS — M62.81 DECREASED MUSCLE STRENGTH: ICD-10-CM

## 2022-09-19 PROCEDURE — 97530 THERAPEUTIC ACTIVITIES: CPT

## 2022-09-19 NOTE — PROGRESS NOTES
"        [facility]  Occupational Therapy Treatment Note    Date: 9/19/2022  Name: Sherie Reyes  Clinic Number: [id number]    Therapy Diagnosis:   Encounter Diagnoses   Name Primary?    Right elbow pain Yes    Decreased muscle strength        Physician: Melchor Hughes Jr., *    Physician Orders: Eval and treat;  Medical Diagnosis: M77.11 (ICD-10-CM) - Lateral epicondylitis of right elbow  Surgical Procedure and Date: 5/20/2022, Right TENOTOMY,ELBOW,WITH DEBRIDEMENT AND TENDON REPAIR Date of Injury/Onset: gradual onset   Evaluation Date: 8/15/2022  Insurance Authorization Period Expiration: 8/15/2023   Plan of Care Expiration: 8 weeks; 10/14/2022  Progress Note Due: 4 weeks; 9/16/2022   Date of Return to MD: 8/31/2022  Visit # / Visits authorized: 6/25  FOTO: initial eval      Precautions:  Standard diabetes      Time In:  3:30 PM   Time Out: 04:30  PM  Total Appointment Time (timed & untimed codes): 60  minutes         SUBJECTIVE     Pt reports "It's getting better"  She was compliant with home exercise program given last session.   Response to previous treatment: improved pain   Functional change: none    Pain: 6/10  Location: right elbow      OBJECTIVE   Objective Measures updated at progress report unless specified.    Edema. Measured in centimeters.    8/18/2022 8/18/2022     Left Right   2in. Above elbow       2in. Below elbow       Wrist Crease 26 26   Figure of 8       MCPs             Elbow and Wrist ROM. Measured in degrees.    8/18/2022 8/18/2022     Left Right   Elbow Ext/Flex 10/145 7/138   Supination/Pronation 75/WNL 65/69   Wrist Ext/Flex 74/50 40/55   Wrist RD/UD 15/31 15/30      Hand ROM. Measured in degrees.    8/18/2022 8/18/2022     Left Right           Index: MP    28/              PIP                      DIP                  RODRIGUES               Long:  MP   34/              PIP                  DIP                  RODRIGUES           Strength (Dynamometer) and " Pinch Strength (Pinch Gauge)  Measured in pounds.    8/18/2022 8/18/2022     Left Right   Rung II 12.5 19   Key Pinch       3pt Pinch 2 1   2pt Pinch          Sensation    8/15/2022 8/15/2022     Left Right   [place] Sunitha       Normal 1.65-2.83       Diminished Light Touch 3.22-3.61       Diminished Protective 3.84-4.31       Loss of Protective 4.56-6.65       Untestable >6.65       2 Point Discrimination       Static       Dynamic          Manual Muscle Test    8/18/2022 8/18/2022     Left Right   Wrist Extension  3+    3   Wrist Flexion       Radial Deviation       Ulnar Deviation       Supination       Pronation       Elbow Extension       Elbow Flexion                   Limitation/Restriction for FOTO initial eval; wrist Survey     Therapist reviewed FOTO scores for Sherei Reyes on 8/15/2022.   FOTO documents entered into [employer] - see Media section.     Limitation Score: 59%             Treatment     Sherie received the treatments listed below:     Supervised modalities after being cleared for contradictions: Hot Pack - Patient received Moist Heat  x 10 min to R elbow  to increase blood flow, circulation and tissue elasticity prior to therex    Manual therapy techniques: Soft tissue Mobilization were applied to the: R elbow and FA for 10 minutes, including:  STM and scar massage to lateral elbow and along proximal radius     Therapeutic exercises to develop endurance and ROM for 30 minutes, including:   *avoided holding items due to recent dog bite on R hand   - astym stretches holding for 30s x 3 reps (pre/post session)  - isometric red therabar flex/ext and sup/pro x 10  - elbow AROM 3 ways x 10 ea  - eccentric Wrist AROM  (ext/RD) x 15 (2 sets)   - tricep extension x 10 reps (3 sets) positioned on incline   - shoulder extension x 10 reps (3 sets) positioned on incline        - scapula retraction/depression  x 10 reps (NT)  - wrist maze x 2 min  - true balance x2 min   - isospheres x 2 min   - radial  nerve mobilization (elbow flexed, passively flex wrist - extend elbow, return to flexion, wrist neutral)  (NT)  - eccentric red flexbar x 10 reps   - frowns/smiles yellow therabar x10 ea       Patient Education and Home Exercises      Education provided:   - HEP  - avoid heavy activity   - Progress towards goals     Written Home Exercises Provided: Patient instructed to cont prior HEP.  Exercises were reviewed and Sherie was able to demonstrate them prior to the end of the session.  Sherie demonstrated good  understanding of the HEP provided. See EMR under Patient Instructions for exercises provided during therapy sessions.      ASSESSMENT     Pt would continue to benefit from skilled OT. Good keaton to tx. Min fatigue noted with increased sets today. Pain remained the same throughout tx.   Sherie is progressing well towards her goals and there are no updates to goals at this time. Pt prognosis is Good.     Pt will continue to benefit from skilled outpatient occupational therapy to address the deficits listed in the problem list on initial evaluation, provide pt/family education and to maximize pt's level of independence in the home and community environment.     Pt's spiritual, cultural and educational needs considered and pt agreeable to plan of care and goals.    Anticipated barriers to occupational therapy: n/a    Goals:   The following goals were discussed with the patient and patient is in agreement with them as to be addressed in the treatment plan.   Long Term Goals (LTGs); to be met by discharge.  LTG #1: Pt will report a pain level of 1-2 out of 10 with functional use of R hand. ------progressing not met 9/19/2022  LTG #2: Pt will demo improved FOTO score by 10-15 points. ------progressing not met 9/19/2022  LTG #3: Pt will return to prior level of function for ADLs and household management. ------progressing not met 9/19/2022     Short Term Goals (STGs); to be met within 4 weeks (9/16/2022).  STG #1: Pt will  report 3-4 out of 10 pain level with functional use of R hand. ------progressing not met 9/19/2022  STG #2: Pt will report/demo improved  strength by 5# ------progressing not met 9/19/2022  STG #3: Pt will report/demo MMT 4/5 ------progressing not met 9/19/2022  STG #4: Pt will demonstrate independence with issued HEP. ------progressing not met 9/19/2022         PLAN     Continue skilled occupational therapy with individualized plan of care focusing on increasing strength and participation in daily activities.    Updates/Grading for next session: continue as tolerated, updated measures    Maryse Butler, OT                       right upper arm

## 2022-09-21 ENCOUNTER — OFFICE VISIT (OUTPATIENT)
Dept: OPTOMETRY | Facility: CLINIC | Age: 57
End: 2022-09-21
Payer: MEDICAID

## 2022-09-21 ENCOUNTER — CLINICAL SUPPORT (OUTPATIENT)
Dept: OPHTHALMOLOGY | Facility: CLINIC | Age: 57
End: 2022-09-21
Payer: MEDICAID

## 2022-09-21 DIAGNOSIS — H52.223 MYOPIA OF BOTH EYES WITH REGULAR ASTIGMATISM AND PRESBYOPIA: ICD-10-CM

## 2022-09-21 DIAGNOSIS — H40.053 OCULAR HYPERTENSION, BILATERAL: Primary | ICD-10-CM

## 2022-09-21 DIAGNOSIS — H52.4 MYOPIA OF BOTH EYES WITH REGULAR ASTIGMATISM AND PRESBYOPIA: ICD-10-CM

## 2022-09-21 DIAGNOSIS — H52.13 MYOPIA OF BOTH EYES WITH REGULAR ASTIGMATISM AND PRESBYOPIA: ICD-10-CM

## 2022-09-21 PROCEDURE — 99999 PR PBB SHADOW E&M-EST. PATIENT-LVL IV: ICD-10-PCS | Mod: PBBFAC,,, | Performed by: OPTOMETRIST

## 2022-09-21 PROCEDURE — 92014 PR EYE EXAM, EST PATIENT,COMPREHESV: ICD-10-PCS | Mod: S$PBB,,, | Performed by: OPTOMETRIST

## 2022-09-21 PROCEDURE — 92133 CPTRZD OPH DX IMG PST SGM ON: CPT | Mod: PBBFAC,PO | Performed by: OPTOMETRIST

## 2022-09-21 PROCEDURE — 1159F PR MEDICATION LIST DOCUMENTED IN MEDICAL RECORD: ICD-10-PCS | Mod: CPTII,,, | Performed by: OPTOMETRIST

## 2022-09-21 PROCEDURE — 92015 PR REFRACTION: ICD-10-PCS | Mod: ,,, | Performed by: OPTOMETRIST

## 2022-09-21 PROCEDURE — 99214 OFFICE O/P EST MOD 30 MIN: CPT | Mod: PBBFAC,PO | Performed by: OPTOMETRIST

## 2022-09-21 PROCEDURE — 1160F RVW MEDS BY RX/DR IN RCRD: CPT | Mod: CPTII,,, | Performed by: OPTOMETRIST

## 2022-09-21 PROCEDURE — 92015 DETERMINE REFRACTIVE STATE: CPT | Mod: ,,, | Performed by: OPTOMETRIST

## 2022-09-21 PROCEDURE — 3061F NEG MICROALBUMINURIA REV: CPT | Mod: CPTII,,, | Performed by: OPTOMETRIST

## 2022-09-21 PROCEDURE — 92083 EXTENDED VISUAL FIELD XM: CPT | Mod: PBBFAC,PO | Performed by: OPTOMETRIST

## 2022-09-21 PROCEDURE — 92014 COMPRE OPH EXAM EST PT 1/>: CPT | Mod: S$PBB,,, | Performed by: OPTOMETRIST

## 2022-09-21 PROCEDURE — 1159F MED LIST DOCD IN RCRD: CPT | Mod: CPTII,,, | Performed by: OPTOMETRIST

## 2022-09-21 PROCEDURE — 4010F PR ACE/ARB THEARPY RXD/TAKEN: ICD-10-PCS | Mod: CPTII,,, | Performed by: OPTOMETRIST

## 2022-09-21 PROCEDURE — 3044F HG A1C LEVEL LT 7.0%: CPT | Mod: CPTII,,, | Performed by: OPTOMETRIST

## 2022-09-21 PROCEDURE — 3066F PR DOCUMENTATION OF TREATMENT FOR NEPHROPATHY: ICD-10-PCS | Mod: CPTII,,, | Performed by: OPTOMETRIST

## 2022-09-21 PROCEDURE — 3061F PR NEG MICROALBUMINURIA RESULT DOCUMENTED/REVIEW: ICD-10-PCS | Mod: CPTII,,, | Performed by: OPTOMETRIST

## 2022-09-21 PROCEDURE — 3066F NEPHROPATHY DOC TX: CPT | Mod: CPTII,,, | Performed by: OPTOMETRIST

## 2022-09-21 PROCEDURE — 92083 HUMPHREY VISUAL FIELD - OU - BOTH EYES: ICD-10-PCS | Mod: 26,S$PBB,, | Performed by: OPTOMETRIST

## 2022-09-21 PROCEDURE — 4010F ACE/ARB THERAPY RXD/TAKEN: CPT | Mod: CPTII,,, | Performed by: OPTOMETRIST

## 2022-09-21 PROCEDURE — 3044F PR MOST RECENT HEMOGLOBIN A1C LEVEL <7.0%: ICD-10-PCS | Mod: CPTII,,, | Performed by: OPTOMETRIST

## 2022-09-21 PROCEDURE — 92133 POSTERIOR SEGMENT OCT OPTIC NERVE(OCULAR COHERENCE TOMOGRAPHY) - OU - BOTH EYES: ICD-10-PCS | Mod: 26,S$PBB,, | Performed by: OPTOMETRIST

## 2022-09-21 PROCEDURE — 99999 PR PBB SHADOW E&M-EST. PATIENT-LVL IV: CPT | Mod: PBBFAC,,, | Performed by: OPTOMETRIST

## 2022-09-21 PROCEDURE — 1160F PR REVIEW ALL MEDS BY PRESCRIBER/CLIN PHARMACIST DOCUMENTED: ICD-10-PCS | Mod: CPTII,,, | Performed by: OPTOMETRIST

## 2022-09-21 NOTE — PROGRESS NOTES
HVF done ou. Rel/fix/coop. Good ou./chart checked for latex allergy./ -.75 + 1.00 x 159/od -.50 + 1.00 x 175/os-smh

## 2022-09-21 NOTE — PROGRESS NOTES
HPI    KEISHA: 03/2021  Chief complaint (CC): annual ocular hypertension exam with HVF  Glasses? yes  Contacts? no  H/o eye surgery, injections or laser: none  H/o eye injury: none  Known eye conditions? Ocular hypertension   Family h/o eye conditions? Mother ocular hypertension no eye meds  Eye gtts? AT's PRN      (-) Flashes (+)  Floaters (-) Mucous   (-)  Tearing (-) Itching (--) Burning   (+) Headaches (-) Eye Pain/discomfort (-) Irritation   (-)  Redness (-) Double vision (-) Blurry vision    Diabetic? +  A1c? Hemoglobin A1C       Date                     Value               Ref Range             Status                08/09/2022               6.0 (H)             4.0 - 5.6 %           Final                  05/09/2022               6.3 (H)             4.0 - 5.6 %           Final                 02/04/2022               6.3 (H)             4.0 - 5.6 %           Final                    Last edited by Rosalio Rubio, PCT on 9/21/2022  2:49 PM.            Assessment /Plan     For exam results, see Encounter Report.      Ocular hypertension, bilateral  -     Posterior Segment OCT Optic Nerve- Both eyes  -     De Visual Field - OU - Extended - Both Eyes  (+) FHx- mom has OHTN. IOP 19 OD, 20 OS. Tmax 28 OD, 27 OS. Prev pt of Dr Fink.   9/21/2022 OCT WNL OU  9/21/2022 HVF WNL OU  Educated pt on findings w/understanding.   No w/o glaucoma.   RTC 1 year w/repeat testing.     Myopia of both eyes with regular astigmatism and presbyopia  SRx released to patient. Patient educated on lens options. Normal ocular health. RTC 1 year for routine exam.

## 2022-09-22 ENCOUNTER — CLINICAL SUPPORT (OUTPATIENT)
Dept: REHABILITATION | Facility: HOSPITAL | Age: 57
End: 2022-09-22
Payer: MEDICAID

## 2022-09-22 DIAGNOSIS — M62.81 DECREASED MUSCLE STRENGTH: ICD-10-CM

## 2022-09-22 DIAGNOSIS — M25.521 RIGHT ELBOW PAIN: Primary | ICD-10-CM

## 2022-09-22 PROCEDURE — 97530 THERAPEUTIC ACTIVITIES: CPT

## 2022-09-22 NOTE — PROGRESS NOTES
"          [facility]  Occupational Therapy Treatment Note    Date: 9/22/2022  Name: Sherie Reyes  Clinic Number: [id number]    Therapy Diagnosis:   Encounter Diagnoses   Name Primary?    Right elbow pain Yes    Decreased muscle strength          Physician: Melchor Hughes Jr., *    Physician Orders: Eval and treat;  Medical Diagnosis: M77.11 (ICD-10-CM) - Lateral epicondylitis of right elbow  Surgical Procedure and Date: 5/20/2022, Right TENOTOMY,ELBOW,WITH DEBRIDEMENT AND TENDON REPAIR Date of Injury/Onset: gradual onset   Evaluation Date: 8/15/2022  Insurance Authorization Period Expiration: 8/15/2023   Plan of Care Expiration: 8 weeks; 10/14/2022  Progress Note Due: 4 weeks; 9/16/2022   Date of Return to MD: 8/31/2022  Visit # / Visits authorized: 7/25  FOTO: initial eval      Precautions:  Standard diabetes      Time In: 01:52 PM   Time Out: 02:50  PM  Total Appointment Time (timed & untimed codes): 58  minutes         SUBJECTIVE     Pt reports "It's getting better"  She was compliant with home exercise program given last session.   Response to previous treatment: improved pain   Functional change:  none    Pain: 6/10  Location: right elbow      OBJECTIVE   Objective Measures updated at progress report unless specified.    Edema. Measured in centimeters.    8/18/2022 8/18/2022     Left Right   2in. Above elbow       2in. Below elbow       Wrist Crease 26 26   Figure of 8       MCPs             Elbow and Wrist ROM. Measured in degrees.    8/18/2022 8/18/2022     Left Right   Elbow Ext/Flex 10/145 7/138   Supination/Pronation 75/WNL 65/69   Wrist Ext/Flex 74/50 40/55   Wrist RD/UD 15/31 15/30      Hand ROM. Measured in degrees.    8/18/2022 8/18/2022     Left Right           Index: MP    28/              PIP                      DIP                  RODRIGUES               Long:  MP   34/              PIP                  DIP                  RODRIGUES           Strength (Dynamometer) " and Pinch Strength (Pinch Gauge)  Measured in pounds.    8/18/2022 8/18/2022     Left Right   Rung II 12.5 19   Key Pinch       3pt Pinch 2 1   2pt Pinch             Manual Muscle Test    8/18/2022 8/18/2022     Left Right   Wrist Extension  3+    3   Wrist Flexion       Radial Deviation       Ulnar Deviation       Supination       Pronation       Elbow Extension       Elbow Flexion                   Limitation/Restriction for FOTO initial eval; wrist Survey     Therapist reviewed FOTO scores for Sherie Reyes on 8/15/2022.   FOTO documents entered into [employer] - see Media section.     Limitation Score: 59%             Treatment     Sherie received the treatments listed below:       Supervised modalities after being cleared for contradictions: Hot Pack - Patient received Moist Heat  x 10 min to R elbow  to increase blood flow, circulation and tissue elasticity prior to therex        Manual therapy techniques: Soft tissue Mobilization were applied to the: R elbow and FA for 10 minutes, including:  STM and scar massage to lateral elbow and along proximal radius         Therapeutic exercises to develop endurance and ROM for 30 minutes, including:   - astym stretches holding for 30s x 3 reps (pre/post session)  - isometric red therabar flex/ext and sup/pro x 10  - elbow AROM 3 ways x 10 ea  - red flexbar frowns/smiles x 10 reps (2 sets)   - eccentric Wrist AROM  (ext/RD) x 15 (2 sets)   - tricep extension x 10 reps (3 sets) positioned on incline   - shoulder extension x 10 reps (3 sets) positioned on incline        - scapula retraction/depression  x 10 reps (NT)  - wrist maze x 2 min  - true balance x2 min   - isospheres x 2 min   - radial nerve mobilization (elbow flexed, passively flex wrist - extend elbow, return to flexion, wrist neutral)  (NT)  - eccentric red flexbar x 10 reps   - frowns/smiles yellow therabar x10 ea       Patient Education and Home Exercises      Education provided:   - HEP  - avoid heavy  activity   - Progress towards goals     Written Home Exercises Provided: Patient instructed to cont prior HEP.  Exercises were reviewed and Sherie was able to demonstrate them prior to the end of the session.  Sherie demonstrated good  understanding of the HEP provided. See EMR under Patient Instructions for exercises provided during therapy sessions.      ASSESSMENT     Pt would continue to benefit from skilled OT. Good keaton to tx. Able to keaton increased rep with exercise. Sherie is progressing well towards her goals and there are no updates to goals at this time. Pt prognosis is Good.     Pt will continue to benefit from skilled outpatient occupational therapy to address the deficits listed in the problem list on initial evaluation, provide pt/family education and to maximize pt's level of independence in the home and community environment.     Pt's spiritual, cultural and educational needs considered and pt agreeable to plan of care and goals.    Anticipated barriers to occupational therapy: n/a    Goals:   The following goals were discussed with the patient and patient is in agreement with them as to be addressed in the treatment plan.   Long Term Goals (LTGs); to be met by discharge.  LTG #1: Pt will report a pain level of 1-2 out of 10 with functional use of R hand. ------progressing not met 9/22/2022  LTG #2: Pt will demo improved FOTO score by 10-15 points. ------progressing not met 9/22/2022  LTG #3: Pt will return to prior level of function for ADLs and household management. ------progressing not met 9/22/2022     Short Term Goals (STGs); to be met within 4 weeks (9/16/2022).  STG #1: Pt will report 3-4 out of 10 pain level with functional use of R hand. ------progressing not met 9/22/2022  STG #2: Pt will report/demo improved  strength by 5# ------progressing not met 9/22/2022  STG #3: Pt will report/demo MMT 4/5 ------progressing not met 9/22/2022  STG #4: Pt will demonstrate independence with issued HEP.  ------progressing not met 9/22/2022         PLAN     Continue skilled occupational therapy with individualized plan of care focusing on increasing strength and participation in daily activities.    Updates/Grading for next session: continue as tolerated,     Maryse Butler, OT

## 2022-09-26 ENCOUNTER — CLINICAL SUPPORT (OUTPATIENT)
Dept: REHABILITATION | Facility: HOSPITAL | Age: 57
End: 2022-09-26
Payer: MEDICAID

## 2022-09-26 DIAGNOSIS — M62.81 DECREASED MUSCLE STRENGTH: ICD-10-CM

## 2022-09-26 DIAGNOSIS — M25.521 RIGHT ELBOW PAIN: Primary | ICD-10-CM

## 2022-09-26 PROCEDURE — 97530 THERAPEUTIC ACTIVITIES: CPT | Mod: CO

## 2022-09-26 NOTE — PROGRESS NOTES
"          [facility]  Occupational Therapy Treatment Note    Date: 9/26/2022  Name: Sherie Reyes  Clinic Number: [id number]    Therapy Diagnosis:   Encounter Diagnoses   Name Primary?    Right elbow pain Yes    Decreased muscle strength            Physician: Melchor Hughes Jr., *    Physician Orders: Eval and treat;  Medical Diagnosis: M77.11 (ICD-10-CM) - Lateral epicondylitis of right elbow  Surgical Procedure and Date: 5/20/2022, Right TENOTOMY,ELBOW,WITH DEBRIDEMENT AND TENDON REPAIR Date of Injury/Onset: gradual onset   Evaluation Date: 8/15/2022  Insurance Authorization Period Expiration: 8/15/2023   Plan of Care Expiration: 8 weeks; 10/14/2022  Progress Note Due: 4 weeks; 9/16/2022   Date of Return to MD: 8/31/2022  Visit # / Visits authorized: 8/25  FOTO: initial eval      Precautions:  Standard diabetes      Time In: 3:27 PM   Time Out: 4:20  PM  Total Appointment Time (timed & untimed codes): 53  minutes         SUBJECTIVE     Pt reports "My left arm is starting to hurt more than my Right."  She was compliant with home exercise program given last session.   Response to previous treatment: improved pain   Functional change:  none    Pain: 4/10  Location: right elbow      OBJECTIVE   Objective Measures updated at progress report unless specified.    Edema. Measured in centimeters.    8/18/2022 8/18/2022     Left Right   2in. Above elbow       2in. Below elbow       Wrist Crease 26 26   Figure of 8       MCPs             Elbow and Wrist ROM. Measured in degrees.    8/18/2022 8/18/2022     Left Right   Elbow Ext/Flex 10/145 7/138   Supination/Pronation 75/WNL 65/69   Wrist Ext/Flex 74/50 40/55   Wrist RD/UD 15/31 15/30      Hand ROM. Measured in degrees.    8/18/2022 8/18/2022     Left Right           Index: MP    28/              PIP                      DIP                  RODRIGUES               Long:  MP   34/              PIP                  DIP                  RODRIGUES     "       Strength (Dynamometer) and Pinch Strength (Pinch Gauge)  Measured in pounds.    8/18/2022 8/18/2022     Left Right   Rung II 12.5 19   Key Pinch       3pt Pinch 2 1   2pt Pinch             Manual Muscle Test    8/18/2022 8/18/2022     Left Right   Wrist Extension  3+    3   Wrist Flexion       Radial Deviation       Ulnar Deviation       Supination       Pronation       Elbow Extension       Elbow Flexion                   Limitation/Restriction for FOTO initial eval; wrist Survey     Therapist reviewed FOTO scores for Sherie Reyes on 8/15/2022.   FOTO documents entered into [employer] - see Media section.     Limitation Score: 59%             Treatment     Sherie received the treatments listed below:       Supervised modalities after being cleared for contradictions: Hot Pack - Patient received Moist Heat  x 10 min to R elbow  to increase blood flow, circulation and tissue elasticity prior to therex        Manual therapy techniques: Soft tissue Mobilization were applied to the: R elbow and FA for 10 minutes, including:  STM and scar massage to lateral elbow and along proximal radius         Therapeutic exercises to develop endurance and ROM for 33 minutes, including:   - astym stretches holding for 30s x 3 reps (pre/post session)  - isometric red therabar flex/ext and sup/pro x 10  - elbow AROM 3 ways x 10 ea  - red flexbar frowns/smiles x 10 reps (2 sets)   - oscillations with red therabar x2 min   - eccentric Wrist AROM  (ext/RD) x 15 (2 sets)   - wrist maze x 2 min  - isospheres x 2 min   - liberty stick tan with tan ball x2 laps   - digi extender yellow x15 (2sets)  - hand gripper 3 yellow RB x15 (2 sets )    (NT)  - tricep extension x 10 reps (3 sets) positioned on incline   - shoulder extension x 10 reps (3 sets) positioned on incline  - scapula retraction/depression  x 10 reps (NT)  - radial nerve mobilization (elbow flexed, passively flex wrist - extend elbow, return to flexion, wrist neutral)   (NT)  - eccentric red flexbar x 10 reps   - true balance x2 min   Patient Education and Home Exercises      Education provided:   - HEP  - avoid heavy activity   - Progress towards goals     Written Home Exercises Provided: Patient instructed to cont prior HEP.  Exercises were reviewed and Sherie was able to demonstrate them prior to the end of the session.  Sherie demonstrated good  understanding of the HEP provided. See EMR under Patient Instructions for exercises provided during therapy sessions.      ASSESSMENT     Pt would continue to benefit from skilled OT. Elbow exercises deferred today 2* to painful popping sensation noted during elbow AROM exercise today. Tolerated wrist and hand strengthening well.   Sherie is progressing well towards her goals and there are no updates to goals at this time. Pt prognosis is Good.     Pt will continue to benefit from skilled outpatient occupational therapy to address the deficits listed in the problem list on initial evaluation, provide pt/family education and to maximize pt's level of independence in the home and community environment.     Pt's spiritual, cultural and educational needs considered and pt agreeable to plan of care and goals.    Anticipated barriers to occupational therapy: n/a    Goals:   The following goals were discussed with the patient and patient is in agreement with them as to be addressed in the treatment plan.   Long Term Goals (LTGs); to be met by discharge.  LTG #1: Pt will report a pain level of 1-2 out of 10 with functional use of R hand. ------progressing not met 9/26/2022  LTG #2: Pt will demo improved FOTO score by 10-15 points. ------progressing not met 9/26/2022  LTG #3: Pt will return to prior level of function for ADLs and household management. ------progressing not met 9/26/2022     Short Term Goals (STGs); to be met within 4 weeks (9/16/2022).  STG #1: Pt will report 3-4 out of 10 pain level with functional use of R hand. ------progressing  not met 9/26/2022  STG #2: Pt will report/demo improved  strength by 5# ------progressing not met 9/26/2022  STG #3: Pt will report/demo MMT 4/5 ------progressing not met 9/26/2022  STG #4: Pt will demonstrate independence with issued HEP. ------progressing not met 9/26/2022         PLAN     Continue skilled occupational therapy with individualized plan of care focusing on increasing strength and participation in daily activities.    Updates/Grading for next session: continue as tolerated,     KIAH Hoang/AZAM

## 2022-09-28 ENCOUNTER — CLINICAL SUPPORT (OUTPATIENT)
Dept: REHABILITATION | Facility: HOSPITAL | Age: 57
End: 2022-09-28
Payer: MEDICAID

## 2022-09-28 DIAGNOSIS — M25.521 RIGHT ELBOW PAIN: Primary | ICD-10-CM

## 2022-09-28 DIAGNOSIS — M62.81 DECREASED MUSCLE STRENGTH: ICD-10-CM

## 2022-09-28 PROCEDURE — 97530 THERAPEUTIC ACTIVITIES: CPT

## 2022-09-28 NOTE — PROGRESS NOTES
"  [facility]  Occupational Therapy Treatment Note    Date: 9/28/2022  Name: Sherie Reyes  Clinic Number: [id number]    Therapy Diagnosis:         Physician: Melchor Hughes Jr., *    Physician Orders: Eval and treat;  Medical Diagnosis: M77.11 (ICD-10-CM) - Lateral epicondylitis of right elbow  Surgical Procedure and Date: 5/20/2022, Right TENOTOMY,ELBOW,WITH DEBRIDEMENT AND TENDON REPAIR Date of Injury/Onset: gradual onset   Evaluation Date: 8/15/2022  Insurance Authorization Period Expiration: 8/15/2023   Plan of Care Expiration: 8 weeks; 10/14/2022  Progress Note Due: 4 weeks; 9/16/2022   Date of Return to MD: 8/31/2022  Visit # / Visits authorized: 8/25  FOTO: initial eval      Precautions:  Standard diabetes      Time In:  02:55 PM   Time Out: 04:00   PM  Total Appointment Time (timed & untimed codes): 65  minutes         SUBJECTIVE     Pt reports "my L hurts a lot"   She was compliant with home exercise program given last session.   Response to previous treatment: improved pain   Functional change:  able to use R with less pain    Pain: 4/10 R elbow  Location: right elbow      OBJECTIVE   Objective Measures updated at progress report unless specified.    Edema. Measured in centimeters.    8/18/2022 8/18/2022     Left Right   2in. Above elbow       2in. Below elbow       Wrist Crease 26 26   Figure of 8       MCPs             Elbow and Wrist ROM. Measured in degrees.    8/18/2022 8/18/2022     Left Right   Elbow Ext/Flex 10/145 7/138   Supination/Pronation 75/WNL 65/69   Wrist Ext/Flex 74/50 40/55   Wrist RD/UD 15/31 15/30      Hand ROM. Measured in degrees.    8/18/2022 8/18/2022     Left Right           Index: MP    28/              PIP                      DIP                  RODRIGUES               Long:  MP   34/              PIP                  DIP                  RODRIGUES           Strength (Dynamometer) and Pinch Strength (Pinch Gauge)  Measured in pounds.    8/18/2022 " 8/18/2022 9/28/2022     Left Right Right   Rung II 12.5 19 35   Key Pinch        3pt Pinch 2 1 6   2pt Pinch              Manual Muscle Test    8/18/2022 8/18/2022 9/28/2022     Left Right Right   Wrist Extension  3+    3 3+               Limitation/Restriction for FOTO initial eval; wrist Survey     Therapist reviewed FOTO scores for Sherie Reyes on 8/15/2022.   FOTO documents entered into ???? - see Media section.     Limitation Score: 59%             Treatment     Sherie received the treatments listed below:       Supervised modalities after being cleared for contradictions: Hot Pack - Patient received Moist Heat  x 10 min to R elbow  to increase blood flow, circulation and tissue elasticity prior to therex      Manual therapy techniques: Soft tissue Mobilization were applied to the: R elbow and FA for 10 minutes, including:  STM and scar massage to lateral elbow and along proximal radius       Therapeutic exercises to develop endurance and ROM for 45 minutes, including:   - astym stretches holding for 30s x 3 reps (pre/post session)  - twisting forward/backward with R RED flexbar x 10 reps (2 sets)   - elbow AROM 3 ways x 10 each (2 sets) with 1#  - red flexbar frowns/smiles x 10 reps (2 sets)   - eccent   - oscillations with red therabar x2 min   - eccentric Wrist AROM  (ext/RD) x 15 (2 sets)   - wrist maze x 2 min  - isospheres x 2 min   - liberty stick tan with tan ball x2 laps   - digi extender yellow x15 (2sets)  - hand gripper 3 yellow RB x15 (2 sets )    (NT)  - tricep extension x 10 reps (3 sets) positioned on incline   - shoulder extension x 10 reps (3 sets) positioned on incline  - scapula retraction/depression  x 10 reps (NT)  - radial nerve mobilization (elbow flexed, passively flex wrist - extend elbow, return to flexion, wrist neutral)  (NT)  - eccentric red flexbar x 10 reps   - true balance x2 min   Patient Education and Home Exercises      Education provided:   - HEP  - avoid heavy  activity   - Progress towards goals     Written Home Exercises Provided: Patient instructed to cont prior HEP.  Exercises were reviewed and Sherie was able to demonstrate them prior to the end of the session.  Sherie demonstrated good  understanding of the HEP provided. See EMR under Patient Instructions for exercises provided during therapy sessions.      ASSESSMENT     Pt would continue to benefit from skilled OT. Able to keaton full elbow ROM with 1#. C/o L elbow pain limiting certain movements. Tolerated wrist and hand strengthening well with R.   Sherie is progressing well towards her goals and there are no updates to goals at this time. Pt prognosis is Good.     Pt will continue to benefit from skilled outpatient occupational therapy to address the deficits listed in the problem list on initial evaluation, provide pt/family education and to maximize pt's level of independence in the home and community environment.     Pt's spiritual, cultural and educational needs considered and pt agreeable to plan of care and goals.    Anticipated barriers to occupational therapy: n/a    Goals:   The following goals were discussed with the patient and patient is in agreement with them as to be addressed in the treatment plan.   Long Term Goals (LTGs); to be met by discharge.  LTG #1: Pt will report a pain level of 1-2 out of 10 with functional use of R hand. ------progressing not met 9/28/2022  LTG #2: Pt will demo improved FOTO score by 10-15 points. ------progressing not met 9/28/2022  LTG #3: Pt will return to prior level of function for ADLs and household management. ------progressing not met 9/28/2022     Short Term Goals (STGs); to be met within 4 weeks (9/16/2022).  STG #1: Pt will report 3-4 out of 10 pain level with functional use of R hand. ------progressing not met 9/28/2022  STG #2: Pt will report/demo improved  strength by 5# ------progressing not met 9/28/2022  STG #3: Pt will report/demo MMT 4/5  ------progressing not met 9/28/2022  STG #4: Pt will demonstrate independence with issued HEP. ------progressing not met 9/28/2022         PLAN     Continue skilled occupational therapy with individualized plan of care focusing on increasing strength and participation in daily activities.    Updates/Grading for next session: continue as tolerated,     Maryse Butler, OT

## 2022-10-05 ENCOUNTER — CLINICAL SUPPORT (OUTPATIENT)
Dept: REHABILITATION | Facility: HOSPITAL | Age: 57
End: 2022-10-05
Payer: MEDICAID

## 2022-10-05 DIAGNOSIS — M25.521 RIGHT ELBOW PAIN: Primary | ICD-10-CM

## 2022-10-05 DIAGNOSIS — M62.81 DECREASED MUSCLE STRENGTH: ICD-10-CM

## 2022-10-05 PROCEDURE — 97530 THERAPEUTIC ACTIVITIES: CPT

## 2022-10-05 NOTE — PROGRESS NOTES
"  [facility]  Occupational Therapy Treatment Note    Date: 10/5/2022  Name: Sherie Reyes  Clinic Number: [id number]    Therapy Diagnosis:   Encounter Diagnoses   Name Primary?    Right elbow pain Yes    Decreased muscle strength      Physician: Melchor Hughes Jr., *    Physician Orders: Eval and treat;  Medical Diagnosis: M77.11 (ICD-10-CM) - Lateral epicondylitis of right elbow  Surgical Procedure and Date: 5/20/2022, Right TENOTOMY,ELBOW,WITH DEBRIDEMENT AND TENDON REPAIR Date of Injury/Onset: gradual onset   Evaluation Date: 8/15/2022  Insurance Authorization Period Expiration: 8/15/2023   Plan of Care Expiration: 8 weeks; 10/14/2022  Progress Note Due: 4 weeks; 9/16/2022   Date of Return to MD: 8/31/2022  Visit # / Visits authorized: 10/25  FOTO: initial eval      Precautions:  Standard diabetes      Time In:  03:00 PM   Time Out: 04:00  PM  Total Appointment Time (timed & untimed codes): 60  minutes         SUBJECTIVE     Pt reports  "it feels tight"   She was compliant with home exercise program given last session.   Response to previous treatment: improved pain   Functional change:  able to use R with less pain    Pain: 7/10 R elbow  Location: right elbow      OBJECTIVE   Objective Measures updated at progress report unless specified.    Edema. Measured in centimeters.    8/18/2022 8/18/2022     Left Right   2in. Above elbow       2in. Below elbow       Wrist Crease 26 26   Figure of 8       MCPs             Elbow and Wrist ROM. Measured in degrees.    8/18/2022 8/18/2022     Left Right   Elbow Ext/Flex 10/145 7/138   Supination/Pronation 75/WNL 65/69   Wrist Ext/Flex 74/50 40/55   Wrist RD/UD 15/31 15/30      Hand ROM. Measured in degrees.    8/18/2022 8/18/2022     Left Right           Index: MP    28/              PIP                      DIP                  RODRIGUES               Long:  MP   34/              PIP                  DIP                  RODRIGUES           " Strength (Dynamometer) and Pinch Strength (Pinch Gauge)  Measured in pounds.    8/18/2022 8/18/2022 9/28/2022     Left Right Right   Rung II 12.5 19 35   Key Pinch        3pt Pinch 2 1 6   2pt Pinch              Manual Muscle Test    8/18/2022 8/18/2022 9/28/2022     Left Right Right   Wrist Extension  3+    3 3+               Limitation/Restriction for FOTO initial eval; wrist Survey     Therapist reviewed FOTO scores for Sherie Reyes on 8/15/2022.   FOTO documents entered into [employer] - see Media section.     Limitation Score: 59%             Treatment     Sherie received the treatments listed below:       Supervised modalities after being cleared for contradictions: Hot Pack - Patient received Moist Heat  x 10 min to R elbow  to increase blood flow, circulation and tissue elasticity prior to therex    Manual therapy techniques: Soft tissue Mobilization were applied to the: R elbow and FA for 10 minutes, including:  STM and scar massage to lateral elbow and along proximal radius       Therapeutic exercises to develop endurance and ROM for 40 minutes, including:   - astym stretches holding for 30s x 3 reps (pre/post session)  - twisting forward/backward with R RED flexbar x 10 reps (2 sets)   - elbow AROM 3 ways x 10 each (2 sets) with 1#  - red flexbar frowns/smiles x 10 reps (2 sets)   - eccent   - oscillations with red therabar x2 min   - eccentric Wrist AROM  (ext/RD) x 15 (2 sets)   - wrist maze x 2 min  - isospheres x 2 min   - liberty stick tan with tan ball x2 laps   - digi extender yellow x15 (2sets)  - hand gripper 3 yellow RB x15 (2 sets )      (NT)  - tricep extension x 10 reps (3 sets) positioned on incline   - shoulder extension x 10 reps (3 sets) positioned on incline  - scapula retraction/depression  x 10 reps (NT)  - radial nerve mobilization (elbow flexed, passively flex wrist - extend elbow, return to flexion, wrist neutral)  (NT)  - eccentric red flexbar x 10 reps   - true balance x2 min    Patient Education and Home Exercises      Education provided:   - HEP  - avoid heavy activity   - Progress towards goals     Written Home Exercises Provided: Patient instructed to cont prior HEP.  Exercises were reviewed and Sherie was able to demonstrate them prior to the end of the session.  Sherie demonstrated good  understanding of the HEP provided. See EMR under Patient Instructions for exercises provided during therapy sessions.      ASSESSMENT     Pt would continue to benefit from skilled OT. Able to keaton full elbow ROM with 1#. C/o L elbow pain limiting certain movements. Tolerated wrist and hand strengthening well with R.   Sherie is progressing well towards her goals and there are no updates to goals at this time. Pt prognosis is Good.     Pt will continue to benefit from skilled outpatient occupational therapy to address the deficits listed in the problem list on initial evaluation, provide pt/family education and to maximize pt's level of independence in the home and community environment.     Pt's spiritual, cultural and educational needs considered and pt agreeable to plan of care and goals.    Anticipated barriers to occupational therapy: n/a    Goals:   The following goals were discussed with the patient and patient is in agreement with them as to be addressed in the treatment plan.   Long Term Goals (LTGs); to be met by discharge.  LTG #1: Pt will report a pain level of 1-2 out of 10 with functional use of R hand. ------progressing not met 10/5/2022  LTG #2: Pt will demo improved FOTO score by 10-15 points. ------progressing not met 10/5/2022  LTG #3: Pt will return to prior level of function for ADLs and household management. ------progressing not met 10/5/2022     Short Term Goals (STGs); to be met within 4 weeks (9/16/2022).  STG #1: Pt will report 3-4 out of 10 pain level with functional use of R hand. ------progressing not met 10/5/2022  STG #2: Pt will report/demo improved  strength by 5#  ------progressing not met 10/5/2022  STG #3: Pt will report/demo MMT 4/5 ------progressing not met 10/5/2022  STG #4: Pt will demonstrate independence with issued HEP. ------progressing not met 10/5/2022         PLAN     Continue skilled occupational therapy with individualized plan of care focusing on increasing strength and participation in daily activities.    Updates/Grading for next session: continue as tolerated,     Maryse Butler, OT

## 2022-10-11 ENCOUNTER — CLINICAL SUPPORT (OUTPATIENT)
Dept: REHABILITATION | Facility: HOSPITAL | Age: 57
End: 2022-10-11
Payer: MEDICAID

## 2022-10-11 DIAGNOSIS — M62.81 DECREASED MUSCLE STRENGTH: ICD-10-CM

## 2022-10-11 DIAGNOSIS — M25.521 RIGHT ELBOW PAIN: Primary | ICD-10-CM

## 2022-10-11 PROCEDURE — 97530 THERAPEUTIC ACTIVITIES: CPT | Mod: CO

## 2022-10-12 ENCOUNTER — OFFICE VISIT (OUTPATIENT)
Dept: ALLERGY | Facility: CLINIC | Age: 57
End: 2022-10-12
Payer: MEDICAID

## 2022-10-12 ENCOUNTER — OFFICE VISIT (OUTPATIENT)
Dept: ORTHOPEDICS | Facility: CLINIC | Age: 57
End: 2022-10-12
Payer: MEDICAID

## 2022-10-12 VITALS — WEIGHT: 171.94 LBS | BODY MASS INDEX: 31.64 KG/M2 | HEIGHT: 62 IN

## 2022-10-12 VITALS — BODY MASS INDEX: 31.47 KG/M2 | HEIGHT: 62 IN | WEIGHT: 171 LBS

## 2022-10-12 DIAGNOSIS — R21 RASH: Primary | ICD-10-CM

## 2022-10-12 DIAGNOSIS — M77.11 LATERAL EPICONDYLITIS OF RIGHT ELBOW: Primary | ICD-10-CM

## 2022-10-12 DIAGNOSIS — L29.9 PRURITIC CONDITION: ICD-10-CM

## 2022-10-12 DIAGNOSIS — M77.02 MEDIAL EPICONDYLITIS OF LEFT ELBOW: ICD-10-CM

## 2022-10-12 PROBLEM — Z79.890 HORMONE REPLACEMENT THERAPY: Status: ACTIVE | Noted: 2022-09-20

## 2022-10-12 PROBLEM — M70.61 TROCHANTERIC BURSITIS OF RIGHT HIP: Status: ACTIVE | Noted: 2022-10-12

## 2022-10-12 PROBLEM — M51.16 RADICULOPATHY DUE TO LUMBAR INTERVERTEBRAL DISC DISORDER: Status: ACTIVE | Noted: 2022-10-12

## 2022-10-12 PROBLEM — M47.894 THORACIC FACET SYNDROME: Status: ACTIVE | Noted: 2022-10-12

## 2022-10-12 PROBLEM — M50.20 DISPLACEMENT OF CERVICAL INTERVERTEBRAL DISC: Status: ACTIVE | Noted: 2022-10-12

## 2022-10-12 PROBLEM — M48.9 CERVICAL SPINE DISEASE: Status: ACTIVE | Noted: 2021-01-27

## 2022-10-12 PROCEDURE — 3061F NEG MICROALBUMINURIA REV: CPT | Mod: CPTII,,, | Performed by: STUDENT IN AN ORGANIZED HEALTH CARE EDUCATION/TRAINING PROGRAM

## 2022-10-12 PROCEDURE — 99999 PR PBB SHADOW E&M-EST. PATIENT-LVL IV: CPT | Mod: PBBFAC,,, | Performed by: PHYSICIAN ASSISTANT

## 2022-10-12 PROCEDURE — 99214 OFFICE O/P EST MOD 30 MIN: CPT | Mod: PBBFAC,27,PN | Performed by: PHYSICIAN ASSISTANT

## 2022-10-12 PROCEDURE — 1160F RVW MEDS BY RX/DR IN RCRD: CPT | Mod: CPTII,,, | Performed by: PHYSICIAN ASSISTANT

## 2022-10-12 PROCEDURE — 4010F ACE/ARB THERAPY RXD/TAKEN: CPT | Mod: CPTII,,, | Performed by: PHYSICIAN ASSISTANT

## 2022-10-12 PROCEDURE — 3061F PR NEG MICROALBUMINURIA RESULT DOCUMENTED/REVIEW: ICD-10-PCS | Mod: CPTII,,, | Performed by: PHYSICIAN ASSISTANT

## 2022-10-12 PROCEDURE — 4010F PR ACE/ARB THEARPY RXD/TAKEN: ICD-10-PCS | Mod: CPTII,,, | Performed by: PHYSICIAN ASSISTANT

## 2022-10-12 PROCEDURE — 1159F PR MEDICATION LIST DOCUMENTED IN MEDICAL RECORD: ICD-10-PCS | Mod: CPTII,,, | Performed by: PHYSICIAN ASSISTANT

## 2022-10-12 PROCEDURE — 3066F PR DOCUMENTATION OF TREATMENT FOR NEPHROPATHY: ICD-10-PCS | Mod: CPTII,,, | Performed by: PHYSICIAN ASSISTANT

## 2022-10-12 PROCEDURE — 3066F NEPHROPATHY DOC TX: CPT | Mod: CPTII,,, | Performed by: STUDENT IN AN ORGANIZED HEALTH CARE EDUCATION/TRAINING PROGRAM

## 2022-10-12 PROCEDURE — 99999 PR PBB SHADOW E&M-EST. PATIENT-LVL III: ICD-10-PCS | Mod: PBBFAC,,, | Performed by: STUDENT IN AN ORGANIZED HEALTH CARE EDUCATION/TRAINING PROGRAM

## 2022-10-12 PROCEDURE — 3072F LOW RISK FOR RETINOPATHY: CPT | Mod: CPTII,,, | Performed by: PHYSICIAN ASSISTANT

## 2022-10-12 PROCEDURE — 99213 OFFICE O/P EST LOW 20 MIN: CPT | Mod: PBBFAC,27,PO | Performed by: STUDENT IN AN ORGANIZED HEALTH CARE EDUCATION/TRAINING PROGRAM

## 2022-10-12 PROCEDURE — 3044F PR MOST RECENT HEMOGLOBIN A1C LEVEL <7.0%: ICD-10-PCS | Mod: CPTII,,, | Performed by: STUDENT IN AN ORGANIZED HEALTH CARE EDUCATION/TRAINING PROGRAM

## 2022-10-12 PROCEDURE — 3008F BODY MASS INDEX DOCD: CPT | Mod: CPTII,,, | Performed by: PHYSICIAN ASSISTANT

## 2022-10-12 PROCEDURE — 99999 PR PBB SHADOW E&M-EST. PATIENT-LVL III: CPT | Mod: PBBFAC,,, | Performed by: STUDENT IN AN ORGANIZED HEALTH CARE EDUCATION/TRAINING PROGRAM

## 2022-10-12 PROCEDURE — 3044F HG A1C LEVEL LT 7.0%: CPT | Mod: CPTII,,, | Performed by: PHYSICIAN ASSISTANT

## 2022-10-12 PROCEDURE — 99999 PR PBB SHADOW E&M-EST. PATIENT-LVL IV: ICD-10-PCS | Mod: PBBFAC,,, | Performed by: PHYSICIAN ASSISTANT

## 2022-10-12 PROCEDURE — 1159F PR MEDICATION LIST DOCUMENTED IN MEDICAL RECORD: ICD-10-PCS | Mod: CPTII,,, | Performed by: STUDENT IN AN ORGANIZED HEALTH CARE EDUCATION/TRAINING PROGRAM

## 2022-10-12 PROCEDURE — 3044F HG A1C LEVEL LT 7.0%: CPT | Mod: CPTII,,, | Performed by: STUDENT IN AN ORGANIZED HEALTH CARE EDUCATION/TRAINING PROGRAM

## 2022-10-12 PROCEDURE — 3066F PR DOCUMENTATION OF TREATMENT FOR NEPHROPATHY: ICD-10-PCS | Mod: CPTII,,, | Performed by: STUDENT IN AN ORGANIZED HEALTH CARE EDUCATION/TRAINING PROGRAM

## 2022-10-12 PROCEDURE — 3008F PR BODY MASS INDEX (BMI) DOCUMENTED: ICD-10-PCS | Mod: CPTII,,, | Performed by: STUDENT IN AN ORGANIZED HEALTH CARE EDUCATION/TRAINING PROGRAM

## 2022-10-12 PROCEDURE — 3008F BODY MASS INDEX DOCD: CPT | Mod: CPTII,,, | Performed by: STUDENT IN AN ORGANIZED HEALTH CARE EDUCATION/TRAINING PROGRAM

## 2022-10-12 PROCEDURE — 3072F PR LOW RISK FOR RETINOPATHY: ICD-10-PCS | Mod: CPTII,,, | Performed by: STUDENT IN AN ORGANIZED HEALTH CARE EDUCATION/TRAINING PROGRAM

## 2022-10-12 PROCEDURE — 99204 PR OFFICE/OUTPT VISIT, NEW, LEVL IV, 45-59 MIN: ICD-10-PCS | Mod: S$PBB,,, | Performed by: STUDENT IN AN ORGANIZED HEALTH CARE EDUCATION/TRAINING PROGRAM

## 2022-10-12 PROCEDURE — 1160F PR REVIEW ALL MEDS BY PRESCRIBER/CLIN PHARMACIST DOCUMENTED: ICD-10-PCS | Mod: CPTII,,, | Performed by: STUDENT IN AN ORGANIZED HEALTH CARE EDUCATION/TRAINING PROGRAM

## 2022-10-12 PROCEDURE — 3072F LOW RISK FOR RETINOPATHY: CPT | Mod: CPTII,,, | Performed by: STUDENT IN AN ORGANIZED HEALTH CARE EDUCATION/TRAINING PROGRAM

## 2022-10-12 PROCEDURE — 3044F PR MOST RECENT HEMOGLOBIN A1C LEVEL <7.0%: ICD-10-PCS | Mod: CPTII,,, | Performed by: PHYSICIAN ASSISTANT

## 2022-10-12 PROCEDURE — 3061F PR NEG MICROALBUMINURIA RESULT DOCUMENTED/REVIEW: ICD-10-PCS | Mod: CPTII,,, | Performed by: STUDENT IN AN ORGANIZED HEALTH CARE EDUCATION/TRAINING PROGRAM

## 2022-10-12 PROCEDURE — 3072F PR LOW RISK FOR RETINOPATHY: ICD-10-PCS | Mod: CPTII,,, | Performed by: PHYSICIAN ASSISTANT

## 2022-10-12 PROCEDURE — 1160F RVW MEDS BY RX/DR IN RCRD: CPT | Mod: CPTII,,, | Performed by: STUDENT IN AN ORGANIZED HEALTH CARE EDUCATION/TRAINING PROGRAM

## 2022-10-12 PROCEDURE — 99204 OFFICE O/P NEW MOD 45 MIN: CPT | Mod: S$PBB,,, | Performed by: STUDENT IN AN ORGANIZED HEALTH CARE EDUCATION/TRAINING PROGRAM

## 2022-10-12 PROCEDURE — 4010F ACE/ARB THERAPY RXD/TAKEN: CPT | Mod: CPTII,,, | Performed by: STUDENT IN AN ORGANIZED HEALTH CARE EDUCATION/TRAINING PROGRAM

## 2022-10-12 PROCEDURE — 1160F PR REVIEW ALL MEDS BY PRESCRIBER/CLIN PHARMACIST DOCUMENTED: ICD-10-PCS | Mod: CPTII,,, | Performed by: PHYSICIAN ASSISTANT

## 2022-10-12 PROCEDURE — 99213 PR OFFICE/OUTPT VISIT, EST, LEVL III, 20-29 MIN: ICD-10-PCS | Mod: S$PBB,,, | Performed by: PHYSICIAN ASSISTANT

## 2022-10-12 PROCEDURE — 3066F NEPHROPATHY DOC TX: CPT | Mod: CPTII,,, | Performed by: PHYSICIAN ASSISTANT

## 2022-10-12 PROCEDURE — 3008F PR BODY MASS INDEX (BMI) DOCUMENTED: ICD-10-PCS | Mod: CPTII,,, | Performed by: PHYSICIAN ASSISTANT

## 2022-10-12 PROCEDURE — 1159F MED LIST DOCD IN RCRD: CPT | Mod: CPTII,,, | Performed by: STUDENT IN AN ORGANIZED HEALTH CARE EDUCATION/TRAINING PROGRAM

## 2022-10-12 PROCEDURE — 1159F MED LIST DOCD IN RCRD: CPT | Mod: CPTII,,, | Performed by: PHYSICIAN ASSISTANT

## 2022-10-12 PROCEDURE — 3061F NEG MICROALBUMINURIA REV: CPT | Mod: CPTII,,, | Performed by: PHYSICIAN ASSISTANT

## 2022-10-12 PROCEDURE — 99213 OFFICE O/P EST LOW 20 MIN: CPT | Mod: S$PBB,,, | Performed by: PHYSICIAN ASSISTANT

## 2022-10-12 PROCEDURE — 4010F PR ACE/ARB THEARPY RXD/TAKEN: ICD-10-PCS | Mod: CPTII,,, | Performed by: STUDENT IN AN ORGANIZED HEALTH CARE EDUCATION/TRAINING PROGRAM

## 2022-10-12 RX ORDER — HYDROXYZINE HYDROCHLORIDE 25 MG/1
TABLET, FILM COATED ORAL
Qty: 240 TABLET | Refills: 3 | Status: SHIPPED | OUTPATIENT
Start: 2022-10-12 | End: 2023-11-02

## 2022-10-12 RX ORDER — HYDROCORTISONE 25 MG/G
CREAM TOPICAL
Qty: 453.6 G | Refills: 1 | Status: SHIPPED | OUTPATIENT
Start: 2022-10-12 | End: 2023-11-02

## 2022-10-12 RX ORDER — VENLAFAXINE HYDROCHLORIDE 75 MG/1
CAPSULE, EXTENDED RELEASE ORAL
COMMUNITY
Start: 2022-08-24

## 2022-10-12 RX ORDER — ESTRADIOL 1 MG/1
1 TABLET ORAL DAILY
COMMUNITY
Start: 2022-09-20

## 2022-10-12 RX ORDER — CYCLOBENZAPRINE HCL 10 MG
10 TABLET ORAL 2 TIMES DAILY PRN
COMMUNITY
Start: 2022-09-27

## 2022-10-12 RX ORDER — CETIRIZINE HYDROCHLORIDE 10 MG/1
20 TABLET ORAL EVERY MORNING
Qty: 60 TABLET | Refills: 3 | Status: SHIPPED | OUTPATIENT
Start: 2022-10-12

## 2022-10-12 NOTE — PATIENT INSTRUCTIONS
If rash and itching return     Take photos.      2.    Medicines to control itching    Zyrtec 2 tablets at outset of itching and then every morning    Bedtime:  Atarax = hydroxizine 1-8 tablets   Causes drowsiness              Start with 3 tablets tonight              Increase or decrease by one tablet nightly until you find the best dose for you.      3.  HC 2.5% cream:  Apply to rashy areas up to three times a day    4.  Contact me via portal or [phone] to come back to see me.

## 2022-10-12 NOTE — PROGRESS NOTES
"Allergy Clinic Note  [facility]    This note was created by combination of typed  and M-Modal dictation. Transcription errors may be present.  If there are any questions, please contact me.    Subjective:      Patient ID: Sherie Reyes is a 57 y.o. female.    Chief Complaint: Urticaria and Itching      Referring Provider: Tonia Bernal    History of Present Illness: Sherie Reyes is a 57 y.o. female referred from internist for evaluation of itchy rash that she calls "hives"   X2 months, now resolved.  She is here alone, and she is a fair historian.     \She does not have any photos.  Descriptions from ED and from Internal Medicine describe a papular rash.  In looking at  Internet photos she said it looked more like the papular rash than the urticarial welts, except that the bumps occurred in clusters.    Related medications and other interventions  Hydoxyzine 25 mg TID prn  TMC  albuterol    10/12/2022:  At initial visit, Patient reported severely pruritic rash from mid August to mid October 2022.  She describes 100's of small pink severely pruritic bumps on torso arms and legs with sparing of the face.    She was treated with hydroxyzine and a dose pack with benefit until 1 day after finishing the Dosepak the rash recurred.  She was seen in the emergency room where she was given additional steroids with some benefit but rash returned full force while she was still taking the steroids.  Approximately a month ago, the rash stopped and has not recurred.    There are no clear precipitants.The itching was severe and was complicated by bruising as well as scabs from scratching.  None of her household members were affected.  She was not helped by Benadryl cream, oatmeal baths, or discontinuation of bathing products.  Hydroxyzine  and Benadryl cream were somewhat helpful for the itching but  the hydroxyzine caused excessive somnolence, requiring her to nap.  She was prescribed " Elimite for scabies but did not use it. She has had no previous similar episodes.            MEDICAL HISTORY      Significant past medical history: Anxiety, Prediabetes,  GERD  ENT surgery:  tonsils & adenoids    Significant family history:  allergies in mother, father, and sibling(s).   No asthma  Exposures: cat(s) and dog(s)  Smoking Hx:  Client  reports that she quit smoking about 8 years ago. Her smoking use included vaping with nicotine and cigarettes. She started smoking about 37 years ago. She has a 2.50 pack-year smoking history. She quit smokeless tobacco use about 7 years ago.    Medications:  MAR reviewed    Asthma: ?  Has albuterol.  Did not discuss.  Eczema: No  Rhinitis: mild,   Manifest by congestion and runny nose  Drug allergy/intolerance:   Venom allergy: denies  Latex allergy:  denies    Patient Active Problem List   Diagnosis    Primary insomnia    Chronic midline low back pain without sciatica    Slow transit constipation    Mixed hyperlipidemia    Essential hypertension    Hypotension    Incomplete tear of left rotator cuff    Anxiety    PTSD (post-traumatic stress disorder)    Prediabetes    Lateral epicondylitis of right elbow    Sialadenitis    Right shoulder pain    Symptom of leg swelling    Tear of right glenoid labrum    Shoulder pain, right    Shoulder weakness    Decreased range of motion (ROM) of shoulder    Depression    Pain    Cervical spine disease    Poor posture    Decreased strength of trunk and back    Right elbow pain    Decreased muscle strength    Degenerative tear of acetabular labrum of right hip    Displacement of cervical intervertebral disc    Hormone replacement therapy    Radiculopathy due to lumbar intervertebral disc disorder    Thoracic facet syndrome    Trochanteric bursitis of right hip     Medication List with Changes/Refills   New Medications    CETIRIZINE (ZYRTEC) 10 MG TABLET    Take 2 tablets (20 mg total) by mouth every morning.       Start Date:  10/12/2022End Date: --    HYDROCORTISONE 2.5 % CREAM    Apply to affected areas twice a day when eczema is moderate.       Start Date: 10/12/2022End Date: --    HYDROXYZINE HCL (ATARAX) 25 MG TABLET    1 to 8 tablets at bedtime.  Start with 2 tablets.  Increase or decrease as needed until itching is controlled or a maximum of 8 tablets.       Start Date: 10/12/2022End Date: --   Current Medications    ACETAMINOPHEN (TYLENOL) 500 MG TABLET    Take 500 mg by mouth every 6 (six) hours as needed for Pain.       Start Date: --        End Date: --    ALBUTEROL (PROAIR HFA) 90 MCG/ACTUATION INHALER    Inhale 2 puffs into the lungs every 6 (six) hours as needed for Wheezing. Rescue       Start Date: 1/16/2022 End Date: 1/16/2023    AMLODIPINE (NORVASC) 10 MG TABLET    TAKE 1 TABLET(10 MG) BY MOUTH EVERY DAY       Start Date: 3/21/2022 End Date: --    AZELASTINE (ASTELIN) 137 MCG (0.1 %) NASAL SPRAY    1 spray (137 mcg total) by Nasal route 2 (two) times daily.       Start Date: 1/21/2022 End Date: 1/21/2023    CHLORZOXAZONE (PARAFON FORTE) 500 MG TAB    Take 500 mg by mouth daily as needed.       Start Date: 6/21/2022 End Date: --    CLONAZEPAM (KLONOPIN) 1 MG TABLET    TAKE 1 TABLET BY MOUTH TWICE DAILY       Start Date: 9/2/2020  End Date: --    CYANOCOBALAMIN 1,000 MCG/ML INJECTION    Inject 1,000 mcg into the muscle every 30 days.       Start Date: 10/1/2021 End Date: --    CYCLOBENZAPRINE (FLEXERIL) 10 MG TABLET    Take 10 mg by mouth 2 (two) times daily as needed.       Start Date: 9/27/2022 End Date: --    DICLOFENAC SODIUM (VOLTAREN) 1 % GEL    Apply 2 g topically 4 (four) times daily.       Start Date: 2/22/2022 End Date: --    DICYCLOMINE (BENTYL) 10 MG CAPSULE    TAKE 1 CAPSULE BY MOUTH FOUR TIMES DAILY AS NEEDED FOR ABDOMINAL PAIN       Start Date: 9/19/2022 End Date: --    ESTRADIOL (ESTRACE) 1 MG TABLET    Take 1 mg by mouth once daily.       Start Date: 9/20/2022 End Date: --    LINZESS 290 MCG CAP CAPSULE     TAKE 1 CAPSULE(290 MCG) BY MOUTH BEFORE BREAKFAST       Start Date: 7/21/2022 End Date: --    LOSARTAN (COZAAR) 100 MG TABLET    TAKE 1 TABLET(100 MG) BY MOUTH EVERY DAY       Start Date: 4/18/2022 End Date: --    MELATONIN 5 MG CHEW    Take 10 mg by mouth daily as needed.       Start Date: --        End Date: --    MELOXICAM (MOBIC) 15 MG TABLET    Take 15 mg by mouth daily as needed.       Start Date: 2/3/2022  End Date: --    MUPIROCIN (BACTROBAN) 2 % OINTMENT    APPLY TOPICALLY TO THE AFFECTED AREA TWICE DAILY       Start Date: 8/24/2022 End Date: --    MV-MN/IRON/FOLIC ACID/HERB 190 (VITAMIN D3 COMPLETE ORAL)    Take by mouth once daily at 6am.       Start Date: --        End Date: --    OMEGA-3 FATTY ACIDS/FISH OIL (FISH OIL-OMEGA-3 FATTY ACIDS) 300-1,000 MG CAPSULE    Take 1 capsule by mouth once daily.       Start Date: --        End Date: --    OMEPRAZOLE (PRILOSEC) 40 MG CAPSULE    TAKE 1 CAPSULE(40 MG) BY MOUTH EVERY DAY       Start Date: 5/12/2022 End Date: --    POLYETHYLENE GLYCOL 3350 (MIRALAX ORAL)    Take by mouth as needed.        Start Date: --        End Date: --    POTASSIUM CHLORIDE 10% (KAYCIEL) 20 MEQ/15 ML ORAL SOLUTION    TAKE 15 ML BY MOUTH EVERY DAY       Start Date: 12/6/2021 End Date: --    PRAVASTATIN (PRAVACHOL) 20 MG TABLET    TAKE 1 TABLET(20 MG) BY MOUTH EVERY EVENING       Start Date: 6/15/2022 End Date: --    QUETIAPINE (SEROQUEL) 200 MG TAB    Take 1 tablet (200 mg total) by mouth every evening.       Start Date: 7/9/2020  End Date: 10/12/2022    TIZANIDINE (ZANAFLEX) 4 MG TABLET    Take 4 mg by mouth 2 (two) times daily as needed.       Start Date: --        End Date: --    TRAMADOL (ULTRAM) 50 MG TABLET    Take 50 mg by mouth every 12 (twelve) hours as needed.       Start Date: --        End Date: --    TRAZODONE (DESYREL) 100 MG TABLET    TK 1 T PO  QHS       Start Date: 9/2/2020  End Date: --    VENLAFAXINE (EFFEXOR-XR) 150 MG CP24    Take 150 mg by mouth once daily.        "Start Date: --        End Date: --    VENLAFAXINE (EFFEXOR-XR) 75 MG 24 HR CAPSULE    TAKE ONE CAPSULE BY MOUTH DAILY WITH 150 MG TO EQUAL 225 MG       Start Date: 8/24/2022 End Date: --    ZTLIDO 1.8 % PTMD    Apply 1 patch topically daily as needed.       Start Date: 10/7/2021 End Date: --   Discontinued Medications    HYDROXYZINE HCL (ATARAX) 25 MG TABLET    Take 1 tablet (25 mg total) by mouth 3 (three) times daily as needed for Itching.       Start Date: 9/5/2022  End Date: 10/12/2022    KURVELO 0.15-0.03 MG PER TABLET    Take 1 tablet by mouth once daily.       Start Date: 1/11/2017 End Date: 10/12/2022    PERMETHRIN (ELIMITE) 5 % CREAM    Apply to affected area once       Start Date: 8/23/2022 End Date: 10/12/2022    SERTRALINE (ZOLOFT) 100 MG TABLET    Take 100 mg by mouth once daily.       Start Date: 7/7/2022  End Date: 10/12/2022           REVIEW OF SYSTEMS      CONST: no F/C/NS, no unintentional weight changes  NEURO:  no tremor, no weakness  EYES: no discharge, no pruritus, no erythema  EARS: no hearing loss, no sensation of fullness  NOSE: + congestion, + runny nose, no sneezing  PULM:  no SOB, no wheezing, no cough  CV: no CP, no palpitations, no leg swelling  GI:  no abdominal pain, no blood in stool, + heartburn  :  no dysuria, no hematuria  DERM: no rashes, no skin breaks    Objective:     Physical Exam:     Ht 5' 2" (1.575 m)   Wt 78 kg (171 lb 15.3 oz)   BMI 31.45 kg/m²   GEN: Awake and alert, no distress  DERM: No rashes or flushing  EYE:  No occular discharge  HEENT: No nasal discharge, no hoarseness  PULM: Normal work of breathing, no cough  NEURO:  No focal deficit, speech fluent and logical  PSYCH: appropriate affect, normal behavior        Allergy Testing            Lab results      Labs ordered at IM but not yet drawn, inc CBC, CMP, TSH, thyroperoxidase Ab, vitamin D      Imaging and other daignostics            Medical records review    Reviewed IM note of 8/12/2022.  Pt c/o rash for " one week. Px notable mainly for excoriations.      ASSESSMENT & PLAN       Diagnoses:     Sherie Reyes is a 57 y.o. female. with  1. Rash    2. Pruritic condition          Medical Decision making:     Ms. Reyes  is presenting following a severely pruritic rash which is probably not urticarial.  There are no associated symptoms and no clear etiology.  As the rash has been quiescent for the last month, I am not recommending any testing at this time.  If the rash recurs, I suggested taking photos and returning to see me.  Therapeutically, if the rash recurs, I recommended a regimen of antihistamines and topical steroids.  Please see patient instructions.      Rash  -     hydrocortisone 2.5 % cream; Apply to affected areas twice a day when eczema is moderate.  Dispense: 453.6 g; Refill: 1    Pruritic condition  -     Ambulatory referral/consult to Allergy  -     cetirizine (ZYRTEC) 10 MG tablet; Take 2 tablets (20 mg total) by mouth every morning.  Dispense: 60 tablet; Refill: 3  -     hydrOXYzine HCL (ATARAX) 25 MG tablet; 1 to 8 tablets at bedtime.  Start with 2 tablets.  Increase or decrease as needed until itching is controlled or a maximum of 8 tablets.  Dispense: 240 tablet; Refill: 3        Patient Instructions:     Patient Instructions   If rash and itching return     Take photos.      2.    Medicines to control itching    Zyrtec 2 tablets at outset of itching and then every morning    Bedtime:  Atarax = hydroxizine 1-8 tablets   Causes drowsiness              Start with 3 tablets tonight              Increase or decrease by one tablet nightly until you find the best dose for you.      3.  HC 2.5% cream:  Apply to rashy areas up to three times a day    4.  Contact me via portal or [phone] to come back to see me.    Follow up if recurs.        Allyson Botello MD  Allergy, Asthma and Immunology

## 2022-10-12 NOTE — PROGRESS NOTES
Subjective:      Patient ID: Sherie Reyes is a 57 y.o. female.  Chief Complaint: Follow-up of the Right Elbow and Consult of the Left Elbow      HPI  Sherie Reyes is a  57 y.o. female presenting today for post op visit.  She is s/p lateral epicondylar release and repair of the right elbow now about 5 months postop  She notes some intermittent soreness surrounding surgical site, but states that overall the pain has improved.    Additionally, the pt presents with new onset pain to the medial left elbow. The pain began atraumatically and is not associated with any UE sensation changes or overlying skin changes. The pain does not extend distally.    Review of patient's allergies indicates:  No Known Allergies      Current Outpatient Medications   Medication Sig Dispense Refill    acetaminophen (TYLENOL) 500 MG tablet Take 500 mg by mouth every 6 (six) hours as needed for Pain.      albuterol (PROAIR HFA) 90 mcg/actuation inhaler Inhale 2 puffs into the lungs every 6 (six) hours as needed for Wheezing. Rescue 18 g 0    amLODIPine (NORVASC) 10 MG tablet TAKE 1 TABLET(10 MG) BY MOUTH EVERY DAY 90 tablet 3    azelastine (ASTELIN) 137 mcg (0.1 %) nasal spray 1 spray (137 mcg total) by Nasal route 2 (two) times daily. 30 mL 0    cetirizine (ZYRTEC) 10 MG tablet Take 2 tablets (20 mg total) by mouth every morning. 60 tablet 3    chlorzoxazone (PARAFON FORTE) 500 mg Tab Take 500 mg by mouth daily as needed.      clonazePAM (KLONOPIN) 1 MG tablet TAKE 1 TABLET BY MOUTH TWICE DAILY 60 tablet 1    cyanocobalamin 1,000 mcg/mL injection Inject 1,000 mcg into the muscle every 30 days.      cyclobenzaprine (FLEXERIL) 10 MG tablet Take 10 mg by mouth 2 (two) times daily as needed.      diclofenac sodium (VOLTAREN) 1 % Gel Apply 2 g topically 4 (four) times daily. 4 each 2    dicyclomine (BENTYL) 10 MG capsule TAKE 1 CAPSULE BY MOUTH FOUR TIMES DAILY AS NEEDED FOR ABDOMINAL PAIN 120 capsule 1    estradioL (ESTRACE) 1  MG tablet Take 1 mg by mouth once daily.      hydrocortisone 2.5 % cream Apply to affected areas twice a day when eczema is moderate. 453.6 g 1    hydrOXYzine HCL (ATARAX) 25 MG tablet 1 to 8 tablets at bedtime.  Start with 2 tablets.  Increase or decrease as needed until itching is controlled or a maximum of 8 tablets. 240 tablet 3    LINZESS 290 mcg Cap capsule TAKE 1 CAPSULE(290 MCG) BY MOUTH BEFORE BREAKFAST 90 capsule 3    losartan (COZAAR) 100 MG tablet TAKE 1 TABLET(100 MG) BY MOUTH EVERY DAY 90 tablet 3    melatonin 5 mg Chew Take 10 mg by mouth daily as needed.      meloxicam (MOBIC) 15 MG tablet Take 15 mg by mouth daily as needed.      mupirocin (BACTROBAN) 2 % ointment APPLY TOPICALLY TO THE AFFECTED AREA TWICE DAILY 22 g 1    mv-mn/iron/folic acid/herb 190 (VITAMIN D3 COMPLETE ORAL) Take by mouth once daily at 6am.      omega-3 fatty acids/fish oil (FISH OIL-OMEGA-3 FATTY ACIDS) 300-1,000 mg capsule Take 1 capsule by mouth once daily.      omeprazole (PRILOSEC) 40 MG capsule TAKE 1 CAPSULE(40 MG) BY MOUTH EVERY DAY 30 capsule 11    POLYETHYLENE GLYCOL 3350 (MIRALAX ORAL) Take by mouth as needed.       potassium chloride 10% (KAYCIEL) 20 mEq/15 mL oral solution TAKE 15 ML BY MOUTH EVERY  mL 11    pravastatin (PRAVACHOL) 20 MG tablet TAKE 1 TABLET(20 MG) BY MOUTH EVERY EVENING 90 tablet 1    QUEtiapine (SEROQUEL) 200 MG Tab Take 1 tablet (200 mg total) by mouth every evening. 30 tablet 2    tiZANidine (ZANAFLEX) 4 MG tablet Take 4 mg by mouth 2 (two) times daily as needed.      traMADoL (ULTRAM) 50 mg tablet Take 50 mg by mouth every 12 (twelve) hours as needed.      traZODone (DESYREL) 100 MG tablet TK 1 T PO  QHS      venlafaxine (EFFEXOR-XR) 150 MG Cp24 Take 150 mg by mouth once daily.      venlafaxine (EFFEXOR-XR) 75 MG 24 hr capsule TAKE ONE CAPSULE BY MOUTH DAILY WITH 150 MG TO EQUAL 225 MG      ZTLIDO 1.8 % PtMd Apply 1 patch topically daily as needed.       No current facility-administered  medications for this visit.     Facility-Administered Medications Ordered in Other Visits   Medication Dose Route Frequency Provider Last Rate Last Admin    0.9%  NaCl infusion   Intravenous Continuous Gregoria Rodriguez MD           Past Medical History:   Diagnosis Date    Anxiety     Degenerative joint disease (DJD) of hip     Diabetes type 2, controlled 7/10/2019    GERD (gastroesophageal reflux disease)     Hyperlipidemia     Hypertension     IBS (irritable bowel syndrome)     Insomnia     Lumbar disc herniation     PTSD (post-traumatic stress disorder)        Past Surgical History:   Procedure Laterality Date    ADENOIDECTOMY      ARTHROSCOPIC DEBRIDEMENT OF ROTATOR CUFF Right 2020    Procedure: DEBRIDEMENT, ROTATOR CUFF, ARTHROSCOPIC;  Surgeon: Melchor Hughes Jr., MD;  Location: [facility] OR;  Service: Orthopedics;  Laterality: Right;  need opus system (Ger MESA notified , EF)  video, notified/confirmed 2020 KB 1310    ARTHROSCOPIC EXCISION OF ACROMIOCLAVICULAR JOINT  2020    Procedure: EXCISION, ACROMIOCLAVICULAR JOINT, ARTHROSCOPIC;  Surgeon: Melchor Hughes Jr., MD;  Location: [facility] OR;  Service: Orthopedics;;    ARTHROSCOPIC REPAIR OF ROTATOR CUFF OF SHOULDER      ARTHROSCOPY OF SHOULDER WITH DECOMPRESSION OF SUBACROMIAL SPACE  2020    Procedure: ARTHROSCOPY, SHOULDER, WITH SUBACROMIAL SPACE DECOMPRESSION;  Surgeon: Melchor Hughes Jr., MD;  Location: [facility] OR;  Service: Orthopedics;;     SECTION      EPIDURAL STEROID INJECTION INTO CERVICAL SPINE N/A 2021    Procedure: Injection-steroid-epidural-cervical--C7-T1 IL ZAYNAB;  Surgeon: Gregoria Rodriguez MD;  Location: [facility] PAIN MGT;  Service: Pain Management;  Laterality: N/A;    TENOTOMY, ELBOW, WITH DEBRIDEMENT AND TENDON REPAIR Right 2022    Procedure: TENOTOMY,ELBOW,WITH DEBRIDEMENT AND TENDON REPAIR;  Surgeon: Melchor Hughes Jr., MD;  Location: [facility] OR;  Service: Orthopedics;  Laterality: Right;    TONSILLECTOMY    "      OBJECTIVE:   PHYSICAL EXAM:  Height: 5' 2" (157.5 cm) Weight: 77.6 kg (171 lb)  Ht 5' 2" (1.575 m)   Wt 77.6 kg (171 lb)   BMI 31.28 kg/m²      Ortho/SPM Exam  Examination right elbow the incision is well healed no swelling no tenderness no evidence of infection  Range of motion elbow full without pain or difficulty.   strength mildly decreased right hand.  Mild TTP to lateral epicondyle    LUE:  Skin intact without lacerations, ecchymosis or swelling.  (+) TTP to conjoined tendon wad overlying medial epicondyle. Otherwise, NTTP to bony prominences and soft tissues throughout.  (+) pain to resisted wrist flexion.  BCR to finger pads. SILT to r/u/m n. Dist.      ASSESSMENT/PLAN:     1. Lateral epicondylitis of right elbow        2. Medial epicondylitis of left elbow          PLAN:    5mos s/p Lateral Epicondyle Release  At this point, the patient may certainly d/c PT as she has seen a plateau in her progress  Advil or Motrin by mouth PTN  Advance activities as tolerated    Medial epicondylitis, L elbow  We discussed the pathophysiology along with conservative tx options for this condition. Typically, golfer's elbow responds very well to conservative tx in the form of activity modification, NSAIDs, +/- bracing, and appropriate usage of steroid injections. When the pain becomes recalcitrant to these measures, we discuss surgical options.    Injection: CSI performed to L medial epicondyle today    FOLLOW UP:  6 weeks      "

## 2022-11-15 ENCOUNTER — PATIENT MESSAGE (OUTPATIENT)
Dept: OTHER | Facility: OTHER | Age: 57
End: 2022-11-15
Payer: MEDICARE

## 2022-11-17 RX ORDER — CYANOCOBALAMIN 1000 UG/ML
1000 INJECTION, SOLUTION INTRAMUSCULAR; SUBCUTANEOUS
Qty: 30 ML | Refills: 1 | Status: SHIPPED | OUTPATIENT
Start: 2022-11-17

## 2022-11-28 ENCOUNTER — TELEPHONE (OUTPATIENT)
Dept: ORTHOPEDICS | Facility: CLINIC | Age: 57
End: 2022-11-28
Payer: MEDICARE

## 2022-11-28 NOTE — TELEPHONE ENCOUNTER
----- Message from Aidan Mcginnis sent at 11/28/2022 12:22 PM CST -----  Contact: pt  .Type:  Sooner Apoointment Request    Caller is requesting a sooner appointment.  Caller declined first available appointment listed below.  Caller will not accept being placed on the waitlist and is requesting a message be sent to doctor.  Name of Caller:pt  When is the first available appointment? Feb  Symptoms: right elbow pain f/u  Would the patient rather a call back or a response via [facility]? Call back  Best Call Back Number:[phone]  Additional Information: Pt. Needs to reschedule her appt from today and Dr. Castro /Ryan appt. Que is not coming up.  Please call the pt. Back with an appt.

## 2022-11-30 ENCOUNTER — PATIENT MESSAGE (OUTPATIENT)
Dept: INTERNAL MEDICINE | Facility: CLINIC | Age: 57
End: 2022-11-30
Payer: MEDICARE

## 2022-12-05 ENCOUNTER — PATIENT MESSAGE (OUTPATIENT)
Dept: INTERNAL MEDICINE | Facility: CLINIC | Age: 57
End: 2022-12-05
Payer: MEDICARE

## 2022-12-05 NOTE — TELEPHONE ENCOUNTER
Pt would like to try paxlovid , pt is currently taking Dayquil, using nasal spray and taking tylenol .     Pt would like to know should she keep her appt for blood work tomorrow and appt with you on the 8th ?

## 2022-12-14 ENCOUNTER — TELEPHONE (OUTPATIENT)
Dept: INTERNAL MEDICINE | Facility: CLINIC | Age: 57
End: 2022-12-14
Payer: MEDICARE

## 2022-12-16 ENCOUNTER — TELEPHONE (OUTPATIENT)
Dept: INTERNAL MEDICINE | Facility: CLINIC | Age: 57
End: 2022-12-16
Payer: MEDICARE

## 2022-12-18 ENCOUNTER — PATIENT MESSAGE (OUTPATIENT)
Dept: OTHER | Facility: OTHER | Age: 57
End: 2022-12-18
Payer: MEDICARE

## 2022-12-20 ENCOUNTER — LAB VISIT (OUTPATIENT)
Dept: LAB | Facility: HOSPITAL | Age: 57
End: 2022-12-20
Attending: INTERNAL MEDICINE
Payer: MEDICARE

## 2022-12-20 DIAGNOSIS — I10 ESSENTIAL HYPERTENSION: ICD-10-CM

## 2022-12-20 DIAGNOSIS — E78.2 MIXED HYPERLIPIDEMIA: ICD-10-CM

## 2022-12-20 DIAGNOSIS — E11.9 CONTROLLED TYPE 2 DIABETES MELLITUS WITHOUT COMPLICATION, WITHOUT LONG-TERM CURRENT USE OF INSULIN: ICD-10-CM

## 2022-12-20 LAB
BASOPHILS # BLD AUTO: 0.02 K/UL (ref 0–0.2)
BASOPHILS NFR BLD: 0.3 % (ref 0–1.9)
DIFFERENTIAL METHOD: ABNORMAL
EOSINOPHIL # BLD AUTO: 0.1 K/UL (ref 0–0.5)
EOSINOPHIL NFR BLD: 1 % (ref 0–8)
ERYTHROCYTE [DISTWIDTH] IN BLOOD BY AUTOMATED COUNT: 13.4 % (ref 11.5–14.5)
HCT VFR BLD AUTO: 46.3 % (ref 37–48.5)
HGB BLD-MCNC: 14.4 G/DL (ref 12–16)
IMM GRANULOCYTES # BLD AUTO: 0.01 K/UL (ref 0–0.04)
IMM GRANULOCYTES NFR BLD AUTO: 0.1 % (ref 0–0.5)
LYMPHOCYTES # BLD AUTO: 2.6 K/UL (ref 1–4.8)
LYMPHOCYTES NFR BLD: 33.9 % (ref 18–48)
MCH RBC QN AUTO: 28 PG (ref 27–31)
MCHC RBC AUTO-ENTMCNC: 31.1 G/DL (ref 32–36)
MCV RBC AUTO: 90 FL (ref 82–98)
MONOCYTES # BLD AUTO: 0.6 K/UL (ref 0.3–1)
MONOCYTES NFR BLD: 8 % (ref 4–15)
NEUTROPHILS # BLD AUTO: 4.4 K/UL (ref 1.8–7.7)
NEUTROPHILS NFR BLD: 56.7 % (ref 38–73)
NRBC BLD-RTO: 0 /100 WBC
PLATELET # BLD AUTO: 276 K/UL (ref 150–450)
PMV BLD AUTO: 12.4 FL (ref 9.2–12.9)
RBC # BLD AUTO: 5.15 M/UL (ref 4–5.4)
WBC # BLD AUTO: 7.67 K/UL (ref 3.9–12.7)

## 2022-12-20 PROCEDURE — 83921 ORGANIC ACID SINGLE QUANT: CPT | Performed by: INTERNAL MEDICINE

## 2022-12-20 PROCEDURE — 80053 COMPREHEN METABOLIC PANEL: CPT | Performed by: INTERNAL MEDICINE

## 2022-12-20 PROCEDURE — 82306 VITAMIN D 25 HYDROXY: CPT | Performed by: INTERNAL MEDICINE

## 2022-12-20 PROCEDURE — 83036 HEMOGLOBIN GLYCOSYLATED A1C: CPT | Performed by: INTERNAL MEDICINE

## 2022-12-20 PROCEDURE — 84443 ASSAY THYROID STIM HORMONE: CPT | Performed by: INTERNAL MEDICINE

## 2022-12-20 PROCEDURE — 86376 MICROSOMAL ANTIBODY EACH: CPT | Performed by: INTERNAL MEDICINE

## 2022-12-20 PROCEDURE — 85025 COMPLETE CBC W/AUTO DIFF WBC: CPT | Performed by: INTERNAL MEDICINE

## 2022-12-20 PROCEDURE — 80061 LIPID PANEL: CPT | Performed by: INTERNAL MEDICINE

## 2022-12-21 LAB
25(OH)D3+25(OH)D2 SERPL-MCNC: 55 NG/ML (ref 30–96)
ALBUMIN SERPL BCP-MCNC: 4.2 G/DL (ref 3.5–5.2)
ALP SERPL-CCNC: 62 U/L (ref 55–135)
ALT SERPL W/O P-5'-P-CCNC: 34 U/L (ref 10–44)
ANION GAP SERPL CALC-SCNC: 13 MMOL/L (ref 8–16)
AST SERPL-CCNC: 22 U/L (ref 10–40)
BILIRUB SERPL-MCNC: 0.5 MG/DL (ref 0.1–1)
BUN SERPL-MCNC: 8 MG/DL (ref 6–20)
CALCIUM SERPL-MCNC: 9.6 MG/DL (ref 8.7–10.5)
CHLORIDE SERPL-SCNC: 104 MMOL/L (ref 95–110)
CHOLEST SERPL-MCNC: 206 MG/DL (ref 120–199)
CHOLEST/HDLC SERPL: 3.4 {RATIO} (ref 2–5)
CO2 SERPL-SCNC: 21 MMOL/L (ref 23–29)
CREAT SERPL-MCNC: 1 MG/DL (ref 0.5–1.4)
EST. GFR  (NO RACE VARIABLE): >60 ML/MIN/1.73 M^2
ESTIMATED AVG GLUCOSE: 114 MG/DL (ref 68–131)
GLUCOSE SERPL-MCNC: 106 MG/DL (ref 70–110)
HBA1C MFR BLD: 5.6 % (ref 4–5.6)
HDLC SERPL-MCNC: 61 MG/DL (ref 40–75)
HDLC SERPL: 29.6 % (ref 20–50)
LDLC SERPL CALC-MCNC: 121.2 MG/DL (ref 63–159)
NONHDLC SERPL-MCNC: 145 MG/DL
POTASSIUM SERPL-SCNC: 3.4 MMOL/L (ref 3.5–5.1)
PROT SERPL-MCNC: 7.5 G/DL (ref 6–8.4)
SODIUM SERPL-SCNC: 138 MMOL/L (ref 136–145)
THYROPEROXIDASE IGG SERPL-ACNC: <6 IU/ML
TRIGL SERPL-MCNC: 119 MG/DL (ref 30–150)
TSH SERPL DL<=0.005 MIU/L-ACNC: 0.8 UIU/ML (ref 0.4–4)

## 2022-12-24 LAB — METHYLMALONATE SERPL-SCNC: <0.1 UMOL/L

## 2022-12-24 NOTE — PROGRESS NOTES
Subjective:       Patient ID: Sherie Reyes is a 57 y.o. female.    Chief Complaint: Follow-up (Labs; UA)      Patient is a 57 y.o. female who traditionally follows with Tonia Bernal DO presenting today for weight loss.     Down 8 lbs since August and 26 lbs since May.   Has stopped Gabapentin.  Reports decrease in appetite -  cooks.     Review of patient's allergies indicates:  No Known Allergies    Medication List with Changes/Refills   Current Medications    ACETAMINOPHEN (TYLENOL) 500 MG TABLET    Take 500 mg by mouth every 6 (six) hours as needed for Pain.    ALBUTEROL (PROAIR HFA) 90 MCG/ACTUATION INHALER    Inhale 2 puffs into the lungs every 6 (six) hours as needed for Wheezing. Rescue    AMLODIPINE (NORVASC) 10 MG TABLET    TAKE 1 TABLET(10 MG) BY MOUTH EVERY DAY    AZELASTINE (ASTELIN) 137 MCG (0.1 %) NASAL SPRAY    1 spray (137 mcg total) by Nasal route 2 (two) times daily.    CETIRIZINE (ZYRTEC) 10 MG TABLET    Take 2 tablets (20 mg total) by mouth every morning.    CHLORZOXAZONE (PARAFON FORTE) 500 MG TAB    Take 500 mg by mouth daily as needed.    CLONAZEPAM (KLONOPIN) 1 MG TABLET    TAKE 1 TABLET BY MOUTH TWICE DAILY    CYANOCOBALAMIN 1,000 MCG/ML INJECTION    Inject 1 mL (1,000 mcg total) into the muscle every 30 days.    CYCLOBENZAPRINE (FLEXERIL) 10 MG TABLET    Take 10 mg by mouth 2 (two) times daily as needed.    DICLOFENAC SODIUM (VOLTAREN) 1 % GEL    Apply 2 g topically 4 (four) times daily.    DICYCLOMINE (BENTYL) 10 MG CAPSULE    TAKE 1 CAPSULE BY MOUTH FOUR TIMES DAILY AS NEEDED FOR ABDOMINAL PAIN    ESTRADIOL (ESTRACE) 1 MG TABLET    Take 1 mg by mouth once daily.    HYDROCORTISONE 2.5 % CREAM    Apply to affected areas twice a day when eczema is moderate.    HYDROXYZINE HCL (ATARAX) 25 MG TABLET    1 to 8 tablets at bedtime.  Start with 2 tablets.  Increase or decrease as needed until itching is controlled or a maximum of 8 tablets.    LINZESS 290 MCG CAP CAPSULE     TAKE 1 CAPSULE(290 MCG) BY MOUTH BEFORE BREAKFAST    LOSARTAN (COZAAR) 100 MG TABLET    TAKE 1 TABLET(100 MG) BY MOUTH EVERY DAY    MELATONIN 5 MG CHEW    Take 10 mg by mouth daily as needed.    MELOXICAM (MOBIC) 15 MG TABLET    Take 15 mg by mouth daily as needed.    MUPIROCIN (BACTROBAN) 2 % OINTMENT    APPLY TOPICALLY TO THE AFFECTED AREA TWICE DAILY    MV-MN/IRON/FOLIC ACID/HERB 190 (VITAMIN D3 COMPLETE ORAL)    Take by mouth once daily at 6am.    OMEGA-3 FATTY ACIDS/FISH OIL (FISH OIL-OMEGA-3 FATTY ACIDS) 300-1,000 MG CAPSULE    Take 1 capsule by mouth once daily.    OMEPRAZOLE (PRILOSEC) 40 MG CAPSULE    TAKE 1 CAPSULE(40 MG) BY MOUTH EVERY DAY    POLYETHYLENE GLYCOL 3350 (MIRALAX ORAL)    Take by mouth as needed.     POTASSIUM CHLORIDE 10% (KAYCIEL) 20 MEQ/15 ML ORAL SOLUTION    TAKE 15 ML BY MOUTH EVERY DAY    PRAVASTATIN (PRAVACHOL) 20 MG TABLET    TAKE 1 TABLET(20 MG) BY MOUTH EVERY EVENING    QUETIAPINE (SEROQUEL) 200 MG TAB    Take 1 tablet (200 mg total) by mouth every evening.    TIZANIDINE (ZANAFLEX) 4 MG TABLET    Take 4 mg by mouth 2 (two) times daily as needed.    TRAMADOL (ULTRAM) 50 MG TABLET    Take 50 mg by mouth every 12 (twelve) hours as needed.    TRAZODONE (DESYREL) 100 MG TABLET    TK 1 T PO  QHS    VENLAFAXINE (EFFEXOR-XR) 150 MG CP24    Take 150 mg by mouth once daily.    VENLAFAXINE (EFFEXOR-XR) 75 MG 24 HR CAPSULE    TAKE ONE CAPSULE BY MOUTH DAILY WITH 150 MG TO EQUAL 225 MG    ZTLIDO 1.8 % PTMD    Apply 1 patch topically daily as needed.     Medical, social and surgical history has been reviewed with the patient.      Review of Systems   Constitutional:  Positive for appetite change and unexpected weight change.   Gastrointestinal:  Negative for abdominal pain, change in bowel habit and change in bowel habit.      Objective:   /72 (BP Location: Right arm, Patient Position: Sitting, BP Method: Medium (Manual))   Pulse 96   Temp 97.9 °F (36.6 °C) (Temporal)   Resp 18   Ht 5'  "2" (1.575 m)   Wt 73 kg (160 lb 15 oz)   SpO2 96%   BMI 29.44 kg/m²       Physical Exam  Vitals reviewed.   Constitutional:       Appearance: Normal appearance.   HENT:      Head: Normocephalic and atraumatic.   Cardiovascular:      Rate and Rhythm: Normal rate and regular rhythm.      Heart sounds: Normal heart sounds. No murmur heard.  Pulmonary:      Effort: Pulmonary effort is normal.      Breath sounds: Normal breath sounds. No wheezing.   Abdominal:      General: Bowel sounds are normal.      Palpations: Abdomen is soft.      Tenderness: There is no abdominal tenderness.   Skin:     General: Skin is warm and dry.   Neurological:      Mental Status: She is alert and oriented to person, place, and time.       Last Labs:  Glucose   Date Value Ref Range Status   12/20/2022 106 70 - 110 mg/dL Final   08/09/2022 122 (H) 70 - 110 mg/dL Final     BUN   Date Value Ref Range Status   12/20/2022 8 6 - 20 mg/dL Final   08/09/2022 7 6 - 20 mg/dL Final     Creatinine   Date Value Ref Range Status   12/20/2022 1.0 0.5 - 1.4 mg/dL Final   08/09/2022 1.1 0.5 - 1.4 mg/dL Final     Cholesterol   Date Value Ref Range Status   12/20/2022 206 (H) 120 - 199 mg/dL Final     Comment:     The National Cholesterol Education Program (NCEP) has set the  following guidelines (reference ranges) for Cholesterol:  Optimal.....................<200 mg/dL  Borderline High.............200-239 mg/dL  High........................> or = 240 mg/dL     05/09/2022 176 120 - 199 mg/dL Final     Comment:     The National Cholesterol Education Program (NCEP) has set the  following guidelines (reference ranges) for Cholesterol:  Optimal.....................<200 mg/dL  Borderline High.............200-239 mg/dL  High........................> or = 240 mg/dL       Hemoglobin A1C   Date Value Ref Range Status   12/20/2022 5.6 4.0 - 5.6 % Final     Comment:     ADA Screening Guidelines:  5.7-6.4%  Consistent with prediabetes  >or=6.5%  Consistent with " diabetes    High levels of fetal hemoglobin interfere with the HbA1C  assay. Heterozygous hemoglobin variants (HbS, HgC, etc)do  not significantly interfere with this assay.   However, presence of multiple variants may affect accuracy.     08/09/2022 6.0 (H) 4.0 - 5.6 % Final     Comment:     ADA Screening Guidelines:  5.7-6.4%  Consistent with prediabetes  >or=6.5%  Consistent with diabetes    High levels of fetal hemoglobin interfere with the HbA1C  assay. Heterozygous hemoglobin variants (HbS, HgC, etc)do  not significantly interfere with this assay.   However, presence of multiple variants may affect accuracy.       Hemoglobin   Date Value Ref Range Status   12/20/2022 14.4 12.0 - 16.0 g/dL Final   08/22/2020 12.5 12.0 - 16.0 g/dL Final     POC Hematocrit   Date Value Ref Range Status   03/30/2018 43 36 - 54 %PCV Final     Hematocrit   Date Value Ref Range Status   12/20/2022 46.3 37.0 - 48.5 % Final   08/22/2020 39.6 37.0 - 48.5 % Final       I have reviewed the following:     Details / Date    [x]   Labs     []   Micro     []   Pathology     []   Imaging     []   Cardiology Procedures     []   Other        Assessment and Plan:     1. Weight loss  - Ambulatory referral/consult to Gastroenterology; Future    2. Early satiety  - Ambulatory referral/consult to Gastroenterology; Future  - H. PYLORI ANTIBODY, IGG; Future    3. Hypokalemia  - POTASSIUM; Future  - Magnesium; Future    4. Tobacco use  - CT Chest Lung Screening Low Dose; Future

## 2022-12-27 ENCOUNTER — LAB VISIT (OUTPATIENT)
Dept: LAB | Facility: HOSPITAL | Age: 57
End: 2022-12-27
Payer: MEDICARE

## 2022-12-27 ENCOUNTER — OFFICE VISIT (OUTPATIENT)
Dept: INTERNAL MEDICINE | Facility: CLINIC | Age: 57
End: 2022-12-27
Payer: MEDICAID

## 2022-12-27 VITALS
BODY MASS INDEX: 29.62 KG/M2 | RESPIRATION RATE: 18 BRPM | WEIGHT: 160.94 LBS | SYSTOLIC BLOOD PRESSURE: 108 MMHG | HEART RATE: 96 BPM | OXYGEN SATURATION: 96 % | DIASTOLIC BLOOD PRESSURE: 72 MMHG | TEMPERATURE: 98 F | HEIGHT: 62 IN

## 2022-12-27 DIAGNOSIS — E87.6 HYPOKALEMIA: ICD-10-CM

## 2022-12-27 DIAGNOSIS — R63.4 WEIGHT LOSS: Primary | ICD-10-CM

## 2022-12-27 DIAGNOSIS — R68.81 EARLY SATIETY: ICD-10-CM

## 2022-12-27 DIAGNOSIS — Z72.0 TOBACCO USE: ICD-10-CM

## 2022-12-27 LAB
MAGNESIUM SERPL-MCNC: 1.9 MG/DL (ref 1.6–2.6)
POTASSIUM SERPL-SCNC: 3.9 MMOL/L (ref 3.5–5.1)

## 2022-12-27 PROCEDURE — 1159F MED LIST DOCD IN RCRD: CPT | Mod: CPTII,,, | Performed by: NURSE PRACTITIONER

## 2022-12-27 PROCEDURE — 83735 ASSAY OF MAGNESIUM: CPT | Performed by: NURSE PRACTITIONER

## 2022-12-27 PROCEDURE — 3074F SYST BP LT 130 MM HG: CPT | Mod: CPTII,,, | Performed by: NURSE PRACTITIONER

## 2022-12-27 PROCEDURE — 3074F PR MOST RECENT SYSTOLIC BLOOD PRESSURE < 130 MM HG: ICD-10-PCS | Mod: CPTII,,, | Performed by: NURSE PRACTITIONER

## 2022-12-27 PROCEDURE — 99213 OFFICE O/P EST LOW 20 MIN: CPT | Mod: S$PBB,,, | Performed by: NURSE PRACTITIONER

## 2022-12-27 PROCEDURE — 3072F PR LOW RISK FOR RETINOPATHY: ICD-10-PCS | Mod: CPTII,,, | Performed by: NURSE PRACTITIONER

## 2022-12-27 PROCEDURE — 1160F PR REVIEW ALL MEDS BY PRESCRIBER/CLIN PHARMACIST DOCUMENTED: ICD-10-PCS | Mod: CPTII,,, | Performed by: NURSE PRACTITIONER

## 2022-12-27 PROCEDURE — 99213 PR OFFICE/OUTPT VISIT, EST, LEVL III, 20-29 MIN: ICD-10-PCS | Mod: S$PBB,,, | Performed by: NURSE PRACTITIONER

## 2022-12-27 PROCEDURE — 4010F ACE/ARB THERAPY RXD/TAKEN: CPT | Mod: CPTII,,, | Performed by: NURSE PRACTITIONER

## 2022-12-27 PROCEDURE — 3061F PR NEG MICROALBUMINURIA RESULT DOCUMENTED/REVIEW: ICD-10-PCS | Mod: CPTII,,, | Performed by: NURSE PRACTITIONER

## 2022-12-27 PROCEDURE — 1160F RVW MEDS BY RX/DR IN RCRD: CPT | Mod: CPTII,,, | Performed by: NURSE PRACTITIONER

## 2022-12-27 PROCEDURE — 3066F NEPHROPATHY DOC TX: CPT | Mod: CPTII,,, | Performed by: NURSE PRACTITIONER

## 2022-12-27 PROCEDURE — 4010F PR ACE/ARB THEARPY RXD/TAKEN: ICD-10-PCS | Mod: CPTII,,, | Performed by: NURSE PRACTITIONER

## 2022-12-27 PROCEDURE — 99215 OFFICE O/P EST HI 40 MIN: CPT | Mod: PBBFAC,PO | Performed by: NURSE PRACTITIONER

## 2022-12-27 PROCEDURE — 3044F PR MOST RECENT HEMOGLOBIN A1C LEVEL <7.0%: ICD-10-PCS | Mod: CPTII,,, | Performed by: NURSE PRACTITIONER

## 2022-12-27 PROCEDURE — 99999 PR PBB SHADOW E&M-EST. PATIENT-LVL V: ICD-10-PCS | Mod: PBBFAC,,, | Performed by: NURSE PRACTITIONER

## 2022-12-27 PROCEDURE — 3072F LOW RISK FOR RETINOPATHY: CPT | Mod: CPTII,,, | Performed by: NURSE PRACTITIONER

## 2022-12-27 PROCEDURE — 3044F HG A1C LEVEL LT 7.0%: CPT | Mod: CPTII,,, | Performed by: NURSE PRACTITIONER

## 2022-12-27 PROCEDURE — 3061F NEG MICROALBUMINURIA REV: CPT | Mod: CPTII,,, | Performed by: NURSE PRACTITIONER

## 2022-12-27 PROCEDURE — 3066F PR DOCUMENTATION OF TREATMENT FOR NEPHROPATHY: ICD-10-PCS | Mod: CPTII,,, | Performed by: NURSE PRACTITIONER

## 2022-12-27 PROCEDURE — 3008F PR BODY MASS INDEX (BMI) DOCUMENTED: ICD-10-PCS | Mod: CPTII,,, | Performed by: NURSE PRACTITIONER

## 2022-12-27 PROCEDURE — 36415 COLL VENOUS BLD VENIPUNCTURE: CPT | Mod: PO | Performed by: NURSE PRACTITIONER

## 2022-12-27 PROCEDURE — 84132 ASSAY OF SERUM POTASSIUM: CPT | Performed by: NURSE PRACTITIONER

## 2022-12-27 PROCEDURE — 3078F PR MOST RECENT DIASTOLIC BLOOD PRESSURE < 80 MM HG: ICD-10-PCS | Mod: CPTII,,, | Performed by: NURSE PRACTITIONER

## 2022-12-27 PROCEDURE — 86677 HELICOBACTER PYLORI ANTIBODY: CPT | Performed by: NURSE PRACTITIONER

## 2022-12-27 PROCEDURE — 3078F DIAST BP <80 MM HG: CPT | Mod: CPTII,,, | Performed by: NURSE PRACTITIONER

## 2022-12-27 PROCEDURE — 3008F BODY MASS INDEX DOCD: CPT | Mod: CPTII,,, | Performed by: NURSE PRACTITIONER

## 2022-12-27 PROCEDURE — 99999 PR PBB SHADOW E&M-EST. PATIENT-LVL V: CPT | Mod: PBBFAC,,, | Performed by: NURSE PRACTITIONER

## 2022-12-27 PROCEDURE — 1159F PR MEDICATION LIST DOCUMENTED IN MEDICAL RECORD: ICD-10-PCS | Mod: CPTII,,, | Performed by: NURSE PRACTITIONER

## 2022-12-30 ENCOUNTER — HOSPITAL ENCOUNTER (OUTPATIENT)
Dept: RADIOLOGY | Facility: HOSPITAL | Age: 57
Discharge: HOME | End: 2022-12-30
Attending: NURSE PRACTITIONER
Payer: MEDICAID

## 2022-12-30 DIAGNOSIS — Z72.0 TOBACCO USE: ICD-10-CM

## 2022-12-30 LAB — H PYLORI IGG SERPL QL IA: NORMAL

## 2022-12-30 PROCEDURE — 71271 CT THORAX LUNG CANCER SCR C-: CPT | Mod: TC

## 2022-12-30 PROCEDURE — 71271 CT THORAX LUNG CANCER SCR C-: CPT | Mod: 26,,, | Performed by: RADIOLOGY

## 2022-12-30 PROCEDURE — 71271 CT CHEST LUNG SCREENING LOW DOSE: ICD-10-PCS | Mod: 26,,, | Performed by: RADIOLOGY

## 2023-01-03 PROBLEM — J98.4 CALCIFIED GRANULOMA OF LUNG: Status: ACTIVE | Noted: 2023-01-03

## 2023-01-03 PROBLEM — J84.10 CALCIFIED GRANULOMA OF LUNG: Status: ACTIVE | Noted: 2023-01-03

## 2023-01-09 ENCOUNTER — OFFICE VISIT (OUTPATIENT)
Dept: ORTHOPEDICS | Facility: CLINIC | Age: 58
End: 2023-01-09
Payer: MEDICAID

## 2023-01-09 ENCOUNTER — HOSPITAL ENCOUNTER (OUTPATIENT)
Dept: RADIOLOGY | Facility: HOSPITAL | Age: 58
Discharge: HOME | End: 2023-01-09
Attending: ORTHOPAEDIC SURGERY
Payer: MEDICAID

## 2023-01-09 DIAGNOSIS — M25.529 ELBOW PAIN, UNSPECIFIED LATERALITY: Primary | ICD-10-CM

## 2023-01-09 DIAGNOSIS — M25.529 ELBOW PAIN, UNSPECIFIED LATERALITY: ICD-10-CM

## 2023-01-09 DIAGNOSIS — M77.02 MEDIAL EPICONDYLITIS OF ELBOW, LEFT: Primary | ICD-10-CM

## 2023-01-09 PROCEDURE — 99999 PR PBB SHADOW E&M-EST. PATIENT-LVL IV: CPT | Mod: PBBFAC,,, | Performed by: ORTHOPAEDIC SURGERY

## 2023-01-09 PROCEDURE — 99999 PR PBB SHADOW E&M-EST. PATIENT-LVL IV: ICD-10-PCS | Mod: PBBFAC,,, | Performed by: ORTHOPAEDIC SURGERY

## 2023-01-09 PROCEDURE — 99214 OFFICE O/P EST MOD 30 MIN: CPT | Mod: PBBFAC,PN | Performed by: ORTHOPAEDIC SURGERY

## 2023-01-09 PROCEDURE — 73070 X-RAY EXAM OF ELBOW: CPT | Mod: 26,LT,, | Performed by: RADIOLOGY

## 2023-01-09 PROCEDURE — 1160F PR REVIEW ALL MEDS BY PRESCRIBER/CLIN PHARMACIST DOCUMENTED: ICD-10-PCS | Mod: CPTII,,, | Performed by: ORTHOPAEDIC SURGERY

## 2023-01-09 PROCEDURE — 1159F MED LIST DOCD IN RCRD: CPT | Mod: CPTII,,, | Performed by: ORTHOPAEDIC SURGERY

## 2023-01-09 PROCEDURE — 73070 X-RAY EXAM OF ELBOW: CPT | Mod: TC,FY,LT

## 2023-01-09 PROCEDURE — 99213 OFFICE O/P EST LOW 20 MIN: CPT | Mod: S$PBB,,, | Performed by: ORTHOPAEDIC SURGERY

## 2023-01-09 PROCEDURE — 99213 PR OFFICE/OUTPT VISIT, EST, LEVL III, 20-29 MIN: ICD-10-PCS | Mod: S$PBB,,, | Performed by: ORTHOPAEDIC SURGERY

## 2023-01-09 PROCEDURE — 1159F PR MEDICATION LIST DOCUMENTED IN MEDICAL RECORD: ICD-10-PCS | Mod: CPTII,,, | Performed by: ORTHOPAEDIC SURGERY

## 2023-01-09 PROCEDURE — 1160F RVW MEDS BY RX/DR IN RCRD: CPT | Mod: CPTII,,, | Performed by: ORTHOPAEDIC SURGERY

## 2023-01-09 PROCEDURE — 73070 XR ELBOW 2 VIEWS LEFT: ICD-10-PCS | Mod: 26,LT,, | Performed by: RADIOLOGY

## 2023-01-09 NOTE — PROGRESS NOTES
Subjective:      Patient ID: Sherie Reyes is a 57 y.o. female.  Chief Complaint: Pain of the Left Elbow        HPI  Sherie Reyes is a  57 y.o. female presenting today for post op visit.  She is s/p lateral epicondylar release and repair of the right elbow now about 7 months postop  She notes some intermittent soreness surrounding surgical site, but states that overall the pain has improved.    She states the pain in her medial left elbow has not subsided.  She received a CSI at her last appointment that did not help.  States she has attempted formal physical therapy multiple times for multiple issues and does not feel it will help. The pain began atraumatically and is not associated with any UE sensation changes or overlying skin changes. The pain does not extend distally.    Review of patient's allergies indicates:  No Known Allergies      Current Outpatient Medications   Medication Sig Dispense Refill    acetaminophen (TYLENOL) 500 MG tablet Take 500 mg by mouth every 6 (six) hours as needed for Pain.      albuterol (PROAIR HFA) 90 mcg/actuation inhaler Inhale 2 puffs into the lungs every 6 (six) hours as needed for Wheezing. Rescue 18 g 0    azelastine (ASTELIN) 137 mcg (0.1 %) nasal spray 1 spray (137 mcg total) by Nasal route 2 (two) times daily. 30 mL 0    cetirizine (ZYRTEC) 10 MG tablet Take 2 tablets (20 mg total) by mouth every morning. 60 tablet 3    chlorzoxazone (PARAFON FORTE) 500 mg Tab Take 500 mg by mouth daily as needed.      clonazePAM (KLONOPIN) 1 MG tablet TAKE 1 TABLET BY MOUTH TWICE DAILY 60 tablet 1    cyanocobalamin 1,000 mcg/mL injection Inject 1 mL (1,000 mcg total) into the muscle every 30 days. 30 mL 1    cyclobenzaprine (FLEXERIL) 10 MG tablet Take 10 mg by mouth 2 (two) times daily as needed.      diclofenac sodium (VOLTAREN) 1 % Gel Apply 2 g topically 4 (four) times daily. 4 each 2    dicyclomine (BENTYL) 10 MG capsule TAKE 1 CAPSULE BY MOUTH FOUR TIMES DAILY AS  NEEDED FOR ABDOMINAL PAIN 120 capsule 1    estradioL (ESTRACE) 1 MG tablet Take 1 mg by mouth once daily.      hydrocortisone 2.5 % cream Apply to affected areas twice a day when eczema is moderate. 453.6 g 1    hydrOXYzine HCL (ATARAX) 25 MG tablet 1 to 8 tablets at bedtime.  Start with 2 tablets.  Increase or decrease as needed until itching is controlled or a maximum of 8 tablets. 240 tablet 3    LINZESS 290 mcg Cap capsule TAKE 1 CAPSULE(290 MCG) BY MOUTH BEFORE BREAKFAST 90 capsule 3    losartan (COZAAR) 50 MG tablet Take 1 tablet (50 mg total) by mouth once daily. 90 tablet 1    melatonin 5 mg Chew Take 10 mg by mouth daily as needed.      meloxicam (MOBIC) 15 MG tablet Take 15 mg by mouth daily as needed.      mupirocin (BACTROBAN) 2 % ointment APPLY TOPICALLY TO THE AFFECTED AREA TWICE DAILY 15 g 0    mv-mn/iron/folic acid/herb 190 (VITAMIN D3 COMPLETE ORAL) Take by mouth once daily at 6am.      omega-3 fatty acids/fish oil (FISH OIL-OMEGA-3 FATTY ACIDS) 300-1,000 mg capsule Take 1 capsule by mouth once daily.      omeprazole (PRILOSEC) 40 MG capsule TAKE 1 CAPSULE(40 MG) BY MOUTH EVERY DAY 30 capsule 11    POLYETHYLENE GLYCOL 3350 (MIRALAX ORAL) Take by mouth as needed.       potassium chloride 10% (KAYCIEL) 20 mEq/15 mL oral solution TAKE 15 ML BY MOUTH EVERY  mL 11    pravastatin (PRAVACHOL) 20 MG tablet TAKE 1 TABLET(20 MG) BY MOUTH EVERY EVENING 90 tablet 1    tiZANidine (ZANAFLEX) 4 MG tablet Take 4 mg by mouth 2 (two) times daily as needed.      traMADoL (ULTRAM) 50 mg tablet Take 50 mg by mouth every 12 (twelve) hours as needed.      traZODone (DESYREL) 100 MG tablet TK 1 T PO  QHS      venlafaxine (EFFEXOR-XR) 150 MG Cp24 Take 150 mg by mouth once daily.      venlafaxine (EFFEXOR-XR) 75 MG 24 hr capsule TAKE ONE CAPSULE BY MOUTH DAILY WITH 150 MG TO EQUAL 225 MG      ZTLIDO 1.8 % PtMd Apply 1 patch topically daily as needed.      QUEtiapine (SEROQUEL) 200 MG Tab Take 1 tablet (200 mg total)  by mouth every evening. 30 tablet 2     No current facility-administered medications for this visit.     Facility-Administered Medications Ordered in Other Visits   Medication Dose Route Frequency Provider Last Rate Last Admin    0.9%  NaCl infusion   Intravenous Continuous Gregoria Rodriguez MD           Past Medical History:   Diagnosis Date    Anxiety     Degenerative joint disease (DJD) of hip     Diabetes type 2, controlled 7/10/2019    GERD (gastroesophageal reflux disease)     Hyperlipidemia     Hypertension     IBS (irritable bowel syndrome)     Insomnia     Lumbar disc herniation     PTSD (post-traumatic stress disorder)        Past Surgical History:   Procedure Laterality Date    ADENOIDECTOMY      ARTHROSCOPIC DEBRIDEMENT OF ROTATOR CUFF Right 2020    Procedure: DEBRIDEMENT, ROTATOR CUFF, ARTHROSCOPIC;  Surgeon: Melchor Hughes Jr., MD;  Location: [facility] OR;  Service: Orthopedics;  Laterality: Right;  need opus system (Ger MESA notified , )  video, notified/confirmed 2020 KB [id number]    ARTHROSCOPIC EXCISION OF ACROMIOCLAVICULAR JOINT  2020    Procedure: EXCISION, ACROMIOCLAVICULAR JOINT, ARTHROSCOPIC;  Surgeon: Melchor Hughes Jr., MD;  Location: [facility] OR;  Service: Orthopedics;;    ARTHROSCOPIC REPAIR OF ROTATOR CUFF OF SHOULDER      ARTHROSCOPY OF SHOULDER WITH DECOMPRESSION OF SUBACROMIAL SPACE  2020    Procedure: ARTHROSCOPY, SHOULDER, WITH SUBACROMIAL SPACE DECOMPRESSION;  Surgeon: Melchor Hughes Jr., MD;  Location: [facility] OR;  Service: Orthopedics;;     SECTION      EPIDURAL STEROID INJECTION INTO CERVICAL SPINE N/A 2021    Procedure: Injection-steroid-epidural-cervical--C7-T1 IL ZAYNAB;  Surgeon: Gregoria Rodriguez MD;  Location: [facility] PAIN MGT;  Service: Pain Management;  Laterality: N/A;    TENOTOMY, ELBOW, WITH DEBRIDEMENT AND TENDON REPAIR Right 2022    Procedure: TENOTOMY,ELBOW,WITH DEBRIDEMENT AND TENDON REPAIR;  Surgeon: Melchor Hughes Jr., MD;  Location: [facility]  OR;  Service: Orthopedics;  Laterality: Right;    TONSILLECTOMY         OBJECTIVE:   PHYSICAL EXAM:       There were no vitals taken for this visit.     Ortho/SPM Exam  Examination right elbow the incision is well healed no swelling no tenderness no evidence of infection  Range of motion elbow full without pain or difficulty.   strength mildly decreased right hand.  Mild TTP to lateral epicondyle    LUE:  Skin intact without lacerations, ecchymosis or swelling.  (+) TTP to conjoined tendon wad overlying medial epicondyle. Otherwise, NTTP to bony prominences and soft tissues throughout.  (+) pain to resisted wrist and digit flexion.  BCR to finger pads. SILT to r/u/m n. Dist.    Radiographs of the elbow     My interpretation:    No signs of fracture, contusion, swelling, DJD, or any other bony abnormalities noted.      ASSESSMENT/PLAN:     1. Medial epicondylitis of elbow, left            PLAN:      Medial epicondylitis, L elbow    I made the decision to order new imaging of the extremity or extremities evaluated. I independently reviewed and interpreted the radiographs and/or MRIs today. These images were shown to the patient where I then discussed my findings in detail.    We again discussed the pathophysiology along with conservative tx options for this condition.  Because she is already attempted steroid injection, I recommended a counterforce brace that she can wear at night and discuss formal physical therapy.  Patient states she will attempt the counterforce brace as well as take NSAIDs.  We discussed attempting ibuprofen 600 mg 2 or 3 times a day with food.      FOLLOW UP:  3-week follow-up with Melchor Hughes Jr, MD. if pain has not subsided following conserve measures, we will discuss possible operative management.

## 2023-01-23 ENCOUNTER — TELEPHONE (OUTPATIENT)
Dept: OPHTHALMOLOGY | Facility: CLINIC | Age: 58
End: 2023-01-23
Payer: MEDICARE

## 2023-01-23 ENCOUNTER — OFFICE VISIT (OUTPATIENT)
Dept: OPHTHALMOLOGY | Facility: CLINIC | Age: 58
End: 2023-01-23
Payer: MEDICAID

## 2023-01-23 DIAGNOSIS — S01.151A: Primary | ICD-10-CM
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
## 2023-01-23 PROCEDURE — 99999 PR PBB SHADOW E&M-EST. PATIENT-LVL IV: ICD-10-PCS | Mod: PBBFAC,,, | Performed by: OPHTHALMOLOGY

## 2023-01-23 PROCEDURE — 99214 OFFICE O/P EST MOD 30 MIN: CPT | Mod: PBBFAC | Performed by: OPHTHALMOLOGY

## 2023-01-23 PROCEDURE — 1159F PR MEDICATION LIST DOCUMENTED IN MEDICAL RECORD: ICD-10-PCS | Mod: CPTII,,, | Performed by: OPHTHALMOLOGY

## 2023-01-23 PROCEDURE — 99202 OFFICE O/P NEW SF 15 MIN: CPT | Mod: S$PBB,,, | Performed by: OPHTHALMOLOGY

## 2023-01-23 PROCEDURE — 1160F RVW MEDS BY RX/DR IN RCRD: CPT | Mod: CPTII,,, | Performed by: OPHTHALMOLOGY

## 2023-01-23 PROCEDURE — 99999 PR PBB SHADOW E&M-EST. PATIENT-LVL IV: CPT | Mod: PBBFAC,,, | Performed by: OPHTHALMOLOGY

## 2023-01-23 PROCEDURE — 99202 PR OFFICE/OUTPT VISIT, NEW, LEVL II, 15-29 MIN: ICD-10-PCS | Mod: S$PBB,,, | Performed by: OPHTHALMOLOGY

## 2023-01-23 PROCEDURE — 1160F PR REVIEW ALL MEDS BY PRESCRIBER/CLIN PHARMACIST DOCUMENTED: ICD-10-PCS | Mod: CPTII,,, | Performed by: OPHTHALMOLOGY

## 2023-01-23 PROCEDURE — 1159F MED LIST DOCD IN RCRD: CPT | Mod: CPTII,,, | Performed by: OPHTHALMOLOGY

## 2023-01-23 RX ORDER — AMOXICILLIN AND CLAVULANATE POTASSIUM 875; 125 MG/1; MG/1
1 TABLET, FILM COATED ORAL 2 TIMES DAILY
Qty: 20 TABLET | Refills: 0 | Status: SHIPPED | OUTPATIENT
Start: 2023-01-23 | End: 2023-01-26

## 2023-01-23 NOTE — PROGRESS NOTES
HPI    Triage pt  Patient states OD bit by dog x 3 days ago.  OD feeling of pressure w/eye movement. OD slight blood shot and RUL   bruised.    Eye drops:OTC tears prn OU  Last edited by Candelaria Alba MA on 1/23/2023  2:41 PM.            Assessment /Plan     For exam results, see Encounter Report.    Dog bite of eye region, right, initial encounter  -     amoxicillin-clavulanate 875-125mg (AUGMENTIN) 875-125 mg per tablet; Take 1 tablet by mouth 2 (two) times daily. for 3 days  Dispense: 20 tablet; Refill: 0      No damage sufficient to require wound closure. Augmentin for three days prophylaxis. Return as needed.

## 2023-01-23 NOTE — TELEPHONE ENCOUNTER
----- Message from Alvin Erwin sent at 1/23/2023  9:56 AM CST -----  Contact: [phone]  Pt is calling because she was bit in the eye multiple times by a rhiannon. She is now having pain and vision issues with some swelling. Please call back to further assist.

## 2023-01-23 NOTE — PATIENT INSTRUCTIONS
No damage sufficient to require wound closure.   Augmentin for three days prophylaxis. Return as needed.

## 2023-01-26 NOTE — PROGRESS NOTES
Subjective:       Patient ID: Sherie Reyes is a 57 y.o. female.    Chief Complaint: Follow-up (1 mo f/u weight loss)      Patient is a 57 y.o. female who traditionally follows with Tonia Bernal DO presenting today for:    Weight Loss   Medical marijuana began around same time weight loss began  02/2022: 190  05/2022: 186  12/2022: 160  01/2023: 157    Due for MMG   CT of chest is normal   Colonoscopy and EGD in August of 2016 per patient. I have colonoscopy report but not EGD report.   Fatigue   Chronic pain    Review of patient's allergies indicates:  No Known Allergies    Medication List with Changes/Refills   Current Medications    ACETAMINOPHEN (TYLENOL) 500 MG TABLET    Take 500 mg by mouth every 6 (six) hours as needed for Pain.    ALBUTEROL (PROAIR HFA) 90 MCG/ACTUATION INHALER    Inhale 2 puffs into the lungs every 6 (six) hours as needed for Wheezing. Rescue    AZELASTINE (ASTELIN) 137 MCG (0.1 %) NASAL SPRAY    1 spray (137 mcg total) by Nasal route 2 (two) times daily.    CETIRIZINE (ZYRTEC) 10 MG TABLET    Take 2 tablets (20 mg total) by mouth every morning.    CHLORZOXAZONE (PARAFON FORTE) 500 MG TAB    Take 500 mg by mouth daily as needed.    CLONAZEPAM (KLONOPIN) 1 MG TABLET    TAKE 1 TABLET BY MOUTH TWICE DAILY    CYANOCOBALAMIN 1,000 MCG/ML INJECTION    Inject 1 mL (1,000 mcg total) into the muscle every 30 days.    CYCLOBENZAPRINE (FLEXERIL) 10 MG TABLET    Take 10 mg by mouth 2 (two) times daily as needed.    DICLOFENAC SODIUM (VOLTAREN) 1 % GEL    Apply 2 g topically 4 (four) times daily.    DICYCLOMINE (BENTYL) 10 MG CAPSULE    TAKE 1 CAPSULE BY MOUTH FOUR TIMES DAILY AS NEEDED FOR ABDOMINAL PAIN    ESTRADIOL (ESTRACE) 1 MG TABLET    Take 1 mg by mouth once daily.    HYDROCORTISONE 2.5 % CREAM    Apply to affected areas twice a day when eczema is moderate.    HYDROXYZINE HCL (ATARAX) 25 MG TABLET    1 to 8 tablets at bedtime.  Start with 2 tablets.  Increase or decrease as needed  until itching is controlled or a maximum of 8 tablets.    LINZESS 290 MCG CAP CAPSULE    TAKE 1 CAPSULE(290 MCG) BY MOUTH BEFORE BREAKFAST    LOSARTAN (COZAAR) 50 MG TABLET    Take 1 tablet (50 mg total) by mouth once daily.    MELATONIN 5 MG CHEW    Take 10 mg by mouth daily as needed.    MELOXICAM (MOBIC) 15 MG TABLET    Take 15 mg by mouth daily as needed.    MUPIROCIN (BACTROBAN) 2 % OINTMENT    APPLY TOPICALLY TO THE AFFECTED AREA TWICE DAILY    MV-MN/IRON/FOLIC ACID/HERB 190 (VITAMIN D3 COMPLETE ORAL)    Take by mouth once daily at 6am.    OMEGA-3 FATTY ACIDS/FISH OIL (FISH OIL-OMEGA-3 FATTY ACIDS) 300-1,000 MG CAPSULE    Take 1 capsule by mouth once daily.    OMEPRAZOLE (PRILOSEC) 40 MG CAPSULE    TAKE 1 CAPSULE(40 MG) BY MOUTH EVERY DAY    POLYETHYLENE GLYCOL 3350 (MIRALAX ORAL)    Take by mouth as needed.     POTASSIUM CHLORIDE 10% (KAYCIEL) 20 MEQ/15 ML ORAL SOLUTION    TAKE 15 ML BY MOUTH EVERY DAY    PRAVASTATIN (PRAVACHOL) 20 MG TABLET    TAKE 1 TABLET(20 MG) BY MOUTH EVERY EVENING    QUETIAPINE (SEROQUEL) 200 MG TAB    Take 1 tablet (200 mg total) by mouth every evening.    TIZANIDINE (ZANAFLEX) 4 MG TABLET    Take 4 mg by mouth 2 (two) times daily as needed.    TRAMADOL (ULTRAM) 50 MG TABLET    Take 50 mg by mouth every 12 (twelve) hours as needed.    TRAZODONE (DESYREL) 100 MG TABLET    TK 1 T PO  QHS    VENLAFAXINE (EFFEXOR-XR) 150 MG CP24    Take 150 mg by mouth once daily.    VENLAFAXINE (EFFEXOR-XR) 75 MG 24 HR CAPSULE    TAKE ONE CAPSULE BY MOUTH DAILY WITH 150 MG TO EQUAL 225 MG    ZTLIDO 1.8 % PTMD    Apply 1 patch topically daily as needed.     Medical, social and surgical history has been reviewed with the patient.      Review of Systems   Constitutional:  Positive for appetite change, fatigue and unexpected weight change.   Gastrointestinal:  Negative for abdominal pain, change in bowel habit and change in bowel habit.        Early Satiety    Psychiatric/Behavioral:  Positive for dysphoric  "mood and sleep disturbance.        Objective:   /84 (BP Location: Right arm, Patient Position: Sitting, BP Method: Medium (Manual))   Pulse 88   Temp 97.8 °F (36.6 °C) (Temporal)   Resp 18   Ht 5' 2" (1.575 m)   Wt 71.5 kg (157 lb 10.1 oz)   SpO2 98%   BMI 28.83 kg/m²     Physical Exam  Vitals reviewed.   Constitutional:       Appearance: Normal appearance.   HENT:      Head: Normocephalic and atraumatic.   Cardiovascular:      Rate and Rhythm: Normal rate and regular rhythm.      Heart sounds: Normal heart sounds. No murmur heard.  Pulmonary:      Effort: Pulmonary effort is normal.      Breath sounds: Normal breath sounds. No wheezing.   Skin:     General: Skin is warm and dry.   Neurological:      Mental Status: She is alert and oriented to person, place, and time.       Last Labs:  Glucose   Date Value Ref Range Status   12/20/2022 106 70 - 110 mg/dL Final   08/09/2022 122 (H) 70 - 110 mg/dL Final     BUN   Date Value Ref Range Status   12/20/2022 8 6 - 20 mg/dL Final   08/09/2022 7 6 - 20 mg/dL Final     Creatinine   Date Value Ref Range Status   12/20/2022 1.0 0.5 - 1.4 mg/dL Final   08/09/2022 1.1 0.5 - 1.4 mg/dL Final     Cholesterol   Date Value Ref Range Status   12/20/2022 206 (H) 120 - 199 mg/dL Final     Comment:     The National Cholesterol Education Program (NCEP) has set the  following guidelines (reference ranges) for Cholesterol:  Optimal.....................<200 mg/dL  Borderline High.............200-239 mg/dL  High........................> or = 240 mg/dL     05/09/2022 176 120 - 199 mg/dL Final     Comment:     The National Cholesterol Education Program (NCEP) has set the  following guidelines (reference ranges) for Cholesterol:  Optimal.....................<200 mg/dL  Borderline High.............200-239 mg/dL  High........................> or = 240 mg/dL       Hemoglobin A1C   Date Value Ref Range Status   12/20/2022 5.6 4.0 - 5.6 % Final     Comment:     ADA Screening " Guidelines:  5.7-6.4%  Consistent with prediabetes  >or=6.5%  Consistent with diabetes    High levels of fetal hemoglobin interfere with the HbA1C  assay. Heterozygous hemoglobin variants (HbS, HgC, etc)do  not significantly interfere with this assay.   However, presence of multiple variants may affect accuracy.     08/09/2022 6.0 (H) 4.0 - 5.6 % Final     Comment:     ADA Screening Guidelines:  5.7-6.4%  Consistent with prediabetes  >or=6.5%  Consistent with diabetes    High levels of fetal hemoglobin interfere with the HbA1C  assay. Heterozygous hemoglobin variants (HbS, HgC, etc)do  not significantly interfere with this assay.   However, presence of multiple variants may affect accuracy.       Hemoglobin   Date Value Ref Range Status   12/20/2022 14.4 12.0 - 16.0 g/dL Final   08/22/2020 12.5 12.0 - 16.0 g/dL Final     POC Hematocrit   Date Value Ref Range Status   03/30/2018 43 36 - 54 %PCV Final     Hematocrit   Date Value Ref Range Status   12/20/2022 46.3 37.0 - 48.5 % Final   08/22/2020 39.6 37.0 - 48.5 % Final       I have reviewed the following:     Details / Date    [x]   Labs     []   Micro     []   Pathology     []   Imaging     []   Cardiology Procedures     []   Other        Assessment and Plan:     1. Weight loss  2. Early satiety    Patient uses medical cannabis and usage and beginning of weight loss coincide. Possible contributing cause.     - Ambulatory referral/consult to Endo Procedure ; Future    3. Encounter for screening mammogram for breast cancer  - Mammo Digital Screening Bilat w/ Lucas; Future

## 2023-01-27 ENCOUNTER — OFFICE VISIT (OUTPATIENT)
Dept: INTERNAL MEDICINE | Facility: CLINIC | Age: 58
End: 2023-01-27
Payer: MEDICAID

## 2023-01-27 VITALS
RESPIRATION RATE: 18 BRPM | TEMPERATURE: 98 F | HEART RATE: 88 BPM | OXYGEN SATURATION: 98 % | DIASTOLIC BLOOD PRESSURE: 84 MMHG | BODY MASS INDEX: 29.01 KG/M2 | SYSTOLIC BLOOD PRESSURE: 108 MMHG | HEIGHT: 62 IN | WEIGHT: 157.63 LBS

## 2023-01-27 DIAGNOSIS — R63.4 WEIGHT LOSS: Primary | ICD-10-CM

## 2023-01-27 DIAGNOSIS — Z12.31 ENCOUNTER FOR SCREENING MAMMOGRAM FOR BREAST CANCER: ICD-10-CM

## 2023-01-27 DIAGNOSIS — R68.81 EARLY SATIETY: ICD-10-CM

## 2023-01-27 PROCEDURE — 99215 OFFICE O/P EST HI 40 MIN: CPT | Mod: PBBFAC,PO | Performed by: NURSE PRACTITIONER

## 2023-01-27 PROCEDURE — 99213 PR OFFICE/OUTPT VISIT, EST, LEVL III, 20-29 MIN: ICD-10-PCS | Mod: S$PBB,,, | Performed by: NURSE PRACTITIONER

## 2023-01-27 PROCEDURE — 3074F PR MOST RECENT SYSTOLIC BLOOD PRESSURE < 130 MM HG: ICD-10-PCS | Mod: CPTII,,, | Performed by: NURSE PRACTITIONER

## 2023-01-27 PROCEDURE — 1159F PR MEDICATION LIST DOCUMENTED IN MEDICAL RECORD: ICD-10-PCS | Mod: CPTII,,, | Performed by: NURSE PRACTITIONER

## 2023-01-27 PROCEDURE — 1160F RVW MEDS BY RX/DR IN RCRD: CPT | Mod: CPTII,,, | Performed by: NURSE PRACTITIONER

## 2023-01-27 PROCEDURE — 3008F PR BODY MASS INDEX (BMI) DOCUMENTED: ICD-10-PCS | Mod: CPTII,,, | Performed by: NURSE PRACTITIONER

## 2023-01-27 PROCEDURE — 3074F SYST BP LT 130 MM HG: CPT | Mod: CPTII,,, | Performed by: NURSE PRACTITIONER

## 2023-01-27 PROCEDURE — 3008F BODY MASS INDEX DOCD: CPT | Mod: CPTII,,, | Performed by: NURSE PRACTITIONER

## 2023-01-27 PROCEDURE — 3079F DIAST BP 80-89 MM HG: CPT | Mod: CPTII,,, | Performed by: NURSE PRACTITIONER

## 2023-01-27 PROCEDURE — 99999 PR PBB SHADOW E&M-EST. PATIENT-LVL V: ICD-10-PCS | Mod: PBBFAC,,, | Performed by: NURSE PRACTITIONER

## 2023-01-27 PROCEDURE — 1159F MED LIST DOCD IN RCRD: CPT | Mod: CPTII,,, | Performed by: NURSE PRACTITIONER

## 2023-01-27 PROCEDURE — 3079F PR MOST RECENT DIASTOLIC BLOOD PRESSURE 80-89 MM HG: ICD-10-PCS | Mod: CPTII,,, | Performed by: NURSE PRACTITIONER

## 2023-01-27 PROCEDURE — 99213 OFFICE O/P EST LOW 20 MIN: CPT | Mod: S$PBB,,, | Performed by: NURSE PRACTITIONER

## 2023-01-27 PROCEDURE — 1160F PR REVIEW ALL MEDS BY PRESCRIBER/CLIN PHARMACIST DOCUMENTED: ICD-10-PCS | Mod: CPTII,,, | Performed by: NURSE PRACTITIONER

## 2023-01-27 PROCEDURE — 99999 PR PBB SHADOW E&M-EST. PATIENT-LVL V: CPT | Mod: PBBFAC,,, | Performed by: NURSE PRACTITIONER

## 2023-02-02 ENCOUNTER — PATIENT MESSAGE (OUTPATIENT)
Dept: INTERNAL MEDICINE | Facility: CLINIC | Age: 58
End: 2023-02-02
Payer: MEDICARE

## 2023-02-05 ENCOUNTER — PATIENT MESSAGE (OUTPATIENT)
Dept: ADMINISTRATIVE | Facility: OTHER | Age: 58
End: 2023-02-05
Payer: MEDICARE

## 2023-02-05 ENCOUNTER — PATIENT MESSAGE (OUTPATIENT)
Dept: INTERNAL MEDICINE | Facility: CLINIC | Age: 58
End: 2023-02-05
Payer: MEDICARE

## 2023-02-06 ENCOUNTER — TELEPHONE (OUTPATIENT)
Dept: INTERNAL MEDICINE | Facility: CLINIC | Age: 58
End: 2023-02-06
Payer: MEDICARE

## 2023-02-06 ENCOUNTER — PATIENT MESSAGE (OUTPATIENT)
Dept: INTERNAL MEDICINE | Facility: CLINIC | Age: 58
End: 2023-02-06
Payer: MEDICARE

## 2023-02-06 NOTE — TELEPHONE ENCOUNTER
----- Message from Sandip Andrade sent at 2/6/2023  4:30 PM CST -----  Contact: pt [phone]  Patient would like a call back recording Virtual Appt. 2/7/23 12:00. Please call and advise.    Thank you and have a great day.

## 2023-02-06 NOTE — TELEPHONE ENCOUNTER
Pt state someone scheduled her for a Virtual Visual appt. 2/7/23. Pt is unfamiliar with using portal for virtual visits. Spot is on hold. Pt was given the steps on what to do prior to the visit.     Pt concerned with who will take over Klonopin refills per recommendation of Psychologist. Pt advised to reach out to PCP but pt state there's no availability until May and pt doesn't want to wait until then.   Pt advised to keep virtual appt. And discuss it.

## 2023-02-07 ENCOUNTER — OFFICE VISIT (OUTPATIENT)
Dept: INTERNAL MEDICINE | Facility: CLINIC | Age: 58
End: 2023-02-07
Payer: MEDICAID

## 2023-02-07 ENCOUNTER — PATIENT MESSAGE (OUTPATIENT)
Dept: INTERNAL MEDICINE | Facility: CLINIC | Age: 58
End: 2023-02-07

## 2023-02-07 DIAGNOSIS — F41.9 ANXIETY: Primary | ICD-10-CM

## 2023-02-07 PROCEDURE — 1159F PR MEDICATION LIST DOCUMENTED IN MEDICAL RECORD: ICD-10-PCS | Mod: CPTII,95,, | Performed by: NURSE PRACTITIONER

## 2023-02-07 PROCEDURE — 1159F MED LIST DOCD IN RCRD: CPT | Mod: CPTII,95,, | Performed by: NURSE PRACTITIONER

## 2023-02-07 PROCEDURE — 1160F RVW MEDS BY RX/DR IN RCRD: CPT | Mod: CPTII,95,, | Performed by: NURSE PRACTITIONER

## 2023-02-07 PROCEDURE — 1160F PR REVIEW ALL MEDS BY PRESCRIBER/CLIN PHARMACIST DOCUMENTED: ICD-10-PCS | Mod: CPTII,95,, | Performed by: NURSE PRACTITIONER

## 2023-02-07 PROCEDURE — 99212 PR OFFICE/OUTPT VISIT, EST, LEVL II, 10-19 MIN: ICD-10-PCS | Mod: 95,,, | Performed by: NURSE PRACTITIONER

## 2023-02-07 PROCEDURE — 99212 OFFICE O/P EST SF 10 MIN: CPT | Mod: 95,,, | Performed by: NURSE PRACTITIONER

## 2023-02-07 NOTE — TELEPHONE ENCOUNTER
----- Message from Caitlyn Schroeder sent at 2/7/2023 12:13 PM CST -----  Contact: [phone] Patient  Pt states she has another appt already scheduled for today at 12:45. Pt states she is able to have the virtual visit with Ariana but wants to make sure it does not get scheduled at the same time.

## 2023-02-07 NOTE — PROGRESS NOTES
Subjective:       Patient ID: Sherie Reyes is a 57 y.o. female.    Chief Complaint: No chief complaint on file.    Visit type: audiovisual    Patient is a 57 y.o. female who traditionally follows with Tonia Bernal DO located in [place] presenting today for discussion regarding medication management.     Her psychiatrist is no longer able to dispense her Klonopin. Their clinic is transitioning to a drug addiction clinic and this would be a conflict of interest. They will continue to follow her and manage other medications but needs her PCP to manage Klonopin.   Patient takes 1 mg twice per day every day for the past 8 years.   Fills on a monthly basis with no unusual activity noted. Last fill on 1/19/23. She is not on Tramadol any longer.     Review of patient's allergies indicates:  No Known Allergies    Medication List with Changes/Refills   Current Medications    ACETAMINOPHEN (TYLENOL) 500 MG TABLET    Take 500 mg by mouth every 6 (six) hours as needed for Pain.    ALBUTEROL (PROAIR HFA) 90 MCG/ACTUATION INHALER    Inhale 2 puffs into the lungs every 6 (six) hours as needed for Wheezing. Rescue    AZELASTINE (ASTELIN) 137 MCG (0.1 %) NASAL SPRAY    1 spray (137 mcg total) by Nasal route 2 (two) times daily.    CETIRIZINE (ZYRTEC) 10 MG TABLET    Take 2 tablets (20 mg total) by mouth every morning.    CHLORZOXAZONE (PARAFON FORTE) 500 MG TAB    Take 500 mg by mouth daily as needed.    CLONAZEPAM (KLONOPIN) 1 MG TABLET    TAKE 1 TABLET BY MOUTH TWICE DAILY    CYANOCOBALAMIN 1,000 MCG/ML INJECTION    Inject 1 mL (1,000 mcg total) into the muscle every 30 days.    CYCLOBENZAPRINE (FLEXERIL) 10 MG TABLET    Take 10 mg by mouth 2 (two) times daily as needed.    DICLOFENAC SODIUM (VOLTAREN) 1 % GEL    Apply 2 g topically 4 (four) times daily.    DICYCLOMINE (BENTYL) 10 MG CAPSULE    TAKE 1 CAPSULE BY MOUTH FOUR TIMES DAILY AS NEEDED FOR ABDOMINAL PAIN    ESTRADIOL (ESTRACE) 1 MG TABLET    Take 1 mg by  mouth once daily.    HYDROCORTISONE 2.5 % CREAM    Apply to affected areas twice a day when eczema is moderate.    HYDROXYZINE HCL (ATARAX) 25 MG TABLET    1 to 8 tablets at bedtime.  Start with 2 tablets.  Increase or decrease as needed until itching is controlled or a maximum of 8 tablets.    LINZESS 290 MCG CAP CAPSULE    TAKE 1 CAPSULE(290 MCG) BY MOUTH BEFORE BREAKFAST    LOSARTAN (COZAAR) 50 MG TABLET    Take 1 tablet (50 mg total) by mouth once daily.    MELATONIN 5 MG CHEW    Take 10 mg by mouth daily as needed.    MELOXICAM (MOBIC) 15 MG TABLET    Take 15 mg by mouth daily as needed.    MUPIROCIN (BACTROBAN) 2 % OINTMENT    APPLY TOPICALLY TO THE AFFECTED AREA TWICE DAILY    MV-MN/IRON/FOLIC ACID/HERB 190 (VITAMIN D3 COMPLETE ORAL)    Take by mouth once daily at 6am.    OMEGA-3 FATTY ACIDS/FISH OIL (FISH OIL-OMEGA-3 FATTY ACIDS) 300-1,000 MG CAPSULE    Take 1 capsule by mouth once daily.    OMEPRAZOLE (PRILOSEC) 40 MG CAPSULE    TAKE 1 CAPSULE(40 MG) BY MOUTH EVERY DAY    POLYETHYLENE GLYCOL 3350 (MIRALAX ORAL)    Take by mouth as needed.     POTASSIUM CHLORIDE 10% (KAYCIEL) 20 MEQ/15 ML ORAL SOLUTION    TAKE 15 ML BY MOUTH EVERY DAY    PRAVASTATIN (PRAVACHOL) 20 MG TABLET    TAKE 1 TABLET(20 MG) BY MOUTH EVERY EVENING    QUETIAPINE (SEROQUEL) 200 MG TAB    Take 1 tablet (200 mg total) by mouth every evening.    TIZANIDINE (ZANAFLEX) 4 MG TABLET    Take 4 mg by mouth 2 (two) times daily as needed.    TRAMADOL (ULTRAM) 50 MG TABLET    Take 50 mg by mouth every 12 (twelve) hours as needed.    TRAZODONE (DESYREL) 100 MG TABLET    TK 1 T PO  QHS    VENLAFAXINE (EFFEXOR-XR) 150 MG CP24    Take 150 mg by mouth once daily.    VENLAFAXINE (EFFEXOR-XR) 75 MG 24 HR CAPSULE    TAKE ONE CAPSULE BY MOUTH DAILY WITH 150 MG TO EQUAL 225 MG    ZTLIDO 1.8 % PTMD    Apply 1 patch topically daily as needed.     Review of Systems   Psychiatric/Behavioral:  The patient is nervous/anxious.      Objective:   There were no vitals  taken for this visit.    Physical Exam  Vitals reviewed: Exam Limited due to Video Consultation.   Constitutional:       General: She is not in acute distress.  HENT:      Head: Normocephalic.   Neurological:      Mental Status: She is alert.     Assessment and Plan:     1. Anxiety    Discussed with PCP who will be willing to take over Klonopin prescription. Patient will be advised that she cannot take pain medication concurrently with Klonopin. Additionally given the high dosage she is taking a taper will begin with goal to decrease need as this medication is not meant long term.     Face to Face time with patient: 5  10 minutes of total time spent on the encounter, which includes face to face time and non-face to face time preparing to see the patient (eg, review of tests), Obtaining and/or reviewing separately obtained history, Documenting clinical information in the electronic or other health record, Independently interpreting results (not separately reported) and communicating results to the patient/family/caregiver, or Care coordination (not separately reported).       Each patient to whom he or she provides medical services by telemedicine is:  (1) informed of the relationship between the physician and patient and the respective role of any other health care provider with respect to management of the patient; and (2) notified that he or she may decline to receive medical services by telemedicine and may withdraw from such care at any time.

## 2023-02-21 NOTE — PROGRESS NOTES
Left detailed message on cell (OK per verbal). Stated that SM placed stool labs and to pick them up at the lab and return with completed sample. Also stated I'll send mcm, so can refer to mcm. Pt to call back if have further questions. Subjective:       Patient ID: Sherie Reyes is a 52 y.o. female.    Chief Complaint: Shoulder Pain (chronic bilateral shoulder pain)    Patient is a 52 y.o.female who presents today for shoulder pain.    She is having acute on chronic shoulder pain; left worse than right. She has had shoulder problems for ten years now.  She has tried taking tylenol, ibuprofen and/ or tramadol without much benefit. Pain is worse with movement; she cannot abduct her shoulder more than ninety degrees. She cannot sleep due to her pain. No fever or chills.     Review of Systems   Constitutional: Positive for activity change and unexpected weight change. Negative for appetite change, chills, diaphoresis, fatigue and fever.   HENT: Positive for rhinorrhea. Negative for congestion, dental problem, ear discharge, ear pain, hearing loss, postnasal drip, sinus pressure, sore throat and trouble swallowing.    Eyes: Negative for discharge, redness, itching and visual disturbance.   Respiratory: Negative for cough, chest tightness, shortness of breath and wheezing.    Cardiovascular: Negative for chest pain, palpitations and leg swelling.   Gastrointestinal: Positive for constipation. Negative for abdominal pain, blood in stool, diarrhea, nausea and vomiting.   Endocrine: Negative for cold intolerance, heat intolerance, polydipsia and polyuria.   Genitourinary: Negative for difficulty urinating, dysuria, frequency, hematuria, menstrual problem and urgency.   Musculoskeletal: Positive for arthralgias. Negative for back pain, gait problem, joint swelling, myalgias and neck pain.   Skin: Negative for color change and rash.   Neurological: Positive for weakness. Negative for dizziness, syncope and headaches.   Hematological: Negative for adenopathy.   Psychiatric/Behavioral: Positive for dysphoric mood. Negative for behavioral problems, confusion and sleep disturbance. The patient is not nervous/anxious.        Objective:      Physical Exam    Constitutional: She is oriented to person, place, and time. She appears well-developed and well-nourished. No distress.   HENT:   Head: Normocephalic and atraumatic.   Right Ear: External ear normal.   Left Ear: External ear normal.   Nose: Nose normal.   Mouth/Throat: Oropharynx is clear and moist. No oropharyngeal exudate.   Eyes: Conjunctivae and EOM are normal. Pupils are equal, round, and reactive to light. Right eye exhibits no discharge. Left eye exhibits no discharge. No scleral icterus.   Neck: Normal range of motion. Neck supple. No JVD present. No thyromegaly present.   Cardiovascular: Normal rate, regular rhythm, normal heart sounds and intact distal pulses.  Exam reveals no gallop and no friction rub.    No murmur heard.  Pulmonary/Chest: Effort normal and breath sounds normal. No stridor. No respiratory distress. She has no wheezes. She has no rales. She exhibits no tenderness.   Abdominal: Soft. Bowel sounds are normal. She exhibits no distension. There is no tenderness. There is no rebound.   Musculoskeletal: She exhibits no edema or tenderness.        Right shoulder: She exhibits decreased range of motion.        Left shoulder: She exhibits decreased range of motion (limited active range of motion).   Lymphadenopathy:     She has no cervical adenopathy.   Neurological: She is alert and oriented to person, place, and time. She has normal strength. No cranial nerve deficit or sensory deficit. She displays a negative Romberg sign.   Skin: Skin is warm and dry. No rash noted. She is not diaphoretic. No erythema.   Psychiatric: She has a normal mood and affect. Her behavior is normal.   Nursing note and vitals reviewed.      Assessment and Plan:       1. Chronic pain of both shoulders  - declines PT  - Notify clinic if symptoms change, worsen or do not improve  - X-Ray Shoulder 2 or More views Bilateral; Future  - ibuprofen (ADVIL,MOTRIN) 600 MG tablet; Take 1 tablet (600 mg total) by mouth 2 (two)  times daily. With food  Dispense: 28 tablet; Refill: 0  - methylPREDNISolone (MEDROL DOSEPACK) 4 mg tablet; Take as directed  Dispense: 1 Package; Refill: 0  - Ambulatory Referral to Orthopedics          No Follow-up on file.

## 2023-04-20 NOTE — LETTER
Ariadna Jimenez PharmD  [address] [place] [address]     Dear Sherie Reyes,    Welcome to the [facility]!           My name is Ariadna Jimenez PharmD and I am your dedicated Digital Medicine clinician.  As an expert in medication management, I will help ensure that the medications you are taking continue to provide you with the intended benefits.        I am Mj Godinez and I will be your health  for the duration of the program.  My  job is to help you identify lifestyle changes to improve your blood pressure control.  We will talk about nutrition, exercise, and other ways that you may be able to adjust your current habits to better your health. Together, we will work to improve your overall health and encourage you to meet your goals for a healthier lifestyle.    What we expect from YOU:    You will need to take blood pressure readings multiple times a week and no less than one reading per week.   It is important that you take your measurements at different times during the day, when possible.     What you should expect from your Digital Medicine Care Team:   We will provide you with education about high blood pressure, including lifestyle changes that could help you to control your blood pressure.   We will review your weekly readings and provide you with monthly blood pressure progress reports after you have been in the program for more than 30 days.   We will send monthly progress reports on your blood pressure control to your physician so they can follow along with your progress as well.    You will be able to reach me by phone at [phone] or through your [facility] account by clicking my name under Care Team on the right side of the home screen.    I look forward to working with you to achieve your blood pressure goals!    Sincerely,    Ariadna Jimenez PharmD  Your personal clinician    Please visit  [email or web address] to learn more about high blood pressure and what you can do lower your blood pressure.                                                                                           Sherie Reyes  [address] [id number]   done

## 2023-05-01 ENCOUNTER — HOSPITAL ENCOUNTER (OUTPATIENT)
Dept: RADIOLOGY | Facility: HOSPITAL | Age: 58
Discharge: HOME | End: 2023-05-01
Attending: NURSE PRACTITIONER
Payer: MEDICARE

## 2023-05-01 VITALS — HEIGHT: 62 IN | BODY MASS INDEX: 28.89 KG/M2 | WEIGHT: 157 LBS

## 2023-05-01 DIAGNOSIS — Z12.31 ENCOUNTER FOR SCREENING MAMMOGRAM FOR BREAST CANCER: ICD-10-CM

## 2023-05-01 PROCEDURE — 77063 BREAST TOMOSYNTHESIS BI: CPT | Mod: 26,,, | Performed by: RADIOLOGY

## 2023-05-01 PROCEDURE — 77067 SCR MAMMO BI INCL CAD: CPT | Mod: 26,,, | Performed by: RADIOLOGY

## 2023-05-01 PROCEDURE — 77067 SCR MAMMO BI INCL CAD: CPT | Mod: TC,PO

## 2023-05-01 PROCEDURE — 77063 MAMMO DIGITAL SCREENING BILAT WITH TOMO: ICD-10-PCS | Mod: 26,,, | Performed by: RADIOLOGY

## 2023-05-01 PROCEDURE — 77067 MAMMO DIGITAL SCREENING BILAT WITH TOMO: ICD-10-PCS | Mod: 26,,, | Performed by: RADIOLOGY

## 2023-05-02 ENCOUNTER — TELEPHONE (OUTPATIENT)
Dept: ENDOSCOPY | Facility: HOSPITAL | Age: 58
End: 2023-05-02
Payer: MEDICARE

## 2023-05-02 NOTE — TELEPHONE ENCOUNTER
----- Message from Barry Quiroz sent at 5/2/2023  2:01 PM CDT -----  Regarding: appt  Contact: @[phone]  Pt calling to get an endoscopy scheduled states pcp put orders in.. pls call and adv@[phone]

## 2023-05-09 ENCOUNTER — CLINICAL SUPPORT (OUTPATIENT)
Dept: ENDOSCOPY | Facility: HOSPITAL | Age: 58
End: 2023-05-09
Payer: MEDICARE

## 2023-05-09 VITALS — WEIGHT: 145 LBS | BODY MASS INDEX: 26.68 KG/M2 | HEIGHT: 62 IN

## 2023-05-09 DIAGNOSIS — R63.4 WEIGHT LOSS: ICD-10-CM

## 2023-05-09 DIAGNOSIS — R68.81 EARLY SATIETY: ICD-10-CM

## 2023-05-17 ENCOUNTER — TELEPHONE (OUTPATIENT)
Dept: ENDOSCOPY | Facility: HOSPITAL | Age: 58
End: 2023-05-17
Payer: MEDICARE

## 2023-05-18 NOTE — TELEPHONE ENCOUNTER
----- Message from Juanjose Rodriguez sent at 5/17/2023  4:47 PM CDT -----  Contact: @[phone]  Pt is calling in to get her procedure time, please call to discuss further.

## 2023-05-22 ENCOUNTER — TELEPHONE (OUTPATIENT)
Dept: INTERNAL MEDICINE | Facility: CLINIC | Age: 58
End: 2023-05-22
Payer: MEDICARE

## 2023-05-22 ENCOUNTER — ANESTHESIA (OUTPATIENT)
Dept: ENDOSCOPY | Facility: HOSPITAL | Age: 58
End: 2023-05-22
Payer: MEDICARE

## 2023-05-22 ENCOUNTER — ANESTHESIA EVENT (OUTPATIENT)
Dept: ENDOSCOPY | Facility: HOSPITAL | Age: 58
End: 2023-05-22
Payer: MEDICARE

## 2023-05-22 ENCOUNTER — HOSPITAL ENCOUNTER (OUTPATIENT)
Facility: HOSPITAL | Age: 58
Discharge: HOME | End: 2023-05-22
Attending: INTERNAL MEDICINE | Admitting: INTERNAL MEDICINE
Payer: MEDICARE

## 2023-05-22 VITALS
HEART RATE: 62 BPM | BODY MASS INDEX: 23 KG/M2 | RESPIRATION RATE: 16 BRPM | OXYGEN SATURATION: 96 % | WEIGHT: 125 LBS | HEIGHT: 62 IN | DIASTOLIC BLOOD PRESSURE: 61 MMHG | TEMPERATURE: 98 F | SYSTOLIC BLOOD PRESSURE: 102 MMHG

## 2023-05-22 DIAGNOSIS — R63.4 WEIGHT LOSS: Primary | ICD-10-CM

## 2023-05-22 DIAGNOSIS — R63.4 WEIGHT LOSS: ICD-10-CM

## 2023-05-22 PROCEDURE — 25000003 PHARM REV CODE 250: Performed by: INTERNAL MEDICINE

## 2023-05-22 PROCEDURE — 37000008 HC ANESTHESIA 1ST 15 MINUTES: Performed by: INTERNAL MEDICINE

## 2023-05-22 PROCEDURE — 43239 EGD BIOPSY SINGLE/MULTIPLE: CPT | Performed by: INTERNAL MEDICINE

## 2023-05-22 PROCEDURE — 63600175 PHARM REV CODE 636 W HCPCS: Performed by: NURSE ANESTHETIST, CERTIFIED REGISTERED

## 2023-05-22 PROCEDURE — E9220 PRA ENDO ANESTHESIA: ICD-10-PCS | Mod: ,,, | Performed by: NURSE ANESTHETIST, CERTIFIED REGISTERED

## 2023-05-22 PROCEDURE — 88305 TISSUE EXAM BY PATHOLOGIST: ICD-10-PCS | Mod: 26,,, | Performed by: PATHOLOGY

## 2023-05-22 PROCEDURE — 43239 EGD BIOPSY SINGLE/MULTIPLE: CPT | Mod: ,,, | Performed by: INTERNAL MEDICINE

## 2023-05-22 PROCEDURE — 88305 TISSUE EXAM BY PATHOLOGIST: CPT | Mod: 59 | Performed by: PATHOLOGY

## 2023-05-22 PROCEDURE — 88305 TISSUE EXAM BY PATHOLOGIST: CPT | Mod: 26,,, | Performed by: PATHOLOGY

## 2023-05-22 PROCEDURE — 37000009 HC ANESTHESIA EA ADD 15 MINS: Performed by: INTERNAL MEDICINE

## 2023-05-22 PROCEDURE — E9220 PRA ENDO ANESTHESIA: HCPCS | Mod: ,,, | Performed by: NURSE ANESTHETIST, CERTIFIED REGISTERED

## 2023-05-22 PROCEDURE — 27201012 HC FORCEPS, HOT/COLD, DISP: Performed by: INTERNAL MEDICINE

## 2023-05-22 PROCEDURE — 43239 PR EGD, FLEX, W/BIOPSY, SGL/MULTI: ICD-10-PCS | Mod: ,,, | Performed by: INTERNAL MEDICINE

## 2023-05-22 PROCEDURE — 25000003 PHARM REV CODE 250: Performed by: NURSE ANESTHETIST, CERTIFIED REGISTERED

## 2023-05-22 RX ORDER — ONDANSETRON 2 MG/ML
INJECTION INTRAMUSCULAR; INTRAVENOUS
Status: DISCONTINUED | OUTPATIENT
Start: 2023-05-22 | End: 2023-05-22

## 2023-05-22 RX ORDER — PROPOFOL 10 MG/ML
INJECTION, EMULSION INTRAVENOUS
Status: DISCONTINUED | OUTPATIENT
Start: 2023-05-22 | End: 2023-05-22

## 2023-05-22 RX ORDER — PROPOFOL 10 MG/ML
INJECTION, EMULSION INTRAVENOUS CONTINUOUS PRN
Status: DISCONTINUED | OUTPATIENT
Start: 2023-05-22 | End: 2023-05-22

## 2023-05-22 RX ORDER — SODIUM CHLORIDE 9 MG/ML
INJECTION, SOLUTION INTRAVENOUS CONTINUOUS
Status: DISCONTINUED | OUTPATIENT
Start: 2023-05-22 | End: 2023-05-22 | Stop reason: HOSPADM

## 2023-05-22 RX ORDER — LIDOCAINE HYDROCHLORIDE 20 MG/ML
INJECTION INTRAVENOUS
Status: DISCONTINUED | OUTPATIENT
Start: 2023-05-22 | End: 2023-05-22

## 2023-05-22 RX ORDER — PHENYLEPHRINE HYDROCHLORIDE 10 MG/ML
INJECTION INTRAVENOUS
Status: DISCONTINUED | OUTPATIENT
Start: 2023-05-22 | End: 2023-05-22

## 2023-05-22 RX ADMIN — LIDOCAINE HYDROCHLORIDE 75 MG: 20 INJECTION INTRAVENOUS at 03:05

## 2023-05-22 RX ADMIN — PHENYLEPHRINE HYDROCHLORIDE 100 MCG: 10 INJECTION INTRAVENOUS at 03:05

## 2023-05-22 RX ADMIN — PROPOFOL 120 MG: 10 INJECTION, EMULSION INTRAVENOUS at 03:05

## 2023-05-22 RX ADMIN — SODIUM CHLORIDE: 9 INJECTION, SOLUTION INTRAVENOUS at 02:05

## 2023-05-22 RX ADMIN — ONDANSETRON 4 MG: 2 INJECTION INTRAMUSCULAR; INTRAVENOUS at 03:05

## 2023-05-22 RX ADMIN — PROPOFOL 30 MG: 10 INJECTION, EMULSION INTRAVENOUS at 03:05

## 2023-05-22 RX ADMIN — PROPOFOL 200 MCG/KG/MIN: 10 INJECTION, EMULSION INTRAVENOUS at 03:05

## 2023-05-22 NOTE — TRANSFER OF CARE
"Anesthesia Transfer of Care Note    Patient: Sherie Reyes    Procedure(s) Performed: Procedure(s) (LRB):  ESOPHAGOGASTRODUODENOSCOPY (EGD) (N/A)    Patient location: GI    Anesthesia Type: general    Transport from OR: Transported from OR on room air with adequate spontaneous ventilation    Post pain: adequate analgesia    Post assessment: no apparent anesthetic complications    Post vital signs: stable    Level of consciousness: sedated    Nausea/Vomiting: no nausea/vomiting    Complications: none    Transfer of care protocol was followed      Last vitals:   Visit Vitals  BP (!) 97/55 (BP Location: Left arm, Patient Position: Lying)   Pulse 67   Temp 36.5 °C (97.7 °F) (Temporal)   Resp 16   Ht 5' 2" (1.575 m)   Wt 56.7 kg (125 lb)   SpO2 96%   Breastfeeding No   BMI 22.86 kg/m²     "

## 2023-05-22 NOTE — PROVATION PATIENT INSTRUCTIONS
Discharge Summary/Instructions after an Endoscopic Procedure  Patient Name: Sherie Reyes  Patient MRN: [id number]  Patient YOB: 1965  Monday, May 22, 2023  Tommy Schmitt MD  Dear patient,  As a result of recent federal legislation (The Federal Cures Act), you may   receive lab or pathology results from your procedure in your [facility]   account before your physician is able to contact you. Your physician or   their representative will relay the results to you with their   recommendations at their soonest availability.  Thank you,  RESTRICTIONS:  During your procedure today, you received medications for sedation.  These   medications may affect your judgment, balance and coordination.  Therefore,   for 24 hours, you have the following restrictions:   - DO NOT drive a car, operate machinery, make legal/financial decisions,   sign important papers or drink alcohol.    ACTIVITY:  Today: no heavy lifting, straining or running due to procedural   sedation/anesthesia.  The following day: return to full activity including work.  DIET:  Eat and drink normally unless instructed otherwise.     TREATMENT FOR COMMON SIDE EFFECTS:  - Mild abdominal pain, nausea, belching, bloating or excessive gas:  rest,   eat lightly and use a heating pad.  - Sore Throat: treat with throat lozenges and/or gargle with warm salt   water.  - Because air was used during the procedure, expelling large amounts of air   from your rectum or belching is normal.  - If a bowel prep was taken, you may not have a bowel movement for 1-3 days.    This is normal.  SYMPTOMS TO WATCH FOR AND REPORT TO YOUR PHYSICIAN:  1. Abdominal pain or bloating, other than gas cramps.  2. Chest pain.  3. Back pain.  4. Signs of infection such as: chills or fever occurring within 24 hours   after the procedure.  5. Rectal bleeding, which would show as bright red, maroon, or black stools.   (A tablespoon of blood from the rectum is not serious, especially if    hemorrhoids are present.)  6. Vomiting.  7. Weakness or dizziness.  GO DIRECTLY TO THE NEAREST EMERGENCY ROOM IF YOU HAVE ANY OF THE FOLLOWING:      Difficulty breathing              Chills and/or fever over 101 F   Persistent vomiting and/or vomiting blood   Severe abdominal pain   Severe chest pain   Black, tarry stools   Bleeding- more than one tablespoon   Any other symptom or condition that you feel may need urgent attention  Your doctor recommends these additional instructions:  If any biopsies were taken, your doctors clinic will contact you in 1 to 2   weeks with any results.  - Discharge patient to home.   - Follow an antireflux regimen indefinitely.   - Await pathology results.   - Telephone endoscopist for pathology results in 3 weeks.   - Repeat upper endoscopy in 3 years for surveillance based on pathology   results.   - Continue present medication (Omeprazole (PRILOSEC) 40 MG capsule 1   CAPSULE(40 MG) BY MOUTH EVERY DAY (Best taken 45-60 minutes before your   first protein meal of the day).  - Return to primary care physician for further evaluation.   - Return to nurse practitioner.   - The findings and recommendations were discussed with the patient.  For questions, problems or results please call your physician - Tommy Schmitt MD at Work:  [phone].  [facility], EMERGENCY ROOM PHONE NUMBER: [phone]  IF A COMPLICATION OR EMERGENCY SITUATION ARISES AND YOU ARE UNABLE TO REACH   YOUR PHYSICIAN - GO DIRECTLY TO THE EMERGENCY ROOM.  Tommy Schmitt MD  5/22/2023 3:58:13 PM  This report has been verified and signed electronically.  Dear patient,  As a result of recent federal legislation (The Federal Cures Act), you may   receive lab or pathology results from your procedure in your [facility]   account before your physician is able to contact you. Your physician or   their representative will relay the results to you with their   recommendations at their soonest  availability.  Thank you,  PROVATION

## 2023-05-22 NOTE — H&P
[facility] 4th Floor  History & Physical    Subjective:      Chief Complaint/Reason for Admission:    Direct access EGD for weight loss and early satiety    Sherie Reyes is a 57 y.o. female.    Past Medical History:   Diagnosis Date    Anxiety     Degenerative joint disease (DJD) of hip     Diabetes type 2, controlled 7/10/2019    GERD (gastroesophageal reflux disease)     Hyperlipidemia     Hypertension     IBS (irritable bowel syndrome)     Insomnia     Lumbar disc herniation     PTSD (post-traumatic stress disorder)      Past Surgical History:   Procedure Laterality Date    ADENOIDECTOMY      ARTHROSCOPIC DEBRIDEMENT OF ROTATOR CUFF Right 2020    Procedure: DEBRIDEMENT, ROTATOR CUFF, ARTHROSCOPIC;  Surgeon: Melchor Hughes Jr., MD;  Location: [facility] OR;  Service: Orthopedics;  Laterality: Right;  need opus system (Ger MESA notified , )  video, notified/confirmed 2020 KB 1310    ARTHROSCOPIC EXCISION OF ACROMIOCLAVICULAR JOINT  2020    Procedure: EXCISION, ACROMIOCLAVICULAR JOINT, ARTHROSCOPIC;  Surgeon: Melchor Hughes Jr., MD;  Location: [facility] OR;  Service: Orthopedics;;    ARTHROSCOPIC REPAIR OF ROTATOR CUFF OF SHOULDER      ARTHROSCOPY OF SHOULDER WITH DECOMPRESSION OF SUBACROMIAL SPACE  2020    Procedure: ARTHROSCOPY, SHOULDER, WITH SUBACROMIAL SPACE DECOMPRESSION;  Surgeon: Melchor Hughes Jr., MD;  Location: [facility] OR;  Service: Orthopedics;;     SECTION      EPIDURAL STEROID INJECTION INTO CERVICAL SPINE N/A 2021    Procedure: Injection-steroid-epidural-cervical--C7-T1 IL ZAYNAB;  Surgeon: Gregoria Rodriguez MD;  Location: [facility] PAIN MGT;  Service: Pain Management;  Laterality: N/A;    TENOTOMY, ELBOW, WITH DEBRIDEMENT AND TENDON REPAIR Right 2022    Procedure: TENOTOMY,ELBOW,WITH DEBRIDEMENT AND TENDON REPAIR;  Surgeon: Melchor Hughes Jr., MD;  Location: [facility] OR;  Service: Orthopedics;  Laterality: Right;    TONSILLECTOMY       Family History    Problem Relation Age of Onset    Allergies Mother     Hypertension Mother     Hypothyroidism Mother     Arthritis Mother     Depression Mother     Heart disease Mother     Hyperlipidemia Mother     Hypertension Father     Stroke Father 46    Diabetes Father     Heart disease Father     Hyperlipidemia Father     Depression Sister     Breast cancer Paternal Aunt     Cancer Paternal Aunt         Bilat breast Cancer    Heart disease Paternal Aunt     Cancer Maternal Grandmother         Lung w/ Mets    COPD Maternal Grandmother     Depression Maternal Grandmother     Early death Maternal Grandmother     Heart disease Maternal Grandmother     Hyperlipidemia Maternal Grandmother     Hypertension Maternal Grandmother     Cancer Maternal Grandfather         Stomach Ca    Depression Maternal Grandfather     Early death Maternal Grandfather     Heart disease Maternal Grandfather     Hyperlipidemia Maternal Grandfather     Hypertension Maternal Grandfather     Mental illness Maternal Grandfather     Vision loss Maternal Grandfather         Loss during yhe War.    Arthritis Paternal Grandmother     Depression Paternal Grandmother     Heart disease Paternal Grandmother     Diabetes Paternal Grandfather     Early death Paternal Grandfather     Heart disease Paternal Grandfather     Hyperlipidemia Paternal Grandfather     Hypertension Paternal Grandfather     Kidney disease Paternal Grandfather     Depression Daughter     Depression Son     Learning disabilities Son      Social History     Tobacco Use    Smoking status: Former     Packs/day: 0.50     Years: 47.00     Pack years: 23.50     Types: Vaping with nicotine, Cigarettes     Start date: 1985     Quit date: 2014     Years since quittin.9    Smokeless tobacco: Former     Quit date: 10/20/2014    Tobacco comments:     Vaping 30 ml last 5 or 6 weeks - 3% nicotine    Substance Use Topics    Alcohol use: Yes     Alcohol/week: 1.0 standard drink     Types: 1 Cans of  beer per week     Comment: Maybe 1 or 2 a month    Drug use: No       PTA Medications   Medication Sig    LINZESS 290 mcg Cap capsule TAKE 1 CAPSULE(290 MCG) BY MOUTH BEFORE BREAKFAST    acetaminophen (TYLENOL) 500 MG tablet Take 500 mg by mouth every 6 (six) hours as needed for Pain.    albuterol (PROAIR HFA) 90 mcg/actuation inhaler Inhale 2 puffs into the lungs every 6 (six) hours as needed for Wheezing. Rescue    azelastine (ASTELIN) 137 mcg (0.1 %) nasal spray 1 spray (137 mcg total) by Nasal route 2 (two) times daily.    cetirizine (ZYRTEC) 10 MG tablet Take 2 tablets (20 mg total) by mouth every morning.    chlorzoxazone (PARAFON FORTE) 500 mg Tab Take 500 mg by mouth daily as needed.    clonazePAM (KLONOPIN) 1 MG tablet TAKE 1 TABLET BY MOUTH TWICE DAILY    cyanocobalamin 1,000 mcg/mL injection Inject 1 mL (1,000 mcg total) into the muscle every 30 days.    cyclobenzaprine (FLEXERIL) 10 MG tablet Take 10 mg by mouth 2 (two) times daily as needed.    diclofenac sodium (VOLTAREN) 1 % Gel Apply 2 g topically 4 (four) times daily.    dicyclomine (BENTYL) 10 MG capsule TAKE 1 CAPSULE BY MOUTH FOUR TIMES DAILY AS NEEDED FOR ABDOMINAL PAIN    estradioL (ESTRACE) 1 MG tablet Take 1 mg by mouth once daily.    hydrocortisone 2.5 % cream Apply to affected areas twice a day when eczema is moderate.    hydrOXYzine HCL (ATARAX) 25 MG tablet 1 to 8 tablets at bedtime.  Start with 2 tablets.  Increase or decrease as needed until itching is controlled or a maximum of 8 tablets.    losartan (COZAAR) 50 MG tablet TAKE 1 TABLET(50 MG) BY MOUTH EVERY DAY    melatonin 5 mg Chew Take 10 mg by mouth daily as needed.    meloxicam (MOBIC) 15 MG tablet Take 15 mg by mouth daily as needed.    mupirocin (BACTROBAN) 2 % ointment APPLY TOPICALLY TO THE AFFECTED AREA TWICE DAILY    mv-mn/iron/folic acid/herb 190 (VITAMIN D3 COMPLETE ORAL) Take by mouth once daily at 6am.    omega-3 fatty acids/fish oil (FISH OIL-OMEGA-3 FATTY ACIDS)  300-1,000 mg capsule Take 1 capsule by mouth once daily.    omeprazole (PRILOSEC) 40 MG capsule TAKE 1 CAPSULE(40 MG) BY MOUTH EVERY DAY    POLYETHYLENE GLYCOL 3350 (MIRALAX ORAL) Take by mouth as needed.     potassium chloride 10% (KAYCIEL) 20 mEq/15 mL oral solution TAKE 15 ML BY MOUTH EVERY DAY    pravastatin (PRAVACHOL) 20 MG tablet TAKE 1 TABLET(20 MG) BY MOUTH EVERY EVENING    QUEtiapine (SEROQUEL) 200 MG Tab Take 1 tablet (200 mg total) by mouth every evening.    tiZANidine (ZANAFLEX) 4 MG tablet Take 4 mg by mouth 2 (two) times daily as needed.    traZODone (DESYREL) 100 MG tablet TK 1 T PO  QHS    venlafaxine (EFFEXOR-XR) 150 MG Cp24 Take 150 mg by mouth once daily.    venlafaxine (EFFEXOR-XR) 75 MG 24 hr capsule TAKE ONE CAPSULE BY MOUTH DAILY WITH 150 MG TO EQUAL 225 MG    ZTLIDO 1.8 % PtMd Apply 1 patch topically daily as needed.     Review of patient's allergies indicates:  No Known Allergies     Review of Systems   Constitutional:  Positive for weight loss. Negative for fever.   Respiratory:  Negative for shortness of breath.    Cardiovascular:  Negative for chest pain.     Objective:      Vital Signs (Most Recent)       Vital Signs Range (Last 24H):       Physical Exam  Neurological:      Mental Status: She is alert and oriented to person, place, and time.           Assessment:      Direct access EGD for weight loss and early satiety      Plan:      Direct access EGD for weight loss and early satiety

## 2023-05-22 NOTE — TELEPHONE ENCOUNTER
CT abdomen/pelvis with contrast ordered per GI recommendations.   Ordered by ELAINE Morfin    A message was sent to the referral coordinator to call pt and assist with scheduling

## 2023-05-22 NOTE — ANESTHESIA PREPROCEDURE EVALUATION
2023  Sherie Reyes is a 57 y.o., female.  Past Medical History:   Diagnosis Date    Anxiety     Degenerative joint disease (DJD) of hip     Diabetes type 2, controlled 7/10/2019    GERD (gastroesophageal reflux disease)     Hyperlipidemia     Hypertension     IBS (irritable bowel syndrome)     Insomnia     Lumbar disc herniation     PTSD (post-traumatic stress disorder)      Patient Active Problem List   Diagnosis    Primary insomnia    Chronic midline low back pain without sciatica    Slow transit constipation    Mixed hyperlipidemia    Essential hypertension    Hypotension    Incomplete tear of left rotator cuff    Anxiety    PTSD (post-traumatic stress disorder)    Prediabetes    Lateral epicondylitis of right elbow    Sialadenitis    Right shoulder pain    Symptom of leg swelling    Tear of right glenoid labrum    Shoulder pain, right    Shoulder weakness    Decreased range of motion (ROM) of shoulder    Depression    Pain    Cervical spine disease    Poor posture    Decreased strength of trunk and back    Right elbow pain    Decreased muscle strength    Degenerative tear of acetabular labrum of right hip    Displacement of cervical intervertebral disc    Hormone replacement therapy    Radiculopathy due to lumbar intervertebral disc disorder    Thoracic facet syndrome    Trochanteric bursitis of right hip    Calcified granuloma of lung     Social History     Socioeconomic History    Marital status:    Tobacco Use    Smoking status: Former     Packs/day: 0.50     Years: 47.00     Pack years: 23.50     Types: Vaping with nicotine, Cigarettes     Start date: 1985     Quit date: 2014     Years since quittin.9    Smokeless tobacco: Former     Quit date: 10/20/2014    Tobacco comments:     Vaping 30 ml last 5 or 6 weeks - 3%  nicotine    Substance and Sexual Activity    Alcohol use: Yes     Alcohol/week: 1.0 standard drink     Types: 1 Cans of beer per week     Comment: Maybe 1 or 2 a month    Drug use: No    Sexual activity: Yes     Partners: Male     Birth control/protection: OCP     Social Determinants of Health     Financial Resource Strain: High Risk    Difficulty of Paying Living Expenses: Very hard   Food Insecurity: Food Insecurity Present    Worried About Running Out of Food in the Last Year: Often true    Ran Out of Food in the Last Year: Often true   Transportation Needs: No Transportation Needs    Lack of Transportation (Medical): No    Lack of Transportation (Non-Medical): No   Physical Activity: Inactive    Days of Exercise per Week: 0 days    Minutes of Exercise per Session: 0 min   Stress: Stress Concern Present    Feeling of Stress : Very much   Social Connections: Unknown    Frequency of Communication with Friends and Family: Once a week    Frequency of Social Gatherings with Friends and Family: Once a week    Active Member of Clubs or Organizations: No    Attends Club or Organization Meetings: Never    Marital Status:    Housing Stability: High Risk    Unable to Pay for Housing in the Last Year: Yes    Number of Places Lived in the Last Year: 1    Unstable Housing in the Last Year: No     Past Surgical History:   Procedure Laterality Date    ADENOIDECTOMY      ARTHROSCOPIC DEBRIDEMENT OF ROTATOR CUFF Right 6/9/2020    Procedure: DEBRIDEMENT, ROTATOR CUFF, ARTHROSCOPIC;  Surgeon: Melchor Hughes Jr., MD;  Location: [facility] OR;  Service: Orthopedics;  Laterality: Right;  need opus system (Ger MESA notified 5/26, EF)  video, notified/confirmed 6/8/2020 KB 9022    ARTHROSCOPIC EXCISION OF ACROMIOCLAVICULAR JOINT  6/9/2020    Procedure: EXCISION, ACROMIOCLAVICULAR JOINT, ARTHROSCOPIC;  Surgeon: Melchor Hughes Jr., MD;  Location: [facility] OR;  Service: Orthopedics;;    ARTHROSCOPIC REPAIR OF  ROTATOR CUFF OF SHOULDER      ARTHROSCOPY OF SHOULDER WITH DECOMPRESSION OF SUBACROMIAL SPACE  2020    Procedure: ARTHROSCOPY, SHOULDER, WITH SUBACROMIAL SPACE DECOMPRESSION;  Surgeon: Melchor Hughes Jr., MD;  Location: [facility] OR;  Service: Orthopedics;;     SECTION      EPIDURAL STEROID INJECTION INTO CERVICAL SPINE N/A 2021    Procedure: Injection-steroid-epidural-cervical--C7-T1 IL ZAYNAB;  Surgeon: Gregoria Rodriguez MD;  Location: [facility] PAIN MGT;  Service: Pain Management;  Laterality: N/A;    TENOTOMY, ELBOW, WITH DEBRIDEMENT AND TENDON REPAIR Right 2022    Procedure: TENOTOMY,ELBOW,WITH DEBRIDEMENT AND TENDON REPAIR;  Surgeon: Melchor Hughes Jr., MD;  Location: [facility] OR;  Service: Orthopedics;  Laterality: Right;    TONSILLECTOMY       Current Facility-Administered Medications on File Prior to Encounter   Medication Dose Route Frequency Provider Last Rate Last Admin    0.9%  NaCl infusion   Intravenous Continuous Gregoria Rodriguez MD         Current Outpatient Medications on File Prior to Encounter   Medication Sig Dispense Refill    LINZESS 290 mcg Cap capsule TAKE 1 CAPSULE(290 MCG) BY MOUTH BEFORE BREAKFAST 90 capsule 3    acetaminophen (TYLENOL) 500 MG tablet Take 500 mg by mouth every 6 (six) hours as needed for Pain.      albuterol (PROAIR HFA) 90 mcg/actuation inhaler Inhale 2 puffs into the lungs every 6 (six) hours as needed for Wheezing. Rescue 18 g 0    azelastine (ASTELIN) 137 mcg (0.1 %) nasal spray 1 spray (137 mcg total) by Nasal route 2 (two) times daily. 30 mL 0    cetirizine (ZYRTEC) 10 MG tablet Take 2 tablets (20 mg total) by mouth every morning. 60 tablet 3    chlorzoxazone (PARAFON FORTE) 500 mg Tab Take 500 mg by mouth daily as needed.      clonazePAM (KLONOPIN) 1 MG tablet TAKE 1 TABLET BY MOUTH TWICE DAILY 60 tablet 1    cyanocobalamin 1,000 mcg/mL injection Inject 1 mL (1,000 mcg total) into the muscle every 30 days. 30 mL 1    cyclobenzaprine (FLEXERIL)  10 MG tablet Take 10 mg by mouth 2 (two) times daily as needed.      diclofenac sodium (VOLTAREN) 1 % Gel Apply 2 g topically 4 (four) times daily. 4 each 2    dicyclomine (BENTYL) 10 MG capsule TAKE 1 CAPSULE BY MOUTH FOUR TIMES DAILY AS NEEDED FOR ABDOMINAL PAIN 120 capsule 1    estradioL (ESTRACE) 1 MG tablet Take 1 mg by mouth once daily.      hydrocortisone 2.5 % cream Apply to affected areas twice a day when eczema is moderate. 453.6 g 1    hydrOXYzine HCL (ATARAX) 25 MG tablet 1 to 8 tablets at bedtime.  Start with 2 tablets.  Increase or decrease as needed until itching is controlled or a maximum of 8 tablets. 240 tablet 3    losartan (COZAAR) 50 MG tablet TAKE 1 TABLET(50 MG) BY MOUTH EVERY DAY 90 tablet 0    melatonin 5 mg Chew Take 10 mg by mouth daily as needed.      meloxicam (MOBIC) 15 MG tablet Take 15 mg by mouth daily as needed.      mupirocin (BACTROBAN) 2 % ointment APPLY TOPICALLY TO THE AFFECTED AREA TWICE DAILY 15 g 0    mv-mn/iron/folic acid/herb 190 (VITAMIN D3 COMPLETE ORAL) Take by mouth once daily at 6am.      omega-3 fatty acids/fish oil (FISH OIL-OMEGA-3 FATTY ACIDS) 300-1,000 mg capsule Take 1 capsule by mouth once daily.      omeprazole (PRILOSEC) 40 MG capsule TAKE 1 CAPSULE(40 MG) BY MOUTH EVERY DAY 30 capsule 11    POLYETHYLENE GLYCOL 3350 (MIRALAX ORAL) Take by mouth as needed.       potassium chloride 10% (KAYCIEL) 20 mEq/15 mL oral solution TAKE 15 ML BY MOUTH EVERY  mL 11    pravastatin (PRAVACHOL) 20 MG tablet TAKE 1 TABLET(20 MG) BY MOUTH EVERY EVENING 90 tablet 1    QUEtiapine (SEROQUEL) 200 MG Tab Take 1 tablet (200 mg total) by mouth every evening. 30 tablet 2    tiZANidine (ZANAFLEX) 4 MG tablet Take 4 mg by mouth 2 (two) times daily as needed.      traZODone (DESYREL) 100 MG tablet TK 1 T PO  QHS      venlafaxine (EFFEXOR-XR) 150 MG Cp24 Take 150 mg by mouth once daily.      venlafaxine (EFFEXOR-XR) 75 MG 24 hr capsule TAKE ONE CAPSULE BY MOUTH  DAILY WITH 150 MG TO EQUAL 225 MG      ZTLIDO 1.8 % PtMd Apply 1 patch topically daily as needed.       Pre-op Assessment    I have reviewed the NPO Status.      Review of Systems  Anesthesia Hx:   Denies Personal Hx of Anesthesia complications.   Social:  Former Smoker Vape   Cardiovascular:   Hypertension hyperlipidemia    Pulmonary:   snores   Hepatic/GI:   GERD    Musculoskeletal:   Arthritis   Spine Disorders: lumbar and cervical    Psych:   anxiety depression          Physical Exam    Airway:  Mallampati: III   Neck ROM: Extension Decreased, Right Lateral Motion Decreased, Left Lateral Motion Decreased, Extension Painful        Anesthesia Plan  Type of Anesthesia, risks & benefits discussed:    Anesthesia Type: Gen Natural Airway  Intra-op Monitoring Plan: Standard ASA Monitors  Induction:  IV  Informed Consent: Informed consent signed with the Patient and all parties understand the risks and agree with anesthesia plan.  All questions answered.   ASA Score: 2    Ready For Surgery From Anesthesia Perspective.     .

## 2023-05-23 NOTE — ANESTHESIA POSTPROCEDURE EVALUATION
Anesthesia Post Evaluation    Patient: Sherie Reyes    Procedure(s) Performed: Procedure(s) (LRB):  ESOPHAGOGASTRODUODENOSCOPY (EGD) (N/A)    Final Anesthesia Type: general      Patient location during evaluation: PACU  Patient participation: Yes- Able to Participate  Level of consciousness: awake and alert and oriented  Pain management: adequate  Airway patency: patent    PONV status at discharge: No PONV  Anesthetic complications: no      Cardiovascular status: blood pressure returned to baseline and hemodynamically stable  Respiratory status: unassisted  Hydration status: euvolemic  Follow-up not needed.          Vitals Value Taken Time   /61 05/22/23 1620   Temp 36.5 °C (97.7 °F) 05/22/23 1550   Pulse 62 05/22/23 1620   Resp 16 05/22/23 1620   SpO2 96 % 05/22/23 1620         Event Time   Out of Recovery 16:27:03         Pain/Jessica Score: Jessica Score: 9 (5/22/2023  4:09 PM)

## 2023-05-25 ENCOUNTER — PATIENT MESSAGE (OUTPATIENT)
Dept: INTERNAL MEDICINE | Facility: CLINIC | Age: 58
End: 2023-05-25
Payer: MEDICARE

## 2023-05-26 LAB
FINAL PATHOLOGIC DIAGNOSIS: NORMAL
GROSS: NORMAL
Lab: NORMAL

## 2023-05-28 ENCOUNTER — PATIENT MESSAGE (OUTPATIENT)
Dept: INTERNAL MEDICINE | Facility: CLINIC | Age: 58
End: 2023-05-28
Payer: MEDICARE

## 2023-06-04 NOTE — PROGRESS NOTES
Yennifer your EGD pathology was benign no evidence of celiac sprue no evidence of H pylori no evidence of Barretts esophagus no dysplasia.    - Repeat upper endoscopy in 3 years for                          surveillance based on pathology results.                          - Continue present medication (Omeprazole                          (PRILOSEC) 40 MG capsule 1 CAPSULE(40 MG) BY MOUTH                          EVERY DAY (Best taken 45-60 minutes before your                          first protein meal of the day).

## 2023-06-13 ENCOUNTER — PATIENT MESSAGE (OUTPATIENT)
Dept: INTERNAL MEDICINE | Facility: CLINIC | Age: 58
End: 2023-06-13
Payer: MEDICARE

## 2023-06-14 ENCOUNTER — PATIENT MESSAGE (OUTPATIENT)
Dept: INTERNAL MEDICINE | Facility: CLINIC | Age: 58
End: 2023-06-14
Payer: MEDICARE

## 2023-06-16 ENCOUNTER — PATIENT MESSAGE (OUTPATIENT)
Dept: PODIATRY | Facility: CLINIC | Age: 58
End: 2023-06-16
Payer: MEDICARE

## 2023-06-21 DIAGNOSIS — E78.2 MIXED HYPERLIPIDEMIA: ICD-10-CM

## 2023-06-21 RX ORDER — PRAVASTATIN SODIUM 20 MG/1
TABLET ORAL
Qty: 90 TABLET | Refills: 1 | Status: SHIPPED | OUTPATIENT
Start: 2023-06-21 | End: 2023-11-28

## 2023-06-21 NOTE — TELEPHONE ENCOUNTER
Care Due:                  Date            Visit Type   Department     Provider  --------------------------------------------------------------------------------                                EP -                              PRIMARY      MET INTERNAL  Last Visit: 08-      CARE ([facility])   MEDICINE       Tonia Bernal  Next Visit: None Scheduled  None         None Found                                                            Last  Test          Frequency    Reason                     Performed    Due Date  --------------------------------------------------------------------------------    Office Visit  12 months..  albuterol, azelastine,     08- 08-                             losartan, omeprazole,                             pravastatin..............    [employer] Embedded Care Due Messages. Reference number: [id number].   6/21/2023 8:07:22 AM CDT

## 2023-06-27 ENCOUNTER — PATIENT MESSAGE (OUTPATIENT)
Dept: INTERNAL MEDICINE | Facility: CLINIC | Age: 58
End: 2023-06-27
Payer: MEDICARE

## 2023-06-28 ENCOUNTER — PATIENT MESSAGE (OUTPATIENT)
Dept: INTERNAL MEDICINE | Facility: CLINIC | Age: 58
End: 2023-06-28
Payer: MEDICARE

## 2023-06-28 NOTE — TELEPHONE ENCOUNTER
Pt informed to contact our office for next available appointment with a physician to establish care

## 2023-06-29 ENCOUNTER — PATIENT MESSAGE (OUTPATIENT)
Dept: INTERNAL MEDICINE | Facility: CLINIC | Age: 58
End: 2023-06-29
Payer: MEDICARE

## 2023-06-30 ENCOUNTER — PATIENT MESSAGE (OUTPATIENT)
Dept: INTERNAL MEDICINE | Facility: CLINIC | Age: 58
End: 2023-06-30
Payer: MEDICARE

## 2023-07-03 ENCOUNTER — TELEPHONE (OUTPATIENT)
Dept: INTERNAL MEDICINE | Facility: CLINIC | Age: 58
End: 2023-07-03
Payer: MEDICARE

## 2023-07-03 ENCOUNTER — TELEPHONE (OUTPATIENT)
Dept: PAIN MEDICINE | Facility: CLINIC | Age: 58
End: 2023-07-03
Payer: MEDICARE

## 2023-07-03 DIAGNOSIS — I10 ESSENTIAL HYPERTENSION: ICD-10-CM

## 2023-07-03 DIAGNOSIS — Z00.00 ANNUAL PHYSICAL EXAM: Primary | ICD-10-CM

## 2023-07-03 DIAGNOSIS — R73.03 PREDIABETES: ICD-10-CM

## 2023-07-03 NOTE — TELEPHONE ENCOUNTER
----- Message from Madelaine Rodriguez sent at 6/30/2023  4:00 PM CDT -----  Regarding: new patient appt  Name of caller: tamy       What is the requesting detail: pt has a referral from Dr. Anisha Kapadia ([facility]) to be seen by Dr. Monroy. Please give her a call back to schedule her new pt appt.       Can the clinic reply by [facility]:       What number to call back:[phone]

## 2023-07-06 ENCOUNTER — PATIENT MESSAGE (OUTPATIENT)
Dept: INTERNAL MEDICINE | Facility: CLINIC | Age: 58
End: 2023-07-06
Payer: MEDICARE

## 2023-07-06 ENCOUNTER — HOSPITAL ENCOUNTER (OUTPATIENT)
Dept: RADIOLOGY | Facility: HOSPITAL | Age: 58
Discharge: HOME | End: 2023-07-06
Attending: NURSE PRACTITIONER
Payer: MEDICARE

## 2023-07-06 DIAGNOSIS — R63.4 WEIGHT LOSS: ICD-10-CM

## 2023-07-06 PROCEDURE — 74177 CT ABDOMEN PELVIS WITH CONTRAST: ICD-10-PCS | Mod: 26,,, | Performed by: RADIOLOGY

## 2023-07-06 PROCEDURE — 74177 CT ABD & PELVIS W/CONTRAST: CPT | Mod: 26,,, | Performed by: RADIOLOGY

## 2023-07-06 PROCEDURE — 74177 CT ABD & PELVIS W/CONTRAST: CPT | Mod: TC

## 2023-07-06 PROCEDURE — 25500020 PHARM REV CODE 255: Performed by: NURSE PRACTITIONER

## 2023-07-06 RX ADMIN — IOHEXOL 75 ML: 350 INJECTION, SOLUTION INTRAVENOUS at 04:07

## 2023-07-19 ENCOUNTER — OFFICE VISIT (OUTPATIENT)
Dept: PAIN MEDICINE | Facility: CLINIC | Age: 58
End: 2023-07-19
Attending: ANESTHESIOLOGY
Payer: MEDICARE

## 2023-07-19 ENCOUNTER — HOSPITAL ENCOUNTER (OUTPATIENT)
Dept: RADIOLOGY | Facility: OTHER | Age: 58
Discharge: HOME | End: 2023-07-19
Attending: EMERGENCY MEDICINE
Payer: MEDICARE

## 2023-07-19 VITALS
BODY MASS INDEX: 26.29 KG/M2 | DIASTOLIC BLOOD PRESSURE: 73 MMHG | WEIGHT: 142.88 LBS | SYSTOLIC BLOOD PRESSURE: 113 MMHG | HEIGHT: 62 IN | HEART RATE: 76 BPM | RESPIRATION RATE: 19 BRPM | TEMPERATURE: 98 F

## 2023-07-19 DIAGNOSIS — G89.29 CHRONIC HIP PAIN, UNSPECIFIED LATERALITY: ICD-10-CM

## 2023-07-19 DIAGNOSIS — M25.559 CHRONIC HIP PAIN, UNSPECIFIED LATERALITY: ICD-10-CM

## 2023-07-19 DIAGNOSIS — M70.71 ISCHIAL BURSITIS OF RIGHT SIDE: Primary | ICD-10-CM

## 2023-07-19 DIAGNOSIS — M70.71 ISCHIAL BURSITIS OF RIGHT SIDE: ICD-10-CM

## 2023-07-19 PROCEDURE — 73502 XR HIP WITH PELVIS WHEN PERFORMED, 2 OR 3  VIEWS RIGHT: ICD-10-PCS | Mod: 26,RT,, | Performed by: RADIOLOGY

## 2023-07-19 PROCEDURE — 73502 X-RAY EXAM HIP UNI 2-3 VIEWS: CPT | Mod: TC,FY,RT

## 2023-07-19 PROCEDURE — 99999 PR PBB SHADOW E&M-EST. PATIENT-LVL V: ICD-10-PCS | Mod: PBBFAC,,, | Performed by: ANESTHESIOLOGY

## 2023-07-19 PROCEDURE — 99215 OFFICE O/P EST HI 40 MIN: CPT | Mod: PBBFAC | Performed by: ANESTHESIOLOGY

## 2023-07-19 PROCEDURE — 99214 OFFICE O/P EST MOD 30 MIN: CPT | Mod: S$PBB,GC,, | Performed by: ANESTHESIOLOGY

## 2023-07-19 PROCEDURE — 73502 X-RAY EXAM HIP UNI 2-3 VIEWS: CPT | Mod: 26,RT,, | Performed by: RADIOLOGY

## 2023-07-19 PROCEDURE — 99214 PR OFFICE/OUTPT VISIT, EST, LEVL IV, 30-39 MIN: ICD-10-PCS | Mod: S$PBB,GC,, | Performed by: ANESTHESIOLOGY

## 2023-07-19 PROCEDURE — 99999 PR PBB SHADOW E&M-EST. PATIENT-LVL V: CPT | Mod: PBBFAC,,, | Performed by: ANESTHESIOLOGY

## 2023-07-19 NOTE — PROGRESS NOTES
Chronic Pain - New Consult    Referring Physician: No ref. provider found    Chief Complaint:   Chief Complaint   Patient presents with    Low-back Pain        SUBJECTIVE:    Sherie Reyes presents to the clinic for the evaluation of ischial pain. Referred by [facility] by Dr. Freddy Kapadia. 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 Symptoms have been worsening.The pain is located in the right buttock area and radiates to the right hip.  The pain is described as stabbing and is rated as 8/10. The pain is rated with a score of  7/10 on the BEST day and a score of 10/10 on the WORST day.  Symptoms interfere with daily activity and work. The pain is exacerbated by Sitting and Walking.  The pain is mitigated by laying down and rest. The patient reports 6 hours of uninterrupted sleep per night.    Patient denies night fever/night sweats, urinary incontinence, bowel incontinence, significant motor weakness, and loss of sensations. Patient lost 50lbs in past 9 month, PCP is working her up for this.     Physical Therapy/Home Exercise: yes      Pain Disability Index Review:  Last 3 PDI Scores 7/19/2023   Pain Disability Index (PDI) 45       Pain Medications:  Parafon Forte 500mg   Klonopin 1mg BID  Flexeril - no longer taking  Voltaren gel 1% qhs  Mobic 15mg not taking   Tizanidine 4mg - not taking  Venlafaxine 225mg qam       report:  Reviewed and consistent with medication use as prescribed.    Pain Procedures:   [facility] by Dr. Freddy Kapadia.     Imaging:   EXAMINATION:  MRI LUMBAR SPINE WITHOUT CONTRAST     CLINICAL HISTORY:  Back pain or radiculopathy, > 6 wks; Dorsalgia, unspecified     TECHNIQUE:  Multiplanar, multisequence MR images were acquired from the thoracolumbar junction to the sacrum without the administration of contrast.     COMPARISON:  Lumbar radiograph 07/20/2021.     CT lumbar spine  08/06/2021.     FINDINGS:  Alignment: Normal.     Vertebrae: 5 lumbar-type vertebral bodies. No aggressive marrow replacement process or fracture.     Discs: Multilevel degenerative changes, described in detail below.     Cord: Conus appears unremarkable and terminates at L1.  Cauda equina nerve roots are unremarkable.     Paraspinal soft tissues are unremarkable.     Significant findings by level:     T12-L1 and L1-L2: Unremarkable.     L2-L3: Disc bulge with superimposed right foraminal extrusion.  Mild right facet arthropathy.  No canal stenosis.  Mild right foraminal stenosis.  Disc material abuts the exiting right L2 nerve root.     L3-L4: Disc bulge.  Bilateral facet arthropathy and ligamentum flavum thickening, left greater than right. Mild canal stenosis with narrowing of the left subarticular zone and abutment of the descending left L4 nerve root.  Mild left foraminal stenosis.     L4-L5: Disc bulge.  Bilateral facet arthropathy.  No canal stenosis.  Mild bilateral foraminal stenosis.     L5-S1: Bilateral facet arthropathy.  No spinal canal or foraminal stenosis.     Impression:     Multilevel degenerative changes as described, noting right foraminal extrusion at L2-3 with abutment of the exiting right L2 nerve root, and disc bulging and facet arthropathy at L3-4 with abutment of the descending left L4 nerve root.     Electronically signed by resident: Jovan Powell  Date:                                            08/06/2021  Time:                                           11:24     Electronically signed by: Charles Mccray  Date:                                            08/06/2021  Time:                                           15:12    MRI CERVICAL SPINE WITHOUT CONTRAST     CLINICAL HISTORY:  Neck pain, abnormal neuro exam; Cervicalgia     TECHNIQUE:  Multiplanar, multisequence MR images of the cervical spine were performed without the administration of contrast.     COMPARISON:  12/23/2020.      FINDINGS:  C1-C2: Dens is intact.  Pre dens space is maintained.     Alignment: Alignment is maintained.  Straightening of lordosis noted.     Vertebrae: Normal marrow signal. No fracture.     Discs: Mild disc height loss at C5-C7.  No evidence for discitis.     Cord: Normal.     Skull base and craniocervical junction: Normal.     Degenerative findings:     C2-C3: No spinal canal stenosis or neural foraminal narrowing.     C3-C4: Posterior disc osteophyte complex with uncovertebral and facet arthrosis result in mild spinal canal stenosis with minimal effacement of the left neural foramen.     C4-C5: No spinal canal stenosis or neural foraminal narrowing.     C5-C6: Posterior disc osteophyte complex with uncovertebral and facet arthrosis result in moderate spinal canal stenosis and moderate right, mild left neural foraminal narrowing.     C6-C7: Posterior disc osteophyte complex with uncovertebral and facet arthrosis result in mild spinal canal stenosis and mild bilateral neural foraminal narrowing.     C7-T1: No spinal canal stenosis or neural foraminal narrowing.     Paraspinal muscles & soft tissues: Unremarkable.     Impression:     1. Degenerative changes of the cervical spine detailed above.  Moderate spinal canal stenosis noted at C5-C6.  Mild-to-moderate neural foraminal narrowing noted at C5-C7.        Electronically signed by: Enoch Rojas MD  Date:                                            08/06/2021  Time:                                           11:36    Past Medical History:   Diagnosis Date    Anxiety     Degenerative joint disease (DJD) of hip     Diabetes type 2, controlled 7/10/2019    GERD (gastroesophageal reflux disease)     Hyperlipidemia     Hypertension     IBS (irritable bowel syndrome)     Insomnia     Lumbar disc herniation     PTSD (post-traumatic stress disorder)      Past Surgical History:   Procedure Laterality Date    ADENOIDECTOMY      ARTHROSCOPIC DEBRIDEMENT OF ROTATOR CUFF  Right 2020    Procedure: DEBRIDEMENT, ROTATOR CUFF, ARTHROSCOPIC;  Surgeon: Melchor Hughes Jr., MD;  Location: [facility] [place] OR;  Service: Orthopedics;  Laterality: Right;  need opus system (Ger MESA notified , EF)  video, notified/confirmed 2020 KB 1310    ARTHROSCOPIC EXCISION OF ACROMIOCLAVICULAR JOINT  2020    Procedure: EXCISION, ACROMIOCLAVICULAR JOINT, ARTHROSCOPIC;  Surgeon: Melchor Hughes Jr., MD;  Location: [facility] [place] OR;  Service: Orthopedics;;    ARTHROSCOPIC REPAIR OF ROTATOR CUFF OF SHOULDER      ARTHROSCOPY OF SHOULDER WITH DECOMPRESSION OF SUBACROMIAL SPACE  2020    Procedure: ARTHROSCOPY, SHOULDER, WITH SUBACROMIAL SPACE DECOMPRESSION;  Surgeon: Melchor Hughes Jr., MD;  Location: [facility] [place] OR;  Service: Orthopedics;;     SECTION      EPIDURAL STEROID INJECTION INTO CERVICAL SPINE N/A 2021    Procedure: Injection-steroid-epidural-cervical--C7-T1 IL ZAYNAB;  Surgeon: Gregoria Rodriguez MD;  Location: [facility] [place] PAIN MGT;  Service: Pain Management;  Laterality: N/A;    ESOPHAGOGASTRODUODENOSCOPY N/A 2023    Procedure: ESOPHAGOGASTRODUODENOSCOPY (EGD);  Surgeon: Tommy Schmitt MD;  Location: [address]);  Service: Endoscopy;  Laterality: N/A;  referral Navjot HENRY-instr portal-GT    TENOTOMY, ELBOW, WITH DEBRIDEMENT AND TENDON REPAIR Right 2022    Procedure: TENOTOMY,ELBOW,WITH DEBRIDEMENT AND TENDON REPAIR;  Surgeon: Melchor Hughes Jr., MD;  Location: [facility] [place] OR;  Service: Orthopedics;  Laterality: Right;    TONSILLECTOMY       Social History     Socioeconomic History    Marital status:    Tobacco Use    Smoking status: Every Day     Packs/day: 0.50     Years: 47.00     Pack years: 23.50     Types: Vaping with nicotine, Cigarettes     Start date: 1985     Last attempt to quit: 2014     Years since quittin.1    Smokeless tobacco: Former     Quit date: 10/20/2014    Tobacco comments:     Vaping 30 ml last 5 or 6 weeks - 3% nicotine    Substance and Sexual  Activity    Alcohol use: Yes     Alcohol/week: 1.0 standard drink     Types: 1 Cans of beer per week     Comment: Maybe 1 or 2 a month    Drug use: Yes     Types: Marijuana    Sexual activity: Yes     Partners: Male     Birth control/protection: OCP     Social Determinants of Health     Financial Resource Strain: High Risk    Difficulty of Paying Living Expenses: Very hard   Food Insecurity: Food Insecurity Present    Worried About Running Out of Food in the Last Year: Often true    Ran Out of Food in the Last Year: Often true   Transportation Needs: No Transportation Needs    Lack of Transportation (Medical): No    Lack of Transportation (Non-Medical): No   Physical Activity: Inactive    Days of Exercise per Week: 0 days    Minutes of Exercise per Session: 0 min   Stress: Stress Concern Present    Feeling of Stress : Very much   Social Connections: Unknown    Frequency of Communication with Friends and Family: Once a week    Frequency of Social Gatherings with Friends and Family: Once a week    Active Member of Clubs or Organizations: No    Attends Club or Organization Meetings: Never    Marital Status:    Housing Stability: High Risk    Unable to Pay for Housing in the Last Year: Yes    Number of Places Lived in the Last Year: 1    Unstable Housing in the Last Year: No     Family History   Problem Relation Age of Onset    Allergies Mother     Hypertension Mother     Hypothyroidism Mother     Arthritis Mother     Depression Mother     Heart disease Mother     Hyperlipidemia Mother     Hypertension Father     Stroke Father 46    Diabetes Father     Heart disease Father     Hyperlipidemia Father     Depression Sister     Breast cancer Paternal Aunt     Cancer Paternal Aunt         Bilat breast Cancer    Heart disease Paternal Aunt     Cancer Maternal Grandmother         Lung w/ Mets    COPD Maternal Grandmother     Depression Maternal Grandmother     Early death Maternal Grandmother     Heart disease Maternal  Grandmother     Hyperlipidemia Maternal Grandmother     Hypertension Maternal Grandmother     Cancer Maternal Grandfather         Stomach Ca    Depression Maternal Grandfather     Early death Maternal Grandfather     Heart disease Maternal Grandfather     Hyperlipidemia Maternal Grandfather     Hypertension Maternal Grandfather     Mental illness Maternal Grandfather     Vision loss Maternal Grandfather         Loss during yhe War.    Arthritis Paternal Grandmother     Depression Paternal Grandmother     Heart disease Paternal Grandmother     Diabetes Paternal Grandfather     Early death Paternal Grandfather     Heart disease Paternal Grandfather     Hyperlipidemia Paternal Grandfather     Hypertension Paternal Grandfather     Kidney disease Paternal Grandfather     Depression Daughter     Depression Son     Learning disabilities Son        Review of patient's allergies indicates:  No Known Allergies    Current Outpatient Medications   Medication Sig    acetaminophen (TYLENOL) 500 MG tablet Take 500 mg by mouth every 6 (six) hours as needed for Pain.    cetirizine (ZYRTEC) 10 MG tablet Take 2 tablets (20 mg total) by mouth every morning.    chlorzoxazone (PARAFON FORTE) 500 mg Tab Take 500 mg by mouth daily as needed.    clonazePAM (KLONOPIN) 1 MG tablet TAKE 1 TABLET BY MOUTH TWICE DAILY    cyanocobalamin 1,000 mcg/mL injection Inject 1 mL (1,000 mcg total) into the muscle every 30 days.    cyclobenzaprine (FLEXERIL) 10 MG tablet Take 10 mg by mouth 2 (two) times daily as needed.    diclofenac sodium (VOLTAREN) 1 % Gel Apply 2 g topically 4 (four) times daily.    dicyclomine (BENTYL) 10 MG capsule TAKE 1 CAPSULE BY MOUTH FOUR TIMES DAILY AS NEEDED FOR ABDOMINAL PAIN    estradioL (ESTRACE) 1 MG tablet Take 1 mg by mouth once daily.    hydrocortisone 2.5 % cream Apply to affected areas twice a day when eczema is moderate.    hydrOXYzine HCL (ATARAX) 25 MG tablet 1 to 8 tablets at bedtime.  Start with 2 tablets.   Increase or decrease as needed until itching is controlled or a maximum of 8 tablets.    LINZESS 290 mcg Cap capsule TAKE 1 CAPSULE(290 MCG) BY MOUTH BEFORE BREAKFAST    losartan (COZAAR) 50 MG tablet TAKE 1 TABLET(50 MG) BY MOUTH EVERY DAY    melatonin 5 mg Chew Take 10 mg by mouth daily as needed.    meloxicam (MOBIC) 15 MG tablet Take 15 mg by mouth daily as needed.    mupirocin (BACTROBAN) 2 % ointment APPLY TOPICALLY TO THE AFFECTED AREA TWICE DAILY    mv-mn/iron/folic acid/herb 190 (VITAMIN D3 COMPLETE ORAL) Take by mouth once daily at 6am.    omega-3 fatty acids/fish oil (FISH OIL-OMEGA-3 FATTY ACIDS) 300-1,000 mg capsule Take 1 capsule by mouth once daily.    omeprazole (PRILOSEC) 40 MG capsule TAKE 1 CAPSULE(40 MG) BY MOUTH EVERY DAY    POLYETHYLENE GLYCOL 3350 (MIRALAX ORAL) Take by mouth as needed.     potassium chloride 10% (KAYCIEL) 20 mEq/15 mL oral solution TAKE 15 ML BY MOUTH EVERY DAY    pravastatin (PRAVACHOL) 20 MG tablet TAKE 1 TABLET(20 MG) BY MOUTH EVERY EVENING    tiZANidine (ZANAFLEX) 4 MG tablet Take 4 mg by mouth 2 (two) times daily as needed.    traZODone (DESYREL) 100 MG tablet TK 1 T PO  QHS    venlafaxine (EFFEXOR-XR) 150 MG Cp24 Take 150 mg by mouth once daily.    venlafaxine (EFFEXOR-XR) 75 MG 24 hr capsule TAKE ONE CAPSULE BY MOUTH DAILY WITH 150 MG TO EQUAL 225 MG    ZTLIDO 1.8 % PtMd Apply 1 patch topically daily as needed.    albuterol (PROAIR HFA) 90 mcg/actuation inhaler Inhale 2 puffs into the lungs every 6 (six) hours as needed for Wheezing. Rescue    azelastine (ASTELIN) 137 mcg (0.1 %) nasal spray 1 spray (137 mcg total) by Nasal route 2 (two) times daily.    QUEtiapine (SEROQUEL) 200 MG Tab Take 1 tablet (200 mg total) by mouth every evening.     No current facility-administered medications for this visit.     Facility-Administered Medications Ordered in Other Visits   Medication    0.9%  NaCl infusion       REVIEW OF SYSTEMS:    GENERAL:  No weight loss, malaise or  "fevers.  HEENT:  Negative for frequent or significant headaches.  NECK:  Negative for lumps, goiter, pain and significant neck swelling.  RESPIRATORY:  Negative for cough, wheezing or shortness of breath.  CARDIOVASCULAR:  Negative for chest pain, leg swelling or palpitations.  GI:  Negative for abdominal discomfort, blood in stools or black stools or change in bowel habits.  MUSCULOSKELETAL:  See HPI.  SKIN:  Negative for lesions, rash, and itching.  PSYCH:  Negative for sleep disturbance, mood disorder and recent psychosocial stressors.  HEMATOLOGY/LYMPHOLOGY:  Negative for prolonged bleeding, bruising easily or swollen nodes.  NEURO:   No history of headaches, syncope, paralysis, seizures or tremors.  All other reviewed and negative other than HPI.    OBJECTIVE:    /73 (BP Location: Left arm, Patient Position: Sitting)   Pulse 76   Temp 97.7 °F (36.5 °C) (Oral)   Resp 19   Ht 5' 2" (1.575 m)   Wt 64.8 kg (142 lb 13.7 oz)   BMI 26.13 kg/m²     PHYSICAL EXAMINATION:    General appearance: Well appearing, in no acute distress, alert and oriented x3.  Psych:  Mood and affect appropriate.  Skin: Skin color, texture, turgor normal, no rashes or lesions, in both upper and lower body.  Head/face:  Normocephalic, atraumatic. No palpable lymph nodes.  Neck: No pain to palpation over the cervical paraspinous muscles. Spurling Negative. No pain with neck flexion, extension, or lateral flexion.   Cor: RRR by palpation  Pulm: breathing is even and unlabored, equal chest rise  GI:  non-distended  Back: Straight leg raising in the sitting and supine positions is negative to radicular pain. No pain to palpation over the spine or costovertebral angles. Pain with flexion > extension, ROM limited due to pain  Extremities: TTP at bilateral GTB, TTP at right ischium which reproduces pain, TTP at piriformis bilaterally,  but not with provacative maneuvers. Right hip: Slight pain with FADIR on right. Slight TTP at SIJ " bilaterally.  No deformities, edema, or skin discoloration. Good capillary refill.  Musculoskeletal: Shoulder, hip, sacroiliac and knee provocative maneuvers are negative. Bilateral upper and lower extremity strength is normal and symmetric.  No atrophy or tone abnormalities are noted.  Neuro: Bilateral upper and lower extremity coordination and muscle stretch reflexes are physiologic and symmetric.  Plantar response are downgoing. No loss of sensation is noted.  Gait: normal.    ASSESSMENT: 58 y.o. year old female with buttock pain, consistent with      1. Ischial bursitis of right side  X-Ray Hip 2 or 3 views Right (with Pelvis when performed)    Procedure Order to Pain Management      2. Chronic hip pain, unspecified laterality  X-Ray Hip 2 or 3 views Right (with Pelvis when performed)            PLAN:   We discussed with the patient the assessment and recommendations. The following is the plan we agreed on:   - I have stressed the importance of physical activity and a home exercise plan to help with pain and improve health.  - Patient can continue with medications for now since they are providing benefits, using them appropriately, and without side effects.  - Counseled patient regarding the importance of activity modification and physical therapy.  - Will order right hip x-ray  - Will plan for right ischial bursa injection  - RTC 2 weeks after procedure      The above plan and management options were discussed at length with patient. Patient is in agreement with the above and verbalized understanding. It will be communicated with the referring physician via electronic record, fax, or mail.    DAISY GUO  07/19/2023  This encounter took at least 30 minutes spent in chart review, history, physical, image, assessment and plan discussion.    I have personally taken the history and examined this patient and agree with the fellow's note as stated above.

## 2023-07-20 ENCOUNTER — TELEPHONE (OUTPATIENT)
Dept: ADMINISTRATIVE | Facility: OTHER | Age: 58
End: 2023-07-20
Payer: MEDICARE

## 2023-07-23 ENCOUNTER — TELEPHONE (OUTPATIENT)
Dept: ADMINISTRATIVE | Facility: OTHER | Age: 58
End: 2023-07-23
Payer: MEDICARE

## 2023-08-01 ENCOUNTER — PATIENT MESSAGE (OUTPATIENT)
Dept: PAIN MEDICINE | Facility: OTHER | Age: 58
End: 2023-08-01
Payer: MEDICARE

## 2023-08-02 ENCOUNTER — PATIENT MESSAGE (OUTPATIENT)
Dept: PAIN MEDICINE | Facility: OTHER | Age: 58
End: 2023-08-02
Payer: MEDICARE

## 2023-08-02 ENCOUNTER — TELEPHONE (OUTPATIENT)
Dept: PAIN MEDICINE | Facility: OTHER | Age: 58
End: 2023-08-02
Payer: MEDICARE

## 2023-08-02 NOTE — TELEPHONE ENCOUNTER
----- Message from Adeola Smith sent at 8/2/2023  3:43 PM CDT -----  Regarding: Reschedule  Name of Who is Calling: BANDAR FORD [[id number]]           What is the request in detail: Patient is requesting a call back to reschedule procedure.  Patient is going out of town.  Please assist.           Can the clinic reply by [facility]: No           What Number to Call Back [facility]: [phone]

## 2023-08-09 ENCOUNTER — PATIENT MESSAGE (OUTPATIENT)
Dept: ADMINISTRATIVE | Facility: OTHER | Age: 58
End: 2023-08-09
Payer: MEDICARE

## 2023-08-29 ENCOUNTER — LAB VISIT (OUTPATIENT)
Dept: LAB | Facility: HOSPITAL | Age: 58
End: 2023-08-29
Attending: INTERNAL MEDICINE
Payer: MEDICARE

## 2023-08-29 DIAGNOSIS — I10 ESSENTIAL HYPERTENSION: ICD-10-CM

## 2023-08-29 DIAGNOSIS — R73.03 PREDIABETES: ICD-10-CM

## 2023-08-29 DIAGNOSIS — Z00.00 ANNUAL PHYSICAL EXAM: ICD-10-CM

## 2023-08-29 LAB
ALBUMIN SERPL BCP-MCNC: 3.7 G/DL (ref 3.5–5.2)
ALP SERPL-CCNC: 59 U/L (ref 55–135)
ALT SERPL W/O P-5'-P-CCNC: 11 U/L (ref 10–44)
ANION GAP SERPL CALC-SCNC: 11 MMOL/L (ref 8–16)
AST SERPL-CCNC: 14 U/L (ref 10–40)
BASOPHILS # BLD AUTO: 0.02 K/UL (ref 0–0.2)
BASOPHILS NFR BLD: 0.3 % (ref 0–1.9)
BILIRUB SERPL-MCNC: 0.2 MG/DL (ref 0.1–1)
BILIRUB UR QL STRIP: NEGATIVE
BUN SERPL-MCNC: 8 MG/DL (ref 6–20)
CALCIUM SERPL-MCNC: 9.2 MG/DL (ref 8.7–10.5)
CHLORIDE SERPL-SCNC: 107 MMOL/L (ref 95–110)
CHOLEST SERPL-MCNC: 188 MG/DL (ref 120–199)
CHOLEST/HDLC SERPL: 3.4 {RATIO} (ref 2–5)
CLARITY UR REFRACT.AUTO: CLEAR
CO2 SERPL-SCNC: 22 MMOL/L (ref 23–29)
COLOR UR AUTO: YELLOW
CREAT SERPL-MCNC: 1 MG/DL (ref 0.5–1.4)
DIFFERENTIAL METHOD: ABNORMAL
EOSINOPHIL # BLD AUTO: 0.1 K/UL (ref 0–0.5)
EOSINOPHIL NFR BLD: 1.1 % (ref 0–8)
ERYTHROCYTE [DISTWIDTH] IN BLOOD BY AUTOMATED COUNT: 13.3 % (ref 11.5–14.5)
EST. GFR  (NO RACE VARIABLE): >60 ML/MIN/1.73 M^2
ESTIMATED AVG GLUCOSE: 108 MG/DL (ref 68–131)
GLUCOSE SERPL-MCNC: 105 MG/DL (ref 70–110)
GLUCOSE UR QL STRIP: NEGATIVE
HBA1C MFR BLD: 5.4 % (ref 4–5.6)
HCT VFR BLD AUTO: 41.6 % (ref 37–48.5)
HDLC SERPL-MCNC: 55 MG/DL (ref 40–75)
HDLC SERPL: 29.3 % (ref 20–50)
HGB BLD-MCNC: 13 G/DL (ref 12–16)
HGB UR QL STRIP: NEGATIVE
IMM GRANULOCYTES # BLD AUTO: 0.04 K/UL (ref 0–0.04)
IMM GRANULOCYTES NFR BLD AUTO: 0.6 % (ref 0–0.5)
KETONES UR QL STRIP: NEGATIVE
LDLC SERPL CALC-MCNC: 96 MG/DL (ref 63–159)
LEUKOCYTE ESTERASE UR QL STRIP: NEGATIVE
LYMPHOCYTES # BLD AUTO: 1.9 K/UL (ref 1–4.8)
LYMPHOCYTES NFR BLD: 30.7 % (ref 18–48)
MCH RBC QN AUTO: 27.4 PG (ref 27–31)
MCHC RBC AUTO-ENTMCNC: 31.3 G/DL (ref 32–36)
MCV RBC AUTO: 88 FL (ref 82–98)
MONOCYTES # BLD AUTO: 0.4 K/UL (ref 0.3–1)
MONOCYTES NFR BLD: 6.9 % (ref 4–15)
NEUTROPHILS # BLD AUTO: 3.8 K/UL (ref 1.8–7.7)
NEUTROPHILS NFR BLD: 60.4 % (ref 38–73)
NITRITE UR QL STRIP: NEGATIVE
NONHDLC SERPL-MCNC: 133 MG/DL
NRBC BLD-RTO: 0 /100 WBC
PH UR STRIP: 5 [PH] (ref 5–8)
PLATELET # BLD AUTO: 185 K/UL (ref 150–450)
PMV BLD AUTO: 12.4 FL (ref 9.2–12.9)
POTASSIUM SERPL-SCNC: 4 MMOL/L (ref 3.5–5.1)
PROT SERPL-MCNC: 6.6 G/DL (ref 6–8.4)
PROT UR QL STRIP: NEGATIVE
RBC # BLD AUTO: 4.75 M/UL (ref 4–5.4)
SODIUM SERPL-SCNC: 140 MMOL/L (ref 136–145)
SP GR UR STRIP: 1.02 (ref 1–1.03)
TRIGL SERPL-MCNC: 185 MG/DL (ref 30–150)
TSH SERPL DL<=0.005 MIU/L-ACNC: 1.62 UIU/ML (ref 0.4–4)
URN SPEC COLLECT METH UR: NORMAL
WBC # BLD AUTO: 6.26 K/UL (ref 3.9–12.7)

## 2023-08-29 PROCEDURE — 81003 URINALYSIS AUTO W/O SCOPE: CPT | Performed by: INTERNAL MEDICINE

## 2023-08-29 PROCEDURE — 80061 LIPID PANEL: CPT | Performed by: INTERNAL MEDICINE

## 2023-08-29 PROCEDURE — 83036 HEMOGLOBIN GLYCOSYLATED A1C: CPT | Performed by: INTERNAL MEDICINE

## 2023-08-29 PROCEDURE — 84443 ASSAY THYROID STIM HORMONE: CPT | Performed by: INTERNAL MEDICINE

## 2023-08-29 PROCEDURE — 85025 COMPLETE CBC W/AUTO DIFF WBC: CPT | Performed by: INTERNAL MEDICINE

## 2023-08-29 PROCEDURE — 36415 COLL VENOUS BLD VENIPUNCTURE: CPT | Mod: PO | Performed by: INTERNAL MEDICINE

## 2023-08-29 PROCEDURE — 80053 COMPREHEN METABOLIC PANEL: CPT | Performed by: INTERNAL MEDICINE

## 2023-09-18 ENCOUNTER — HOSPITAL ENCOUNTER (OUTPATIENT)
Facility: OTHER | Age: 58
Discharge: HOME | End: 2023-09-18
Attending: ANESTHESIOLOGY | Admitting: ANESTHESIOLOGY
Payer: MEDICARE

## 2023-09-18 VITALS
SYSTOLIC BLOOD PRESSURE: 134 MMHG | HEIGHT: 62 IN | DIASTOLIC BLOOD PRESSURE: 72 MMHG | WEIGHT: 145 LBS | BODY MASS INDEX: 26.68 KG/M2 | OXYGEN SATURATION: 98 % | HEART RATE: 75 BPM | RESPIRATION RATE: 16 BRPM | TEMPERATURE: 98 F

## 2023-09-18 DIAGNOSIS — M70.71 ISCHIAL BURSITIS OF RIGHT SIDE: Primary | ICD-10-CM

## 2023-09-18 DIAGNOSIS — G89.29 CHRONIC PAIN: ICD-10-CM

## 2023-09-18 PROCEDURE — 25500020 PHARM REV CODE 255: Performed by: ANESTHESIOLOGY

## 2023-09-18 PROCEDURE — 20610 DRAIN/INJ JOINT/BURSA W/O US: CPT | Mod: RT | Performed by: ANESTHESIOLOGY

## 2023-09-18 PROCEDURE — 25000003 PHARM REV CODE 250: Performed by: ANESTHESIOLOGY

## 2023-09-18 PROCEDURE — 20610 DRAIN/INJ JOINT/BURSA W/O US: CPT | Mod: RT,,, | Performed by: ANESTHESIOLOGY

## 2023-09-18 PROCEDURE — 77002 NEEDLE LOCALIZATION BY XRAY: CPT | Mod: 26,,, | Performed by: ANESTHESIOLOGY

## 2023-09-18 PROCEDURE — 77002 PR FLUOROSCOPIC GUIDANCE NEEDLE PLACEMENT: ICD-10-PCS | Mod: 26,,, | Performed by: ANESTHESIOLOGY

## 2023-09-18 PROCEDURE — 20610 PR DRAIN/INJECT LARGE JOINT/BURSA: ICD-10-PCS | Mod: RT,,, | Performed by: ANESTHESIOLOGY

## 2023-09-18 PROCEDURE — 63600175 PHARM REV CODE 636 W HCPCS: Performed by: ANESTHESIOLOGY

## 2023-09-18 RX ORDER — LIDOCAINE HYDROCHLORIDE 20 MG/ML
INJECTION, SOLUTION INFILTRATION; PERINEURAL
Status: DISCONTINUED | OUTPATIENT
Start: 2023-09-18 | End: 2023-09-18 | Stop reason: HOSPADM

## 2023-09-18 RX ORDER — ALPRAZOLAM 0.5 MG/1
0.5 TABLET ORAL ONCE AS NEEDED
Status: COMPLETED | OUTPATIENT
Start: 2023-09-18 | End: 2023-09-18

## 2023-09-18 RX ORDER — BUPIVACAINE HYDROCHLORIDE 2.5 MG/ML
INJECTION, SOLUTION EPIDURAL; INFILTRATION; INTRACAUDAL
Status: DISCONTINUED | OUTPATIENT
Start: 2023-09-18 | End: 2023-09-18 | Stop reason: HOSPADM

## 2023-09-18 RX ORDER — SODIUM CHLORIDE 9 MG/ML
INJECTION, SOLUTION INTRAVENOUS CONTINUOUS
Status: DISCONTINUED | OUTPATIENT
Start: 2023-09-18 | End: 2023-09-18 | Stop reason: HOSPADM

## 2023-09-18 RX ORDER — TRIAMCINOLONE ACETONIDE 40 MG/ML
INJECTION, SUSPENSION INTRA-ARTICULAR; INTRAMUSCULAR
Status: DISCONTINUED | OUTPATIENT
Start: 2023-09-18 | End: 2023-09-18 | Stop reason: HOSPADM

## 2023-09-18 RX ADMIN — ALPRAZOLAM 0.5 MG: 0.5 TABLET ORAL at 02:09

## 2023-09-18 NOTE — OP NOTE
Ischial Bursa Injection under Fluoroscopic Guidance    The procedure, risks, benefits, and options were discussed with the patient. There are no contraindications to the procedure. The patent expressed understanding and agreed to the procedure. Informed written consent was obtained prior to the start of the procedure and can be found in the patient's chart.    PATIENT NAME: Sherie Reyes   MRN: [id number]     DATE OF PROCEDURE: 09/18/2023    PROCEDURE: Right Ischial Bursa Injection under Fluoroscopic Guidance    PRE-OP DIAGNOSIS: Ischial bursitis of right side [M70.71]    POST-OP DIAGNOSIS: Same    PHYSICIAN: Yesica Monroy MD    ASSISTANTS: Siddharth Rodriguez MD [facility] Fellow      MEDICATIONS INJECTED: Preservative-free Kenalog 40mg with 7cc of Bupivacine 0.25%     LOCAL ANESTHETIC INJECTED: Xylocaine 2%     SEDATION: None    ESTIMATED BLOOD LOSS: None    COMPLICATIONS: None    TECHNIQUE: Time-out was performed to identify the patient and procedure to be performed. With the patient laying in a prone position, the surgical area was prepped and draped in the usual sterile fashion using ChloraPrep and a fenestrated drape. The area overlying the ischial bursa was determined under fluoroscopy guidance. Skin anesthesia was achieved by injecting Lidocaine 2% over the injection sites. The ischial bursa was then approached with a 22 gauge, 3.5 inch spinal quinke needle that was introduced under fluoroscopic guidance in the AP view. Once the needle tip was in the area of the bursa, and there was no blood aspiration,  Contrast dye  Omnipaque (300mg/mL) was injected to confirm placement and there was no vascular runoff. 4 mL of the medication mixture listed above was injected slowly. The needles were removed and bleeding was nil. A sterile dressing was applied. No specimens collected. The patient tolerated the procedure well.    PRE-PROCEDURE PAIN SCORE: 10/10    POST-PROCEDURE PAIN SCORE: 6/10    The patient was monitored  after the procedure in the recovery area. They were given post-procedure and discharge instructions to follow at home. The patient was discharged in a stable condition.    Austin Manning MD    I reviewed and edited the fellow's note. I conducted my own interview and physical examination. I agree with the findings. I was present and supervising all critical portions of the procedure.

## 2023-09-18 NOTE — DISCHARGE INSTRUCTIONS

## 2023-09-18 NOTE — H&P
HPI  Patient presenting for Procedure(s) (LRB):  INJECTION, JOINT, RIGHT ISCHIAL BURSA  SOONER DATE (Right)     Patient on Anti-coagulation No    No health changes since previous encounter    Past Medical History:   Diagnosis Date    Anxiety     Degenerative joint disease (DJD) of hip     Diabetes type 2, controlled 7/10/2019    GERD (gastroesophageal reflux disease)     Hyperlipidemia     Hypertension     IBS (irritable bowel syndrome)     Insomnia     Lumbar disc herniation     PTSD (post-traumatic stress disorder)      Past Surgical History:   Procedure Laterality Date    ADENOIDECTOMY      ARTHROSCOPIC DEBRIDEMENT OF ROTATOR CUFF Right 2020    Procedure: DEBRIDEMENT, ROTATOR CUFF, ARTHROSCOPIC;  Surgeon: Melchor Hughes Jr., MD;  Location: [facility] OR;  Service: Orthopedics;  Laterality: Right;  need opus system (Ger MESA notified , EF)  video, notified/confirmed 2020 KB 1310    ARTHROSCOPIC EXCISION OF ACROMIOCLAVICULAR JOINT  2020    Procedure: EXCISION, ACROMIOCLAVICULAR JOINT, ARTHROSCOPIC;  Surgeon: Melchor Hughes Jr., MD;  Location: [facility] OR;  Service: Orthopedics;;    ARTHROSCOPIC REPAIR OF ROTATOR CUFF OF SHOULDER      ARTHROSCOPY OF SHOULDER WITH DECOMPRESSION OF SUBACROMIAL SPACE  2020    Procedure: ARTHROSCOPY, SHOULDER, WITH SUBACROMIAL SPACE DECOMPRESSION;  Surgeon: Melchor Hughes Jr., MD;  Location: [facility] OR;  Service: Orthopedics;;     SECTION      EPIDURAL STEROID INJECTION INTO CERVICAL SPINE N/A 2021    Procedure: Injection-steroid-epidural-cervical--C7-T1 IL ZAYNAB;  Surgeon: Gregoria Rodriguez MD;  Location: [facility] PAIN MGT;  Service: Pain Management;  Laterality: N/A;    ESOPHAGOGASTRODUODENOSCOPY N/A 2023    Procedure: ESOPHAGOGASTRODUODENOSCOPY (EGD);  Surgeon: Tommy Schmitt MD;  [facility]  Service: Endoscopy;  Laterality: N/A;  referral Navjot HENRY-instr portal-GT    TENOTOMY, ELBOW, WITH DEBRIDEMENT AND TENDON REPAIR Right 2022     Procedure: TENOTOMY,ELBOW,WITH DEBRIDEMENT AND TENDON REPAIR;  Surgeon: Melchor Hughes Jr., MD;  Location: [facility] OR;  Service: Orthopedics;  Laterality: Right;    TONSILLECTOMY       Review of patient's allergies indicates:  No Known Allergies   Current Facility-Administered Medications   Medication    0.9%  NaCl infusion     Facility-Administered Medications Ordered in Other Encounters   Medication    0.9%  NaCl infusion       PMHx, PSHx, Allergies, Medications reviewed in epic    ROS negative except pain complaints in HPI    OBJECTIVE:    Breastfeeding No     PHYSICAL EXAMINATION:    GENERAL: Well appearing, in no acute distress, alert and oriented x3.  PSYCH:  Mood and affect appropriate.  SKIN: Skin color, texture, turgor normal, no rashes or lesions which will impact the procedure.  CV: RRR with palpation of the radial artery.  PULM: No evidence of respiratory difficulty, symmetric chest rise. Clear to auscultation.  NEURO: Cranial nerves grossly intact.    Plan:    Proceed with procedure as planned Procedure(s) (LRB):  INJECTION, JOINT, RIGHT ISCHIAL BURSA  SOONER DATE (Right)    Austin Manning  09/18/2023

## 2023-09-18 NOTE — DISCHARGE SUMMARY
Discharge Note  Short Stay      SUMMARY     Admit Date: 9/18/2023    Attending Physician: Yesica Monroy       Discharge Physician: Austin Manning      Discharge Date: 9/18/2023 3:01 PM    Procedure(s) (LRB):  INJECTION, JOINT, RIGHT ISCHIAL BURSA  SOONER DATE (Right)    Final Diagnosis: Ischial bursitis of right side [M70.71]    Disposition: Home or self care    Patient Instructions:   Current Discharge Medication List        CONTINUE these medications which have NOT CHANGED    Details   acetaminophen (TYLENOL) 500 MG tablet Take 500 mg by mouth every 6 (six) hours as needed for Pain.      albuterol (PROAIR HFA) 90 mcg/actuation inhaler Inhale 2 puffs into the lungs every 6 (six) hours as needed for Wheezing. Rescue  Qty: 18 g, Refills: 0      azelastine (ASTELIN) 137 mcg (0.1 %) nasal spray 1 spray (137 mcg total) by Nasal route 2 (two) times daily.  Qty: 30 mL, Refills: 0      cetirizine (ZYRTEC) 10 MG tablet Take 2 tablets (20 mg total) by mouth every morning.  Qty: 60 tablet, Refills: 3    Associated Diagnoses: Pruritic condition      chlorzoxazone (PARAFON FORTE) 500 mg Tab Take 500 mg by mouth daily as needed.      clonazePAM (KLONOPIN) 1 MG tablet TAKE 1 TABLET BY MOUTH TWICE DAILY  Qty: 60 tablet, Refills: 1      cyanocobalamin 1,000 mcg/mL injection Inject 1 mL (1,000 mcg total) into the muscle every 30 days.  Qty: 30 mL, Refills: 1      cyclobenzaprine (FLEXERIL) 10 MG tablet Take 10 mg by mouth 2 (two) times daily as needed.      diclofenac sodium (VOLTAREN) 1 % Gel Apply 2 g topically 4 (four) times daily.  Qty: 4 each, Refills: 2    Associated Diagnoses: Chronic right shoulder pain; Tear of right rotator cuff, unspecified tear extent, unspecified whether traumatic; Pain      dicyclomine (BENTYL) 10 MG capsule TAKE 1 CAPSULE BY MOUTH FOUR TIMES DAILY AS NEEDED FOR ABDOMINAL PAIN  Qty: 120 capsule, Refills: 1    Associated Diagnoses: Abdominal pain, unspecified abdominal location      estradioL (ESTRACE) 1  MG tablet Take 1 mg by mouth once daily.      hydrocortisone 2.5 % cream Apply to affected areas twice a day when eczema is moderate.  Qty: 453.6 g, Refills: 1    Associated Diagnoses: Rash      hydrOXYzine HCL (ATARAX) 25 MG tablet 1 to 8 tablets at bedtime.  Start with 2 tablets.  Increase or decrease as needed until itching is controlled or a maximum of 8 tablets.  Qty: 240 tablet, Refills: 3    Associated Diagnoses: Pruritic condition      LINZESS 290 mcg Cap capsule TAKE 1 CAPSULE(290 MCG) BY MOUTH BEFORE BREAKFAST  Qty: 90 capsule, Refills: 3    Comments: ZERO refills remain on this prescription. Your patient is requesting advance approval of refills for this medication to PREVENT ANY MISSED DOSES  Associated Diagnoses: Constipation, unspecified constipation type      losartan (COZAAR) 50 MG tablet TAKE 1 TABLET(50 MG) BY MOUTH EVERY DAY  Qty: 90 tablet, Refills: 0    Comments: ZERO refills remain on this prescription. Your patient is requesting advance approval of refills for this medication to PREVENT ANY MISSED DOSES  Associated Diagnoses: Essential hypertension      melatonin 5 mg Chew Take 10 mg by mouth daily as needed.      meloxicam (MOBIC) 15 MG tablet Take 15 mg by mouth daily as needed.      mupirocin (BACTROBAN) 2 % ointment APPLY TOPICALLY TO THE AFFECTED AREA TWICE DAILY  Qty: 15 g, Refills: 0      mv-mn/iron/folic acid/herb 190 (VITAMIN D3 COMPLETE ORAL) Take by mouth once daily at 6am.      omega-3 fatty acids/fish oil (FISH OIL-OMEGA-3 FATTY ACIDS) 300-1,000 mg capsule Take 1 capsule by mouth once daily.      omeprazole (PRILOSEC) 40 MG capsule TAKE 1 CAPSULE(40 MG) BY MOUTH EVERY DAY  Qty: 30 capsule, Refills: 11    Comments: ZERO refills remain on this prescription. Your patient is requesting advance approval of refills for this medication to PREVENT ANY MISSED DOSES      POLYETHYLENE GLYCOL 3350 (MIRALAX ORAL) Take by mouth as needed.       potassium chloride 10% (KAYCIEL) 20 mEq/15 mL oral  solution TAKE 15 ML BY MOUTH EVERY DAY  Qty: 473 mL, Refills: 11      pravastatin (PRAVACHOL) 20 MG tablet TAKE 1 TABLET(20 MG) BY MOUTH EVERY EVENING  Qty: 90 tablet, Refills: 1    Comments: ZERO refills remain on this prescription. Your patient is requesting advance approval of refills for this medication to PREVENT ANY MISSED DOSES  Associated Diagnoses: Mixed hyperlipidemia      QUEtiapine (SEROQUEL) 200 MG Tab Take 1 tablet (200 mg total) by mouth every evening.  Qty: 30 tablet, Refills: 2      tiZANidine (ZANAFLEX) 4 MG tablet Take 4 mg by mouth 2 (two) times daily as needed.      traZODone (DESYREL) 100 MG tablet TK 1 T PO  QHS      !! venlafaxine (EFFEXOR-XR) 150 MG Cp24 Take 150 mg by mouth once daily.      !! venlafaxine (EFFEXOR-XR) 75 MG 24 hr capsule TAKE ONE CAPSULE BY MOUTH DAILY WITH 150 MG TO EQUAL 225 MG      ZTLIDO 1.8 % PtMd Apply 1 patch topically daily as needed.       !! - Potential duplicate medications found. Please discuss with provider.              Discharge Diagnosis: Ischial bursitis of right side [M70.71]  Condition on Discharge: Stable with no complications to procedure   Diet on Discharge: Same as before.  Activity: as per instruction sheet.  Discharge to: Home with a responsible adult.  Follow up: 2-4 weeks       Please call my office or pager at [phone] if experienced any weakness or loss of sensation, fever > 101.5, pain uncontrolled with oral medications, persistent nausea/vomiting/or diarrhea, redness or drainage from the incisions, or any other worrisome concerns. If physician on call was not reached or could not communicate with our office for any reason please go to the nearest emergency department

## 2023-09-23 ENCOUNTER — TELEPHONE (OUTPATIENT)
Dept: INTERNAL MEDICINE | Facility: CLINIC | Age: 58
End: 2023-09-23
Payer: MEDICARE

## 2023-09-26 RX ORDER — MUPIROCIN 20 MG/G
OINTMENT TOPICAL
Qty: 22 G | OUTPATIENT
Start: 2023-09-26

## 2023-09-26 NOTE — TELEPHONE ENCOUNTER
Refill Routing Note   Medication(s) are not appropriate for processing by [facility] for the following reason(s):      Medication outside of protocol    [facility] action(s):  Route Care Due:  None identified            Appointments  past 12m or future 3m with PCP    Date Provider   Last Visit   2/7/2023 Ariana Morfin NP   Next Visit   Visit date not found Ariana Morfni NP   ED visits in past 90 days: 0        Note composed:9:57 AM 09/26/2023

## 2023-09-27 DIAGNOSIS — M25.519 SHOULDER PAIN, UNSPECIFIED CHRONICITY, UNSPECIFIED LATERALITY: Primary | ICD-10-CM

## 2023-09-28 ENCOUNTER — HOSPITAL ENCOUNTER (OUTPATIENT)
Dept: RADIOLOGY | Facility: HOSPITAL | Age: 58
Discharge: HOME | End: 2023-09-28
Attending: ORTHOPAEDIC SURGERY
Payer: MEDICARE

## 2023-09-28 ENCOUNTER — OFFICE VISIT (OUTPATIENT)
Dept: ORTHOPEDICS | Facility: CLINIC | Age: 58
End: 2023-09-28
Payer: MEDICARE

## 2023-09-28 VITALS — BODY MASS INDEX: 26.52 KG/M2 | HEIGHT: 62 IN

## 2023-09-28 DIAGNOSIS — M25.512 CHRONIC PAIN OF BOTH SHOULDERS: Primary | ICD-10-CM

## 2023-09-28 DIAGNOSIS — M25.511 CHRONIC PAIN OF BOTH SHOULDERS: Primary | ICD-10-CM

## 2023-09-28 DIAGNOSIS — G89.29 CHRONIC PAIN OF BOTH SHOULDERS: Primary | ICD-10-CM

## 2023-09-28 DIAGNOSIS — M25.519 SHOULDER PAIN, UNSPECIFIED CHRONICITY, UNSPECIFIED LATERALITY: ICD-10-CM

## 2023-09-28 PROCEDURE — 99999 PR PBB SHADOW E&M-EST. PATIENT-LVL IV: CPT | Mod: PBBFAC,,, | Performed by: ORTHOPAEDIC SURGERY

## 2023-09-28 PROCEDURE — 3008F PR BODY MASS INDEX (BMI) DOCUMENTED: ICD-10-PCS | Mod: CPTII,S$GLB,, | Performed by: ORTHOPAEDIC SURGERY

## 2023-09-28 PROCEDURE — 99999 PR PBB SHADOW E&M-EST. PATIENT-LVL IV: ICD-10-PCS | Mod: PBBFAC,,, | Performed by: ORTHOPAEDIC SURGERY

## 2023-09-28 PROCEDURE — 20610 PR DRAIN/INJECT LARGE JOINT/BURSA: ICD-10-PCS | Mod: 50,S$GLB,, | Performed by: ORTHOPAEDIC SURGERY

## 2023-09-28 PROCEDURE — 3044F PR MOST RECENT HEMOGLOBIN A1C LEVEL <7.0%: ICD-10-PCS | Mod: CPTII,S$GLB,, | Performed by: ORTHOPAEDIC SURGERY

## 2023-09-28 PROCEDURE — 3044F HG A1C LEVEL LT 7.0%: CPT | Mod: CPTII,S$GLB,, | Performed by: ORTHOPAEDIC SURGERY

## 2023-09-28 PROCEDURE — 1159F PR MEDICATION LIST DOCUMENTED IN MEDICAL RECORD: ICD-10-PCS | Mod: CPTII,S$GLB,, | Performed by: ORTHOPAEDIC SURGERY

## 2023-09-28 PROCEDURE — 73030 X-RAY EXAM OF SHOULDER: CPT | Mod: 26,50,, | Performed by: RADIOLOGY

## 2023-09-28 PROCEDURE — 4010F ACE/ARB THERAPY RXD/TAKEN: CPT | Mod: CPTII,S$GLB,, | Performed by: ORTHOPAEDIC SURGERY

## 2023-09-28 PROCEDURE — 73030 X-RAY EXAM OF SHOULDER: CPT | Mod: TC,50,PN

## 2023-09-28 PROCEDURE — 4010F PR ACE/ARB THEARPY RXD/TAKEN: ICD-10-PCS | Mod: CPTII,S$GLB,, | Performed by: ORTHOPAEDIC SURGERY

## 2023-09-28 PROCEDURE — 99213 PR OFFICE/OUTPT VISIT, EST, LEVL III, 20-29 MIN: ICD-10-PCS | Mod: 25,S$GLB,, | Performed by: ORTHOPAEDIC SURGERY

## 2023-09-28 PROCEDURE — 73030 XR SHOULDER COMPLETE 2 OR MORE VIEWS BILATERAL: ICD-10-PCS | Mod: 26,50,, | Performed by: RADIOLOGY

## 2023-09-28 PROCEDURE — 3008F BODY MASS INDEX DOCD: CPT | Mod: CPTII,S$GLB,, | Performed by: ORTHOPAEDIC SURGERY

## 2023-09-28 PROCEDURE — 99213 OFFICE O/P EST LOW 20 MIN: CPT | Mod: 25,S$GLB,, | Performed by: ORTHOPAEDIC SURGERY

## 2023-09-28 PROCEDURE — 20610 DRAIN/INJ JOINT/BURSA W/O US: CPT | Mod: 50,S$GLB,, | Performed by: ORTHOPAEDIC SURGERY

## 2023-09-28 PROCEDURE — 1159F MED LIST DOCD IN RCRD: CPT | Mod: CPTII,S$GLB,, | Performed by: ORTHOPAEDIC SURGERY

## 2023-09-28 RX ORDER — CELECOXIB 200 MG/1
200 CAPSULE ORAL DAILY
Qty: 30 CAPSULE | Refills: 2 | Status: SHIPPED | OUTPATIENT
Start: 2023-09-28 | End: 2023-11-24

## 2023-09-28 RX ORDER — TRIAMCINOLONE ACETONIDE 40 MG/ML
40 INJECTION, SUSPENSION INTRA-ARTICULAR; INTRAMUSCULAR
Status: COMPLETED | OUTPATIENT
Start: 2023-09-28 | End: 2023-09-28

## 2023-09-28 RX ORDER — GABAPENTIN 600 MG/1
1 TABLET ORAL
COMMUNITY
End: 2023-09-28

## 2023-09-28 RX ORDER — BUPROPION HYDROCHLORIDE 150 MG/1
150 TABLET ORAL EVERY MORNING
COMMUNITY
Start: 2023-09-28 | End: 2023-11-02

## 2023-09-28 RX ADMIN — TRIAMCINOLONE ACETONIDE 40 MG: 40 INJECTION, SUSPENSION INTRA-ARTICULAR; INTRAMUSCULAR at 01:09

## 2023-09-28 NOTE — PROGRESS NOTES
Subjective:      Patient ID: Sherie Reyes is a 58 y.o. female.    Chief Complaint: Shoulder Pain (Bilateral shoulder c/o pain and difficulty with range of motion)      HPI  hSerie Reyes is a  58 y.o. female presenting today for shoulder pain bilateral shoulders.  There was not a history of trauma.  Onset of symptoms began several months ago   Initially the right shoulder was hurting more now the left shoulder is hurting more   She reports pain with overhead use some pain at night   No numbness or tingling reported   She has had previous shoulder surgery in the past some years ago   .      Review of patient's allergies indicates:  No Known Allergies      Current Outpatient Medications   Medication Sig Dispense Refill    acetaminophen (TYLENOL) 500 MG tablet Take 500 mg by mouth every 6 (six) hours as needed for Pain.      albuterol (PROAIR HFA) 90 mcg/actuation inhaler Inhale 2 puffs into the lungs every 6 (six) hours as needed for Wheezing. Rescue 18 g 0    azelastine (ASTELIN) 137 mcg (0.1 %) nasal spray 1 spray (137 mcg total) by Nasal route 2 (two) times daily. 30 mL 0    buPROPion (WELLBUTRIN XL) 150 MG TB24 tablet Take 150 mg by mouth every morning.      cetirizine (ZYRTEC) 10 MG tablet Take 2 tablets (20 mg total) by mouth every morning. 60 tablet 3    chlorzoxazone (PARAFON FORTE) 500 mg Tab Take 500 mg by mouth daily as needed.      clonazePAM (KLONOPIN) 1 MG tablet TAKE 1 TABLET BY MOUTH TWICE DAILY 60 tablet 1    cyanocobalamin 1,000 mcg/mL injection Inject 1 mL (1,000 mcg total) into the muscle every 30 days. 30 mL 1    cyclobenzaprine (FLEXERIL) 10 MG tablet Take 10 mg by mouth 2 (two) times daily as needed.      diclofenac sodium (VOLTAREN) 1 % Gel Apply 2 g topically 4 (four) times daily. 4 each 2    dicyclomine (BENTYL) 10 MG capsule TAKE 1 CAPSULE BY MOUTH FOUR TIMES DAILY AS NEEDED FOR ABDOMINAL PAIN 120 capsule 1    estradioL (ESTRACE) 1 MG tablet Take 1 mg by mouth once daily.       hydrocortisone 2.5 % cream Apply to affected areas twice a day when eczema is moderate. 453.6 g 1    hydrOXYzine HCL (ATARAX) 25 MG tablet 1 to 8 tablets at bedtime.  Start with 2 tablets.  Increase or decrease as needed until itching is controlled or a maximum of 8 tablets. 240 tablet 3    LINZESS 290 mcg Cap capsule TAKE 1 CAPSULE(290 MCG) BY MOUTH BEFORE BREAKFAST 90 capsule 3    losartan (COZAAR) 50 MG tablet TAKE 1 TABLET(50 MG) BY MOUTH EVERY DAY 90 tablet 0    melatonin 5 mg Chew Take 10 mg by mouth daily as needed.      meloxicam (MOBIC) 15 MG tablet Take 15 mg by mouth daily as needed.      mupirocin (BACTROBAN) 2 % ointment APPLY TOPICALLY TO THE AFFECTED AREA TWICE DAILY 15 g 0    mv-mn/iron/folic acid/herb 190 (VITAMIN D3 COMPLETE ORAL) Take by mouth once daily at 6am.      omega-3 fatty acids/fish oil (FISH OIL-OMEGA-3 FATTY ACIDS) 300-1,000 mg capsule Take 1 capsule by mouth once daily.      omeprazole (PRILOSEC) 40 MG capsule TAKE 1 CAPSULE(40 MG) BY MOUTH EVERY DAY 30 capsule 11    POLYETHYLENE GLYCOL 3350 (MIRALAX ORAL) Take by mouth as needed.       potassium chloride 10% (KAYCIEL) 20 mEq/15 mL oral solution TAKE 15 ML BY MOUTH EVERY  mL 11    pravastatin (PRAVACHOL) 20 MG tablet TAKE 1 TABLET(20 MG) BY MOUTH EVERY EVENING 90 tablet 1    traZODone (DESYREL) 100 MG tablet TK 1 T PO  QHS      venlafaxine (EFFEXOR-XR) 150 MG Cp24 Take 150 mg by mouth once daily.      venlafaxine (EFFEXOR-XR) 75 MG 24 hr capsule TAKE ONE CAPSULE BY MOUTH DAILY WITH 150 MG TO EQUAL 225 MG      ZTLIDO 1.8 % PtMd Apply 1 patch topically daily as needed.      celecoxib (CELEBREX) 200 MG capsule Take 1 capsule (200 mg total) by mouth once daily. 30 capsule 2    QUEtiapine (SEROQUEL) 200 MG Tab Take 1 tablet (200 mg total) by mouth every evening. 30 tablet 2     No current facility-administered medications for this visit.     Facility-Administered Medications Ordered in Other Visits   Medication Dose Route  Frequency Provider Last Rate Last Admin    0.9%  NaCl infusion   Intravenous Continuous Gregoria Rodriguez MD           Past Medical History:   Diagnosis Date    Anxiety     Degenerative joint disease (DJD) of hip     Diabetes type 2, controlled 7/10/2019    GERD (gastroesophageal reflux disease)     Hyperlipidemia     Hypertension     IBS (irritable bowel syndrome)     Insomnia     Lumbar disc herniation     PTSD (post-traumatic stress disorder)        Past Surgical History:   Procedure Laterality Date    ADENOIDECTOMY      ARTHROSCOPIC DEBRIDEMENT OF ROTATOR CUFF Right 2020    Procedure: DEBRIDEMENT, ROTATOR CUFF, ARTHROSCOPIC;  Surgeon: Melchor Hughes Jr., MD;  Location: [facility] OR;  Service: Orthopedics;  Laterality: Right;  need opus system (Ger MESA notified , EF)  video, notified/confirmed 2020 KB 1310    ARTHROSCOPIC EXCISION OF ACROMIOCLAVICULAR JOINT  2020    Procedure: EXCISION, ACROMIOCLAVICULAR JOINT, ARTHROSCOPIC;  Surgeon: Melchor Hughes Jr., MD;  Location: [facility] OR;  Service: Orthopedics;;    ARTHROSCOPIC REPAIR OF ROTATOR CUFF OF SHOULDER      ARTHROSCOPY OF SHOULDER WITH DECOMPRESSION OF SUBACROMIAL SPACE  2020    Procedure: ARTHROSCOPY, SHOULDER, WITH SUBACROMIAL SPACE DECOMPRESSION;  Surgeon: Melchor Hughes Jr., MD;  Location: [facility] OR;  Service: Orthopedics;;     SECTION      EPIDURAL STEROID INJECTION INTO CERVICAL SPINE N/A 2021    Procedure: Injection-steroid-epidural-cervical--C7-T1 IL ZAYNAB;  Surgeon: Gregoria Rodriguez MD;  Location: [facility] PAIN MGT;  Service: Pain Management;  Laterality: N/A;    ESOPHAGOGASTRODUODENOSCOPY N/A 2023    Procedure: ESOPHAGOGASTRODUODENOSCOPY (EGD);  Surgeon: Tommy Schmitt MD;  [facility]  Service: Endoscopy;  Laterality: N/A;  referral Navjot NP-instr portal-GT    INJECTION OF JOINT Right 2023    Procedure: INJECTION, JOINT, RIGHT ISCHIAL BURSA  SOONER DATE;  Surgeon: Yesica Monroy MD;  Location:  "[facility] PAIN MGT;  Service: Pain Management;  Laterality: Right;    TENOTOMY, ELBOW, WITH DEBRIDEMENT AND TENDON REPAIR Right 5/20/2022    Procedure: TENOTOMY,ELBOW,WITH DEBRIDEMENT AND TENDON REPAIR;  Surgeon: Melchor Hughes Jr., MD;  Location: [facility];  Service: Orthopedics;  Laterality: Right;    TONSILLECTOMY         Review of Systems:  ROS    OBJECTIVE:     PHYSICAL EXAM:  Height: 5' 2" (157.5 cm)    Vitals:    09/28/23 1325   Height: 5' 2" (1.575 m)   PainSc:   8   PainLoc: Shoulder     Well developed, well nourished female in no acute distress  Alert and oriented x 3  HEENT- Normal exam  Lungs- Clear to auscultation  Heart- Regular rate and rhythm  Abdomen- Soft nontender  Extremity exam- examination of the shoulders both shoulders are nontender no bruising no swelling   Range of motion full bilaterally impingement sign is positive bilateral she has limited motion left shoulder due to pain   No instability   Rotator cuff strength a little bit weak on the left       RADIOGRAPHS:  AP lateral x-rays bilateral shoulders demonstrates some mild degenerative changes  Comments: I have personally reviewed the imaging and I agree with the above radiologist's report.    ASSESSMENT/PLAN:     IMPRESSION:  1. Bilateral shoulder pain related to impingement.      2. Possible rotator cuff tear left shoulder    PLAN:  I explained the nature of the problem to the patient   Recommended injections  After pause for time-out identified each shoulder injected bilaterally with combination Kenalog 40 mg 2 cc xylocaine sterile technique   Tolerated the procedure well without complication  I have also started her on Celebrex 200 mg once a day with food  Follow-up 4-6 weeks   If symptoms in the left shoulder do not improve then I would recommend an MRI scan of the left shoulder       - We talked at length about the anatomy and pathophysiology of   Encounter Diagnosis   Name Primary?    Chronic pain of both shoulders Yes "           Disclaimer: This note has been generated using voice-recognition software. There may be typographical errors that have been missed during proof-reading.

## 2023-10-02 RX ORDER — MUPIROCIN 20 MG/G
OINTMENT TOPICAL 2 TIMES DAILY
Qty: 15 G | Refills: 2 | Status: SHIPPED | OUTPATIENT
Start: 2023-10-02 | End: 2023-12-07

## 2023-10-02 NOTE — TELEPHONE ENCOUNTER
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 I use this medicine every time I have an infection break out. I have not had a hospital visit in many years due to this medicine.

## 2023-11-02 ENCOUNTER — OFFICE VISIT (OUTPATIENT)
Dept: INTERNAL MEDICINE | Facility: CLINIC | Age: 58
End: 2023-11-02
Payer: MEDICARE

## 2023-11-02 VITALS
OXYGEN SATURATION: 98 % | HEIGHT: 62 IN | HEART RATE: 87 BPM | BODY MASS INDEX: 26.25 KG/M2 | RESPIRATION RATE: 20 BRPM | WEIGHT: 142.63 LBS | TEMPERATURE: 97 F | SYSTOLIC BLOOD PRESSURE: 108 MMHG | DIASTOLIC BLOOD PRESSURE: 68 MMHG

## 2023-11-02 DIAGNOSIS — R63.4 UNINTENTIONAL WEIGHT LOSS: ICD-10-CM

## 2023-11-02 DIAGNOSIS — N18.30 CKD STAGE 3 SECONDARY TO DIABETES: ICD-10-CM

## 2023-11-02 DIAGNOSIS — M25.511 CHRONIC PAIN OF BOTH SHOULDERS: ICD-10-CM

## 2023-11-02 DIAGNOSIS — F32.A ANXIETY AND DEPRESSION: ICD-10-CM

## 2023-11-02 DIAGNOSIS — M25.512 CHRONIC PAIN OF BOTH SHOULDERS: ICD-10-CM

## 2023-11-02 DIAGNOSIS — E78.5 HYPERLIPIDEMIA DUE TO TYPE 2 DIABETES MELLITUS: ICD-10-CM

## 2023-11-02 DIAGNOSIS — E11.69 HYPERLIPIDEMIA DUE TO TYPE 2 DIABETES MELLITUS: ICD-10-CM

## 2023-11-02 DIAGNOSIS — I15.2 HYPERTENSION ASSOCIATED WITH DIABETES: Primary | ICD-10-CM

## 2023-11-02 DIAGNOSIS — F41.9 ANXIETY AND DEPRESSION: ICD-10-CM

## 2023-11-02 DIAGNOSIS — J84.10 CALCIFIED GRANULOMA OF LUNG: ICD-10-CM

## 2023-11-02 DIAGNOSIS — G89.29 CHRONIC PAIN OF BOTH SHOULDERS: ICD-10-CM

## 2023-11-02 DIAGNOSIS — E11.22 CKD STAGE 3 SECONDARY TO DIABETES: ICD-10-CM

## 2023-11-02 DIAGNOSIS — Z79.890 HORMONE REPLACEMENT THERAPY: ICD-10-CM

## 2023-11-02 DIAGNOSIS — Z00.00 ANNUAL PHYSICAL EXAM: ICD-10-CM

## 2023-11-02 DIAGNOSIS — E11.59 HYPERTENSION ASSOCIATED WITH DIABETES: Primary | ICD-10-CM

## 2023-11-02 DIAGNOSIS — E11.9 CONTROLLED TYPE 2 DIABETES MELLITUS WITHOUT COMPLICATION, WITHOUT LONG-TERM CURRENT USE OF INSULIN: ICD-10-CM

## 2023-11-02 DIAGNOSIS — M51.16 RADICULOPATHY DUE TO LUMBAR INTERVERTEBRAL DISC DISORDER: ICD-10-CM

## 2023-11-02 PROCEDURE — 3074F PR MOST RECENT SYSTOLIC BLOOD PRESSURE < 130 MM HG: ICD-10-PCS | Mod: CPTII,S$GLB,, | Performed by: INTERNAL MEDICINE

## 2023-11-02 PROCEDURE — 99999 PR PBB SHADOW E&M-EST. PATIENT-LVL V: ICD-10-PCS | Mod: PBBFAC,,, | Performed by: INTERNAL MEDICINE

## 2023-11-02 PROCEDURE — 3078F PR MOST RECENT DIASTOLIC BLOOD PRESSURE < 80 MM HG: ICD-10-PCS | Mod: CPTII,S$GLB,, | Performed by: INTERNAL MEDICINE

## 2023-11-02 PROCEDURE — 3074F SYST BP LT 130 MM HG: CPT | Mod: CPTII,S$GLB,, | Performed by: INTERNAL MEDICINE

## 2023-11-02 PROCEDURE — 3008F PR BODY MASS INDEX (BMI) DOCUMENTED: ICD-10-PCS | Mod: CPTII,S$GLB,, | Performed by: INTERNAL MEDICINE

## 2023-11-02 PROCEDURE — 1159F PR MEDICATION LIST DOCUMENTED IN MEDICAL RECORD: ICD-10-PCS | Mod: CPTII,S$GLB,, | Performed by: INTERNAL MEDICINE

## 2023-11-02 PROCEDURE — 99214 PR OFFICE/OUTPT VISIT, EST, LEVL IV, 30-39 MIN: ICD-10-PCS | Mod: S$GLB,,, | Performed by: INTERNAL MEDICINE

## 2023-11-02 PROCEDURE — 1159F MED LIST DOCD IN RCRD: CPT | Mod: CPTII,S$GLB,, | Performed by: INTERNAL MEDICINE

## 2023-11-02 PROCEDURE — 4010F PR ACE/ARB THEARPY RXD/TAKEN: ICD-10-PCS | Mod: CPTII,S$GLB,, | Performed by: INTERNAL MEDICINE

## 2023-11-02 PROCEDURE — 3078F DIAST BP <80 MM HG: CPT | Mod: CPTII,S$GLB,, | Performed by: INTERNAL MEDICINE

## 2023-11-02 PROCEDURE — 4010F ACE/ARB THERAPY RXD/TAKEN: CPT | Mod: CPTII,S$GLB,, | Performed by: INTERNAL MEDICINE

## 2023-11-02 PROCEDURE — 1160F PR REVIEW ALL MEDS BY PRESCRIBER/CLIN PHARMACIST DOCUMENTED: ICD-10-PCS | Mod: CPTII,S$GLB,, | Performed by: INTERNAL MEDICINE

## 2023-11-02 PROCEDURE — 3044F HG A1C LEVEL LT 7.0%: CPT | Mod: CPTII,S$GLB,, | Performed by: INTERNAL MEDICINE

## 2023-11-02 PROCEDURE — 99214 OFFICE O/P EST MOD 30 MIN: CPT | Mod: S$GLB,,, | Performed by: INTERNAL MEDICINE

## 2023-11-02 PROCEDURE — 3008F BODY MASS INDEX DOCD: CPT | Mod: CPTII,S$GLB,, | Performed by: INTERNAL MEDICINE

## 2023-11-02 PROCEDURE — 3044F PR MOST RECENT HEMOGLOBIN A1C LEVEL <7.0%: ICD-10-PCS | Mod: CPTII,S$GLB,, | Performed by: INTERNAL MEDICINE

## 2023-11-02 PROCEDURE — 99999 PR PBB SHADOW E&M-EST. PATIENT-LVL V: CPT | Mod: PBBFAC,,, | Performed by: INTERNAL MEDICINE

## 2023-11-02 PROCEDURE — 1160F RVW MEDS BY RX/DR IN RCRD: CPT | Mod: CPTII,S$GLB,, | Performed by: INTERNAL MEDICINE

## 2023-11-02 RX ORDER — LOSARTAN POTASSIUM 25 MG/1
50 TABLET ORAL DAILY
Qty: 90 TABLET | Refills: 1 | Status: SHIPPED | OUTPATIENT
Start: 2023-11-02 | End: 2024-01-02

## 2023-11-02 NOTE — PROGRESS NOTES
Subjective:     PCP: Farhana Whittington MD    Sherie Reyes is a 58 y.o. female who presents for an annual exam.    Hypertension: The patient has been taking medications as instructed, no medication side effects noted, no chest pain on exertion, no dyspnea on exertion, and no swelling of ankles. She reports that BP fluctuates and SBP increases to 140's at times.    BP Readings from Last 3 Encounters:   11/02/23 108/68   09/18/23 134/72   07/19/23 113/73     Hyperlipidemia: Compliance with treatment has been good. The patient exercises never. Patient denies muscle pain associated with his medications. Previous history of cardiac disease includes: none.    Lab Results   Component Value Date    CHOL 188 08/29/2023    HDL 55 08/29/2023        Anxiety & Depression: She presents for follow up of anxiety disorder and depression. Current symptoms: none. She denies current suicidal and homicidal ideation. She complains of the following side effects from the treatment: none.    Medical History:   Past Medical History:   Diagnosis Date    Anxiety     Degenerative joint disease (DJD) of hip     Diabetes type 2, controlled 7/10/2019    GERD (gastroesophageal reflux disease)     Hyperlipidemia     Hypertension     IBS (irritable bowel syndrome)     Insomnia     Lumbar disc herniation     PTSD (post-traumatic stress disorder)        Family History: family history includes Allergies in her mother; Arthritis in her mother and paternal grandmother; Breast cancer in her paternal aunt; COPD in her maternal grandmother; Cancer in her maternal grandfather, maternal grandmother, and paternal aunt; Depression in her daughter, maternal grandfather, maternal grandmother, mother, paternal grandmother, sister, and son; Diabetes in her father and paternal grandfather; Early death in her maternal grandfather, maternal grandmother, and paternal grandfather; Heart disease in her father, maternal grandfather, maternal grandmother,  mother, paternal aunt, paternal grandfather, and paternal grandmother; Hyperlipidemia in her father, maternal grandfather, maternal grandmother, mother, and paternal grandfather; Hypertension in her father, maternal grandfather, maternal grandmother, mother, and paternal grandfather; Hypothyroidism in her mother; Kidney disease in her paternal grandfather; Learning disabilities in her son; Mental illness in her maternal grandfather; Stroke (age of onset: 46) in her father; Vision loss in her maternal grandfather.    Surgical History:   Past Surgical History:   Procedure Laterality Date    ADENOIDECTOMY      ARTHROSCOPIC DEBRIDEMENT OF ROTATOR CUFF Right 2020    Procedure: DEBRIDEMENT, ROTATOR CUFF, ARTHROSCOPIC;  Surgeon: Melchor Hughes Jr., MD;  Location: [facility] OR;  Service: Orthopedics;  Laterality: Right;  need opus system (Ger MESA notified , EF)  video, notified/confirmed 2020 KB 1310    ARTHROSCOPIC EXCISION OF ACROMIOCLAVICULAR JOINT  2020    Procedure: EXCISION, ACROMIOCLAVICULAR JOINT, ARTHROSCOPIC;  Surgeon: Melchor Hughes Jr., MD;  Location: [facility] OR;  Service: Orthopedics;;    ARTHROSCOPIC REPAIR OF ROTATOR CUFF OF SHOULDER      ARTHROSCOPY OF SHOULDER WITH DECOMPRESSION OF SUBACROMIAL SPACE  2020    Procedure: ARTHROSCOPY, SHOULDER, WITH SUBACROMIAL SPACE DECOMPRESSION;  Surgeon: Melchor Hughes Jr., MD;  Location: [facility] OR;  Service: Orthopedics;;     SECTION      EPIDURAL STEROID INJECTION INTO CERVICAL SPINE N/A 2021    Procedure: Injection-steroid-epidural-cervical--C7-T1 IL ZAYNAB;  Surgeon: Gregoria Rodriguez MD;  Location: [facility] PAIN MGT;  Service: Pain Management;  Laterality: N/A;    ESOPHAGOGASTRODUODENOSCOPY N/A 2023    Procedure: ESOPHAGOGASTRODUODENOSCOPY (EGD);  Surgeon: Tommy Schmitt MD;  Location: [facility] [address]);  Service: Endoscopy;  Laterality: N/A;  referral Navjot HENRY-cristina portal-GT    INJECTION OF JOINT Right 2023    Procedure:  INJECTION, JOINT, RIGHT ISCHIAL BURSA  SOONER DATE;  Surgeon: Yesica Monroy MD;  Location: [facility] PAIN MGT;  Service: Pain Management;  Laterality: Right;    TENOTOMY, ELBOW, WITH DEBRIDEMENT AND TENDON REPAIR Right 5/20/2022    Procedure: TENOTOMY,ELBOW,WITH DEBRIDEMENT AND TENDON REPAIR;  Surgeon: Melchor Hughes Jr., MD;  Location: [facility] OR;  Service: Orthopedics;  Laterality: Right;    TONSILLECTOMY          Social History:  reports that she has been smoking vaping with nicotine and cigarettes. She started smoking about 38 years ago. She has a 23.5 pack-year smoking history. She quit smokeless tobacco use about 9 years ago. She reports current alcohol use of about 1.0 standard drink of alcohol per week. She reports current drug use. Drug: Marijuana.     Allergies: Review of patient's allergies indicates:  No Known Allergies    Medications:   Current Outpatient Medications   Medication Sig    acetaminophen (TYLENOL) 500 MG tablet Take 500 mg by mouth every 6 (six) hours as needed for Pain.    cetirizine (ZYRTEC) 10 MG tablet Take 2 tablets (20 mg total) by mouth every morning.    chlorzoxazone (PARAFON FORTE) 500 mg Tab Take 500 mg by mouth daily as needed.    clonazePAM (KLONOPIN) 1 MG tablet TAKE 1 TABLET BY MOUTH TWICE DAILY    cyanocobalamin 1,000 mcg/mL injection Inject 1 mL (1,000 mcg total) into the muscle every 30 days.    cyclobenzaprine (FLEXERIL) 10 MG tablet Take 10 mg by mouth 2 (two) times daily as needed.    diclofenac sodium (VOLTAREN) 1 % Gel Apply 2 g topically 4 (four) times daily.    dicyclomine (BENTYL) 10 MG capsule TAKE 1 CAPSULE BY MOUTH FOUR TIMES DAILY AS NEEDED FOR ABDOMINAL PAIN    estradioL (ESTRACE) 1 MG tablet Take 1 mg by mouth once daily.    LINZESS 290 mcg Cap capsule TAKE 1 CAPSULE(290 MCG) BY MOUTH BEFORE BREAKFAST    melatonin 5 mg Chew Take 10 mg by mouth daily as needed.    meloxicam (MOBIC) 15 MG tablet Take 15 mg by mouth daily as needed.    mv-mn/iron/folic acid/herb 190  (VITAMIN D3 COMPLETE ORAL) Take by mouth once daily at 6am.    omega-3 fatty acids/fish oil (FISH OIL-OMEGA-3 FATTY ACIDS) 300-1,000 mg capsule Take 1 capsule by mouth once daily.    omeprazole (PRILOSEC) 40 MG capsule TAKE 1 CAPSULE(40 MG) BY MOUTH EVERY DAY    POLYETHYLENE GLYCOL 3350 (MIRALAX ORAL) Take by mouth as needed.     traZODone (DESYREL) 100 MG tablet TK 1 T PO  QHS    venlafaxine (EFFEXOR-XR) 150 MG Cp24 Take 150 mg by mouth once daily.    venlafaxine (EFFEXOR-XR) 75 MG 24 hr capsule TAKE ONE CAPSULE BY MOUTH DAILY WITH 150 MG TO EQUAL 225 MG    ZTLIDO 1.8 % PtMd Apply 1 patch topically daily as needed.    albuterol (PROAIR HFA) 90 mcg/actuation inhaler Inhale 2 puffs into the lungs every 6 (six) hours as needed for Wheezing. Rescue    azelastine (ASTELIN) 137 mcg (0.1 %) nasal spray 1 spray (137 mcg total) by Nasal route 2 (two) times daily.    celecoxib (CELEBREX) 200 MG capsule Take 1 capsule (200 mg total) by mouth once daily.    losartan (COZAAR) 25 MG tablet Take 2 tablets (50 mg total) by mouth once daily.    mupirocin (BACTROBAN) 2 % ointment APPLY TOPICALLY TO THE AFFECTED AREA TWICE DAILY    potassium chloride 10% (KAYCIEL) 20 mEq/15 mL oral solution TAKE 15 ML BY MOUTH EVERY DAY    pravastatin (PRAVACHOL) 20 MG tablet Take 1 tablet (20 mg total) by mouth every evening.    QUEtiapine (SEROQUEL) 200 MG Tab Take 1 tablet (200 mg total) by mouth every evening.     No current facility-administered medications for this visit.       Health Maintenance:   Health Maintenance Topics with due status: Not Due       Topic Last Completion Date    Colorectal Cancer Screening 08/25/2016    Cervical Cancer Screening 09/20/2022    LDCT Lung Screen 12/30/2022    Mammogram 05/01/2023    Lipid Panel 08/29/2023    Hemoglobin A1c 08/29/2023    Low Dose Statin 11/30/2023     Lab Results   Component Value Date    HEPCAB Negative 07/08/2020     Eye Exam: Last Ophthalmology Visit: 1/23/2023 [facility] OPHTHALMOLOGY   Dental  Exam: due  OB/GYN: Dr. Basil Terrell  Pap smear: 12/4/2019  Mammogram: 5/2023  Colonoscopy:   8/2016  Hepatitic C  Ab: 7/2020, neg    Vaccinations:  Immunization History   Administered Date(s) Administered    COVID-19, MRNA, LN-S, PF (Pfizer) (Purple Cap) 08/04/2021, 08/25/2021    Influenza - Quadrivalent - PF *Preferred* (6 months and older) 01/25/2017    Influenza A (H1N1) 2009 Monovalent - Intranasal 11/18/2009    Pneumococcal Polysaccharide - 23 Valent 05/05/2021     Influenza: due  Tetanus: due  Shingrix: due  Pneumovax: 2021  Covid vaccine: not boosted    Body mass index is 26.09 kg/m².  Wt Readings from Last 3 Encounters:   11/30/23 63.5 kg (140 lb)   11/02/23 64.7 kg (142 lb 10.2 oz)   09/18/23 65.8 kg (145 lb)   - weight has decreased from 171 lbs last year to 140 lbs today      Review of Systems   Constitutional:  Positive for appetite change (decreased). Negative for chills, diaphoresis, fatigue and fever.   HENT:  Positive for rhinorrhea. Negative for congestion, dental problem, ear discharge, ear pain, postnasal drip, sinus pressure, sore throat and trouble swallowing.    Eyes:  Negative for redness and visual disturbance.   Respiratory:  Negative for apnea (she snores a little but denies apnea), cough, chest tightness and shortness of breath.    Cardiovascular:  Negative for chest pain, palpitations and leg swelling.   Gastrointestinal:  Positive for constipation. Negative for abdominal pain, blood in stool, diarrhea, nausea and vomiting.   Endocrine: Negative for cold intolerance and heat intolerance.   Genitourinary:  Negative for decreased urine volume, dysuria, frequency and hematuria.   Musculoskeletal:  Positive for arthralgias and back pain. Negative for myalgias.        She reports right elbow pain, left shoulder pain with decreased range of motion. Sees Dr. Kapadia / Pain Management at [facility]   Skin:  Negative for rash and wound.   Neurological:  Positive for headaches  (occasionally). Negative for dizziness, syncope, weakness and numbness.   Hematological:  Negative for adenopathy.   Psychiatric/Behavioral:  Negative for dysphoric mood and sleep disturbance. The patient is nervous/anxious.         She has seen Psychiatry and Psychology in past. Previously on wellbutrin but she did not like the way that she felt.          Objective:     Physical Exam  Vitals reviewed.   Constitutional:       General: She is awake. She is not in acute distress.     Appearance: Normal appearance. She is well-developed and well-groomed. She is not diaphoretic.   HENT:      Head: Normocephalic and atraumatic.      Right Ear: Hearing, tympanic membrane, ear canal and external ear normal. Tympanic membrane is not erythematous or bulging.      Left Ear: Hearing, tympanic membrane, ear canal and external ear normal. Tympanic membrane is not erythematous or bulging.      Nose: Nose normal. No congestion.      Mouth/Throat:      Mouth: Mucous membranes are moist.      Tongue: No lesions.      Pharynx: Oropharynx is clear. Uvula midline. No oropharyngeal exudate or posterior oropharyngeal erythema.   Eyes:      General: Lids are normal. Vision grossly intact. Gaze aligned appropriately. No scleral icterus.     Conjunctiva/sclera:      Right eye: Right conjunctiva is not injected.      Left eye: Left conjunctiva is not injected.      Pupils: Pupils are equal, round, and reactive to light.   Neck:      Thyroid: No thyroid mass or thyromegaly.   Cardiovascular:      Rate and Rhythm: Normal rate and regular rhythm.      Pulses: Normal pulses.      Heart sounds: Normal heart sounds. No murmur heard.  Pulmonary:      Effort: Pulmonary effort is normal. No respiratory distress.      Breath sounds: Normal breath sounds. No decreased breath sounds or wheezing.   Abdominal:      General: Bowel sounds are normal. There is no distension.      Palpations: Abdomen is soft. Abdomen is not rigid.      Tenderness: There is no  abdominal tenderness. There is no guarding or rebound.   Musculoskeletal:         General: Normal range of motion.      Cervical back: Normal range of motion and neck supple.      Right lower leg: No edema.      Left lower leg: No edema.   Lymphadenopathy:      Cervical: No cervical adenopathy.      Upper Body:      Right upper body: No supraclavicular adenopathy.      Left upper body: No supraclavicular adenopathy.   Skin:     General: Skin is warm and dry.      Coloration: Skin is not cyanotic.      Findings: No lesion or rash.      Nails: There is no clubbing.   Neurological:      General: No focal deficit present.      Mental Status: She is alert and oriented to person, place, and time.      Sensory: Sensation is intact.      Coordination: Coordination is intact.      Gait: Gait is intact.      Deep Tendon Reflexes: Reflexes are normal and symmetric.   Psychiatric:         Attention and Perception: Attention normal.         Mood and Affect: Mood normal.         Behavior: Behavior is cooperative.              Assessment:        1. Hypertension associated with diabetes    2. Hyperlipidemia due to type 2 diabetes mellitus    3. Controlled type 2 diabetes mellitus without complication, without long-term current use of insulin    4. Unintentional weight loss    5. Chronic pain of both shoulders    6. Radiculopathy due to lumbar intervertebral disc disorder    7. CKD stage 3 secondary to diabetes    8. Anxiety and depression    9. Hormone replacement therapy    10. Annual physical exam    11. Calcified granuloma of lung           Plan:     1. Hypertension associated with diabetes  - controlled, continue losartan, monitor BP closely  - losartan (COZAAR) 25 MG tablet; Take 2 tablets (50 mg total) by mouth once daily.  Dispense: 90 tablet; Refill: 1    2. Hyperlipidemia due to type 2 diabetes mellitus  - improved but TG remain elevated  - control pravachol    3. Controlled type 2 diabetes mellitus without complication,  without long-term current use of insulin  - HbA1c reached 6.5 but is now normal, will monitor    4. Unintentional weight loss  - unclear etiology, no abdominal pain, will monitor    5. Chronic pain of both shoulders  - currently takes tylenol or celebrex     6. Radiculopathy due to lumbar intervertebral disc disorder  - stable, will f/u with Pain Management as needed    7. CKD stage 3 secondary to diabetes  - stable Scr at baseline, minimize nephrotoxins, maintain adequate water intake    8. Anxiety and depression  - controlled, continue effexor daily, klonopin prn    9. Hormone replacement therapy  - managed by ranjeet Lazaro    10. Annual physical exam  - reviewed recent labs with patient    11. Calcified granuloma of lung  - seen on previous imaging, no respiratory symptoms, continue to monitor      RTC in 4 months for follow-up or sooner if needed    __________________________    Farhana Whittington MD, PharmD  [facility]  American Board of Obesity Medicine diplomate  Office [phone]

## 2023-11-07 RX ORDER — POTASSIUM CHLORIDE 20 MEQ/15ML
SOLUTION ORAL
Qty: 473 ML | Refills: 11 | Status: SHIPPED | OUTPATIENT
Start: 2023-11-07

## 2023-11-28 DIAGNOSIS — E78.2 MIXED HYPERLIPIDEMIA: ICD-10-CM

## 2023-11-28 RX ORDER — PRAVASTATIN SODIUM 20 MG/1
20 TABLET ORAL NIGHTLY
Qty: 90 TABLET | Refills: 3 | Status: SHIPPED | OUTPATIENT
Start: 2023-11-28 | End: 2023-12-28

## 2023-11-28 NOTE — TELEPHONE ENCOUNTER
Refill Routing Note   Medication(s) are not appropriate for processing by [facility] for the following reason(s):        No active prescription written by provider    [facility] action(s):  Defer               Appointments  past 12m or future 3m with PCP    Date Provider   Last Visit   11/2/2023 Farhana Whittington MD   Next Visit   3/4/2024 Farhana Whittington MD   ED visits in past 90 days: 0        Note composed:12:23 PM 11/28/2023

## 2023-11-28 NOTE — TELEPHONE ENCOUNTER
No care due was identified.  [facility] Embedded Care Due Messages. Reference number: [id number].   11/28/2023 11:52:25 AM CST

## 2023-11-30 ENCOUNTER — OFFICE VISIT (OUTPATIENT)
Dept: ORTHOPEDICS | Facility: CLINIC | Age: 58
End: 2023-11-30
Payer: MEDICARE

## 2023-11-30 VITALS — HEIGHT: 62 IN | WEIGHT: 140 LBS | BODY MASS INDEX: 25.76 KG/M2

## 2023-11-30 DIAGNOSIS — G89.29 CHRONIC LEFT SHOULDER PAIN: Primary | ICD-10-CM

## 2023-11-30 DIAGNOSIS — M25.512 CHRONIC LEFT SHOULDER PAIN: Primary | ICD-10-CM

## 2023-11-30 PROCEDURE — 4010F ACE/ARB THERAPY RXD/TAKEN: CPT | Mod: CPTII,S$GLB,, | Performed by: ORTHOPAEDIC SURGERY

## 2023-11-30 PROCEDURE — 99999 PR PBB SHADOW E&M-EST. PATIENT-LVL IV: CPT | Mod: PBBFAC,,, | Performed by: ORTHOPAEDIC SURGERY

## 2023-11-30 PROCEDURE — 99213 PR OFFICE/OUTPT VISIT, EST, LEVL III, 20-29 MIN: ICD-10-PCS | Mod: S$PBB,,, | Performed by: ORTHOPAEDIC SURGERY

## 2023-11-30 PROCEDURE — 3044F HG A1C LEVEL LT 7.0%: CPT | Mod: CPTII,S$GLB,, | Performed by: ORTHOPAEDIC SURGERY

## 2023-11-30 PROCEDURE — 99213 OFFICE O/P EST LOW 20 MIN: CPT | Mod: S$PBB,,, | Performed by: ORTHOPAEDIC SURGERY

## 2023-11-30 PROCEDURE — 3008F PR BODY MASS INDEX (BMI) DOCUMENTED: ICD-10-PCS | Mod: CPTII,S$GLB,, | Performed by: ORTHOPAEDIC SURGERY

## 2023-11-30 PROCEDURE — 99999 PR PBB SHADOW E&M-EST. PATIENT-LVL IV: ICD-10-PCS | Mod: PBBFAC,,, | Performed by: ORTHOPAEDIC SURGERY

## 2023-11-30 PROCEDURE — 3008F BODY MASS INDEX DOCD: CPT | Mod: CPTII,S$GLB,, | Performed by: ORTHOPAEDIC SURGERY

## 2023-11-30 PROCEDURE — 1159F PR MEDICATION LIST DOCUMENTED IN MEDICAL RECORD: ICD-10-PCS | Mod: CPTII,S$GLB,, | Performed by: ORTHOPAEDIC SURGERY

## 2023-11-30 PROCEDURE — 1159F MED LIST DOCD IN RCRD: CPT | Mod: CPTII,S$GLB,, | Performed by: ORTHOPAEDIC SURGERY

## 2023-11-30 PROCEDURE — 3044F PR MOST RECENT HEMOGLOBIN A1C LEVEL <7.0%: ICD-10-PCS | Mod: CPTII,S$GLB,, | Performed by: ORTHOPAEDIC SURGERY

## 2023-11-30 PROCEDURE — 4010F PR ACE/ARB THEARPY RXD/TAKEN: ICD-10-PCS | Mod: CPTII,S$GLB,, | Performed by: ORTHOPAEDIC SURGERY

## 2023-11-30 RX ORDER — CELECOXIB 200 MG/1
200 CAPSULE ORAL DAILY
Qty: 30 CAPSULE | Refills: 2 | Status: SHIPPED | OUTPATIENT
Start: 2023-11-30 | End: 2023-12-27

## 2023-11-30 NOTE — PROGRESS NOTES
Subjective:      Patient ID: Sherie Reyes is a 58 y.o. female.  Chief Complaint: Follow-up of the Right Shoulder and Follow-up of the Left Shoulder (Pt states pain is more severe in left shoulder)      HPI  Sherie Reyes is a  58 y.o. female presenting today for follow up of bilateral shoulder pain.  She reports that she is still having pain in both shoulders left worse than right she did have surgery about 3 or 4 years ago for rotator cuff debridement but not have anchors put in at that time   Left shoulder is worse than the right no numbness or tingling.    Review of patient's allergies indicates:  No Known Allergies      Current Outpatient Medications   Medication Sig Dispense Refill    acetaminophen (TYLENOL) 500 MG tablet Take 500 mg by mouth every 6 (six) hours as needed for Pain.      cetirizine (ZYRTEC) 10 MG tablet Take 2 tablets (20 mg total) by mouth every morning. 60 tablet 3    chlorzoxazone (PARAFON FORTE) 500 mg Tab Take 500 mg by mouth daily as needed.      clonazePAM (KLONOPIN) 1 MG tablet TAKE 1 TABLET BY MOUTH TWICE DAILY 60 tablet 1    cyanocobalamin 1,000 mcg/mL injection Inject 1 mL (1,000 mcg total) into the muscle every 30 days. 30 mL 1    cyclobenzaprine (FLEXERIL) 10 MG tablet Take 10 mg by mouth 2 (two) times daily as needed.      diclofenac sodium (VOLTAREN) 1 % Gel Apply 2 g topically 4 (four) times daily. 4 each 2    dicyclomine (BENTYL) 10 MG capsule TAKE 1 CAPSULE BY MOUTH FOUR TIMES DAILY AS NEEDED FOR ABDOMINAL PAIN 120 capsule 1    estradioL (ESTRACE) 1 MG tablet Take 1 mg by mouth once daily.      LINZESS 290 mcg Cap capsule TAKE 1 CAPSULE(290 MCG) BY MOUTH BEFORE BREAKFAST 90 capsule 3    losartan (COZAAR) 25 MG tablet Take 2 tablets (50 mg total) by mouth once daily. 90 tablet 1    melatonin 5 mg Chew Take 10 mg by mouth daily as needed.      meloxicam (MOBIC) 15 MG tablet Take 15 mg by mouth daily as needed.      mupirocin (BACTROBAN) 2 % ointment Apply  topically 2 (two) times daily. Apply to affected area 15 g 2    mv-mn/iron/folic acid/herb 190 (VITAMIN D3 COMPLETE ORAL) Take by mouth once daily at 6am.      omega-3 fatty acids/fish oil (FISH OIL-OMEGA-3 FATTY ACIDS) 300-1,000 mg capsule Take 1 capsule by mouth once daily.      omeprazole (PRILOSEC) 40 MG capsule TAKE 1 CAPSULE(40 MG) BY MOUTH EVERY DAY 30 capsule 11    POLYETHYLENE GLYCOL 3350 (MIRALAX ORAL) Take by mouth as needed.       potassium chloride 10% (KAYCIEL) 20 mEq/15 mL oral solution TAKE 15 ML BY MOUTH EVERY  mL 11    pravastatin (PRAVACHOL) 20 MG tablet Take 1 tablet (20 mg total) by mouth every evening. 90 tablet 3    traZODone (DESYREL) 100 MG tablet TK 1 T PO  QHS      venlafaxine (EFFEXOR-XR) 150 MG Cp24 Take 150 mg by mouth once daily.      venlafaxine (EFFEXOR-XR) 75 MG 24 hr capsule TAKE ONE CAPSULE BY MOUTH DAILY WITH 150 MG TO EQUAL 225 MG      ZTLIDO 1.8 % PtMd Apply 1 patch topically daily as needed.      albuterol (PROAIR HFA) 90 mcg/actuation inhaler Inhale 2 puffs into the lungs every 6 (six) hours as needed for Wheezing. Rescue 18 g 0    azelastine (ASTELIN) 137 mcg (0.1 %) nasal spray 1 spray (137 mcg total) by Nasal route 2 (two) times daily. 30 mL 0    celecoxib (CELEBREX) 200 MG capsule Take 1 capsule (200 mg total) by mouth once daily. 30 capsule 2    QUEtiapine (SEROQUEL) 200 MG Tab Take 1 tablet (200 mg total) by mouth every evening. 30 tablet 2     No current facility-administered medications for this visit.       Past Medical History:   Diagnosis Date    Anxiety     Degenerative joint disease (DJD) of hip     Diabetes type 2, controlled 7/10/2019    GERD (gastroesophageal reflux disease)     Hyperlipidemia     Hypertension     IBS (irritable bowel syndrome)     Insomnia     Lumbar disc herniation     PTSD (post-traumatic stress disorder)        Past Surgical History:   Procedure Laterality Date    ADENOIDECTOMY      ARTHROSCOPIC DEBRIDEMENT OF ROTATOR CUFF Right  "2020    Procedure: DEBRIDEMENT, ROTATOR CUFF, ARTHROSCOPIC;  Surgeon: Melchor Hughes Jr., MD;  Location: [facility] OR;  Service: Orthopedics;  Laterality: Right;  need opus system (Ger MESA notified , )  video, notified/confirmed 2020 KB 1310    ARTHROSCOPIC EXCISION OF ACROMIOCLAVICULAR JOINT  2020    Procedure: EXCISION, ACROMIOCLAVICULAR JOINT, ARTHROSCOPIC;  Surgeon: Melchor Hughes Jr., MD;  Location: [facility] OR;  Service: Orthopedics;;    ARTHROSCOPIC REPAIR OF ROTATOR CUFF OF SHOULDER      ARTHROSCOPY OF SHOULDER WITH DECOMPRESSION OF SUBACROMIAL SPACE  2020    Procedure: ARTHROSCOPY, SHOULDER, WITH SUBACROMIAL SPACE DECOMPRESSION;  Surgeon: Melchor Hughes Jr., MD;  Location: [facility] OR;  Service: Orthopedics;;     SECTION      EPIDURAL STEROID INJECTION INTO CERVICAL SPINE N/A 2021    Procedure: Injection-steroid-epidural-cervical--C7-T1 IL ZAYNAB;  Surgeon: Gregoria Rodriguez MD;  Location: [facility] PAIN MGT;  Service: Pain Management;  Laterality: N/A;    ESOPHAGOGASTRODUODENOSCOPY N/A 2023    Procedure: ESOPHAGOGASTRODUODENOSCOPY (EGD);  Surgeon: Tommy Schmitt MD;  Location: [address]);  Service: Endoscopy;  Laterality: N/A;  referral Navjot HENRY-instr portal-GT    INJECTION OF JOINT Right 2023    Procedure: INJECTION, JOINT, RIGHT ISCHIAL BURSA  SOONER DATE;  Surgeon: Yesica Monroy MD;  Location: [facility] PAIN MGT;  Service: Pain Management;  Laterality: Right;    TENOTOMY, ELBOW, WITH DEBRIDEMENT AND TENDON REPAIR Right 2022    Procedure: TENOTOMY,ELBOW,WITH DEBRIDEMENT AND TENDON REPAIR;  Surgeon: Melchor Hughes Jr., MD;  Location: [facility] OR;  Service: Orthopedics;  Laterality: Right;    TONSILLECTOMY         OBJECTIVE:   PHYSICAL EXAM:  Height: 5' 2" (157.5 cm) Weight: 63.5 kg (140 lb)  Vitals:    23 1322   Weight: 63.5 kg (140 lb)   Height: 5' 2" (1.575 m)   PainSc:   9     Ortho/SPM Exam  Reported she does have a history of neck problems examination " left shoulder no tenderness no swelling   Range of motion slightly decreased due to pain positive impingement sign  Positive supraspinatus stress test no instability neurologic exam intact    RADIOGRAPHS:  None  Comments: I have personally reviewed the imaging and I agree with the above radiologist's report.    ASSESSMENT/PLAN:     IMPRESSION:  1. Left shoulder pain chronic.      2. Possible rotator cuff tear left shoulder after previous surgery    PLAN:  Because of ongoing symptoms I have ordered an MRI scan left shoulder to check the rotator cuff   Also refilled Celebrex which is helping   Avoid overhead lifting       FOLLOW UP:  After the MRI is complete    Disclaimer: This note has been generated using voice-recognition software. There may be typographical errors that have been missed during proof-reading.

## 2023-12-07 RX ORDER — MUPIROCIN 20 MG/G
OINTMENT TOPICAL 2 TIMES DAILY
Qty: 15 G | Refills: 2 | Status: SHIPPED | OUTPATIENT
Start: 2023-12-07 | End: 2024-04-02

## 2023-12-11 ENCOUNTER — HOSPITAL ENCOUNTER (OUTPATIENT)
Dept: RADIOLOGY | Facility: HOSPITAL | Age: 58
Discharge: HOME | End: 2023-12-11
Attending: ORTHOPAEDIC SURGERY
Payer: MEDICARE

## 2023-12-11 DIAGNOSIS — M25.512 CHRONIC LEFT SHOULDER PAIN: ICD-10-CM

## 2023-12-11 DIAGNOSIS — G89.29 CHRONIC LEFT SHOULDER PAIN: ICD-10-CM

## 2023-12-11 PROCEDURE — 73221 MRI JOINT UPR EXTREM W/O DYE: CPT | Mod: TC,LT

## 2023-12-11 PROCEDURE — 73221 MRI SHOULDER WITHOUT CONTRAST LEFT: ICD-10-PCS | Mod: 26,LT,, | Performed by: RADIOLOGY

## 2023-12-11 PROCEDURE — 73221 MRI JOINT UPR EXTREM W/O DYE: CPT | Mod: 26,LT,, | Performed by: RADIOLOGY

## 2023-12-13 PROBLEM — R63.4 UNINTENTIONAL WEIGHT LOSS: Status: ACTIVE | Noted: 2023-12-13

## 2023-12-14 ENCOUNTER — HOSPITAL ENCOUNTER (EMERGENCY)
Facility: HOSPITAL | Age: 58
Discharge: HOME | End: 2023-12-15
Attending: STUDENT IN AN ORGANIZED HEALTH CARE EDUCATION/TRAINING PROGRAM
Payer: MEDICARE

## 2023-12-14 DIAGNOSIS — G89.29 CHRONIC RIGHT SHOULDER PAIN: ICD-10-CM
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
## 2023-12-14 DIAGNOSIS — R55 SYNCOPAL EPISODES: ICD-10-CM

## 2023-12-14 DIAGNOSIS — T14.90XA TRAUMA: ICD-10-CM

## 2023-12-14 DIAGNOSIS — R55 SYNCOPE: ICD-10-CM

## 2023-12-14 DIAGNOSIS — R52 PAIN: ICD-10-CM

## 2023-12-14 DIAGNOSIS — M75.101 TEAR OF RIGHT ROTATOR CUFF, UNSPECIFIED TEAR EXTENT, UNSPECIFIED WHETHER TRAUMATIC: ICD-10-CM

## 2023-12-14 DIAGNOSIS — M25.511 CHRONIC RIGHT SHOULDER PAIN: ICD-10-CM

## 2023-12-14 PROCEDURE — 87389 HIV-1 AG W/HIV-1&-2 AB AG IA: CPT | Performed by: PHYSICIAN ASSISTANT

## 2023-12-14 PROCEDURE — 83735 ASSAY OF MAGNESIUM: CPT | Performed by: STUDENT IN AN ORGANIZED HEALTH CARE EDUCATION/TRAINING PROGRAM

## 2023-12-14 PROCEDURE — 93010 ELECTROCARDIOGRAM REPORT: CPT | Mod: ,,, | Performed by: INTERNAL MEDICINE

## 2023-12-14 PROCEDURE — 96365 THER/PROPH/DIAG IV INF INIT: CPT

## 2023-12-14 PROCEDURE — 96376 TX/PRO/DX INJ SAME DRUG ADON: CPT

## 2023-12-14 PROCEDURE — 83880 ASSAY OF NATRIURETIC PEPTIDE: CPT | Performed by: STUDENT IN AN ORGANIZED HEALTH CARE EDUCATION/TRAINING PROGRAM

## 2023-12-14 PROCEDURE — 86803 HEPATITIS C AB TEST: CPT | Performed by: PHYSICIAN ASSISTANT

## 2023-12-14 PROCEDURE — 93010 EKG 12-LEAD: ICD-10-PCS | Mod: ,,, | Performed by: INTERNAL MEDICINE

## 2023-12-14 PROCEDURE — 93005 ELECTROCARDIOGRAM TRACING: CPT

## 2023-12-14 PROCEDURE — 25000003 PHARM REV CODE 250: Performed by: STUDENT IN AN ORGANIZED HEALTH CARE EDUCATION/TRAINING PROGRAM

## 2023-12-14 PROCEDURE — 63600175 PHARM REV CODE 636 W HCPCS: Performed by: STUDENT IN AN ORGANIZED HEALTH CARE EDUCATION/TRAINING PROGRAM

## 2023-12-14 PROCEDURE — 96375 TX/PRO/DX INJ NEW DRUG ADDON: CPT

## 2023-12-14 PROCEDURE — 85025 COMPLETE CBC W/AUTO DIFF WBC: CPT | Performed by: STUDENT IN AN ORGANIZED HEALTH CARE EDUCATION/TRAINING PROGRAM

## 2023-12-14 PROCEDURE — 96361 HYDRATE IV INFUSION ADD-ON: CPT

## 2023-12-14 PROCEDURE — 99285 EMERGENCY DEPT VISIT HI MDM: CPT | Mod: 25

## 2023-12-14 PROCEDURE — 84484 ASSAY OF TROPONIN QUANT: CPT | Performed by: STUDENT IN AN ORGANIZED HEALTH CARE EDUCATION/TRAINING PROGRAM

## 2023-12-14 PROCEDURE — 80053 COMPREHEN METABOLIC PANEL: CPT | Performed by: STUDENT IN AN ORGANIZED HEALTH CARE EDUCATION/TRAINING PROGRAM

## 2023-12-14 RX ORDER — ACETAMINOPHEN 325 MG/1
650 TABLET ORAL
Status: COMPLETED | OUTPATIENT
Start: 2023-12-14 | End: 2023-12-14

## 2023-12-14 RX ADMIN — ACETAMINOPHEN 650 MG: 325 TABLET ORAL at 11:12

## 2023-12-14 RX ADMIN — SODIUM CHLORIDE, POTASSIUM CHLORIDE, SODIUM LACTATE AND CALCIUM CHLORIDE 1000 ML: 600; 310; 30; 20 INJECTION, SOLUTION INTRAVENOUS at 11:12

## 2023-12-15 VITALS
TEMPERATURE: 98 F | OXYGEN SATURATION: 99 % | HEART RATE: 70 BPM | DIASTOLIC BLOOD PRESSURE: 72 MMHG | SYSTOLIC BLOOD PRESSURE: 109 MMHG | RESPIRATION RATE: 16 BRPM

## 2023-12-15 LAB
ALBUMIN SERPL BCP-MCNC: 3.8 G/DL (ref 3.5–5.2)
ALP SERPL-CCNC: 55 U/L (ref 55–135)
ALT SERPL W/O P-5'-P-CCNC: 13 U/L (ref 10–44)
ANION GAP SERPL CALC-SCNC: 10 MMOL/L (ref 8–16)
AST SERPL-CCNC: 12 U/L (ref 10–40)
BACTERIA #/AREA URNS AUTO: ABNORMAL /HPF
BASOPHILS # BLD AUTO: 0.02 K/UL (ref 0–0.2)
BASOPHILS NFR BLD: 0.3 % (ref 0–1.9)
BILIRUB SERPL-MCNC: 0.3 MG/DL (ref 0.1–1)
BILIRUB UR QL STRIP: NEGATIVE
BNP SERPL-MCNC: <10 PG/ML (ref 0–99)
BUN SERPL-MCNC: 12 MG/DL (ref 6–20)
CALCIUM SERPL-MCNC: 8.9 MG/DL (ref 8.7–10.5)
CHLORIDE SERPL-SCNC: 108 MMOL/L (ref 95–110)
CLARITY UR REFRACT.AUTO: CLEAR
CO2 SERPL-SCNC: 25 MMOL/L (ref 23–29)
COLOR UR AUTO: YELLOW
CREAT SERPL-MCNC: 0.9 MG/DL (ref 0.5–1.4)
DIFFERENTIAL METHOD: NORMAL
EOSINOPHIL # BLD AUTO: 0 K/UL (ref 0–0.5)
EOSINOPHIL NFR BLD: 0.6 % (ref 0–8)
ERYTHROCYTE [DISTWIDTH] IN BLOOD BY AUTOMATED COUNT: 12.8 % (ref 11.5–14.5)
EST. GFR  (NO RACE VARIABLE): >60 ML/MIN/1.73 M^2
GLUCOSE SERPL-MCNC: 82 MG/DL (ref 70–110)
GLUCOSE UR QL STRIP: NEGATIVE
HCT VFR BLD AUTO: 40.8 % (ref 37–48.5)
HCV AB SERPL QL IA: NORMAL
HGB BLD-MCNC: 13.6 G/DL (ref 12–16)
HGB UR QL STRIP: NEGATIVE
HIV 1+2 AB+HIV1 P24 AG SERPL QL IA: NORMAL
IMM GRANULOCYTES # BLD AUTO: 0.02 K/UL (ref 0–0.04)
IMM GRANULOCYTES NFR BLD AUTO: 0.3 % (ref 0–0.5)
KETONES UR QL STRIP: NEGATIVE
LEUKOCYTE ESTERASE UR QL STRIP: ABNORMAL
LYMPHOCYTES # BLD AUTO: 2.4 K/UL (ref 1–4.8)
LYMPHOCYTES NFR BLD: 35.1 % (ref 18–48)
MAGNESIUM SERPL-MCNC: 2.1 MG/DL (ref 1.6–2.6)
MCH RBC QN AUTO: 29.7 PG (ref 27–31)
MCHC RBC AUTO-ENTMCNC: 33.3 G/DL (ref 32–36)
MCV RBC AUTO: 89 FL (ref 82–98)
MICROSCOPIC COMMENT: ABNORMAL
MONOCYTES # BLD AUTO: 0.5 K/UL (ref 0.3–1)
MONOCYTES NFR BLD: 7.3 % (ref 4–15)
NEUTROPHILS # BLD AUTO: 3.8 K/UL (ref 1.8–7.7)
NEUTROPHILS NFR BLD: 56.4 % (ref 38–73)
NITRITE UR QL STRIP: NEGATIVE
NRBC BLD-RTO: 0 /100 WBC
PH UR STRIP: 6 [PH] (ref 5–8)
PLATELET # BLD AUTO: 210 K/UL (ref 150–450)
PMV BLD AUTO: 11.8 FL (ref 9.2–12.9)
POTASSIUM SERPL-SCNC: 3.7 MMOL/L (ref 3.5–5.1)
PROT SERPL-MCNC: 6.8 G/DL (ref 6–8.4)
PROT UR QL STRIP: ABNORMAL
RBC # BLD AUTO: 4.58 M/UL (ref 4–5.4)
RBC #/AREA URNS AUTO: 4 /HPF (ref 0–4)
SODIUM SERPL-SCNC: 143 MMOL/L (ref 136–145)
SP GR UR STRIP: >1.03 (ref 1–1.03)
SQUAMOUS #/AREA URNS AUTO: 3 /HPF
TROPONIN I SERPL DL<=0.01 NG/ML-MCNC: <0.006 NG/ML (ref 0–0.03)
URN SPEC COLLECT METH UR: ABNORMAL
WBC # BLD AUTO: 6.81 K/UL (ref 3.9–12.7)
WBC #/AREA URNS AUTO: 11 /HPF (ref 0–5)

## 2023-12-15 PROCEDURE — 87086 URINE CULTURE/COLONY COUNT: CPT | Performed by: STUDENT IN AN ORGANIZED HEALTH CARE EDUCATION/TRAINING PROGRAM

## 2023-12-15 PROCEDURE — 93005 ELECTROCARDIOGRAM TRACING: CPT

## 2023-12-15 PROCEDURE — 25000003 PHARM REV CODE 250: Performed by: STUDENT IN AN ORGANIZED HEALTH CARE EDUCATION/TRAINING PROGRAM

## 2023-12-15 PROCEDURE — 63600175 PHARM REV CODE 636 W HCPCS: Performed by: STUDENT IN AN ORGANIZED HEALTH CARE EDUCATION/TRAINING PROGRAM

## 2023-12-15 PROCEDURE — 81001 URINALYSIS AUTO W/SCOPE: CPT | Performed by: STUDENT IN AN ORGANIZED HEALTH CARE EDUCATION/TRAINING PROGRAM

## 2023-12-15 PROCEDURE — 93010 EKG 12-LEAD: ICD-10-PCS | Mod: ,,, | Performed by: INTERNAL MEDICINE

## 2023-12-15 PROCEDURE — 93010 ELECTROCARDIOGRAM REPORT: CPT | Mod: ,,, | Performed by: INTERNAL MEDICINE

## 2023-12-15 RX ORDER — MORPHINE SULFATE 4 MG/ML
4 INJECTION, SOLUTION INTRAMUSCULAR; INTRAVENOUS
Status: COMPLETED | OUTPATIENT
Start: 2023-12-15 | End: 2023-12-15

## 2023-12-15 RX ORDER — DICLOFENAC SODIUM 10 MG/G
2 GEL TOPICAL 4 TIMES DAILY
Qty: 50 G | Refills: 0 | Status: SHIPPED | OUTPATIENT
Start: 2023-12-15

## 2023-12-15 RX ORDER — LIDOCAINE 50 MG/G
1 PATCH TOPICAL DAILY
Qty: 14 PATCH | Refills: 0 | Status: SHIPPED | OUTPATIENT
Start: 2023-12-15

## 2023-12-15 RX ORDER — CEPHALEXIN 500 MG/1
500 CAPSULE ORAL 4 TIMES DAILY
Qty: 20 CAPSULE | Refills: 0 | Status: SHIPPED | OUTPATIENT
Start: 2023-12-15 | End: 2023-12-20

## 2023-12-15 RX ADMIN — CEFTRIAXONE SODIUM 1 G: 1 INJECTION, POWDER, FOR SOLUTION INTRAMUSCULAR; INTRAVENOUS at 03:12

## 2023-12-15 RX ADMIN — MORPHINE SULFATE 4 MG: 4 INJECTION INTRAVENOUS at 01:12

## 2023-12-15 RX ADMIN — MORPHINE SULFATE 4 MG: 4 INJECTION INTRAVENOUS at 03:12

## 2023-12-15 NOTE — ED PROVIDER NOTES
Encounter Date: 12/14/2023       History     Chief Complaint   Patient presents with    Loss of Consciousness     Had 3 syncopal episodes last night while walking to bathroom. Pt states she hit her L elbow, lower back, and jaw when she passed out. -thinners. States she has been dizzy and fatigued all day      HPI  59 yo F w/HTN, GERD, presenting with three episodes of syncope yesterday, L elbow pain, headache, R knee pain. Also with urinary frequency and dysuria x 3 days.    Reports last night she was straining to urinate when she became lightheaded, kiki to standing and syncopized. Attempted to stand up twice but syncopized again, hitting her L elbow and R knee. Crawled back into bed and woke up with diffuse myalgias, came to the ED.    No history of exertional chest pain, no chest pain/shortness of breath/palpitations before or after syncope yesterday. No further episodes of syncope today.       Review of patient's allergies indicates:  No Known Allergies  Past Medical History:   Diagnosis Date    Anxiety     Degenerative joint disease (DJD) of hip     Diabetes type 2, controlled 7/10/2019    GERD (gastroesophageal reflux disease)     Hyperlipidemia     Hypertension     IBS (irritable bowel syndrome)     Insomnia     Lumbar disc herniation     PTSD (post-traumatic stress disorder)      Past Surgical History:   Procedure Laterality Date    ADENOIDECTOMY      ARTHROSCOPIC DEBRIDEMENT OF ROTATOR CUFF Right 6/9/2020    Procedure: DEBRIDEMENT, ROTATOR CUFF, ARTHROSCOPIC;  Surgeon: Melchor Hughes Jr., MD;  Location: [facility] OR;  Service: Orthopedics;  Laterality: Right;  need opus system (Ger MESA notified 5/26, EF)  video, notified/confirmed 6/8/2020 KB 1310    ARTHROSCOPIC EXCISION OF ACROMIOCLAVICULAR JOINT  6/9/2020    Procedure: EXCISION, ACROMIOCLAVICULAR JOINT, ARTHROSCOPIC;  Surgeon: Melchor Hughes Jr., MD;  Location: [facility] OR;  Service: Orthopedics;;    ARTHROSCOPIC REPAIR OF ROTATOR CUFF OF SHOULDER       ARTHROSCOPY OF SHOULDER WITH DECOMPRESSION OF SUBACROMIAL SPACE  2020    Procedure: ARTHROSCOPY, SHOULDER, WITH SUBACROMIAL SPACE DECOMPRESSION;  Surgeon: Melchor Hughes Jr., MD;  Location: [facility] OR;  Service: Orthopedics;;     SECTION      EPIDURAL STEROID INJECTION INTO CERVICAL SPINE N/A 2021    Procedure: Injection-steroid-epidural-cervical--C7-T1 IL ZAYNAB;  Surgeon: Gregoria Rodriguez MD;  Location: [facility] PAIN MGT;  Service: Pain Management;  Laterality: N/A;    ESOPHAGOGASTRODUODENOSCOPY N/A 2023    Procedure: ESOPHAGOGASTRODUODENOSCOPY (EGD);  Surgeon: Tommy Schmitt MD;  Location: [facility] ENDO [address]);  Service: Endoscopy;  Laterality: N/A;  referral [employer] HENRY-instr portal-GT    INJECTION OF JOINT Right 2023    Procedure: INJECTION, JOINT, RIGHT ISCHIAL BURSA  SOONER DATE;  Surgeon: Yesica Monroy MD;  Location: [facility] PAIN MGT;  Service: Pain Management;  Laterality: Right;    TENOTOMY, ELBOW, WITH DEBRIDEMENT AND TENDON REPAIR Right 2022    Procedure: TENOTOMY,ELBOW,WITH DEBRIDEMENT AND TENDON REPAIR;  Surgeon: Melchor Hughes Jr., MD;  Location: [facility] OR;  Service: Orthopedics;  Laterality: Right;    TONSILLECTOMY       Family History   Problem Relation Age of Onset    Allergies Mother     Hypertension Mother     Hypothyroidism Mother     Arthritis Mother     Depression Mother     Heart disease Mother     Hyperlipidemia Mother     Hypertension Father     Stroke Father 46    Diabetes Father     Heart disease Father     Hyperlipidemia Father     Depression Sister     Breast cancer Paternal Aunt     Cancer Paternal Aunt         Bilat breast Cancer    Heart disease Paternal Aunt     Cancer Maternal Grandmother         Lung w/ Mets    COPD Maternal Grandmother     Depression Maternal Grandmother     Early death Maternal Grandmother     Heart disease Maternal Grandmother     Hyperlipidemia Maternal Grandmother     Hypertension Maternal Grandmother     Cancer Maternal Grandfather          Stomach Ca    Depression Maternal Grandfather     Early death Maternal Grandfather     Heart disease Maternal Grandfather     Hyperlipidemia Maternal Grandfather     Hypertension Maternal Grandfather     Mental illness Maternal Grandfather     Vision loss Maternal Grandfather         Loss during yhe War.    Arthritis Paternal Grandmother     Depression Paternal Grandmother     Heart disease Paternal Grandmother     Diabetes Paternal Grandfather     Early death Paternal Grandfather     Heart disease Paternal Grandfather     Hyperlipidemia Paternal Grandfather     Hypertension Paternal Grandfather     Kidney disease Paternal Grandfather     Depression Daughter     Depression Son     Learning disabilities Son      Social History     Tobacco Use    Smoking status: Every Day     Current packs/day: 0.00     Average packs/day: 0.5 packs/day for 47.0 years (23.5 ttl pk-yrs)     Types: Vaping with nicotine, Cigarettes     Start date: 1985     Last attempt to quit: 2014     Years since quittin.5    Smokeless tobacco: Former     Quit date: 10/20/2014    Tobacco comments:     Vaping 30 ml last 5 or 6 weeks - 3% nicotine    Substance Use Topics    Alcohol use: Yes     Alcohol/week: 1.0 standard drink of alcohol     Types: 1 Cans of beer per week     Comment: Maybe 1 or 2 a month    Drug use: Yes     Types: Marijuana     Review of Systems    Physical Exam     Initial Vitals [23 2137]   BP Pulse Resp Temp SpO2   137/89 87 16 97.7 °F (36.5 °C) 98 %      MAP       --         Physical Exam    Nursing note and vitals reviewed.  Constitutional: She appears well-developed and well-nourished.   HENT:   Head: Normocephalic and atraumatic.   No facial bony tenderness. No scalp hematoma.    Eyes: EOM are normal.   Neck:   Normal range of motion.  Cardiovascular:  Normal rate and regular rhythm.           Pulmonary/Chest: No respiratory distress.   No chest tenderness to compression.    Abdominal: Abdomen is soft. She  exhibits no distension.   No abdominal ecchymosis or tenderness noted.    Musculoskeletal:         General: Normal range of motion.      Cervical back: Normal range of motion.      Comments: Mid thoracic midline tenderness. Bony tenderness to L elbow but intact ROM. Ambulatory at baseline. Normal flexion and extension of all extremities.      Neurological: She is alert. GCS score is 15. GCS eye subscore is 4. GCS verbal subscore is 5. GCS motor subscore is 6.   Skin: Skin is warm and dry.   Abrasion to R knee, abrasion to L elbow, mild bony tenderness to L elbow   Psychiatric: She has a normal mood and affect. Thought content normal.         ED Course   Procedures  Labs Reviewed   URINALYSIS, REFLEX TO URINE CULTURE - Abnormal; Notable for the following components:       Result Value    Specific Gravity, UA >1.030 (*)     Protein, UA Trace (*)     Leukocytes, UA 2+ (*)     All other components within normal limits    Narrative:     Specimen Source->Urine   URINALYSIS MICROSCOPIC - Abnormal; Notable for the following components:    WBC, UA 11 (*)     All other components within normal limits    Narrative:     Specimen Source->Urine   CULTURE, URINE   HIV 1 / 2 ANTIBODY    Narrative:     Release to patient->Immediate   HEPATITIS C ANTIBODY    Narrative:     Release to patient->Immediate   CBC W/ AUTO DIFFERENTIAL   B-TYPE NATRIURETIC PEPTIDE   COMPREHENSIVE METABOLIC PANEL   MAGNESIUM   TROPONIN I          Imaging Results              CT Head Without Contrast (Final result)  Result time 12/15/23 02:42:21      Final result by Charlie Shafer MD (12/15/23 02:42:21)                   Impression:      No evidence of acute intracranial pathology.    No evidence of an acute displaced fracture.    Electronically signed by resident: Jimbo Sylvester  Date:    12/15/2023  Time:    02:33    Electronically signed by: Charlie Shafer MD  Date:    12/15/2023  Time:    02:42               Narrative:    EXAMINATION:  CT HEAD  WITHOUT CONTRAST; CT MAXILLOFACIAL WITHOUT CONTRAST    CLINICAL HISTORY:  Facial trauma, blunt;    TECHNIQUE:  Low dose CT images throughout the region of the head and facial bones.  Axial, sagittal and coronal reformations were obtained.  Contrast was not administered.    COMPARISON:  CT head 01/05/2019    FINDINGS:  Head:    Ventricles are normal in size for age.  No hydrocephalus.    Diffuse supratentorial white matter hypoattenuation, nonspecific likely sequel of chronic microvascular ischemic changes.  No parenchymal mass, hemorrhage or recent or remote major vascular distribution infarct.    No extra-axial blood or fluid collections.    The calvarium appears intact with no evidence of fracture.  Hyperostosis frontalis.    The mastoid air cells and paranasal sinuses are clear.    Facial bones:    No focal soft tissue swelling identified.    The globes and intraorbital contents are within normal limits.    No orbital fracture. The remainder of the facial bones appear intact without evidence of an acute displaced fracture. No osseous destructive lesions.    Temporomandibular joints appropriately position without evidence of dislocation.                                       CT Maxillofacial Without Contrast (Final result)  Result time 12/15/23 02:42:21      Final result by Charlie Shafer MD (12/15/23 02:42:21)                   Impression:      No evidence of acute intracranial pathology.    No evidence of an acute displaced fracture.    Electronically signed by resident: Jimbo Sylvester  Date:    12/15/2023  Time:    02:33    Electronically signed by: Charlie Shafer MD  Date:    12/15/2023  Time:    02:42               Narrative:    EXAMINATION:  CT HEAD WITHOUT CONTRAST; CT MAXILLOFACIAL WITHOUT CONTRAST    CLINICAL HISTORY:  Facial trauma, blunt;    TECHNIQUE:  Low dose CT images throughout the region of the head and facial bones.  Axial, sagittal and coronal reformations were obtained.  Contrast was not  administered.    COMPARISON:  CT head 01/05/2019    FINDINGS:  Head:    Ventricles are normal in size for age.  No hydrocephalus.    Diffuse supratentorial white matter hypoattenuation, nonspecific likely sequel of chronic microvascular ischemic changes.  No parenchymal mass, hemorrhage or recent or remote major vascular distribution infarct.    No extra-axial blood or fluid collections.    The calvarium appears intact with no evidence of fracture.  Hyperostosis frontalis.    The mastoid air cells and paranasal sinuses are clear.    Facial bones:    No focal soft tissue swelling identified.    The globes and intraorbital contents are within normal limits.    No orbital fracture. The remainder of the facial bones appear intact without evidence of an acute displaced fracture. No osseous destructive lesions.    Temporomandibular joints appropriately position without evidence of dislocation.                                       CT Thoracic Spine Without Contrast (Final result)  Result time 12/15/23 03:39:38      Final result by Charlie Shafer MD (12/15/23 03:39:38)                   Impression:      No acute fracture or traumatic malalignment.    Electronically signed by resident: Jimbo Sylvester  Date:    12/15/2023  Time:    02:28    Electronically signed by: Charlie Shafer MD  Date:    12/15/2023  Time:    03:39               Narrative:    EXAMINATION:  CT THORACIC SPINE WITHOUT CONTRAST; CT LUMBAR SPINE WITHOUT CONTRAST    CLINICAL HISTORY:  Spine fracture, thoracic, traumatic;Back trauma, no prior imaging (Age >= 16y);; Lumbar radiculopathy, trauma;Back trauma, no prior imaging (Age >= 16y);    TECHNIQUE:  Low dose axial CT images through the thoracic and lumbar spine with sagittal and coronal reformations.  Contrast was not administered.    COMPARISON:  CT abdomen pelvis 07/06/2023, CT chest 12/30/2022    FINDINGS:  THORACIC    Alignment: Normal.    Vertebrae: No fracture.  No suspicious lytic or sclerotic  lesion.    Discs: Mild height loss of T7-T8 and T8-T9 intervertebral disc.    Degenerative changes without spinal canal stenosis or neural foraminal narrowing.    LUMBAR    Alignment: Normal.    Vertebrae: No fracture.No suspicious lytic or sclerotic lesion.    Discs: Satisfactory intervertebral disc height.    Degenerative changes most prominant at L3-L4 without spinal canal stenosis or neural foraminal narrowing.    Paraspinal muscles & soft tissues: Small hiatal hernia.  Colonic diverticulosis.                                       CT Lumbar Spine Without Contrast (Final result)  Result time 12/15/23 03:39:38      Final result by Charlie Shafer MD (12/15/23 03:39:38)                   Impression:      No acute fracture or traumatic malalignment.    Electronically signed by resident: Jimbo Sylvester  Date:    12/15/2023  Time:    02:28    Electronically signed by: Charlie Shafer MD  Date:    12/15/2023  Time:    03:39               Narrative:    EXAMINATION:  CT THORACIC SPINE WITHOUT CONTRAST; CT LUMBAR SPINE WITHOUT CONTRAST    CLINICAL HISTORY:  Spine fracture, thoracic, traumatic;Back trauma, no prior imaging (Age >= 16y);; Lumbar radiculopathy, trauma;Back trauma, no prior imaging (Age >= 16y);    TECHNIQUE:  Low dose axial CT images through the thoracic and lumbar spine with sagittal and coronal reformations.  Contrast was not administered.    COMPARISON:  CT abdomen pelvis 07/06/2023, CT chest 12/30/2022    FINDINGS:  THORACIC    Alignment: Normal.    Vertebrae: No fracture.  No suspicious lytic or sclerotic lesion.    Discs: Mild height loss of T7-T8 and T8-T9 intervertebral disc.    Degenerative changes without spinal canal stenosis or neural foraminal narrowing.    LUMBAR    Alignment: Normal.    Vertebrae: No fracture.No suspicious lytic or sclerotic lesion.    Discs: Satisfactory intervertebral disc height.    Degenerative changes most prominant at L3-L4 without spinal canal stenosis or neural  foraminal narrowing.    Paraspinal muscles & soft tissues: Small hiatal hernia.  Colonic diverticulosis.                                       X-Ray Knee 1 or 2 View Right (Final result)  Result time 12/15/23 00:55:01      Final result by Charlie Shafer MD (12/15/23 00:55:01)                   Impression:      There is no evidence of fracture or subluxation.      Electronically signed by: Charlie Shafer MD  Date:    12/15/2023  Time:    00:55               Narrative:    EXAMINATION:  XR KNEE 1 OR 2 VIEW RIGHT    CLINICAL HISTORY:  trauma;    TECHNIQUE:  AP and lateral views of right knee    COMPARISON:  None.    FINDINGS:  No fracture or dislocation.  No joint effusion.  Mild degenerative change.                                       X-Ray Elbow Complete Left (Final result)  Result time 12/15/23 00:53:55      Final result by Charlie Shafer MD (12/15/23 00:53:55)                   Impression:      There is no evidence of fracture or subluxation.      Electronically signed by: Charlie Shafer MD  Date:    12/15/2023  Time:    00:53               Narrative:    EXAMINATION:  XR ELBOW COMPLETE 3 VIEW LEFT    CLINICAL HISTORY:  Injury, unspecified, initial encounter    TECHNIQUE:  AP, lateral, and oblique views of the left elbow were performed.    COMPARISON:  01/09/2023.    FINDINGS:  No fractures or dislocations.  Unremarkable visualized bony structures. No fat pad elevation.                                       X-Ray Chest PA And Lateral (Final result)  Result time 12/15/23 00:53:12      Final result by hCarlie Shafer MD (12/15/23 00:53:12)                   Impression:      No acute cardiopulmonary process.      Electronically signed by: Charlie Shafer MD  Date:    12/15/2023  Time:    00:53               Narrative:    EXAMINATION:  XR CHEST PA AND LATERAL    CLINICAL HISTORY:  Unspecified fall, initial encounter    TECHNIQUE:  PA and lateral views of the chest were  performed.    COMPARISON:  06/10/2020.    FINDINGS:  There is no consolidation, effusion, or pneumothorax.    Cardiomediastinal silhouette is unremarkable.    Regional osseous structures are unremarkable.                                       Medications   lactated ringers bolus 1,000 mL (0 mLs Intravenous Stopped 12/15/23 0032)   acetaminophen tablet 650 mg (650 mg Oral Given 12/14/23 2331)   morphine injection 4 mg (4 mg Intravenous Given 12/15/23 0112)   cefTRIAXone (ROCEPHIN) 1 g in dextrose 5 % in water (D5W) 100 mL IVPB (MB+) (0 g Intravenous Stopped 12/15/23 0332)   morphine injection 4 mg (4 mg Intravenous Given 12/15/23 0314)     Medical Decision Making  59 yo F w/HTN, GERD, presenting with three episodes of syncope yesterday, L elbow pain, headache, R knee pain. Also with urinary frequency and dysuria x 3 days.    Differential: UTI vs vasovagal vs orthostatic syncope vs cardiac syncope   UA significant for borderline UTI, however will treat empirically given clinical signs of UTI. Syncopal episodes with vasovagal presentation, with negative trop, normal EKG, no hx of CHF, is appropriate for outpatient follow up for syncope per [facility] Syncope algorithm. No acute fractures seen on XR, no ICH on CTH, no fractures on CT T spine and L spine obtained for midline tenderness, no radiculopathy or focal neuro deficits on exam.  Discussed outpatient pain management with ibuprofen/tylenol, lidocaine patches and voltaren, keflex for UTI, and follow up with cardiology given age and risk factors. IVF given for hydration. Ambulation at baseline with no dizziness/near syncope in ED.    I discussed with the patient/family the diagnosis, treatment plan, indications for return to the emergency department, as well as for expected follow-up. The patient/family verbalized an understanding. The patient/family is asked if there were any questions or concerns, which were addressed to patient/family satisfaction.  Patient/family understands and is agreeable to the plan.     DISCLAIMER: This note was prepared with [employer] voice recognition transcription software. Garbled syntax, mangled pronouns, and other bizarre constructions may be attributed to that software system.      Amount and/or Complexity of Data Reviewed  Labs: ordered.  Radiology: ordered.    Risk  OTC drugs.  Prescription drug management.                                      Clinical Impression:  Final diagnoses:  [R55] Syncopal episodes  [W19.XXXA] Fall  [T14.90XA] Trauma  [R55] Syncope          ED Disposition Condition    Discharge Stable          ED Prescriptions       Medication Sig Dispense Start Date End Date Auth. Provider    cephALEXin (KEFLEX) 500 MG capsule Take 1 capsule (500 mg total) by mouth 4 (four) times daily. for 5 days 20 capsule 12/15/2023 12/20/2023 Radha Sheppard MD          Follow-up Information       Follow up With Specialties Details Why Contact Info Additional Information    [facility] - 3rd Fl Cardiology   [address]  [facility] [id number] [phone] Cardiology Services Clinics - 3rd floor             Radha Sheppard MD  12/15/23 0722

## 2023-12-15 NOTE — ED TRIAGE NOTES
Sherie Reyes, a 58 y.o. female presents to the ED w/ complaint of     Triage note:  Chief Complaint   Patient presents with    Loss of Consciousness     Had 3 syncopal episodes last night while walking to bathroom. Pt states she hit her L elbow, lower back, and jaw when she passed out. -thinners. States she has been dizzy and fatigued all day      Review of patient's allergies indicates:  No Known Allergies  Past Medical History:   Diagnosis Date    Anxiety     Degenerative joint disease (DJD) of hip     Diabetes type 2, controlled 7/10/2019    GERD (gastroesophageal reflux disease)     Hyperlipidemia     Hypertension     IBS (irritable bowel syndrome)     Insomnia     Lumbar disc herniation     PTSD (post-traumatic stress disorder)

## 2023-12-15 NOTE — DISCHARGE INSTRUCTIONS

## 2023-12-16 LAB
BACTERIA UR CULT: NORMAL
BACTERIA UR CULT: NORMAL

## 2023-12-18 ENCOUNTER — OFFICE VISIT (OUTPATIENT)
Dept: ORTHOPEDICS | Facility: CLINIC | Age: 58
End: 2023-12-18
Payer: MEDICARE

## 2023-12-18 DIAGNOSIS — M25.511 RIGHT SHOULDER PAIN, UNSPECIFIED CHRONICITY: Primary | ICD-10-CM

## 2023-12-18 PROCEDURE — 99441 PR PHYSICIAN TELEPHONE EVALUATION 5-10 MIN: ICD-10-PCS | Mod: 95,,, | Performed by: ORTHOPAEDIC SURGERY

## 2023-12-18 PROCEDURE — 4010F ACE/ARB THERAPY RXD/TAKEN: CPT | Mod: CPTII,95,, | Performed by: ORTHOPAEDIC SURGERY

## 2023-12-18 PROCEDURE — 99441 PR PHYSICIAN TELEPHONE EVALUATION 5-10 MIN: CPT | Mod: 95,,, | Performed by: ORTHOPAEDIC SURGERY

## 2023-12-18 PROCEDURE — 3044F PR MOST RECENT HEMOGLOBIN A1C LEVEL <7.0%: ICD-10-PCS | Mod: CPTII,95,, | Performed by: ORTHOPAEDIC SURGERY

## 2023-12-18 PROCEDURE — 4010F PR ACE/ARB THEARPY RXD/TAKEN: ICD-10-PCS | Mod: CPTII,95,, | Performed by: ORTHOPAEDIC SURGERY

## 2023-12-18 PROCEDURE — 3044F HG A1C LEVEL LT 7.0%: CPT | Mod: CPTII,95,, | Performed by: ORTHOPAEDIC SURGERY

## 2023-12-18 NOTE — PROGRESS NOTES
Established Patient - Audio Only Telehealth Visit     The patient location is:  At home  The chief complaint leading to consultation is:  Left shoulder pain  Visit type: Virtual visit with audio only (telephone)  Total time spent with patient:  10 minutes       The reason for the audio only service rather than synchronous audio and video virtual visit was related to technical difficulties or patient preference/necessity.     Each patient to whom I provide medical services by telemedicine is:  (1) informed of the relationship between the physician and patient and the respective role of any other health care provider with respect to management of the patient; and (2) notified that they may decline to receive medical services by telemedicine and may withdraw from such care at any time. Patient verbally consented to receive this service via voice-only telephone call.       HPI:  58-year-old female with left shoulder pain for the past 6 months        Assessment and plan:  Recent MRI scan of the left shoulder showed a partial tear of the rotator cuff   I went over these findings with the patient today   She has been through physical therapy and had injections in the past with only temporary improvement   She might be a candidate for surgical treatment for the left shoulder   She will follow-up in 2-3 weeks to talk about possible surgery for the left shoulder                        This service was not originating from a related E/M service provided within the previous 7 days nor will  to an E/M service or procedure within the next 24 hours or my soonest available appointment.  Prevailing standard of care was able to be met in this audio-only visit.

## 2023-12-24 ENCOUNTER — PATIENT MESSAGE (OUTPATIENT)
Dept: OTHER | Facility: OTHER | Age: 58
End: 2023-12-24
Payer: MEDICARE

## 2023-12-24 NOTE — PROGRESS NOTES
CC: right  foot pain     HPI:   Sherie Reyes is a 51 y.o. female with complaints of pain to the right  forefoot, sub 1st MPJ. Pt states the pain began a little over a month ago, when she had a fall, possibly hyperextended her toe when her  helped her up.  She has tried no treatment yet.  Xrays done last week without fractures noted.   She reports pain mainly with weight bearing and direct pressure to the area.  There is also pain with dorsiflexion of the toe.  She is wearing athletic shoes today.      3/9/17  follow up right foot pain.   She is using an offloading pad which helps.  Also in CAM with mild relief.  Still painful to the area however.  Vicoprofen does help.   No other changes in the interval.   4/4/17:  follow up right foot pain.  Still in walking boot as this seems to improve her pain though she reports that she keeps falling due to dehydration.  MRI foot was done.        5/2/17:  follow up right foot pain. She had transitioned to regular shoes.  She had an injection last visit which helped for at least two weeks.  It is started to bother her again.  She does report that she injured her big toe about a week ago, there is mild bruising in the area (she defers taking xrays today).  She reports that she was able to put weight on her foot after the injury.          PCP:  Tonia Bernal DO          Past Medical History:   Diagnosis Date    Anxiety     Degenerative joint disease (DJD) of hip     GERD (gastroesophageal reflux disease)     Hyperlipidemia     Hypertension     IBS (irritable bowel syndrome)     Insomnia     Lumbar disc herniation     PTSD (post-traumatic stress disorder)        Current Outpatient Prescriptions on File Prior to Visit   Medication Sig Dispense Refill    alprazolam 1 mg/mL Conc       buPROPion (WELLBUTRIN XL) 300 MG 24 hr tablet Take 300 mg by mouth once daily.      clonazePAM (KLONOPIN) 1 MG tablet Take 1 mg by mouth 3 (three) times daily.       "diphenhydramine-acetaminophen (TYLENOL PM)  mg Tab Take 1 tablet by mouth nightly as needed.      duloxetine (CYMBALTA) 60 MG capsule Take 60 mg by mouth once daily.      gabapentin (NEURONTIN) 300 MG capsule Take 300 mg by mouth 3 (three) times daily.      gemfibrozil (LOPID) 600 MG tablet Take 1 tablet (600 mg total) by mouth 2 (two) times daily before meals. 60 tablet 6    hydrocodone-ibuprofen 7.5-200 mg (VICOPROFEN) 7.5-200 mg per tablet Take 1 tablet by mouth every 12 (twelve) hours as needed for Pain. 20 tablet 0    KURVELO 0.15-0.03 mg per tablet Take 1 tablet by mouth once daily.      LINZESS 290 mcg Cap Take 1 capsule by mouth once daily.      melatonin 5 mg Tab Take by mouth nightly as needed.      pantoprazole (PROTONIX) 40 MG tablet Take 40 mg by mouth once daily.      POLYETHYLENE GLYCOL 3350 (MIRALAX ORAL) Take by mouth as needed.       promethazine (PHENERGAN) 25 MG tablet       tizanidine (ZANAFLEX) 6 mg capsule Take 6 mg by mouth 2 (two) times daily as needed for Muscle spasms.      zolpidem (AMBIEN) 10 mg Tab Take 5 mg by mouth nightly as needed.        No current facility-administered medications on file prior to visit.          ALLG:   Review of patient's allergies indicates:  No Known Allergies          ROS:   General ROS: negative for chills, fatigue or fever  Cardiovascular ROS: no chest pain or dyspnea on exertion  Musculoskeletal ROS: positive for  joint pain or joint stiffness.  Negative for loss of strength  Neuro ROS: Negative for syncope, numbness, or muscle weakness  Skin ROS: Negative for rash, itching or hair changes.        EXAM:  Vitals:    05/02/17 1451   BP: 135/88   Pulse: 106   Weight: 78.5 kg (173 lb)   Height: 5' 2" (1.575 m)        General: Patient is well-developed, well-nourished.  Alert and oriented x 3 and in no apparent distress.   Lower extremity exam:   Vasc: Palpable pedal pulses bilaterally. Normal capillary refill time.    Neuro:  Light touch, " proprioception, and sharp/dull sensation are all intact bilaterally.   No sensorimotor deficits.   Derm: No open lesions, macerations, rashes, or hyperkeratotic lesions. no erythema, minimal localized edema to the area of the right sub 1st metatarsophalangeal joint.  Mild bruising right hallux, no mal-alignment of the toe.   MSK: Full ROM of 1st MPJ, with pain at sesamoids with dorsiflexion. 1st ray ROM full and intact without crepitus. No pain on palpation to dorsal 1st MPJ, or lesser MPJs of the affected foot.      IMAGIN/1/17  Findings: 3 views right.  There is no fracture, dislocation, or bony erosion.  Normal variant Bipartite tibial sesamoid noted.   MRI:  3/13/17   Acute on chronic degenerative changes of the right 1st MTP joint, as above.  Fluid between the 1st and 2nd metatarsal heads, suggesting intermetatarsal bursitis.  Marrow edema at the base of the first metatarsal, near the insertion of the peroneus longus tendon. No fracture. The peroneus longus and  Lis Franc ligaments appear intact.            ASSESSMENT:   1. Right foot pain     2. Right foot injury, initial encounter              PLAN:  I counseled the patient on her conditions, their implications and medical management.  Patient defers taking xrays today.   Return to Walking boot.  Rest as much as possible.  Reduce weight bearing and ice prn.   Return in about 1 month (around 2017).    Wesley Rey)

## 2023-12-27 RX ORDER — CELECOXIB 200 MG/1
200 CAPSULE ORAL
Qty: 30 CAPSULE | Refills: 2 | Status: SHIPPED | OUTPATIENT
Start: 2023-12-27 | End: 2024-03-04

## 2023-12-28 ENCOUNTER — LAB VISIT (OUTPATIENT)
Dept: LAB | Facility: HOSPITAL | Age: 58
End: 2023-12-28
Attending: INTERNAL MEDICINE
Payer: MEDICARE

## 2023-12-28 ENCOUNTER — OFFICE VISIT (OUTPATIENT)
Dept: CARDIOLOGY | Facility: CLINIC | Age: 58
End: 2023-12-28
Payer: MEDICARE

## 2023-12-28 VITALS
DIASTOLIC BLOOD PRESSURE: 74 MMHG | BODY MASS INDEX: 26.65 KG/M2 | SYSTOLIC BLOOD PRESSURE: 107 MMHG | WEIGHT: 145.75 LBS | HEART RATE: 75 BPM | RESPIRATION RATE: 18 BRPM

## 2023-12-28 DIAGNOSIS — I10 ESSENTIAL HYPERTENSION: Chronic | ICD-10-CM

## 2023-12-28 DIAGNOSIS — R55 SYNCOPE: Primary | ICD-10-CM

## 2023-12-28 DIAGNOSIS — Z13.6 ENCOUNTER FOR SCREENING FOR CARDIOVASCULAR DISORDERS: ICD-10-CM

## 2023-12-28 DIAGNOSIS — E78.2 MIXED HYPERLIPIDEMIA: Chronic | ICD-10-CM

## 2023-12-28 DIAGNOSIS — E11.9 TYPE 2 DIABETES MELLITUS WITHOUT COMPLICATION, WITHOUT LONG-TERM CURRENT USE OF INSULIN: ICD-10-CM

## 2023-12-28 LAB
ALBUMIN/CREAT UR: 3.1 UG/MG (ref 0–30)
CREAT UR-MCNC: 224 MG/DL (ref 15–325)
MICROALBUMIN UR DL<=1MG/L-MCNC: 7 UG/ML

## 2023-12-28 PROCEDURE — 3074F PR MOST RECENT SYSTOLIC BLOOD PRESSURE < 130 MM HG: ICD-10-PCS | Mod: CPTII,S$GLB,, | Performed by: INTERNAL MEDICINE

## 2023-12-28 PROCEDURE — 99999 PR PBB SHADOW E&M-EST. PATIENT-LVL V: CPT | Mod: PBBFAC,,, | Performed by: INTERNAL MEDICINE

## 2023-12-28 PROCEDURE — 1160F PR REVIEW ALL MEDS BY PRESCRIBER/CLIN PHARMACIST DOCUMENTED: ICD-10-PCS | Mod: CPTII,S$GLB,, | Performed by: INTERNAL MEDICINE

## 2023-12-28 PROCEDURE — 1159F PR MEDICATION LIST DOCUMENTED IN MEDICAL RECORD: ICD-10-PCS | Mod: CPTII,S$GLB,, | Performed by: INTERNAL MEDICINE

## 2023-12-28 PROCEDURE — 1160F RVW MEDS BY RX/DR IN RCRD: CPT | Mod: CPTII,S$GLB,, | Performed by: INTERNAL MEDICINE

## 2023-12-28 PROCEDURE — 3078F DIAST BP <80 MM HG: CPT | Mod: CPTII,S$GLB,, | Performed by: INTERNAL MEDICINE

## 2023-12-28 PROCEDURE — 3074F SYST BP LT 130 MM HG: CPT | Mod: CPTII,S$GLB,, | Performed by: INTERNAL MEDICINE

## 2023-12-28 PROCEDURE — 3008F BODY MASS INDEX DOCD: CPT | Mod: CPTII,S$GLB,, | Performed by: INTERNAL MEDICINE

## 2023-12-28 PROCEDURE — 99204 OFFICE O/P NEW MOD 45 MIN: CPT | Mod: S$GLB,,, | Performed by: INTERNAL MEDICINE

## 2023-12-28 PROCEDURE — 3008F PR BODY MASS INDEX (BMI) DOCUMENTED: ICD-10-PCS | Mod: CPTII,S$GLB,, | Performed by: INTERNAL MEDICINE

## 2023-12-28 PROCEDURE — 4010F ACE/ARB THERAPY RXD/TAKEN: CPT | Mod: CPTII,S$GLB,, | Performed by: INTERNAL MEDICINE

## 2023-12-28 PROCEDURE — 99999 PR PBB SHADOW E&M-EST. PATIENT-LVL V: ICD-10-PCS | Mod: PBBFAC,,, | Performed by: INTERNAL MEDICINE

## 2023-12-28 PROCEDURE — 1159F MED LIST DOCD IN RCRD: CPT | Mod: CPTII,S$GLB,, | Performed by: INTERNAL MEDICINE

## 2023-12-28 PROCEDURE — 99204 PR OFFICE/OUTPT VISIT, NEW, LEVL IV, 45-59 MIN: ICD-10-PCS | Mod: S$GLB,,, | Performed by: INTERNAL MEDICINE

## 2023-12-28 PROCEDURE — 4010F PR ACE/ARB THEARPY RXD/TAKEN: ICD-10-PCS | Mod: CPTII,S$GLB,, | Performed by: INTERNAL MEDICINE

## 2023-12-28 PROCEDURE — 82043 UR ALBUMIN QUANTITATIVE: CPT | Performed by: INTERNAL MEDICINE

## 2023-12-28 PROCEDURE — 3044F HG A1C LEVEL LT 7.0%: CPT | Mod: CPTII,S$GLB,, | Performed by: INTERNAL MEDICINE

## 2023-12-28 PROCEDURE — 3078F PR MOST RECENT DIASTOLIC BLOOD PRESSURE < 80 MM HG: ICD-10-PCS | Mod: CPTII,S$GLB,, | Performed by: INTERNAL MEDICINE

## 2023-12-28 PROCEDURE — 3044F PR MOST RECENT HEMOGLOBIN A1C LEVEL <7.0%: ICD-10-PCS | Mod: CPTII,S$GLB,, | Performed by: INTERNAL MEDICINE

## 2023-12-28 RX ORDER — ATORVASTATIN CALCIUM 40 MG/1
40 TABLET, FILM COATED ORAL DAILY
Qty: 90 TABLET | Refills: 3 | Status: SHIPPED | OUTPATIENT
Start: 2023-12-28

## 2023-12-28 NOTE — PROGRESS NOTES
HISTORY:    58-year-old female with a history of hypertension, hyperlipidemia, +vape presenting for initial evaluation.    Patient had syncopal episode a couple of weeks ago. Woke up at night to use the restroom. Was straining, but unable to relieve herself. Stood up and passed out. Stood up again and passed out. Crawled to her bed and SBP was 70. Went to the ED and was dx'd w a UTI. No symptoms since.    At baseline, feels well. No CP, SOB, or CUELLAR. Limited in her activity by MS issues.     Has lost 50 pounds this year due to loss of appetite that she believes is related to a covid infection. Antihtnsive regimen has been reduced over that period of time and Bps are in a normal range without hypotension for the most part.    The patient denies any previous history of myocardial infarction, coronary artery disease, peripheral arterial disease, stroke, congestive heart failure, or cardiomyopathy. Prev tob, +vap. + FH.     Tolerates losartan 25 x 1, pravastatin 20 x 1.    PHYSICAL EXAM:    Vitals:    12/28/23 1340   BP: 107/74   Pulse: 75   Resp:        NAD, A+Ox3.  No jvd, no bruit.  RRR nml s1,s2. No murmurs.  CTA B no wheezes or crackles.  No edema.    LABS/STUDIES (imaging reviewed during clinic visit):    December 2023 CBC and CMP normal.  August LDL 96 and HDL 55.  Triglycerides 185.  TSH normal.  A1c normal.    EKG December 2023 sinus rhythm with no Q-waves or ST changes.    DSE 2020 no evidence of ischemia with normal TTE at baseline.  Carotid 2017 No significant ICA ds.    ASSESSMENT & PLAN:    1. Syncope    2. Essential hypertension    3. Mixed hyperlipidemia    4. Encounter for screening for cardiovascular disorders        Orders Placed This Encounter    CT Calcium Scoring Cardiac    Holter monitor - 24 hour    Echo    atorvastatin (LIPITOR) 40 MG tablet        Seems like vasovagal syncope in the setting of a UTI. TTE/holter ordered.     Also, has lost 50 pounds this year. Defer to PCP.    Bps reasonable at  baseline on losartan 25x1. Can reduce further if light headedness or dizziness.    Change pravastatin to atorvastatin 40x1.      Quit vaping.     Check coronary calcium score given FH and Rfs.       Follow up in about 4 months (around 4/28/2024).      Esperanza Sharma MD

## 2023-12-29 ENCOUNTER — TELEPHONE (OUTPATIENT)
Dept: ORTHOPEDICS | Facility: CLINIC | Age: 58
End: 2023-12-29
Payer: MEDICARE

## 2023-12-29 NOTE — TELEPHONE ENCOUNTER
----- Message from Riya Jamison sent at 12/29/2023  3:35 PM CST -----  Type:  Needs Medical Advice/appointment time     Who Called: pt    Would the patient rather a call back or a response via [facility]? Call   Best Call Back Number: [phone]  Additional Information: pt requesting a call back to discuss changing appointment on 1/11 to be later in the day after 1.

## 2024-01-03 ENCOUNTER — HOSPITAL ENCOUNTER (OUTPATIENT)
Dept: RADIOLOGY | Facility: HOSPITAL | Age: 59
Discharge: HOME | End: 2024-01-03
Attending: INTERNAL MEDICINE
Payer: MEDICARE

## 2024-01-03 DIAGNOSIS — Z13.6 ENCOUNTER FOR SCREENING FOR CARDIOVASCULAR DISORDERS: ICD-10-CM

## 2024-01-03 PROCEDURE — 75571 CT HRT W/O DYE W/CA TEST: CPT | Mod: TC

## 2024-01-03 PROCEDURE — 75571 CT HRT W/O DYE W/CA TEST: CPT | Mod: 26,,, | Performed by: RADIOLOGY

## 2024-01-12 ENCOUNTER — PATIENT MESSAGE (OUTPATIENT)
Dept: ADMINISTRATIVE | Facility: OTHER | Age: 59
End: 2024-01-12
Payer: MEDICARE

## 2024-01-22 ENCOUNTER — OFFICE VISIT (OUTPATIENT)
Dept: ORTHOPEDICS | Facility: CLINIC | Age: 59
End: 2024-01-22
Payer: MEDICARE

## 2024-01-22 VITALS — WEIGHT: 145 LBS | HEIGHT: 62 IN | BODY MASS INDEX: 26.68 KG/M2

## 2024-01-22 DIAGNOSIS — M75.102 ROTATOR CUFF TEAR, LEFT: ICD-10-CM

## 2024-01-22 DIAGNOSIS — M25.511 RIGHT SHOULDER PAIN, UNSPECIFIED CHRONICITY: ICD-10-CM

## 2024-01-22 DIAGNOSIS — M25.512 CHRONIC LEFT SHOULDER PAIN: Primary | ICD-10-CM

## 2024-01-22 DIAGNOSIS — M75.102 TEAR OF LEFT ROTATOR CUFF, UNSPECIFIED TEAR EXTENT, UNSPECIFIED WHETHER TRAUMATIC: Primary | ICD-10-CM

## 2024-01-22 DIAGNOSIS — M75.122 COMPLETE TEAR OF LEFT ROTATOR CUFF, UNSPECIFIED WHETHER TRAUMATIC: ICD-10-CM

## 2024-01-22 DIAGNOSIS — G89.29 CHRONIC LEFT SHOULDER PAIN: Primary | ICD-10-CM

## 2024-01-22 PROCEDURE — 4010F ACE/ARB THERAPY RXD/TAKEN: CPT | Mod: CPTII,S$GLB,,

## 2024-01-22 PROCEDURE — 99214 OFFICE O/P EST MOD 30 MIN: CPT | Mod: 25,S$GLB,,

## 2024-01-22 PROCEDURE — 1159F MED LIST DOCD IN RCRD: CPT | Mod: CPTII,S$GLB,,

## 2024-01-22 PROCEDURE — 3008F BODY MASS INDEX DOCD: CPT | Mod: CPTII,S$GLB,,

## 2024-01-22 PROCEDURE — 1160F RVW MEDS BY RX/DR IN RCRD: CPT | Mod: CPTII,S$GLB,,

## 2024-01-22 PROCEDURE — 99999 PR PBB SHADOW E&M-EST. PATIENT-LVL IV: CPT | Mod: PBBFAC,,,

## 2024-01-22 PROCEDURE — 20610 DRAIN/INJ JOINT/BURSA W/O US: CPT | Mod: RT,S$GLB,,

## 2024-01-22 RX ORDER — CEFAZOLIN SODIUM 2 G/50ML
2 SOLUTION INTRAVENOUS
Status: CANCELLED | OUTPATIENT
Start: 2024-01-22

## 2024-01-22 RX ORDER — TRIAMCINOLONE ACETONIDE 40 MG/ML
40 INJECTION, SUSPENSION INTRA-ARTICULAR; INTRAMUSCULAR
Status: DISCONTINUED | OUTPATIENT
Start: 2024-01-22 | End: 2024-01-22 | Stop reason: HOSPADM

## 2024-01-22 RX ORDER — IBUPROFEN 800 MG/1
800 TABLET ORAL 3 TIMES DAILY
Qty: 90 TABLET | Refills: 2 | Status: SHIPPED | OUTPATIENT
Start: 2024-01-22 | End: 2024-05-30 | Stop reason: SDUPTHER

## 2024-01-22 RX ADMIN — TRIAMCINOLONE ACETONIDE 40 MG: 40 INJECTION, SUSPENSION INTRA-ARTICULAR; INTRAMUSCULAR at 03:01

## 2024-01-22 NOTE — PROGRESS NOTES
SUBJECTIVE:      Chief Complaint: Pain of the Left Shoulder      History of Present Illness:  Patient is a RHD 58 y.o.  who presents today for follow-up of bilateral shoulder pain (L>R).   Pain began 7months ago.   At last appointment, patient discussed with Dr. Hughes considering rotator cuff repair surgery  Pain is  constant  and nocturnal symptoms present  Attempted treatment(s) and/or interventions include Celebrex without adequate response.    Past surgical history of bilateral rotator cuff repair     Smoking: No  DM: No    Past Medical History:   Diagnosis Date    Anxiety     Degenerative joint disease (DJD) of hip     Diabetes type 2, controlled 7/10/2019    GERD (gastroesophageal reflux disease)     Hyperlipidemia     Hypertension     IBS (irritable bowel syndrome)     Insomnia     Lumbar disc herniation     PTSD (post-traumatic stress disorder)      Past Surgical History:   Procedure Laterality Date    ADENOIDECTOMY      ARTHROSCOPIC DEBRIDEMENT OF ROTATOR CUFF Right 2020    Procedure: DEBRIDEMENT, ROTATOR CUFF, ARTHROSCOPIC;  Surgeon: Melchor Hughes Jr., MD;  Location: [facility] OR;  Service: Orthopedics;  Laterality: Right;  need opus system (Ger MESA notified , )  video, notified/confirmed 2020 KB 1310    ARTHROSCOPIC EXCISION OF ACROMIOCLAVICULAR JOINT  2020    Procedure: EXCISION, ACROMIOCLAVICULAR JOINT, ARTHROSCOPIC;  Surgeon: Melchor Hughes Jr., MD;  Location: [facility] OR;  Service: Orthopedics;;    ARTHROSCOPIC REPAIR OF ROTATOR CUFF OF SHOULDER      ARTHROSCOPY OF SHOULDER WITH DECOMPRESSION OF SUBACROMIAL SPACE  2020    Procedure: ARTHROSCOPY, SHOULDER, WITH SUBACROMIAL SPACE DECOMPRESSION;  Surgeon: Melchor Hughes Jr., MD;  Location: [facility] OR;  Service: Orthopedics;;     SECTION      EPIDURAL STEROID INJECTION INTO CERVICAL SPINE N/A 2021    Procedure: Injection-steroid-epidural-cervical--C7-T1 IL ZAYNAB;  Surgeon: Gregoria Rodriguez MD;  Location: [facility] PAIN MGT;   Service: Pain Management;  Laterality: N/A;    ESOPHAGOGASTRODUODENOSCOPY N/A 5/22/2023    Procedure: ESOPHAGOGASTRODUODENOSCOPY (EGD);  Surgeon: Tommy Schmitt MD;  Location: [facility] ENDO (4TH FLR);  Service: Endoscopy;  Laterality: N/A;  referral [employer] HENRY-instr portal-GT    INJECTION OF JOINT Right 9/18/2023    Procedure: INJECTION, JOINT, RIGHT ISCHIAL BURSA  SOONER DATE;  Surgeon: Yesica Monroy MD;  Location: [facility] PAIN MGT;  Service: Pain Management;  Laterality: Right;    TENOTOMY, ELBOW, WITH DEBRIDEMENT AND TENDON REPAIR Right 5/20/2022    Procedure: TENOTOMY,ELBOW,WITH DEBRIDEMENT AND TENDON REPAIR;  Surgeon: Melchor Hughes Jr., MD;  Location: [facility] OR;  Service: Orthopedics;  Laterality: Right;    TONSILLECTOMY       Review of patient's allergies indicates:  No Known Allergies  Social History     Social History Narrative    Not on file     Family History   Problem Relation Age of Onset    Allergies Mother     Hypertension Mother     Hypothyroidism Mother     Arthritis Mother     Depression Mother     Heart disease Mother     Hyperlipidemia Mother     Hypertension Father     Stroke Father 46    Diabetes Father     Heart disease Father     Hyperlipidemia Father     Depression Sister     Breast cancer Paternal Aunt     Cancer Paternal Aunt         Bilat breast Cancer    Heart disease Paternal Aunt     Cancer Maternal Grandmother         Lung w/ Mets    COPD Maternal Grandmother     Depression Maternal Grandmother     Early death Maternal Grandmother     Heart disease Maternal Grandmother     Hyperlipidemia Maternal Grandmother     Hypertension Maternal Grandmother     Cancer Maternal Grandfather         Stomach Ca    Depression Maternal Grandfather     Early death Maternal Grandfather     Heart disease Maternal Grandfather     Hyperlipidemia Maternal Grandfather     Hypertension Maternal Grandfather     Mental illness Maternal Grandfather     Vision loss Maternal Grandfather         Loss during yhe War.     Arthritis Paternal Grandmother     Depression Paternal Grandmother     Heart disease Paternal Grandmother     Diabetes Paternal Grandfather     Early death Paternal Grandfather     Heart disease Paternal Grandfather     Hyperlipidemia Paternal Grandfather     Hypertension Paternal Grandfather     Kidney disease Paternal Grandfather     Depression Daughter     Depression Son     Learning disabilities Son          Current Outpatient Medications:     acetaminophen (TYLENOL) 500 MG tablet, Take 500 mg by mouth every 6 (six) hours as needed for Pain., Disp: , Rfl:     albuterol (PROAIR HFA) 90 mcg/actuation inhaler, Inhale 2 puffs into the lungs every 6 (six) hours as needed for Wheezing. Rescue, Disp: 18 g, Rfl: 0    atorvastatin (LIPITOR) 40 MG tablet, Take 1 tablet (40 mg total) by mouth once daily., Disp: 90 tablet, Rfl: 3    azelastine (ASTELIN) 137 mcg (0.1 %) nasal spray, 1 spray (137 mcg total) by Nasal route 2 (two) times daily., Disp: 30 mL, Rfl: 0    celecoxib (CELEBREX) 200 MG capsule, TAKE 1 CAPSULE(200 MG) BY MOUTH EVERY DAY, Disp: 30 capsule, Rfl: 2    cetirizine (ZYRTEC) 10 MG tablet, Take 2 tablets (20 mg total) by mouth every morning., Disp: 60 tablet, Rfl: 3    chlorzoxazone (PARAFON FORTE) 500 mg Tab, Take 500 mg by mouth daily as needed., Disp: , Rfl:     clonazePAM (KLONOPIN) 1 MG tablet, TAKE 1 TABLET BY MOUTH TWICE DAILY, Disp: 60 tablet, Rfl: 1    cyanocobalamin 1,000 mcg/mL injection, Inject 1 mL (1,000 mcg total) into the muscle every 30 days., Disp: 30 mL, Rfl: 1    cyclobenzaprine (FLEXERIL) 10 MG tablet, Take 10 mg by mouth 2 (two) times daily as needed., Disp: , Rfl:     diclofenac sodium (VOLTAREN) 1 % Gel, Apply 2 g topically 4 (four) times daily., Disp: 50 g, Rfl: 0    dicyclomine (BENTYL) 10 MG capsule, TAKE 1 CAPSULE BY MOUTH FOUR TIMES DAILY AS NEEDED FOR ABDOMINAL PAIN, Disp: 120 capsule, Rfl: 1    estradioL (ESTRACE) 1 MG tablet, Take 1 mg by mouth once daily., Disp: , Rfl:      "ibuprofen (ADVIL,MOTRIN) 800 MG tablet, Take 1 tablet (800 mg total) by mouth 3 (three) times daily., Disp: 90 tablet, Rfl: 2    LIDOcaine (LIDODERM) 5 %, Place 1 patch onto the skin once daily. Remove & Discard patch within 12 hours or as directed by MD, Disp: 14 patch, Rfl: 0    LINZESS 290 mcg Cap capsule, TAKE 1 CAPSULE(290 MCG) BY MOUTH BEFORE BREAKFAST, Disp: 90 capsule, Rfl: 3    losartan (COZAAR) 25 MG tablet, Take 1 tablet (25 mg total) by mouth once daily., Disp: 90 tablet, Rfl: 1    melatonin 5 mg Chew, Take 10 mg by mouth daily as needed., Disp: , Rfl:     mupirocin (BACTROBAN) 2 % ointment, APPLY TOPICALLY TO THE AFFECTED AREA TWICE DAILY, Disp: 15 g, Rfl: 2    mv-mn/iron/folic acid/herb 190 (VITAMIN D3 COMPLETE ORAL), Take by mouth once daily at 6am., Disp: , Rfl:     omega-3 fatty acids/fish oil (FISH OIL-OMEGA-3 FATTY ACIDS) 300-1,000 mg capsule, Take 1 capsule by mouth once daily., Disp: , Rfl:     omeprazole (PRILOSEC) 40 MG capsule, TAKE 1 CAPSULE(40 MG) BY MOUTH EVERY DAY, Disp: 30 capsule, Rfl: 11    POLYETHYLENE GLYCOL 3350 (MIRALAX ORAL), Take by mouth as needed. , Disp: , Rfl:     potassium chloride 10% (KAYCIEL) 20 mEq/15 mL oral solution, TAKE 15 ML BY MOUTH EVERY DAY, Disp: 473 mL, Rfl: 11    QUEtiapine (SEROQUEL) 200 MG Tab, Take 1 tablet (200 mg total) by mouth every evening., Disp: 30 tablet, Rfl: 2    traZODone (DESYREL) 100 MG tablet, TK 1 T PO  QHS, Disp: , Rfl:     venlafaxine (EFFEXOR-XR) 150 MG Cp24, Take 150 mg by mouth once daily., Disp: , Rfl:     venlafaxine (EFFEXOR-XR) 75 MG 24 hr capsule, TAKE ONE CAPSULE BY MOUTH DAILY WITH 150 MG TO EQUAL 225 MG, Disp: , Rfl:     Review of Systems   Musculoskeletal:  Positive for joint pain and muscle weakness.       OBJECTIVE:      Vital Signs (Most Recent):  Vitals:    01/22/24 1502   Weight: 65.8 kg (145 lb)   Height: 5' 2" (1.575 m)     Body mass index is 26.52 kg/m².      PHYSICAL EXAMINATION:  Vitals:  Ht 5' 2" (1.575 m)   Wt 65.8 " kg (145 lb)   BMI 26.52 kg/m²    General: The patient is alert and oriented x 3.  Mood is pleasant.  Observation of ears, eyes and nose reveal no gross abnormalities.  No labored breathing observed.  Gait is coordinated. Patient can toe walk and heel walk without difficulty.    LEFT Shoulder / Upper Extremity Exam    OBSERVATION:     Swelling  none  Deformity  none   Discoloration  none   Scapular winging none   Scars   from previous RTC repair        ROM: (* = with pain)  Right shoulder   Left shoulder        AROM (PROM)   AROM (PROM)   FE    90° (175°)     75° (175°)     ER at 0°    55°  (65°)    50°  (65°)    IR (spine level)   buttock    buttock    STRENGTH: (* = with pain) Right shoulder   Left shoulder    SCAPTION   4/5*    4/5*    IR    4/5*    4/5*   ER    4/5*    4/5*   BICEPS   4/5*    4/5*   Deltoid    4/5*    4/5*     EXTREMITY NEURO-VASCULAR EXAM:    Sensation grossly intact to light touch all dermatomal regions.    DTR 2+ Biceps, Triceps, BR and Negative Garys sign   Grossly intact motor function at Elbow, Wrist and Hand   Distal pulses radial and ulnar 2+, brisk cap refill, symmetric.      NECK:  Painless FROM and spinous processes non-tender.  Positive Spurlings sign.        Diagnostic Results:  Imaging -none    ASSESSMENT/PLAN:      1. Chronic left shoulder pain    2. Complete tear of left rotator cuff, unspecified whether traumatic        58 y.o. y/o female with left rotator cuff tear  Plan: The patient and I had a thorough discussion today.  We discussed the working diagnosis as well as several other potential alternative diagnoses.    We discussed conservative and surgical treatment options. Conservative options include rest, activity modifications, workplace modifications, po and topical analgesia (OTC and rx), formal or home PT, and others. Surgical treatment was also mentioned.    At this time, the patient would like to proceed with the following, which I agree with.    Medications:   Ibuprofen prescription sent to pharmacy  Procedures:  CSI given 2 right shoulder  Surgery:  Had a long discussion with the patient today about considering rotator cuff repair surgery for the left shoulder due to weakness and constant pain.  Patient has elected to proceed with surgery.  Consents have been signed.  Patient will contact PCP for clearance  All of the patient's questions were answered and the patient will contact us if they have any questions or concerns in the interim.    Case discussed with Dr. Saul Amador PA-C  [facility]  Orthopedic Surgery

## 2024-01-23 NOTE — PROCEDURES
Large Joint Aspiration/Injection: R subacromial bursa    Date/Time: 1/22/2024 3:00 PM    Performed by: Cookie Amador PA-C  Authorized by: Cookei Amador PA-C    Consent Done?:  Yes (Verbal)  Indications:  Pain and arthritis  Site marked: the procedure site was marked    Timeout: prior to procedure the correct patient, procedure, and site was verified    Prep: patient was prepped and draped in usual sterile fashion      Details:  Needle Size:  22 G  Ultrasonic Guidance for needle placement?: No    Location:  Shoulder  Site:  R subacromial bursa  Medications:  40 mg triamcinolone acetonide 40 mg/mL  Patient tolerance:  Patient tolerated the procedure well with no immediate complications

## 2024-01-24 ENCOUNTER — TELEPHONE (OUTPATIENT)
Dept: PREADMISSION TESTING | Facility: HOSPITAL | Age: 59
End: 2024-01-24
Payer: MEDICARE

## 2024-01-24 DIAGNOSIS — I10 ESSENTIAL HYPERTENSION: Chronic | ICD-10-CM

## 2024-01-24 DIAGNOSIS — Z01.818 PRE-OP EVALUATION: Primary | ICD-10-CM

## 2024-01-29 DIAGNOSIS — E11.59 HYPERTENSION ASSOCIATED WITH DIABETES: ICD-10-CM

## 2024-01-29 DIAGNOSIS — I15.2 HYPERTENSION ASSOCIATED WITH DIABETES: ICD-10-CM

## 2024-01-29 NOTE — TELEPHONE ENCOUNTER
Refill Routing Note   Medication(s) are not appropriate for processing by [facility] for the following reason(s):      New or recently adjusted medication (Dose adjustment of 1qd set as no print.)    [facility] action(s):  Defer Care Due:  None identified     Medication Therapy Plan: Dose adjustment  of 1qd set as no print.      Appointments  past 12m or future 3m with PCP    Date Provider   Last Visit   11/2/2023 Farhana Whittington MD   Next Visit   3/4/2024 Farhana Whittington MD   ED visits in past 90 days: 1        Note composed:12:10 PM 01/29/2024

## 2024-01-29 NOTE — TELEPHONE ENCOUNTER
No care due was identified.  [facility] Embedded Care Due Messages. Reference number: [id number].   1/29/2024 3:08:04 AM CST

## 2024-01-30 RX ORDER — LOSARTAN POTASSIUM 50 MG/1
50 TABLET ORAL DAILY
Qty: 90 TABLET | Refills: 1 | Status: SHIPPED | OUTPATIENT
Start: 2024-01-30 | End: 2024-05-01

## 2024-02-18 NOTE — PROGRESS NOTES
[facility]  Occupational Therapy Treatment Note    Date: 10/11/2022  Name: Sherie Reyes  Clinic Number: [id number]    Therapy Diagnosis:   Encounter Diagnoses   Name Primary?    Right elbow pain Yes    Decreased muscle strength        Physician: Melchor Hughes Jr., *    Physician Orders: Eval and treat;  Medical Diagnosis: M77.11 (ICD-10-CM) - Lateral epicondylitis of right elbow  Surgical Procedure and Date: 5/20/2022, Right TENOTOMY,ELBOW,WITH DEBRIDEMENT AND TENDON REPAIR Date of Injury/Onset: gradual onset   Evaluation Date: 8/15/2022  Insurance Authorization Period Expiration: 8/15/2023   Plan of Care Expiration: 8 weeks; 10/14/2022  Progress Note Due: 4 weeks; 9/16/2022   Date of Return to MD: 8/31/2022  Visit # / Visits authorized: 11/25  FOTO: initial eval      Precautions:  Standard diabetes      Time In:  1:57 PM   Time Out: 2:54  PM  Total Appointment Time (timed & untimed codes): 57 minutes         SUBJECTIVE     Pt reports: pt reported incr back pain from scapular exercises, also reported pain in the lateral epicondyle, possibly from holding a cup for and extended time   She was compliant with home exercise program given last session.   Response to previous treatment: improved pain   Functional change:  able to use R with less pain    Pain: 4/10 R elbow  Location: right elbow      OBJECTIVE   Objective Measures updated at progress report unless specified.    Edema. Measured in centimeters.    8/18/2022 8/18/2022     Left Right   2in. Above elbow       2in. Below elbow       Wrist Crease 26 26   Figure of 8       MCPs             Elbow and Wrist ROM. Measured in degrees.    8/18/2022 8/18/2022 10/11/2022     Left Right Right   Elbow Ext/Flex 10/145 7/138 6/132   Supination/Pronation 75/WNL 65/69 78/89   Wrist Ext/Flex 74/50 40/55 65/61   Wrist RD/UD 15/31 15/30 18/39      Hand ROM. Measured in degrees.    8/18/2022 8/18/2022 10/11/2021     Left Right Right             Index: MP    28/ 26/              PIP                       DIP                   RODRIGUES                 Long:  MP   34/ 22/              PIP                   DIP                   RODRIGUES            Strength (Dynamometer) and Pinch Strength (Pinch Gauge)  Measured in pounds.    8/18/2022 8/18/2022 9/28/2022 10/11/2022     Left Right Right Right   Rung II 12.5 19 35 16/17/25   Key Pinch         3pt Pinch 2 1 6 5   2pt Pinch               Manual Muscle Test    8/18/2022 8/18/2022 9/28/2022 10/11/2022     Left Right Right Right   Wrist Extension  3+    3 3+ 3+               Limitation/Restriction for FOTO initial eval; wrist Survey     Therapist reviewed FOTO scores for Sherie Reyes on 8/15/2022.   FOTO documents entered into [employer] - see Media section.     Limitation Score: 59%             Treatment     Sherie received the treatments listed below:       Supervised modalities after being cleared for contradictions: Hot Pack - Patient received Moist Heat  x 10 min to R elbow  to increase blood flow, circulation and tissue elasticity prior to therex      Manual therapy techniques: Soft tissue Mobilization were applied to the: R elbow and FA for 8 minutes, including:  STM and scar massage to lateral elbow and along proximal radius       Therapeutic exercises to develop endurance and ROM for 39 minutes, including:   - astym stretches holding for 30s x 3 reps (pre/post session)  - elbow AROM 3 ways x 10 each (2 sets) with 1#  - pegboard with PHG silver 1st rung  - oscillations with red therabar x2 min   - eccentric Wrist AROM 2# (ext/RD) x 15 (2 sets)   - wrist maze x 2 min  - wrist wheel flex/ext, sup/pro x2 min      *objective measures updated  (NT)  - twisting forward/backward with R RED flexbar x 10 reps (2 sets)   - tricep extension x 10 reps (3 sets) positioned on incline   - shoulder extension x 10 reps (3 sets) positioned on incline  - scapula retraction/depression  x 10 reps (NT)  - radial nerve mobilization  (elbow flexed, passively flex wrist - extend elbow, return to flexion, wrist neutral)  (NT)  - eccentric red flexbar x 10 reps   - true balance x2 min   - red flexbar frowns/smiles x 10 reps (2 sets)   - isospheres x 2 min   - liberty stick tan with tan ball x2 laps   - digi extender yellow x15 (2sets)  - hand gripper 3 yellow RB x15 (2 sets )  Patient Education and Home Exercises      Education provided:   - HEP  - avoid heavy activity   - Progress towards goals     Written Home Exercises Provided: Patient instructed to cont prior HEP.  Exercises were reviewed and Sherie was able to demonstrate them prior to the end of the session.  Sherie demonstrated good  understanding of the HEP provided. See EMR under Patient Instructions for exercises provided during therapy sessions.      ASSESSMENT     Pt would continue to benefit from skilled OT. Tolerated session well. Objective measures display improved wrist ROM however decline in  strength. She participated well and is motivated. She continues to be limited by chronic widespread pain. Will continue as tolerated.    Sherie is progressing well towards her goals and there are no updates to goals at this time. Pt prognosis is Good.     Pt will continue to benefit from skilled outpatient occupational therapy to address the deficits listed in the problem list on initial evaluation, provide pt/family education and to maximize pt's level of independence in the home and community environment.     Pt's spiritual, cultural and educational needs considered and pt agreeable to plan of care and goals.    Anticipated barriers to occupational therapy: n/a    Goals:   The following goals were discussed with the patient and patient is in agreement with them as to be addressed in the treatment plan.   Long Term Goals (LTGs); to be met by discharge.  LTG #1: Pt will report a pain level of 1-2 out of 10 with functional use of R hand. ------progressing not met 10/11/2022  LTG #2: Pt will  demo improved FOTO score by 10-15 points. ------progressing not met 10/11/2022  LTG #3: Pt will return to prior level of function for ADLs and household management. ------progressing not met 10/11/2022     Short Term Goals (STGs); to be met within 4 weeks (9/16/2022).  STG #1: Pt will report 3-4 out of 10 pain level with functional use of R hand. ------progressing not met 10/11/2022  STG #2: Pt will report/demo improved  strength by 5# ------progressing not met 10/11/2022  STG #3: Pt will report/demo MMT 4/5 ------progressing not met 10/11/2022  STG #4: Pt will demonstrate independence with issued HEP. ------progressing not met 10/11/2022         PLAN     Continue skilled occupational therapy with individualized plan of care focusing on increasing strength and participation in daily activities.    Updates/Grading for next session: continue as tolerated,     KIAH Hoang/AZAM                               Ambulatory

## 2024-03-04 ENCOUNTER — OFFICE VISIT (OUTPATIENT)
Dept: INTERNAL MEDICINE | Facility: CLINIC | Age: 59
End: 2024-03-04
Payer: MEDICARE

## 2024-03-04 DIAGNOSIS — N18.30 CKD STAGE 3 SECONDARY TO DIABETES: ICD-10-CM

## 2024-03-04 DIAGNOSIS — J84.10 CALCIFIED GRANULOMA OF LUNG: ICD-10-CM

## 2024-03-04 DIAGNOSIS — E11.22 CKD STAGE 3 SECONDARY TO DIABETES: ICD-10-CM

## 2024-03-04 DIAGNOSIS — R10.9 CHRONIC ABDOMINAL PAIN: ICD-10-CM

## 2024-03-04 DIAGNOSIS — E11.9 CONTROLLED TYPE 2 DIABETES MELLITUS WITHOUT COMPLICATION, WITHOUT LONG-TERM CURRENT USE OF INSULIN: ICD-10-CM

## 2024-03-04 DIAGNOSIS — G89.29 CHRONIC ABDOMINAL PAIN: ICD-10-CM

## 2024-03-04 DIAGNOSIS — E11.59 HYPERTENSION ASSOCIATED WITH DIABETES: Primary | ICD-10-CM

## 2024-03-04 DIAGNOSIS — I15.2 HYPERTENSION ASSOCIATED WITH DIABETES: Primary | ICD-10-CM

## 2024-03-04 DIAGNOSIS — G89.29 OTHER CHRONIC PAIN: ICD-10-CM

## 2024-03-04 PROCEDURE — 1160F RVW MEDS BY RX/DR IN RCRD: CPT | Mod: CPTII,95,, | Performed by: INTERNAL MEDICINE

## 2024-03-04 PROCEDURE — 99214 OFFICE O/P EST MOD 30 MIN: CPT | Mod: 95,,, | Performed by: INTERNAL MEDICINE

## 2024-03-04 PROCEDURE — 1159F MED LIST DOCD IN RCRD: CPT | Mod: CPTII,95,, | Performed by: INTERNAL MEDICINE

## 2024-03-04 PROCEDURE — 4010F ACE/ARB THERAPY RXD/TAKEN: CPT | Mod: CPTII,95,, | Performed by: INTERNAL MEDICINE

## 2024-03-04 RX ORDER — DICYCLOMINE HYDROCHLORIDE 10 MG/1
10 CAPSULE ORAL 4 TIMES DAILY
Qty: 120 CAPSULE | Refills: 1 | Status: SHIPPED | OUTPATIENT
Start: 2024-03-04 | End: 2024-03-04

## 2024-03-04 RX ORDER — DICYCLOMINE HYDROCHLORIDE 10 MG/1
CAPSULE ORAL
Qty: 360 CAPSULE | Refills: 1 | Status: SHIPPED | OUTPATIENT
Start: 2024-03-04

## 2024-03-04 RX ORDER — ALBUTEROL SULFATE 90 UG/1
2 AEROSOL, METERED RESPIRATORY (INHALATION) EVERY 6 HOURS PRN
Qty: 18 G | Refills: 2 | Status: SHIPPED | OUTPATIENT
Start: 2024-03-04 | End: 2024-05-13

## 2024-03-04 RX ORDER — AZELASTINE 1 MG/ML
1 SPRAY, METERED NASAL 2 TIMES DAILY
Qty: 30 ML | Refills: 1 | Status: SHIPPED | OUTPATIENT
Start: 2024-03-04 | End: 2024-06-12

## 2024-03-04 NOTE — PROGRESS NOTES
Primary Care Telemedicine Note    The patient location is:  Home     The chief complaint leading to consultation is: HTN, DM2, chronic pain    Total time spent with patient: 20 minutes    Visit type: Virtual visit with synchronous audio only and video      Each patient to whom he or she provides medical services by telemedicine is:  (1) informed of the relationship between the physician and patient and the respective role of any other health care provider with respect to management of the patient; and (2) notified that he or she may decline to receive medical services by telemedicine and may withdraw from such care at any time.        Subjective:        Patient ID: Sherie Reyes is a 58 y.o. female.    Chief Complaint: Diabetes, Hypertension, and Pain      HPI    Recent syncopal episode in December related to vasovagal episodes after arising from toilet. BP at home was 70/50's and she hit her chin on the basin and injured her shoulder during the fall. Cardiac workup also done and in process.    Hypertension: The patient has been taking medications as instructed, no medication side effects noted, no chest pain on exertion, no dyspnea on exertion, and no swelling of ankles.    BP Readings from Last 3 Encounters:   12/28/23 107/74   12/15/23 109/72   11/02/23 108/68       Diabetes: Patient presents for follow up of diabetes. Current symptoms include: none. Symptoms have been well-controlled. Patient denies foot ulcerations. Evaluation to date has included: hemoglobin A1C.  Home sugars: BGs consistently in an acceptable range. Current treatment: none.    Lab Results   Component Value Date    HGBA1C 5.4 08/29/2023    GLU 82 12/14/2023     Chronic Pain: The patient sees Dr. Freddy Kapadia for pain management every 4-6 months. She takes multiple medications for pain control but she was given information about how to use them. Dr. Kapadia requested that her ESR be checked. Pain is currently stable. She is planning  to undergo left rotator cuff repair .         Review of Systems   Constitutional:  Negative for chills, diaphoresis and fever.   HENT:  Negative for congestion, ear pain, sinus pressure and sore throat.    Eyes:  Negative for discharge and visual disturbance.   Respiratory:  Negative for cough and shortness of breath.    Cardiovascular:  Negative for chest pain and palpitations.   Gastrointestinal:  Positive for abdominal pain (requests Bentyl for recurrent abdominal pain). Negative for constipation, diarrhea, nausea and vomiting.   Endocrine: Negative for polydipsia and polyuria.   Musculoskeletal:  Positive for arthralgias and back pain. Negative for myalgias.   Skin:  Negative for rash and wound.   Neurological:  Negative for dizziness, tremors and headaches.   Psychiatric/Behavioral:  Negative for dysphoric mood. The patient is nervous/anxious (stable, sees a psychiatrist).            Objective:        Physical Exam  Constitutional:       General: She is not in acute distress.     Appearance: Normal appearance. She is well-developed. She is not toxic-appearing or diaphoretic.   HENT:      Head: Normocephalic and atraumatic.      Right Ear: Hearing normal.      Left Ear: Hearing normal.      Nose: No congestion.   Eyes:      General: Lids are normal.   Pulmonary:      Effort: No tachypnea or respiratory distress.   Musculoskeletal:      Cervical back: Normal range of motion.   Neurological:      Mental Status: She is alert and oriented to person, place, and time.      Comments: Alert and oriented   Psychiatric:         Attention and Perception: Attention normal.         Mood and Affect: Mood normal.         Behavior: Behavior is cooperative.               Assessment:       1. Hypertension associated with diabetes    2. Controlled type 2 diabetes mellitus without complication, without long-term current use of insulin    3. CKD stage 3 secondary to diabetes    4. Chronic abdominal pain    5. Other chronic pain     6. Calcified granuloma of lung              Plan:     1. Hypertension associated with diabetes  - controlled, continue losartan    2. Controlled type 2 diabetes mellitus without complication, without long-term current use of insulin  - Hemoglobin A1C; Future    3. CKD stage 3 secondary to diabetes  - BMP scheduled for tomorrow, maintain adequate water intake  - discussed minimizing use of NSAID's ordered by her pain management providers    4. Chronic abdominal pain  - bentyl helps, will refill  - dicyclomine (BENTYL) 10 MG capsule; Take 1 capsule (10 mg total) by mouth 4 (four) times daily.  Dispense: 120 capsule; Refill: 1    5. Other chronic pain  - Sedimentation rate; Future  - C-REACTIVE PROTEIN; Future    6. Calcified granuloma of lung  - seen on past imaging, no symptoms, will monitor    RTC in 4 months for follow-up or sooner if needed (virtual)    __________________________    Farhana Whittington MD, PharmD  [facility]  American Board of Obesity Medicine diplomate  Office [phone]

## 2024-03-04 NOTE — TELEPHONE ENCOUNTER
No care due was identified.  [facility] Embedded Care Due Messages. Reference number: [id number].   3/04/2024 3:20:05 PM CST

## 2024-03-05 ENCOUNTER — LAB VISIT (OUTPATIENT)
Dept: LAB | Facility: HOSPITAL | Age: 59
End: 2024-03-05
Attending: ORTHOPAEDIC SURGERY
Payer: MEDICARE

## 2024-03-05 DIAGNOSIS — G89.29 OTHER CHRONIC PAIN: ICD-10-CM

## 2024-03-05 DIAGNOSIS — Z01.818 PRE-OP EVALUATION: ICD-10-CM

## 2024-03-05 DIAGNOSIS — I10 ESSENTIAL HYPERTENSION: Chronic | ICD-10-CM

## 2024-03-05 DIAGNOSIS — E11.9 CONTROLLED TYPE 2 DIABETES MELLITUS WITHOUT COMPLICATION, WITHOUT LONG-TERM CURRENT USE OF INSULIN: ICD-10-CM

## 2024-03-05 LAB
ANION GAP SERPL CALC-SCNC: 10 MMOL/L (ref 8–16)
BUN SERPL-MCNC: 8 MG/DL (ref 6–20)
CALCIUM SERPL-MCNC: 9.3 MG/DL (ref 8.7–10.5)
CHLORIDE SERPL-SCNC: 109 MMOL/L (ref 95–110)
CO2 SERPL-SCNC: 24 MMOL/L (ref 23–29)
CREAT SERPL-MCNC: 0.8 MG/DL (ref 0.5–1.4)
CRP SERPL-MCNC: 0.3 MG/L (ref 0–8.2)
ERYTHROCYTE [SEDIMENTATION RATE] IN BLOOD BY PHOTOMETRIC METHOD: <2 MM/HR (ref 0–36)
EST. GFR  (NO RACE VARIABLE): >60 ML/MIN/1.73 M^2
ESTIMATED AVG GLUCOSE: 111 MG/DL (ref 68–131)
GLUCOSE SERPL-MCNC: 99 MG/DL (ref 70–110)
HBA1C MFR BLD: 5.5 % (ref 4–5.6)
POTASSIUM SERPL-SCNC: 3.4 MMOL/L (ref 3.5–5.1)
SODIUM SERPL-SCNC: 143 MMOL/L (ref 136–145)

## 2024-03-05 PROCEDURE — 36415 COLL VENOUS BLD VENIPUNCTURE: CPT | Performed by: ORTHOPAEDIC SURGERY

## 2024-03-05 PROCEDURE — 85652 RBC SED RATE AUTOMATED: CPT | Performed by: INTERNAL MEDICINE

## 2024-03-05 PROCEDURE — 80048 BASIC METABOLIC PNL TOTAL CA: CPT | Performed by: ORTHOPAEDIC SURGERY

## 2024-03-05 PROCEDURE — 86140 C-REACTIVE PROTEIN: CPT | Performed by: INTERNAL MEDICINE

## 2024-03-05 PROCEDURE — 83036 HEMOGLOBIN GLYCOSYLATED A1C: CPT | Performed by: INTERNAL MEDICINE

## 2024-03-12 ENCOUNTER — TELEPHONE (OUTPATIENT)
Dept: CARDIOLOGY | Facility: CLINIC | Age: 59
End: 2024-03-12
Payer: MEDICARE

## 2024-03-12 NOTE — TELEPHONE ENCOUNTER
Spoke with patient she cancel her test will call back to resche       ----- Message from Christina Ribeiro sent at 3/12/2024  3:38 PM CDT -----  Regarding: Insurance Problems  Pt will callback to schedule appt on 3/13/24, due to insurance problems.    Contact @ [phone]    Thanks

## 2024-03-18 ENCOUNTER — TELEPHONE (OUTPATIENT)
Dept: ORTHOPEDICS | Facility: CLINIC | Age: 59
End: 2024-03-18
Payer: MEDICARE

## 2024-03-18 NOTE — TELEPHONE ENCOUNTER
Spoke with patient. Patient stated she was called and notified that only one person can be with her the day of surgery.

## 2024-03-18 NOTE — TELEPHONE ENCOUNTER
----- Message from Ludy Sandra sent at 3/18/2024  1:26 PM CDT -----  Pt Requesting Call Back    Who called: pt  Who called for pt:  Best call back #:  [phone]  Add notes: pt said she wants to know how many people can be in the room with her while she is having her surgery; pt said she also wants to know is there any medications she should stop taking the morning of her surgery

## 2024-03-19 ENCOUNTER — ANESTHESIA (OUTPATIENT)
Dept: SURGERY | Facility: HOSPITAL | Age: 59
End: 2024-03-19
Payer: MEDICARE

## 2024-03-19 ENCOUNTER — HOSPITAL ENCOUNTER (OUTPATIENT)
Facility: HOSPITAL | Age: 59
Discharge: HOME | End: 2024-03-19
Attending: ORTHOPAEDIC SURGERY | Admitting: ORTHOPAEDIC SURGERY
Payer: MEDICARE

## 2024-03-19 ENCOUNTER — ANESTHESIA EVENT (OUTPATIENT)
Dept: SURGERY | Facility: HOSPITAL | Age: 59
End: 2024-03-19
Payer: MEDICARE

## 2024-03-19 VITALS
DIASTOLIC BLOOD PRESSURE: 78 MMHG | BODY MASS INDEX: 26.69 KG/M2 | HEART RATE: 84 BPM | TEMPERATURE: 98 F | SYSTOLIC BLOOD PRESSURE: 124 MMHG | OXYGEN SATURATION: 99 % | HEIGHT: 62 IN | WEIGHT: 145.06 LBS | RESPIRATION RATE: 18 BRPM

## 2024-03-19 DIAGNOSIS — M75.102 ROTATOR CUFF TEAR, LEFT: ICD-10-CM

## 2024-03-19 DIAGNOSIS — M75.102 TEAR OF LEFT ROTATOR CUFF, UNSPECIFIED TEAR EXTENT, UNSPECIFIED WHETHER TRAUMATIC: ICD-10-CM

## 2024-03-19 LAB — POCT GLUCOSE: 108 MG/DL (ref 70–110)

## 2024-03-19 PROCEDURE — 63600175 PHARM REV CODE 636 W HCPCS: Performed by: ANESTHESIOLOGY

## 2024-03-19 PROCEDURE — C9290 INJ, BUPIVACAINE LIPOSOME: HCPCS | Performed by: ANESTHESIOLOGY

## 2024-03-19 PROCEDURE — D9220A PRA ANESTHESIA: Mod: CRNA,,, | Performed by: NURSE ANESTHETIST, CERTIFIED REGISTERED

## 2024-03-19 PROCEDURE — 71000015 HC POSTOP RECOV 1ST HR: Performed by: ORTHOPAEDIC SURGERY

## 2024-03-19 PROCEDURE — D9220A PRA ANESTHESIA: Mod: ANES,,, | Performed by: ANESTHESIOLOGY

## 2024-03-19 PROCEDURE — 71000033 HC RECOVERY, INTIAL HOUR: Performed by: ORTHOPAEDIC SURGERY

## 2024-03-19 PROCEDURE — 36000710: Performed by: ORTHOPAEDIC SURGERY

## 2024-03-19 PROCEDURE — 36000711: Performed by: ORTHOPAEDIC SURGERY

## 2024-03-19 PROCEDURE — 29827 SHO ARTHRS SRG RT8TR CUF RPR: CPT | Mod: AS,LT,,

## 2024-03-19 PROCEDURE — 63600175 PHARM REV CODE 636 W HCPCS: Performed by: NURSE ANESTHETIST, CERTIFIED REGISTERED

## 2024-03-19 PROCEDURE — C1713 ANCHOR/SCREW BN/BN,TIS/BN: HCPCS | Performed by: ORTHOPAEDIC SURGERY

## 2024-03-19 PROCEDURE — 64415 NJX AA&/STRD BRCH PLXS IMG: CPT | Mod: 59,LT | Performed by: ANESTHESIOLOGY

## 2024-03-19 PROCEDURE — 71000039 HC RECOVERY, EACH ADD'L HOUR: Performed by: ORTHOPAEDIC SURGERY

## 2024-03-19 PROCEDURE — 29826 SHO ARTHRS SRG DECOMPRESSION: CPT | Mod: LT,,, | Performed by: ORTHOPAEDIC SURGERY

## 2024-03-19 PROCEDURE — 63600175 PHARM REV CODE 636 W HCPCS: Performed by: ORTHOPAEDIC SURGERY

## 2024-03-19 PROCEDURE — 25000003 PHARM REV CODE 250: Performed by: ANESTHESIOLOGY

## 2024-03-19 PROCEDURE — 37000009 HC ANESTHESIA EA ADD 15 MINS: Performed by: ORTHOPAEDIC SURGERY

## 2024-03-19 PROCEDURE — 29827 SHO ARTHRS SRG RT8TR CUF RPR: CPT | Mod: LT,,, | Performed by: ORTHOPAEDIC SURGERY

## 2024-03-19 PROCEDURE — 37000008 HC ANESTHESIA 1ST 15 MINUTES: Performed by: ORTHOPAEDIC SURGERY

## 2024-03-19 PROCEDURE — 27201423 OPTIME MED/SURG SUP & DEVICES STERILE SUPPLY: Performed by: ORTHOPAEDIC SURGERY

## 2024-03-19 PROCEDURE — 25000003 PHARM REV CODE 250: Performed by: NURSE ANESTHETIST, CERTIFIED REGISTERED

## 2024-03-19 PROCEDURE — 29826 SHO ARTHRS SRG DECOMPRESSION: CPT | Mod: AS,LT,,

## 2024-03-19 DEVICE — MULTIFIX S-ULTRA 5.5MM KNOTLESS ANCHOR
Type: IMPLANTABLE DEVICE | Site: SHOULDER | Status: FUNCTIONAL
Brand: MULTIFIX

## 2024-03-19 RX ORDER — OXYCODONE AND ACETAMINOPHEN 7.5; 325 MG/1; MG/1
1 TABLET ORAL EVERY 4 HOURS PRN
Qty: 30 TABLET | Refills: 0 | Status: SHIPPED | OUTPATIENT
Start: 2024-03-19 | End: 2024-04-02 | Stop reason: SDUPTHER

## 2024-03-19 RX ORDER — OXYCODONE HYDROCHLORIDE 5 MG/1
5 TABLET ORAL
Status: DISCONTINUED | OUTPATIENT
Start: 2024-03-19 | End: 2024-03-19 | Stop reason: HOSPADM

## 2024-03-19 RX ORDER — CEFAZOLIN SODIUM 2 G/50ML
2 SOLUTION INTRAVENOUS
Status: DISCONTINUED | OUTPATIENT
Start: 2024-03-19 | End: 2024-03-19 | Stop reason: HOSPADM

## 2024-03-19 RX ORDER — MIDAZOLAM HYDROCHLORIDE 1 MG/ML
INJECTION INTRAMUSCULAR; INTRAVENOUS
Status: COMPLETED
Start: 2024-03-19 | End: 2024-03-19

## 2024-03-19 RX ORDER — PHENYLEPHRINE HYDROCHLORIDE 10 MG/ML
INJECTION INTRAVENOUS
Status: DISCONTINUED | OUTPATIENT
Start: 2024-03-19 | End: 2024-03-19

## 2024-03-19 RX ORDER — ROPIVACAINE HYDROCHLORIDE 5 MG/ML
INJECTION, SOLUTION EPIDURAL; INFILTRATION; PERINEURAL
Status: COMPLETED | OUTPATIENT
Start: 2024-03-19 | End: 2024-03-19

## 2024-03-19 RX ORDER — MIDAZOLAM HYDROCHLORIDE 1 MG/ML
INJECTION, SOLUTION INTRAMUSCULAR; INTRAVENOUS
Status: DISCONTINUED | OUTPATIENT
Start: 2024-03-19 | End: 2024-03-19

## 2024-03-19 RX ORDER — ONDANSETRON 8 MG/1
8 TABLET, ORALLY DISINTEGRATING ORAL EVERY 8 HOURS PRN
Status: DISCONTINUED | OUTPATIENT
Start: 2024-03-19 | End: 2024-03-19 | Stop reason: HOSPADM

## 2024-03-19 RX ORDER — CEFAZOLIN SODIUM 1 G/3ML
INJECTION, POWDER, FOR SOLUTION INTRAMUSCULAR; INTRAVENOUS
Status: DISCONTINUED | OUTPATIENT
Start: 2024-03-19 | End: 2024-03-19

## 2024-03-19 RX ORDER — FENTANYL CITRATE 50 UG/ML
INJECTION, SOLUTION INTRAMUSCULAR; INTRAVENOUS
Status: DISCONTINUED | OUTPATIENT
Start: 2024-03-19 | End: 2024-03-19

## 2024-03-19 RX ORDER — ROCURONIUM BROMIDE 10 MG/ML
INJECTION, SOLUTION INTRAVENOUS
Status: DISCONTINUED | OUTPATIENT
Start: 2024-03-19 | End: 2024-03-19

## 2024-03-19 RX ORDER — LIDOCAINE HYDROCHLORIDE 20 MG/ML
INJECTION INTRAVENOUS
Status: DISCONTINUED | OUTPATIENT
Start: 2024-03-19 | End: 2024-03-19

## 2024-03-19 RX ORDER — ACETAMINOPHEN 325 MG/1
650 TABLET ORAL EVERY 4 HOURS PRN
Status: DISCONTINUED | OUTPATIENT
Start: 2024-03-19 | End: 2024-03-19 | Stop reason: HOSPADM

## 2024-03-19 RX ORDER — SODIUM CHLORIDE 0.9 % (FLUSH) 0.9 %
10 SYRINGE (ML) INJECTION
Status: DISCONTINUED | OUTPATIENT
Start: 2024-03-19 | End: 2024-03-19 | Stop reason: HOSPADM

## 2024-03-19 RX ORDER — KETOROLAC TROMETHAMINE 30 MG/ML
30 INJECTION, SOLUTION INTRAMUSCULAR; INTRAVENOUS ONCE
Status: COMPLETED | OUTPATIENT
Start: 2024-03-19 | End: 2024-03-19

## 2024-03-19 RX ORDER — OXYCODONE HYDROCHLORIDE 5 MG/1
10 TABLET ORAL EVERY 4 HOURS PRN
Status: DISCONTINUED | OUTPATIENT
Start: 2024-03-19 | End: 2024-03-19 | Stop reason: HOSPADM

## 2024-03-19 RX ORDER — DEXAMETHASONE SODIUM PHOSPHATE 4 MG/ML
INJECTION, SOLUTION INTRA-ARTICULAR; INTRALESIONAL; INTRAMUSCULAR; INTRAVENOUS; SOFT TISSUE
Status: DISCONTINUED | OUTPATIENT
Start: 2024-03-19 | End: 2024-03-19

## 2024-03-19 RX ORDER — ACETAMINOPHEN 10 MG/ML
INJECTION, SOLUTION INTRAVENOUS
Status: DISCONTINUED | OUTPATIENT
Start: 2024-03-19 | End: 2024-03-19

## 2024-03-19 RX ORDER — HYDROMORPHONE HYDROCHLORIDE 2 MG/ML
0.2 INJECTION, SOLUTION INTRAMUSCULAR; INTRAVENOUS; SUBCUTANEOUS EVERY 5 MIN PRN
Status: DISCONTINUED | OUTPATIENT
Start: 2024-03-19 | End: 2024-03-19 | Stop reason: HOSPADM

## 2024-03-19 RX ORDER — EPINEPHRINE 1 MG/ML
INJECTION, SOLUTION, CONCENTRATE INTRAVENOUS
Status: DISCONTINUED | OUTPATIENT
Start: 2024-03-19 | End: 2024-03-19 | Stop reason: HOSPADM

## 2024-03-19 RX ORDER — ROPIVACAINE HYDROCHLORIDE 5 MG/ML
INJECTION, SOLUTION EPIDURAL; INFILTRATION; PERINEURAL
Status: COMPLETED
Start: 2024-03-19 | End: 2024-03-19

## 2024-03-19 RX ORDER — PROPOFOL 10 MG/ML
VIAL (ML) INTRAVENOUS
Status: DISCONTINUED | OUTPATIENT
Start: 2024-03-19 | End: 2024-03-19

## 2024-03-19 RX ORDER — PROCHLORPERAZINE EDISYLATE 5 MG/ML
5 INJECTION INTRAMUSCULAR; INTRAVENOUS EVERY 30 MIN PRN
Status: DISCONTINUED | OUTPATIENT
Start: 2024-03-19 | End: 2024-03-19 | Stop reason: HOSPADM

## 2024-03-19 RX ORDER — ONDANSETRON HYDROCHLORIDE 2 MG/ML
INJECTION, SOLUTION INTRAVENOUS
Status: DISCONTINUED | OUTPATIENT
Start: 2024-03-19 | End: 2024-03-19

## 2024-03-19 RX ADMIN — ROPIVACAINE HYDROCHLORIDE 10 ML: 5 INJECTION, SOLUTION EPIDURAL; INFILTRATION; PERINEURAL at 09:03

## 2024-03-19 RX ADMIN — PROPOFOL 150 MG: 10 INJECTION, EMULSION INTRAVENOUS at 09:03

## 2024-03-19 RX ADMIN — BUPIVACAINE 10 ML: 13.3 INJECTION, SUSPENSION, LIPOSOMAL INFILTRATION at 09:03

## 2024-03-19 RX ADMIN — PHENYLEPHRINE HYDROCHLORIDE 100 MCG: 10 INJECTION INTRAVENOUS at 10:03

## 2024-03-19 RX ADMIN — ROCURONIUM BROMIDE 50 MG: 10 INJECTION, SOLUTION INTRAVENOUS at 09:03

## 2024-03-19 RX ADMIN — ONDANSETRON 4 MG: 2 INJECTION, SOLUTION INTRAMUSCULAR; INTRAVENOUS at 09:03

## 2024-03-19 RX ADMIN — SUGAMMADEX 200 MG: 100 INJECTION, SOLUTION INTRAVENOUS at 10:03

## 2024-03-19 RX ADMIN — OXYCODONE HYDROCHLORIDE 5 MG: 5 TABLET ORAL at 11:03

## 2024-03-19 RX ADMIN — CEFAZOLIN 2 G: 330 INJECTION, POWDER, FOR SOLUTION INTRAMUSCULAR; INTRAVENOUS at 09:03

## 2024-03-19 RX ADMIN — ONDANSETRON 4 MG: 2 INJECTION, SOLUTION INTRAMUSCULAR; INTRAVENOUS at 10:03

## 2024-03-19 RX ADMIN — ACETAMINOPHEN 1000 MG: 10 INJECTION, SOLUTION INTRAVENOUS at 10:03

## 2024-03-19 RX ADMIN — KETOROLAC TROMETHAMINE 30 MG: 30 INJECTION, SOLUTION INTRAMUSCULAR; INTRAVENOUS at 11:03

## 2024-03-19 RX ADMIN — LIDOCAINE HYDROCHLORIDE 100 MG: 20 INJECTION, SOLUTION INTRAVENOUS at 09:03

## 2024-03-19 RX ADMIN — SODIUM CHLORIDE, SODIUM LACTATE, POTASSIUM CHLORIDE, AND CALCIUM CHLORIDE: .6; .31; .03; .02 INJECTION, SOLUTION INTRAVENOUS at 10:03

## 2024-03-19 RX ADMIN — HYDROMORPHONE HYDROCHLORIDE 0.2 MG: 2 INJECTION, SOLUTION INTRAMUSCULAR; INTRAVENOUS; SUBCUTANEOUS at 11:03

## 2024-03-19 RX ADMIN — SODIUM CHLORIDE, SODIUM LACTATE, POTASSIUM CHLORIDE, AND CALCIUM CHLORIDE: .6; .31; .03; .02 INJECTION, SOLUTION INTRAVENOUS at 09:03

## 2024-03-19 RX ADMIN — DEXAMETHASONE SODIUM PHOSPHATE 8 MG: 4 INJECTION, SOLUTION INTRA-ARTICULAR; INTRALESIONAL; INTRAMUSCULAR; INTRAVENOUS; SOFT TISSUE at 09:03

## 2024-03-19 RX ADMIN — FENTANYL CITRATE 100 MCG: 50 INJECTION INTRAMUSCULAR; INTRAVENOUS at 09:03

## 2024-03-19 RX ADMIN — MIDAZOLAM 2 MG: 1 INJECTION INTRAMUSCULAR; INTRAVENOUS at 09:03

## 2024-03-19 RX ADMIN — PHENYLEPHRINE HYDROCHLORIDE 100 MCG: 10 INJECTION INTRAVENOUS at 09:03

## 2024-03-19 NOTE — OPERATIVE NOTE ADDENDUM
Certification of Assistant at Surgery       Surgery Date: 3/19/2024     Participating Surgeons:  Surgeon(s) and Role:     * Melchor Hughes Jr., MD - Primary    Procedures:  Procedure(s) (LRB):  REPAIR, ROTATOR CUFF, ARTHROSCOPIC (Left)    Assistant Surgeon's Certification of Necessity:  I understand that section 1842 (b) (6) (d) of the Social Security Act generally prohibits Medicare Part B reasonable charge payment for the services of assistants at surgery in teaching hospitals when qualified residents are available to furnish such services. I certify that the services for which payment is claimed were medically necessary, and that no qualified resident was available to perform the services. I further understand that these services are subject to post-payment review by the Medicare carrier.      Cookie Amador PA-C    03/19/2024  11:21 AM

## 2024-03-19 NOTE — ANESTHESIA PROCEDURE NOTES
Peripheral Block    Patient location during procedure: pre-op   Block not for primary anesthetic.  Reason for block: at surgeon's request and post-op pain management   Post-op Pain Location: L shoulder   Start time: 3/19/2024 9:18 AM  Timeout: 3/19/2024 9:16 AM   End time: 3/19/2024 9:23 AM    Staffing  Authorizing Provider: Shila Duenas MD  Performing Provider: Shila Duenas MD    Staffing  Performed by: Shila Duenas MD  Authorized by: Shila Duenas MD    Preanesthetic Checklist  Completed: patient identified, IV checked, site marked, risks and benefits discussed, surgical consent, monitors and equipment checked, pre-op evaluation and timeout performed  Peripheral Block  Patient position: sitting  Prep: ChloraPrep  Patient monitoring: heart rate, cardiac monitor, continuous pulse ox, continuous capnometry and frequent blood pressure checks  Block type: interscalene  Laterality: left  Injection technique: single shot  Needle  Needle type: Stimuplex   Needle gauge: 20 G  Needle length: 4 in  Needle localization: anatomical landmarks and ultrasound guidance   -ultrasound image captured on disc.  Assessment  Injection assessment: negative aspiration, negative parasthesia and local visualized surrounding nerve  Paresthesia pain: none  Heart rate change: no  Slow fractionated injection: yes    Medications:    Medications: ropivacaine (NAROPIN) injection 0.5% - Perineural   10 mL - 3/19/2024 9:19:00 AM    Additional Notes  VSS.  [facility] RN monitoring vitals throughout procedure.  Patient tolerated procedure well.

## 2024-03-19 NOTE — ANESTHESIA PROCEDURE NOTES
Intubation    Date/Time: 3/19/2024 9:41 AM    Performed by: Hannah Chan CRNA  Authorized by: Shila Duenas MD    Intubation:     Induction:  Intravenous    Intubated:  Postinduction    Mask Ventilation:  Easy mask    Attempts:  1    Attempted By:  CRNA    Method of Intubation:  Video laryngoscopy    Blade:  Stewart 3    Laryngeal View Grade: Grade I - full view of cords      Difficult Airway Encountered?: No      Complications:  None    Airway Device:  Oral endotracheal tube    Airway Device Size:  7.0    Style/Cuff Inflation:  Cuffed (inflated to minimal occlusive pressure)    Inflation Amount (mL):  6    Tube secured:  21    Secured at:  The lips    Placement Verified By:  Capnometry    Complicating Factors:  None    Findings Post-Intubation:  BS equal bilateral and atraumatic/condition of teeth unchanged

## 2024-03-19 NOTE — H&P
Chief Complaint: Pain of the Left Shoulder        History of Present Illness:  Patient is a RHD 58 y.o.  who presents today for follow-up of bilateral shoulder pain (L>R).   Pain began 7months ago.   At last appointment, patient discussed with Dr. Hughes considering rotator cuff repair surgery  Pain is  constant  and nocturnal symptoms present  Attempted treatment(s) and/or interventions include Celebrex without adequate response.     Past surgical history of bilateral rotator cuff repair     Smoking: No  DM: No          Past Medical History:   Diagnosis Date    Anxiety      Degenerative joint disease (DJD) of hip      Diabetes type 2, controlled 7/10/2019    GERD (gastroesophageal reflux disease)      Hyperlipidemia      Hypertension      IBS (irritable bowel syndrome)      Insomnia      Lumbar disc herniation      PTSD (post-traumatic stress disorder)              Past Surgical History:   Procedure Laterality Date    ADENOIDECTOMY        ARTHROSCOPIC DEBRIDEMENT OF ROTATOR CUFF Right 2020     Procedure: DEBRIDEMENT, ROTATOR CUFF, ARTHROSCOPIC;  Surgeon: Melchor Hughes Jr., MD;  Location: [facility] OR;  Service: Orthopedics;  Laterality: Right;  need opus system (Ger MESA notified , )  video, notified/confirmed 2020 KB 1310    ARTHROSCOPIC EXCISION OF ACROMIOCLAVICULAR JOINT   2020     Procedure: EXCISION, ACROMIOCLAVICULAR JOINT, ARTHROSCOPIC;  Surgeon: Melchor Hughes Jr., MD;  Location: [facility] OR;  Service: Orthopedics;;    ARTHROSCOPIC REPAIR OF ROTATOR CUFF OF SHOULDER        ARTHROSCOPY OF SHOULDER WITH DECOMPRESSION OF SUBACROMIAL SPACE   2020     Procedure: ARTHROSCOPY, SHOULDER, WITH SUBACROMIAL SPACE DECOMPRESSION;  Surgeon: Melchor Hughes Jr., MD;  Location: [facility] OR;  Service: Orthopedics;;     SECTION        EPIDURAL STEROID INJECTION INTO CERVICAL SPINE N/A 2021     Procedure: Injection-steroid-epidural-cervical--C7-T1 IL ZAYNAB;  Surgeon: Gregoria Rodriguez MD;   Location: [facility] PAIN MGT;  Service: Pain Management;  Laterality: N/A;    ESOPHAGOGASTRODUODENOSCOPY N/A 5/22/2023     Procedure: ESOPHAGOGASTRODUODENOSCOPY (EGD);  Surgeon: Tommy Schmitt MD;  Location: [facility] ENDO (4TH FLR);  Service: Endoscopy;  Laterality: N/A;  referral [employer] HENRY-instr portal-GT    INJECTION OF JOINT Right 9/18/2023     Procedure: INJECTION, JOINT, RIGHT ISCHIAL BURSA  SOONER DATE;  Surgeon: Yesica Monroy MD;  Location: [facility] PAIN MGT;  Service: Pain Management;  Laterality: Right;    TENOTOMY, ELBOW, WITH DEBRIDEMENT AND TENDON REPAIR Right 5/20/2022     Procedure: TENOTOMY,ELBOW,WITH DEBRIDEMENT AND TENDON REPAIR;  Surgeon: Melchor Hughes Jr., MD;  Location: [facility] OR;  Service: Orthopedics;  Laterality: Right;    TONSILLECTOMY          Review of patient's allergies indicates:  No Known Allergies  Social History          Social History Narrative    Not on file            Family History   Problem Relation Age of Onset    Allergies Mother      Hypertension Mother      Hypothyroidism Mother      Arthritis Mother      Depression Mother      Heart disease Mother      Hyperlipidemia Mother      Hypertension Father      Stroke Father 46    Diabetes Father      Heart disease Father      Hyperlipidemia Father      Depression Sister      Breast cancer Paternal Aunt      Cancer Paternal Aunt           Bilat breast Cancer    Heart disease Paternal Aunt      Cancer Maternal Grandmother           Lung w/ Mets    COPD Maternal Grandmother      Depression Maternal Grandmother      Early death Maternal Grandmother      Heart disease Maternal Grandmother      Hyperlipidemia Maternal Grandmother      Hypertension Maternal Grandmother      Cancer Maternal Grandfather           Stomach Ca    Depression Maternal Grandfather      Early death Maternal Grandfather      Heart disease Maternal Grandfather      Hyperlipidemia Maternal Grandfather      Hypertension Maternal Grandfather      Mental illness Maternal  Grandfather      Vision loss Maternal Grandfather           Loss during yhe War.    Arthritis Paternal Grandmother      Depression Paternal Grandmother      Heart disease Paternal Grandmother      Diabetes Paternal Grandfather      Early death Paternal Grandfather      Heart disease Paternal Grandfather      Hyperlipidemia Paternal Grandfather      Hypertension Paternal Grandfather      Kidney disease Paternal Grandfather      Depression Daughter      Depression Son      Learning disabilities Son              Current Outpatient Medications:     acetaminophen (TYLENOL) 500 MG tablet, Take 500 mg by mouth every 6 (six) hours as needed for Pain., Disp: , Rfl:     albuterol (PROAIR HFA) 90 mcg/actuation inhaler, Inhale 2 puffs into the lungs every 6 (six) hours as needed for Wheezing. Rescue, Disp: 18 g, Rfl: 0    atorvastatin (LIPITOR) 40 MG tablet, Take 1 tablet (40 mg total) by mouth once daily., Disp: 90 tablet, Rfl: 3    azelastine (ASTELIN) 137 mcg (0.1 %) nasal spray, 1 spray (137 mcg total) by Nasal route 2 (two) times daily., Disp: 30 mL, Rfl: 0    celecoxib (CELEBREX) 200 MG capsule, TAKE 1 CAPSULE(200 MG) BY MOUTH EVERY DAY, Disp: 30 capsule, Rfl: 2    cetirizine (ZYRTEC) 10 MG tablet, Take 2 tablets (20 mg total) by mouth every morning., Disp: 60 tablet, Rfl: 3    chlorzoxazone (PARAFON FORTE) 500 mg Tab, Take 500 mg by mouth daily as needed., Disp: , Rfl:     clonazePAM (KLONOPIN) 1 MG tablet, TAKE 1 TABLET BY MOUTH TWICE DAILY, Disp: 60 tablet, Rfl: 1    cyanocobalamin 1,000 mcg/mL injection, Inject 1 mL (1,000 mcg total) into the muscle every 30 days., Disp: 30 mL, Rfl: 1    cyclobenzaprine (FLEXERIL) 10 MG tablet, Take 10 mg by mouth 2 (two) times daily as needed., Disp: , Rfl:     diclofenac sodium (VOLTAREN) 1 % Gel, Apply 2 g topically 4 (four) times daily., Disp: 50 g, Rfl: 0    dicyclomine (BENTYL) 10 MG capsule, TAKE 1 CAPSULE BY MOUTH FOUR TIMES DAILY AS NEEDED FOR ABDOMINAL PAIN, Disp: 120  capsule, Rfl: 1    estradioL (ESTRACE) 1 MG tablet, Take 1 mg by mouth once daily., Disp: , Rfl:     ibuprofen (ADVIL,MOTRIN) 800 MG tablet, Take 1 tablet (800 mg total) by mouth 3 (three) times daily., Disp: 90 tablet, Rfl: 2    LIDOcaine (LIDODERM) 5 %, Place 1 patch onto the skin once daily. Remove & Discard patch within 12 hours or as directed by MD, Disp: 14 patch, Rfl: 0    LINZESS 290 mcg Cap capsule, TAKE 1 CAPSULE(290 MCG) BY MOUTH BEFORE BREAKFAST, Disp: 90 capsule, Rfl: 3    losartan (COZAAR) 25 MG tablet, Take 1 tablet (25 mg total) by mouth once daily., Disp: 90 tablet, Rfl: 1    melatonin 5 mg Chew, Take 10 mg by mouth daily as needed., Disp: , Rfl:     mupirocin (BACTROBAN) 2 % ointment, APPLY TOPICALLY TO THE AFFECTED AREA TWICE DAILY, Disp: 15 g, Rfl: 2    mv-mn/iron/folic acid/herb 190 (VITAMIN D3 COMPLETE ORAL), Take by mouth once daily at 6am., Disp: , Rfl:     omega-3 fatty acids/fish oil (FISH OIL-OMEGA-3 FATTY ACIDS) 300-1,000 mg capsule, Take 1 capsule by mouth once daily., Disp: , Rfl:     omeprazole (PRILOSEC) 40 MG capsule, TAKE 1 CAPSULE(40 MG) BY MOUTH EVERY DAY, Disp: 30 capsule, Rfl: 11    POLYETHYLENE GLYCOL 3350 (MIRALAX ORAL), Take by mouth as needed. , Disp: , Rfl:     potassium chloride 10% (KAYCIEL) 20 mEq/15 mL oral solution, TAKE 15 ML BY MOUTH EVERY DAY, Disp: 473 mL, Rfl: 11    QUEtiapine (SEROQUEL) 200 MG Tab, Take 1 tablet (200 mg total) by mouth every evening., Disp: 30 tablet, Rfl: 2    traZODone (DESYREL) 100 MG tablet, TK 1 T PO  QHS, Disp: , Rfl:     venlafaxine (EFFEXOR-XR) 150 MG Cp24, Take 150 mg by mouth once daily., Disp: , Rfl:     venlafaxine (EFFEXOR-XR) 75 MG 24 hr capsule, TAKE ONE CAPSULE BY MOUTH DAILY WITH 150 MG TO EQUAL 225 MG, Disp: , Rfl:      Review of Systems   Musculoskeletal:  Positive for joint pain and muscle weakness.         OBJECTIVE:       Vital Signs (Most Recent):  Vitals       Vitals:     01/22/24 1502   Weight: 65.8 kg (145 lb)   Height:  "5' 2" (1.575 m)         Body mass index is 26.52 kg/m².        PHYSICAL EXAMINATION:  Vitals:  Ht 5' 2" (1.575 m)   Wt 65.8 kg (145 lb)   BMI 26.52 kg/m²    General: The patient is alert and oriented x 3.  Mood is pleasant.  Observation of ears, eyes and nose reveal no gross abnormalities.  No labored breathing observed.  Gait is coordinated. Patient can toe walk and heel walk without difficulty.     LEFT SHOULDER / UPPER EXTREMITY EXAM       OBSERVATION:                Swelling                       none                Deformity                     none              Discoloration               none                Scapular winging        none              Scars                           from previous RTC repair                                                    ROM:   (* = with pain)                       Right shoulder                         Left shoulder                                                               AROM (PROM)                       AROM (PROM)              FE                                            90° (175°)                                75° (175°)                ER at 0°                                   55°  (65°)                                 50°  (65°)                       IR (spine level)                        buttock                                     buttock     STRENGTH:   (* = with pain)           Right shoulder                         Left shoulder               SCAPTION                              4/5*                                          4/5*                    IR                                             4/5*                                          4/5*              ER                                           4/5*                                          4/5*              BICEPS                                   4/5*                                          4/5*              Deltoid                                     4/5*                                      "     4/5*                EXTREMITY NEURO-VASCULAR EXAM:               Sensation grossly intact to light touch all dermatomal regions.               DTR 2+ Biceps, Triceps, BR and Negative Garys sign              Grossly intact motor function at Elbow, Wrist and Hand              Distal pulses radial and ulnar 2+, brisk cap refill, symmetric.                 NECK:  Painless FROM and spinous processes non-tender.  Positive Spurlings sign.          Diagnostic Results:  Imaging -none     ASSESSMENT/PLAN:       1. Chronic left shoulder pain    2. Complete tear of left rotator cuff, unspecified whether traumatic          58 y.o. y/o female with left rotator cuff tear  Plan: The patient and I had a thorough discussion today.  We discussed the working diagnosis as well as several other potential alternative diagnoses.    We discussed conservative and surgical treatment options. Conservative options include rest, activity modifications, workplace modifications, po and topical analgesia (OTC and rx), formal or home PT, and others. Surgical treatment was also mentioned.     At this time, the patient would like to proceed with the following, which I agree with.     Medications:  Ibuprofen prescription sent to pharmacy  Procedures:  CSI given 2 right shoulder  Surgery:  Had a long discussion with the patient today about considering rotator cuff repair surgery for the left shoulder due to weakness and constant pain.  Patient has elected to proceed with surgery.  Consents have been signed.  Patient will contact PCP for clearance  All of the patient's questions were answered and the patient will contact us if they have any questions or concerns in the interim.     Case discussed with Dr. Saul Amador PA-C  [facility]  Orthopedic Surgery      Electronically signed by Cookie Amador PA-C at 1/22/2024  6:03 PM     Instructions

## 2024-03-19 NOTE — ANESTHESIA PREPROCEDURE EVALUATION
03/19/2024  Sherie Reyes is a 58 y.o., female here for L shoulder rotator cuff repair.       Pre-op Assessment    I have reviewed the Patient Summary Reports.    I have reviewed the NPO Status.   I have reviewed the Medications.     Review of Systems  Anesthesia Hx:  No problems with previous Anesthesia               Denies Personal Hx of Anesthesia complications.                    Social:  Non-Smoker, No Alcohol Use       Hematology/Oncology:  Hematology Normal   Oncology Normal                                   EENT/Dental:  EENT/Dental Normal           Cardiovascular:     Hypertension                                        Pulmonary:  Pulmonary Normal                       Hepatic/GI:     GERD             Musculoskeletal:  Arthritis               Neurological:    Neuromuscular Disease,                                   Endocrine:  Diabetes           Psych:  Psychiatric History anxiety                 Physical Exam  General: Well nourished, Cooperative, Alert and Oriented    Airway:  Mallampati: III / II  Mouth Opening: Normal  TM Distance: Normal  Tongue: Normal  Neck ROM: Normal ROM    Dental:        Anesthesia Plan  Type of Anesthesia, risks & benefits discussed:    Anesthesia Type: Gen ETT, Regional  Intra-op Monitoring Plan: Standard ASA Monitors  Post Op Pain Control Plan: multimodal analgesia  Induction:  IV  Airway Plan: Video and Direct, Post-Induction  Informed Consent: Informed consent signed with the Patient and all parties understand the risks and agree with anesthesia plan.  All questions answered.   ASA Score: 3    Ready For Surgery From Anesthesia Perspective.     .

## 2024-03-19 NOTE — ACP (ADVANCE CARE PLANNING)
Received from PACU per stretcher. VSS. Drowsy. Family called to bedside. PO fluids served.     Assisted up to BR to void without difficulty.

## 2024-03-19 NOTE — OP NOTE
[facility] - [facility] (Hospital)  Operative Note      Date of Procedure: 3/19/2024     Procedure: Procedure(s) (LRB):  REPAIR, ROTATOR CUFF, ARTHROSCOPIC (Left)     Surgeon(s) and Role:     * Melchor Hughes Jr., MD - Primary    Assisting Surgeon: None    Pre-Operative Diagnosis: Tear of left rotator cuff, unspecified tear extent, unspecified whether traumatic [M75.102]    Post-Operative Diagnosis: Post-Op Diagnosis Codes:     * Tear of left rotator cuff, unspecified tear extent, unspecified whether traumatic [M75.102]    Anesthesia: General/Regional    Operative Findings (including complications, if any): rot cuff etar left    Description of Technical Procedures: DATE OF PROCEDURE:   3/19/2024     PREOPERATIVE DIAGNOSIS:  Rotator cuff tear,left shoulder.     POSTOPERATIVE DIAGNOSIS:  Rotator cuff tear,left shoulder.     OPERATIVE PROCEDURES:  1.  left shoulder arthroscopy with subacromial decompression.  2.  left shoulder arthroscopic rotator cuff repair using Opus suture anchor X 3     SURGEON:  Melchor Hughes Jr., MRAH     FIRST ASSISTANT:  Marjorie     ANESTHESIA:  General endotracheal.     ESTIMATED BLOOD LOSS:  Minimal.     COMPLICATIONS:  None.     SPECIMENS:  None.     BRIEF INDICATIONS:  A 58-year-old female with rotator cuff tear,   unresponsive to conservative treatment.     OPERATIVE PROCEDURE IN DETAIL:  After operative consent was obtained, the   patient brought to the Operating Room, placed supine on the operating room   table.  Anesthesia by GET method was performed by the Anesthesia staff.  After   the patient was asleep, the patient was carefully turned to the lateral decubitus position   and stabilized on the bean bag and the operative arm was prepped and draped out   in the normal sterile fashion.  The arm suspended to longitudinal traction 12   pounds.     Following this, a posterolateral stab incision made with a #15 blade and the   arthroscope was inserted into the shoulder joint.  Diagnostic  arthroscopy showed   a complete tear of the supraspinatus tendon near its   Insertion. The scope was reinserted into the subacromial space.  The lateral portal was created   with a #15 blade and a sucker shaver inserted laterally.  A large amount of   bursal tissue was encountered and a complete bursectomy was performed including   removal of the CA ligament.  The subacromial space was very tight and the bur   was brought in laterally and an acromioplasty was performed from lateral to   medial decompressing the subacromial space all the way to the AC joint, which   had some mild-to-moderate degenerative wear.  The inferior clavicle was   co-planed but not removed and after smoothing all bony surfaces, the subacromial   space was noted to be well decompressed.     Attention then turned back to the rotator cuff, which had a complete tear,   which was debrided.     The leading edge of the rotator cuff was freshened up and carefully mobilized   and a suture passer was used to place an inverted horizontal mattress suture in   leading edge of the rotator cuff.  Following this, a superior portal was created   with a #15 blade and the drill used to drill into the greater tuberosity,   followed by insertion of the Opus suture anchor, which achieved good purchase.    The suture passed through the anchor and then tightened up locking the suture   in, bring the rotator cuff down to bone flush, it was locked in place and cut   short.  Range of motion was checked and noted to be full without impingement.    Good repair was achieved.  Hemostasis achieved with the Bovie.  The instruments   were removed and the portals then closed using interrupted 3-0 nylon suture on   the skin.  Sterile dressing applied followed by a pillow sling.  The patient was   then extubated and brought to the Recovery Room in stable condition.  All   sponge and needle counts reported as correct.  No complications.          Significant Surgical Tasks  Conducted by the Assistant(s), if Applicable: scope    Estimated Blood Loss (EBL): * No values recorded between 3/19/2024 10:01 AM and 3/19/2024 11:07 AM *           Implants:   Implant Name Type Inv. Item Serial No.  Lot No. LRB No. Used Action   ANCHOR SUTURE MULTIFIX 5.5MM - [id number]  ANCHOR SUTURE MULTIFIX 5.5MM  [employer] [id number] Left 1 Implanted   ANCHOR SUTURE MULTIFIX 5.5MM - [id number]  ANCHOR SUTURE MULTIFIX 5.5MM  [employer] [id number] Left 1 Implanted   ANCHOR SUTURE MULTIFIX 5.5MM - [id number]  ANCHOR SUTURE MULTIFIX 5.5MM  [employer] [id number] Left 1 Implanted       Specimens:   Specimen (24h ago, onward)      None                    Condition: Good    Disposition: PACU - hemodynamically stable.    Attestation: I was present and scrubbed for the entire procedure.    Discharge Note    OUTCOME: Patient tolerated treatment/procedure well without complication and is now ready for discharge.    DISPOSITION: Home or Self Care    FINAL DIAGNOSIS:  Rotator cuff tear, left    FOLLOWUP: In clinic    DISCHARGE INSTRUCTIONS:    Discharge Procedure Orders   Diet general     Call MD for:  temperature >100.4     Call MD for:  persistent nausea and vomiting     Call MD for:  severe uncontrolled pain     Ice to affected area   Order Comments: using barrier between ice and skin (specify duration&frequency)     Remove dressing in 48 hours

## 2024-03-19 NOTE — TRANSFER OF CARE
"Anesthesia Transfer of Care Note    Patient: Sherie Reyes    Procedure(s) Performed: Procedure(s) (LRB):  REPAIR, ROTATOR CUFF, ARTHROSCOPIC (Left)    Patient location: PACU    Anesthesia Type: general    Transport from OR: Transported from OR on 6-10 L/min O2 by face mask with adequate spontaneous ventilation    Post pain: adequate analgesia    Post assessment: no apparent anesthetic complications    Post vital signs: stable    Level of consciousness: awake, alert and oriented    Nausea/Vomiting: no nausea/vomiting    Complications: none    Transfer of care protocol was followed      Last vitals: Visit Vitals  BP (!) 102/55   Pulse 66   Temp 36.4 °C (97.5 °F) (Skin)   Resp 18   Ht 5' 2" (1.575 m)   Wt 65.8 kg (145 lb 1 oz)   SpO2 97%   Breastfeeding No   BMI 26.53 kg/m²     "

## 2024-03-20 NOTE — ANESTHESIA POSTPROCEDURE EVALUATION
Anesthesia Post Evaluation    Patient: Sherie Reyes    Procedure(s) Performed: Procedure(s) (LRB):  REPAIR, ROTATOR CUFF, ARTHROSCOPIC (Left)  DECOMPRESSION, SUBACROMIAL SPACE (Left)    Final Anesthesia Type: general (+ regional3)      Patient location during evaluation: PACU  Patient participation: Yes- Able to Participate  Level of consciousness: awake and alert, oriented and awake  Post-procedure vital signs: reviewed and stable  Pain management: adequate  Airway patency: patent    PONV status at discharge: No PONV  Anesthetic complications: no      Cardiovascular status: stable  Respiratory status: unassisted and room air  Hydration status: euvolemic  Follow-up not needed.              Vitals Value Taken Time   /78 03/19/24 1300   Temp 36.6 °C (97.9 °F) 03/19/24 1300   Pulse 84 03/19/24 1300   Resp 18 03/19/24 1300   SpO2 99 % 03/19/24 1300         Event Time   Out of Recovery 12:22:00         Pain/Jessica Score: Pain Rating Prior to Med Admin: 5 (3/19/2024 11:59 AM)  Jessica Score: 10 (3/19/2024  1:00 PM)

## 2024-03-23 ENCOUNTER — NURSE TRIAGE (OUTPATIENT)
Dept: ADMINISTRATIVE | Facility: CLINIC | Age: 59
End: 2024-03-23
Payer: MEDICARE

## 2024-03-23 NOTE — TELEPHONE ENCOUNTER
S/p repair of left rotator cuff on 3/19/24.  Pt was not provided with complete written discharge instructions and cannot remember was she was told while coming out of anesthesia. Pt asks the following questions and answers are provided by Dr. Adams, who is on-call.     How long to wear splint?  Wear splint until she speaks to Dr. Hughes  When can I shower?  Wait until she speaks to Dr. Hughes  Numbness and tingling   Yes for several weeks     Pt verbalizes understanding and is instructed to call back with any new/worsening sxs or if she has any additional questions or concerns.   Reason for Disposition   [1] Caller has URGENT question AND [2] triager unable to answer question    Additional Information   Negative: [1] Major abdominal surgical incision AND [2] wound gaping open AND [3] visible internal organs   Negative: Sounds like a life-threatening emergency to the triager   Negative: [1] Bleeding from incision AND [2] won't stop after 10 minutes of direct pressure   Negative: [1] Bleeding (more than a few drops) from incision AND [2] tracheostomy or blood vessel surgery (e.g., carotid endarterectomy, femoral bypass graft, kidney dialysis fistula)   Negative: [1] Widespread rash AND [2] bright red, sunburn-like   Negative: Severe pain in the incision   Negative: [1] Incision gaping open AND [2] < 48 hours since wound re-opened   Negative: [1] Incision gaping open AND [2] length of opening > 2 inches (5 cm)   Negative: Patient sounds very sick or weak to the triager   Negative: Sounds like a serious complication to the triager   Negative: Fever > 100.4 F (38.0 C)   Negative: [1] Incision looks infected (spreading redness, pain) AND [2] fever > 99.5 F (37.5 C)   Negative: [1] Incision looks infected (spreading redness, pain) AND [2] large red area (> 2 in. or 5 cm)   Negative: [1] Incision looks infected (spreading redness, pain) AND [2] face wound   Negative: [1] Red streak runs from the incision AND [2] longer  than 1 inch (2.5 cm)   Negative: [1] Pus or bad-smelling fluid draining from incision AND [2] no fever   Negative: [1] Post-op pain AND [2] not controlled with pain medications   Negative: Dressing soaked with blood or body fluid (e.g., drainage)   Negative: [1] Scant bleeding (e.g., few drops) from incision AND AND [2] tracheostomy or blood vessel surgery (e.g., carotid endarterectomy, femoral bypass graft, kidney dialysis fistula)   Negative: [1] Raised bruise and [2] size > 2 inches (5 cm) and expanding    Protocols used: Post-Op Incision Symptoms and [id number]-H-HL

## 2024-04-01 NOTE — PROGRESS NOTES
Subjective:     Patient ID: Sherie Reyes is a 58 y.o. female.    Chief Complaint: Postop Follow Up    HPI:   Sherie Reyes  returns for a postop visit approximately 2 weeks following:    Surgery: L rotator cuff repair (x3 anchor) with subacromial decompression  Date of Surgery:  3-  Surgeon: Dr. Hughes    Overall, the patient is doing well. The patient reports that pain has been managed with medication, but she is requiring a refill. Pt has not begun formal PT yet, but maintains compliance with activity restrictions and UE sling. Post-operative complaints include: night time pain    Past Medical History:   Diagnosis Date    Anxiety     Degenerative joint disease (DJD) of hip     Diabetes type 2, controlled 7/10/2019    GERD (gastroesophageal reflux disease)     Hyperlipidemia     Hypertension     IBS (irritable bowel syndrome)     Insomnia     Lumbar disc herniation     PTSD (post-traumatic stress disorder)        Current Outpatient Medications:     acetaminophen (TYLENOL) 500 MG tablet, Take 500 mg by mouth every 6 (six) hours as needed for Pain., Disp: , Rfl:     albuterol (PROAIR HFA) 90 mcg/actuation inhaler, Inhale 2 puffs into the lungs every 6 (six) hours as needed for Wheezing. Rescue, Disp: 18 g, Rfl: 2    atorvastatin (LIPITOR) 40 MG tablet, Take 1 tablet (40 mg total) by mouth once daily., Disp: 90 tablet, Rfl: 3    azelastine (ASTELIN) 137 mcg (0.1 %) nasal spray, 1 spray (137 mcg total) by Nasal route 2 (two) times daily., Disp: 30 mL, Rfl: 1    cetirizine (ZYRTEC) 10 MG tablet, Take 2 tablets (20 mg total) by mouth every morning., Disp: 60 tablet, Rfl: 3    chlorzoxazone (PARAFON FORTE) 500 mg Tab, Take 500 mg by mouth daily as needed., Disp: , Rfl:     clonazePAM (KLONOPIN) 1 MG tablet, TAKE 1 TABLET BY MOUTH TWICE DAILY, Disp: 60 tablet, Rfl: 1    cyanocobalamin 1,000 mcg/mL injection, Inject 1 mL (1,000 mcg total) into the muscle every 30 days., Disp: 30 mL, Rfl: 1     cyclobenzaprine (FLEXERIL) 10 MG tablet, Take 10 mg by mouth 2 (two) times daily as needed., Disp: , Rfl:     diclofenac sodium (VOLTAREN) 1 % Gel, Apply 2 g topically 4 (four) times daily., Disp: 50 g, Rfl: 0    dicyclomine (BENTYL) 10 MG capsule, TAKE 1 CAPSULE(10 MG) BY MOUTH FOUR TIMES DAILY, Disp: 360 capsule, Rfl: 1    estradioL (ESTRACE) 1 MG tablet, Take 1 mg by mouth once daily., Disp: , Rfl:     ibuprofen (ADVIL,MOTRIN) 800 MG tablet, Take 1 tablet (800 mg total) by mouth 3 (three) times daily., Disp: 90 tablet, Rfl: 2    LIDOcaine (LIDODERM) 5 %, Place 1 patch onto the skin once daily. Remove & Discard patch within 12 hours or as directed by MD, Disp: 14 patch, Rfl: 0    LINZESS 290 mcg Cap capsule, TAKE 1 CAPSULE(290 MCG) BY MOUTH BEFORE BREAKFAST, Disp: 90 capsule, Rfl: 3    losartan (COZAAR) 50 MG tablet, Take 1 tablet (50 mg total) by mouth once daily., Disp: 90 tablet, Rfl: 1    melatonin 5 mg Chew, Take 10 mg by mouth daily as needed., Disp: , Rfl:     mupirocin (BACTROBAN) 2 % ointment, APPLY TOPICALLY TO THE AFFECTED AREA TWICE DAILY, Disp: 15 g, Rfl: 2    mv-mn/iron/folic acid/herb 190 (VITAMIN D3 COMPLETE ORAL), Take by mouth once daily at 6am., Disp: , Rfl:     omega-3 fatty acids/fish oil (FISH OIL-OMEGA-3 FATTY ACIDS) 300-1,000 mg capsule, Take 1 capsule by mouth once daily., Disp: , Rfl:     omeprazole (PRILOSEC) 40 MG capsule, TAKE 1 CAPSULE(40 MG) BY MOUTH EVERY DAY, Disp: 30 capsule, Rfl: 11    POLYETHYLENE GLYCOL 3350 (MIRALAX ORAL), Take by mouth as needed. , Disp: , Rfl:     potassium chloride 10% (KAYCIEL) 20 mEq/15 mL oral solution, TAKE 15 ML BY MOUTH EVERY DAY, Disp: 473 mL, Rfl: 11    traZODone (DESYREL) 100 MG tablet, TK 1 T PO  QHS, Disp: , Rfl:     venlafaxine (EFFEXOR-XR) 150 MG Cp24, Take 150 mg by mouth once daily., Disp: , Rfl:     venlafaxine (EFFEXOR-XR) 75 MG 24 hr capsule, TAKE ONE CAPSULE BY MOUTH DAILY WITH 150 MG TO EQUAL 225 MG, Disp: , Rfl:     cephALEXin (KEFLEX)  "500 MG capsule, Take 1 capsule (500 mg total) by mouth 4 (four) times daily. for 7 days, Disp: 28 capsule, Rfl: 0    ondansetron (ZOFRAN-ODT) 4 MG TbDL, Take 1 tablet (4 mg total) by mouth every 12 (twelve) hours as needed (nausea)., Disp: 20 tablet, Rfl: 0    oxyCODONE-acetaminophen (PERCOCET) 7.5-325 mg per tablet, Take 1 tablet by mouth every 8 (eight) hours as needed for Pain (as needed for severe pain only)., Disp: 21 tablet, Rfl: 0    QUEtiapine (SEROQUEL) 200 MG Tab, Take 1 tablet (200 mg total) by mouth every evening., Disp: 30 tablet, Rfl: 2  Review of patient's allergies indicates:  No Known Allergies    Review of Systems   Constitutional:  Negative for chills and fever.   Respiratory:  Negative for shortness of breath.    Cardiovascular:  Negative for chest pain and leg swelling.   Gastrointestinal:  Negative for nausea and vomiting.   Genitourinary:  Negative for dysuria.   Musculoskeletal:  Positive for joint pain. Negative for falls.   Skin:  Negative for rash.   Neurological:  Negative for dizziness. Negative for sensory change.    Objective:   Orthopedic Physical Exam  Ht 5' 2" (1.575 m)   Wt 68 kg (150 lb)   BMI 27.44 kg/m²   General:  Well developed, well nourished female. AAOX3. No acute distress.   Breathing unlabored.  Mood and affect appropriate.     Examination LEFT SHOULDER:   Inspection:   Surgical wound margins are well approximated and healing nicely.   Mild erythema to anterior incision.  ROM:  PROM FF 15, ER 15.  AROM not tested today.  Strength:   Not tested today  Sensation: grossly intact distally  NV: Pulses palpable. Cap refill brisk    Assessment:       ICD-10-CM ICD-9-CM    1. S/P left rotator cuff repair  Z98.890 V45.89 Ambulatory referral/consult to Physical/Occupational Therapy          Plan:   2wks s/p   LEFT rotator cuff repair with subacromial decompression    Wound Care: Sutures removed at the bedside. Regular wound care explained with soap and water.   Bracing: Continue " UE sling during activities. Ok to remove for hygiene and ROM exercises   Abx: Rx Kelfex out of an abundance of caution  Activity: No heavy lifting, pushing, pulling, or gripping. Restrictions reviewed at bedside.  Pain Control: rest, ice, OTC analgesics. Rx percocet refill provided  PT: formal referral provided. [place] protocol.     Discussion:  We discussed that she would greatly benefit from guided therapy to work on early shoulder range-of-motion.  She was unable to perform much forward flexion today.  I reviewed our range-of-motion goals over the next few weeks.  Follow Up: 4 weeks    Orders Placed This Encounter   Procedures    Ambulatory referral/consult to Physical/Occupational Therapy     Standing Status:   Future     Standing Expiration Date:   5/1/2025     Referral Priority:   Routine     Referral Type:   Physical Medicine     Referral Reason:   Specialty Services Required     Number of Visits Requested:   1

## 2024-04-02 ENCOUNTER — OFFICE VISIT (OUTPATIENT)
Dept: ORTHOPEDICS | Facility: CLINIC | Age: 59
End: 2024-04-02
Payer: MEDICARE

## 2024-04-02 ENCOUNTER — TELEPHONE (OUTPATIENT)
Dept: ORTHOPEDICS | Facility: CLINIC | Age: 59
End: 2024-04-02

## 2024-04-02 VITALS — BODY MASS INDEX: 27.6 KG/M2 | HEIGHT: 62 IN | WEIGHT: 150 LBS

## 2024-04-02 DIAGNOSIS — Z98.890 S/P LEFT ROTATOR CUFF REPAIR: Primary | ICD-10-CM

## 2024-04-02 PROCEDURE — 3044F HG A1C LEVEL LT 7.0%: CPT | Mod: CPTII,S$GLB,, | Performed by: PHYSICIAN ASSISTANT

## 2024-04-02 PROCEDURE — 1160F RVW MEDS BY RX/DR IN RCRD: CPT | Mod: CPTII,S$GLB,, | Performed by: PHYSICIAN ASSISTANT

## 2024-04-02 PROCEDURE — 99024 POSTOP FOLLOW-UP VISIT: CPT | Mod: S$GLB,,, | Performed by: PHYSICIAN ASSISTANT

## 2024-04-02 PROCEDURE — 99999 PR PBB SHADOW E&M-EST. PATIENT-LVL V: CPT | Mod: PBBFAC,,, | Performed by: PHYSICIAN ASSISTANT

## 2024-04-02 PROCEDURE — 1159F MED LIST DOCD IN RCRD: CPT | Mod: CPTII,S$GLB,, | Performed by: PHYSICIAN ASSISTANT

## 2024-04-02 PROCEDURE — 4010F ACE/ARB THERAPY RXD/TAKEN: CPT | Mod: CPTII,S$GLB,, | Performed by: PHYSICIAN ASSISTANT

## 2024-04-02 RX ORDER — MUPIROCIN 20 MG/G
OINTMENT TOPICAL 2 TIMES DAILY
Qty: 15 G | Refills: 2 | Status: SHIPPED | OUTPATIENT
Start: 2024-04-02

## 2024-04-02 RX ORDER — OXYCODONE AND ACETAMINOPHEN 7.5; 325 MG/1; MG/1
1 TABLET ORAL EVERY 8 HOURS PRN
Qty: 21 TABLET | Refills: 0 | Status: SHIPPED | OUTPATIENT
Start: 2024-04-02 | End: 2024-04-09

## 2024-04-02 RX ORDER — ONDANSETRON 4 MG/1
4 TABLET, ORALLY DISINTEGRATING ORAL EVERY 12 HOURS PRN
Qty: 20 TABLET | Refills: 0 | Status: SHIPPED | OUTPATIENT
Start: 2024-04-02 | End: 2024-04-13 | Stop reason: SDUPTHER

## 2024-04-02 RX ORDER — CEPHALEXIN 500 MG/1
500 CAPSULE ORAL 4 TIMES DAILY
Qty: 28 CAPSULE | Refills: 0 | Status: SHIPPED | OUTPATIENT
Start: 2024-04-02 | End: 2024-04-09

## 2024-04-02 NOTE — TELEPHONE ENCOUNTER
Spoke with patient. Patient stated that when she arrived home this evening, her and her  noticed an area where sutures were removed, seemed to be open and that she can stick her finger in the incision. Advised patient to send us a pic of incision through her portal and I will forward it to  the provider. She verbalized understanding.

## 2024-04-02 NOTE — TELEPHONE ENCOUNTER
----- Message from Irma Young sent at 4/2/2024  4:15 PM CDT -----  Regarding: pt called  Name of Who is Calling: BANDAR FORD [[id number]]      What is the request in detail: pt is requesting a callback regarding the stiches that were removed. There is an area that has opened with some mild  bleeding. Please advise       Can the clinic reply by [facility]: No      What Number [facility] Back [facility]: Telephone Information:           [phone]

## 2024-04-04 ENCOUNTER — OFFICE VISIT (OUTPATIENT)
Dept: ORTHOPEDICS | Facility: CLINIC | Age: 59
End: 2024-04-04
Payer: MEDICARE

## 2024-04-04 VITALS — BODY MASS INDEX: 27.44 KG/M2 | HEIGHT: 62 IN

## 2024-04-04 DIAGNOSIS — M75.112 NONTRAUMATIC INCOMPLETE TEAR OF LEFT ROTATOR CUFF: Primary | ICD-10-CM

## 2024-04-04 PROCEDURE — 3044F HG A1C LEVEL LT 7.0%: CPT | Mod: CPTII,S$GLB,, | Performed by: ORTHOPAEDIC SURGERY

## 2024-04-04 PROCEDURE — 1159F MED LIST DOCD IN RCRD: CPT | Mod: CPTII,S$GLB,, | Performed by: ORTHOPAEDIC SURGERY

## 2024-04-04 PROCEDURE — 4010F ACE/ARB THERAPY RXD/TAKEN: CPT | Mod: CPTII,S$GLB,, | Performed by: ORTHOPAEDIC SURGERY

## 2024-04-04 PROCEDURE — 99024 POSTOP FOLLOW-UP VISIT: CPT | Mod: S$GLB,,, | Performed by: ORTHOPAEDIC SURGERY

## 2024-04-04 PROCEDURE — 99999 PR PBB SHADOW E&M-EST. PATIENT-LVL III: CPT | Mod: PBBFAC,,, | Performed by: ORTHOPAEDIC SURGERY

## 2024-04-04 NOTE — PROGRESS NOTES
Subjective:      Patient ID: Sherie Reyes is a 58 y.o. female.  Chief Complaint: Post-op Evaluation (Left shoulder incision check)      HPI  Sherie Reyes is a  58 y.o. female presenting today for post op visit.  She is s/p cuff repair left shoulder 2 weeks postop   She had sutures removed 2 days ago and was concerned that 1 of the incision seemed to open up a little bit still having some pain in the shoulder   Currently scheduled for therapy   .     Review of patient's allergies indicates:  No Known Allergies      Current Outpatient Medications   Medication Sig Dispense Refill    acetaminophen (TYLENOL) 500 MG tablet Take 500 mg by mouth every 6 (six) hours as needed for Pain.      albuterol (PROAIR HFA) 90 mcg/actuation inhaler Inhale 2 puffs into the lungs every 6 (six) hours as needed for Wheezing. Rescue 18 g 2    atorvastatin (LIPITOR) 40 MG tablet Take 1 tablet (40 mg total) by mouth once daily. 90 tablet 3    azelastine (ASTELIN) 137 mcg (0.1 %) nasal spray 1 spray (137 mcg total) by Nasal route 2 (two) times daily. 30 mL 1    cephALEXin (KEFLEX) 500 MG capsule Take 1 capsule (500 mg total) by mouth 4 (four) times daily. for 7 days 28 capsule 0    cetirizine (ZYRTEC) 10 MG tablet Take 2 tablets (20 mg total) by mouth every morning. 60 tablet 3    chlorzoxazone (PARAFON FORTE) 500 mg Tab Take 500 mg by mouth daily as needed.      clonazePAM (KLONOPIN) 1 MG tablet TAKE 1 TABLET BY MOUTH TWICE DAILY 60 tablet 1    cyanocobalamin 1,000 mcg/mL injection Inject 1 mL (1,000 mcg total) into the muscle every 30 days. 30 mL 1    cyclobenzaprine (FLEXERIL) 10 MG tablet Take 10 mg by mouth 2 (two) times daily as needed.      diclofenac sodium (VOLTAREN) 1 % Gel Apply 2 g topically 4 (four) times daily. 50 g 0    dicyclomine (BENTYL) 10 MG capsule TAKE 1 CAPSULE(10 MG) BY MOUTH FOUR TIMES DAILY 360 capsule 1    estradioL (ESTRACE) 1 MG tablet Take 1 mg by mouth once daily.      ibuprofen (ADVIL,MOTRIN)  800 MG tablet Take 1 tablet (800 mg total) by mouth 3 (three) times daily. 90 tablet 2    LIDOcaine (LIDODERM) 5 % Place 1 patch onto the skin once daily. Remove & Discard patch within 12 hours or as directed by MD 14 patch 0    LINZESS 290 mcg Cap capsule TAKE 1 CAPSULE(290 MCG) BY MOUTH BEFORE BREAKFAST 90 capsule 3    losartan (COZAAR) 50 MG tablet Take 1 tablet (50 mg total) by mouth once daily. 90 tablet 1    melatonin 5 mg Chew Take 10 mg by mouth daily as needed.      mupirocin (BACTROBAN) 2 % ointment APPLY TOPICALLY TO THE AFFECTED AREA TWICE DAILY 15 g 2    mv-mn/iron/folic acid/herb 190 (VITAMIN D3 COMPLETE ORAL) Take by mouth once daily at 6am.      omega-3 fatty acids/fish oil (FISH OIL-OMEGA-3 FATTY ACIDS) 300-1,000 mg capsule Take 1 capsule by mouth once daily.      omeprazole (PRILOSEC) 40 MG capsule TAKE 1 CAPSULE(40 MG) BY MOUTH EVERY DAY 30 capsule 11    ondansetron (ZOFRAN-ODT) 4 MG TbDL Take 1 tablet (4 mg total) by mouth every 12 (twelve) hours as needed (nausea). 20 tablet 0    oxyCODONE-acetaminophen (PERCOCET) 7.5-325 mg per tablet Take 1 tablet by mouth every 8 (eight) hours as needed for Pain (as needed for severe pain only). 21 tablet 0    POLYETHYLENE GLYCOL 3350 (MIRALAX ORAL) Take by mouth as needed.       potassium chloride 10% (KAYCIEL) 20 mEq/15 mL oral solution TAKE 15 ML BY MOUTH EVERY  mL 11    traZODone (DESYREL) 100 MG tablet TK 1 T PO  QHS      venlafaxine (EFFEXOR-XR) 150 MG Cp24 Take 150 mg by mouth once daily.      venlafaxine (EFFEXOR-XR) 75 MG 24 hr capsule TAKE ONE CAPSULE BY MOUTH DAILY WITH 150 MG TO EQUAL 225 MG      QUEtiapine (SEROQUEL) 200 MG Tab Take 1 tablet (200 mg total) by mouth every evening. 30 tablet 2     No current facility-administered medications for this visit.       Past Medical History:   Diagnosis Date    Anxiety     Degenerative joint disease (DJD) of hip     Diabetes type 2, controlled 7/10/2019    GERD (gastroesophageal reflux disease)      Hyperlipidemia     Hypertension     IBS (irritable bowel syndrome)     Insomnia     Lumbar disc herniation     PTSD (post-traumatic stress disorder)        Past Surgical History:   Procedure Laterality Date    ADENOIDECTOMY      ARTHROSCOPIC DEBRIDEMENT OF ROTATOR CUFF Right 2020    Procedure: DEBRIDEMENT, ROTATOR CUFF, ARTHROSCOPIC;  Surgeon: Melchor Hughes Jr., MD;  Location: [facility] OR;  Service: Orthopedics;  Laterality: Right;  need opus system (Ger MESA notified , )  video, notified/confirmed 2020 KB 1310    ARTHROSCOPIC EXCISION OF ACROMIOCLAVICULAR JOINT  2020    Procedure: EXCISION, ACROMIOCLAVICULAR JOINT, ARTHROSCOPIC;  Surgeon: Melchor Hughes Jr., MD;  Location: [facility] OR;  Service: Orthopedics;;    ARTHROSCOPIC REPAIR OF ROTATOR CUFF OF SHOULDER      ARTHROSCOPIC REPAIR OF ROTATOR CUFF OF SHOULDER Left 3/19/2024    Procedure: REPAIR, ROTATOR CUFF, ARTHROSCOPIC;  Surgeon: Melchor Hughes Jr., MD;  Location: [facility] OR;  Service: Orthopedics;  Laterality: Left;  need opus system and regeneten patch    ARTHROSCOPY OF SHOULDER WITH DECOMPRESSION OF SUBACROMIAL SPACE  2020    Procedure: ARTHROSCOPY, SHOULDER, WITH SUBACROMIAL SPACE DECOMPRESSION;  Surgeon: Melchor Hughes Jr., MD;  Location: [facility] OR;  Service: Orthopedics;;     SECTION      DECOMPRESSION OF SUBACROMIAL SPACE Left 3/19/2024    Procedure: DECOMPRESSION, SUBACROMIAL SPACE;  Surgeon: Melchor Hughes Jr., MD;  Location: [facility] OR;  Service: Orthopedics;  Laterality: Left;    EPIDURAL STEROID INJECTION INTO CERVICAL SPINE N/A 2021    Procedure: Injection-steroid-epidural-cervical--C7-T1 IL ZAYNAB;  Surgeon: Gregoria Rodriguez MD;  Location: [facility] PAIN MGT;  Service: Pain Management;  Laterality: N/A;    ESOPHAGOGASTRODUODENOSCOPY N/A 2023    Procedure: ESOPHAGOGASTRODUODENOSCOPY (EGD);  Surgeon: Tommy Schmitt MD;  Location: [address]);  Service: Endoscopy;  Laterality: N/A;  referral [facility] RESENDEZinstr  "portal-GT    INJECTION OF JOINT Right 9/18/2023    Procedure: INJECTION, JOINT, RIGHT ISCHIAL BURSA  SOONER DATE;  Surgeon: Yesica Monroy MD;  Location: [facility];  Service: Pain Management;  Laterality: Right;    TENOTOMY, ELBOW, WITH DEBRIDEMENT AND TENDON REPAIR Right 5/20/2022    Procedure: TENOTOMY,ELBOW,WITH DEBRIDEMENT AND TENDON REPAIR;  Surgeon: Melchor Hughes Jr., MD;  Location: [facility];  Service: Orthopedics;  Laterality: Right;    TONSILLECTOMY         OBJECTIVE:   PHYSICAL EXAM:  Height: 5' 2" (157.5 cm)    Vitals:    04/04/24 1304   Height: 5' 2" (1.575 m)     Ortho/SPM Exam  Examination left shoulder the incisions all look good except the posterior incision maybe opened up just a couple of mm certainly no evidence of infection no deep extension and no drainage  Range of motion shoulder limited due to pain       RADIOGRAPHS:  No  Comments: I have personally reviewed the imaging and I agree with the above radiologist's report.    ASSESSMENT/PLAN:     IMPRESSION:  Status post left shoulder arthroscopy and rotator cuff repair    PLAN:  I recommended routine wound care Neosporin twice a day   She is currently on Keflex for concerns about this go ahead and finish up the Keflex but I really do not see any concerns regarding the wound healing which should continue over the next 1-2 weeks   Go ahead and start therapy light activities no heavy lifting Motrin twice a day with food    FOLLOW UP:  3-4 weeks    Disclaimer: This note has been generated using voice-recognition software. There may be typographical errors that have been missed during proof-reading.     "

## 2024-04-09 ENCOUNTER — TELEPHONE (OUTPATIENT)
Dept: CARDIOLOGY | Facility: CLINIC | Age: 59
End: 2024-04-09
Payer: MEDICARE

## 2024-04-09 NOTE — TELEPHONE ENCOUNTER
Spoke with patient rescheduled appts. that she cancelled she was okay fabrice new date and time       ----- Message from Janis Leong sent at 4/9/2024  3:46 PM CDT -----  Regarding: test  Pt is calling to schedule her testing before her Medicaid expires by the end of the Month.  She can be reached at [phone].    Thank you

## 2024-04-10 ENCOUNTER — OFFICE VISIT (OUTPATIENT)
Dept: ORTHOPEDICS | Facility: CLINIC | Age: 59
End: 2024-04-10
Payer: MEDICARE

## 2024-04-10 VITALS — BODY MASS INDEX: 27.59 KG/M2 | HEIGHT: 62 IN | WEIGHT: 149.94 LBS

## 2024-04-10 DIAGNOSIS — Z98.890 S/P LEFT ROTATOR CUFF REPAIR: Primary | ICD-10-CM

## 2024-04-10 PROCEDURE — 99999 PR PBB SHADOW E&M-EST. PATIENT-LVL IV: CPT | Mod: PBBFAC,,, | Performed by: PHYSICIAN ASSISTANT

## 2024-04-10 PROCEDURE — 3044F HG A1C LEVEL LT 7.0%: CPT | Mod: CPTII,S$GLB,, | Performed by: PHYSICIAN ASSISTANT

## 2024-04-10 PROCEDURE — 1159F MED LIST DOCD IN RCRD: CPT | Mod: CPTII,S$GLB,, | Performed by: PHYSICIAN ASSISTANT

## 2024-04-10 PROCEDURE — 1160F RVW MEDS BY RX/DR IN RCRD: CPT | Mod: CPTII,S$GLB,, | Performed by: PHYSICIAN ASSISTANT

## 2024-04-10 PROCEDURE — 4010F ACE/ARB THERAPY RXD/TAKEN: CPT | Mod: CPTII,S$GLB,, | Performed by: PHYSICIAN ASSISTANT

## 2024-04-10 PROCEDURE — 99024 POSTOP FOLLOW-UP VISIT: CPT | Mod: S$GLB,,, | Performed by: PHYSICIAN ASSISTANT

## 2024-04-10 RX ORDER — OXYCODONE AND ACETAMINOPHEN 5; 325 MG/1; MG/1
1 TABLET ORAL EVERY 8 HOURS PRN
Qty: 21 TABLET | Refills: 0 | Status: SHIPPED | OUTPATIENT
Start: 2024-04-10 | End: 2024-04-29 | Stop reason: SDUPTHER

## 2024-04-10 NOTE — PROGRESS NOTES
Subjective:      Patient ID: Sherie Reyes is a 58 y.o. female.  Chief Complaint: Post-op Evaluation of the Left Shoulder (1 week f/u /Wound check )    BETHANY Reyes is a  58 y.o. female presenting today for post op visit.    She is s/p cuff repair left shoulder 3 weeks postop   DOS: 3-  She notes that previous drainage and redness surrounding posterior incision has completely resolved.  Patient has established with formal physical therapy.  She continues to use upper extremity sling.  Pain somewhat managed on medication.  She requests Percocet refill today.    Review of patient's allergies indicates:  No Known Allergies      Current Outpatient Medications   Medication Sig Dispense Refill    acetaminophen (TYLENOL) 500 MG tablet Take 500 mg by mouth every 6 (six) hours as needed for Pain.      albuterol (PROAIR HFA) 90 mcg/actuation inhaler Inhale 2 puffs into the lungs every 6 (six) hours as needed for Wheezing. Rescue 18 g 2    atorvastatin (LIPITOR) 40 MG tablet Take 1 tablet (40 mg total) by mouth once daily. 90 tablet 3    azelastine (ASTELIN) 137 mcg (0.1 %) nasal spray 1 spray (137 mcg total) by Nasal route 2 (two) times daily. 30 mL 1    cetirizine (ZYRTEC) 10 MG tablet Take 2 tablets (20 mg total) by mouth every morning. 60 tablet 3    chlorzoxazone (PARAFON FORTE) 500 mg Tab Take 500 mg by mouth daily as needed.      clonazePAM (KLONOPIN) 1 MG tablet TAKE 1 TABLET BY MOUTH TWICE DAILY 60 tablet 1    cyanocobalamin 1,000 mcg/mL injection Inject 1 mL (1,000 mcg total) into the muscle every 30 days. 30 mL 1    cyclobenzaprine (FLEXERIL) 10 MG tablet Take 10 mg by mouth 2 (two) times daily as needed.      diclofenac sodium (VOLTAREN) 1 % Gel Apply 2 g topically 4 (four) times daily. 50 g 0    dicyclomine (BENTYL) 10 MG capsule TAKE 1 CAPSULE(10 MG) BY MOUTH FOUR TIMES DAILY 360 capsule 1    estradioL (ESTRACE) 1 MG tablet Take 1 mg by mouth once daily.      ibuprofen (ADVIL,MOTRIN)  800 MG tablet Take 1 tablet (800 mg total) by mouth 3 (three) times daily. 90 tablet 2    LIDOcaine (LIDODERM) 5 % Place 1 patch onto the skin once daily. Remove & Discard patch within 12 hours or as directed by MD 14 patch 0    LINZESS 290 mcg Cap capsule TAKE 1 CAPSULE(290 MCG) BY MOUTH BEFORE BREAKFAST 90 capsule 3    losartan (COZAAR) 50 MG tablet Take 1 tablet (50 mg total) by mouth once daily. 90 tablet 1    melatonin 5 mg Chew Take 10 mg by mouth daily as needed.      mupirocin (BACTROBAN) 2 % ointment APPLY TOPICALLY TO THE AFFECTED AREA TWICE DAILY 15 g 2    mv-mn/iron/folic acid/herb 190 (VITAMIN D3 COMPLETE ORAL) Take by mouth once daily at 6am.      omega-3 fatty acids/fish oil (FISH OIL-OMEGA-3 FATTY ACIDS) 300-1,000 mg capsule Take 1 capsule by mouth once daily.      omeprazole (PRILOSEC) 40 MG capsule TAKE 1 CAPSULE(40 MG) BY MOUTH EVERY DAY 30 capsule 11    ondansetron (ZOFRAN-ODT) 4 MG TbDL Take 1 tablet (4 mg total) by mouth every 12 (twelve) hours as needed (nausea). 20 tablet 0    POLYETHYLENE GLYCOL 3350 (MIRALAX ORAL) Take by mouth as needed.       potassium chloride 10% (KAYCIEL) 20 mEq/15 mL oral solution TAKE 15 ML BY MOUTH EVERY  mL 11    traZODone (DESYREL) 100 MG tablet TK 1 T PO  QHS      venlafaxine (EFFEXOR-XR) 150 MG Cp24 Take 150 mg by mouth once daily.      venlafaxine (EFFEXOR-XR) 75 MG 24 hr capsule TAKE ONE CAPSULE BY MOUTH DAILY WITH 150 MG TO EQUAL 225 MG      oxyCODONE-acetaminophen (PERCOCET) 5-325 mg per tablet Take 1 tablet by mouth every 8 (eight) hours as needed for Pain (as needed for severe pain only). 21 tablet 0    QUEtiapine (SEROQUEL) 200 MG Tab Take 1 tablet (200 mg total) by mouth every evening. 30 tablet 2     No current facility-administered medications for this visit.       Past Medical History:   Diagnosis Date    Anxiety     Degenerative joint disease (DJD) of hip     Diabetes type 2, controlled 7/10/2019    GERD (gastroesophageal reflux disease)      Hyperlipidemia     Hypertension     IBS (irritable bowel syndrome)     Insomnia     Lumbar disc herniation     PTSD (post-traumatic stress disorder)        Past Surgical History:   Procedure Laterality Date    ADENOIDECTOMY      ARTHROSCOPIC DEBRIDEMENT OF ROTATOR CUFF Right 2020    Procedure: DEBRIDEMENT, ROTATOR CUFF, ARTHROSCOPIC;  Surgeon: Melchor Hughes Jr., MD;  Location: [facility] OR;  Service: Orthopedics;  Laterality: Right;  need opus system (Ger MESA notified , )  video, notified/confirmed 2020 KB 1310    ARTHROSCOPIC EXCISION OF ACROMIOCLAVICULAR JOINT  2020    Procedure: EXCISION, ACROMIOCLAVICULAR JOINT, ARTHROSCOPIC;  Surgeon: Melchor Hughes Jr., MD;  Location: [facility] OR;  Service: Orthopedics;;    ARTHROSCOPIC REPAIR OF ROTATOR CUFF OF SHOULDER      ARTHROSCOPIC REPAIR OF ROTATOR CUFF OF SHOULDER Left 3/19/2024    Procedure: REPAIR, ROTATOR CUFF, ARTHROSCOPIC;  Surgeon: Melchor Hughes Jr., MD;  Location: [facility] OR;  Service: Orthopedics;  Laterality: Left;  need opus system and regeneten patch    ARTHROSCOPY OF SHOULDER WITH DECOMPRESSION OF SUBACROMIAL SPACE  2020    Procedure: ARTHROSCOPY, SHOULDER, WITH SUBACROMIAL SPACE DECOMPRESSION;  Surgeon: Melchor Hughes Jr., MD;  Location: [facility] OR;  Service: Orthopedics;;     SECTION      DECOMPRESSION OF SUBACROMIAL SPACE Left 3/19/2024    Procedure: DECOMPRESSION, SUBACROMIAL SPACE;  Surgeon: Melchor Hughes Jr., MD;  Location: [facility] OR;  Service: Orthopedics;  Laterality: Left;    EPIDURAL STEROID INJECTION INTO CERVICAL SPINE N/A 2021    Procedure: Injection-steroid-epidural-cervical--C7-T1 IL ZAYNAB;  Surgeon: Gregoria Rodriguez MD;  Location: [facility] PAIN MGT;  Service: Pain Management;  Laterality: N/A;    ESOPHAGOGASTRODUODENOSCOPY N/A 2023    Procedure: ESOPHAGOGASTRODUODENOSCOPY (EGD);  Surgeon: Tommy Schmitt MD;  Location: [address]);  Service: Endoscopy;  Laterality: N/A;  referral [facility] RESENDEZinstr  "portal-GT    INJECTION OF JOINT Right 9/18/2023    Procedure: INJECTION, JOINT, RIGHT ISCHIAL BURSA  SOONER DATE;  Surgeon: Yesica Monroy MD;  Location: [facility];  Service: Pain Management;  Laterality: Right;    TENOTOMY, ELBOW, WITH DEBRIDEMENT AND TENDON REPAIR Right 5/20/2022    Procedure: TENOTOMY,ELBOW,WITH DEBRIDEMENT AND TENDON REPAIR;  Surgeon: Melchor Hughes Jr., MD;  Location: [facility];  Service: Orthopedics;  Laterality: Right;    TONSILLECTOMY         OBJECTIVE:   PHYSICAL EXAM:  Height: 5' 2" (157.5 cm) Weight: 68 kg (149 lb 14.6 oz)  Vitals:    04/10/24 1054   Weight: 68 kg (149 lb 14.6 oz)   Height: 5' 2" (1.575 m)   PainSc:   8       Ortho/SPM Exam  Examination left shoulder  All surgical incisions are healing appropriately with no evidence of infection or dehiscence.  Sensation grossly intact throughout  Passive range of motion:  Forward flexion 45, external rotation 10      ASSESSMENT/PLAN:     IMPRESSION:  Status post left shoulder arthroscopy and rotator cuff repair    PLAN:   No active signs or symptoms of infection.  DME: Continue upper extremity sling during activity.  Activity: No heavy lifting, pushing or pulling  PT:  Emphasis placed on early passive range of motion to prevent long-term shoulder stiffness  Pain control: Rx Percocet refill provided for breakthrough pain.  Otherwise counseled on over-the-counter analgesics, rest and ice    FOLLOW UP:  3wks for 6 week postop appointment  "

## 2024-04-11 ENCOUNTER — HOSPITAL ENCOUNTER (OUTPATIENT)
Dept: CARDIOLOGY | Facility: HOSPITAL | Age: 59
Discharge: HOME | End: 2024-04-11
Attending: INTERNAL MEDICINE
Payer: MEDICARE

## 2024-04-11 DIAGNOSIS — R55 SYNCOPE: ICD-10-CM

## 2024-04-11 PROCEDURE — 93227 XTRNL ECG REC<48 HR R&I: CPT | Mod: ,,, | Performed by: INTERNAL MEDICINE

## 2024-04-11 PROCEDURE — 93226 XTRNL ECG REC<48 HR SCAN A/R: CPT

## 2024-04-18 DIAGNOSIS — K59.00 CONSTIPATION, UNSPECIFIED CONSTIPATION TYPE: ICD-10-CM

## 2024-04-18 LAB
OHS CV EVENT MONITOR DAY: 0
OHS CV HOLTER LENGTH DECIMAL HOURS: 25
OHS CV HOLTER LENGTH HOURS: 25
OHS CV HOLTER LENGTH MINUTES: 0
OHS CV HOLTER SINUS AVERAGE HR: 72
OHS CV HOLTER SINUS MAX HR: 129
OHS CV HOLTER SINUS MIN HR: 52

## 2024-04-18 RX ORDER — ONDANSETRON 4 MG/1
4 TABLET, ORALLY DISINTEGRATING ORAL EVERY 12 HOURS PRN
Qty: 20 TABLET | Refills: 0 | Status: SHIPPED | OUTPATIENT
Start: 2024-04-18 | End: 2024-04-28

## 2024-04-18 RX ORDER — LINACLOTIDE 290 UG/1
CAPSULE, GELATIN COATED ORAL
Qty: 90 CAPSULE | Refills: 3 | Status: SHIPPED | OUTPATIENT
Start: 2024-04-18

## 2024-04-18 RX ORDER — OMEPRAZOLE 40 MG/1
CAPSULE, DELAYED RELEASE ORAL
Qty: 30 CAPSULE | Refills: 11 | Status: SHIPPED | OUTPATIENT
Start: 2024-04-18

## 2024-04-18 NOTE — PROGRESS NOTES
[facility]   Physical Therapy Initial Evaluation      Name: Sherie Posey [facility]  Clinic Number: [id number]    Therapy Diagnosis:   Encounter Diagnoses   Name Primary?    S/P left rotator cuff repair     Decreased range of motion of left shoulder Yes        Physician: Regine Morse PA-C    Physician Orders: PT Eval and Treat   Medical Diagnosis from Referral:  S/P left rotator cuff repair [Z98.890]   Evaluation Date: 4/19/2024  Authorization Period Expiration: 4/2/25  Plan of Care Expiration: 10/19/24  Progress Note Due: 5/19/24    Date of Surgery: 3/19/24  Post Op Day: 4 weeks, 3 days as of 4/19/24    Visit # / Visits authorized: 1/1   FOTO: 1/3    Precautions: Standard and Surgical Precautions       Time In: 12:05 PM  Time Out: 1:00 PM   Total Billable Time: 55 minutes    Subjective     Date of onset: 3/19/24 surgery     History of current condition - Sherie reports: she has been doing ok since her left shoulder surgery but reports some pain with trying to move her arm and some stiffness following sleep. Patient denies complications including arm swelling, chest pain, numbness/tingling, and states no issues with infection or wounds in recent weeks; however, patient did have a wound check 2-3 weeks ago following suture removal but has since resolved. Patient states she has been sleeping in her bed but with an upright seated pillow to help support her arm. Her primary goals are to be able to use her left shoulder again to wash her hair and color her own hair.       Imaging: MRI studies: 12/11/23  Impression:     Supraspinatus and subscapularis tendinosis with small partial-thickness subscapularis tendon insertional tear.     Slap tear of the glenoid labrum    Prior Therapy: for both shoulders  Social History:  lives with their spouse  Occupation: on disability leave right now   Prior Level of Function: chronic left shoulder pain  Current Level of Function: limited to surgical protocols      Pain:  Current 3/10, worst 8/10, best 1/10   Location: left shoulder   Description: Aching, Dull, Burning, and Throbbing  Aggravating Factors: moving her arm in the right way   Easing Factors: relaxation and pain medication    Patients goals: be able to use her left arm for coloring her hair     Medical History:   Past Medical History:   Diagnosis Date    Anxiety     Degenerative joint disease (DJD) of hip     Diabetes type 2, controlled 7/10/2019    GERD (gastroesophageal reflux disease)     Hyperlipidemia     Hypertension     IBS (irritable bowel syndrome)     Insomnia     Lumbar disc herniation     PTSD (post-traumatic stress disorder)        Surgical History:   Sherie Reyes  has a past surgical history that includes  section; Adenoidectomy; Tonsillectomy; Arthroscopic repair of rotator cuff of shoulder; Arthroscopic debridement of rotator cuff (Right, 2020); Arthroscopy of shoulder with decompression of subacromial space (2020); Arthroscopic excision of acromioclavicular joint (2020); Epidural steroid injection into cervical spine (N/A, 2021); tenotomy, elbow, with debridement and tendon repair (Right, 2022); Esophagogastroduodenoscopy (N/A, 2023); Injection of joint (Right, 2023); Arthroscopic repair of rotator cuff of shoulder (Left, 3/19/2024); and Decompression of subacromial space (Left, 3/19/2024).    Medications:   Sherie has a current medication list which includes the following prescription(s): acetaminophen, albuterol, atorvastatin, azelastine, cetirizine, chlorzoxazone, clonazepam, cyanocobalamin, cyclobenzaprine, diclofenac sodium, dicyclomine, estradiol, ibuprofen, lidocaine, linzess, losartan, melatonin, mupirocin, mv-mn/iron/folic acid/herb 190, fish oil-omega-3 fatty acids, omeprazole, ondansetron, polyethylene glycol 3350, potassium chloride 10%, quetiapine, trazodone, venlafaxine, and venlafaxine.    Allergies:   Review of patient's allergies  indicates:  No Known Allergies     Objective      Observation: alert, oriented, calm    Posture:  patient arrived in Left shoulder sling with abduction pillow    Passive Range of Motion:   Shoulder Left   Flexion 110   Abduction 60   ER at 20 45   IR NT        Upper Extremity Strength:  Formal MMT not performed 2/2 POD#4 weeks, 3 days and increased pain.      Joint Mobility: Limited posterior glide as expected post-op.    Palpation:  Minimal TTP     Sensation: Intact but diminished 2/2 residual nerve block.       Intake Outcome Measure for FOTO Shoulder Post Operative Survey    Therapist reviewed FOTO scores for Sherie Reyes on 4/19/2024.   FOTO report - see Media section or FOTO account episode details.    Intake Score: 14% (50% predicted)          Treatment     Total Treatment time (time-based codes) separate from Evaluation: 25 minutes     Sherie received the treatments listed below:      therapeutic activities to improve functional performance for 25 minutes, including:    HEP:  Wand external rotation 3 x 10   Passive shoulder flexion - table supported walkouts 2 x 10   Scapular squeezes 2 x 10   Pendulums for pain modulation 30 sec holds     Education:  - Post op Precautions   - HEP compliance   - POC/Prognosis   - Sleeping positions   - Donning and doffing sling     Patient Education and Home Exercises     Education provided:   - HEP    Written Home Exercises Provided: yes. Exercises were reviewed and Sherie was able to demonstrate them prior to the end of the session.  Sherie demonstrated good  understanding of the education provided. See EMR under Patient Instructions for exercises provided during therapy sessions.    Assessment     Sherie is a 58 y.o. female referred to outpatient Physical Therapy with a medical diagnosis of  S/P left rotator cuff repair [Z98.890]. Patient presents with decreased and painful shoulder range of motion but shows good progress with motion at this stage of her post  operative state. Patient is 4 weeks status post Left RTC repair. Patient would benefit from skilled therapy to return to prior level of function and optimize healing timeline.     Patient prognosis is Good.   Patient will benefit from skilled outpatient Physical Therapy to address the deficits stated above and in the chart below, provide patient /family education, and to maximize patientt's level of independence.     Plan of care discussed with patient: Yes  Patient's spiritual, cultural and educational needs considered and patient is agreeable to the plan of care and goals as stated below:     Anticipated Barriers for therapy: surgical precautions, chronic pain     Medical Necessity is demonstrated by the following  History  Co-morbidities and personal factors that may impact the plan of care [] LOW: no personal factors / co-morbidities  [x] MODERATE: 1-2 personal factors / co-morbidities  [] HIGH: 3+ personal factors / co-morbidities    Moderate / High Support Documentation:   Co-morbidities affecting plan of care: HTN, HLD, chronic LBP, prior shoulder surgical history     Personal Factors:   no deficits     Examination  Body Structures and Functions, activity limitations and participation restrictions that may impact the plan of care [] LOW: addressing 1-2 elements  [x] MODERATE: 3+ elements  [] HIGH: 4+ elements (please support below)    Moderate / High Support Documentation: see above assessment     Clinical Presentation [x] LOW: stable  [] MODERATE: Evolving  [] HIGH: Unstable     Decision Making/ Complexity Score: low       Goals:  Short Term Goals:  4-6 weeks  1.Report decreased Left shoulder pain < / =  3/10  to increase tolerance for progressing exercises with therapy.   2. Increase PROM flexion to 150 and ER to 60 pain-free to show improvement with post operative status and equalize ROM to other shoulder.   3. Increased strength by 1/3 MMT grade in left shoulder to increase tolerance for ADL and work  activities.  4. Pt to tolerate HEP to improve ROM and independence with ADL's    Long Term Goals: 20-24 weeks  1.Report decreased Left shoulder pain  < / =  1/10  to increase tolerance for return to pain-free and full overhead motion.   2.Increase AROM to full and pain-free in flexion, abduction, and ER of left shoulder to show good progression through post operative protocol.   3.Increase strength to >/= 4/5 in left shoulder to increase tolerance for ADL and work activities.  4. Pt goal: be able to color her hair and wash her hair with her left arm.   5. Pt will have score of >/= 50% on FOTO shoulder in order to demonstrate true functional improvement.   Plan     Plan of care Certification: 4/19/2024 to 10/19/24.    Outpatient Physical Therapy 2 times weekly for 24 weeks to include the following interventions: Manual Therapy, Moist Heat/ Ice, Neuromuscular Re-ed, Patient Education, Therapeutic Activities, and Therapeutic Exercise.     Shubham Manning, BECKY        Physician's Signature: _________________________________________ Date: ________________

## 2024-04-18 NOTE — TELEPHONE ENCOUNTER
Refill Routing Note   Medication(s) are not appropriate for processing by [facility] for the following reason(s):        No active prescription written by provider    [facility] action(s):  Defer      Medication Therapy Plan: The provider responsible for managing the medication isnt PCP      Appointments  past 12m or future 3m with PCP    Date Provider   Last Visit   3/4/2024 Farhana Whittington MD   Next Visit   7/8/2024 Farhana Whittington MD   ED visits in past 90 days: 0        Note composed:5:39 PM 04/18/2024

## 2024-04-18 NOTE — TELEPHONE ENCOUNTER
No care due was identified.  [facility] Embedded Care Due Messages. Reference number: [id number].   4/18/2024 3:08:12 AM CDT

## 2024-04-19 ENCOUNTER — CLINICAL SUPPORT (OUTPATIENT)
Dept: REHABILITATION | Facility: HOSPITAL | Age: 59
End: 2024-04-19
Payer: MEDICARE

## 2024-04-19 DIAGNOSIS — M25.612 DECREASED RANGE OF MOTION OF LEFT SHOULDER: Primary | ICD-10-CM

## 2024-04-19 DIAGNOSIS — Z98.890 S/P LEFT ROTATOR CUFF REPAIR: ICD-10-CM

## 2024-04-19 PROCEDURE — 97530 THERAPEUTIC ACTIVITIES: CPT

## 2024-04-19 PROCEDURE — 97161 PT EVAL LOW COMPLEX 20 MIN: CPT

## 2024-04-19 NOTE — PLAN OF CARE
[facility]   Physical Therapy Initial Evaluation      Name: Sherie Posey [facility]  Clinic Number: [id number]    Therapy Diagnosis:   Encounter Diagnoses   Name Primary?    S/P left rotator cuff repair     Decreased range of motion of left shoulder Yes        Physician: Regine Morse PA-C    Physician Orders: PT Eval and Treat   Medical Diagnosis from Referral:  S/P left rotator cuff repair [Z98.890]   Evaluation Date: 4/19/2024  Authorization Period Expiration: 4/2/25  Plan of Care Expiration: 10/19/24  Progress Note Due: 5/19/24    Date of Surgery: 3/19/24  Post Op Day: 4 weeks, 3 days as of 4/19/24    Visit # / Visits authorized: 1/1   FOTO: 1/3    Precautions: Standard and Surgical Precautions      Time In: 12:05 PM  Time Out: 1:00 PM   Total Billable Time: 55 minutes    Subjective     Date of onset: 3/19/24 surgery     History of current condition - Sherie reports: she has been doing ok since her left shoulder surgery but reports some pain with trying to move her arm and some stiffness following sleep. Patient denies complications including arm swelling, chest pain, numbness/tingling, and states no issues with infection or wounds in recent weeks; however, patient did have a wound check 2-3 weeks ago following suture removal but has since resolved. Patient states she has been sleeping in her bed but with an upright seated pillow to help support her arm. Her primary goals are to be able to use her left shoulder again to wash her hair and color her own hair.       Imaging: MRI studies: 12/11/23  Impression:     Supraspinatus and subscapularis tendinosis with small partial-thickness subscapularis tendon insertional tear.     Slap tear of the glenoid labrum    Prior Therapy: for both shoulders  Social History:  lives with their spouse  Occupation: on disability leave right now   Prior Level of Function: chronic left shoulder pain  Current Level of Function: limited to surgical protocols      Pain:  Current 3/10, worst 8/10, best 1/10   Location: left shoulder   Description: Aching, Dull, Burning, and Throbbing  Aggravating Factors: moving her arm in the right way   Easing Factors: relaxation and pain medication    Patients goals: be able to use her left arm for coloring her hair     Medical History:   Past Medical History:   Diagnosis Date    Anxiety     Degenerative joint disease (DJD) of hip     Diabetes type 2, controlled 7/10/2019    GERD (gastroesophageal reflux disease)     Hyperlipidemia     Hypertension     IBS (irritable bowel syndrome)     Insomnia     Lumbar disc herniation     PTSD (post-traumatic stress disorder)        Surgical History:   Sherie Reyes  has a past surgical history that includes  section; Adenoidectomy; Tonsillectomy; Arthroscopic repair of rotator cuff of shoulder; Arthroscopic debridement of rotator cuff (Right, 2020); Arthroscopy of shoulder with decompression of subacromial space (2020); Arthroscopic excision of acromioclavicular joint (2020); Epidural steroid injection into cervical spine (N/A, 2021); tenotomy, elbow, with debridement and tendon repair (Right, 2022); Esophagogastroduodenoscopy (N/A, 2023); Injection of joint (Right, 2023); Arthroscopic repair of rotator cuff of shoulder (Left, 3/19/2024); and Decompression of subacromial space (Left, 3/19/2024).    Medications:   Sherie has a current medication list which includes the following prescription(s): acetaminophen, albuterol, atorvastatin, azelastine, cetirizine, chlorzoxazone, clonazepam, cyanocobalamin, cyclobenzaprine, diclofenac sodium, dicyclomine, estradiol, ibuprofen, lidocaine, linzess, losartan, melatonin, mupirocin, mv-mn/iron/folic acid/herb 190, fish oil-omega-3 fatty acids, omeprazole, ondansetron, polyethylene glycol 3350, potassium chloride 10%, quetiapine, trazodone, venlafaxine, and venlafaxine.    Allergies:   Review of patient's allergies  indicates:  No Known Allergies     Objective      Observation: alert, oriented, calm    Posture:  patient arrived in Left shoulder sling with abduction pillow    Passive Range of Motion:   Shoulder Left   Flexion 110   Abduction 60   ER at 20 45   IR NT        Upper Extremity Strength:  Formal MMT not performed 2/2 POD#4 weeks, 3 days and increased pain.      Joint Mobility: Limited posterior glide as expected post-op.    Palpation:  Minimal TTP     Sensation: Intact but diminished 2/2 residual nerve block.       Intake Outcome Measure for FOTO Shoulder Post Operative Survey    Therapist reviewed FOTO scores for Sherie Reyes on 4/19/2024.   FOTO report - see Media section or FOTO account episode details.    Intake Score: 14% (50% predicted)          Treatment     Total Treatment time (time-based codes) separate from Evaluation: 25 minutes     Sherie received the treatments listed below:      therapeutic activities to improve functional performance for 25 minutes, including:    HEP:  Wand external rotation 3 x 10   Passive shoulder flexion - table supported walkouts 2 x 10   Scapular squeezes 2 x 10   Pendulums for pain modulation 30 sec holds     Education:  - Post op Precautions   - HEP compliance   - POC/Prognosis   - Sleeping positions   - Donning and doffing sling     Patient Education and Home Exercises     Education provided:   - HEP    Written Home Exercises Provided: yes. Exercises were reviewed and Sherie was able to demonstrate them prior to the end of the session.  Sherie demonstrated good  understanding of the education provided. See EMR under Patient Instructions for exercises provided during therapy sessions.    Assessment     Sherie is a 58 y.o. female referred to outpatient Physical Therapy with a medical diagnosis of  S/P left rotator cuff repair [Z98.890]. Patient presents with decreased and painful shoulder range of motion but shows good progress with motion at this stage of her post  operative state. Patient is 4 weeks, 3 days status post Left RTC repair. Patient would benefit from skilled therapy to return to prior level of function and optimize healing timeline.     Patient prognosis is Good.   Patient will benefit from skilled outpatient Physical Therapy to address the deficits stated above and in the chart below, provide patient /family education, and to maximize patientt's level of independence.     Plan of care discussed with patient: Yes  Patient's spiritual, cultural and educational needs considered and patient is agreeable to the plan of care and goals as stated below:     Anticipated Barriers for therapy: surgical precautions, chronic pain     Medical Necessity is demonstrated by the following  History  Co-morbidities and personal factors that may impact the plan of care [] LOW: no personal factors / co-morbidities  [x] MODERATE: 1-2 personal factors / co-morbidities  [] HIGH: 3+ personal factors / co-morbidities    Moderate / High Support Documentation:   Co-morbidities affecting plan of care: HTN, HLD, chronic LBP, prior shoulder surgical history     Personal Factors:   no deficits     Examination  Body Structures and Functions, activity limitations and participation restrictions that may impact the plan of care [] LOW: addressing 1-2 elements  [x] MODERATE: 3+ elements  [] HIGH: 4+ elements (please support below)    Moderate / High Support Documentation: see above assessment     Clinical Presentation [x] LOW: stable  [] MODERATE: Evolving  [] HIGH: Unstable     Decision Making/ Complexity Score: low       Goals:  Short Term Goals:  4-6 weeks  1.Report decreased Left shoulder pain < / =  3/10  to increase tolerance for progressing exercises with therapy.   2. Increase PROM flexion to 150 and ER to 60 pain-free to show improvement with post operative status and equalize ROM to other shoulder.   3. Increased strength by 1/3 MMT grade in left shoulder to increase tolerance for ADL and  work activities.  4. Pt to tolerate HEP to improve ROM and independence with ADL's    Long Term Goals: 20-24 weeks  1.Report decreased Left shoulder pain  < / =  1/10  to increase tolerance for return to pain-free and full overhead motion.   2.Increase AROM to full and pain-free in flexion, abduction, and ER of left shoulder to show good progression through post operative protocol.   3.Increase strength to >/= 4/5 in left shoulder to increase tolerance for ADL and work activities.  4. Pt goal: be able to color her hair and wash her hair with her left arm.   5. Pt will have score of >/= 50% on FOTO shoulder in order to demonstrate true functional improvement.   Plan     Plan of care Certification: 4/19/2024 to 10/19/24.    Outpatient Physical Therapy 2 times weekly for 24 weeks to include the following interventions: Manual Therapy, Moist Heat/ Ice, Neuromuscular Re-ed, Patient Education, Therapeutic Activities, and Therapeutic Exercise.     Shubham Manning, BECKY        Physician's Signature: _________________________________________ Date: ________________

## 2024-04-22 ENCOUNTER — HOSPITAL ENCOUNTER (OUTPATIENT)
Dept: CARDIOLOGY | Facility: HOSPITAL | Age: 59
Discharge: HOME | End: 2024-04-22
Attending: INTERNAL MEDICINE
Payer: MEDICARE

## 2024-04-22 ENCOUNTER — TELEPHONE (OUTPATIENT)
Dept: CARDIOLOGY | Facility: CLINIC | Age: 59
End: 2024-04-22
Payer: MEDICARE

## 2024-04-22 VITALS
SYSTOLIC BLOOD PRESSURE: 120 MMHG | DIASTOLIC BLOOD PRESSURE: 75 MMHG | BODY MASS INDEX: 27.59 KG/M2 | HEIGHT: 62 IN | HEART RATE: 77 BPM | WEIGHT: 149.94 LBS

## 2024-04-22 DIAGNOSIS — R55 SYNCOPE: ICD-10-CM

## 2024-04-22 LAB
ASCENDING AORTA: 2.26 CM
AV INDEX (PROSTH): 0.77
AV MEAN GRADIENT: 4 MMHG
AV PEAK GRADIENT: 7 MMHG
AV VALVE AREA BY VELOCITY RATIO: 2.1 CM²
AV VALVE AREA: 1.91 CM²
AV VELOCITY RATIO: 0.85
BSA FOR ECHO PROCEDURE: 1.72 M2
CV ECHO LV RWT: 0.34 CM
DOP CALC AO PEAK VEL: 1.3 M/S
DOP CALC AO VTI: 27.14 CM
DOP CALC LVOT AREA: 2.5 CM2
DOP CALC LVOT DIAMETER: 1.78 CM
DOP CALC LVOT PEAK VEL: 1.1 M/S
DOP CALC LVOT STROKE VOLUME: 51.83 CM3
DOP CALCLVOT PEAK VEL VTI: 20.84 CM
E WAVE DECELERATION TIME: 275.62 MSEC
E/A RATIO: 1.02
E/E' RATIO: 8.25 M/S
ECHO LV POSTERIOR WALL: 0.64 CM (ref 0.6–1.1)
FRACTIONAL SHORTENING: 34 % (ref 28–44)
INTERVENTRICULAR SEPTUM: 0.6 CM (ref 0.6–1.1)
IVRT: 77.07 MSEC
LA MAJOR: 4.43 CM
LA MINOR: 4.41 CM
LA WIDTH: 2.54 CM
LEFT ATRIUM SIZE: 2.38 CM
LEFT ATRIUM VOLUME INDEX MOD: 10.4 ML/M2
LEFT ATRIUM VOLUME INDEX: 13.4 ML/M2
LEFT ATRIUM VOLUME MOD: 17.62 CM3
LEFT ATRIUM VOLUME: 22.71 CM3
LEFT INTERNAL DIMENSION IN SYSTOLE: 2.49 CM (ref 2.1–4)
LEFT VENTRICLE DIASTOLIC VOLUME INDEX: 35.53 ML/M2
LEFT VENTRICLE DIASTOLIC VOLUME: 60.04 ML
LEFT VENTRICLE MASS INDEX: 36 G/M2
LEFT VENTRICLE SYSTOLIC VOLUME INDEX: 13 ML/M2
LEFT VENTRICLE SYSTOLIC VOLUME: 22.03 ML
LEFT VENTRICULAR INTERNAL DIMENSION IN DIASTOLE: 3.75 CM (ref 3.5–6)
LEFT VENTRICULAR MASS: 60.1 G
LV LATERAL E/E' RATIO: 7.62 M/S
LV SEPTAL E/E' RATIO: 9 M/S
MV A" WAVE DURATION": 6.57 MSEC
MV PEAK A VEL: 0.97 M/S
MV PEAK E VEL: 0.99 M/S
MV STENOSIS PRESSURE HALF TIME: 79.93 MS
MV VALVE AREA P 1/2 METHOD: 2.75 CM2
OHS CV RV/LV RATIO: 0.61 CM
PISA TR MAX VEL: 2.32 M/S
PULM VEIN S/D RATIO: 1.43
PV PEAK D VEL: 0.35 M/S
PV PEAK S VEL: 0.5 M/S
RA MAJOR: 4.37 CM
RA PRESSURE ESTIMATED: 3 MMHG
RA WIDTH: 2.86 CM
RIGHT VENTRICULAR END-DIASTOLIC DIMENSION: 2.28 CM
RV TB RVSP: 5 MMHG
SINUS: 2.43 CM
STJ: 2.18 CM
TDI LATERAL: 0.13 M/S
TDI SEPTAL: 0.11 M/S
TDI: 0.12 M/S
TR MAX PG: 22 MMHG
TRICUSPID ANNULAR PLANE SYSTOLIC EXCURSION: 1.84 CM
TV REST PULMONARY ARTERY PRESSURE: 25 MMHG
Z-SCORE OF LEFT VENTRICULAR DIMENSION IN END DIASTOLE: -2.27
Z-SCORE OF LEFT VENTRICULAR DIMENSION IN END SYSTOLE: -1.25

## 2024-04-22 PROCEDURE — 93306 TTE W/DOPPLER COMPLETE: CPT

## 2024-04-22 PROCEDURE — 93306 TTE W/DOPPLER COMPLETE: CPT | Mod: 26,,, | Performed by: INTERNAL MEDICINE

## 2024-04-22 NOTE — TELEPHONE ENCOUNTER
----- Message from Sammy Parra sent at 4/22/2024  8:06 AM CDT -----  Type:  Patient Returning Call    Who Called:pt  Who Left Message for Patient:  Does the patient know what this is regarding?: appt info  Would the patient rather a call back or a response via [facility]? Call  Best Call Back Number: [phone]  Additional Information: pt states she would like to speak with someone in office in regards to appt she has today. Thank you

## 2024-04-24 ENCOUNTER — CLINICAL SUPPORT (OUTPATIENT)
Dept: REHABILITATION | Facility: HOSPITAL | Age: 59
End: 2024-04-24
Payer: MEDICARE

## 2024-04-24 DIAGNOSIS — M25.612 DECREASED RANGE OF MOTION OF LEFT SHOULDER: Primary | ICD-10-CM

## 2024-04-24 PROCEDURE — 97110 THERAPEUTIC EXERCISES: CPT

## 2024-04-24 PROCEDURE — 97140 MANUAL THERAPY 1/> REGIONS: CPT

## 2024-04-24 NOTE — PROGRESS NOTES
[facility]   Physical Therapy Treatment Note      [facility]  Clinic Number: [id number]    Therapy Diagnosis:   Encounter Diagnosis   Name Primary?    Decreased range of motion of left shoulder Yes     Physician: Regine Morse PA-C    Visit Date: 4/24/2024    Physician Orders: PT Eval and Treat   Medical Diagnosis from Referral:  S/P left rotator cuff repair [Z98.890]   Evaluation Date: 4/19/2024  Authorization Period Expiration: 12/31/24  Plan of Care Expiration: 10/19/24  Progress Note Due: 5/19/24     Date of Surgery: 3/19/24  Post Op Day: 5 weeks, 1 day as of 4/24/24     Visit # / Visits authorized:  1/20  FOTO: 1/3     Precautions: Standard and Surgical Precautions       Time In: 3:10 PM  Time Out: 4:04  PM   Total Billable Time: 54 minutes    Subjective     Patient reports: her shoulder has really been hurting since initial evaluation. States she tries her best not to actively use her left shoulder but sometimes forgets. Patients states she has not been able to sleep with much comfort at all.    She was compliant with home exercise program.    Response to previous treatment: first follow up   Functional change: first follow up     Pain: 2/10  Location: left shoulder      Objective      (4/24/24)   Passive Range of Motion:   Shoulder Left   Flexion 110   Abduction 60   ER at 20 45     Treatment     Sherie received the treatments listed below:      therapeutic exercises to develop strength, endurance, and ROM for 39 minutes including:    Supine Wand external rotation 3 x 10   Passive shoulder flexion - table supported walkouts 2 x 20   Scapular squeezes 2 x 10   Pendulums for pain modulation 30 sec holds   Education on sling use, donning and doffing, surgical precautions    manual therapy techniques: Joint mobilizations and Soft tissue Mobilization were applied to the: left shoulder for 15 minutes, including:    Passive ROM flexion, ER, abd for Left  shoulder  Posterior mobs Left GH light grade I/II  Posterior inferior mobs Left GH light grade I/II    neuromuscular re-education activities to improve: Coordination, Kinesthetic, Sense, Proprioception, and Posture for 00 minutes. The following activities were included:      therapeutic activities to improve functional performance for 00 minutes, including:      Patient Education and Home Exercises       Education provided:   - HEP   - surgical precautions     Written Home Exercises Provided: Patient instructed to cont prior HEP. Exercises were reviewed and Sherie was able to demonstrate them prior to the end of the session.  Sherie demonstrated good  understanding of the education provided. See Electronic Medical Record under Patient Instructions for exercises provided during therapy sessions    Assessment     Patient presents for her first follow up visit and is 5 weeks status post Left RTC repair. She demonstrates guarding initial to ROM interventions and required moderate amount of cueing to achieve relaxation of left UE in order to allow true passive ROM assessment. Patient able to demonstrate good form with HEP but required cueing for almost all exercises to complete with correct form. She was also educated on continued surgical precautions and reasons for adhering to post operative protocol guidelines in order to achieve optimal outcome with left shoulder healing. Patient will continue to follow RTC medium/massive repair protocol until otherwise advised by physician.     Sherie Is progressing well towards her goals.   Patient prognosis is Good.     Patient will continue to benefit from skilled outpatient physical therapy to address the deficits listed in the problem list box on initial evaluation, provide pt/family education and to maximize pt's level of independence in the home and community environment.     Patient's spiritual, cultural and educational needs considered and pt agreeable to plan of care and  goals.     Anticipated barriers to physical therapy: surgical precautions, chronic pain     Goals:   Short Term Goals:  4-6 weeks  1.Report decreased Left shoulder pain < / =  3/10  to increase tolerance for progressing exercises with therapy.   2. Increase PROM flexion to 150 and ER to 60 pain-free to show improvement with post operative status and equalize ROM to other shoulder.   3. Increased strength by 1/3 MMT grade in left shoulder to increase tolerance for ADL and work activities.  4. Pt to tolerate HEP to improve ROM and independence with ADL's     Long Term Goals: 20-24 weeks  1.Report decreased Left shoulder pain  < / =  1/10  to increase tolerance for return to pain-free and full overhead motion.   2.Increase AROM to full and pain-free in flexion, abduction, and ER of left shoulder to show good progression through post operative protocol.   3.Increase strength to >/= 4/5 in left shoulder to increase tolerance for ADL and work activities.  4. Pt goal: be able to color her hair and wash her hair with her left arm.   5. Pt will have score of >/= 50% on FOTO shoulder in order to demonstrate true functional improvement.     Plan     Plan of care Certification: 4/19/2024 to 10/19/24.     Outpatient Physical Therapy 2 times weekly for 24 weeks to include the following interventions: Manual Therapy, Moist Heat/ Ice, Neuromuscular Re-ed, Patient Education, Therapeutic Activities, and Therapeutic Exercise.     Shubham Manning, PT

## 2024-04-29 ENCOUNTER — OFFICE VISIT (OUTPATIENT)
Dept: ORTHOPEDICS | Facility: CLINIC | Age: 59
End: 2024-04-29
Payer: MEDICARE

## 2024-04-29 VITALS — WEIGHT: 149.94 LBS | HEIGHT: 62 IN | BODY MASS INDEX: 27.59 KG/M2

## 2024-04-29 DIAGNOSIS — M75.122 NONTRAUMATIC COMPLETE TEAR OF LEFT ROTATOR CUFF: Primary | ICD-10-CM

## 2024-04-29 PROCEDURE — 3044F HG A1C LEVEL LT 7.0%: CPT | Mod: CPTII,S$GLB,, | Performed by: ORTHOPAEDIC SURGERY

## 2024-04-29 PROCEDURE — 99999 PR PBB SHADOW E&M-EST. PATIENT-LVL IV: CPT | Mod: PBBFAC,,, | Performed by: ORTHOPAEDIC SURGERY

## 2024-04-29 PROCEDURE — 99024 POSTOP FOLLOW-UP VISIT: CPT | Mod: S$GLB,,, | Performed by: ORTHOPAEDIC SURGERY

## 2024-04-29 PROCEDURE — 1159F MED LIST DOCD IN RCRD: CPT | Mod: CPTII,S$GLB,, | Performed by: ORTHOPAEDIC SURGERY

## 2024-04-29 PROCEDURE — 4010F ACE/ARB THERAPY RXD/TAKEN: CPT | Mod: CPTII,S$GLB,, | Performed by: ORTHOPAEDIC SURGERY

## 2024-04-29 RX ORDER — IBUPROFEN 800 MG/1
800 TABLET ORAL
Qty: 90 TABLET | Refills: 1 | Status: SHIPPED | OUTPATIENT
Start: 2024-04-29 | End: 2024-06-28

## 2024-04-29 RX ORDER — TIZANIDINE 4 MG/1
4 TABLET ORAL NIGHTLY
Qty: 30 TABLET | Refills: 0 | Status: SHIPPED | OUTPATIENT
Start: 2024-04-29 | End: 2024-05-29

## 2024-04-29 RX ORDER — OXYCODONE AND ACETAMINOPHEN 5; 325 MG/1; MG/1
1 TABLET ORAL
Qty: 21 TABLET | Refills: 0 | Status: SHIPPED | OUTPATIENT
Start: 2024-04-29 | End: 2024-05-20

## 2024-04-29 NOTE — PROGRESS NOTES
Subjective:      Patient ID: Sherie Reyes is a 58 y.o. female.  Chief Complaint: Post-op Evaluation of the Left Shoulder      HPI  Sherie Reyes is a  58 y.o. female presenting today for post op visit.  She is s/p rotator cuff repair left shoulder now 5 weeks postop currently in therapy having some pain at night difficulty sleeping.     Review of patient's allergies indicates:  No Known Allergies      Current Outpatient Medications   Medication Sig Dispense Refill    acetaminophen (TYLENOL) 500 MG tablet Take 500 mg by mouth every 6 (six) hours as needed for Pain.      albuterol (PROAIR HFA) 90 mcg/actuation inhaler Inhale 2 puffs into the lungs every 6 (six) hours as needed for Wheezing. Rescue 18 g 2    atorvastatin (LIPITOR) 40 MG tablet Take 1 tablet (40 mg total) by mouth once daily. 90 tablet 3    azelastine (ASTELIN) 137 mcg (0.1 %) nasal spray 1 spray (137 mcg total) by Nasal route 2 (two) times daily. 30 mL 1    cetirizine (ZYRTEC) 10 MG tablet Take 2 tablets (20 mg total) by mouth every morning. 60 tablet 3    chlorzoxazone (PARAFON FORTE) 500 mg Tab Take 500 mg by mouth daily as needed.      clonazePAM (KLONOPIN) 1 MG tablet TAKE 1 TABLET BY MOUTH TWICE DAILY 60 tablet 1    cyanocobalamin 1,000 mcg/mL injection Inject 1 mL (1,000 mcg total) into the muscle every 30 days. 30 mL 1    cyclobenzaprine (FLEXERIL) 10 MG tablet Take 10 mg by mouth 2 (two) times daily as needed.      diclofenac sodium (VOLTAREN) 1 % Gel Apply 2 g topically 4 (four) times daily. 50 g 0    dicyclomine (BENTYL) 10 MG capsule TAKE 1 CAPSULE(10 MG) BY MOUTH FOUR TIMES DAILY 360 capsule 1    estradioL (ESTRACE) 1 MG tablet Take 1 mg by mouth once daily.      ibuprofen (ADVIL,MOTRIN) 800 MG tablet Take 1 tablet (800 mg total) by mouth 3 (three) times daily. 90 tablet 2    LIDOcaine (LIDODERM) 5 % Place 1 patch onto the skin once daily. Remove & Discard patch within 12 hours or as directed by MD Moon patch 0    LINZESS 290  mcg Cap capsule TAKE 1 CAPSULE(290 MCG) BY MOUTH BEFORE BREAKFAST 90 capsule 3    losartan (COZAAR) 50 MG tablet Take 1 tablet (50 mg total) by mouth once daily. 90 tablet 1    melatonin 5 mg Chew Take 10 mg by mouth daily as needed.      mupirocin (BACTROBAN) 2 % ointment APPLY TOPICALLY TO THE AFFECTED AREA TWICE DAILY 15 g 2    mv-mn/iron/folic acid/herb 190 (VITAMIN D3 COMPLETE ORAL) Take by mouth once daily at 6am.      omega-3 fatty acids/fish oil (FISH OIL-OMEGA-3 FATTY ACIDS) 300-1,000 mg capsule Take 1 capsule by mouth once daily.      omeprazole (PRILOSEC) 40 MG capsule TAKE 1 CAPSULE(40 MG) BY MOUTH EVERY DAY 30 capsule 11    POLYETHYLENE GLYCOL 3350 (MIRALAX ORAL) Take by mouth as needed.       potassium chloride 10% (KAYCIEL) 20 mEq/15 mL oral solution TAKE 15 ML BY MOUTH EVERY  mL 11    traZODone (DESYREL) 100 MG tablet TK 1 T PO  QHS      venlafaxine (EFFEXOR-XR) 150 MG Cp24 Take 150 mg by mouth once daily.      venlafaxine (EFFEXOR-XR) 75 MG 24 hr capsule TAKE ONE CAPSULE BY MOUTH DAILY WITH 150 MG TO EQUAL 225 MG      ibuprofen (ADVIL,MOTRIN) 800 MG tablet Take 1 tablet (800 mg total) by mouth 3 (three) times daily with meals. 90 tablet 1    oxyCODONE-acetaminophen (PERCOCET) 5-325 mg per tablet Take 1 tablet by mouth every 24 hours as needed for Pain (as needed for severe pain only). 21 tablet 0    QUEtiapine (SEROQUEL) 200 MG Tab Take 1 tablet (200 mg total) by mouth every evening. 30 tablet 2    tiZANidine (ZANAFLEX) 4 MG tablet Take 1 tablet (4 mg total) by mouth every evening. 30 tablet 0     No current facility-administered medications for this visit.       Past Medical History:   Diagnosis Date    Anxiety     Degenerative joint disease (DJD) of hip     Diabetes type 2, controlled 7/10/2019    GERD (gastroesophageal reflux disease)     Hyperlipidemia     Hypertension     IBS (irritable bowel syndrome)     Insomnia     Lumbar disc herniation     PTSD (post-traumatic stress disorder)         Past Surgical History:   Procedure Laterality Date    ADENOIDECTOMY      ARTHROSCOPIC DEBRIDEMENT OF ROTATOR CUFF Right 2020    Procedure: DEBRIDEMENT, ROTATOR CUFF, ARTHROSCOPIC;  Surgeon: Melchor Hughes Jr., MD;  Location: [facility] OR;  Service: Orthopedics;  Laterality: Right;  need opus system (Ger MESA notified , EF)  video, notified/confirmed 2020 KB 1310    ARTHROSCOPIC EXCISION OF ACROMIOCLAVICULAR JOINT  2020    Procedure: EXCISION, ACROMIOCLAVICULAR JOINT, ARTHROSCOPIC;  Surgeon: Melchor Hughes Jr., MD;  Location: [facility] OR;  Service: Orthopedics;;    ARTHROSCOPIC REPAIR OF ROTATOR CUFF OF SHOULDER      ARTHROSCOPIC REPAIR OF ROTATOR CUFF OF SHOULDER Left 3/19/2024    Procedure: REPAIR, ROTATOR CUFF, ARTHROSCOPIC;  Surgeon: Melchor Hughes Jr., MD;  Location: [facility] OR;  Service: Orthopedics;  Laterality: Left;  need opus system and regeneten patch    ARTHROSCOPY OF SHOULDER WITH DECOMPRESSION OF SUBACROMIAL SPACE  2020    Procedure: ARTHROSCOPY, SHOULDER, WITH SUBACROMIAL SPACE DECOMPRESSION;  Surgeon: Melchor Hughes Jr., MD;  Location: [facility] OR;  Service: Orthopedics;;     SECTION      DECOMPRESSION OF SUBACROMIAL SPACE Left 3/19/2024    Procedure: DECOMPRESSION, SUBACROMIAL SPACE;  Surgeon: Melchor Hughes Jr., MD;  Location: [facility] OR;  Service: Orthopedics;  Laterality: Left;    EPIDURAL STEROID INJECTION INTO CERVICAL SPINE N/A 2021    Procedure: Injection-steroid-epidural-cervical--C7-T1 IL ZAYNAB;  Surgeon: Gregoria Rodriguez MD;  Location: [facility] PAIN MGT;  Service: Pain Management;  Laterality: N/A;    ESOPHAGOGASTRODUODENOSCOPY N/A 2023    Procedure: ESOPHAGOGASTRODUODENOSCOPY (EGD);  Surgeon: Tommy Schmitt MD;  Location: [facility] ENDO [address]);  Service: Endoscopy;  Laterality: N/A;  referral Navjot NP-instr portal-GT    INJECTION OF JOINT Right 2023    Procedure: INJECTION, JOINT, RIGHT ISCHIAL BURSA  SOONER DATE;  Surgeon: Yesica Monroy MD;   "Location: [facility] MGT;  Service: Pain Management;  Laterality: Right;    TENOTOMY, ELBOW, WITH DEBRIDEMENT AND TENDON REPAIR Right 5/20/2022    Procedure: TENOTOMY,ELBOW,WITH DEBRIDEMENT AND TENDON REPAIR;  Surgeon: Melchor Hughes Jr., MD;  Location: [facility] OR;  Service: Orthopedics;  Laterality: Right;    TONSILLECTOMY         OBJECTIVE:   PHYSICAL EXAM:  Height: 5' 2" (157.5 cm) Weight: 68 kg (149 lb 14.6 oz)  Vitals:    04/29/24 1439   Weight: 68 kg (149 lb 14.6 oz)   Height: 5' 2" (1.575 m)   PainSc:   9     Ortho/SPM Exam  Examination left shoulder no tenderness no swelling incisions well-healed no evidence of infection range of motion limited due to pain   Neurologic exam intact       RADIOGRAPHS:  None  Comments: I have personally reviewed the imaging and I agree with the above radiologist's report.    ASSESSMENT/PLAN:     IMPRESSION:  Status post rotator cuff repair left shoulder    PLAN:  I think she can discontinue the sling at home and especially for sleeping   Continue therapy   No heavy lifting   I refilled Percocet but only 1 per day the rest the time I would like her to take Motrin and we can add Zanaflex at bed time as a muscle relaxer    FOLLOW UP:  3-4 weeks    Disclaimer: This note has been generated using voice-recognition software. There may be typographical errors that have been missed during proof-reading.     "

## 2024-04-30 ENCOUNTER — OFFICE VISIT (OUTPATIENT)
Dept: CARDIOLOGY | Facility: CLINIC | Age: 59
End: 2024-04-30
Payer: MEDICARE

## 2024-04-30 ENCOUNTER — CLINICAL SUPPORT (OUTPATIENT)
Dept: REHABILITATION | Facility: HOSPITAL | Age: 59
End: 2024-04-30
Payer: MEDICARE

## 2024-04-30 VITALS
HEIGHT: 62 IN | HEART RATE: 67 BPM | DIASTOLIC BLOOD PRESSURE: 77 MMHG | SYSTOLIC BLOOD PRESSURE: 127 MMHG | BODY MASS INDEX: 27.42 KG/M2 | WEIGHT: 149 LBS

## 2024-04-30 DIAGNOSIS — I15.2 HYPERTENSION ASSOCIATED WITH DIABETES: ICD-10-CM

## 2024-04-30 DIAGNOSIS — E11.9 CONTROLLED TYPE 2 DIABETES MELLITUS WITHOUT COMPLICATION, WITHOUT LONG-TERM CURRENT USE OF INSULIN: ICD-10-CM

## 2024-04-30 DIAGNOSIS — E11.59 HYPERTENSION ASSOCIATED WITH DIABETES: ICD-10-CM

## 2024-04-30 DIAGNOSIS — I10 ESSENTIAL HYPERTENSION: Primary | Chronic | ICD-10-CM

## 2024-04-30 DIAGNOSIS — E78.2 MIXED HYPERLIPIDEMIA: Chronic | ICD-10-CM

## 2024-04-30 DIAGNOSIS — M25.612 DECREASED RANGE OF MOTION OF LEFT SHOULDER: Primary | ICD-10-CM

## 2024-04-30 PROCEDURE — 4010F ACE/ARB THERAPY RXD/TAKEN: CPT | Mod: CPTII,S$GLB,, | Performed by: INTERNAL MEDICINE

## 2024-04-30 PROCEDURE — 99999 PR PBB SHADOW E&M-EST. PATIENT-LVL V: CPT | Mod: PBBFAC,,, | Performed by: INTERNAL MEDICINE

## 2024-04-30 PROCEDURE — 1160F RVW MEDS BY RX/DR IN RCRD: CPT | Mod: CPTII,S$GLB,, | Performed by: INTERNAL MEDICINE

## 2024-04-30 PROCEDURE — 97110 THERAPEUTIC EXERCISES: CPT

## 2024-04-30 PROCEDURE — 3008F BODY MASS INDEX DOCD: CPT | Mod: CPTII,S$GLB,, | Performed by: INTERNAL MEDICINE

## 2024-04-30 PROCEDURE — 3044F HG A1C LEVEL LT 7.0%: CPT | Mod: CPTII,S$GLB,, | Performed by: INTERNAL MEDICINE

## 2024-04-30 PROCEDURE — 1159F MED LIST DOCD IN RCRD: CPT | Mod: CPTII,S$GLB,, | Performed by: INTERNAL MEDICINE

## 2024-04-30 PROCEDURE — 3074F SYST BP LT 130 MM HG: CPT | Mod: CPTII,S$GLB,, | Performed by: INTERNAL MEDICINE

## 2024-04-30 PROCEDURE — 99214 OFFICE O/P EST MOD 30 MIN: CPT | Mod: S$GLB,,, | Performed by: INTERNAL MEDICINE

## 2024-04-30 PROCEDURE — 3078F DIAST BP <80 MM HG: CPT | Mod: CPTII,S$GLB,, | Performed by: INTERNAL MEDICINE

## 2024-04-30 NOTE — TELEPHONE ENCOUNTER
No care due was identified.  [facility] Embedded Care Due Messages. Reference number: [id number].   4/30/2024 8:07:31 AM CDT

## 2024-04-30 NOTE — PROGRESS NOTES
[facility]   Physical Therapy Treatment Note      [facility]  Cl[facility] Number: [id number]    Therapy Diagnosis:   Encounter Diagnosis   Name Primary?    Decreased range of motion of left shoulder Yes       Physician: Regine Morse PA-C    Visit Date: 4/30/2024    Physician Orders: PT Eval and Treat   Medical Diagnosis from Referral:  S/P left rotator cuff repair [Z98.890]   Evaluation Date: 4/19/2024  Authorization Period Expiration: 12/31/24  Plan of Care Expiration: 10/19/24  Progress Note Due: 5/19/24     Date of Surgery: 3/19/24  Post Op Day: 6 weeks as of 4/30/24     Visit # / Visits authorized:  2/20  FOTO: 1/3     Precautions: Standard and Surgical Precautions       Time In: 2:50 PM  Time Out: 3:45 PM   Total Billable Time: 30 minutes of one on one time    Subjective     Patient reports: that she saw Dr. Hughes and he told her she can wean out of the sling when sleeping and at home. States she got some more pain medicine and muscle relaxers but is taking about half of prescribed dose because she does not want bad reaction with medications. She states she took a Tylenol today prior to visit and has to leave early due to cardiology appt upstairs.     She was compliant with home exercise program.    Response to previous treatment: soreness  Functional change: improving passive motion     Pain: 2/10  Location: left shoulder      Objective      (4/30/24)    Passive Range of Motion:    Shoulder Left   Flexion 130   Abduction 70   ER at 20 45     Treatment     Sherie received the treatments listed below:      therapeutic exercises to develop strength, endurance, and ROM for 41 minutes including:    Supine Wand external rotation 3 x 10   Seated pulleys flexion, scaption 3 mins each direction   Passive shoulder flexion - table supported walkouts 2 x 20   Scapular squeezes 2 x 10   Education on sling use, donning and doffing, surgical precautions    manual therapy  techniques: Joint mobilizations and Soft tissue Mobilization were applied to the: left shoulder for 14 minutes, including:    Passive ROM flexion, ER, abd for Left shoulder  Posterior mobs Left GH light grade I/II  Posterior inferior mobs Left GH light grade I/II    neuromuscular re-education activities to improve: Coordination, Kinesthetic, Sense, Proprioception, and Posture for 00 minutes. The following activities were included:      therapeutic activities to improve functional performance for 00 minutes, including:      Patient Education and Home Exercises       Education provided:   - HEP   - surgical precautions     Written Home Exercises Provided: Patient instructed to cont prior HEP. Exercises were reviewed and Sherie was able to demonstrate them prior to the end of the session.  Sherie demonstrated good  understanding of the education provided. See Electronic Medical Record under Patient Instructions for exercises provided during therapy sessions    Assessment     Patient presents with her sling donned and is 6 weeks status post Left RTC repair. She continued to demonstrate guarding with initial ROM passively however is able to improve motion to 130 degrees of flexion when conducting pulleys in scapular plane. Patient given release from physician to wean out of the sling when she is at home and when sleeping. She was educated on weaning process and continued surgical precautions reasons in order to achieve optimal outcome with left shoulder healing. Patient will continue to follow RTC medium/massive repair protocol until otherwise advised by physician.     Sherie Is progressing well towards her goals.   Patient prognosis is Good.     Patient will continue to benefit from skilled outpatient physical therapy to address the deficits listed in the problem list box on initial evaluation, provide pt/family education and to maximize pt's level of independence in the home and community environment.     Patient's  spiritual, cultural and educational needs considered and pt agreeable to plan of care and goals.     Anticipated barriers to physical therapy: surgical precautions, chronic pain     Goals:   Short Term Goals:  4-6 weeks  1.Report decreased Left shoulder pain < / =  3/10  to increase tolerance for progressing exercises with therapy.   2. Increase PROM flexion to 150 and ER to 60 pain-free to show improvement with post operative status and equalize ROM to other shoulder.   3. Increased strength by 1/3 MMT grade in left shoulder to increase tolerance for ADL and work activities.  4. Pt to tolerate HEP to improve ROM and independence with ADL's     Long Term Goals: 20-24 weeks  1.Report decreased Left shoulder pain  < / =  1/10  to increase tolerance for return to pain-free and full overhead motion.   2.Increase AROM to full and pain-free in flexion, abduction, and ER of left shoulder to show good progression through post operative protocol.   3.Increase strength to >/= 4/5 in left shoulder to increase tolerance for ADL and work activities.  4. Pt goal: be able to color her hair and wash her hair with her left arm.   5. Pt will have score of >/= 50% on FOTO shoulder in order to demonstrate true functional improvement.     Plan     Plan of care Certification: 4/19/2024 to 10/19/24.     Outpatient Physical Therapy 2 times weekly for 24 weeks to include the following interventions: Manual Therapy, Moist Heat/ Ice, Neuromuscular Re-ed, Patient Education, Therapeutic Activities, and Therapeutic Exercise.     Shubham Manning, PT

## 2024-04-30 NOTE — PROGRESS NOTES
HISTORY:    58-year-old female with a history of hypertension, hyperlipidemia, +vape presenting for follow-up.    Patient initially evaluated for syncopal episode in early '24. No repeat episodes. Negative CV moore. Occurred while straining using the restroom at night in the setting of a UTI.    At baseline, feels well. No CP, SOB, or CUELLAR. Limited in her activity by MS issues. Recently had successful shoulder surgery.    The patient denies any previous history of myocardial infarction, coronary artery disease, peripheral arterial disease, stroke, congestive heart failure, or cardiomyopathy. Prev tob, +vap. + FH.     50 pound weight loss in '23 with decrease in BP regimen. Still has some light headedness and dizziness when she wakes up in the morning.     Tolerates losartan 25 x 1, atorvastatin 40 x 1.     PHYSICAL EXAM:    Vitals:    04/30/24 1621   BP: 127/77   Pulse: 67       NAD, A+Ox3.  No jvd, no bruit.  RRR nml s1,s2. No murmurs.  CTA B no wheezes or crackles.  No edema.    LABS/STUDIES (imaging reviewed during clinic visit):    March 2024 BMP normal.  December 2023 CBC and CMP normal.  August LDL 96 and HDL 55.  Triglycerides 185.  TSH normal.  A1c normal.    EKG December 2023 sinus rhythm with no Q-waves or ST changes.    Holter April 2024 sinus rhythm with average heart rate of 72 beats per minute.  TTE April 2024 normal LV size and wall thickness.  EF 60 65%.  CVP 3.   DSE 2020 no evidence of ischemia with normal TTE at baseline.  Coronary calcium score January 2024 Agatston score of 0.   Carotid 2017 No significant ICA ds.    ASSESSMENT & PLAN:    1. Essential hypertension    2. Mixed hyperlipidemia    3. Controlled type 2 diabetes mellitus without complication, without long-term current use of insulin                  Seems like vasovagal syncope in the setting of a UTI. TTE/holter normal. No repeat events.    Bps very good on losartan 25x1. Can do trial off given weight loss and orthostatic symptoms.      Tolerating atorvastatin 40x1.   Was on pravastatin. Calcium score 0.    Quit vaping.     Follow up if symptoms worsen or fail to improve.      Esperanza Sharma MD

## 2024-05-01 RX ORDER — LOSARTAN POTASSIUM 50 MG/1
50 TABLET ORAL
Qty: 90 TABLET | Refills: 3 | Status: SHIPPED | OUTPATIENT
Start: 2024-05-01

## 2024-05-01 NOTE — TELEPHONE ENCOUNTER
Refill Decision Note   Sherie Reyes  is requesting a refill authorization.  Brief Assessment and Rationale for Refill:  Approve     Medication Therapy Plan:        Pharmacist review requested: Yes   Extended chart review required: Yes   Comments:     Note composed:10:59 AM 05/01/2024

## 2024-05-01 NOTE — TELEPHONE ENCOUNTER
Refill Routing Note   Medication(s) are not appropriate for processing by [facility] for the following reason(s):      Drug-disease interaction    [facility] action(s):  Defer Care Due:  None identified     Medication Therapy Plan: Drug-Disease: losartan and Hypotension    Pharmacist review requested: Yes     Appointments  past 12m or future 3m with PCP    Date Provider   Last Visit   3/4/2024 Farhana Whittington MD   Next Visit   7/8/2024 Farhana Whittington MD   ED visits in past 90 days: 0        Note composed:9:50 AM 05/01/2024

## 2024-05-02 ENCOUNTER — CLINICAL SUPPORT (OUTPATIENT)
Dept: REHABILITATION | Facility: HOSPITAL | Age: 59
End: 2024-05-02
Attending: PHYSICIAN ASSISTANT
Payer: MEDICARE

## 2024-05-02 DIAGNOSIS — M25.612 DECREASED RANGE OF MOTION OF LEFT SHOULDER: Primary | ICD-10-CM

## 2024-05-02 PROCEDURE — 97110 THERAPEUTIC EXERCISES: CPT

## 2024-05-02 NOTE — PROGRESS NOTES
"[facility]   Physical Therapy Treatment Note      [facility]  Clinic Number: [id number]    Therapy Diagnosis:   Encounter Diagnosis   Name Primary?    Decreased range of motion of left shoulder Yes       Physician: Regine Morse PA-C    Visit Date: 5/2/2024    Physician Orders: PT Eval and Treat   Medical Diagnosis from Referral:  S/P left rotator cuff repair [Z98.890]   Evaluation Date: 4/19/2024  Authorization Period Expiration: 12/31/24  Plan of Care Expiration: 10/19/24  Progress Note Due: 5/19/24     Date of Surgery: 3/19/24  Post Op Day: 6 weeks, 2 days as of 5/2/24     Visit # / Visits authorized:  3/20  FOTO: 1/3     Precautions: Standard and Surgical Precautions       Time In: 2:05 PM  Time Out: 3:00 PM   Total Billable Time: 30 minutes of one on one time    Subjective     Patient reports: that her shoulder feels like it has hurt at the same level since the surgery and that she "always feels some popping" when moving her arm around. Patient states she sometimes forgets that she cannot use her arm in certain positions and has accidentally tried to  things or reach for things at home that cause the shoulder to hurt. Patient states she also was unaware that she had a complete tear until recently.     She was compliant with home exercise program.    Response to previous treatment: soreness  Functional change: improving passive motion     Pain: 5/10  Location: left shoulder      Objective      (5/2/24)     Passive Range of Motion:    Shoulder Left   Flexion 150   Abduction 70   ER at 20 45     Treatment     Sherie received the treatments listed below:      therapeutic exercises to develop strength, endurance, and ROM for 25 minutes including:    Supine Wand external rotation at neutral, 3 x 10 with 5" holds at end-range   Sidelying shoulder flexion with UE assisted on pole, 2 x 20   Wand ER at 45 Abd, 3 x 10   Seated pulleys flexion, scaption 3 mins each " "direction   Passive shoulder flexion - table supported walkouts 2 x 20   Scapular squeezes 2 x 10   Education on sling use, donning and doffing, surgical precautions    manual therapy techniques: Joint mobilizations and Soft tissue Mobilization were applied to the: left shoulder for 19 minutes, including:    Passive ROM flexion, ER, abd for Left shoulder  Posterior mobs Left GH light grade I/II  Posterior inferior mobs Left GH light grade I/II  Scapular upward rotation mobilizations, grades II/III  Scapular mobilizations in all directions     neuromuscular re-education activities to improve: Coordination, Kinesthetic, Sense, Proprioception, and Posture for 11 minutes. The following activities were included:    Sidelying scapular elevation/depression 20x 3" holds in each direction  Sidelying scapular adduction holds, 20x with 3" hold against manual resistance    therapeutic activities to improve functional performance for 00 minutes, including:      Patient Education and Home Exercises       Education provided:   - HEP   - surgical precautions     Written Home Exercises Provided: Patient instructed to cont prior HEP. Exercises were reviewed and Kathrineritchie was able to demonstrate them prior to the end of the session.  Sherie demonstrated good  understanding of the education provided. See Electronic Medical Record under Patient Instructions for exercises provided during therapy sessions    Assessment     Patient presents with her sling donned and is 6 weeks, 2 days status post Left RTC repair. She shows continued guarding of left shoulder but is able to improve flexion ROM today to 150 degrees using shoulder pulleys and during passive ROM assist. Patient remains with challenges of tolerating full and pain-free passive ROM at this time and was educated on ways to minimize use of rotator cuff muscle activation while she is still weaning out of the sling. Continued education on not returning yet to full active motion due to " pain and restricting herself on as much lifting as possible right now. Patient verbalizes understanding but shows poor compliance as she leaves session using both UE to carry items she brought with her to the session. Patient will continue to follow RTC medium/massive repair protocol until otherwise advised by physician.     Sherie Is progressing well towards her goals.   Patient prognosis is Good.     Patient will continue to benefit from skilled outpatient physical therapy to address the deficits listed in the problem list box on initial evaluation, provide pt/family education and to maximize pt's level of independence in the home and community environment.     Patient's spiritual, cultural and educational needs considered and pt agreeable to plan of care and goals.     Anticipated barriers to physical therapy: surgical precautions, chronic pain     Goals:   Short Term Goals:  4-6 weeks  1.Report decreased Left shoulder pain < / =  3/10  to increase tolerance for progressing exercises with therapy.   2. Increase PROM flexion to 150 and ER to 60 pain-free to show improvement with post operative status and equalize ROM to other shoulder.   3. Increased strength by 1/3 MMT grade in left shoulder to increase tolerance for ADL and work activities.  4. Pt to tolerate HEP to improve ROM and independence with ADL's     Long Term Goals: 20-24 weeks  1.Report decreased Left shoulder pain  < / =  1/10  to increase tolerance for return to pain-free and full overhead motion.   2.Increase AROM to full and pain-free in flexion, abduction, and ER of left shoulder to show good progression through post operative protocol.   3.Increase strength to >/= 4/5 in left shoulder to increase tolerance for ADL and work activities.  4. Pt goal: be able to color her hair and wash her hair with her left arm.   5. Pt will have score of >/= 50% on FOTO shoulder in order to demonstrate true functional improvement.     Plan     Plan of care  Certification: 4/19/2024 to 10/19/24.     Outpatient Physical Therapy 2 times weekly for 24 weeks to include the following interventions: Manual Therapy, Moist Heat/ Ice, Neuromuscular Re-ed, Patient Education, Therapeutic Activities, and Therapeutic Exercise.     Shubham Manning, PT

## 2024-05-03 ENCOUNTER — TELEPHONE (OUTPATIENT)
Dept: ORTHOPEDICS | Facility: CLINIC | Age: 59
End: 2024-05-03
Payer: MEDICARE

## 2024-05-03 NOTE — TELEPHONE ENCOUNTER
----- Message from Fauzia Cruz sent at 5/2/2024  4:07 PM CDT -----  Type:   Call    Who Called:pt  Does the patient know what this is regarding?:meds  Would the patient rather a call back or a response via [facility]? call  Best Call Back Number: [phone]  Additional Information: Pharmacy needs a PA for tiZANidine (ZANAFLEX) 4 MG tablet

## 2024-05-03 NOTE — TELEPHONE ENCOUNTER
Left message for patient stating that I did speak with pharmacy in regards to the Tizanidine and they stated that the mediation went through to your insurance with no charge.

## 2024-05-07 ENCOUNTER — CLINICAL SUPPORT (OUTPATIENT)
Dept: REHABILITATION | Facility: HOSPITAL | Age: 59
End: 2024-05-07
Payer: MEDICARE

## 2024-05-07 DIAGNOSIS — M25.612 DECREASED RANGE OF MOTION OF LEFT SHOULDER: Primary | ICD-10-CM

## 2024-05-07 PROCEDURE — 97140 MANUAL THERAPY 1/> REGIONS: CPT

## 2024-05-07 PROCEDURE — 97110 THERAPEUTIC EXERCISES: CPT

## 2024-05-07 NOTE — PROGRESS NOTES
[facility]   Physical Therapy Treatment Note      [facility]  [facility] Number: [id number]    Therapy Diagnosis:   Encounter Diagnosis   Name Primary?    Decreased range of motion of left shoulder Yes         Physician: Regine Morse PA-C    Visit Date: 5/7/2024    Physician Orders: PT Eval and Treat   Medical Diagnosis from Referral:  S/P left rotator cuff repair [Z98.890]   Evaluation Date: 4/19/2024  Authorization Period Expiration: 12/31/24  Plan of Care Expiration: 10/19/24  Progress Note Due: 5/19/24     Date of Surgery: 3/19/24  Post Op Day: 7 weeks as of 5/7/24     Visit # / Visits authorized:  4/20  FOTO: 1/3     Precautions: Standard and Surgical Precautions       Time In: 4:00 PM  Time Out: 5:05 PM   Total Billable Time: 60 minutes     Subjective     Patient reports: she had a fall over the weekend and fell on her left shoulder tripping over her dog. States she landed with her arms outstretched and was in severe pain for 10-15 minutes. Patient states she contacted her physician's office this morning but was unsure of whether she could do her exercises at home still until she came to PT today.     She was compliant with home exercise program.     Response to previous treatment: soreness   Functional change: improving passive motion      Pain: 5/10   Location: left shoulder      Objective      (5/2/24)     Passive Range of Motion:    Shoulder Left   Flexion 120   Abduction 70   ER at 20 45     Treatment     Sherie received the treatments listed below:      therapeutic exercises to develop strength, endurance, and ROM for 51 minutes including:    Supine Wand ER at 45 Abd, 3 x 10   Table slides flexion and scapular planes, 30x   Seated/standing pulleys facing the door into flexion, scaption 3 mins each direction   Passive shoulder flexion - table supported walkouts 2 x 20   Scapular squeezes 2 x 10   Education on sling use, donning and doffing, surgical  "precautions    Not performed today:  Supine Wand external rotation at neutral, 3 x 10 with 5" holds at end-range  Sidelying shoulder flexion with UE assisted on pole, 2 x 20     manual therapy techniques: Joint mobilizations and Soft tissue Mobilization were applied to the: left shoulder for 09 minutes, including:    Passive ROM flexion, ER, abd for Left shoulder  Posterior mobs Left GH light grade I/II  Posterior inferior mobs Left GH light grade I/II    Not performed today:  Scapular upward rotation mobilizations, grades II/III  Scapular mobilizations in all directions     Neuromuscular re-education activities to improve Coordination, Kinesthetic, Proprioception, and Posture for 00 minutes. The following activities were included:      Not performed today:  Sidelying scapular elevation/depression 20x 3" holds in each direction  Sidelying scapular adduction holds, 20x with 3" hold against manual resistance    Patient Education and Home Exercises       Education provided:   - HEP   - surgical precautions     Written Home Exercises Provided: Patient instructed to cont prior HEP. Exercises were reviewed and Sherie was able to demonstrate them prior to the end of the session.  Sherie demonstrated good  understanding of the education provided. See Electronic Medical Record under Patient Instructions for exercises provided during therapy sessions    Assessment     Patient presents with her sling mabel and is 7 weeks status post Left RTC repair. She reports to PT following a fall over the weekend onto her surgical arm and is initially guarded to passive range of motion today. She was eventually able to tolerate AAROM with table slides and pulleys for improving range back to what it was in previous session. Some exercises held today due to guarding and painful end-feel. Patient advised on keeping range of motion in pain-free ranges until she is able to follow up with her physician. PT advised as of last week to follow medium " RTC repair protocol by physician assistant. Physician was contacted today by patient and PT regarding follow up appt after patient's fall over the weekend.     Sherie Is progressing well towards her goals.   Patient prognosis is Good.     Patient will continue to benefit from skilled outpatient physical therapy to address the deficits listed in the problem list box on initial evaluation, provide pt/family education and to maximize pt's level of independence in the home and community environment.     Patient's spiritual, cultural and educational needs considered and pt agreeable to plan of care and goals.     Anticipated barriers to physical therapy: surgical precautions, chronic pain     Goals:   Short Term Goals:  4-6 weeks  1.Report decreased Left shoulder pain < / =  3/10  to increase tolerance for progressing exercises with therapy.   2. Increase PROM flexion to 150 and ER to 60 pain-free to show improvement with post operative status and equalize ROM to other shoulder.   3. Increased strength by 1/3 MMT grade in left shoulder to increase tolerance for ADL and work activities.  4. Pt to tolerate HEP to improve ROM and independence with ADL's     Long Term Goals: 20-24 weeks  1.Report decreased Left shoulder pain  < / =  1/10  to increase tolerance for return to pain-free and full overhead motion.   2.Increase AROM to full and pain-free in flexion, abduction, and ER of left shoulder to show good progression through post operative protocol.   3.Increase strength to >/= 4/5 in left shoulder to increase tolerance for ADL and work activities.  4. Pt goal: be able to color her hair and wash her hair with her left arm.   5. Pt will have score of >/= 50% on FOTO shoulder in order to demonstrate true functional improvement.     Plan     Plan of care Certification: 4/19/2024 to 10/19/24.     Outpatient Physical Therapy 2 times weekly for 24 weeks to include the following interventions: Manual Therapy, Moist Heat/ Ice,  Neuromuscular Re-ed, Patient Education, Therapeutic Activities, and Therapeutic Exercise.     Shubham Manning, PT

## 2024-05-08 ENCOUNTER — TELEPHONE (OUTPATIENT)
Dept: ORTHOPEDICS | Facility: CLINIC | Age: 59
End: 2024-05-08
Payer: MEDICARE

## 2024-05-08 NOTE — PROGRESS NOTES
"[facility]   Physical Therapy Treatment Note      [facility]  Clinic Number: [id number]    Therapy Diagnosis:   Encounter Diagnosis   Name Primary?    Decreased range of motion of left shoulder Yes     Physician: Regine Morse PA-C    Visit Date: 5/9/2024    Physician Orders: PT Eval and Treat   Medical Diagnosis from Referral:  S/P left rotator cuff repair [Z98.890]   Evaluation Date: 4/19/2024  Authorization Period Expiration: 12/31/24  Plan of Care Expiration: 10/19/24  Progress Note Due: 5/19/24     Date of Surgery: 3/19/24  Post Op Day: 7 weeks as of 5/9/24     Visit # / Visits authorized:  5/20  FOTO: 1/3     Precautions: Standard and Surgical Precautions       Time In: 3:05 PM  Time Out: 4:05 PM   Total Billable Time: 60 minutes     Subjective     Patient reports: Still has not heard back from her surgeon regarding the fall. Had decreased pain following the last visit, but increased stiffness the following morning.    She was compliant with home exercise program.     Response to previous treatment: soreness   Functional change: improving passive motion      Pain: 8/10   Location: left shoulder      Objective      (5/9/24)     Passive Range of Motion:  following manual therapy  Shoulder Left   Flexion 150   Abduction 120   ER at 45 65     Treatment     Sherie received the treatments listed below:      therapeutic exercises to develop strength, endurance, and ROM for 20 minutes including:    Supine Wand ER at 45 Abd, 3 x 10   Sidelying shoulder flexion with UE assisted on pole, 2 x 20   Seated thoracic extension - x20  Education on sling use, donning and doffing, surgical precautions    Not performed today:  Supine Wand external rotation at neutral, 3 x 10 with 5" holds at end-range    Table slides flexion and scapular planes, 30x   Seated/standing pulleys facing the door into flexion, scaption 3 mins each direction   Passive shoulder flexion - table supported " "walkouts 2 x 20   Scapular squeezes 2 x 10     manual therapy techniques: Joint mobilizations and Soft tissue Mobilization were applied to the: left shoulder for 40 minutes, including:    Passive ROM flexion, ER, abd for Left shoulder  Posterior mobs Left GH light grade I/II  Posterior inferior mobs Left GH light grade I/II  Left GH external rotation / internal rotation isometrics  Scapular mobilizations in all directions     Not performed today:  Scapular upward rotation mobilizations, grades II/III    Neuromuscular re-education activities to improve Coordination, Kinesthetic, Proprioception, and Posture for 00 minutes. The following activities were included:      Not performed today:  Sidelying scapular elevation/depression 20x 3" holds in each direction  Sidelying scapular adduction holds, 20x with 3" hold against manual resistance    Patient Education and Home Exercises       Education provided:   - HEP   - surgical precautions     Written Home Exercises Provided: Patient instructed to cont prior HEP. Exercises were reviewed and Sherie was able to demonstrate them prior to the end of the session.  Sherie demonstrated good  understanding of the education provided. See Electronic Medical Record under Patient Instructions for exercises provided during therapy sessions    Assessment     Patient presents with her sling donned and is 7 weeks status post Left RTC repair. Ongoing high tissue irritability secondary to fall over the weekend. Able to demonstrate near full passive range of motion following manual techniques. Also displays signs of adverse neural tension as noted by symptoms with scapular depression and reduction of symptoms following seated thoracic extension. Pain reported to be 5/10 following today's treatments.     Sherie Is progressing well towards her goals.   Patient prognosis is Good.     Patient will continue to benefit from skilled outpatient physical therapy to address the deficits listed in the " problem list box on initial evaluation, provide pt/family education and to maximize pt's level of independence in the home and community environment.     Patient's spiritual, cultural and educational needs considered and pt agreeable to plan of care and goals.     Anticipated barriers to physical therapy: surgical precautions, chronic pain     Goals:   Short Term Goals:  4-6 weeks  1.Report decreased Left shoulder pain < / =  3/10  to increase tolerance for progressing exercises with therapy.   2. Increase PROM flexion to 150 and ER to 60 pain-free to show improvement with post operative status and equalize ROM to other shoulder.   3. Increased strength by 1/3 MMT grade in left shoulder to increase tolerance for ADL and work activities.  4. Pt to tolerate HEP to improve ROM and independence with ADL's     Long Term Goals: 20-24 weeks  1.Report decreased Left shoulder pain  < / =  1/10  to increase tolerance for return to pain-free and full overhead motion.   2.Increase AROM to full and pain-free in flexion, abduction, and ER of left shoulder to show good progression through post operative protocol.   3.Increase strength to >/= 4/5 in left shoulder to increase tolerance for ADL and work activities.  4. Pt goal: be able to color her hair and wash her hair with her left arm.   5. Pt will have score of >/= 50% on FOTO shoulder in order to demonstrate true functional improvement.     Plan     Plan of care Certification: 4/19/2024 to 10/19/24.     Outpatient Physical Therapy 2 times weekly for 24 weeks to include the following interventions: Manual Therapy, Moist Heat/ Ice, Neuromuscular Re-ed, Patient Education, Therapeutic Activities, and Therapeutic Exercise.     Joey Xavier, PT    Co-treated with Julián Hamilton PT  Board Certified in Orthopedic Physical Therapy  Board Certified in Sports Physical Therapy  Fellow, [employer] Manual Physical Therapists

## 2024-05-08 NOTE — TELEPHONE ENCOUNTER
----- Message from Regine Morse PA-C sent at 5/8/2024  7:18 AM CDT -----  Regarding: RE: Patient  Thanks Shubham!    Our office was working on getting her in sooner for an evaluation following her fall. I'll make sure we get her in to check it out.  ----- Message -----  From: Shubham Manning PT  Sent: 5/7/2024   4:52 PM CDT  To: Melchor Hughes Jr., MD; #  Subject: Patient                                          Good afternoon Dr. Hughes,     I am currently seeing Mrs. Reyes as she is 7 weeks out today from her Left RTC repair surgery. She presented to the clinic today for PT after a fall onto her left shoulder and is stating severe pain from the onset of injury over the weekend (Saturday night) up to today with minimal change in her symptoms.     The mechanism of this injury was her tripping over her dogs and falling forward onto her outstretched arm. She says this pain sent a jolt through her arm and she was unable to move it for 10-15 minutes following the fall. Today, she was able to tolerate passive motion up to 100 flexion and has maintained ER motion to 45 degrees in scapular plane, but is demonstrating an empty end-feel with the end-range of these motions.     Just wanted to let you know in case I shoulder advise her to come in for a visit this week. She says she has also reached out to your office earlier today.     Thanks,     Shubham Manning, PT, DPT

## 2024-05-09 ENCOUNTER — CLINICAL SUPPORT (OUTPATIENT)
Dept: REHABILITATION | Facility: HOSPITAL | Age: 59
End: 2024-05-09
Payer: MEDICARE

## 2024-05-09 DIAGNOSIS — M25.612 DECREASED RANGE OF MOTION OF LEFT SHOULDER: Primary | ICD-10-CM

## 2024-05-09 PROCEDURE — 97140 MANUAL THERAPY 1/> REGIONS: CPT | Mod: KX

## 2024-05-09 PROCEDURE — 97110 THERAPEUTIC EXERCISES: CPT | Mod: KX

## 2024-05-11 NOTE — TELEPHONE ENCOUNTER
No care due was identified.  [facility] Embedded Care Due Messages. Reference number: [id number].   5/11/2024 3:07:32 AM CDT

## 2024-05-11 NOTE — TELEPHONE ENCOUNTER
Refill Routing Note   Medication(s) are not appropriate for processing by [facility] for the following reason(s):      New or recently adjusted medication    [facility] action(s):  Defer Care Due:  None identified            Appointments  past 12m or future 3m with PCP    Date Provider   Last Visit   3/4/2024 Farhana Whittington MD   Next Visit   7/8/2024 Farhana Whittington MD   ED visits in past 90 days: 0        Note composed:11:55 AM 05/11/2024

## 2024-05-13 DIAGNOSIS — Z98.890 S/P LEFT ROTATOR CUFF REPAIR: Primary | ICD-10-CM

## 2024-05-13 RX ORDER — ALBUTEROL SULFATE 90 UG/1
AEROSOL, METERED RESPIRATORY (INHALATION)
Qty: 18 G | Refills: 2 | Status: SHIPPED | OUTPATIENT
Start: 2024-05-13

## 2024-05-14 ENCOUNTER — OFFICE VISIT (OUTPATIENT)
Dept: ORTHOPEDICS | Facility: CLINIC | Age: 59
End: 2024-05-14
Payer: MEDICARE

## 2024-05-14 ENCOUNTER — HOSPITAL ENCOUNTER (OUTPATIENT)
Dept: RADIOLOGY | Facility: HOSPITAL | Age: 59
Discharge: HOME | End: 2024-05-14
Attending: PHYSICIAN ASSISTANT
Payer: MEDICARE

## 2024-05-14 VITALS — HEIGHT: 62 IN | BODY MASS INDEX: 27.25 KG/M2

## 2024-05-14 DIAGNOSIS — Z98.890 S/P LEFT ROTATOR CUFF REPAIR: ICD-10-CM

## 2024-05-14 DIAGNOSIS — Z98.890 S/P LEFT ROTATOR CUFF REPAIR: Primary | ICD-10-CM

## 2024-05-14 PROCEDURE — 99024 POSTOP FOLLOW-UP VISIT: CPT | Mod: HCNC,S$GLB,, | Performed by: PHYSICIAN ASSISTANT

## 2024-05-14 PROCEDURE — 1160F RVW MEDS BY RX/DR IN RCRD: CPT | Mod: HCNC,CPTII,S$GLB, | Performed by: PHYSICIAN ASSISTANT

## 2024-05-14 PROCEDURE — 73030 X-RAY EXAM OF SHOULDER: CPT | Mod: 26,HCNC,LT, | Performed by: RADIOLOGY

## 2024-05-14 PROCEDURE — 99999 PR PBB SHADOW E&M-EST. PATIENT-LVL IV: CPT | Mod: PBBFAC,HCNC,, | Performed by: PHYSICIAN ASSISTANT

## 2024-05-14 PROCEDURE — 3044F HG A1C LEVEL LT 7.0%: CPT | Mod: HCNC,CPTII,S$GLB, | Performed by: PHYSICIAN ASSISTANT

## 2024-05-14 PROCEDURE — 73030 X-RAY EXAM OF SHOULDER: CPT | Mod: TC,HCNC,PN,LT

## 2024-05-14 PROCEDURE — 1159F MED LIST DOCD IN RCRD: CPT | Mod: HCNC,CPTII,S$GLB, | Performed by: PHYSICIAN ASSISTANT

## 2024-05-14 PROCEDURE — 4010F ACE/ARB THERAPY RXD/TAKEN: CPT | Mod: HCNC,CPTII,S$GLB, | Performed by: PHYSICIAN ASSISTANT

## 2024-05-14 RX ORDER — IBUPROFEN 800 MG/1
800 TABLET ORAL 3 TIMES DAILY
Qty: 30 TABLET | Refills: 0 | Status: SHIPPED | OUTPATIENT
Start: 2024-05-14

## 2024-05-14 NOTE — PROGRESS NOTES
Subjective:      Patient ID: Sherie Reyes is a 58 y.o. female.  Chief Complaint: Post-op Evaluation (Left shoulder/ recent fall)    HPI  Sherie Reyes is a  58 y.o. female returns for an early follow up postop visit today following a ground level fall from a seated position.     She is s/p rotator cuff repair left shoulder now 2 mos postop currently in therapy having some pain at night difficulty sleeping.     Pt does reports continued shoulder pain, but denies difficulties with shoulder ROM.    Review of patient's allergies indicates:  No Known Allergies      Current Outpatient Medications   Medication Sig Dispense Refill    acetaminophen (TYLENOL) 500 MG tablet Take 500 mg by mouth every 6 (six) hours as needed for Pain.      albuterol (PROVENTIL/VENTOLIN HFA) 90 mcg/actuation inhaler INHALE 2 PUFFS INTO THE LUNGS BY MOUTH EVERY 6 HOURS AS NEEDED FOR WHEEZING 18 g 2    atorvastatin (LIPITOR) 40 MG tablet Take 1 tablet (40 mg total) by mouth once daily. 90 tablet 3    azelastine (ASTELIN) 137 mcg (0.1 %) nasal spray 1 spray (137 mcg total) by Nasal route 2 (two) times daily. 30 mL 1    cetirizine (ZYRTEC) 10 MG tablet Take 2 tablets (20 mg total) by mouth every morning. 60 tablet 3    chlorzoxazone (PARAFON FORTE) 500 mg Tab Take 500 mg by mouth daily as needed.      clonazePAM (KLONOPIN) 1 MG tablet TAKE 1 TABLET BY MOUTH TWICE DAILY 60 tablet 1    cyanocobalamin 1,000 mcg/mL injection Inject 1 mL (1,000 mcg total) into the muscle every 30 days. 30 mL 1    cyclobenzaprine (FLEXERIL) 10 MG tablet Take 10 mg by mouth 2 (two) times daily as needed.      diclofenac sodium (VOLTAREN) 1 % Gel Apply 2 g topically 4 (four) times daily. 50 g 0    dicyclomine (BENTYL) 10 MG capsule TAKE 1 CAPSULE(10 MG) BY MOUTH FOUR TIMES DAILY 360 capsule 1    estradioL (ESTRACE) 1 MG tablet Take 1 mg by mouth once daily.      ibuprofen (ADVIL,MOTRIN) 800 MG tablet Take 1 tablet (800 mg total) by mouth 3 (three) times  daily. 90 tablet 2    ibuprofen (ADVIL,MOTRIN) 800 MG tablet Take 1 tablet (800 mg total) by mouth 3 (three) times daily with meals. 90 tablet 1    LIDOcaine (LIDODERM) 5 % Place 1 patch onto the skin once daily. Remove & Discard patch within 12 hours or as directed by MD 14 patch 0    LINZESS 290 mcg Cap capsule TAKE 1 CAPSULE(290 MCG) BY MOUTH BEFORE BREAKFAST 90 capsule 3    losartan (COZAAR) 50 MG tablet TAKE 1 TABLET(50 MG) BY MOUTH EVERY DAY 90 tablet 3    melatonin 5 mg Chew Take 10 mg by mouth daily as needed.      mupirocin (BACTROBAN) 2 % ointment APPLY TOPICALLY TO THE AFFECTED AREA TWICE DAILY 15 g 2    mv-mn/iron/folic acid/herb 190 (VITAMIN D3 COMPLETE ORAL) Take by mouth once daily at 6am.      omega-3 fatty acids/fish oil (FISH OIL-OMEGA-3 FATTY ACIDS) 300-1,000 mg capsule Take 1 capsule by mouth once daily.      omeprazole (PRILOSEC) 40 MG capsule TAKE 1 CAPSULE(40 MG) BY MOUTH EVERY DAY 30 capsule 11    oxyCODONE-acetaminophen (PERCOCET) 5-325 mg per tablet Take 1 tablet by mouth every 24 hours as needed for Pain (as needed for severe pain only). 21 tablet 0    POLYETHYLENE GLYCOL 3350 (MIRALAX ORAL) Take by mouth as needed.       potassium chloride 10% (KAYCIEL) 20 mEq/15 mL oral solution TAKE 15 ML BY MOUTH EVERY  mL 11    tiZANidine (ZANAFLEX) 4 MG tablet Take 1 tablet (4 mg total) by mouth every evening. 30 tablet 0    traZODone (DESYREL) 100 MG tablet TK 1 T PO  QHS      venlafaxine (EFFEXOR-XR) 150 MG Cp24 Take 150 mg by mouth once daily.      venlafaxine (EFFEXOR-XR) 75 MG 24 hr capsule TAKE ONE CAPSULE BY MOUTH DAILY WITH 150 MG TO EQUAL 225 MG      QUEtiapine (SEROQUEL) 200 MG Tab Take 1 tablet (200 mg total) by mouth every evening. 30 tablet 2     No current facility-administered medications for this visit.       Past Medical History:   Diagnosis Date    Anxiety     Degenerative joint disease (DJD) of hip     Diabetes type 2, controlled 7/10/2019    GERD (gastroesophageal reflux  disease)     Hyperlipidemia     Hypertension     IBS (irritable bowel syndrome)     Insomnia     Lumbar disc herniation     PTSD (post-traumatic stress disorder)        Past Surgical History:   Procedure Laterality Date    ADENOIDECTOMY      ARTHROSCOPIC DEBRIDEMENT OF ROTATOR CUFF Right 2020    Procedure: DEBRIDEMENT, ROTATOR CUFF, ARTHROSCOPIC;  Surgeon: Melchor Hughes Jr., MD;  Location: [facility] OR;  Service: Orthopedics;  Laterality: Right;  need opus system (Gre MESA notified , EF)  video, notified/confirmed 2020 KB 1310    ARTHROSCOPIC EXCISION OF ACROMIOCLAVICULAR JOINT  2020    Procedure: EXCISION, ACROMIOCLAVICULAR JOINT, ARTHROSCOPIC;  Surgeon: Melchor Hughes Jr., MD;  Location: [facility] OR;  Service: Orthopedics;;    ARTHROSCOPIC REPAIR OF ROTATOR CUFF OF SHOULDER      ARTHROSCOPIC REPAIR OF ROTATOR CUFF OF SHOULDER Left 3/19/2024    Procedure: REPAIR, ROTATOR CUFF, ARTHROSCOPIC;  Surgeon: Melchor Hughes Jr., MD;  Location: [facility] OR;  Service: Orthopedics;  Laterality: Left;  need opus system and regeneten patch    ARTHROSCOPY OF SHOULDER WITH DECOMPRESSION OF SUBACROMIAL SPACE  2020    Procedure: ARTHROSCOPY, SHOULDER, WITH SUBACROMIAL SPACE DECOMPRESSION;  Surgeon: Melchor Hughes Jr., MD;  Location: [facility] OR;  Service: Orthopedics;;     SECTION      DECOMPRESSION OF SUBACROMIAL SPACE Left 3/19/2024    Procedure: DECOMPRESSION, SUBACROMIAL SPACE;  Surgeon: Melchor Hughes Jr., MD;  Location: [facility] OR;  Service: Orthopedics;  Laterality: Left;    EPIDURAL STEROID INJECTION INTO CERVICAL SPINE N/A 2021    Procedure: Injection-steroid-epidural-cervical--C7-T1 IL ZAYNAB;  Surgeon: Gregoria Rodriguez MD;  Location: [facility] PAIN MGT;  Service: Pain Management;  Laterality: N/A;    ESOPHAGOGASTRODUODENOSCOPY N/A 2023    Procedure: ESOPHAGOGASTRODUODENOSCOPY (EGD);  Surgeon: Tommy Schmitt MD;  Location: [address]);  Service: Endoscopy;  Laterality: N/A;  referral  "[facility]-GT    INJECTION OF JOINT Right 9/18/2023    Procedure: INJECTION, JOINT, RIGHT ISCHIAL BURSA  SOONER DATE;  Surgeon: Yesica Monroy MD;  Location: [facility];  Service: Pain Management;  Laterality: Right;    TENOTOMY, ELBOW, WITH DEBRIDEMENT AND TENDON REPAIR Right 5/20/2022    Procedure: TENOTOMY,ELBOW,WITH DEBRIDEMENT AND TENDON REPAIR;  Surgeon: Melchor Hughes Jr., MD;  Location: [facility];  Service: Orthopedics;  Laterality: Right;    TONSILLECTOMY         OBJECTIVE:   PHYSICAL EXAM:  Height: 5' 2" (157.5 cm)    Vitals:    05/14/24 1443   Height: 5' 2" (1.575 m)   PainSc:   5   PainLoc: Shoulder       Ortho/SPM Exam  Examination left shoulder   No specific tenderness   no swelling or visible wounds  incisions well-healed no evidence of infection   ROM near full to PROM. AROM intact to , ER 45  Neurologic exam intact       RADIOGRAPHS:  3v XR L shoulder obtained in the clinic today show no acute osseous abnormalities or joint dislocation    ASSESSMENT/PLAN:     IMPRESSION:  Status post rotator cuff repair left shoulder    PLAN:    Pt advised to continue gentle ROM and therapy  No heavy lifting   Rx: Ibuprofen 800mg refilled for acute pain following recent fall    FOLLOW UP:  keep regularly scheduled postop appt  "

## 2024-05-15 ENCOUNTER — CLINICAL SUPPORT (OUTPATIENT)
Dept: REHABILITATION | Facility: HOSPITAL | Age: 59
End: 2024-05-15
Attending: PHYSICIAN ASSISTANT
Payer: MEDICARE

## 2024-05-15 DIAGNOSIS — M25.612 DECREASED RANGE OF MOTION OF LEFT SHOULDER: Primary | ICD-10-CM

## 2024-05-15 PROCEDURE — 97140 MANUAL THERAPY 1/> REGIONS: CPT

## 2024-05-15 PROCEDURE — 97110 THERAPEUTIC EXERCISES: CPT

## 2024-05-15 NOTE — PROGRESS NOTES
"[facility]   Physical Therapy Treatment and Progress Note      [facility]  Cl[facility] Number: [id number]    Therapy Diagnosis:   Encounter Diagnosis   Name Primary?    Decreased range of motion of left shoulder Yes       Physician: Regine Morse PA-C    Visit Date: 5/15/2024    Physician Orders: PT Eval and Treat   Medical Diagnosis from Referral:  S/P left rotator cuff repair [Z98.890]   Evaluation Date: 4/19/2024  Authorization Period Expiration: 12/31/24  Plan of Care Expiration: 10/19/24  Progress Note Due: 6/15/24     Date of Surgery: 3/19/24  Post Op Day: 7 weeks, 6 days as of 5/15/24     Visit # / Visits authorized:  6/20  FOTO: 1/3     Precautions: Standard and Surgical Precautions       Time In: 2:04 PM  Time Out: 3:00 PM   Total Billable Time: 56 minutes     Subjective     Patient reports: Still went to the doctor yesterday for a check up after her fall onto the left shoulder. States nothing was found to be wrong and got an X-ray. States she is feeling better today but just gets stiff sometimes if she does not do her home exercises.     She was compliant with home exercise program.     Response to previous treatment: soreness   Functional change: improving passive motion      Pain: 8/10   Location: left shoulder      Objective      (5/15/24)     Passive Range of Motion:  following manual therapy  Shoulder Left   Flexion 140 (when doing pulleys)    Abduction 120   ER at 45 65     Joint Mobility:   Left GH joint limited with posterior capsular mobility    Treatment     Sherie received the treatments listed below:      therapeutic exercises to develop strength, endurance, and ROM for 39 minutes including:    Re-assessment noted above  Supine Wand ER at 45 Abd, 3 x 10   Supine Wand external rotation at neutral, 3 x 10 with 5" holds at end-range  Sidelying shoulder flexion with UE assisted on pole, 2 x 20   Seated thoracic extension 2 x 20  Seated pulleys facing away " "from door into flexion, scaption, 3 mins each direction   Education on sling use, donning and doffing, surgical precautions    Not performed today:  Table slides flexion and scapular planes, 30x   Passive shoulder flexion - table supported walkouts 2 x 20   Scapular squeezes 2 x 10     manual therapy techniques: Joint mobilizations and Soft tissue Mobilization were applied to the: left shoulder for 17 minutes, including:    Passive ROM flexion, ER, abd for Left shoulder  Posterior mobs Left GH light grade I/II  Posterior inferior mobs Left GH light grade I/II  Left GH external rotation / internal rotation submaximal isometrics, 10 x 5 sec holds each direction  Scapular mobilizations in all directions     Not performed today:  Scapular upward rotation mobilizations, grades II/III    Neuromuscular re-education activities to improve Coordination, Kinesthetic, Proprioception, and Posture for 00 minutes. The following activities were included:    Not performed today:  Sidelying scapular elevation/depression 20x 3" holds in each direction  Sidelying scapular adduction holds, 20x with 3" hold against manual resistance    Patient Education and Home Exercises       Education provided:   - HEP   - surgical precautions     Written Home Exercises Provided: Patient instructed to cont prior HEP. Exercises were reviewed and Sherie was able to demonstrate them prior to the end of the session.  Sherie demonstrated good  understanding of the education provided. See Electronic Medical Record under Patient Instructions for exercises provided during therapy sessions    Assessment     Patient presents for her re-assessment visit with her sling donned and is 7 weeks, 6 days status post Left RTC repair.  She has completed 6 visits of physical therapy since her initial evaluation and shows good ability to reach 140 degrees flexion passively, however remains guarded with initial motion. Patient has recently started to incorporate submaximal " isometrics during manual. She does display some ongoing high tissue irritability secondary to overuse of her left shoulder and difficulty with following her post-operative precautions from the beginning of her time with PT. She is able to demonstrate near full passive range of motion following manual techniques and during pulleys. She has met and partially met most of her short term goals but remains limited due to a recent fall onto the left shoulder and due to adverse neural tension symptoms persisting. Patient remains a good candidate to continue benefiting from skilled therapy care during her post operative state in order to return to her prior level of function.     Sherie Is progressing well towards her goals.   Patient prognosis is Good.     Patient will continue to benefit from skilled outpatient physical therapy to address the deficits listed in the problem list box on initial evaluation, provide pt/family education and to maximize pt's level of independence in the home and community environment.     Patient's spiritual, cultural and educational needs considered and pt agreeable to plan of care and goals.     Anticipated barriers to physical therapy: surgical precautions, chronic pain     Goals:   Short Term Goals:  4-6 weeks  1.Report decreased Left shoulder pain < / =  3/10  to increase tolerance for progressing exercises with therapy. - MET (5/15/24)  2. Increase PROM flexion to 150 and ER to 60 pain-free to show improvement with post operative status and equalize ROM to other shoulder.  - Partially Met   3. Increased strength by 1/3 MMT grade in left shoulder to increase tolerance for ADL and work activities. - Progressing, Not Met   4. Pt to tolerate HEP to improve ROM and independence with ADL's - MET (5/15/24)     Long Term Goals: 20-24 weeks  1.Report decreased Left shoulder pain  < / =  1/10  to increase tolerance for return to pain-free and full overhead motion.  - Progressing, Not Met   2.Increase  AROM to full and pain-free in flexion, abduction, and ER of left shoulder to show good progression through post operative protocol.  - Progressing, Not Met   3.Increase strength to >/= 4/5 in left shoulder to increase tolerance for ADL and work activities. - Progressing, Not Met   4. Pt goal: be able to color her hair and wash her hair with her left arm.  - Progressing, Not Met   5. Pt will have score of >/= 50% on FOTO shoulder in order to demonstrate true functional improvement.  - Progressing, Not Met     Plan     Plan of care Certification: 4/19/2024 to 10/19/24.     Outpatient Physical Therapy 2 times weekly for 24 weeks to include the following interventions: Manual Therapy, Moist Heat/ Ice, Neuromuscular Re-ed, Patient Education, Therapeutic Activities, and Therapeutic Exercise.     Shubham Manning, PT

## 2024-05-16 DIAGNOSIS — G89.29 CHRONIC ABDOMINAL PAIN: ICD-10-CM

## 2024-05-16 DIAGNOSIS — R10.9 CHRONIC ABDOMINAL PAIN: ICD-10-CM

## 2024-05-16 DIAGNOSIS — K59.00 CONSTIPATION, UNSPECIFIED CONSTIPATION TYPE: ICD-10-CM

## 2024-05-16 NOTE — TELEPHONE ENCOUNTER
No care due was identified.  [facility] Embedded Care Due Messages. Reference number: [id number].   5/16/2024 5:54:26 PM CDT

## 2024-05-17 RX ORDER — ALBUTEROL SULFATE 90 UG/1
AEROSOL, METERED RESPIRATORY (INHALATION)
Refills: 0 | OUTPATIENT
Start: 2024-05-17

## 2024-05-17 RX ORDER — DICYCLOMINE HYDROCHLORIDE 10 MG/1
CAPSULE ORAL
Qty: 270 CAPSULE | Refills: 0 | OUTPATIENT
Start: 2024-05-17

## 2024-05-17 RX ORDER — CYCLOBENZAPRINE HCL 10 MG
TABLET ORAL
Qty: 90 TABLET | Refills: 0 | OUTPATIENT
Start: 2024-05-17

## 2024-05-17 RX ORDER — AZELASTINE 1 MG/ML
SPRAY, METERED NASAL
Qty: 90 ML | Refills: 0 | OUTPATIENT
Start: 2024-05-17

## 2024-05-17 RX ORDER — OMEPRAZOLE 40 MG/1
CAPSULE, DELAYED RELEASE ORAL
Qty: 90 CAPSULE | Refills: 0 | OUTPATIENT
Start: 2024-05-17

## 2024-05-17 RX ORDER — TIZANIDINE 4 MG/1
TABLET ORAL
Qty: 180 TABLET | Refills: 0 | OUTPATIENT
Start: 2024-05-17

## 2024-05-17 RX ORDER — ATORVASTATIN CALCIUM 40 MG/1
TABLET, FILM COATED ORAL
Qty: 90 TABLET | Refills: 0 | OUTPATIENT
Start: 2024-05-17

## 2024-05-17 NOTE — TELEPHONE ENCOUNTER
Refill Decision Note   Sherie Reyes  is requesting a refill authorization.  Brief Assessment and Rationale for Refill:  Quick Discontinue     Medication Therapy Plan:    Pharmacy is requesting new scripts for the following medications without required information, (sig/ frequency/qty/etc)      Medication Reconciliation Completed: No     Comments: Pharmacies have been requesting medications for patients without required information, (sig, frequency, qty, etc.). In addition, requests are sent for medication(s) pt. are currently not taking, and medications patients have never taken.    We have spoken to the pharmacies about these request types and advised their teams previously that we are unable to assess these New Script requests and require all details for these requests. This is a known issue and has been reported.     Note composed:12:18 PM 05/17/2024

## 2024-05-22 ENCOUNTER — CLINICAL SUPPORT (OUTPATIENT)
Dept: REHABILITATION | Facility: HOSPITAL | Age: 59
End: 2024-05-22
Payer: MEDICARE

## 2024-05-22 DIAGNOSIS — M25.612 DECREASED RANGE OF MOTION OF LEFT SHOULDER: Primary | ICD-10-CM

## 2024-05-22 PROCEDURE — 97110 THERAPEUTIC EXERCISES: CPT | Mod: KX

## 2024-05-22 PROCEDURE — 97140 MANUAL THERAPY 1/> REGIONS: CPT | Mod: KX

## 2024-05-22 NOTE — PROGRESS NOTES
"[facility]   Physical Therapy Treatment Note      [facility]  Cl[facility] Number: [id number]    Therapy Diagnosis:   Encounter Diagnosis   Name Primary?    Decreased range of motion of left shoulder Yes     Physician: Regine Morse PA-C    Visit Date: 5/22/2024    Physician Orders: PT Eval and Treat   Medical Diagnosis from Referral:  S/P left rotator cuff repair [Z98.890]   Evaluation Date: 4/19/2024  Authorization Period Expiration: 12/31/2024  Plan of Care Expiration: 10/19/2024  Progress Note Due: 6/15/2024     Date of Surgery: 3/19/2024  Post Op Day: 9 weeks and 1 day as of 5/22/2024     Visit # / Visits authorized: 7/20 + EVAL  FOTO: 1/3     Precautions: Standard and Surgical Precautions       Time In: 1402  Time Out: 1458  Total Billable Time: 28 minutes     Subjective     Patient reports: continued high intensity of left shoulder pain since fall. She has trouble finding a comfortable position sleeping.    She was compliant with home exercise program.   Response to previous treatment: no exacerbation of symptoms  Functional change: improving passive motion      Pain: 8/10   Location: left shoulder      Objective      Objective Measures updated at progress report unless specified.     Treatment     Sherie received the treatments listed below:      therapeutic exercises to develop strength, endurance, and ROM for 32 minutes including:    Seated thoracic extension 25x with 3" holds  Sidelying shoulder flexion with UE assisted on pole: 2 x 20   AAROM table slides flexion/scaption, 30x   Seated pulleys: flexion/scaption, 3 mins each direction   Slot machines: 3x10  Wall slides: 3x10  Education on no reaching across or behind body and no lifting more than a cup of coffee    Not performed today:  Supine Wand ER at 45 Abd, 3 x 10   Supine Wand external rotation at neutral, 3 x 10 with 5" holds at end-range  Passive shoulder flexion - table supported walkouts 2 x 20   Scapular " "squeezes 2 x 10     manual therapy techniques: Joint mobilizations and Soft tissue Mobilization were applied to the: left shoulder for 24 minutes, including:    Gentle GH oscillations and distraction  Passive ROM flexion/ER for Left shoulder  Posterior inferior mobs Left GH light grade I/II  Left GH external rotation / internal rotation submaximal isometrics, 2 rounds 10 x 5 sec holds each direction    Not performed today:  Scapular mobilizations in all directions   Scapular upward rotation mobilizations, grades II/III    Neuromuscular re-education activities to improve Coordination, Kinesthetic, Proprioception, and Posture for 00 minutes. The following activities were included:    Not performed today:  Sidelying scapular elevation/depression 20x 3" holds in each direction  Sidelying scapular adduction holds, 20x with 3" hold against manual resistance    Patient Education and Home Exercises       Education provided:   - Continue prior home exercise program    Written Home Exercises Provided: Patient instructed to cont prior HEP. Exercises were reviewed and Sherie was able to demonstrate them prior to the end of the session.  Sherie demonstrated good  understanding of the education provided. See Electronic Medical Record under Patient Instructions for exercises provided during therapy sessions    Assessment     Sherie presents to session with report of continued high severity of left shoulder symptoms. She notes increased pain at night when she attempts to sleep. She was educated to attempt to sleep in a recliner with her arm supported on the arm cushion. Focus remains on symptom modulation and improving her tolerance to shoulder mobility. She demonstrated difficulty and increased guarding with passive range of motion today. Continued with active assisted range of motion interventions. She demonstrated fair tolerance to treatment interventions. Will monitor symptoms and continue to progress as tolerated.    Sherie Is " progressing well towards her goals.   Patient prognosis is Good.     Patient will continue to benefit from skilled outpatient physical therapy to address the deficits listed in the problem list box on initial evaluation, provide pt/family education and to maximize pt's level of independence in the home and community environment.     Patient's spiritual, cultural and educational needs considered and pt agreeable to plan of care and goals.     Anticipated barriers to physical therapy: surgical precautions, chronic pain     Goals:   Short Term Goals:  4-6 weeks  1.Report decreased Left shoulder pain < / =  3/10  to increase tolerance for progressing exercises with therapy. - MET (5/15/24)  2. Increase PROM flexion to 150 and ER to 60 pain-free to show improvement with post operative status and equalize ROM to other shoulder.  - Partially Met   3. Increased strength by 1/3 MMT grade in left shoulder to increase tolerance for ADL and work activities. - Progressing, Not Met   4. Pt to tolerate HEP to improve ROM and independence with ADL's - MET (5/15/24)     Long Term Goals: 20-24 weeks  1.Report decreased Left shoulder pain  < / =  1/10  to increase tolerance for return to pain-free and full overhead motion.  - Progressing, Not Met   2.Increase AROM to full and pain-free in flexion, abduction, and ER of left shoulder to show good progression through post operative protocol.  - Progressing, Not Met   3.Increase strength to >/= 4/5 in left shoulder to increase tolerance for ADL and work activities. - Progressing, Not Met   4. Pt goal: be able to color her hair and wash her hair with her left arm.  - Progressing, Not Met   5. Pt will have score of >/= 50% on FOTO shoulder in order to demonstrate true functional improvement.  - Progressing, Not Met     Plan     Plan of care Certification: 4/19/2024 to 10/19/24.     Outpatient Physical Therapy 2 times weekly for 24 weeks to include the following interventions: Manual  Therapy, Moist Heat/ Ice, Neuromuscular Re-ed, Patient Education, Therapeutic Activities, and Therapeutic Exercise.     José Boswell, PT, DPT

## 2024-05-27 PROBLEM — M25.521 RIGHT ELBOW PAIN: Status: RESOLVED | Noted: 2022-08-31 | Resolved: 2024-05-27

## 2024-05-27 PROBLEM — R29.898 SHOULDER WEAKNESS: Status: RESOLVED | Noted: 2020-06-23 | Resolved: 2024-05-27

## 2024-05-27 PROBLEM — M25.619 DECREASED RANGE OF MOTION (ROM) OF SHOULDER: Status: RESOLVED | Noted: 2020-06-23 | Resolved: 2024-05-27

## 2024-05-27 PROBLEM — M62.81 DECREASED MUSCLE STRENGTH: Status: RESOLVED | Noted: 2022-08-31 | Resolved: 2024-05-27

## 2024-05-27 PROBLEM — R29.3 POOR POSTURE: Status: RESOLVED | Noted: 2021-02-04 | Resolved: 2024-05-27

## 2024-05-27 PROBLEM — M25.511 SHOULDER PAIN, RIGHT: Status: RESOLVED | Noted: 2020-06-23 | Resolved: 2024-05-27

## 2024-05-29 ENCOUNTER — CLINICAL SUPPORT (OUTPATIENT)
Dept: REHABILITATION | Facility: HOSPITAL | Age: 59
End: 2024-05-29
Payer: MEDICARE

## 2024-05-29 DIAGNOSIS — M25.612 DECREASED RANGE OF MOTION OF LEFT SHOULDER: Primary | ICD-10-CM

## 2024-05-29 PROCEDURE — 97110 THERAPEUTIC EXERCISES: CPT | Mod: KX

## 2024-05-29 PROCEDURE — 97140 MANUAL THERAPY 1/> REGIONS: CPT | Mod: KX

## 2024-05-29 RX ORDER — TIZANIDINE 4 MG/1
4 TABLET ORAL NIGHTLY
Qty: 30 TABLET | Refills: 0 | Status: SHIPPED | OUTPATIENT
Start: 2024-05-29 | End: 2024-05-30 | Stop reason: SDUPTHER

## 2024-05-29 NOTE — PROGRESS NOTES
"[facility]   Physical Therapy Treatment Note      Name: Sherie Posey [facility]  Clinic Number: [id number]    Therapy Diagnosis:   Encounter Diagnosis   Name Primary?    Decreased range of motion of left shoulder Yes     Physician: Regine Morse PA-C    Visit Date: 5/29/2024    Physician Orders: PT Eval and Treat   Medical Diagnosis from Referral:  S/P left rotator cuff repair [Z98.890]   Evaluation Date: 4/19/2024  Authorization Period Expiration: 12/31/2024  Plan of Care Expiration: 10/19/2024  Progress Note Due: 6/15/2024     Date of Surgery: 3/19/2024  Post Op Day: 10 weeks and 1 day as of 5/29/2024     Visit # / Visits authorized: 8/20 + EVAL  FOTO: 1/3     Precautions: Standard and Surgical Precautions       Time In: 1400  Time Out: 1458  Total Billable Time: 29 minutes     Subjective     Patient reports: she found that sleeping with her sling on was better at night. Her left shoulder has been doing better pain-wise, except last night.     She was compliant with home exercise program.   Response to previous treatment: no exacerbation of symptoms  Functional change: improving passive motion      Pain: 8/10   Location: left shoulder      Objective      Objective Measures updated at progress report unless specified.     Passive shoulder flexion range of motion: 145 degrees    Treatment     Sherie received the treatments listed below:      therapeutic exercises to develop strength, endurance, and ROM for 29 minutes including:    Seated thoracic extension 25x with 3" holds  Sidelying shoulder flexion with UE assisted on pole: 30x  Seated pulleys: flexion/scaption, 3 mins each direction   Slot machines: 3x10  Wall slides: 3x10  Education on no reaching across or behind body and no lifting more than a cup of coffee    Not performed today:  AAROM table slides flexion/scaption, 30x  Supine Wand ER at 45 Abd, 3 x 10   Supine Wand external rotation at neutral, 3 x 10 with 5" holds at " "end-range  Passive shoulder flexion - table supported walkouts 2 x 20   Scapular squeezes 2 x 10     manual therapy techniques: Joint mobilizations and Soft tissue Mobilization were applied to the: left shoulder for 18 minutes, including:    Gentle GH oscillations and lateral distraction  Passive ROM flexion/ER for Left shoulder  Posterior/inferior mobs Left GH light grade I/II  Left GH external rotation / internal rotation submaximal isometrics, 2 rounds 10 x 5 sec holds each direction    Not performed today:  Scapular mobilizations in all directions   Scapular upward rotation mobilizations, grades II/III    Neuromuscular re-education activities to improve Coordination, Kinesthetic, Proprioception, and Posture for 11 minutes. The following activities were included:    Submax ER/IR isometrics at wall against ball: 20x with 5" holds    Not performed today:  Sidelying scapular elevation/depression 20x 3" holds in each direction  Sidelying scapular adduction holds, 20x with 3" hold against manual resistance    Patient Education and Home Exercises       Education provided:   - Continue prior home exercise program    Written Home Exercises Provided: Patient instructed to cont prior HEP. Exercises were reviewed and Sherie was able to demonstrate them prior to the end of the session.  Sherie demonstrated good  understanding of the education provided. See Electronic Medical Record under Patient Instructions for exercises provided during therapy sessions    Assessment     Sherie presents to session with report of improved left shoulder symptoms at night by sleeping with her sling, with exception of prior night. Continued with interventions directed at improving thoracic and glenohumeral mobility. She was able to demonstrate slight improvement in passive shoulder flexion to 145 degrees. Progressed patient to light, submaximal rotator cuff isometrics today with good tolerance. Will continue to work on symptom modulation and " improving shoulder mobility as tolerated.    Sherie Is progressing well towards her goals.   Patient prognosis is Good.     Patient will continue to benefit from skilled outpatient physical therapy to address the deficits listed in the problem list box on initial evaluation, provide pt/family education and to maximize pt's level of independence in the home and community environment.     Patient's spiritual, cultural and educational needs considered and pt agreeable to plan of care and goals.     Anticipated barriers to physical therapy: surgical precautions, chronic pain     Goals:   Short Term Goals:  4-6 weeks  1.Report decreased Left shoulder pain < / =  3/10  to increase tolerance for progressing exercises with therapy. - MET (5/15/24)  2. Increase PROM flexion to 150 and ER to 60 pain-free to show improvement with post operative status and equalize ROM to other shoulder.  - Partially Met   3. Increased strength by 1/3 MMT grade in left shoulder to increase tolerance for ADL and work activities. - Progressing, Not Met   4. Pt to tolerate HEP to improve ROM and independence with ADL's - MET (5/15/24)     Long Term Goals: 20-24 weeks  1.Report decreased Left shoulder pain  < / =  1/10  to increase tolerance for return to pain-free and full overhead motion.  - Progressing, Not Met   2.Increase AROM to full and pain-free in flexion, abduction, and ER of left shoulder to show good progression through post operative protocol.  - Progressing, Not Met   3.Increase strength to >/= 4/5 in left shoulder to increase tolerance for ADL and work activities. - Progressing, Not Met   4. Pt goal: be able to color her hair and wash her hair with her left arm.  - Progressing, Not Met   5. Pt will have score of >/= 50% on FOTO shoulder in order to demonstrate true functional improvement.  - Progressing, Not Met     Plan     Plan of care Certification: 4/19/2024 to 10/19/24.     Outpatient Physical Therapy 2 times weekly for 24 weeks  to include the following interventions: Manual Therapy, Moist Heat/ Ice, Neuromuscular Re-ed, Patient Education, Therapeutic Activities, and Therapeutic Exercise.     José Boswell, PT, DPT

## 2024-05-30 ENCOUNTER — OFFICE VISIT (OUTPATIENT)
Dept: ORTHOPEDICS | Facility: CLINIC | Age: 59
End: 2024-05-30
Payer: MEDICARE

## 2024-05-30 VITALS — WEIGHT: 149.94 LBS | BODY MASS INDEX: 27.59 KG/M2 | HEIGHT: 62 IN

## 2024-05-30 DIAGNOSIS — M75.122 NONTRAUMATIC COMPLETE TEAR OF LEFT ROTATOR CUFF: Primary | ICD-10-CM

## 2024-05-30 DIAGNOSIS — M25.512 CHRONIC LEFT SHOULDER PAIN: ICD-10-CM

## 2024-05-30 DIAGNOSIS — M75.122 COMPLETE TEAR OF LEFT ROTATOR CUFF, UNSPECIFIED WHETHER TRAUMATIC: ICD-10-CM

## 2024-05-30 DIAGNOSIS — G89.29 CHRONIC LEFT SHOULDER PAIN: ICD-10-CM

## 2024-05-30 PROCEDURE — 4010F ACE/ARB THERAPY RXD/TAKEN: CPT | Mod: HCNC,CPTII,S$GLB, | Performed by: ORTHOPAEDIC SURGERY

## 2024-05-30 PROCEDURE — 99024 POSTOP FOLLOW-UP VISIT: CPT | Mod: HCNC,S$GLB,, | Performed by: ORTHOPAEDIC SURGERY

## 2024-05-30 PROCEDURE — 99999 PR PBB SHADOW E&M-EST. PATIENT-LVL II: CPT | Mod: PBBFAC,HCNC,, | Performed by: ORTHOPAEDIC SURGERY

## 2024-05-30 PROCEDURE — 3044F HG A1C LEVEL LT 7.0%: CPT | Mod: HCNC,CPTII,S$GLB, | Performed by: ORTHOPAEDIC SURGERY

## 2024-05-30 RX ORDER — TIZANIDINE 4 MG/1
4 TABLET ORAL NIGHTLY
Qty: 30 TABLET | Refills: 1 | Status: SHIPPED | OUTPATIENT
Start: 2024-05-30

## 2024-05-30 RX ORDER — IBUPROFEN 800 MG/1
800 TABLET ORAL 2 TIMES DAILY WITH MEALS
Qty: 60 TABLET | Refills: 2 | Status: SHIPPED | OUTPATIENT
Start: 2024-05-30

## 2024-05-30 RX ORDER — IBUPROFEN 800 MG/1
800 TABLET ORAL 2 TIMES DAILY WITH MEALS
Qty: 60 TABLET | Refills: 2 | Status: SHIPPED | OUTPATIENT
Start: 2024-05-30 | End: 2024-05-30 | Stop reason: SDUPTHER

## 2024-05-30 RX ORDER — TIZANIDINE 4 MG/1
4 TABLET ORAL NIGHTLY
Qty: 30 TABLET | Refills: 0 | Status: SHIPPED | OUTPATIENT
Start: 2024-05-30 | End: 2024-05-30 | Stop reason: SDUPTHER

## 2024-05-30 NOTE — PROGRESS NOTES
Subjective:      Patient ID: Sherie Reyes is a 58 y.o. female.  Chief Complaint: Post-op Evaluation of the Left Shoulder (10 WEEK PO )      HPI  Sherie Reyes is a  58 y.o. female presenting today for follow up of shoulder rotator cuff repair.  She reports that she is now about 10 weeks postop   She reports severe pain in left shoulder difficulty with use   She has been in physical therapy but does not feel like therapy is helping   She has had least 1 fall since surgery   Previous x-ray was negative for fracture or dislocation.    Review of patient's allergies indicates:  No Known Allergies      Current Outpatient Medications   Medication Sig Dispense Refill    acetaminophen (TYLENOL) 500 MG tablet Take 500 mg by mouth every 6 (six) hours as needed for Pain.      albuterol (PROVENTIL/VENTOLIN HFA) 90 mcg/actuation inhaler INHALE 2 PUFFS INTO THE LUNGS BY MOUTH EVERY 6 HOURS AS NEEDED FOR WHEEZING 18 g 2    atorvastatin (LIPITOR) 40 MG tablet Take 1 tablet (40 mg total) by mouth once daily. 90 tablet 3    azelastine (ASTELIN) 137 mcg (0.1 %) nasal spray 1 spray (137 mcg total) by Nasal route 2 (two) times daily. 30 mL 1    cetirizine (ZYRTEC) 10 MG tablet Take 2 tablets (20 mg total) by mouth every morning. 60 tablet 3    chlorzoxazone (PARAFON FORTE) 500 mg Tab Take 500 mg by mouth daily as needed.      clonazePAM (KLONOPIN) 1 MG tablet TAKE 1 TABLET BY MOUTH TWICE DAILY 60 tablet 1    cyanocobalamin 1,000 mcg/mL injection Inject 1 mL (1,000 mcg total) into the muscle every 30 days. 30 mL 1    cyclobenzaprine (FLEXERIL) 10 MG tablet Take 10 mg by mouth 2 (two) times daily as needed.      diclofenac sodium (VOLTAREN) 1 % Gel Apply 2 g topically 4 (four) times daily. 50 g 0    dicyclomine (BENTYL) 10 MG capsule TAKE 1 CAPSULE(10 MG) BY MOUTH FOUR TIMES DAILY 360 capsule 1    estradioL (ESTRACE) 1 MG tablet Take 1 mg by mouth once daily.      ibuprofen (ADVIL,MOTRIN) 800 MG tablet Take 1 tablet (800  mg total) by mouth 3 (three) times daily. 90 tablet 2    ibuprofen (ADVIL,MOTRIN) 800 MG tablet Take 1 tablet (800 mg total) by mouth 3 (three) times daily with meals. 90 tablet 1    ibuprofen (ADVIL,MOTRIN) 800 MG tablet Take 1 tablet (800 mg total) by mouth 3 (three) times daily. 30 tablet 0    LIDOcaine (LIDODERM) 5 % Place 1 patch onto the skin once daily. Remove & Discard patch within 12 hours or as directed by MD 14 patch 0    LINZESS 290 mcg Cap capsule TAKE 1 CAPSULE(290 MCG) BY MOUTH BEFORE BREAKFAST 90 capsule 3    losartan (COZAAR) 50 MG tablet TAKE 1 TABLET(50 MG) BY MOUTH EVERY DAY 90 tablet 3    melatonin 5 mg Chew Take 10 mg by mouth daily as needed.      mupirocin (BACTROBAN) 2 % ointment APPLY TOPICALLY TO THE AFFECTED AREA TWICE DAILY 15 g 2    mv-mn/iron/folic acid/herb 190 (VITAMIN D3 COMPLETE ORAL) Take by mouth once daily at 6am.      omega-3 fatty acids/fish oil (FISH OIL-OMEGA-3 FATTY ACIDS) 300-1,000 mg capsule Take 1 capsule by mouth once daily.      omeprazole (PRILOSEC) 40 MG capsule TAKE 1 CAPSULE(40 MG) BY MOUTH EVERY DAY 30 capsule 11    POLYETHYLENE GLYCOL 3350 (MIRALAX ORAL) Take by mouth as needed.       potassium chloride 10% (KAYCIEL) 20 mEq/15 mL oral solution TAKE 15 ML BY MOUTH EVERY  mL 11    QUEtiapine (SEROQUEL) 200 MG Tab Take 1 tablet (200 mg total) by mouth every evening. 30 tablet 2    tiZANidine (ZANAFLEX) 4 MG tablet TAKE 1 TABLET(4 MG) BY MOUTH EVERY EVENING 30 tablet 0    traZODone (DESYREL) 100 MG tablet TK 1 T PO  QHS      venlafaxine (EFFEXOR-XR) 150 MG Cp24 Take 150 mg by mouth once daily.      venlafaxine (EFFEXOR-XR) 75 MG 24 hr capsule TAKE ONE CAPSULE BY MOUTH DAILY WITH 150 MG TO EQUAL 225 MG       No current facility-administered medications for this visit.       Past Medical History:   Diagnosis Date    Anxiety     Degenerative joint disease (DJD) of hip     Diabetes type 2, controlled 7/10/2019    GERD (gastroesophageal reflux disease)      Hyperlipidemia     Hypertension     IBS (irritable bowel syndrome)     Insomnia     Lumbar disc herniation     PTSD (post-traumatic stress disorder)        Past Surgical History:   Procedure Laterality Date    ADENOIDECTOMY      ARTHROSCOPIC DEBRIDEMENT OF ROTATOR CUFF Right 2020    Procedure: DEBRIDEMENT, ROTATOR CUFF, ARTHROSCOPIC;  Surgeon: Melchor Hughes Jr., MD;  Location: [facility] OR;  Service: Orthopedics;  Laterality: Right;  need opus system (Ger MESA notified , )  video, notified/confirmed 2020 KB 1310    ARTHROSCOPIC EXCISION OF ACROMIOCLAVICULAR JOINT  2020    Procedure: EXCISION, ACROMIOCLAVICULAR JOINT, ARTHROSCOPIC;  Surgeon: Melchor Hughes Jr., MD;  Location: [facility] OR;  Service: Orthopedics;;    ARTHROSCOPIC REPAIR OF ROTATOR CUFF OF SHOULDER      ARTHROSCOPIC REPAIR OF ROTATOR CUFF OF SHOULDER Left 3/19/2024    Procedure: REPAIR, ROTATOR CUFF, ARTHROSCOPIC;  Surgeon: Melchor Hughes Jr., MD;  Location: [facility] OR;  Service: Orthopedics;  Laterality: Left;  need opus system and regeneten patch    ARTHROSCOPY OF SHOULDER WITH DECOMPRESSION OF SUBACROMIAL SPACE  2020    Procedure: ARTHROSCOPY, SHOULDER, WITH SUBACROMIAL SPACE DECOMPRESSION;  Surgeon: Melchor Hughes Jr., MD;  Location: [facility] OR;  Service: Orthopedics;;     SECTION      DECOMPRESSION OF SUBACROMIAL SPACE Left 3/19/2024    Procedure: DECOMPRESSION, SUBACROMIAL SPACE;  Surgeon: Melchor Hughes Jr., MD;  Location: [facility] OR;  Service: Orthopedics;  Laterality: Left;    EPIDURAL STEROID INJECTION INTO CERVICAL SPINE N/A 2021    Procedure: Injection-steroid-epidural-cervical--C7-T1 IL ZAYNAB;  Surgeon: Gregoria Rodriguez MD;  Location: [facility]IN [facility]GT;  Service: Pain Management;  Laterality: N/A;    ESOPHAGOGASTRODUODENOSCOPY N/A 2023    Procedure: ESOPHAGOGASTRODUODENOSCOPY (EGD);  Surgeon: Tommy Schmitt MD;  Location: [address]);  Service: Endoscopy;  Laterality: N/A;  referral [facility] RESENDEZ[facility]  "portal-GT    INJECTION OF JOINT Right 9/18/2023    Procedure: INJECTION, JOINT, RIGHT ISCHIAL BURSA  SOONER DATE;  Surgeon: Yesica Monroy MD;  Location: [facility];  Service: Pain Management;  Laterality: Right;    TENOTOMY, ELBOW, WITH DEBRIDEMENT AND TENDON REPAIR Right 5/20/2022    Procedure: TENOTOMY,ELBOW,WITH DEBRIDEMENT AND TENDON REPAIR;  Surgeon: Melchor Hughes Jr., MD;  Location: [facility];  Service: Orthopedics;  Laterality: Right;    TONSILLECTOMY         OBJECTIVE:   PHYSICAL EXAM:  Height: 5' 2" (157.5 cm) Weight: 68 kg (149 lb 14.6 oz)  Vitals:    05/30/24 1537   Weight: 68 kg (149 lb 14.6 oz)   Height: 5' 2" (1.575 m)   PainSc: 10-Worst pain ever     Ortho/SPM Exam  Examination left shoulder the incisions well-healed   Range of motion is limited due to patient resistance   No bruising no swelling   Strength difficult to assess   No instability   Neurologic exam intact no    RADIOGRAPHS:  Previous x-rays reviewed left shoulder show no significant abnormalities except postsurgical changes of the acromion  Comments: I have personally reviewed the imaging and I agree with the above radiologist's report.    ASSESSMENT/PLAN:     IMPRESSION:  Left shoulder pain after rotator cuff repair    PLAN:  I am not really sure why she is having this amount of pain   It is possible that therapy is aggravating her symptoms   For this reason I recommended she discontinue therapy for the next 3-4 weeks   I would like her to do light activities only gentle range of motion at home   She can discontinue the sling which she is already done   Continue Motrin by mouth 3 times a day   Muscle relaxers at night   No heavy lifting       FOLLOW UP:  3-4 weeks for re-evaluation possibly to restart therapy at that point    Disclaimer: This note has been generated using voice-recognition software. There may be typographical errors that have been missed during proof-reading.     "

## 2024-06-12 RX ORDER — AZELASTINE 1 MG/ML
SPRAY, METERED NASAL
Qty: 90 ML | Refills: 2 | Status: SHIPPED | OUTPATIENT
Start: 2024-06-12

## 2024-06-12 NOTE — TELEPHONE ENCOUNTER
Refill Routing Note   Medication(s) are not appropriate for processing by [facility] for the following reason(s):        New or recently adjusted medication    [facility] action(s):  Defer               Appointments  past 12m or future 3m with PCP    Date Provider   Last Visit   3/4/2024 Farhana Whittington MD   Next Visit   7/8/2024 Farhana Whittington MD   ED visits in past 90 days: 0        Note composed:5:39 AM 06/12/2024

## 2024-06-12 NOTE — TELEPHONE ENCOUNTER
No care due was identified.  [facility] Embedded Care Due Messages. Reference number: [id number].   6/12/2024 3:08:07 AM CDT

## 2024-06-25 DIAGNOSIS — Z00.00 ENCOUNTER FOR MEDICARE ANNUAL WELLNESS EXAM: ICD-10-CM

## 2024-07-01 ENCOUNTER — OFFICE VISIT (OUTPATIENT)
Dept: ORTHOPEDICS | Facility: CLINIC | Age: 59
End: 2024-07-01
Payer: MEDICARE

## 2024-07-01 VITALS — HEIGHT: 62 IN | BODY MASS INDEX: 27.99 KG/M2 | WEIGHT: 152.13 LBS

## 2024-07-01 DIAGNOSIS — M75.122 NONTRAUMATIC COMPLETE TEAR OF LEFT ROTATOR CUFF: Primary | ICD-10-CM

## 2024-07-01 PROCEDURE — 99999 PR PBB SHADOW E&M-EST. PATIENT-LVL II: CPT | Mod: PBBFAC,HCNC,, | Performed by: ORTHOPAEDIC SURGERY

## 2024-07-01 PROCEDURE — 99213 OFFICE O/P EST LOW 20 MIN: CPT | Mod: HCNC,S$GLB,, | Performed by: ORTHOPAEDIC SURGERY

## 2024-07-01 PROCEDURE — 3044F HG A1C LEVEL LT 7.0%: CPT | Mod: HCNC,CPTII,S$GLB, | Performed by: ORTHOPAEDIC SURGERY

## 2024-07-01 PROCEDURE — 4010F ACE/ARB THERAPY RXD/TAKEN: CPT | Mod: HCNC,CPTII,S$GLB, | Performed by: ORTHOPAEDIC SURGERY

## 2024-07-01 PROCEDURE — 3008F BODY MASS INDEX DOCD: CPT | Mod: HCNC,CPTII,S$GLB, | Performed by: ORTHOPAEDIC SURGERY

## 2024-07-01 RX ORDER — IBUPROFEN 800 MG/1
800 TABLET ORAL 2 TIMES DAILY WITH MEALS
Qty: 60 TABLET | Refills: 2 | Status: SHIPPED | OUTPATIENT
Start: 2024-07-01

## 2024-07-01 RX ORDER — TIZANIDINE 4 MG/1
4 TABLET ORAL NIGHTLY
Qty: 30 TABLET | Refills: 1 | Status: SHIPPED | OUTPATIENT
Start: 2024-07-01

## 2024-07-01 NOTE — PROGRESS NOTES
Subjective:      Patient ID: Sherie Reyes is a 59 y.o. female.  Chief Complaint: Post-op Evaluation of the Right Shoulder (14 WEEK PO )      HPI  Sherie Reyes is a  59 y.o. female presenting today for follow up of rotator cuff repair of the left shoulder.  She reports that she is now little over 3 months postop   She is still having pain and weakness of the left shoulder   We did stop therapy because of pain and she does not feel ready to return to therapy   She is doing some exercises at home   .    Review of patient's allergies indicates:  No Known Allergies      Current Outpatient Medications   Medication Sig Dispense Refill    acetaminophen (TYLENOL) 500 MG tablet Take 500 mg by mouth every 6 (six) hours as needed for Pain.      albuterol (PROVENTIL/VENTOLIN HFA) 90 mcg/actuation inhaler INHALE 2 PUFFS INTO THE LUNGS BY MOUTH EVERY 6 HOURS AS NEEDED FOR WHEEZING 18 g 2    atorvastatin (LIPITOR) 40 MG tablet Take 1 tablet (40 mg total) by mouth once daily. 90 tablet 3    azelastine (ASTELIN) 137 mcg (0.1 %) nasal spray SPRAY 1 SPRAY IN EACH NOSTRIL TWICE DAILY 90 mL 2    cetirizine (ZYRTEC) 10 MG tablet Take 2 tablets (20 mg total) by mouth every morning. 60 tablet 3    chlorzoxazone (PARAFON FORTE) 500 mg Tab Take 500 mg by mouth daily as needed.      clonazePAM (KLONOPIN) 1 MG tablet TAKE 1 TABLET BY MOUTH TWICE DAILY 60 tablet 1    cyanocobalamin 1,000 mcg/mL injection Inject 1 mL (1,000 mcg total) into the muscle every 30 days. 30 mL 1    cyclobenzaprine (FLEXERIL) 10 MG tablet Take 10 mg by mouth 2 (two) times daily as needed.      diclofenac sodium (VOLTAREN) 1 % Gel Apply 2 g topically 4 (four) times daily. 50 g 0    dicyclomine (BENTYL) 10 MG capsule TAKE 1 CAPSULE(10 MG) BY MOUTH FOUR TIMES DAILY 360 capsule 1    estradioL (ESTRACE) 1 MG tablet Take 1 mg by mouth once daily.      ibuprofen (ADVIL,MOTRIN) 800 MG tablet Take 1 tablet (800 mg total) by mouth 2 (two) times daily with meals.  60 tablet 2    ibuprofen (ADVIL,MOTRIN) 800 MG tablet Take 1 tablet (800 mg total) by mouth 2 (two) times daily with meals. 60 tablet 2    LIDOcaine (LIDODERM) 5 % Place 1 patch onto the skin once daily. Remove & Discard patch within 12 hours or as directed by MD 14 patch 0    LINZESS 290 mcg Cap capsule TAKE 1 CAPSULE(290 MCG) BY MOUTH BEFORE BREAKFAST 90 capsule 3    losartan (COZAAR) 50 MG tablet TAKE 1 TABLET(50 MG) BY MOUTH EVERY DAY 90 tablet 3    melatonin 5 mg Chew Take 10 mg by mouth daily as needed.      mupirocin (BACTROBAN) 2 % ointment APPLY TOPICALLY TO THE AFFECTED AREA TWICE DAILY 15 g 2    mv-mn/iron/folic acid/herb 190 (VITAMIN D3 COMPLETE ORAL) Take by mouth once daily at 6am.      omega-3 fatty acids/fish oil (FISH OIL-OMEGA-3 FATTY ACIDS) 300-1,000 mg capsule Take 1 capsule by mouth once daily.      omeprazole (PRILOSEC) 40 MG capsule TAKE 1 CAPSULE(40 MG) BY MOUTH EVERY DAY 30 capsule 11    POLYETHYLENE GLYCOL 3350 (MIRALAX ORAL) Take by mouth as needed.       potassium chloride 10% (KAYCIEL) 20 mEq/15 mL oral solution TAKE 15 ML BY MOUTH EVERY  mL 11    QUEtiapine (SEROQUEL) 200 MG Tab Take 1 tablet (200 mg total) by mouth every evening. 30 tablet 2    tiZANidine (ZANAFLEX) 4 MG tablet Take 1 tablet (4 mg total) by mouth every evening. 30 tablet 1    traZODone (DESYREL) 100 MG tablet TK 1 T PO  QHS      venlafaxine (EFFEXOR-XR) 150 MG Cp24 Take 150 mg by mouth once daily.      venlafaxine (EFFEXOR-XR) 75 MG 24 hr capsule TAKE ONE CAPSULE BY MOUTH DAILY WITH 150 MG TO EQUAL 225 MG       No current facility-administered medications for this visit.       Past Medical History:   Diagnosis Date    Anxiety     Degenerative joint disease (DJD) of hip     Diabetes type 2, controlled 7/10/2019    GERD (gastroesophageal reflux disease)     Hyperlipidemia     Hypertension     IBS (irritable bowel syndrome)     Insomnia     Lumbar disc herniation     PTSD (post-traumatic stress disorder)         Past Surgical History:   Procedure Laterality Date    ADENOIDECTOMY      ARTHROSCOPIC DEBRIDEMENT OF ROTATOR CUFF Right 2020    Procedure: DEBRIDEMENT, ROTATOR CUFF, ARTHROSCOPIC;  Surgeon: Melchor Hughes Jr., MD;  Location: [facility] OR;  Service: Orthopedics;  Laterality: Right;  need opus system (Ger MESA notified , EF)  video, notified/confirmed 2020 KB 1310    ARTHROSCOPIC EXCISION OF ACROMIOCLAVICULAR JOINT  2020    Procedure: EXCISION, ACROMIOCLAVICULAR JOINT, ARTHROSCOPIC;  Surgeon: Melchor Hughes Jr., MD;  Location: [facility] OR;  Service: Orthopedics;;    ARTHROSCOPIC REPAIR OF ROTATOR CUFF OF SHOULDER      ARTHROSCOPIC REPAIR OF ROTATOR CUFF OF SHOULDER Left 3/19/2024    Procedure: REPAIR, ROTATOR CUFF, ARTHROSCOPIC;  Surgeon: Melchor Hughes Jr., MD;  Location: [facility] OR;  Service: Orthopedics;  Laterality: Left;  need opus system and regeneten patch    ARTHROSCOPY OF SHOULDER WITH DECOMPRESSION OF SUBACROMIAL SPACE  2020    Procedure: ARTHROSCOPY, SHOULDER, WITH SUBACROMIAL SPACE DECOMPRESSION;  Surgeon: Melchor Hughes Jr., MD;  Location: [facility] OR;  Service: Orthopedics;;     SECTION      DECOMPRESSION OF SUBACROMIAL SPACE Left 3/19/2024    Procedure: DECOMPRESSION, SUBACROMIAL SPACE;  Surgeon: Melchor Hughes Jr., MD;  Location: [facility] OR;  Service: Orthopedics;  Laterality: Left;    EPIDURAL STEROID INJECTION INTO CERVICAL SPINE N/A 2021    Procedure: Injection-steroid-epidural-cervical--C7-T1 IL ZAYNAB;  Surgeon: Gregoria Rodriguez MD;  Location: [facility] PAIN [facility]GT;  Service: Pain Management;  Laterality: N/A;    ESOPHAGOGASTRODUODENOSCOPY N/A 2023    Procedure: ESOPHAGOGASTRODUODENOSCOPY (EGD);  Surgeon: Tommy Schmitt MD;  Location: [facility] ENDO [address]);  Service: Endoscopy;  Laterality: N/A;  referral [facility] NP-instr portal-GT    INJECTION OF JOINT Right 2023    Procedure: INJECTION, JOINT, RIGHT ISCHIAL BURSA  SOONER DATE;  Surgeon: Yesica Monroy MD;   "Location: [facility]GT;  Service: Pain Management;  Laterality: Right;    TENOTOMY, ELBOW, WITH DEBRIDEMENT AND TENDON REPAIR Right 5/20/2022    Procedure: TENOTOMY,ELBOW,WITH DEBRIDEMENT AND TENDON REPAIR;  Surgeon: Melchor Hughes Jr., MD;  Location: [facility] OR;  Service: Orthopedics;  Laterality: Right;    TONSILLECTOMY         OBJECTIVE:   PHYSICAL EXAM:  Height: 5' 2" (157.5 cm) Weight: 69 kg (152 lb 1.9 oz)  Vitals:    07/01/24 1532   Weight: 69 kg (152 lb 1.9 oz)   Height: 5' 2" (1.575 m)   PainSc:   9     Ortho/SPM Exam  Examination left shoulder incisions well-healed no tenderness or bruising   Range of motion limited due to pain   There is some weakness of the rotator cuff no instability   Neurologic exam intact    RADIOGRAPHS:  None  Comments: I have personally reviewed the imaging and I agree with the above radiologist's report.    ASSESSMENT/PLAN:     IMPRESSION:  Left shoulder pain after previous rotator cuff repair    PLAN:  I am concerned about her ongoing symptoms   We might need to consider an MRI scan or possibly a CT arthrogram   The patient wants to hold off on that for today   Instead continue home exercise program no heavy lifting   Continue Motrin by mouth Zanaflex at night as needed   If symptoms persist we will need further imaging studies to check the rotator cuff    FOLLOW UP:  4-6 weeks    Disclaimer: This note has been generated using voice-recognition software. There may be typographical errors that have been missed during proof-reading.    "

## 2024-07-08 ENCOUNTER — OFFICE VISIT (OUTPATIENT)
Dept: INTERNAL MEDICINE | Facility: CLINIC | Age: 59
End: 2024-07-08
Payer: MEDICARE

## 2024-07-08 DIAGNOSIS — M51.16 RADICULOPATHY DUE TO LUMBAR INTERVERTEBRAL DISC DISORDER: ICD-10-CM

## 2024-07-08 DIAGNOSIS — E11.59 HYPERTENSION ASSOCIATED WITH DIABETES: ICD-10-CM

## 2024-07-08 DIAGNOSIS — R23.3 EASY BRUISING: ICD-10-CM

## 2024-07-08 DIAGNOSIS — E11.9 CONTROLLED TYPE 2 DIABETES MELLITUS WITHOUT COMPLICATION, WITHOUT LONG-TERM CURRENT USE OF INSULIN: Primary | ICD-10-CM

## 2024-07-08 DIAGNOSIS — I15.2 HYPERTENSION ASSOCIATED WITH DIABETES: ICD-10-CM

## 2024-07-08 DIAGNOSIS — Z12.31 ENCOUNTER FOR SCREENING MAMMOGRAM FOR HIGH-RISK PATIENT: ICD-10-CM

## 2024-07-08 DIAGNOSIS — E53.8 LOW SERUM VITAMIN B12: ICD-10-CM

## 2024-07-08 PROCEDURE — 3044F HG A1C LEVEL LT 7.0%: CPT | Mod: HCNC,CPTII,95, | Performed by: INTERNAL MEDICINE

## 2024-07-08 PROCEDURE — 4010F ACE/ARB THERAPY RXD/TAKEN: CPT | Mod: HCNC,CPTII,95, | Performed by: INTERNAL MEDICINE

## 2024-07-08 PROCEDURE — 1159F MED LIST DOCD IN RCRD: CPT | Mod: HCNC,CPTII,95, | Performed by: INTERNAL MEDICINE

## 2024-07-08 PROCEDURE — 99214 OFFICE O/P EST MOD 30 MIN: CPT | Mod: HCNC,95,, | Performed by: INTERNAL MEDICINE

## 2024-07-08 PROCEDURE — 1160F RVW MEDS BY RX/DR IN RCRD: CPT | Mod: HCNC,CPTII,95, | Performed by: INTERNAL MEDICINE

## 2024-07-08 RX ORDER — ATORVASTATIN CALCIUM 40 MG/1
40 TABLET, FILM COATED ORAL DAILY
Qty: 90 TABLET | Refills: 3 | Status: SHIPPED | OUTPATIENT
Start: 2024-07-08

## 2024-07-08 RX ORDER — POTASSIUM CHLORIDE 20 MEQ/15ML
20 SOLUTION ORAL DAILY
Qty: 1350 ML | Refills: 1 | Status: SHIPPED | OUTPATIENT
Start: 2024-07-08

## 2024-07-08 RX ORDER — OMEPRAZOLE 40 MG/1
40 CAPSULE, DELAYED RELEASE ORAL DAILY
Qty: 90 CAPSULE | Refills: 3 | Status: SHIPPED | OUTPATIENT
Start: 2024-07-08

## 2024-07-08 RX ORDER — ALBUTEROL SULFATE 90 UG/1
2 AEROSOL, METERED RESPIRATORY (INHALATION) EVERY 6 HOURS PRN
Qty: 18 G | Refills: 3 | Status: SHIPPED | OUTPATIENT
Start: 2024-07-08

## 2024-07-08 NOTE — PROGRESS NOTES
Primary Care Telemedicine Note    The patient location is:  Home     The chief complaint leading to consultation is: HTN, DM2, Chronic Pain    Total time spent with patient: 40 minutes    Visit type: Virtual visit with synchronous audio only and video      Each patient to whom he or she provides medical services by telemedicine is:  (1) informed of the relationship between the physician and patient and the respective role of any other health care provider with respect to management of the patient; and (2) notified that he or she may decline to receive medical services by telemedicine and may withdraw from such care at any time.        Subjective:        Patient ID: Sherie Reyes is a 59 y.o. female.    Chief Complaint: Hypertension (Treatment stopped ), Diabetes, and Back Pain      HPI    Diabetes: Patient presents for follow up of diabetes. Current symptoms include: none. Symptoms have been well-controlled. Patient denies foot ulcerations. She is not currently being treated and her HbA1c is normal.    Lab Results   Component Value Date    HGBA1C 5.5 03/05/2024    GLU 99 03/05/2024       HTN: She was evaluated by Cardiology and the antihypertensive medications were stopped 2 months ago. The patient has been monitoring her BP and it has been normal.    She sees Dr. Freddy Kapadia with [facility] for chronic lower back pain. Takes multiple muscle relaxants and NSAID's depending on symptoms.     Stopped smoking 10 years ago--> $200 DIS cost      Review of Systems   Constitutional:  Negative for chills, diaphoresis and fever.   HENT:  Negative for congestion, ear pain and sinus pressure.    Respiratory:  Negative for cough and shortness of breath.    Cardiovascular:  Negative for chest pain and palpitations.   Gastrointestinal:  Negative for abdominal pain, constipation, diarrhea, nausea and vomiting.   Musculoskeletal:  Positive for arthralgias (recent shoulder surgery but she reports continued  pain and limited ROM of the left arm, difficulty raising arm and applying deodorant, no more recommendations from Ortho) and back pain.   Neurological:  Negative for dizziness, tremors and headaches.   Hematological:  Bruises/bleeds easily.           Objective:        Physical Exam  Constitutional:       General: She is not in acute distress.     Appearance: Normal appearance. She is well-developed. She is not toxic-appearing or diaphoretic.   HENT:      Head: Normocephalic and atraumatic.      Right Ear: Hearing normal.      Left Ear: Hearing normal.      Nose: No congestion.   Eyes:      General: Lids are normal.   Pulmonary:      Effort: No tachypnea or respiratory distress.   Musculoskeletal:      Left shoulder: Decreased range of motion.      Cervical back: Normal range of motion.   Neurological:      Mental Status: She is alert and oriented to person, place, and time.      Comments: Alert and oriented   Psychiatric:         Attention and Perception: Attention normal.         Mood and Affect: Mood normal.         Behavior: Behavior is cooperative.               Assessment:       1. Controlled type 2 diabetes mellitus without complication, without long-term current use of insulin    2. Hypertension associated with diabetes    3. Radiculopathy due to lumbar intervertebral disc disorder    4. Easy bruising    5. Low serum vitamin B12    6. Encounter for screening mammogram for high-risk patient              Plan:     1. Controlled type 2 diabetes mellitus without complication, without long-term current use of insulin  - controlled, will monitor  - Ambulatory referral/consult to Optometry; Future    2. Hypertension associated with diabetes  - stable off medications, will monitor  - Comprehensive Metabolic Panel; Future    3. Radiculopathy due to lumbar intervertebral disc disorder  - Ambulatory referral/consult to Pain Clinic; Future    4. Easy bruising  - CBC Auto Differential; Future  - PROTIME-INR; Future    5.  Low serum vitamin B12  - Vitamin B12; Future    6. Encounter for screening mammogram for high-risk patient  - Mammo Digital Screening Bilat w/ Lucas; Future    RTC in November 2024 for annual exam or sooner if needed    __________________________    Farhana Whittington MD, PharmD  [facility]- Internal Medicine  American Board of Obesity Medicine diplomate  Office [phone]

## 2024-07-10 ENCOUNTER — PATIENT MESSAGE (OUTPATIENT)
Dept: INTERNAL MEDICINE | Facility: CLINIC | Age: 59
End: 2024-07-10
Payer: MEDICARE

## 2024-07-16 NOTE — PLAN OF CARE
Addended by: LYNNE GUERRA on: 7/16/2024 02:20 PM     Modules accepted: Orders     Endoscopy procedure scheduled. Prep instructions reviewed, Patient verbalized understanding

## 2024-07-22 ENCOUNTER — PATIENT MESSAGE (OUTPATIENT)
Dept: INTERNAL MEDICINE | Facility: CLINIC | Age: 59
End: 2024-07-22
Payer: MEDICARE

## 2024-07-22 DIAGNOSIS — I15.2 HYPERTENSION ASSOCIATED WITH DIABETES: Primary | ICD-10-CM

## 2024-07-22 DIAGNOSIS — E11.59 HYPERTENSION ASSOCIATED WITH DIABETES: Primary | ICD-10-CM

## 2024-07-22 RX ORDER — LOSARTAN POTASSIUM 25 MG/1
25 TABLET ORAL DAILY
Qty: 90 TABLET | Refills: 0 | Status: SHIPPED | OUTPATIENT
Start: 2024-07-22 | End: 2024-07-26 | Stop reason: SDUPTHER

## 2024-07-26 ENCOUNTER — PATIENT MESSAGE (OUTPATIENT)
Dept: INTERNAL MEDICINE | Facility: CLINIC | Age: 59
End: 2024-07-26
Payer: MEDICARE

## 2024-07-26 ENCOUNTER — PATIENT MESSAGE (OUTPATIENT)
Dept: ADMINISTRATIVE | Facility: OTHER | Age: 59
End: 2024-07-26
Payer: MEDICARE

## 2024-07-26 DIAGNOSIS — E11.59 HYPERTENSION ASSOCIATED WITH DIABETES: ICD-10-CM

## 2024-07-26 DIAGNOSIS — I15.2 HYPERTENSION ASSOCIATED WITH DIABETES: ICD-10-CM

## 2024-07-26 RX ORDER — LOSARTAN POTASSIUM 50 MG/1
50 TABLET ORAL DAILY
Qty: 90 TABLET | Refills: 0 | Status: SHIPPED | OUTPATIENT
Start: 2024-07-26

## 2024-07-26 NOTE — TELEPHONE ENCOUNTER
Pt comment: I started the Losartan 25mg but my blood pressure is still high. Should I wait or can I take another 25mg of Losartan?     Please see attachment of blood pressure readings

## 2024-08-06 ENCOUNTER — TELEPHONE (OUTPATIENT)
Dept: ADMINISTRATIVE | Facility: CLINIC | Age: 59
End: 2024-08-06
Payer: MEDICARE

## 2024-08-08 ENCOUNTER — OFFICE VISIT (OUTPATIENT)
Dept: HOME HEALTH SERVICES | Facility: CLINIC | Age: 59
End: 2024-08-08
Payer: MEDICARE

## 2024-08-08 DIAGNOSIS — E11.9 CONTROLLED TYPE 2 DIABETES MELLITUS WITHOUT COMPLICATION, WITHOUT LONG-TERM CURRENT USE OF INSULIN: ICD-10-CM

## 2024-08-08 DIAGNOSIS — E11.22 CKD STAGE 3 SECONDARY TO DIABETES: ICD-10-CM

## 2024-08-08 DIAGNOSIS — N18.30 CKD STAGE 3 SECONDARY TO DIABETES: ICD-10-CM

## 2024-08-08 DIAGNOSIS — Z00.00 ENCOUNTER FOR MEDICARE ANNUAL WELLNESS EXAM: ICD-10-CM

## 2024-08-08 DIAGNOSIS — Z99.89 DEPENDENCE ON OTHER ENABLING MACHINES AND DEVICES: ICD-10-CM

## 2024-08-08 DIAGNOSIS — R63.4 WEIGHT LOSS, ABNORMAL: ICD-10-CM

## 2024-08-08 DIAGNOSIS — R26.9 ABNORMALITY OF GAIT AND MOBILITY: ICD-10-CM

## 2024-08-08 DIAGNOSIS — Z00.00 ENCOUNTER FOR PREVENTIVE HEALTH EXAMINATION: Primary | ICD-10-CM

## 2024-08-12 NOTE — PROGRESS NOTES
Subjective:      Patient ID: Sherie Reyes is a 59 y.o. female.  Chief Complaint: Post-op Evaluation (Left shoulder)      HPI  Sherie Reyes is a  59 y.o. female presenting today for follow up of rotator cuff repair of the left shoulder.      She is now 5 months postop.    Patient states that overall she feels that she is doing much better.  She is completing her own physical therapy at home working on active and passive range of motion.    Continues to report weakness especially in the forward flexion direction.  Her pain is well controlled at this time.    Review of patient's allergies indicates:  No Known Allergies      Current Outpatient Medications   Medication Sig Dispense Refill    acetaminophen (TYLENOL) 500 MG tablet Take 500 mg by mouth every 6 (six) hours as needed for Pain.      albuterol (PROVENTIL/VENTOLIN HFA) 90 mcg/actuation inhaler Inhale 2 puffs into the lungs every 6 (six) hours as needed for Wheezing. Rescue 18 g 3    atorvastatin (LIPITOR) 40 MG tablet Take 1 tablet (40 mg total) by mouth once daily. 90 tablet 3    azelastine (ASTELIN) 137 mcg (0.1 %) nasal spray SPRAY 1 SPRAY IN EACH NOSTRIL TWICE DAILY 90 mL 2    cetirizine (ZYRTEC) 10 MG tablet Take 2 tablets (20 mg total) by mouth every morning. 60 tablet 3    chlorzoxazone (PARAFON FORTE) 500 mg Tab Take 500 mg by mouth daily as needed.      clonazePAM (KLONOPIN) 1 MG tablet TAKE 1 TABLET BY MOUTH TWICE DAILY 60 tablet 1    cyanocobalamin 1,000 mcg/mL injection Inject 1 mL (1,000 mcg total) into the muscle every 30 days. 30 mL 1    cyclobenzaprine (FLEXERIL) 10 MG tablet Take 10 mg by mouth 2 (two) times daily as needed.      diclofenac sodium (VOLTAREN) 1 % Gel Apply 2 g topically 4 (four) times daily. 50 g 0    dicyclomine (BENTYL) 10 MG capsule TAKE 1 CAPSULE(10 MG) BY MOUTH FOUR TIMES DAILY 360 capsule 1    estradioL (ESTRACE) 1 MG tablet Take 1 mg by mouth once daily.      ibuprofen (ADVIL,MOTRIN) 800 MG tablet Take 1  tablet (800 mg total) by mouth 2 (two) times daily with meals. 60 tablet 2    LIDOcaine (LIDODERM) 5 % Place 1 patch onto the skin once daily. Remove & Discard patch within 12 hours or as directed by MD 14 patch 0    LINZESS 290 mcg Cap capsule TAKE 1 CAPSULE(290 MCG) BY MOUTH BEFORE BREAKFAST 90 capsule 3    losartan (COZAAR) 50 MG tablet Take 1 tablet (50 mg total) by mouth once daily. 90 tablet 0    melatonin 5 mg Chew Take 10 mg by mouth daily as needed.      mupirocin (BACTROBAN) 2 % ointment APPLY TOPICALLY TO THE AFFECTED AREA TWICE DAILY 15 g 2    mv-mn/iron/folic acid/herb 190 (VITAMIN D3 COMPLETE ORAL) Take by mouth once daily at 6am.      omega-3 fatty acids/fish oil (FISH OIL-OMEGA-3 FATTY ACIDS) 300-1,000 mg capsule Take 1 capsule by mouth once daily.      omeprazole (PRILOSEC) 40 MG capsule Take 1 capsule (40 mg total) by mouth once daily. 90 capsule 3    POLYETHYLENE GLYCOL 3350 (MIRALAX ORAL) Take by mouth as needed.       potassium chloride 10% (KAYCIEL) 20 mEq/15 mL oral solution Take 15 mLs (20 mEq total) by mouth once daily. 1350 mL 1    tiZANidine (ZANAFLEX) 4 MG tablet Take 1 tablet (4 mg total) by mouth every evening. 30 tablet 1    traZODone (DESYREL) 100 MG tablet TK 1 T PO  QHS      venlafaxine (EFFEXOR-XR) 150 MG Cp24 Take 150 mg by mouth once daily.      venlafaxine (EFFEXOR-XR) 75 MG 24 hr capsule TAKE ONE CAPSULE BY MOUTH DAILY WITH 150 MG TO EQUAL 225 MG      QUEtiapine (SEROQUEL) 200 MG Tab Take 1 tablet (200 mg total) by mouth every evening. 30 tablet 2     No current facility-administered medications for this visit.       Past Medical History:   Diagnosis Date    Anxiety     Degenerative joint disease (DJD) of hip     Diabetes type 2, controlled 7/10/2019    GERD (gastroesophageal reflux disease)     Hyperlipidemia     Hypertension     IBS (irritable bowel syndrome)     Insomnia     Lumbar disc herniation     PTSD (post-traumatic stress disorder)        Past Surgical History:    Procedure Laterality Date    ADENOIDECTOMY      ARTHROSCOPIC DEBRIDEMENT OF ROTATOR CUFF Right 2020    Procedure: DEBRIDEMENT, ROTATOR CUFF, ARTHROSCOPIC;  Surgeon: Melchor Hughes Jr., MD;  Location: [facility];  Service: Orthopedics;  Laterality: Right;  need opus system (Ger MESA notified , )  video, notified/confirmed 2020 KB 1310    ARTHROSCOPIC EXCISION OF ACROMIOCLAVICULAR JOINT  2020    Procedure: EXCISION, ACROMIOCLAVICULAR JOINT, ARTHROSCOPIC;  Surgeon: Melchor Hughes Jr., MD;  Location: [facility];  Service: Orthopedics;;    ARTHROSCOPIC REPAIR OF ROTATOR CUFF OF SHOULDER      ARTHROSCOPIC REPAIR OF ROTATOR CUFF OF SHOULDER Left 3/19/2024    Procedure: REPAIR, ROTATOR CUFF, ARTHROSCOPIC;  Surgeon: Melchor Hughes Jr., MD;  Location: [facility];  Service: Orthopedics;  Laterality: Left;  need opus system and regeneten patch    ARTHROSCOPY OF SHOULDER WITH DECOMPRESSION OF SUBACROMIAL SPACE  2020    Procedure: ARTHROSCOPY, SHOULDER, WITH SUBACROMIAL SPACE DECOMPRESSION;  Surgeon: Melchor Hughes Jr., MD;  Location: [facility];  Service: Orthopedics;;     SECTION      DECOMPRESSION OF SUBACROMIAL SPACE Left 3/19/2024    Procedure: DECOMPRESSION, SUBACROMIAL SPACE;  Surgeon: Melchor Hughes Jr., MD;  Location: [facility];  Service: Orthopedics;  Laterality: Left;    EPIDURAL STEROID INJECTION INTO CERVICAL SPINE N/A 2021    Procedure: Injection-steroid-epidural-cervical--C7-T1 IL ZAYNAB;  Surgeon: Gregoria Rodriguez MD;  Location: [facility];  Service: Pain Management;  Laterality: N/A;    ESOPHAGOGASTRODUODENOSCOPY N/A 2023    Procedure: ESOPHAGOGASTRODUODENOSCOPY (EGD);  Surgeon: Tommy Schmitt MD;  Location: [facility] ([address] [place] [address]);  Service: Endoscopy;  Laterality: N/A;  referral [employer] HENRY-instr portal-GT    INJECTION OF JOINT Right 2023    Procedure: INJECTION, JOINT, RIGHT ISCHIAL BURSA  SOONER DATE;  Surgeon: Yesica Monroy MD;  Location: [facility];  Service:  "Pain Management;  Laterality: Right;    TENOTOMY, ELBOW, WITH DEBRIDEMENT AND TENDON REPAIR Right 5/20/2022    Procedure: TENOTOMY,ELBOW,WITH DEBRIDEMENT AND TENDON REPAIR;  Surgeon: Melchor Hughes Jr., MD;  Location: [facility];  Service: Orthopedics;  Laterality: Right;    TONSILLECTOMY         OBJECTIVE:   PHYSICAL EXAM:  Height: 5' 2" (157.5 cm)    Vitals:    08/13/24 1412   Height: 5' 2" (1.575 m)       Ortho/SPM Exam  Examination left shoulder   incisions well-healed   no tenderness or bruising   Range of motion: AROM FF 90, PROM , IR glutes, ER 30  There is some weakness of the rotator cuff. No instability   Neurologic exam intact    RADIOGRAPHS:  No new imaging    ASSESSMENT/PLAN:     IMPRESSION:    Left shoulder pain after previous rotator cuff repair    PLAN:    Though the patient continues to have ongoing symptoms of shoulder tenderness and weakness, she is doing much better compared to previous exam.    PT: We discussed continued home exercise program.    Imaging: We might need to consider an MRI scan or possibly a CT arthrogram in the future. The patient wants to hold off on that for today. If symptoms persist we will need further imaging studies to check the rotator cuff    Pain control: Continue Motrin by mouth Zanaflex at night as needed     FOLLOW UP:  2 months  "

## 2024-08-13 ENCOUNTER — OFFICE VISIT (OUTPATIENT)
Dept: ORTHOPEDICS | Facility: CLINIC | Age: 59
End: 2024-08-13
Payer: MEDICARE

## 2024-08-13 VITALS — HEIGHT: 62 IN | BODY MASS INDEX: 27.82 KG/M2

## 2024-08-13 DIAGNOSIS — Z98.890 S/P LEFT ROTATOR CUFF REPAIR: Primary | ICD-10-CM

## 2024-08-13 PROCEDURE — 99999 PR PBB SHADOW E&M-EST. PATIENT-LVL II: CPT | Mod: PBBFAC,HCNC,, | Performed by: PHYSICIAN ASSISTANT

## 2024-08-13 PROCEDURE — 3008F BODY MASS INDEX DOCD: CPT | Mod: HCNC,CPTII,S$GLB, | Performed by: PHYSICIAN ASSISTANT

## 2024-08-13 PROCEDURE — 3044F HG A1C LEVEL LT 7.0%: CPT | Mod: HCNC,CPTII,S$GLB, | Performed by: PHYSICIAN ASSISTANT

## 2024-08-13 PROCEDURE — 99213 OFFICE O/P EST LOW 20 MIN: CPT | Mod: HCNC,S$GLB,, | Performed by: PHYSICIAN ASSISTANT

## 2024-08-13 PROCEDURE — 1160F RVW MEDS BY RX/DR IN RCRD: CPT | Mod: HCNC,CPTII,S$GLB, | Performed by: PHYSICIAN ASSISTANT

## 2024-08-13 PROCEDURE — 1159F MED LIST DOCD IN RCRD: CPT | Mod: HCNC,CPTII,S$GLB, | Performed by: PHYSICIAN ASSISTANT

## 2024-08-13 PROCEDURE — 4010F ACE/ARB THERAPY RXD/TAKEN: CPT | Mod: HCNC,CPTII,S$GLB, | Performed by: PHYSICIAN ASSISTANT

## 2024-08-18 ENCOUNTER — TELEPHONE (OUTPATIENT)
Dept: INTERNAL MEDICINE | Facility: CLINIC | Age: 59
End: 2024-08-18
Payer: MEDICARE

## 2024-08-18 DIAGNOSIS — E53.8 LOW SERUM VITAMIN B12: Primary | ICD-10-CM

## 2024-08-18 RX ORDER — CYANOCOBALAMIN 1000 UG/ML
1000 INJECTION, SOLUTION INTRAMUSCULAR; SUBCUTANEOUS
Qty: 30 ML | Refills: 1 | Status: CANCELLED | OUTPATIENT
Start: 2024-08-18

## 2024-08-19 ENCOUNTER — PATIENT MESSAGE (OUTPATIENT)
Dept: INTERNAL MEDICINE | Facility: CLINIC | Age: 59
End: 2024-08-19
Payer: MEDICARE

## 2024-08-19 DIAGNOSIS — I15.2 HYPERTENSION ASSOCIATED WITH DIABETES: Primary | ICD-10-CM

## 2024-08-19 DIAGNOSIS — E11.59 HYPERTENSION ASSOCIATED WITH DIABETES: Primary | ICD-10-CM

## 2024-08-21 NOTE — TELEPHONE ENCOUNTER
Labs scheduled ,pt states you told her she needed an A1c and urinalysis . Does she need these tests?

## 2024-08-21 NOTE — TELEPHONE ENCOUNTER
Vitamin B12 fr injection has not been ordered in years. Was it prescribed by another provider after Dr. Glynn left?    We may have spoken about Vitamin B12 because I ordered it in July but she never came in for the blood work. Please schedule the orders from 24 and add homocysteine and  methymalonic acid.     Do not use the  B12. Need labs before I could re-order.

## 2024-08-23 ENCOUNTER — LAB VISIT (OUTPATIENT)
Dept: LAB | Facility: HOSPITAL | Age: 59
End: 2024-08-23
Attending: INTERNAL MEDICINE
Payer: MEDICARE

## 2024-08-23 DIAGNOSIS — R23.3 EASY BRUISING: ICD-10-CM

## 2024-08-23 DIAGNOSIS — I15.2 HYPERTENSION ASSOCIATED WITH DIABETES: ICD-10-CM

## 2024-08-23 DIAGNOSIS — E11.59 HYPERTENSION ASSOCIATED WITH DIABETES: ICD-10-CM

## 2024-08-23 DIAGNOSIS — E53.8 LOW SERUM VITAMIN B12: ICD-10-CM

## 2024-08-23 LAB
ALBUMIN SERPL BCP-MCNC: 3.7 G/DL (ref 3.5–5.2)
ALP SERPL-CCNC: 49 U/L (ref 55–135)
ALT SERPL W/O P-5'-P-CCNC: 14 U/L (ref 10–44)
ANION GAP SERPL CALC-SCNC: 10 MMOL/L (ref 8–16)
AST SERPL-CCNC: 16 U/L (ref 10–40)
BASOPHILS # BLD AUTO: 0.02 K/UL (ref 0–0.2)
BASOPHILS NFR BLD: 0.4 % (ref 0–1.9)
BILIRUB SERPL-MCNC: 0.2 MG/DL (ref 0.1–1)
BUN SERPL-MCNC: 7 MG/DL (ref 6–20)
CALCIUM SERPL-MCNC: 9.1 MG/DL (ref 8.7–10.5)
CHLORIDE SERPL-SCNC: 107 MMOL/L (ref 95–110)
CO2 SERPL-SCNC: 21 MMOL/L (ref 23–29)
CREAT SERPL-MCNC: 0.9 MG/DL (ref 0.5–1.4)
DIFFERENTIAL METHOD BLD: NORMAL
EOSINOPHIL # BLD AUTO: 0.1 K/UL (ref 0–0.5)
EOSINOPHIL NFR BLD: 1.2 % (ref 0–8)
ERYTHROCYTE [DISTWIDTH] IN BLOOD BY AUTOMATED COUNT: 12.7 % (ref 11.5–14.5)
EST. GFR  (NO RACE VARIABLE): >60 ML/MIN/1.73 M^2
GLUCOSE SERPL-MCNC: 106 MG/DL (ref 70–110)
HCT VFR BLD AUTO: 38 % (ref 37–48.5)
HCYS SERPL-SCNC: 12.2 UMOL/L (ref 4–15.5)
HGB BLD-MCNC: 12.3 G/DL (ref 12–16)
IMM GRANULOCYTES # BLD AUTO: 0.02 K/UL (ref 0–0.04)
IMM GRANULOCYTES NFR BLD AUTO: 0.4 % (ref 0–0.5)
INR PPP: 0.9 (ref 0.8–1.2)
LYMPHOCYTES # BLD AUTO: 1.7 K/UL (ref 1–4.8)
LYMPHOCYTES NFR BLD: 33.6 % (ref 18–48)
MCH RBC QN AUTO: 28.9 PG (ref 27–31)
MCHC RBC AUTO-ENTMCNC: 32.4 G/DL (ref 32–36)
MCV RBC AUTO: 89 FL (ref 82–98)
MONOCYTES # BLD AUTO: 0.3 K/UL (ref 0.3–1)
MONOCYTES NFR BLD: 6 % (ref 4–15)
NEUTROPHILS # BLD AUTO: 2.9 K/UL (ref 1.8–7.7)
NEUTROPHILS NFR BLD: 58.4 % (ref 38–73)
NRBC BLD-RTO: 0 /100 WBC
PLATELET # BLD AUTO: 183 K/UL (ref 150–450)
PMV BLD AUTO: 12.3 FL (ref 9.2–12.9)
POTASSIUM SERPL-SCNC: 3.9 MMOL/L (ref 3.5–5.1)
PROT SERPL-MCNC: 6.3 G/DL (ref 6–8.4)
PROTHROMBIN TIME: 9.8 SEC (ref 9–12.5)
RBC # BLD AUTO: 4.26 M/UL (ref 4–5.4)
SODIUM SERPL-SCNC: 138 MMOL/L (ref 136–145)
VIT B12 SERPL-MCNC: 330 PG/ML (ref 210–950)
WBC # BLD AUTO: 4.97 K/UL (ref 3.9–12.7)

## 2024-08-23 PROCEDURE — 36415 COLL VENOUS BLD VENIPUNCTURE: CPT | Performed by: INTERNAL MEDICINE

## 2024-08-23 PROCEDURE — 83090 ASSAY OF HOMOCYSTEINE: CPT | Mod: HCNC | Performed by: INTERNAL MEDICINE

## 2024-08-23 PROCEDURE — 85610 PROTHROMBIN TIME: CPT | Mod: HCNC | Performed by: INTERNAL MEDICINE

## 2024-08-23 PROCEDURE — 80053 COMPREHEN METABOLIC PANEL: CPT | Mod: HCNC | Performed by: INTERNAL MEDICINE

## 2024-08-23 PROCEDURE — 82607 VITAMIN B-12: CPT | Mod: HCNC | Performed by: INTERNAL MEDICINE

## 2024-08-23 PROCEDURE — 83921 ORGANIC ACID SINGLE QUANT: CPT | Mod: HCNC | Performed by: INTERNAL MEDICINE

## 2024-08-23 PROCEDURE — 85025 COMPLETE CBC W/AUTO DIFF WBC: CPT | Mod: HCNC | Performed by: INTERNAL MEDICINE

## 2024-08-23 RX ORDER — LOSARTAN POTASSIUM 25 MG/1
25 TABLET ORAL DAILY
Qty: 90 TABLET | Refills: 0 | Status: SHIPPED | OUTPATIENT
Start: 2024-08-23

## 2024-08-23 NOTE — TELEPHONE ENCOUNTER
HbA1c was checked in March and was normal.      Do not use the  B12.    Decrease losartan to 25mg daily and monitor BP before each dose. May hold if BP <135/85.    Since the BP readings have been erratic again, I would recommend that she contact the Cardiologist (Dr. Sharma) who evaluated her blood pressure and fainting spells few months ago. Or arrange a f/u appointment. May need his help with dose adjustments.

## 2024-08-27 ENCOUNTER — PATIENT MESSAGE (OUTPATIENT)
Dept: ADMINISTRATIVE | Facility: OTHER | Age: 59
End: 2024-08-27
Payer: MEDICARE

## 2024-08-27 LAB — METHYLMALONATE SERPL-SCNC: 0.18 UMOL/L

## 2024-08-28 ENCOUNTER — HOSPITAL ENCOUNTER (OUTPATIENT)
Dept: RADIOLOGY | Facility: HOSPITAL | Age: 59
Discharge: HOME | End: 2024-08-28
Attending: INTERNAL MEDICINE
Payer: MEDICARE

## 2024-08-28 VITALS — WEIGHT: 152 LBS | BODY MASS INDEX: 27.97 KG/M2 | HEIGHT: 62 IN

## 2024-08-28 DIAGNOSIS — Z12.31 ENCOUNTER FOR SCREENING MAMMOGRAM FOR HIGH-RISK PATIENT: ICD-10-CM

## 2024-08-28 PROCEDURE — 77067 SCR MAMMO BI INCL CAD: CPT | Mod: TC

## 2024-08-28 PROCEDURE — 77063 BREAST TOMOSYNTHESIS BI: CPT | Mod: TC

## 2024-08-29 ENCOUNTER — OFFICE VISIT (OUTPATIENT)
Dept: PAIN MEDICINE | Facility: CLINIC | Age: 59
End: 2024-08-29
Payer: MEDICARE

## 2024-08-29 VITALS
HEIGHT: 62 IN | DIASTOLIC BLOOD PRESSURE: 79 MMHG | HEART RATE: 77 BPM | SYSTOLIC BLOOD PRESSURE: 122 MMHG | BODY MASS INDEX: 26.37 KG/M2 | RESPIRATION RATE: 12 BRPM | WEIGHT: 143.31 LBS | OXYGEN SATURATION: 100 %

## 2024-08-29 DIAGNOSIS — G89.29 CHRONIC PAIN IN LEFT SHOULDER: ICD-10-CM

## 2024-08-29 DIAGNOSIS — M47.816 LUMBAR SPONDYLOSIS: ICD-10-CM

## 2024-08-29 DIAGNOSIS — R10.9 CHRONIC ABDOMINAL PAIN: ICD-10-CM

## 2024-08-29 DIAGNOSIS — G89.29 CHRONIC ABDOMINAL PAIN: ICD-10-CM

## 2024-08-29 DIAGNOSIS — M51.36 DDD (DEGENERATIVE DISC DISEASE), LUMBAR: ICD-10-CM

## 2024-08-29 DIAGNOSIS — M47.812 CERVICAL SPONDYLOSIS: ICD-10-CM

## 2024-08-29 DIAGNOSIS — M48.061 SPINAL STENOSIS OF LUMBAR REGION WITHOUT NEUROGENIC CLAUDICATION: ICD-10-CM

## 2024-08-29 DIAGNOSIS — G89.4 CHRONIC PAIN SYNDROME: ICD-10-CM

## 2024-08-29 DIAGNOSIS — M25.612 DECREASED RANGE OF MOTION OF LEFT SHOULDER: Primary | ICD-10-CM

## 2024-08-29 DIAGNOSIS — Z98.890 HISTORY OF ROTATOR CUFF SURGERY: ICD-10-CM

## 2024-08-29 DIAGNOSIS — M51.16 RADICULOPATHY DUE TO LUMBAR INTERVERTEBRAL DISC DISORDER: ICD-10-CM

## 2024-08-29 DIAGNOSIS — M25.512 CHRONIC PAIN IN LEFT SHOULDER: ICD-10-CM

## 2024-08-29 DIAGNOSIS — M50.30 DDD (DEGENERATIVE DISC DISEASE), CERVICAL: ICD-10-CM

## 2024-08-29 PROCEDURE — 3078F DIAST BP <80 MM HG: CPT | Mod: HCNC,CPTII,S$GLB,

## 2024-08-29 PROCEDURE — 3074F SYST BP LT 130 MM HG: CPT | Mod: HCNC,CPTII,S$GLB,

## 2024-08-29 PROCEDURE — 99214 OFFICE O/P EST MOD 30 MIN: CPT | Mod: HCNC,S$GLB,,

## 2024-08-29 PROCEDURE — 4010F ACE/ARB THERAPY RXD/TAKEN: CPT | Mod: HCNC,CPTII,S$GLB,

## 2024-08-29 PROCEDURE — 3044F HG A1C LEVEL LT 7.0%: CPT | Mod: HCNC,CPTII,S$GLB,

## 2024-08-29 PROCEDURE — 3008F BODY MASS INDEX DOCD: CPT | Mod: HCNC,CPTII,S$GLB,

## 2024-08-29 PROCEDURE — 99999 PR PBB SHADOW E&M-EST. PATIENT-LVL III: CPT | Mod: PBBFAC,HCNC,,

## 2024-08-29 RX ORDER — DICYCLOMINE HYDROCHLORIDE 10 MG/1
10 CAPSULE ORAL 4 TIMES DAILY PRN
Qty: 120 CAPSULE | Refills: 0 | Status: SHIPPED | OUTPATIENT
Start: 2024-08-29 | End: 2024-08-30

## 2024-08-29 NOTE — TELEPHONE ENCOUNTER
No care due was identified.  [facility] Embedded Care Due Messages. Reference number: [id number].   8/29/2024 3:08:41 AM CDT

## 2024-08-29 NOTE — TELEPHONE ENCOUNTER
No care due was identified.  [facility] Embedded Care Due Messages. Reference number: [id number].   8/29/2024 1:03:28 PM CDT

## 2024-08-29 NOTE — TELEPHONE ENCOUNTER
Refill Routing Note   Medication(s) are not appropriate for processing by [facility] for the following reason(s):        Outside of protocol    [facility] action(s):  Route               Appointments  past 12m or future 3m with PCP    Date Provider   Last Visit   7/8/2024 Farhana Whittington MD   Next Visit   Visit date not found Farhana Whittington MD   ED visits in past 90 days: 0        Note composed:9:22 AM 08/29/2024

## 2024-08-29 NOTE — PROGRESS NOTES
Interventional Pain Management - Established Visit  Follow-Up      Referring Physician: Farhana Whittington MD    Chief Complaint:   Chief Complaint   Patient presents with    Headache    Knee Pain    Leg Pain    Hip Pain    Pelvic Pain    Neck Pain    Pain    Joint Pain    Hand Pain    Back Pain        SUBJECTIVE:  Interval History 8/29/2024:  Sherie Reyes returns to clinic for delayed follow-up. She states she had left rotator cuff repair surgery with Dr Hughes on 3/19/2024. She has continued limited range of motion and pain since the surgery. She completed 10 weeks of physical therapy without help. She also complains of chronic back and neck pain. She states her biggest source of pain is her left shoulder. She continues to see Dr Kaufman and is on flexeril, ibuprofen, chlorzoxone, and toradol with some relief. She denies any perceived side effects. She denies recent falls or trauma. She denies new onset fever/night sweats, urinary incontinence, bowel incontinence, significant weight changes, significant motor weakness or changes, or loss of sensations. Her pain today is 8/10.     Original HPI 7/19/2023:  Sherie Reyes presents to the clinic for the evaluation of ischial pain. Referred by [facility] by Dr. Freddy Kapadia. 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 Symptoms have been worsening.The pain is located in the right buttock area and radiates to the right hip.  The pain is described as stabbing and is rated as 8/10. The pain is rated with a score of  7/10 on the BEST day and a score of 10/10 on the WORST day.  Symptoms interfere with daily activity and work. The pain is exacerbated by Sitting and Walking.  The pain is mitigated by laying down and rest. The patient reports 6 hours of uninterrupted sleep per night.    Patient denies night fever/night sweats, urinary incontinence, bowel incontinence,  significant motor weakness, and loss of sensations. Patient lost 50lbs in past 9 month, PCP is working her up for this.     Physical Therapy/Home Exercise: yes, 4/2024-5/2024 (8 weeks) and continues HEP most days    Pain Disability Index Review:      8/29/2024     3:06 PM 7/19/2023     2:30 PM   Last 3 PDI Scores   Pain Disability Index (PDI) 70 45       Pain Medications:  Parafon Forte 500mg   Klonopin 1mg BID  Flexeril - no longer taking  Voltaren gel 1% qhs  Mobic 15mg not taking   Tizanidine 4mg - not taking  Venlafaxine 225mg qam       report:  Reviewed and consistent with medication use as prescribed.    Pain Procedures:   [facility] by Dr. Freddy Kapadia.   9/18/2024 - right ischial bursa injection     Imaging:     XR SHOULDER COMPLETE 2 OR MORE VIEWS LEFT     CLINICAL HISTORY:  Other specified postprocedural states     TECHNIQUE:  Two or three views of the left shoulder were performed.     COMPARISON:  Plain film from 09/28/2023     FINDINGS:  No evidence of acute fracture or dislocation.  Acromioclavicular joint demonstrates no significant arthrosis.  No soft tissue abnormality.     Impression:     As above.        Electronically signed by:Abisai Haddad MD  Date:                                            05/14/2024  Time:                                           14:56    EXAMINATION:  MRI LUMBAR SPINE WITHOUT CONTRAST     CLINICAL HISTORY:  Back pain or radiculopathy, > 6 wks; Dorsalgia, unspecified     TECHNIQUE:  Multiplanar, multisequence MR images were acquired from the thoracolumbar junction to the sacrum without the administration of contrast.     COMPARISON:  Lumbar radiograph 07/20/2021.     CT lumbar spine 08/06/2021.     FINDINGS:  Alignment: Normal.     Vertebrae: 5 lumbar-type vertebral bodies. No aggressive marrow replacement process or fracture.     Discs: Multilevel degenerative changes, described in detail below.     Cord: Conus appears unremarkable and terminates at  L1.  Cauda equina nerve roots are unremarkable.     Paraspinal soft tissues are unremarkable.     Significant findings by level:     T12-L1 and L1-L2: Unremarkable.     L2-L3: Disc bulge with superimposed right foraminal extrusion.  Mild right facet arthropathy.  No canal stenosis.  Mild right foraminal stenosis.  Disc material abuts the exiting right L2 nerve root.     L3-L4: Disc bulge.  Bilateral facet arthropathy and ligamentum flavum thickening, left greater than right. Mild canal stenosis with narrowing of the left subarticular zone and abutment of the descending left L4 nerve root.  Mild left foraminal stenosis.     L4-L5: Disc bulge.  Bilateral facet arthropathy.  No canal stenosis.  Mild bilateral foraminal stenosis.     L5-S1: Bilateral facet arthropathy.  No spinal canal or foraminal stenosis.     Impression:     Multilevel degenerative changes as described, noting right foraminal extrusion at L2-3 with abutment of the exiting right L2 nerve root, and disc bulging and facet arthropathy at L3-4 with abutment of the descending left L4 nerve root.     Electronically signed by resident: Jovan Powlel  Date:                                            08/06/2021  Time:                                           11:24     Electronically signed by: Charles Mccray  Date:                                            08/06/2021  Time:                                           15:12    MRI CERVICAL SPINE WITHOUT CONTRAST     CLINICAL HISTORY:  Neck pain, abnormal neuro exam; Cervicalgia     TECHNIQUE:  Multiplanar, multisequence MR images of the cervical spine were performed without the administration of contrast.     COMPARISON:  12/23/2020.     FINDINGS:  C1-C2: Dens is intact.  Pre dens space is maintained.     Alignment: Alignment is maintained.  Straightening of lordosis noted.     Vertebrae: Normal marrow signal. No fracture.     Discs: Mild disc height loss at C5-C7.  No evidence for discitis.     Cord: Normal.      Skull base and craniocervical junction: Normal.     Degenerative findings:     C2-C3: No spinal canal stenosis or neural foraminal narrowing.     C3-C4: Posterior disc osteophyte complex with uncovertebral and facet arthrosis result in mild spinal canal stenosis with minimal effacement of the left neural foramen.     C4-C5: No spinal canal stenosis or neural foraminal narrowing.     C5-C6: Posterior disc osteophyte complex with uncovertebral and facet arthrosis result in moderate spinal canal stenosis and moderate right, mild left neural foraminal narrowing.     C6-C7: Posterior disc osteophyte complex with uncovertebral and facet arthrosis result in mild spinal canal stenosis and mild bilateral neural foraminal narrowing.     C7-T1: No spinal canal stenosis or neural foraminal narrowing.     Paraspinal muscles & soft tissues: Unremarkable.     Impression:     1. Degenerative changes of the cervical spine detailed above.  Moderate spinal canal stenosis noted at C5-C6.  Mild-to-moderate neural foraminal narrowing noted at C5-C7.        Electronically signed by: Enoch Rojas MD  Date:                                            08/06/2021  Time:                                           11:36    Past Medical History:   Diagnosis Date    Anxiety     Degenerative joint disease (DJD) of hip     Diabetes type 2, controlled 7/10/2019    GERD (gastroesophageal reflux disease)     Hyperlipidemia     Hypertension     IBS (irritable bowel syndrome)     Insomnia     Lumbar disc herniation     PTSD (post-traumatic stress disorder)      Past Surgical History:   Procedure Laterality Date    ADENOIDECTOMY      ARTHROSCOPIC DEBRIDEMENT OF ROTATOR CUFF Right 6/9/2020    Procedure: DEBRIDEMENT, ROTATOR CUFF, ARTHROSCOPIC;  Surgeon: Melchor Hughes Jr., MD;  Location: [facility];  Service: Orthopedics;  Laterality: Right;  need opus system (Ger MESA notified 5/26, EF)  video, notified/confirmed 6/8/2020 KB [id number]    ARTHROSCOPIC  EXCISION OF ACROMIOCLAVICULAR JOINT  2020    Procedure: EXCISION, ACROMIOCLAVICULAR JOINT, ARTHROSCOPIC;  Surgeon: Melchor Hughes Jr., MD;  Location: [facility] OR;  Service: Orthopedics;;    ARTHROSCOPIC REPAIR OF ROTATOR CUFF OF SHOULDER      ARTHROSCOPIC REPAIR OF ROTATOR CUFF OF SHOULDER Left 3/19/2024    Procedure: REPAIR, ROTATOR CUFF, ARTHROSCOPIC;  Surgeon: Melchor Hughes Jr., MD;  Location: [facility] OR;  Service: Orthopedics;  Laterality: Left;  need opus system and regeneten patch    ARTHROSCOPY OF SHOULDER WITH DECOMPRESSION OF SUBACROMIAL SPACE  2020    Procedure: ARTHROSCOPY, SHOULDER, WITH SUBACROMIAL SPACE DECOMPRESSION;  Surgeon: Melchor Hughes Jr., MD;  Location: [facility] OR;  Service: Orthopedics;;     SECTION      DECOMPRESSION OF SUBACROMIAL SPACE Left 3/19/2024    Procedure: DECOMPRESSION, SUBACROMIAL SPACE;  Surgeon: Melchor Hughes Jr., MD;  Location: [facility];  Service: Orthopedics;  Laterality: Left;    EPIDURAL STEROID INJECTION INTO CERVICAL SPINE N/A 2021    Procedure: Injection-steroid-epidural-cervical--C7-T1 IL ZAYNAB;  Surgeon: Gregoria Rodriguez MD;  Location: [facility];  Service: Pain Management;  Laterality: N/A;    ESOPHAGOGASTRODUODENOSCOPY N/A 2023    Procedure: ESOPHAGOGASTRODUODENOSCOPY (EGD);  Surgeon: Tommy Schmitt MD;  Location: [address]);  Service: Endoscopy;  Laterality: N/A;  referral [employer] HENRY-instr portal-GT    INJECTION OF JOINT Right 2023    Procedure: INJECTION, JOINT, RIGHT ISCHIAL BURSA  SOONER DATE;  Surgeon: Yescia Monroy MD;  Location: [facility];  Service: Pain Management;  Laterality: Right;    TENOTOMY, ELBOW, WITH DEBRIDEMENT AND TENDON REPAIR Right 2022    Procedure: TENOTOMY,ELBOW,WITH DEBRIDEMENT AND TENDON REPAIR;  Surgeon: Melchor Hughes Jr., MD;  Location: [facility] OR;  Service: Orthopedics;  Laterality: Right;    TONSILLECTOMY       Social History     Socioeconomic History    Marital status:     Tobacco Use    Smoking status: Former     Current packs/day: 0.00     Average packs/day: 0.5 packs/day for 47.0 years (23.5 ttl pk-yrs)     Types: Vaping with nicotine, Cigarettes     Start date: 9/12/1985     Quit date: 6/8/2014     Years since quitting: 10.2    Smokeless tobacco: Former     Quit date: 10/20/2014    Tobacco comments:     Vaping 30 ml last 5 or 6 weeks - 3% nicotine    Substance and Sexual Activity    Alcohol use: Not Currently     Alcohol/week: 1.0 standard drink of alcohol     Types: 1 Cans of beer per week     Comment: Maybe 1 or 2 a month    Drug use: Yes     Types: Marijuana    Sexual activity: Yes     Partners: Male     Birth control/protection: OCP     Social Determinants of Health     Financial Resource Strain: High Risk (7/2/2024)    Overall Financial Resource Strain (CARDIA)     Difficulty of Paying Living Expenses: Very hard   Food Insecurity: Food Insecurity Present (7/2/2024)    Hunger Vital Sign     Worried About Running Out of Food in the Last Year: Often true     Ran Out of Food in the Last Year: Often true   Transportation Needs: No Transportation Needs (3/4/2024)    PRAPARE - Transportation     Lack of Transportation (Medical): No     Lack of Transportation (Non-Medical): No   Physical Activity: Inactive (7/2/2024)    Exercise Vital Sign     Days of Exercise per Week: 0 days     Minutes of Exercise per Session: 0 min   Stress: Stress Concern Present (7/2/2024)    [employer] [place] [employer] - Occupational Stress Questionnaire     Feeling of Stress : Very much   Housing Stability: High Risk (3/4/2024)    Housing Stability Vital Sign     Unable to Pay for Housing in the Last Year: Yes     Number of Places Lived in the Last Year: 1     Unstable Housing in the Last Year: No     Family History   Problem Relation Name Age of Onset    Allergies Mother Anali Parta     Hypertension Mother Anali Parta     Hypothyroidism Mother Anali Parta     Arthritis Mother Anali Parta      Depression Mother Anali Parta     Heart disease Mother Anali Parta     Hyperlipidemia Mother Anali Parta     Hypertension Father Dl Sanders Parta     Stroke Father Dl Sanders Parta 46    Diabetes Father Dl Sanders Parta     Heart disease Father Dl Sanders Parta     Hyperlipidemia Father Dl Sanders Parta     Depression Sister Renuka Linton     Breast cancer Paternal Aunt      Cancer Paternal Aunt Alysia Parta         Bilat breast Cancer    Heart disease Paternal Aunt Alysia Parta     Cancer Maternal Grandmother Zulay Ayleen         Lung w/ Mets    COPD Maternal Grandmother Zulayhector Abbasi     Depression Maternal Grandmother Zulay Ayleen     Early death Maternal Grandmother Zulay Ayleen     Heart disease Maternal Grandmother Zulay Ayleen     Hyperlipidemia Maternal Grandmother Zulay Jhonhney     Hypertension Maternal Grandmother Zulay Jhonhney     Cancer Maternal Grandfather Fallon Jhonhney         Stomach Ca    Depression Maternal Grandfather Fallon Ayleen     Early death Maternal Grandfather Fallon Ayleen     Heart disease Maternal Grandfather Fallon Julioey     Hyperlipidemia Maternal Grandfather Fallon Julioey     Hypertension Maternal Grandfather Floyd Ayleen     Mental illness Maternal Grandfather Floyd Ayleen     Vision loss Maternal Grandfather Floyd Ayleen         Loss during yhe War.    Arthritis Paternal Grandmother Omayra Parta     Depression Paternal Grandmother Omayra Parta     Heart disease Paternal Grandmother Omayra Parta     Diabetes Paternal Grandfather Dl Parta Jr.     Early death Paternal Grandfather Dl Parta Jr.     Heart disease Paternal Grandfather Dl Parta Jr.     Hyperlipidemia Paternal Grandfather Dl Parta Jr.     Hypertension Paternal Grandfather Dl Parta Jr.     Kidney disease Paternal Grandfather Dl Parta Jr.     Depression Daughter Anushka Amy     Depression Son Adan Amy     Learning disabilities Son Adan Amy         Review of patient's allergies indicates:  No Known Allergies    Current Outpatient Medications   Medication Sig    acetaminophen (TYLENOL) 500 MG tablet Take 500 mg by mouth every 6 (six) hours as needed for Pain.    albuterol (PROVENTIL/VENTOLIN HFA) 90 mcg/actuation inhaler Inhale 2 puffs into the lungs every 6 (six) hours as needed for Wheezing. Rescue    atorvastatin (LIPITOR) 40 MG tablet Take 1 tablet (40 mg total) by mouth once daily.    azelastine (ASTELIN) 137 mcg (0.1 %) nasal spray SPRAY 1 SPRAY IN EACH NOSTRIL TWICE DAILY    cetirizine (ZYRTEC) 10 MG tablet Take 2 tablets (20 mg total) by mouth every morning.    chlorzoxazone (PARAFON FORTE) 500 mg Tab Take 500 mg by mouth daily as needed.    clonazePAM (KLONOPIN) 1 MG tablet TAKE 1 TABLET BY MOUTH TWICE DAILY    cyanocobalamin 1,000 mcg/mL injection Inject 1 mL (1,000 mcg total) into the muscle every 30 days.    cyclobenzaprine (FLEXERIL) 10 MG tablet Take 10 mg by mouth 2 (two) times daily as needed.    diclofenac sodium (VOLTAREN) 1 % Gel Apply 2 g topically 4 (four) times daily.    dicyclomine (BENTYL) 10 MG capsule Take 1 capsule (10 mg total) by mouth 4 (four) times daily as needed (abdominal pain).    estradioL (ESTRACE) 1 MG tablet Take 1 mg by mouth once daily.    ibuprofen (ADVIL,MOTRIN) 800 MG tablet Take 1 tablet (800 mg total) by mouth 2 (two) times daily with meals.    LIDOcaine (LIDODERM) 5 % Place 1 patch onto the skin once daily. Remove & Discard patch within 12 hours or as directed by MD SMALL 290 mcg Cap capsule TAKE 1 CAPSULE(290 MCG) BY MOUTH BEFORE BREAKFAST    losartan (COZAAR) 25 MG tablet Take 1 tablet (25 mg total) by mouth once daily.    melatonin 5 mg Chew Take 10 mg by mouth daily as needed.    mupirocin (BACTROBAN) 2 % ointment APPLY TOPICALLY TO THE AFFECTED AREA TWICE DAILY    mv-mn/iron/folic acid/herb 190 (VITAMIN D3 COMPLETE ORAL) Take by mouth once daily at 6am.    omega-3 fatty acids/fish oil (FISH  "OIL-OMEGA-3 FATTY ACIDS) 300-1,000 mg capsule Take 1 capsule by mouth once daily.    omeprazole (PRILOSEC) 40 MG capsule Take 1 capsule (40 mg total) by mouth once daily.    POLYETHYLENE GLYCOL 3350 (MIRALAX ORAL) Take by mouth as needed.     potassium chloride 10% (KAYCIEL) 20 mEq/15 mL oral solution Take 15 mLs (20 mEq total) by mouth once daily.    tiZANidine (ZANAFLEX) 4 MG tablet Take 1 tablet (4 mg total) by mouth every evening.    traZODone (DESYREL) 100 MG tablet TK 1 T PO  QHS    venlafaxine (EFFEXOR-XR) 150 MG Cp24 Take 150 mg by mouth once daily.    venlafaxine (EFFEXOR-XR) 75 MG 24 hr capsule TAKE ONE CAPSULE BY MOUTH DAILY WITH 150 MG TO EQUAL 225 MG    QUEtiapine (SEROQUEL) 200 MG Tab Take 1 tablet (200 mg total) by mouth every evening.     No current facility-administered medications for this visit.       REVIEW OF SYSTEMS:    GENERAL:  No weight loss, malaise or fevers.  HEENT:  Negative for frequent or significant headaches.  NECK:  Negative for lumps, goiter, pain and significant neck swelling.  RESPIRATORY:  Negative for cough, wheezing or shortness of breath.  CARDIOVASCULAR:  Negative for chest pain, leg swelling or palpitations.  GI:  Negative for abdominal discomfort, blood in stools or black stools or change in bowel habits.  MUSCULOSKELETAL:  See HPI.  SKIN:  Negative for lesions, rash, and itching.  PSYCH:  Negative for sleep disturbance, mood disorder and recent psychosocial stressors.  HEMATOLOGY/LYMPHOLOGY:  Negative for prolonged bleeding, bruising easily or swollen nodes.  NEURO:   No history of headaches, syncope, paralysis, seizures or tremors.  All other reviewed and negative other than HPI.    OBJECTIVE:    /79 (BP Location: Left arm, Patient Position: Sitting, BP Method: Small (Automatic))   Pulse 77   Resp 12   Ht 5' 2" (1.575 m)   Wt 65 kg (143 lb 4.8 oz)   SpO2 100%   BMI 26.21 kg/m²     PHYSICAL EXAMINATION:    General appearance: Well appearing, in no acute " distress, alert and oriented x3.  Psych:  Mood and affect appropriate.  Skin: Skin color, texture, turgor normal, no rashes or lesions, in both upper and lower body.  Head/face:  Normocephalic, atraumatic. No palpable lymph nodes.  Neck: No pain to palpation over the cervical paraspinous muscles. Spurling Negative. No pain with neck flexion, extension, or lateral flexion.   Cor: Rate regular.  Pulm: breathing is even and unlabored, equal chest rise  GI:  non-distended  Back: Straight leg raising in the sitting and supine positions is negative to radicular pain. No pain to palpation over the spine or costovertebral angles. Pain with flexion > extension, ROM limited due to pain  Extremities: No deformities, edema, or skin discoloration. Good capillary refill.  Musculoskeletal:Left shoulder: pain about the AC joint, +full can, +empty can, +[place], +Neers, limited ROM in all planes.  Bilateral upper and lower extremity strength is normal and symmetric.  No atrophy or tone abnormalities are noted.  Neuro: Bilateral upper and lower extremity coordination and muscle stretch reflexes are physiologic and symmetric.  Plantar response are downgoing. No loss of sensation is noted.  Gait: normal.        ASSESSMENT: 59 y.o. year old female with buttock pain, consistent with      1. Decreased range of motion of left shoulder        2. Radiculopathy due to lumbar intervertebral disc disorder  Ambulatory referral/consult to Pain Clinic      3. History of rotator cuff surgery        4. Chronic pain in left shoulder        5. Chronic pain syndrome        6. DDD (degenerative disc disease), cervical        7. Cervical spondylosis        8. DDD (degenerative disc disease), lumbar        9. Spinal stenosis of lumbar region without neurogenic claudication        10. Lumbar spondylosis              PLAN:   We discussed with the patient the assessment and recommendations. The following is the plan we agreed on:   - I have stressed the  importance of physical activity and a home exercise plan to help with pain and improve health.  - Patient can continue with medications for now since they are providing benefits, using them appropriately, and without side effects.  - Continue ibuprofen and tizanidine from Ortho.   - Procedure order placed for left shoulder diagnostic block with progression to cooled RF if significant short-term relief. Steroid injection contraindicated due to recent rotator cuff surgery.   - Dr. Monroy was consulted on the patient and agrees with this plan.    - Continue PT and home exercises.   - Counseled patient regarding the importance of activity modification and physical therapy.    The above plan and management options were discussed at length with patient. Patient is in agreement with the above and verbalized understanding.     Estefany Kulkarni NP  08/29/2024

## 2024-08-30 RX ORDER — DICYCLOMINE HYDROCHLORIDE 10 MG/1
CAPSULE ORAL
Qty: 360 CAPSULE | Refills: 0 | Status: SHIPPED | OUTPATIENT
Start: 2024-08-30

## 2024-08-30 NOTE — TELEPHONE ENCOUNTER
Refill Routing Note   Medication(s) are not appropriate for processing by [facility] for the following reason(s):        Outside of protocol    [facility] action(s):  Route             Appointments  past 12m or future 3m with PCP    Date Provider   Last Visit   7/8/2024 Farhana Whittington MD   Next Visit   Visit date not found Farhana Whittington MD   ED visits in past 90 days: 0        Note composed:11:54 PM 08/29/2024

## 2024-09-05 ENCOUNTER — PATIENT OUTREACH (OUTPATIENT)
Dept: ADMINISTRATIVE | Facility: HOSPITAL | Age: 59
End: 2024-09-05
Payer: MEDICARE

## 2024-09-06 ENCOUNTER — LAB VISIT (OUTPATIENT)
Dept: LAB | Facility: HOSPITAL | Age: 59
End: 2024-09-06
Attending: INTERNAL MEDICINE
Payer: MEDICARE

## 2024-09-06 ENCOUNTER — OFFICE VISIT (OUTPATIENT)
Dept: OPTOMETRY | Facility: CLINIC | Age: 59
End: 2024-09-06
Payer: MEDICARE

## 2024-09-06 DIAGNOSIS — E11.9 TYPE 2 DIABETES MELLITUS WITHOUT RETINOPATHY: Primary | ICD-10-CM

## 2024-09-06 DIAGNOSIS — E78.49 OTHER HYPERLIPIDEMIA: ICD-10-CM

## 2024-09-06 DIAGNOSIS — H52.13 MYOPIA OF BOTH EYES WITH REGULAR ASTIGMATISM AND PRESBYOPIA: ICD-10-CM

## 2024-09-06 DIAGNOSIS — H25.13 SENILE NUCLEAR SCLEROSIS, BILATERAL: ICD-10-CM

## 2024-09-06 DIAGNOSIS — H52.4 MYOPIA OF BOTH EYES WITH REGULAR ASTIGMATISM AND PRESBYOPIA: ICD-10-CM

## 2024-09-06 DIAGNOSIS — H40.053 OCULAR HYPERTENSION, BILATERAL: ICD-10-CM

## 2024-09-06 DIAGNOSIS — E11.9 TYPE 2 DIABETES MELLITUS WITHOUT COMPLICATION, WITHOUT LONG-TERM CURRENT USE OF INSULIN: ICD-10-CM

## 2024-09-06 DIAGNOSIS — H52.223 MYOPIA OF BOTH EYES WITH REGULAR ASTIGMATISM AND PRESBYOPIA: ICD-10-CM

## 2024-09-06 DIAGNOSIS — E11.9 CONTROLLED TYPE 2 DIABETES MELLITUS WITHOUT COMPLICATION, WITHOUT LONG-TERM CURRENT USE OF INSULIN: ICD-10-CM

## 2024-09-06 LAB
CHOLEST SERPL-MCNC: 164 MG/DL (ref 120–199)
CHOLEST/HDLC SERPL: 3.2 {RATIO} (ref 2–5)
ESTIMATED AVG GLUCOSE: 105 MG/DL (ref 68–131)
HBA1C MFR BLD: 5.3 % (ref 4–5.6)
HDLC SERPL-MCNC: 52 MG/DL (ref 40–75)
HDLC SERPL: 31.7 % (ref 20–50)
LDLC SERPL CALC-MCNC: 91 MG/DL (ref 63–159)
NONHDLC SERPL-MCNC: 112 MG/DL
TRIGL SERPL-MCNC: 105 MG/DL (ref 30–150)

## 2024-09-06 PROCEDURE — 80061 LIPID PANEL: CPT | Mod: HCNC | Performed by: INTERNAL MEDICINE

## 2024-09-06 PROCEDURE — 36415 COLL VENOUS BLD VENIPUNCTURE: CPT | Mod: HCNC,PO | Performed by: INTERNAL MEDICINE

## 2024-09-06 PROCEDURE — 83036 HEMOGLOBIN GLYCOSYLATED A1C: CPT | Mod: HCNC | Performed by: INTERNAL MEDICINE

## 2024-09-06 PROCEDURE — 99999 PR PBB SHADOW E&M-EST. PATIENT-LVL II: CPT | Mod: PBBFAC,HCNC,, | Performed by: OPTOMETRIST

## 2024-09-06 RX ORDER — MEDROXYPROGESTERONE ACETATE 2.5 MG/1
2.5 TABLET ORAL
COMMUNITY

## 2024-09-06 NOTE — PROGRESS NOTES
HPI    KEISHA: 01/23 with Dr. Krishnamurthy for dog bite  Chief complaint (CC): Patient is here for annual eye exam today.  Patient   had progressive lenses made several years ago but had trouble with them.   patient is wearing OTC readers for near and nothing for distance.    Distance is not as clear as it used to be without glasses. Patient may   want to try a lined bifocal.  Glasses? +1.75 OTC  Contacts? -  H/o eye surgery, injections or laser: -  H/o eye injury: -  Known eye conditions? See above  Family h/o eye conditions? Mother with OHTN  Eye gtts? -      (-) Flashes (-)  Floaters (-) Mucous   (+)  Tearing (-) Itching (-) Burning   (-) Headaches (-) Eye Pain/discomfort (-) Irritation   (-)  Redness (-) Double vision (-) Blurry vision    Diabetic? +  A1c? Hemoglobin A1C       Date                     Value               Ref Range             Status                03/05/2024               5.5                 4.0 - 5.6 %           Final                 08/29/2023               5.4                 4.0 - 5.6 %           Final                 12/20/2022               5.6                 4.0 - 5.6 %           Final                    Last edited by Palmira Miramontes on 9/6/2024  3:38 PM.            Assessment /Plan     For exam results, see Encounter Report.      Type 2 diabetes mellitus without retinopathy  Controlled type 2 diabetes mellitus without complication, without long-term current use of insulin  -     Ambulatory referral/consult to Optometry  BS control. No signs of diabetic retinopathy. Monitor with annual exam.     Ocular hypertension, bilateral  (+) FHx- mom has OHTN. IOP 20 OD, OS. Last  IOP 19 OD, 20 OS. Tmax 28 OD, 27 OS. Prev pt of Dr Fink.   9/21/2022 OCT WNL OU  9/21/2022 HVF WNL OU  Educated pt on findings w/understanding.   No w/o glaucoma.   RTC 1 year     Myopia of both eyes with regular astigmatism and presbyopia  SRx released to patient. Patient educated on lens options. Normal ocular health. RTC 1  year for routine exam.     Senile nuclear sclerosis, bilateral  Nuclear sclerotic cataract - not visually significant. Observe.

## 2024-09-12 ENCOUNTER — PATIENT MESSAGE (OUTPATIENT)
Dept: PAIN MEDICINE | Facility: OTHER | Age: 59
End: 2024-09-12
Payer: MEDICARE

## 2024-09-12 DIAGNOSIS — M25.512 CHRONIC LEFT SHOULDER PAIN: Primary | ICD-10-CM

## 2024-09-12 DIAGNOSIS — G89.29 CHRONIC LEFT SHOULDER PAIN: Primary | ICD-10-CM

## 2024-09-17 DIAGNOSIS — R10.9 CHRONIC ABDOMINAL PAIN: ICD-10-CM

## 2024-09-17 DIAGNOSIS — M25.512 CHRONIC LEFT SHOULDER PAIN: ICD-10-CM

## 2024-09-17 DIAGNOSIS — E11.59 HYPERTENSION ASSOCIATED WITH DIABETES: ICD-10-CM

## 2024-09-17 DIAGNOSIS — M75.122 COMPLETE TEAR OF LEFT ROTATOR CUFF, UNSPECIFIED WHETHER TRAUMATIC: ICD-10-CM

## 2024-09-17 DIAGNOSIS — I15.2 HYPERTENSION ASSOCIATED WITH DIABETES: ICD-10-CM

## 2024-09-17 DIAGNOSIS — G89.29 CHRONIC ABDOMINAL PAIN: ICD-10-CM

## 2024-09-17 DIAGNOSIS — G89.29 CHRONIC LEFT SHOULDER PAIN: ICD-10-CM

## 2024-09-17 RX ORDER — IBUPROFEN 800 MG/1
800 TABLET ORAL 2 TIMES DAILY WITH MEALS
Qty: 60 TABLET | Refills: 1 | Status: SHIPPED | OUTPATIENT
Start: 2024-09-17

## 2024-09-18 ENCOUNTER — TELEPHONE (OUTPATIENT)
Dept: ORTHOPEDICS | Facility: CLINIC | Age: 59
End: 2024-09-18
Payer: MEDICARE

## 2024-09-18 ENCOUNTER — HOSPITAL ENCOUNTER (OUTPATIENT)
Dept: RADIOLOGY | Facility: HOSPITAL | Age: 59
Discharge: HOME | End: 2024-09-18
Attending: INTERNAL MEDICINE
Payer: MEDICARE

## 2024-09-18 DIAGNOSIS — R92.8 ABNORMAL MAMMOGRAM: ICD-10-CM

## 2024-09-18 PROCEDURE — 77065 DX MAMMO INCL CAD UNI: CPT | Mod: 26,HCNC,RT, | Performed by: RADIOLOGY

## 2024-09-18 PROCEDURE — 77061 BREAST TOMOSYNTHESIS UNI: CPT | Mod: 26,HCNC,RT, | Performed by: RADIOLOGY

## 2024-09-18 PROCEDURE — 77065 DX MAMMO INCL CAD UNI: CPT | Mod: TC,HCNC,RT

## 2024-09-18 RX ORDER — AZELASTINE 1 MG/ML
SPRAY, METERED NASAL
Qty: 777 ML | Refills: 0 | OUTPATIENT
Start: 2024-09-18

## 2024-09-18 RX ORDER — LOSARTAN POTASSIUM 25 MG/1
TABLET ORAL
Qty: 777 TABLET | Refills: 0 | OUTPATIENT
Start: 2024-09-18

## 2024-09-18 NOTE — TELEPHONE ENCOUNTER
Spoke with patient.  Informed per Dr Hughes that it is ok to have nerve block with pain management.

## 2024-09-18 NOTE — TELEPHONE ENCOUNTER
----- Message from Melchor Hughes Jr., MD sent at 9/17/2024  4:29 PM CDT -----  Regarding: RE: pt callback  Yes ok  ----- Message -----  From: Mercedes Encarnacion MA  Sent: 9/17/2024   3:46 PM CDT  To: Melchor Hughes Jr., MD  Subject: FW: pt callback                                  Patient had shoulder surgery in March. She is scheduled for a left shoulder nerve block with Pain Management for 9/30. She wants to know if it is ok that she has this done.  ----- Message -----  From: Raúl Cam  Sent: 9/17/2024   3:27 PM CDT  To: Saul VALDEZ Staff  Subject: pt callback                                      Name of Who is Calling:Pt         What is the request in detail: Requesting callback in regards to having injection for Pain management  Pt inquiring it is fine that she see Pain cara after having surgery chico Hughes on same shoulder  Pls advise             Can the clinic reply by [facility]: yes         What Number to Call Back if not in [facility]:Telephone Information:  Mobile          [phone]

## 2024-09-19 NOTE — TELEPHONE ENCOUNTER
Refill Routing Note   Medication(s) are not appropriate for processing by [facility] for the following reason(s):        Outside of protocol    [facility] action(s):  Route  Quick Discontinue             Appointments  past 12m or future 3m with PCP    Date Provider   Last Visit   7/8/2024 Farhana Whittington MD   Next Visit   Visit date not found Farhana Whittington MD   ED visits in past 90 days: 0        Note composed:11:25 PM 09/18/2024

## 2024-09-19 NOTE — TELEPHONE ENCOUNTER
[facility] Note  Quick DC. Inappropriate Request   Refill request requires further review by MD: NO   Medication Therapy Plan: Pharmacy is requesting new script(s) for the following medications without required information, (sig/ frequency/qty/etc)     [facility] action(s):  Quick Discontinue      Encounter Details:    Pharmacies have been requesting medications for patients without required information, (sig, frequency, qty, etc.). In addition, requests are sent for medication(s) pt. are currently not taking, and medications patients have never taken.    We have spoken to the pharmacies about these request types and advised their teams previously that we are unable to assess these New Script requests and require all details for these requests. This is a known issue and has been reported.       Automatic Epic Generated Protocol Data Below:   Requested Prescriptions     Pending Prescriptions Disp Refills    dicyclomine (BENTYL) 10 MG capsule [Pharmacy Med Name: DICYCLOMINE 10MG CAP] 777 capsule 0     Refused Prescriptions Disp Refills    losartan (COZAAR) 25 MG tablet [Pharmacy Med Name: LOSARTAN  25MG TAB] 777 tablet 0     Refused By: RONNIE KEITA     Reason for Refusal: Refill not appropriate    azelastine (ASTELIN) 137 mcg (0.1 %) nasal spray [Pharmacy Med Name: AZELASTINE 0.1% 137MCG/ACT NASAL SPRAY] 777 mL 0     Refused By: RONNIE KEITA     Reason for Refusal: Refill not appropriate            Appointments      Date Provider   Last Visit   7/8/2024 Farhana Whittington MD   Next Visit   Visit date not found Farhana Whittington MD        Note composed:11:25 PM 09/18/2024

## 2024-09-20 ENCOUNTER — TELEPHONE (OUTPATIENT)
Dept: RADIOLOGY | Facility: HOSPITAL | Age: 59
End: 2024-09-20
Payer: MEDICARE

## 2024-09-20 NOTE — TELEPHONE ENCOUNTER
Spoke with patient. Reviewed breast biopsy procedure and reviewed instructions for breast biopsy. Patient expressed understanding and all questions were answered. Provided patient with my phone number to call for any further concerns or questions.   Patient scheduled breast biopsy at the [facility] on 10/10/2024.

## 2024-09-20 NOTE — TELEPHONE ENCOUNTER
Spoke with patient. Reviewed breast biopsy procedure and reviewed instructions for breast biopsy. Patient expressed understanding and all questions were answered. Provided patient with my phone number to call for any further concerns or questions.   Patient scheduled breast biopsy at the [facility] on 10/10/2024.

## 2024-09-20 NOTE — TELEPHONE ENCOUNTER
Spoke with patient. Reviewed breast biopsy procedure and reviewed instructions for breast biopsy. Patient expressed understanding and all questions were answered. Provided patient with my phone number to call for any further concerns or questions.   Patient scheduled breast biopsy at the [facility] on 10/10/2024.    Noé Canales LPN

## 2024-09-23 ENCOUNTER — TELEPHONE (OUTPATIENT)
Dept: INTERNAL MEDICINE | Facility: CLINIC | Age: 59
End: 2024-09-23
Payer: MEDICARE

## 2024-09-23 RX ORDER — DICYCLOMINE HYDROCHLORIDE 10 MG/1
10 CAPSULE ORAL 4 TIMES DAILY PRN
Qty: 360 CAPSULE | Refills: 1 | Status: SHIPPED | OUTPATIENT
Start: 2024-09-23

## 2024-09-23 NOTE — TELEPHONE ENCOUNTER
----- Message from Sandip Andrade sent at 9/23/2024  3:50 PM CDT -----  Contact: [phone]  Patient states please send results and diagnoses of last to Mammo to Dr. Xnader Iraheta (EJ)  Fax to [phone]. Please call and advise.    Thank you and have a great day.

## 2024-09-23 NOTE — TELEPHONE ENCOUNTER
Faxed  both the screening & diagnostic mmg reports  To dr chairez's office, internally.    I called and advised patient this was just done.

## 2024-09-24 ENCOUNTER — PATIENT MESSAGE (OUTPATIENT)
Dept: ADMINISTRATIVE | Facility: OTHER | Age: 59
End: 2024-09-24
Payer: MEDICARE

## 2024-09-26 ENCOUNTER — TELEPHONE (OUTPATIENT)
Dept: PAIN MEDICINE | Facility: CLINIC | Age: 59
End: 2024-09-26
Payer: MEDICARE

## 2024-09-26 ENCOUNTER — PATIENT MESSAGE (OUTPATIENT)
Dept: ADMINISTRATIVE | Facility: OTHER | Age: 59
End: 2024-09-26
Payer: MEDICARE

## 2024-09-26 NOTE — TELEPHONE ENCOUNTER
----- Message from Aide Davenport sent at 9/26/2024  3:14 PM CDT -----  Regarding: Procedure Scheduled  Contact: Patient  Type:  Needs Medical Advice    Who Called: Patient   Symptoms (please be specific): n/a    How long has patient had these symptoms:  n/a   Pharmacy name and phone #:  n/a   Would the patient rather a call back or a response via [facility]? Call back   Best Call Back Number:  [phone]  Additional Information: Patient is requesting a call back with an arrival time for procedure scheduled on 09/30/2024 Please Assist

## 2024-09-26 NOTE — TELEPHONE ENCOUNTER
----- Message from Florinda Dickey sent at 9/26/2024  3:07 PM CDT -----  Name of Who is calling :  BANDAR FORD [[id number]]      What is the request in detail:Pt would like  a call back in regards to her procedure        Can the clinic reply by [facility]:no            What number to call back if not in [facility]: [phone]

## 2024-09-27 ENCOUNTER — PATIENT MESSAGE (OUTPATIENT)
Dept: PAIN MEDICINE | Facility: OTHER | Age: 59
End: 2024-09-27
Payer: MEDICARE

## 2024-09-27 ENCOUNTER — HOSPITAL ENCOUNTER (OUTPATIENT)
Dept: RADIOLOGY | Facility: OTHER | Age: 59
Discharge: HOME | End: 2024-09-27
Attending: INTERNAL MEDICINE
Payer: MEDICARE

## 2024-09-27 DIAGNOSIS — R92.8 ABNORMAL MAMMOGRAM: Primary | ICD-10-CM

## 2024-09-27 PROCEDURE — 25000003 PHARM REV CODE 250: Mod: HCNC | Performed by: INTERNAL MEDICINE

## 2024-09-27 PROCEDURE — 88305 TISSUE EXAM BY PATHOLOGIST: CPT | Mod: HCNC | Performed by: PATHOLOGY

## 2024-09-27 PROCEDURE — 88342 IMHCHEM/IMCYTCHM 1ST ANTB: CPT | Mod: HCNC | Performed by: PATHOLOGY

## 2024-09-27 PROCEDURE — 88305 TISSUE EXAM BY PATHOLOGIST: CPT | Mod: 26,HCNC,, | Performed by: PATHOLOGY

## 2024-09-27 PROCEDURE — 19081 BX BREAST 1ST LESION STRTCTC: CPT | Mod: HCNC,RT

## 2024-09-27 PROCEDURE — 88341 IMHCHEM/IMCYTCHM EA ADD ANTB: CPT | Mod: 26,HCNC,, | Performed by: PATHOLOGY

## 2024-09-27 PROCEDURE — 19081 BX BREAST 1ST LESION STRTCTC: CPT | Mod: HCNC,RT,, | Performed by: RADIOLOGY

## 2024-09-27 PROCEDURE — 88341 IMHCHEM/IMCYTCHM EA ADD ANTB: CPT | Mod: HCNC | Performed by: PATHOLOGY

## 2024-09-27 PROCEDURE — 88342 IMHCHEM/IMCYTCHM 1ST ANTB: CPT | Mod: 26,HCNC,, | Performed by: PATHOLOGY

## 2024-09-27 RX ORDER — LIDOCAINE HYDROCHLORIDE 10 MG/ML
5 INJECTION, SOLUTION INFILTRATION; PERINEURAL ONCE
Status: COMPLETED | OUTPATIENT
Start: 2024-09-27 | End: 2024-09-27

## 2024-09-27 RX ORDER — LIDOCAINE HYDROCHLORIDE AND EPINEPHRINE 20; 10 MG/ML; UG/ML
20 INJECTION, SOLUTION INFILTRATION; PERINEURAL ONCE
Status: COMPLETED | OUTPATIENT
Start: 2024-09-27 | End: 2024-09-27

## 2024-09-27 RX ADMIN — LIDOCAINE HYDROCHLORIDE 5 ML: 10 INJECTION, SOLUTION INFILTRATION; PERINEURAL at 01:09

## 2024-09-27 RX ADMIN — LIDOCAINE HYDROCHLORIDE AND EPINEPHRINE 20 ML: 20; 10 INJECTION, SOLUTION INFILTRATION; PERINEURAL at 01:09

## 2024-09-30 ENCOUNTER — HOSPITAL ENCOUNTER (OUTPATIENT)
Facility: OTHER | Age: 59
Discharge: HOME | End: 2024-09-30
Attending: ANESTHESIOLOGY | Admitting: ANESTHESIOLOGY
Payer: MEDICARE

## 2024-09-30 ENCOUNTER — TELEPHONE (OUTPATIENT)
Dept: SPINE | Facility: CLINIC | Age: 59
End: 2024-09-30
Payer: MEDICARE

## 2024-09-30 VITALS
OXYGEN SATURATION: 96 % | BODY MASS INDEX: 26.68 KG/M2 | DIASTOLIC BLOOD PRESSURE: 69 MMHG | HEART RATE: 68 BPM | RESPIRATION RATE: 16 BRPM | HEIGHT: 62 IN | TEMPERATURE: 99 F | WEIGHT: 145 LBS | SYSTOLIC BLOOD PRESSURE: 137 MMHG

## 2024-09-30 DIAGNOSIS — G89.29 CHRONIC PAIN: ICD-10-CM

## 2024-09-30 DIAGNOSIS — M19.019 SHOULDER ARTHRITIS: Primary | ICD-10-CM

## 2024-09-30 PROCEDURE — 25000003 PHARM REV CODE 250: Mod: HCNC | Performed by: STUDENT IN AN ORGANIZED HEALTH CARE EDUCATION/TRAINING PROGRAM

## 2024-09-30 PROCEDURE — 63600175 PHARM REV CODE 636 W HCPCS: Mod: JZ,JG,HCNC | Performed by: ANESTHESIOLOGY

## 2024-09-30 PROCEDURE — 64418 NJX AA&/STRD SPRSCAP NRV: CPT | Mod: HCNC,LT,, | Performed by: ANESTHESIOLOGY

## 2024-09-30 PROCEDURE — 25000003 PHARM REV CODE 250: Mod: HCNC | Performed by: ANESTHESIOLOGY

## 2024-09-30 PROCEDURE — 64417 NJX AA&/STRD AX NERVE IMG: CPT | Mod: HCNC,LT | Performed by: ANESTHESIOLOGY

## 2024-09-30 PROCEDURE — 64417 NJX AA&/STRD AX NERVE IMG: CPT | Mod: HCNC,LT,, | Performed by: ANESTHESIOLOGY

## 2024-09-30 PROCEDURE — 99152 MOD SED SAME PHYS/QHP 5/>YRS: CPT | Mod: HCNC,,, | Performed by: ANESTHESIOLOGY

## 2024-09-30 PROCEDURE — 99152 MOD SED SAME PHYS/QHP 5/>YRS: CPT | Mod: HCNC | Performed by: ANESTHESIOLOGY

## 2024-09-30 PROCEDURE — 64418 NJX AA&/STRD SPRSCAP NRV: CPT | Mod: HCNC,LT | Performed by: ANESTHESIOLOGY

## 2024-09-30 RX ORDER — LIDOCAINE HYDROCHLORIDE 20 MG/ML
INJECTION, SOLUTION INFILTRATION; PERINEURAL
Status: DISCONTINUED | OUTPATIENT
Start: 2024-09-30 | End: 2024-09-30 | Stop reason: HOSPADM

## 2024-09-30 RX ORDER — MIDAZOLAM HYDROCHLORIDE 1 MG/ML
INJECTION INTRAMUSCULAR; INTRAVENOUS
Status: DISCONTINUED | OUTPATIENT
Start: 2024-09-30 | End: 2024-09-30 | Stop reason: HOSPADM

## 2024-09-30 RX ORDER — SODIUM CHLORIDE 9 MG/ML
INJECTION, SOLUTION INTRAVENOUS CONTINUOUS
Status: DISCONTINUED | OUTPATIENT
Start: 2024-09-30 | End: 2024-09-30 | Stop reason: HOSPADM

## 2024-09-30 RX ORDER — BUPIVACAINE HYDROCHLORIDE 2.5 MG/ML
INJECTION, SOLUTION EPIDURAL; INFILTRATION; INTRACAUDAL
Status: DISCONTINUED | OUTPATIENT
Start: 2024-09-30 | End: 2024-09-30 | Stop reason: HOSPADM

## 2024-09-30 NOTE — OP NOTE
Diagnostic Shoulder Peripheral Nerves Block under Fluoroscopic Guidance    The procedure, risks, benefits, and options were discussed with the patient. There are no contraindications to the procedure. The patent expressed understanding and agreed to the procedure. Informed written consent was obtained prior to the start of the procedure and can be found in the patient's chart.    PATIENT NAME: Sherie Reyes   MRN: [id number]     DATE OF PROCEDURE: 09/30/2024    PROCEDURE: Diagnostic Left Shoulder Peripheral Nerves Block under Fluoroscopic Guidance    PRE-OP DIAGNOSIS: Chronic left shoulder pain [M25.512, G89.29]    POST-OP DIAGNOSIS: Same    PHYSICIAN: Yesica Monroy MD    ASSISTANTS: Chris Blue MD  [facility]     MEDICATIONS INJECTED: Bupivacine 0.25%     LOCAL ANESTHETIC INJECTED: Xylocaine 2%     SEDATION: Versed 4mg and Fentanyl 0mcg                                                                                                                                                                                     Conscious sedation ordered by M.D. Patient re-evaluation prior to administration of conscious sedation. No changes noted in patient's status from initial evaluation. The patient's vital signs were monitored by RN and patient remained hemodynamically stable throughout the procedure. Dictation #1  MRN:[id number]  CSN:[id number]    Event Time In   Sedation Start 1431   Sedation End 1446       ESTIMATED BLOOD LOSS: None    COMPLICATIONS: None    INTERVAL HISTORY: Patient has clinical findings of chronic shoulder pain that is not relieved by other therapies.     TECHNIQUE: Time-out was performed to identify the patient and procedure to be performed. With the patient laying in a oblique position, the surgical area was prepped and draped in the usual sterile fashion using ChloraPrep and a fenestrated drape. The suprascapular and axillary nerve branches were determined under fluoroscopy guidance. The target  site for needle placement was obtained by manual palpation with radiographic confirmation. Skin anesthesia was achieved by injecting Lidocaine 2% over the injection site. A 25 gauge,  3.5 inch spinal quinke needle was then advanced into the superior aspect of the glenoid fossa under fluoroscopy in the AP and lateral views. Once the needle tip position was confirmed on fluoroscopy, negative pressure was applied to confirm no intravascular placement.  0.5 mL of the anesthetic listed above was then slowly injected. Next, the needle was advanced to the superior aspect of the posterior tubercle of the humeral head. Once the needle tip position was confirmed on fluoroscopy, negative pressure was applied to confirm no intravascular placement.  0.5 mL of the anesthetic listed above was then slowly injected. Finally, the C-arm was rotated to oblique position to perform the anterior block of the lateral pectoral nerve branch. The surgical area was prepped and draped in the usual sterile fashion. The target site for needle placement was obtained by manual palpation with radiographic confirmation. Skin anesthesia was achieved by injecting Lidocaine 2% over the injection site. A 25 gauge, 3.5 inch spinal quinke needle was advanced to the inferior aspect of the middle of the coracoid process under fluoroscopy. Once the needle tip position was confirmed on fluoroscopy, negative pressure was applied to confirm no intravascular placement.  0.5 mL of the anesthetic listed above was then slowly injected. The needles were removed and bleeding was nil. A sterile dressing was applied. No specimens collected. The patient tolerated the procedure well.     PRE-PROCEDURE PAIN SCORE: 9-10/10    POST-PROCEDURE PAIN SCORE: 0/10    The patient was monitored after the procedure in the recovery area. They were given post-procedure and discharge instructions to follow at home. The patient was discharged in a stable condition.        Yesica Monroy MD

## 2024-09-30 NOTE — DISCHARGE SUMMARY
Discharge Note  Short Stay      SUMMARY     Admit Date: 9/30/2024    Attending Physician: Yesica Monroy      Discharge Physician: Yesica Monroy      Discharge Date: 9/30/2024 2:31 PM    Procedure(s) (LRB):  BLOCK, NERVE LEFT SHOULDER DIAGNOSTIC *REP CONFIRMED* (Left)    Final Diagnosis: Chronic left shoulder pain [M25.512, G89.29]    Disposition: Home or self care    Patient Instructions:   Current Discharge Medication List        CONTINUE these medications which have NOT CHANGED    Details   acetaminophen (TYLENOL) 500 MG tablet Take 500 mg by mouth every 6 (six) hours as needed for Pain.      albuterol (PROVENTIL/VENTOLIN HFA) 90 mcg/actuation inhaler Inhale 2 puffs into the lungs every 6 (six) hours as needed for Wheezing. Rescue  Qty: 18 g, Refills: 3      atorvastatin (LIPITOR) 40 MG tablet Take 1 tablet (40 mg total) by mouth once daily.  Qty: 90 tablet, Refills: 3      azelastine (ASTELIN) 137 mcg (0.1 %) nasal spray SPRAY 1 SPRAY IN EACH NOSTRIL TWICE DAILY  Qty: 90 mL, Refills: 2      cetirizine (ZYRTEC) 10 MG tablet Take 2 tablets (20 mg total) by mouth every morning.  Qty: 60 tablet, Refills: 3    Associated Diagnoses: Pruritic condition      chlorzoxazone (PARAFON FORTE) 500 mg Tab Take 500 mg by mouth daily as needed.      clonazePAM (KLONOPIN) 1 MG tablet TAKE 1 TABLET BY MOUTH TWICE DAILY  Qty: 60 tablet, Refills: 1      cyanocobalamin 1,000 mcg/mL injection Inject 1 mL (1,000 mcg total) into the muscle every 30 days.  Qty: 30 mL, Refills: 1      cyclobenzaprine (FLEXERIL) 10 MG tablet Take 10 mg by mouth 2 (two) times daily as needed.      diclofenac sodium (VOLTAREN) 1 % Gel Apply 2 g topically 4 (four) times daily.  Qty: 50 g, Refills: 0    Associated Diagnoses: Chronic right shoulder pain; Tear of right rotator cuff, unspecified tear extent, unspecified whether traumatic; Pain      dicyclomine (BENTYL) 10 MG capsule Take 1 capsule (10 mg total) by mouth 4 (four) times daily as needed (abdominal  pain).  Qty: 360 capsule, Refills: 1    Associated Diagnoses: Chronic abdominal pain      estradioL (ESTRACE) 1 MG tablet Take 1 mg by mouth once daily.      ibuprofen (IBU) 800 MG tablet Take 1 tablet (800 mg total) by mouth 2 (two) times daily with meals.  Qty: 60 tablet, Refills: 1    Associated Diagnoses: Chronic left shoulder pain; Complete tear of left rotator cuff, unspecified whether traumatic      LIDOcaine (LIDODERM) 5 % Place 1 patch onto the skin once daily. Remove & Discard patch within 12 hours or as directed by MD  Qty: 14 patch, Refills: 0      LINZESS 290 mcg Cap capsule TAKE 1 CAPSULE(290 MCG) BY MOUTH BEFORE BREAKFAST  Qty: 90 capsule, Refills: 3    Associated Diagnoses: Constipation, unspecified constipation type      losartan (COZAAR) 25 MG tablet Take 1 tablet (25 mg total) by mouth once daily.  Qty: 90 tablet, Refills: 0    Comments: .  Associated Diagnoses: Hypertension associated with diabetes      medroxyPROGESTERone (PROVERA) 2.5 MG tablet Take 2.5 mg by mouth.      melatonin 5 mg Chew Take 10 mg by mouth daily as needed.      mupirocin (BACTROBAN) 2 % ointment APPLY TOPICALLY TO THE AFFECTED AREA TWICE DAILY  Qty: 15 g, Refills: 2      mv-mn/iron/folic acid/herb 190 (VITAMIN D3 COMPLETE ORAL) Take by mouth once daily at 6am.      omega-3 fatty acids/fish oil (FISH OIL-OMEGA-3 FATTY ACIDS) 300-1,000 mg capsule Take 1 capsule by mouth once daily.      omeprazole (PRILOSEC) 40 MG capsule Take 1 capsule (40 mg total) by mouth once daily.  Qty: 90 capsule, Refills: 3      POLYETHYLENE GLYCOL 3350 (MIRALAX ORAL) Take by mouth as needed.       potassium chloride 10% (KAYCIEL) 20 mEq/15 mL oral solution Take 15 mLs (20 mEq total) by mouth once daily.  Qty: 1350 mL, Refills: 1      QUEtiapine (SEROQUEL) 200 MG Tab Take 1 tablet (200 mg total) by mouth every evening.  Qty: 30 tablet, Refills: 2      tiZANidine (ZANAFLEX) 4 MG tablet Take 1 tablet (4 mg total) by mouth every evening.  Qty: 30  tablet, Refills: 1      traZODone (DESYREL) 100 MG tablet TK 1 T PO  QHS      !! venlafaxine (EFFEXOR-XR) 150 MG Cp24 Take 150 mg by mouth once daily.      !! venlafaxine (EFFEXOR-XR) 75 MG 24 hr capsule TAKE ONE CAPSULE BY MOUTH DAILY WITH 150 MG TO EQUAL 225 MG       !! - Potential duplicate medications found. Please discuss with provider.              Discharge Diagnosis: Chronic left shoulder pain [M25.512, G89.29]  Condition on Discharge: Stable with no complications to procedure   Diet on Discharge: Same as before.  Activity: as per instruction sheet.  Discharge to: Home with a responsible adult.  Follow up: 2-4 weeks       Please call my office or pager at [phone] if experienced any weakness or loss of sensation, fever > 101.5, pain uncontrolled with oral medications, persistent nausea/vomiting/or diarrhea, redness or drainage from the incisions, or any other worrisome concerns. If physician on call was not reached or could not communicate with our office for any reason please go to the nearest emergency department

## 2024-09-30 NOTE — H&P
HPI  Patient presenting for Procedure(s) (LRB):  BLOCK, NERVE LEFT SHOULDER DIAGNOSTIC *REP CONFIRMED* (Left)     Patient on Anti-coagulation No    No health changes since previous encounter    Past Medical History:   Diagnosis Date    Anxiety     Degenerative joint disease (DJD) of hip     Diabetes type 2, controlled 7/10/2019    GERD (gastroesophageal reflux disease)     Hyperlipidemia     Hypertension     IBS (irritable bowel syndrome)     Insomnia     Lumbar disc herniation     PTSD (post-traumatic stress disorder)      Past Surgical History:   Procedure Laterality Date    ADENOIDECTOMY      ARTHROSCOPIC DEBRIDEMENT OF ROTATOR CUFF Right 2020    Procedure: DEBRIDEMENT, ROTATOR CUFF, ARTHROSCOPIC;  Surgeon: Melchor Hughes Jr., MD;  Location: [facility] OR;  Service: Orthopedics;  Laterality: Right;  need opus system (Ger MESA notified , EF)  video, notified/confirmed 2020 KB 1310    ARTHROSCOPIC EXCISION OF ACROMIOCLAVICULAR JOINT  2020    Procedure: EXCISION, ACROMIOCLAVICULAR JOINT, ARTHROSCOPIC;  Surgeon: Melchor Hughes Jr., MD;  Location: [facility] OR;  Service: Orthopedics;;    ARTHROSCOPIC REPAIR OF ROTATOR CUFF OF SHOULDER      ARTHROSCOPIC REPAIR OF ROTATOR CUFF OF SHOULDER Left 3/19/2024    Procedure: REPAIR, ROTATOR CUFF, ARTHROSCOPIC;  Surgeon: Melchor Hughes Jr., MD;  Location: [facility] OR;  Service: Orthopedics;  Laterality: Left;  need opus system and regeneten patch    ARTHROSCOPY OF SHOULDER WITH DECOMPRESSION OF SUBACROMIAL SPACE  2020    Procedure: ARTHROSCOPY, SHOULDER, WITH SUBACROMIAL SPACE DECOMPRESSION;  Surgeon: Melchor Hughes Jr., MD;  Location: [facility] OR;  Service: Orthopedics;;     SECTION      DECOMPRESSION OF SUBACROMIAL SPACE Left 3/19/2024    Procedure: DECOMPRESSION, SUBACROMIAL SPACE;  Surgeon: Melchor Hughes Jr., MD;  Location: [facility] OR;  Service: Orthopedics;  Laterality: Left;    EPIDURAL STEROID INJECTION INTO CERVICAL SPINE N/A 2021     Procedure: Injection-steroid-epidural-cervical--C7-T1 IL ZAYNAB;  Surgeon: Gregoria Rodriguez MD;  Location: [facility] PAIN MGT;  Service: Pain Management;  Laterality: N/A;    ESOPHAGOGASTRODUODENOSCOPY N/A 5/22/2023    Procedure: ESOPHAGOGASTRODUODENOSCOPY (EGD);  Surgeon: Tommy Schmitt MD;  Location: [facility] ENDO (4TH FLR);  Service: Endoscopy;  Laterality: N/A;  referral Navjot NP-instr portal-GT    INJECTION OF JOINT Right 9/18/2023    Procedure: INJECTION, JOINT, RIGHT ISCHIAL BURSA  SOONER DATE;  Surgeon: Yesica Monroy MD;  Location: [facility] PAIN MGT;  Service: Pain Management;  Laterality: Right;    TENOTOMY, ELBOW, WITH DEBRIDEMENT AND TENDON REPAIR Right 5/20/2022    Procedure: TENOTOMY,ELBOW,WITH DEBRIDEMENT AND TENDON REPAIR;  Surgeon: Melchor Hughes Jr., MD;  Location: [facility] OR;  Service: Orthopedics;  Laterality: Right;    TONSILLECTOMY       Review of patient's allergies indicates:  No Known Allergies   No current facility-administered medications for this encounter.       PMHx, PSHx, Allergies, Medications reviewed in epic    ROS negative except pain complaints in HPI    OBJECTIVE:    There were no vitals taken for this visit.    PHYSICAL EXAMINATION:    GENERAL: Well appearing, in no acute distress, alert and oriented x3.  PSYCH:  Mood and affect appropriate.  SKIN: Skin color, texture, turgor normal, no rashes or lesions which will impact the procedure.  CV: RRR with palpation of the radial artery.  PULM: No evidence of respiratory difficulty, symmetric chest rise. Clear to auscultation.  NEURO: Cranial nerves grossly intact.    Plan:    Proceed with procedure as planned Procedure(s) (LRB):  BLOCK, NERVE LEFT SHOULDER DIAGNOSTIC *REP CONFIRMED* (Left)    Chris Coronaerick  09/30/2024

## 2024-09-30 NOTE — DISCHARGE INSTRUCTIONS

## 2024-09-30 NOTE — TELEPHONE ENCOUNTER
----- Message from Sharon sent at 9/30/2024 10:33 AM CDT -----  Regarding: Questions and concerns  Name of Who is Calling:Sherie           What is the request in detail:Pt is requesting a call back as soon as possible because she needs to know if she is able to come in for her appointment. She had a breast biopsy done on Friday and needs to know before making a trip there that its ok to have the injection done.            Can the clinic reply by [facility]:No            What Number to Call Back if not in [facility]: [phone]

## 2024-10-01 ENCOUNTER — TELEPHONE (OUTPATIENT)
Dept: PAIN MEDICINE | Facility: CLINIC | Age: 59
End: 2024-10-01
Payer: MEDICARE

## 2024-10-01 ENCOUNTER — TELEPHONE (OUTPATIENT)
Dept: RADIOLOGY | Facility: OTHER | Age: 59
End: 2024-10-01
Payer: MEDICARE

## 2024-10-01 DIAGNOSIS — Z98.890 HISTORY OF ROTATOR CUFF SURGERY: ICD-10-CM

## 2024-10-01 DIAGNOSIS — G89.29 CHRONIC LEFT SHOULDER PAIN: Primary | ICD-10-CM

## 2024-10-01 DIAGNOSIS — M25.512 CHRONIC LEFT SHOULDER PAIN: Primary | ICD-10-CM

## 2024-10-01 LAB
FINAL PATHOLOGIC DIAGNOSIS: NORMAL
GROSS: NORMAL
Lab: NORMAL

## 2024-10-01 NOTE — TELEPHONE ENCOUNTER
Patient notified of right breast biopsy results per pathology reported on 10/1/24. Diagnosis: BENIGN BREAST TISSUE. Explained to patient results are negative for breast cancer. Instructed on need for short interval follow up in 6 months. Questions answered. A Patient verbalized understanding. Biopsy site WNL. Encouraged to call [phone] for further questions or concerns.   no

## 2024-10-01 NOTE — TELEPHONE ENCOUNTER
Medial Branch Block Diary   Left shoulder        Pre procedure pain level 10  Percentage of relief within 24 hours of procedure- 80%  Post procedure level 2  Any Improvements in ADL: bathing, dressing, eating, transferring, using toilet, and walking      I Senthil Parkinson spoke with patient and received their pain diary scores, this is the information the patient provided me with at 3:56 on 10/1/24.    -SENTHIL PARKINSON

## 2024-10-01 NOTE — TELEPHONE ENCOUNTER
----- Message from Ariadna Clayton MD sent at 10/1/2024  4:41 PM CDT -----  The pathology results are benign and concordant to the imaging findings.  The remaining calcifications will be followed at the patient's six-month exam.  At that exam, magnification views should be performed in addition to the standard biopsy follow-up images.   Thank you

## 2024-10-02 DIAGNOSIS — G89.29 CHRONIC LEFT SHOULDER PAIN: Primary | ICD-10-CM

## 2024-10-02 DIAGNOSIS — M25.512 CHRONIC LEFT SHOULDER PAIN: Primary | ICD-10-CM

## 2024-10-03 ENCOUNTER — TELEPHONE (OUTPATIENT)
Dept: PAIN MEDICINE | Facility: CLINIC | Age: 59
End: 2024-10-03
Payer: MEDICARE

## 2024-10-03 NOTE — TELEPHONE ENCOUNTER
Staff spoke with pt and informed her that it was the procedure area who called to get scheduled for the ablation. Staff gave pt procedure area number to give call back to get scheduled.

## 2024-10-08 ENCOUNTER — PATIENT MESSAGE (OUTPATIENT)
Dept: ADMINISTRATIVE | Facility: OTHER | Age: 59
End: 2024-10-08
Payer: MEDICARE

## 2024-10-15 ENCOUNTER — OFFICE VISIT (OUTPATIENT)
Dept: ORTHOPEDICS | Facility: CLINIC | Age: 59
End: 2024-10-15
Payer: MEDICARE

## 2024-10-15 ENCOUNTER — TELEPHONE (OUTPATIENT)
Dept: PAIN MEDICINE | Facility: CLINIC | Age: 59
End: 2024-10-15
Payer: MEDICARE

## 2024-10-15 DIAGNOSIS — G89.29 CHRONIC LEFT SHOULDER PAIN: ICD-10-CM

## 2024-10-15 DIAGNOSIS — M25.512 CHRONIC LEFT SHOULDER PAIN: ICD-10-CM

## 2024-10-15 DIAGNOSIS — Z98.890 S/P LEFT ROTATOR CUFF REPAIR: Primary | ICD-10-CM

## 2024-10-15 PROCEDURE — 4010F ACE/ARB THERAPY RXD/TAKEN: CPT | Mod: HCNC,CPTII,S$GLB, | Performed by: PHYSICIAN ASSISTANT

## 2024-10-15 PROCEDURE — 1160F RVW MEDS BY RX/DR IN RCRD: CPT | Mod: HCNC,CPTII,S$GLB, | Performed by: PHYSICIAN ASSISTANT

## 2024-10-15 PROCEDURE — 1159F MED LIST DOCD IN RCRD: CPT | Mod: HCNC,CPTII,S$GLB, | Performed by: PHYSICIAN ASSISTANT

## 2024-10-15 PROCEDURE — 3044F HG A1C LEVEL LT 7.0%: CPT | Mod: HCNC,CPTII,S$GLB, | Performed by: PHYSICIAN ASSISTANT

## 2024-10-15 PROCEDURE — 99999 PR PBB SHADOW E&M-EST. PATIENT-LVL II: CPT | Mod: PBBFAC,HCNC,, | Performed by: PHYSICIAN ASSISTANT

## 2024-10-15 PROCEDURE — 99213 OFFICE O/P EST LOW 20 MIN: CPT | Mod: HCNC,S$GLB,, | Performed by: PHYSICIAN ASSISTANT

## 2024-10-15 NOTE — TELEPHONE ENCOUNTER
----- Message from Med Assistant Ritter sent at 10/15/2024  2:36 PM CDT -----  Good afternoon all,     Pt has been trying to get in contact to schedule procedure . Please contact.    Thanks !

## 2024-10-15 NOTE — PROGRESS NOTES
Subjective:      Patient ID: Sherie Reyes is a 59 y.o. female.  Chief Complaint: No chief complaint on file.      HPI  Sherie Reyes is a  59 y.o. female returns today for follow up of rotator cuff repair of the left shoulder.      She is now 7 months postop.    Although she was doing a bit better at her last appointment, she states that over the past few weeks her pain has increased and she now rates it a 10/10 in severity.  She denies new injury.  Patient continues to work on shoulder range-of-motion exercises independently.    Of note, the patient has recently established with a new pain management physician.  Recent nerve block has not provided much relief.  She was recommended nerve ablation, which she has been unable to schedule.    Review of patient's allergies indicates:  No Known Allergies      Current Outpatient Medications   Medication Sig Dispense Refill    acetaminophen (TYLENOL) 500 MG tablet Take 500 mg by mouth every 6 (six) hours as needed for Pain.      albuterol (PROVENTIL/VENTOLIN HFA) 90 mcg/actuation inhaler Inhale 2 puffs into the lungs every 6 (six) hours as needed for Wheezing. Rescue 18 g 3    atorvastatin (LIPITOR) 40 MG tablet Take 1 tablet (40 mg total) by mouth once daily. 90 tablet 3    azelastine (ASTELIN) 137 mcg (0.1 %) nasal spray SPRAY 1 SPRAY IN EACH NOSTRIL TWICE DAILY 90 mL 2    cetirizine (ZYRTEC) 10 MG tablet Take 2 tablets (20 mg total) by mouth every morning. 60 tablet 3    chlorzoxazone (PARAFON FORTE) 500 mg Tab Take 500 mg by mouth daily as needed.      clonazePAM (KLONOPIN) 1 MG tablet TAKE 1 TABLET BY MOUTH TWICE DAILY 60 tablet 1    cyanocobalamin 1,000 mcg/mL injection Inject 1 mL (1,000 mcg total) into the muscle every 30 days. 30 mL 1    cyclobenzaprine (FLEXERIL) 10 MG tablet Take 10 mg by mouth 2 (two) times daily as needed.      diclofenac sodium (VOLTAREN) 1 % Gel Apply 2 g topically 4 (four) times daily. 50 g 0    dicyclomine (BENTYL) 10 MG  capsule Take 1 capsule (10 mg total) by mouth 4 (four) times daily as needed (abdominal pain). 360 capsule 1    estradioL (ESTRACE) 1 MG tablet Take 1 mg by mouth once daily.      ibuprofen (IBU) 800 MG tablet Take 1 tablet (800 mg total) by mouth 2 (two) times daily with meals. 60 tablet 1    LIDOcaine (LIDODERM) 5 % Place 1 patch onto the skin once daily. Remove & Discard patch within 12 hours or as directed by MD 14 patch 0    LINZESS 290 mcg Cap capsule TAKE 1 CAPSULE(290 MCG) BY MOUTH BEFORE BREAKFAST 90 capsule 3    losartan (COZAAR) 25 MG tablet Take 1 tablet (25 mg total) by mouth once daily. 90 tablet 0    medroxyPROGESTERone (PROVERA) 2.5 MG tablet Take 2.5 mg by mouth.      melatonin 5 mg Chew Take 10 mg by mouth daily as needed.      mupirocin (BACTROBAN) 2 % ointment APPLY TOPICALLY TO THE AFFECTED AREA TWICE DAILY 15 g 2    mv-mn/iron/folic acid/herb 190 (VITAMIN D3 COMPLETE ORAL) Take by mouth once daily at 6am.      omega-3 fatty acids/fish oil (FISH OIL-OMEGA-3 FATTY ACIDS) 300-1,000 mg capsule Take 1 capsule by mouth once daily.      omeprazole (PRILOSEC) 40 MG capsule Take 1 capsule (40 mg total) by mouth once daily. 90 capsule 3    POLYETHYLENE GLYCOL 3350 (MIRALAX ORAL) Take by mouth as needed.       potassium chloride 10% (KAYCIEL) 20 mEq/15 mL oral solution Take 15 mLs (20 mEq total) by mouth once daily. 1350 mL 1    QUEtiapine (SEROQUEL) 200 MG Tab Take 1 tablet (200 mg total) by mouth every evening. 30 tablet 2    tiZANidine (ZANAFLEX) 4 MG tablet Take 1 tablet (4 mg total) by mouth every evening. 30 tablet 1    traZODone (DESYREL) 100 MG tablet TK 1 T PO  QHS      venlafaxine (EFFEXOR-XR) 150 MG Cp24 Take 150 mg by mouth once daily.      venlafaxine (EFFEXOR-XR) 75 MG 24 hr capsule TAKE ONE CAPSULE BY MOUTH DAILY WITH 150 MG TO EQUAL 225 MG       No current facility-administered medications for this visit.       Past Medical History:   Diagnosis Date    Anxiety     Degenerative joint  disease (DJD) of hip     GERD (gastroesophageal reflux disease)     Hyperlipidemia     Hypertension     IBS (irritable bowel syndrome)     Insomnia     Lumbar disc herniation     PTSD (post-traumatic stress disorder)        Past Surgical History:   Procedure Laterality Date    ADENOIDECTOMY      ARTHROSCOPIC DEBRIDEMENT OF ROTATOR CUFF Right 2020    Procedure: DEBRIDEMENT, ROTATOR CUFF, ARTHROSCOPIC;  Surgeon: Melchor Hughes Jr., MD;  Location: [facility] OR;  Service: Orthopedics;  Laterality: Right;  need opus system (Ger MESA notified , )  video, notified/confirmed 2020 KB 1310    ARTHROSCOPIC EXCISION OF ACROMIOCLAVICULAR JOINT  2020    Procedure: EXCISION, ACROMIOCLAVICULAR JOINT, ARTHROSCOPIC;  Surgeon: Melchor Hughes Jr., MD;  Location: [facility] OR;  Service: Orthopedics;;    ARTHROSCOPIC REPAIR OF ROTATOR CUFF OF SHOULDER      ARTHROSCOPIC REPAIR OF ROTATOR CUFF OF SHOULDER Left 3/19/2024    Procedure: REPAIR, ROTATOR CUFF, ARTHROSCOPIC;  Surgeon: Melchor Hughes Jr., MD;  Location: [facility] OR;  Service: Orthopedics;  Laterality: Left;  need opus system and regeneten patch    ARTHROSCOPY OF SHOULDER WITH DECOMPRESSION OF SUBACROMIAL SPACE  2020    Procedure: ARTHROSCOPY, SHOULDER, WITH SUBACROMIAL SPACE DECOMPRESSION;  Surgeon: Melchor Hughes Jr., MD;  Location: [facility] OR;  Service: Orthopedics;;     SECTION      DECOMPRESSION OF SUBACROMIAL SPACE Left 3/19/2024    Procedure: DECOMPRESSION, SUBACROMIAL SPACE;  Surgeon: Melchor Hughes Jr., MD;  Location: [facility] OR;  Service: Orthopedics;  Laterality: Left;    EPIDURAL STEROID INJECTION INTO CERVICAL SPINE N/A 2021    Procedure: Injection-steroid-epidural-cervical--C7-T1 IL ZAYNAB;  Surgeon: Gregoria Rodriguez MD;  Location: [facility] PAIN MGT;  Service: Pain Management;  Laterality: N/A;    ESOPHAGOGASTRODUODENOSCOPY N/A 2023    Procedure: ESOPHAGOGASTRODUODENOSCOPY (EGD);  Surgeon: Tommy Schmitt MD;  Location: [address]  [facility]);  Service: Endoscopy;  Laterality: N/A;  referral Nav[facility]ot NP-instr portal-GT    INJECTION OF ANESTHETIC AGENT AROUND NERVE Left 9/30/2024    Procedure: BLOCK, NERVE LEFT SHOULDER DIAGNOSTIC *REP CONFIRMED*;  Surgeon: Yesica Monroy MD;  Location: [facility] PAIN MGT;  Service: Pain Management;  Laterality: Left;  [phone]    INJECTION OF JOINT Right 9/18/2023    Procedure: INJECTION, JOINT, RIGHT ISCHIAL BURSA  SOONER DATE;  Surgeon: Yesica Monroy MD;  Location: [facility] PAIN MGT;  Service: Pain Management;  Laterality: Right;    TENOTOMY, ELBOW, WITH DEBRIDEMENT AND TENDON REPAIR Right 5/20/2022    Procedure: TENOTOMY,ELBOW,WITH DEBRIDEMENT AND TENDON REPAIR;  Surgeon: Melchor Hughes Jr., MD;  Location: [facility] OR;  Service: Orthopedics;  Laterality: Right;    TONSILLECTOMY         OBJECTIVE:   PHYSICAL EXAM:       There were no vitals filed for this visit.      Ortho/SPM Exam  Examination left shoulder   Surgical incisions are well healed with appropriate scar formation  No tenderness or bruising   Range of motion: AROM , PROM , IR SI, ER 30  There is some weakness of the rotator cuff. No instability   Neurologic exam intact    RADIOGRAPHS:  No new imaging    ASSESSMENT/PLAN:     IMPRESSION:    Left shoulder pain after previous rotator cuff repair    Discussion:  The patient has shown only mild improvements in shoulder range of motion compared to her last exam.  Her pain has increased despite recent shoulder block performed Pain Management.    PT: We discussed continued home exercise program.    Imaging:  Plan to send the patient for CT arthrogram to evaluate for possible rotator cuff retear following repair.  Order placed today.    Pain control: Continue Motrin by mouth Zanaflex at night as needed.  Patient was encouraged to continue close follow-ups with pain management physician.  We will send a message to pain management to try and assist her in scheduling her nerve ablation    FOLLOW UP:  6  weeks    Orders Placed This Encounter   Procedures    IR Arthrogram Shoulder Left,COMPLETE With CT To Follow (XPD)     Standing Status:   Future     Standing Expiration Date:   10/15/2025     Order Specific Question:   Is the patient allergic to iodine contrast?     Answer:   No     Order Specific Question:   Has the patient had a prior contrast or iodine reaction?     Answer:   No     Order Specific Question:   Is the patient currently on any blood thinners like Aspirin, Coumadin, or Plavix?     Answer:   No     Order Specific Question:   Is the patient on any Metformin drug, such as Glucophage or Glucovance?     Answer:   No     Order Specific Question:   May the Radiologist modify the order per protocol to meet the clinical needs of the patient?     Answer:   Yes     Order Specific Question:   Release to patient     Answer:   Immediate    CT Arthrogram Shoulder Left     Standing Status:   Future     Standing Expiration Date:   10/15/2025     Order Specific Question:   Is the patient allergic to iodine contrast?     Answer:   No     Order Specific Question:   Does this patient have impaired renal function?     Answer:   No     Order Specific Question:   May the Radiologist modify the order per protocol to meet the clinical needs of the patient?     Answer:   Yes

## 2024-10-16 ENCOUNTER — TELEPHONE (OUTPATIENT)
Dept: PAIN MEDICINE | Facility: CLINIC | Age: 59
End: 2024-10-16
Payer: MEDICARE

## 2024-10-16 NOTE — TELEPHONE ENCOUNTER
----- Message from Florinda sent at 10/16/2024  4:08 PM CDT -----  Name of Who is calling :  BANDAR FORD [[id number]]      What is the request in detail:  Pt would like a sooner procedure date. She said she can do it with someone else if possible. Please assist       Can the clinic reply by [facility]:  no          What number to call back [facility]: [phone]

## 2024-10-21 DIAGNOSIS — M25.512 CHRONIC LEFT SHOULDER PAIN: ICD-10-CM

## 2024-10-21 DIAGNOSIS — G89.29 CHRONIC LEFT SHOULDER PAIN: ICD-10-CM

## 2024-10-21 DIAGNOSIS — M75.122 COMPLETE TEAR OF LEFT ROTATOR CUFF, UNSPECIFIED WHETHER TRAUMATIC: ICD-10-CM

## 2024-10-22 RX ORDER — IBUPROFEN 800 MG/1
800 TABLET ORAL 2 TIMES DAILY WITH MEALS
Qty: 120 TABLET | Refills: 5 | Status: SHIPPED | OUTPATIENT
Start: 2024-10-22

## 2024-10-30 ENCOUNTER — TELEPHONE (OUTPATIENT)
Dept: INTERVENTIONAL RADIOLOGY/VASCULAR | Facility: HOSPITAL | Age: 59
End: 2024-10-30
Payer: MEDICARE

## 2024-10-31 ENCOUNTER — HOSPITAL ENCOUNTER (OUTPATIENT)
Dept: RADIOLOGY | Facility: HOSPITAL | Age: 59
Discharge: HOME | End: 2024-10-31
Attending: PHYSICIAN ASSISTANT
Payer: MEDICARE

## 2024-10-31 ENCOUNTER — HOSPITAL ENCOUNTER (OUTPATIENT)
Dept: INTERVENTIONAL RADIOLOGY/VASCULAR | Facility: HOSPITAL | Age: 59
Discharge: HOME | End: 2024-10-31
Attending: PHYSICIAN ASSISTANT
Payer: MEDICARE

## 2024-10-31 VITALS
BODY MASS INDEX: 26.31 KG/M2 | WEIGHT: 143 LBS | OXYGEN SATURATION: 98 % | RESPIRATION RATE: 16 BRPM | HEART RATE: 74 BPM | TEMPERATURE: 98 F | HEIGHT: 62 IN | SYSTOLIC BLOOD PRESSURE: 109 MMHG | DIASTOLIC BLOOD PRESSURE: 62 MMHG

## 2024-10-31 DIAGNOSIS — G89.29 CHRONIC LEFT SHOULDER PAIN: ICD-10-CM

## 2024-10-31 DIAGNOSIS — Z98.890 S/P LEFT ROTATOR CUFF REPAIR: ICD-10-CM

## 2024-10-31 DIAGNOSIS — M25.512 CHRONIC LEFT SHOULDER PAIN: ICD-10-CM

## 2024-10-31 PROCEDURE — 63600175 PHARM REV CODE 636 W HCPCS: Mod: HCNC | Performed by: RADIOLOGY

## 2024-10-31 PROCEDURE — 73201 CT UPPER EXTREMITY W/DYE: CPT | Mod: TC,HCNC,LT

## 2024-10-31 PROCEDURE — 25500020 PHARM REV CODE 255: Mod: HCNC | Performed by: RADIOLOGY

## 2024-10-31 PROCEDURE — 73201 CT UPPER EXTREMITY W/DYE: CPT | Mod: 26,HCNC,LT, | Performed by: RADIOLOGY

## 2024-10-31 RX ORDER — LIDOCAINE HYDROCHLORIDE 10 MG/ML
INJECTION, SOLUTION INFILTRATION; PERINEURAL
Status: COMPLETED | OUTPATIENT
Start: 2024-10-31 | End: 2024-10-31

## 2024-10-31 RX ADMIN — LIDOCAINE HYDROCHLORIDE 5 ML: 10 INJECTION, SOLUTION INFILTRATION; PERINEURAL at 02:10

## 2024-10-31 RX ADMIN — IOHEXOL 10 ML: 300 INJECTION, SOLUTION INTRAVENOUS at 02:10

## 2024-11-04 ENCOUNTER — TELEPHONE (OUTPATIENT)
Dept: PAIN MEDICINE | Facility: CLINIC | Age: 59
End: 2024-11-04
Payer: MEDICARE

## 2024-11-04 NOTE — TELEPHONE ENCOUNTER
----- Message from Dorcas sent at 11/4/2024  3:01 PM CST -----  Regarding: call back  Type: Patient Call Back    Who called: pt     What is the request in detail: calling to let staff know she had a CT done on her shoulder, wants to know if provider needs to review images     Can the clinic reply by [facility]?no    Would the patient rather a call back or a response via [facility]? call    Best call back number: [phone]     Additional Information:

## 2024-11-05 ENCOUNTER — PATIENT MESSAGE (OUTPATIENT)
Dept: ADMINISTRATIVE | Facility: OTHER | Age: 59
End: 2024-11-05
Payer: MEDICARE

## 2024-11-06 ENCOUNTER — PATIENT MESSAGE (OUTPATIENT)
Dept: ADMINISTRATIVE | Facility: OTHER | Age: 59
End: 2024-11-06
Payer: MEDICARE

## 2024-11-11 ENCOUNTER — HOSPITAL ENCOUNTER (OUTPATIENT)
Facility: OTHER | Age: 59
Discharge: HOME | End: 2024-11-11
Attending: ANESTHESIOLOGY | Admitting: ANESTHESIOLOGY
Payer: MEDICARE

## 2024-11-11 VITALS
DIASTOLIC BLOOD PRESSURE: 71 MMHG | BODY MASS INDEX: 26.68 KG/M2 | WEIGHT: 145 LBS | OXYGEN SATURATION: 95 % | TEMPERATURE: 98 F | HEIGHT: 62 IN | RESPIRATION RATE: 16 BRPM | SYSTOLIC BLOOD PRESSURE: 125 MMHG | HEART RATE: 79 BPM

## 2024-11-11 DIAGNOSIS — M19.019 OSTEOARTHRITIS OF GLENOHUMERAL JOINT, UNSPECIFIED LATERALITY: Primary | ICD-10-CM

## 2024-11-11 DIAGNOSIS — G89.29 CHRONIC PAIN: ICD-10-CM

## 2024-11-11 PROCEDURE — 63600175 PHARM REV CODE 636 W HCPCS: Mod: HCNC | Performed by: ANESTHESIOLOGY

## 2024-11-11 PROCEDURE — 64640 INJECTION TREATMENT OF NERVE: CPT | Mod: HCNC,LT | Performed by: ANESTHESIOLOGY

## 2024-11-11 PROCEDURE — 99153 MOD SED SAME PHYS/QHP EA: CPT | Mod: HCNC | Performed by: ANESTHESIOLOGY

## 2024-11-11 PROCEDURE — A4649 SURGICAL SUPPLIES: HCPCS | Mod: HCNC | Performed by: ANESTHESIOLOGY

## 2024-11-11 PROCEDURE — 64640 INJECTION TREATMENT OF NERVE: CPT | Mod: HCNC,LT,, | Performed by: ANESTHESIOLOGY

## 2024-11-11 PROCEDURE — 99152 MOD SED SAME PHYS/QHP 5/>YRS: CPT | Mod: HCNC,,, | Performed by: ANESTHESIOLOGY

## 2024-11-11 PROCEDURE — 99152 MOD SED SAME PHYS/QHP 5/>YRS: CPT | Mod: HCNC | Performed by: ANESTHESIOLOGY

## 2024-11-11 RX ORDER — FENTANYL CITRATE 50 UG/ML
INJECTION, SOLUTION INTRAMUSCULAR; INTRAVENOUS
Status: DISCONTINUED | OUTPATIENT
Start: 2024-11-11 | End: 2024-11-11 | Stop reason: HOSPADM

## 2024-11-11 RX ORDER — BUPIVACAINE HYDROCHLORIDE 2.5 MG/ML
INJECTION, SOLUTION EPIDURAL; INFILTRATION; INTRACAUDAL
Status: DISCONTINUED | OUTPATIENT
Start: 2024-11-11 | End: 2024-11-11 | Stop reason: HOSPADM

## 2024-11-11 RX ORDER — LIDOCAINE HYDROCHLORIDE 20 MG/ML
INJECTION, SOLUTION INFILTRATION; PERINEURAL
Status: DISCONTINUED | OUTPATIENT
Start: 2024-11-11 | End: 2024-11-11 | Stop reason: HOSPADM

## 2024-11-11 RX ORDER — SODIUM CHLORIDE 9 MG/ML
INJECTION, SOLUTION INTRAVENOUS CONTINUOUS
Status: DISCONTINUED | OUTPATIENT
Start: 2024-11-11 | End: 2024-11-11 | Stop reason: HOSPADM

## 2024-11-11 RX ORDER — TRIAMCINOLONE ACETONIDE 40 MG/ML
INJECTION, SUSPENSION INTRA-ARTICULAR; INTRAMUSCULAR
Status: DISCONTINUED | OUTPATIENT
Start: 2024-11-11 | End: 2024-11-11 | Stop reason: HOSPADM

## 2024-11-11 RX ORDER — MIDAZOLAM HYDROCHLORIDE 1 MG/ML
INJECTION INTRAMUSCULAR; INTRAVENOUS
Status: DISCONTINUED | OUTPATIENT
Start: 2024-11-11 | End: 2024-11-11 | Stop reason: HOSPADM

## 2024-11-11 NOTE — DISCHARGE INSTRUCTIONS

## 2024-11-11 NOTE — H&P
HPI  Patient presenting for Procedure(s) (LRB):  RADIOFREQUENCY ABLATION LEFT SHOULDER COOLED *REP CONFIRMED* (Left)     Patient on Anti-coagulation No    No health changes since previous encounter    Past Medical History:   Diagnosis Date    Anxiety     Degenerative joint disease (DJD) of hip     GERD (gastroesophageal reflux disease)     Hyperlipidemia     Hypertension     IBS (irritable bowel syndrome)     Insomnia     Lumbar disc herniation     PTSD (post-traumatic stress disorder)      Past Surgical History:   Procedure Laterality Date    ADENOIDECTOMY      ARTHROSCOPIC DEBRIDEMENT OF ROTATOR CUFF Right 2020    Procedure: DEBRIDEMENT, ROTATOR CUFF, ARTHROSCOPIC;  Surgeon: Melchor Hughes Jr., MD;  Location: [facility] OR;  Service: Orthopedics;  Laterality: Right;  need opus system (Ger MESA notified , EF)  video, notified/confirmed 2020 KB 1310    ARTHROSCOPIC EXCISION OF ACROMIOCLAVICULAR JOINT  2020    Procedure: EXCISION, ACROMIOCLAVICULAR JOINT, ARTHROSCOPIC;  Surgeon: Melchor Hughes Jr., MD;  Location: [facility] OR;  Service: Orthopedics;;    ARTHROSCOPIC REPAIR OF ROTATOR CUFF OF SHOULDER      ARTHROSCOPIC REPAIR OF ROTATOR CUFF OF SHOULDER Left 3/19/2024    Procedure: REPAIR, ROTATOR CUFF, ARTHROSCOPIC;  Surgeon: Melchor Hughes Jr., MD;  Location: [facility] OR;  Service: Orthopedics;  Laterality: Left;  need opus system and regeneten patch    ARTHROSCOPY OF SHOULDER WITH DECOMPRESSION OF SUBACROMIAL SPACE  2020    Procedure: ARTHROSCOPY, SHOULDER, WITH SUBACROMIAL SPACE DECOMPRESSION;  Surgeon: Melchor Hughes Jr., MD;  Location: [facility] OR;  Service: Orthopedics;;     SECTION      DECOMPRESSION OF SUBACROMIAL SPACE Left 3/19/2024    Procedure: DECOMPRESSION, SUBACROMIAL SPACE;  Surgeon: Melchor Hughes Jr., MD;  Location: [facility] OR;  Service: Orthopedics;  Laterality: Left;    EPIDURAL STEROID INJECTION INTO CERVICAL SPINE N/A 2021    Procedure:  "Injection-steroid-epidural-cervical--C7-T1 IL ZAYNAB;  Surgeon: Gregoria Rodriguez MD;  Location: [facility] PAIN MGT;  Service: Pain Management;  Laterality: N/A;    ESOPHAGOGASTRODUODENOSCOPY N/A 5/22/2023    Procedure: ESOPHAGOGASTRODUODENOSCOPY (EGD);  Surgeon: Tommy Schmitt MD;  Location: [facility] (4TH [facility]LR);  Service: Endoscopy;  Laterality: N/A;  referral Nav[facility]ot HENRY-instr portal-GT    INJECTION OF ANESTHETIC AGENT AROUND NERVE Left 9/30/2024    Procedure: BLOCK, NERVE LEFT SHOULDER DIAGNOSTIC *REP CONFIRMED*;  Surgeon: Yesica Monroy MD;  Location: [facility];  Service: Pain Management;  Laterality: Left;  [phone]    INJECTION OF JOINT Right 9/18/2023    Procedure: INJECTION, JOINT, RIGHT ISCHIAL BURSA  SOONER DATE;  Surgeon: Yesica Monroy MD;  Location: [facility];  Service: Pain Management;  Laterality: Right;    TENOTOMY, ELBOW, WITH DEBRIDEMENT AND TENDON REPAIR Right 5/20/2022    Procedure: TENOTOMY,ELBOW,WITH DEBRIDEMENT AND TENDON REPAIR;  Surgeon: Melchor Hughes Jr., MD;  Location: [facility] OR;  Service: Orthopedics;  Laterality: Right;    TONSILLECTOMY       Review of patient's allergies indicates:  No Known Allergies   Current Facility-Administered Medications   Medication    0.9%  NaCl infusion    BUPivacaine (PF) 0.25% (2.5 mg/ml) injection    LIDOcaine HCL 20 mg/ml (2%) injection    triamcinolone acetonide injection       PMHx, PSHx, Allergies, Medications reviewed in epic    ROS negative except pain complaints in HPI    OBJECTIVE:    /61 (BP Location: Right arm, Patient Position: Lying)   Pulse 74   Temp 98.1 °F (36.7 °C) (Oral)   Resp 16   Ht 5' 2" (1.575 m)   Wt 65.8 kg (145 lb)   SpO2 97%   BMI 26.52 kg/m²     PHYSICAL EXAMINATION:    GENERAL: Well appearing, in no acute distress, alert and oriented x3.  PSYCH:  Mood and affect appropriate.  SKIN: Skin color, texture, turgor normal, no rashes or lesions which will impact the procedure.  CV: RRR with palpation of the radial " artery.  PULM: No evidence of respiratory difficulty, symmetric chest rise. Clear to auscultation.  NEURO: Cranial nerves grossly intact.    Plan:    Proceed with procedure as planned Procedure(s) (LRB):  RADIOFREQUENCY ABLATION LEFT SHOULDER COOLED *REP CONFIRMED* (Left)    Modesto Carolina  11/11/2024

## 2024-11-11 NOTE — OP NOTE
Therapeutic Shoulder Peripheral Cooled Nerve Radiofrequency Ablation under Fluoroscopic Guidance    The procedure, risks, benefits, and options were discussed with the patient. There are no contraindications to the procedure. The patent expressed understanding and agreed to the procedure. Informed written consent was obtained prior to the start of the procedure and can be found in the patient's chart.    PATIENT NAME: Sherie Reyes   MRN: [id number]     DATE OF PROCEDURE: 11/11/2024    PROCEDURE: Therapeutic Left Shoulder Peripheral Cooled Nerve Radiofrequency Ablation under Fluoroscopic Guidance    PRE-OP DIAGNOSIS: Chronic left shoulder pain [M25.512, G89.29]    POST-OP DIAGNOSIS: Same    PHYSICIAN: Yesica Monroy MD    ASSISTANTS: Modesto Carolina MD  [facility] pain fellow    MEDICATIONS INJECTED: Preservative-free Kenalog 40mg with 9cc of Bupivacine 0.25%     LOCAL ANESTHETIC INJECTED: Xylocaine 2%     SEDATION: Versed 4mg and Fentanyl 75mcg                                                                                                                                                                                     Conscious sedation ordered by M.D. Patient re-evaluation prior to administration of conscious sedation. No changes noted in patient's status from initial evaluation. The patient's vital signs were monitored by RN and patient remained hemodynamically stable throughout the procedure.    Event Time In   Sedation Start 1455   Sedation End 1528       ESTIMATED BLOOD LOSS: None    COMPLICATIONS: None    INTERVAL HISTORY: Patient has clinical findings of chronic shoulder pain. Patients has completed 2 previous diagnostic peripheral shoulder nerve blocks with at least 80% relief for the expected duration of the local anesthetic utilized.     TECHNIQUE: Time-out was performed to identify the patient and procedure to be performed. The patient was placed on the exam table in a comfortable supine position for the  posterior ablations of the Suprascapular and Axillary nerve branches. The target site for needle placement was obtained by manual palpation with radiographic confirmation. The surgical area was prepped and draped in the usual sterile fashion using ChloraPrep and a fenestrated drape. Skin anesthesia was achieved by injecting Lidocaine 2% over the injection sites.     The patient was rotated after the completion of the posterior Suprascapular and Axillary nerve branch ablations to a prone position to perform the anterior ablation of the Lateral Pectoral nerve branch. The target site for needle placement was obtained by manual palpation with radiographic confirmation. The surgical area was prepped and draped in the usual sterile fashion using ChloraPrep and a fenestrated drape. Skin anesthesia was achieved by injecting Lidocaine 2% over the injection sites.     A 17 gauge, 50/75mm radiofrequency introducer needle with a 2 mm active tip was placed overlying the Left shoulder joint. For the suprascapular target,  the needle was placed inferior to the spinoglenoid notch on the most lateral border of the glenoid fossa rim and then redirected inferiorly approximately 4mm for a second lesion. For the axillary target, the needle was advanced to the inferior most lateral border of the greater tubercle and then repositioned approximately 4mm inferiorly for the a second lesion. For the lateral pectoral target, the needle was placed at the midpoint of the coracoid process. All imaging was done using fluoroscopy.     Attempted aspiration yielded no blood at each site. Lateral x-ray views showed all the needles at proper depth. Motor stimulation was tested at 2.0 volts with no arm movement. Images were saved in AP and lateral. 1cc of 2% lidocaine was injected at each site before the start of each ablation. Coolief radiofrequency ablation was then conducted of each of the shoulder nerve branches (suprascapular, axillary, and lateral  pectoral) at 60 degrees at the probe, 80 degree lesion, for 2:30 minutes. 1 mL of the medication mixture listed above was then slowly injected at each site. The needles were removed and bleeding was nil. A sterile dressing was applied. No specimens collected. The patient tolerated the procedure well.     PRE-PROCEDURE PAIN SCORE: 8-10/10    POST-PROCEDURE PAIN SCORE: 0/10    The patient was monitored after the procedure in the recovery area. They were given post-procedure and discharge instructions to follow at home. The patient was discharged in a stable condition.    Modesto Carolina MD     I reviewed and edited the fellow's note. I conducted my own interview and physical examination. I agree with the findings. I was present and supervising all critical portions of the procedure.

## 2024-11-11 NOTE — DISCHARGE SUMMARY
Discharge Note  Short Stay      SUMMARY     Admit Date: 11/11/2024    Attending Physician: Yesica Monroy MD    Discharge Physician: Yesica Monroy MD      Discharge Date: 11/11/2024 3:35 PM    Procedure(s) (LRB):  RADIOFREQUENCY ABLATION LEFT SHOULDER COOLED *REP CONFIRMED* (Left)    Final Diagnosis: Chronic left shoulder pain [M25.512, G89.29]    Disposition: Home or self care    Patient Instructions:   Current Discharge Medication List        CONTINUE these medications which have NOT CHANGED    Details   acetaminophen (TYLENOL) 500 MG tablet Take 500 mg by mouth every 6 (six) hours as needed for Pain.      albuterol (PROVENTIL/VENTOLIN HFA) 90 mcg/actuation inhaler Inhale 2 puffs into the lungs every 6 (six) hours as needed for Wheezing. Rescue  Qty: 18 g, Refills: 3      atorvastatin (LIPITOR) 40 MG tablet Take 1 tablet (40 mg total) by mouth once daily.  Qty: 90 tablet, Refills: 3      azelastine (ASTELIN) 137 mcg (0.1 %) nasal spray SPRAY 1 SPRAY IN EACH NOSTRIL TWICE DAILY  Qty: 90 mL, Refills: 2      cetirizine (ZYRTEC) 10 MG tablet Take 2 tablets (20 mg total) by mouth every morning.  Qty: 60 tablet, Refills: 3    Associated Diagnoses: Pruritic condition      chlorzoxazone (PARAFON FORTE) 500 mg Tab Take 500 mg by mouth daily as needed.      clonazePAM (KLONOPIN) 1 MG tablet TAKE 1 TABLET BY MOUTH TWICE DAILY  Qty: 60 tablet, Refills: 1      cyanocobalamin 1,000 mcg/mL injection Inject 1 mL (1,000 mcg total) into the muscle every 30 days.  Qty: 30 mL, Refills: 1      cyclobenzaprine (FLEXERIL) 10 MG tablet Take 10 mg by mouth 2 (two) times daily as needed.      diclofenac sodium (VOLTAREN) 1 % Gel Apply 2 g topically 4 (four) times daily.  Qty: 50 g, Refills: 0    Associated Diagnoses: Chronic right shoulder pain; Tear of right rotator cuff, unspecified tear extent, unspecified whether traumatic; Pain      dicyclomine (BENTYL) 10 MG capsule Take 1 capsule (10 mg total) by mouth 4 (four) times daily as needed  (abdominal pain).  Qty: 360 capsule, Refills: 1    Associated Diagnoses: Chronic abdominal pain      estradioL (ESTRACE) 1 MG tablet Take 1 mg by mouth once daily.       mg tablet TAKE 1 TABLET TWICE DAILY WITH MEALS  Qty: 120 tablet, Refills: 5    Associated Diagnoses: Chronic left shoulder pain; Complete tear of left rotator cuff, unspecified whether traumatic      LIDOcaine (LIDODERM) 5 % Place 1 patch onto the skin once daily. Remove & Discard patch within 12 hours or as directed by MD  Qty: 14 patch, Refills: 0      LINZESS 290 mcg Cap capsule TAKE 1 CAPSULE(290 MCG) BY MOUTH BEFORE BREAKFAST  Qty: 90 capsule, Refills: 3    Associated Diagnoses: Constipation, unspecified constipation type      losartan (COZAAR) 25 MG tablet Take 1 tablet (25 mg total) by mouth once daily.  Qty: 90 tablet, Refills: 0    Comments: .  Associated Diagnoses: Hypertension associated with diabetes      medroxyPROGESTERone (PROVERA) 2.5 MG tablet Take 2.5 mg by mouth.      melatonin 5 mg Chew Take 10 mg by mouth daily as needed.      mupirocin (BACTROBAN) 2 % ointment APPLY TOPICALLY TO THE AFFECTED AREA TWICE DAILY  Qty: 15 g, Refills: 2      mv-mn/iron/folic acid/herb 190 (VITAMIN D3 COMPLETE ORAL) Take by mouth once daily at 6am.      omega-3 fatty acids/fish oil (FISH OIL-OMEGA-3 FATTY ACIDS) 300-1,000 mg capsule Take 1 capsule by mouth once daily.      omeprazole (PRILOSEC) 40 MG capsule Take 1 capsule (40 mg total) by mouth once daily.  Qty: 90 capsule, Refills: 3      POLYETHYLENE GLYCOL 3350 (MIRALAX ORAL) Take by mouth as needed.       potassium chloride 10% (KAYCIEL) 20 mEq/15 mL oral solution Take 15 mLs (20 mEq total) by mouth once daily.  Qty: 1350 mL, Refills: 1      QUEtiapine (SEROQUEL) 200 MG Tab Take 1 tablet (200 mg total) by mouth every evening.  Qty: 30 tablet, Refills: 2      tiZANidine (ZANAFLEX) 4 MG tablet Take 1 tablet (4 mg total) by mouth every evening.  Qty: 30 tablet, Refills: 1      traZODone  (DESYREL) 100 MG tablet TK 1 T PO  QHS      !! venlafaxine (EFFEXOR-XR) 150 MG Cp24 Take 150 mg by mouth once daily.      !! venlafaxine (EFFEXOR-XR) 75 MG 24 hr capsule TAKE ONE CAPSULE BY MOUTH DAILY WITH 150 MG TO EQUAL 225 MG       !! - Potential duplicate medications found. Please discuss with provider.              Discharge Diagnosis: Chronic left shoulder pain [M25.512, G89.29]  Condition on Discharge: Stable with no complications to procedure   Diet on Discharge: Same as before.  Activity: as per instruction sheet.  Discharge to: Home with a responsible adult.  Follow up: 2-4 weeks       Please call my office or pager at [phone] if experienced any weakness or loss of sensation, fever > 101.5, pain uncontrolled with oral medications, persistent nausea/vomiting/or diarrhea, redness or drainage from the incisions, or any other worrisome concerns. If physician on call was not reached or could not communicate with our office for any reason please go to the nearest emergency department     Modesto Carolina MD

## 2024-11-15 ENCOUNTER — PATIENT MESSAGE (OUTPATIENT)
Dept: ADMINISTRATIVE | Facility: OTHER | Age: 59
End: 2024-11-15
Payer: MEDICARE

## 2024-11-15 DIAGNOSIS — I15.2 HYPERTENSION ASSOCIATED WITH DIABETES: ICD-10-CM

## 2024-11-15 DIAGNOSIS — E11.59 HYPERTENSION ASSOCIATED WITH DIABETES: ICD-10-CM

## 2024-11-15 RX ORDER — LOSARTAN POTASSIUM 25 MG/1
TABLET ORAL
Qty: 90 TABLET | Refills: 0 | OUTPATIENT
Start: 2024-11-15

## 2024-11-15 NOTE — TELEPHONE ENCOUNTER
No care due was identified.  [facility] Embedded Care Due Messages. Reference number: [id number].   11/15/2024 2:26:05 PM CST

## 2024-11-16 NOTE — TELEPHONE ENCOUNTER
[facility] Note  Quick DC. Inappropriate Request   Refill request requires further review by MD: NO   Medication Therapy Plan: Pharmacy is requesting new script(s) for the following medications without required information, (sig/ frequency/qty/etc)     [facility] action(s):  Quick Discontinue      Duplicate Pended Encounter(s)/ Last Prescribed Details:    Pharmacies have been requesting medications for patients without required information, (sig, frequency, qty, etc.). In addition, requests are sent for medication(s) pt. are currently not taking, and medications patients have never taken.    We have spoken to the pharmacies about these request types and advised their teams previously that we are unable to assess these New Script requests and require all details for these requests. This is a known issue and has been reported.        Medication related problems are not assessed for QDC.   Medication Reconciliation Completed? NO Were there pending details that required adjustment? NO     Automatic Epic Generated Protocol Data Below:   Requested Prescriptions     Pending Prescriptions Disp Refills    losartan (COZAAR) 25 MG tablet [Pharmacy Med Name: LOSARTAN 25 MG TAB] 90 tablet 0     Sig: Once daily.              Appointments      Date Provider   Last Visit   7/8/2024 Farhana Whittington MD   Next Visit   Visit date not found Farhana Whittington MD        Note composed:11:36 PM 11/15/2024

## 2024-11-18 ENCOUNTER — OFFICE VISIT (OUTPATIENT)
Dept: ORTHOPEDICS | Facility: CLINIC | Age: 59
End: 2024-11-18
Payer: MEDICARE

## 2024-11-18 VITALS — HEIGHT: 62 IN | BODY MASS INDEX: 26.52 KG/M2

## 2024-11-18 DIAGNOSIS — M75.122 NONTRAUMATIC COMPLETE TEAR OF LEFT ROTATOR CUFF: Primary | ICD-10-CM

## 2024-11-18 PROCEDURE — 3044F HG A1C LEVEL LT 7.0%: CPT | Mod: HCNC,CPTII,S$GLB, | Performed by: ORTHOPAEDIC SURGERY

## 2024-11-18 PROCEDURE — 99999 PR PBB SHADOW E&M-EST. PATIENT-LVL II: CPT | Mod: PBBFAC,HCNC,, | Performed by: ORTHOPAEDIC SURGERY

## 2024-11-18 PROCEDURE — 99213 OFFICE O/P EST LOW 20 MIN: CPT | Mod: HCNC,S$GLB,, | Performed by: ORTHOPAEDIC SURGERY

## 2024-11-18 PROCEDURE — 1159F MED LIST DOCD IN RCRD: CPT | Mod: HCNC,CPTII,S$GLB, | Performed by: ORTHOPAEDIC SURGERY

## 2024-11-18 PROCEDURE — 3008F BODY MASS INDEX DOCD: CPT | Mod: HCNC,CPTII,S$GLB, | Performed by: ORTHOPAEDIC SURGERY

## 2024-11-18 PROCEDURE — 4010F ACE/ARB THERAPY RXD/TAKEN: CPT | Mod: HCNC,CPTII,S$GLB, | Performed by: ORTHOPAEDIC SURGERY

## 2024-11-18 NOTE — PROGRESS NOTES
Subjective:      Patient ID: Sherie Reyes is a 59 y.o. female.  Chief Complaint: Results (L shoulder CT )      HPI  Sherie eRyes is a  59 y.o. female presenting today for follow up of rotator cuff repair left shoulder now about 8 months postop.  She reports that she is still having some pain in the shoulder but she has also seen by pain management who are currently working on her symptoms   She recently had a CT arthrogram of the left shoulder   The results of the CT shows some mild degenerative changes of the labrum and a small interstitial tear of the muscular fibers of the rotator cuff   But fortunately no retear of the rotator cuff was identified and the supraspinatus tendon looked good   I explained these findings to her today   .    Review of patient's allergies indicates:  No Known Allergies      Current Outpatient Medications   Medication Sig Dispense Refill    acetaminophen (TYLENOL) 500 MG tablet Take 500 mg by mouth every 6 (six) hours as needed for Pain.      albuterol (PROVENTIL/VENTOLIN HFA) 90 mcg/actuation inhaler Inhale 2 puffs into the lungs every 6 (six) hours as needed for Wheezing. Rescue 18 g 3    atorvastatin (LIPITOR) 40 MG tablet Take 1 tablet (40 mg total) by mouth once daily. 90 tablet 3    azelastine (ASTELIN) 137 mcg (0.1 %) nasal spray SPRAY 1 SPRAY IN EACH NOSTRIL TWICE DAILY 90 mL 2    cetirizine (ZYRTEC) 10 MG tablet Take 2 tablets (20 mg total) by mouth every morning. 60 tablet 3    chlorzoxazone (PARAFON FORTE) 500 mg Tab Take 500 mg by mouth daily as needed.      clonazePAM (KLONOPIN) 1 MG tablet TAKE 1 TABLET BY MOUTH TWICE DAILY 60 tablet 1    cyanocobalamin 1,000 mcg/mL injection Inject 1 mL (1,000 mcg total) into the muscle every 30 days. 30 mL 1    cyclobenzaprine (FLEXERIL) 10 MG tablet Take 10 mg by mouth 2 (two) times daily as needed.      diclofenac sodium (VOLTAREN) 1 % Gel Apply 2 g topically 4 (four) times daily. 50 g 0    dicyclomine (BENTYL) 10 MG  capsule Take 1 capsule (10 mg total) by mouth 4 (four) times daily as needed (abdominal pain). 360 capsule 1    estradioL (ESTRACE) 1 MG tablet Take 1 mg by mouth once daily.       mg tablet TAKE 1 TABLET TWICE DAILY WITH MEALS 120 tablet 5    LIDOcaine (LIDODERM) 5 % Place 1 patch onto the skin once daily. Remove & Discard patch within 12 hours or as directed by MD 14 patch 0    LINZESS 290 mcg Cap capsule TAKE 1 CAPSULE(290 MCG) BY MOUTH BEFORE BREAKFAST 90 capsule 3    losartan (COZAAR) 25 MG tablet Take 1 tablet (25 mg total) by mouth once daily. 90 tablet 0    medroxyPROGESTERone (PROVERA) 2.5 MG tablet Take 2.5 mg by mouth.      melatonin 5 mg Chew Take 10 mg by mouth daily as needed.      mupirocin (BACTROBAN) 2 % ointment APPLY TOPICALLY TO THE AFFECTED AREA TWICE DAILY 15 g 2    mv-mn/iron/folic acid/herb 190 (VITAMIN D3 COMPLETE ORAL) Take by mouth once daily at 6am.      omega-3 fatty acids/fish oil (FISH OIL-OMEGA-3 FATTY ACIDS) 300-1,000 mg capsule Take 1 capsule by mouth once daily.      omeprazole (PRILOSEC) 40 MG capsule Take 1 capsule (40 mg total) by mouth once daily. 90 capsule 3    POLYETHYLENE GLYCOL 3350 (MIRALAX ORAL) Take by mouth as needed.       potassium chloride 10% (KAYCIEL) 20 mEq/15 mL oral solution Take 15 mLs (20 mEq total) by mouth once daily. 1350 mL 1    tiZANidine (ZANAFLEX) 4 MG tablet Take 1 tablet (4 mg total) by mouth every evening. 30 tablet 1    traZODone (DESYREL) 100 MG tablet TK 1 T PO  QHS      venlafaxine (EFFEXOR-XR) 150 MG Cp24 Take 150 mg by mouth once daily.      venlafaxine (EFFEXOR-XR) 75 MG 24 hr capsule TAKE ONE CAPSULE BY MOUTH DAILY WITH 150 MG TO EQUAL 225 MG      QUEtiapine (SEROQUEL) 200 MG Tab Take 1 tablet (200 mg total) by mouth every evening. 30 tablet 2     No current facility-administered medications for this visit.       Past Medical History:   Diagnosis Date    Anxiety     Degenerative joint disease (DJD) of hip     GERD (gastroesophageal  reflux disease)     Hyperlipidemia     Hypertension     IBS (irritable bowel syndrome)     Insomnia     Lumbar disc herniation     PTSD (post-traumatic stress disorder)        Past Surgical History:   Procedure Laterality Date    ADENOIDECTOMY      ARTHROSCOPIC DEBRIDEMENT OF ROTATOR CUFF Right 2020    Procedure: DEBRIDEMENT, ROTATOR CUFF, ARTHROSCOPIC;  Surgeon: Melchor Hughes Jr., MD;  Location: [facility] OR;  Service: Orthopedics;  Laterality: Right;  need opus system (Ger MESA notified , EF)  video, notified/confirmed 2020 KB 1310    ARTHROSCOPIC EXCISION OF ACROMIOCLAVICULAR JOINT  2020    Procedure: EXCISION, ACROMIOCLAVICULAR JOINT, ARTHROSCOPIC;  Surgeon: Melchor Hughes Jr., MD;  Location: [facility] OR;  Service: Orthopedics;;    ARTHROSCOPIC REPAIR OF ROTATOR CUFF OF SHOULDER      ARTHROSCOPIC REPAIR OF ROTATOR CUFF OF SHOULDER Left 3/19/2024    Procedure: REPAIR, ROTATOR CUFF, ARTHROSCOPIC;  Surgeon: Melchor Hughes Jr., MD;  Location: [facility] OR;  Service: Orthopedics;  Laterality: Left;  need opus system and regeneten patch    ARTHROSCOPY OF SHOULDER WITH DECOMPRESSION OF SUBACROMIAL SPACE  2020    Procedure: ARTHROSCOPY, SHOULDER, WITH SUBACROMIAL SPACE DECOMPRESSION;  Surgeon: Melchor Hughes Jr., MD;  Location: [facility] OR;  Service: Orthopedics;;     SECTION      DECOMPRESSION OF SUBACROMIAL SPACE Left 3/19/2024    Procedure: DECOMPRESSION, SUBACROMIAL SPACE;  Surgeon: Melchor Hughes Jr., MD;  Location: [facility] OR;  Service: Orthopedics;  Laterality: Left;    EPIDURAL STEROID INJECTION INTO CERVICAL SPINE N/A 2021    Procedure: Injection-steroid-epidural-cervical--C7-T1 IL ZAYNAB;  Surgeon: Gregroia Rodriguez MD;  Location: [facility] PAIN MGT;  Service: Pain Management;  Laterality: N/A;    ESOPHAGOGASTRODUODENOSCOPY N/A 2023    Procedure: ESOPHAGOGASTRODUODENOSCOPY (EGD);  Surgeon: Tommy Schmitt MD;  Location: [address]);  Service: Endoscopy;  Laterality: N/A;   "referral [facility]ristina portal-GT    INJECTION OF ANESTHETIC AGENT AROUND NERVE Left 9/30/2024    Procedure: BLOCK, NERVE LEFT SHOULDER DIAGNOSTIC *REP CONFIRMED*;  Surgeon: Yesica Monroy MD;  Location: [facility] PAIN MGT;  Service: Pain Management;  Laterality: Left;  [phone]    INJECTION OF JOINT Right 9/18/2023    Procedure: INJECTION, JOINT, RIGHT ISCHIAL BURSA  SOONER DATE;  Surgeon: Yesica Monroy MD;  Location: [facility] PAIN MGT;  Service: Pain Management;  Laterality: Right;    RADIOFREQUENCY ABLATION Left 11/11/2024    Procedure: RADIOFREQUENCY ABLATION LEFT SHOULDER COOLED *REP CONFIRMED*;  Surgeon: Yesica Monroy MD;  Location: [facility] PAIN MGT;  Service: Pain Management;  Laterality: Left;  [phone]  6 WK F/U SHALINI    TENOTOMY, ELBOW, WITH DEBRIDEMENT AND TENDON REPAIR Right 5/20/2022    Procedure: TENOTOMY,ELBOW,WITH DEBRIDEMENT AND TENDON REPAIR;  Surgeon: Melchor Hughes Jr., MD;  Location: [facility];  Service: Orthopedics;  Laterality: Right;    TONSILLECTOMY         OBJECTIVE:   PHYSICAL EXAM:  Height: 5' 2" (157.5 cm)    Vitals:    11/18/24 1528   Height: 5' 2" (1.575 m)   PainSc:   5   PainLoc: Shoulder     Ortho/SPM Exam  Examination left shoulder no tenderness no swelling   Range of motion is basically full mildly positive impingement sign slight weakness no instability neurologic exam intact    RADIOGRAPHS:  As above  Comments: I have personally reviewed the imaging and I agree with the above radiologist's report.    ASSESSMENT/PLAN:     IMPRESSION:  Left shoulder pain after rotator cuff repair    PLAN:  I think some of her symptoms are most likely coming from her neck so I have recommended she continue with pain management   I offered physical therapy as an option but she is already knows the exercises so she will continue with a home exercise program   I certainly do not think she needs any further surgery for the left shoulder and I explained that to her today       FOLLOW UP:  2-3 " months    Disclaimer: This note has been generated using voice-recognition software. There may be typographical errors that have been missed during proof-reading.

## 2024-11-21 ENCOUNTER — PATIENT OUTREACH (OUTPATIENT)
Dept: ADMINISTRATIVE | Facility: HOSPITAL | Age: 59
End: 2024-11-21
Payer: MEDICARE

## 2024-11-22 ENCOUNTER — PATIENT OUTREACH (OUTPATIENT)
Dept: ADMINISTRATIVE | Facility: HOSPITAL | Age: 59
End: 2024-11-22
Payer: MEDICARE

## 2024-12-10 DIAGNOSIS — I15.2 HYPERTENSION ASSOCIATED WITH DIABETES: ICD-10-CM

## 2024-12-10 DIAGNOSIS — E11.59 HYPERTENSION ASSOCIATED WITH DIABETES: ICD-10-CM

## 2024-12-10 NOTE — TELEPHONE ENCOUNTER
No care due was identified.  [facility] Embedded Care Due Messages. Reference number: [id number].   12/10/2024 12:02:43 PM CST

## 2024-12-11 RX ORDER — LOSARTAN POTASSIUM 25 MG/1
25 TABLET ORAL DAILY
Qty: 90 TABLET | Refills: 0 | Status: SHIPPED | OUTPATIENT
Start: 2024-12-11

## 2024-12-11 RX ORDER — MUPIROCIN 20 MG/G
OINTMENT TOPICAL 2 TIMES DAILY
Qty: 15 G | Refills: 2 | Status: SHIPPED | OUTPATIENT
Start: 2024-12-11

## 2024-12-11 RX ORDER — AZELASTINE 1 MG/ML
1 SPRAY, METERED NASAL 2 TIMES DAILY
Qty: 90 ML | Refills: 1 | Status: SHIPPED | OUTPATIENT
Start: 2024-12-11

## 2024-12-19 DIAGNOSIS — E11.59 HYPERTENSION ASSOCIATED WITH DIABETES: ICD-10-CM

## 2024-12-19 DIAGNOSIS — I15.2 HYPERTENSION ASSOCIATED WITH DIABETES: ICD-10-CM

## 2024-12-19 RX ORDER — LOSARTAN POTASSIUM 25 MG/1
25 TABLET ORAL
Qty: 90 TABLET | Refills: 3 | OUTPATIENT
Start: 2024-12-19

## 2024-12-19 NOTE — TELEPHONE ENCOUNTER
No care due was identified.  [facility] Embedded Care Due Messages. Reference number: [id number].   12/19/2024 4:44:49 PM CST

## 2024-12-20 NOTE — TELEPHONE ENCOUNTER
Refill Decision Note   Sherie Hutchinsonren  is requesting a refill authorization.  Brief Assessment and Rationale for Refill:  Quick Discontinue     Medication Therapy Plan: E-Prescribing Status: Receipt confirmed by pharmacy (12/11/2024 12:59 PM CST)      Comments:     Note composed:7:50 PM 12/19/2024

## 2025-01-06 ENCOUNTER — PATIENT MESSAGE (OUTPATIENT)
Dept: ADMINISTRATIVE | Facility: OTHER | Age: 60
End: 2025-01-06
Payer: MEDICARE

## 2025-01-09 ENCOUNTER — OFFICE VISIT (OUTPATIENT)
Dept: ORTHOPEDICS | Facility: CLINIC | Age: 60
End: 2025-01-09
Payer: MEDICARE

## 2025-01-09 DIAGNOSIS — M25.511 CHRONIC PAIN OF BOTH SHOULDERS: Primary | ICD-10-CM

## 2025-01-09 DIAGNOSIS — M25.512 CHRONIC PAIN OF BOTH SHOULDERS: Primary | ICD-10-CM

## 2025-01-09 DIAGNOSIS — G89.29 CHRONIC PAIN OF BOTH SHOULDERS: Primary | ICD-10-CM

## 2025-01-09 PROCEDURE — 99999 PR PBB SHADOW E&M-EST. PATIENT-LVL II: CPT | Mod: PBBFAC,HCNC,, | Performed by: ORTHOPAEDIC SURGERY

## 2025-01-09 RX ORDER — TRIAMCINOLONE ACETONIDE 40 MG/ML
40 INJECTION, SUSPENSION INTRA-ARTICULAR; INTRAMUSCULAR
Status: COMPLETED | OUTPATIENT
Start: 2025-01-09 | End: 2025-01-09

## 2025-01-09 RX ADMIN — TRIAMCINOLONE ACETONIDE 40 MG: 40 INJECTION, SUSPENSION INTRA-ARTICULAR; INTRAMUSCULAR at 02:01

## 2025-01-09 NOTE — PROGRESS NOTES
Subjective:      Patient ID: Sherie Reyes is a 59 y.o. female.  Chief Complaint: Follow-up (L shoulder )      HPI  Sherie Reyes is a  59 y.o. female presenting today for follow up of chronic shoulder pain left shoulder after previous rotator cuff repair.  She reports that she is now about a year postop from rotator cuff repair left shoulder   Continues to have pain in the shoulder difficulty with use   She is also followed by pain management.    Review of patient's allergies indicates:  No Known Allergies      Current Outpatient Medications   Medication Sig Dispense Refill    acetaminophen (TYLENOL) 500 MG tablet Take 500 mg by mouth every 6 (six) hours as needed for Pain.      albuterol (PROVENTIL/VENTOLIN HFA) 90 mcg/actuation inhaler Inhale 2 puffs into the lungs every 6 (six) hours as needed for Wheezing. Rescue 18 g 3    atorvastatin (LIPITOR) 40 MG tablet Take 1 tablet (40 mg total) by mouth once daily. 90 tablet 3    azelastine (ASTELIN) 137 mcg (0.1 %) nasal spray 1 spray (137 mcg total) by Nasal route 2 (two) times daily. 90 mL 1    cetirizine (ZYRTEC) 10 MG tablet Take 2 tablets (20 mg total) by mouth every morning. 60 tablet 3    chlorzoxazone (PARAFON FORTE) 500 mg Tab Take 500 mg by mouth daily as needed.      clonazePAM (KLONOPIN) 1 MG tablet TAKE 1 TABLET BY MOUTH TWICE DAILY 60 tablet 1    cyanocobalamin 1,000 mcg/mL injection Inject 1 mL (1,000 mcg total) into the muscle every 30 days. 30 mL 1    cyclobenzaprine (FLEXERIL) 10 MG tablet Take 10 mg by mouth 2 (two) times daily as needed.      diclofenac sodium (VOLTAREN) 1 % Gel Apply 2 g topically 4 (four) times daily. 50 g 0    dicyclomine (BENTYL) 10 MG capsule Take 1 capsule (10 mg total) by mouth 4 (four) times daily as needed (abdominal pain). 360 capsule 1    estradioL (ESTRACE) 1 MG tablet Take 1 mg by mouth once daily.       mg tablet TAKE 1 TABLET TWICE DAILY WITH MEALS 120 tablet 5    LIDOcaine (LIDODERM) 5 % Place  1 patch onto the skin once daily. Remove & Discard patch within 12 hours or as directed by MD Moon patch 0    LINZESS 290 mcg Cap capsule TAKE 1 CAPSULE(290 MCG) BY MOUTH BEFORE BREAKFAST 90 capsule 3    losartan (COZAAR) 25 MG tablet Take 1 tablet (25 mg total) by mouth once daily. 90 tablet 0    medroxyPROGESTERone (PROVERA) 2.5 MG tablet Take 2.5 mg by mouth.      melatonin 5 mg Chew Take 10 mg by mouth daily as needed.      mupirocin (BACTROBAN) 2 % ointment Apply topically 2 (two) times daily. Apply to affected area 15 g 2    mv-mn/iron/folic acid/herb 190 (VITAMIN D3 COMPLETE ORAL) Take by mouth once daily at 6am.      omega-3 fatty acids/fish oil (FISH OIL-OMEGA-3 FATTY ACIDS) 300-1,000 mg capsule Take 1 capsule by mouth once daily.      omeprazole (PRILOSEC) 40 MG capsule Take 1 capsule (40 mg total) by mouth once daily. 90 capsule 3    POLYETHYLENE GLYCOL 3350 (MIRALAX ORAL) Take by mouth as needed.       potassium chloride 10% (KAYCIEL) 20 mEq/15 mL oral solution Take 15 mLs (20 mEq total) by mouth once daily. 1350 mL 1    tiZANidine (ZANAFLEX) 4 MG tablet Take 1 tablet (4 mg total) by mouth every evening. 30 tablet 1    traZODone (DESYREL) 100 MG tablet TK 1 T PO  QHS      venlafaxine (EFFEXOR-XR) 150 MG Cp24 Take 150 mg by mouth once daily.      venlafaxine (EFFEXOR-XR) 75 MG 24 hr capsule TAKE ONE CAPSULE BY MOUTH DAILY WITH 150 MG TO EQUAL 225 MG      QUEtiapine (SEROQUEL) 200 MG Tab Take 1 tablet (200 mg total) by mouth every evening. 30 tablet 2     No current facility-administered medications for this visit.       Past Medical History:   Diagnosis Date    Anxiety     Degenerative joint disease (DJD) of hip     GERD (gastroesophageal reflux disease)     Hyperlipidemia     Hypertension     IBS (irritable bowel syndrome)     Insomnia     Lumbar disc herniation     PTSD (post-traumatic stress disorder)        Past Surgical History:   Procedure Laterality Date    ADENOIDECTOMY      ARTHROSCOPIC DEBRIDEMENT  OF ROTATOR CUFF Right 2020    Procedure: DEBRIDEMENT, ROTATOR CUFF, ARTHROSCOPIC;  Surgeon: Melchor Hughes Jr., MD;  Location: [facility] OR;  Service: Orthopedics;  Laterality: Right;  need opus system (Ger MESA notified , EF)  video, notified/confirmed 2020 KB 1310    ARTHROSCOPIC EXCISION OF ACROMIOCLAVICULAR JOINT  2020    Procedure: EXCISION, ACROMIOCLAVICULAR JOINT, ARTHROSCOPIC;  Surgeon: Melchor Hughes Jr., MD;  Location: [facility];  Service: Orthopedics;;    ARTHROSCOPIC REPAIR OF ROTATOR CUFF OF SHOULDER      ARTHROSCOPIC REPAIR OF ROTATOR CUFF OF SHOULDER Left 3/19/2024    Procedure: REPAIR, ROTATOR CUFF, ARTHROSCOPIC;  Surgeon: Melchor Hughes Jr., MD;  Location: [facility];  Service: Orthopedics;  Laterality: Left;  need opus system and regeneten patch    ARTHROSCOPY OF SHOULDER WITH DECOMPRESSION OF SUBACROMIAL SPACE  2020    Procedure: ARTHROSCOPY, SHOULDER, WITH SUBACROMIAL SPACE DECOMPRESSION;  Surgeon: Melchor Hughes Jr., MD;  Location: [facility] OR;  Service: Orthopedics;;     SECTION      DECOMPRESSION OF SUBACROMIAL SPACE Left 3/19/2024    Procedure: DECOMPRESSION, SUBACROMIAL SPACE;  Surgeon: Melchor Hughes Jr., MD;  Location: [facility] OR;  Service: Orthopedics;  Laterality: Left;    EPIDURAL STEROID INJECTION INTO CERVICAL SPINE N/A 2021    Procedure: Injection-steroid-epidural-cervical--C7-T1 IL ZAYNAB;  Surgeon: Gregoria Rodriguez MD;  Location: [facility] PAIN MGT;  Service: Pain Management;  Laterality: N/A;    ESOPHAGOGASTRODUODENOSCOPY N/A 2023    Procedure: ESOPHAGOGASTRODUODENOSCOPY (EGD);  Surgeon: Tommy Schmitt MD;  Location: [facility] ENDO (4TH FLR);  Service: Endoscopy;  Laterality: N/A;  referral [facility] portal-GT    INJECTION OF ANESTHETIC AGENT AROUND NERVE Left 2024    Procedure: BLOCK, NERVE LEFT SHOULDER DIAGNOSTIC *REP CONFIRMED*;  Surgeon: Yesica Monroy MD;  Location: [facility] PAIN MGT;  Service: Pain Management;  Laterality: Left;  [phone]     INJECTION OF JOINT Right 9/18/2023    Procedure: INJECTION, JOINT, RIGHT ISCHIAL BURSA  SOONER DATE;  Surgeon: Yesica Monroy MD;  Location: [facility] PAIN MGT;  Service: Pain Management;  Laterality: Right;    RADIOFREQUENCY ABLATION Left 11/11/2024    Procedure: RADIOFREQUENCY ABLATION LEFT SHOULDER COOLED *REP CONFIRMED*;  Surgeon: Yesica Monroy MD;  Location: [facility] PAIN MGT;  Service: Pain Management;  Laterality: Left;  [phone]  6 WK F/U SHALINI    TENOTOMY, ELBOW, WITH DEBRIDEMENT AND TENDON REPAIR Right 5/20/2022    Procedure: TENOTOMY,ELBOW,WITH DEBRIDEMENT AND TENDON REPAIR;  Surgeon: Melchor Hughes Jr., MD;  Location: [facility] OR;  Service: Orthopedics;  Laterality: Right;    TONSILLECTOMY         OBJECTIVE:   PHYSICAL EXAM:       Vitals:    01/09/25 1429   PainSc:   7   PainLoc: Shoulder     Ortho/SPM Exam  Examination left shoulder no tenderness no swelling range of motion is limited due to pain   Slight weakness no instability neurologic exam intact    RADIOGRAPHS:  None  Comments: I have personally reviewed the imaging and I agree with the above radiologist's report.    ASSESSMENT/PLAN:     IMPRESSION:  Chronic pain left shoulder after rotator cuff repair    PLAN:  I do not think any further surgery would be helpful but I have recommended injection   After pause for time-out identified the left shoulder injected with Kenalog 40 mg 2 cc xylocaine sterile technique   Tolerated the procedure well without complication       FOLLOW UP:  2-3 months    Disclaimer: This note has been generated using voice-recognition software. There may be typographical errors that have been missed during proof-reading.

## 2025-01-15 DIAGNOSIS — E11.59 HYPERTENSION ASSOCIATED WITH DIABETES: ICD-10-CM

## 2025-01-15 DIAGNOSIS — I15.2 HYPERTENSION ASSOCIATED WITH DIABETES: ICD-10-CM

## 2025-01-15 NOTE — TELEPHONE ENCOUNTER
----- Message from Jackie sent at 1/15/2025  2:37 PM CST -----  Requesting an RX refill or new RX.    Is this a refill or new RX:     RX name and strength:     losartan (COZAAR) 25 MG tablet        Is this a 30 day or 90 day RX:     Pharmacy name and phone #:        [facility] - [place], [place] - [address]  [address] [address]  Phone: [phone] Fax: [phone]        The doctors have asked that we provide their patients with the following 2 reminders -- prescription refills can take up to 72 hours, and a friendly reminder that in the future you can use your [facility] account to request refills:

## 2025-01-15 NOTE — TELEPHONE ENCOUNTER
No care due was identified.  [facility] Embedded Care Due Messages. Reference number: [id number].   1/15/2025 2:46:41 PM CST

## 2025-01-20 ENCOUNTER — PATIENT MESSAGE (OUTPATIENT)
Dept: ADMINISTRATIVE | Facility: HOSPITAL | Age: 60
End: 2025-01-20
Payer: MEDICARE

## 2025-01-20 RX ORDER — LOSARTAN POTASSIUM 25 MG/1
25 TABLET ORAL DAILY
Qty: 90 TABLET | Refills: 0 | Status: SHIPPED | OUTPATIENT
Start: 2025-01-20

## 2025-01-27 ENCOUNTER — PATIENT MESSAGE (OUTPATIENT)
Dept: ADMINISTRATIVE | Facility: OTHER | Age: 60
End: 2025-01-27
Payer: MEDICARE

## 2025-01-28 ENCOUNTER — PATIENT OUTREACH (OUTPATIENT)
Dept: ADMINISTRATIVE | Facility: HOSPITAL | Age: 60
End: 2025-01-28
Payer: MEDICARE

## 2025-02-08 DIAGNOSIS — G89.29 CHRONIC ABDOMINAL PAIN: ICD-10-CM

## 2025-02-08 DIAGNOSIS — R10.9 CHRONIC ABDOMINAL PAIN: ICD-10-CM

## 2025-02-10 RX ORDER — DICYCLOMINE HYDROCHLORIDE 10 MG/1
10 CAPSULE ORAL 4 TIMES DAILY PRN
Qty: 360 CAPSULE | Refills: 0 | Status: SHIPPED | OUTPATIENT
Start: 2025-02-10

## 2025-02-11 ENCOUNTER — HOSPITAL ENCOUNTER (OUTPATIENT)
Dept: RADIOLOGY | Facility: HOSPITAL | Age: 60
Discharge: HOME | End: 2025-02-11
Attending: INTERNAL MEDICINE
Payer: MEDICARE

## 2025-02-11 DIAGNOSIS — R92.8 ABNORMAL MAMMOGRAM: ICD-10-CM

## 2025-02-11 PROCEDURE — 77065 DX MAMMO INCL CAD UNI: CPT | Mod: TC,HCNC,RT

## 2025-02-11 PROCEDURE — 77065 DX MAMMO INCL CAD UNI: CPT | Mod: 26,HCNC,RT, | Performed by: RADIOLOGY

## 2025-02-11 PROCEDURE — 77061 BREAST TOMOSYNTHESIS UNI: CPT | Mod: 26,HCNC,RT, | Performed by: RADIOLOGY

## 2025-02-13 ENCOUNTER — OFFICE VISIT (OUTPATIENT)
Dept: PAIN MEDICINE | Facility: CLINIC | Age: 60
End: 2025-02-13
Payer: MEDICARE

## 2025-02-13 VITALS
WEIGHT: 145.06 LBS | SYSTOLIC BLOOD PRESSURE: 132 MMHG | DIASTOLIC BLOOD PRESSURE: 75 MMHG | RESPIRATION RATE: 18 BRPM | HEART RATE: 80 BPM | OXYGEN SATURATION: 98 % | TEMPERATURE: 99 F | BODY MASS INDEX: 26.53 KG/M2

## 2025-02-13 DIAGNOSIS — M25.512 CHRONIC PAIN IN LEFT SHOULDER: ICD-10-CM

## 2025-02-13 DIAGNOSIS — G89.29 CHRONIC PAIN IN LEFT SHOULDER: ICD-10-CM

## 2025-02-13 DIAGNOSIS — Z98.890 HISTORY OF ROTATOR CUFF SURGERY: ICD-10-CM

## 2025-02-13 DIAGNOSIS — M25.512 CHRONIC LEFT SHOULDER PAIN: Primary | ICD-10-CM

## 2025-02-13 DIAGNOSIS — G89.29 CHRONIC LEFT SHOULDER PAIN: Primary | ICD-10-CM

## 2025-02-13 DIAGNOSIS — G89.4 CHRONIC PAIN SYNDROME: ICD-10-CM

## 2025-02-13 PROCEDURE — 99999 PR PBB SHADOW E&M-EST. PATIENT-LVL III: CPT | Mod: PBBFAC,HCNC,,

## 2025-02-13 NOTE — PROGRESS NOTES
Interventional Pain Management - Established Visit  Follow-Up      Referring Physician: No ref. provider found    Chief Complaint:   Chief Complaint   Patient presents with    Follow-up        SUBJECTIVE:    Interval History 2/13/2025:  Sherie Reyes returns for follow-up of left shoulder pain. She had left shoulder cooled RFA on 11/11/2024. She reports 70% relief that is on-going. She states this is the best she has felt in over 1 year. She continues HEP previously learned in PT. She is able to move the shoulder more since the procedure. She takes ibuprofen or toradol and flexeril as needed with good relief. She denies any perceived side effects. she denies recent health changes. She denies recent falls or trauma. She denies new onset fever/night sweats, urinary incontinence, bowel incontinence, significant weight changes, significant motor weakness or changes, or loss of sensations. Her left shoulder pain today is 4-5/10.      Interval History 8/29/2024:  Sherie Reyes returns to clinic for delayed follow-up. She states she had left rotator cuff repair surgery with Dr Hughes on 3/19/2024. She has continued limited range of motion and pain since the surgery. She completed 10 weeks of physical therapy without help. She also complains of chronic back and neck pain. She states her biggest source of pain is her left shoulder. She continues to see Dr Kaufman and is on flexeril, ibuprofen, chlorzoxone, and toradol with some relief. She denies any perceived side effects. She denies recent falls or trauma. She denies new onset fever/night sweats, urinary incontinence, bowel incontinence, significant weight changes, significant motor weakness or changes, or loss of sensations. Her pain today is 8/10.     Original HPI 7/19/2023:  Sherie Reyes presents to the clinic for the evaluation of ischial pain. Referred by [facility] by Dr. Freddy Kapadia. 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 Symptoms have been worsening.The pain is located in the right buttock area and radiates to the right hip.  The pain is described as stabbing and is rated as 8/10. The pain is rated with a score of  7/10 on the BEST day and a score of 10/10 on the WORST day.  Symptoms interfere with daily activity and work. The pain is exacerbated by Sitting and Walking.  The pain is mitigated by laying down and rest. The patient reports 6 hours of uninterrupted sleep per night.    Patient denies night fever/night sweats, urinary incontinence, bowel incontinence, significant motor weakness, and loss of sensations. Patient lost 50lbs in past 9 month, PCP is working her up for this.     Physical Therapy/Home Exercise: yes, 4/2024-5/2024 (8 weeks) and continues HEP most days    Pain Disability Index Review:      8/29/2024     3:06 PM 7/19/2023     2:30 PM   Last 3 PDI Scores   Pain Disability Index (PDI) 70 45       Pain Medications:  Parafon Forte 500mg   Klonopin 1mg BID  Flexeril - no longer taking  Voltaren gel 1% qhs  Mobic 15mg not taking   Tizanidine 4mg - not taking  Venlafaxine 225mg qam       report:  Reviewed and consistent with medication use as prescribed.    Pain Procedures:   [facility] by Dr. Freddy Kapadia.   9/18/2024 - right ischial bursa injection   11/11/2024 - Left shoulder cooled RFA - 70% relief  1/9/2025 - left shoulder joint injection ([place])    Imaging:     XR SHOULDER COMPLETE 2 OR MORE VIEWS LEFT     CLINICAL HISTORY:  Other specified postprocedural states     TECHNIQUE:  Two or three views of the left shoulder were performed.     COMPARISON:  Plain film from 09/28/2023     FINDINGS:  No evidence of acute fracture or dislocation.  Acromioclavicular joint demonstrates no significant arthrosis.  No soft tissue abnormality.     Impression:     As above.        Electronically signed by:Abisai Haddad MD  Date:                                             05/14/2024  Time:                                           14:56    EXAMINATION:  MRI LUMBAR SPINE WITHOUT CONTRAST     CLINICAL HISTORY:  Back pain or radiculopathy, > 6 wks; Dorsalgia, unspecified     TECHNIQUE:  Multiplanar, multisequence MR images were acquired from the thoracolumbar junction to the sacrum without the administration of contrast.     COMPARISON:  Lumbar radiograph 07/20/2021.     CT lumbar spine 08/06/2021.     FINDINGS:  Alignment: Normal.     Vertebrae: 5 lumbar-type vertebral bodies. No aggressive marrow replacement process or fracture.     Discs: Multilevel degenerative changes, described in detail below.     Cord: Conus appears unremarkable and terminates at L1.  Cauda equina nerve roots are unremarkable.     Paraspinal soft tissues are unremarkable.     Significant findings by level:     T12-L1 and L1-L2: Unremarkable.     L2-L3: Disc bulge with superimposed right foraminal extrusion.  Mild right facet arthropathy.  No canal stenosis.  Mild right foraminal stenosis.  Disc material abuts the exiting right L2 nerve root.     L3-L4: Disc bulge.  Bilateral facet arthropathy and ligamentum flavum thickening, left greater than right. Mild canal stenosis with narrowing of the left subarticular zone and abutment of the descending left L4 nerve root.  Mild left foraminal stenosis.     L4-L5: Disc bulge.  Bilateral facet arthropathy.  No canal stenosis.  Mild bilateral foraminal stenosis.     L5-S1: Bilateral facet arthropathy.  No spinal canal or foraminal stenosis.     Impression:     Multilevel degenerative changes as described, noting right foraminal extrusion at L2-3 with abutment of the exiting right L2 nerve root, and disc bulging and facet arthropathy at L3-4 with abutment of the descending left L4 nerve root.     Electronically signed by resident: Jovan Powell  Date:                                            08/06/2021  Time:                                            11:24     Electronically signed by: Charles Mccray  Date:                                            08/06/2021  Time:                                           15:12    MRI CERVICAL SPINE WITHOUT CONTRAST     CLINICAL HISTORY:  Neck pain, abnormal neuro exam; Cervicalgia     TECHNIQUE:  Multiplanar, multisequence MR images of the cervical spine were performed without the administration of contrast.     COMPARISON:  12/23/2020.     FINDINGS:  C1-C2: Dens is intact.  Pre dens space is maintained.     Alignment: Alignment is maintained.  Straightening of lordosis noted.     Vertebrae: Normal marrow signal. No fracture.     Discs: Mild disc height loss at C5-C7.  No evidence for discitis.     Cord: Normal.     Skull base and craniocervical junction: Normal.     Degenerative findings:     C2-C3: No spinal canal stenosis or neural foraminal narrowing.     C3-C4: Posterior disc osteophyte complex with uncovertebral and facet arthrosis result in mild spinal canal stenosis with minimal effacement of the left neural foramen.     C4-C5: No spinal canal stenosis or neural foraminal narrowing.     C5-C6: Posterior disc osteophyte complex with uncovertebral and facet arthrosis result in moderate spinal canal stenosis and moderate right, mild left neural foraminal narrowing.     C6-C7: Posterior disc osteophyte complex with uncovertebral and facet arthrosis result in mild spinal canal stenosis and mild bilateral neural foraminal narrowing.     C7-T1: No spinal canal stenosis or neural foraminal narrowing.     Paraspinal muscles & soft tissues: Unremarkable.     Impression:     1. Degenerative changes of the cervical spine detailed above.  Moderate spinal canal stenosis noted at C5-C6.  Mild-to-moderate neural foraminal narrowing noted at C5-C7.        Electronically signed by: Enoch Rojas MD  Date:                                            08/06/2021  Time:                                            11:36    Past Medical History:   Diagnosis Date    Anxiety     Degenerative joint disease (DJD) of hip     GERD (gastroesophageal reflux disease)     Hyperlipidemia     Hypertension     IBS (irritable bowel syndrome)     Insomnia     Lumbar disc herniation     PTSD (post-traumatic stress disorder)      Past Surgical History:   Procedure Laterality Date    ADENOIDECTOMY      ARTHROSCOPIC DEBRIDEMENT OF ROTATOR CUFF Right 2020    Procedure: DEBRIDEMENT, ROTATOR CUFF, ARTHROSCOPIC;  Surgeon: Melchor Hughes Jr., MD;  Location: [facility] OR;  Service: Orthopedics;  Laterality: Right;  need opus system (Ger MESA notified , )  video, notified/confirmed 2020 KB 1310    ARTHROSCOPIC EXCISION OF ACROMIOCLAVICULAR JOINT  2020    Procedure: EXCISION, ACROMIOCLAVICULAR JOINT, ARTHROSCOPIC;  Surgeon: Melchor Hughes Jr., MD;  Location: [facility] OR;  Service: Orthopedics;;    ARTHROSCOPIC REPAIR OF ROTATOR CUFF OF SHOULDER      ARTHROSCOPIC REPAIR OF ROTATOR CUFF OF SHOULDER Left 2024    Procedure: REPAIR, ROTATOR CUFF, ARTHROSCOPIC;  Surgeon: Melchor Hughes Jr., MD;  Location: [facility] OR;  Service: Orthopedics;  Laterality: Left;  need opus system and regeneten patch    ARTHROSCOPY OF SHOULDER WITH DECOMPRESSION OF SUBACROMIAL SPACE  2020    Procedure: ARTHROSCOPY, SHOULDER, WITH SUBACROMIAL SPACE DECOMPRESSION;  Surgeon: Melchor Hughes Jr., MD;  Location: [facility] OR;  Service: Orthopedics;;    BREAST BIOPSY Right 2024     SECTION      DECOMPRESSION OF SUBACROMIAL SPACE Left 2024    Procedure: DECOMPRESSION, SUBACROMIAL SPACE;  Surgeon: Melchor Hughes Jr., MD;  Location: [facility] OR;  Service: Orthopedics;  Laterality: Left;    EPIDURAL STEROID INJECTION INTO CERVICAL SPINE N/A 2021    Procedure: Injection-steroid-epidural-cervical--C7-T1 IL ZAYNAB;  Surgeon: Gregoria Rodriguez MD;  Location: [facility] PAIN MGT;  Service: Pain Management;  Laterality: N/A;    ESOPHAGOGASTRODUODENOSCOPY  N/A 05/22/2023    Procedure: ESOPHAGOGASTRODUODENOSCOPY (EGD);  Surgeon: Tommy Schmitt MD;  Location: [facility] ENDO (4TH FLR);  Service: Endoscopy;  Laterality: N/A;  referral [facility]EN[facility]-cristina portal-GT    INJECTION OF ANESTHETIC AGENT AROUND NERVE Left 09/30/2024    Procedure: BLOCK, NERVE LEFT SHOULDER DIAGNOSTIC *REP CONFIRMED*;  Surgeon: Yesica Monroy MD;  Location: [facility] PAIN MGT;  Service: Pain Management;  Laterality: Left;  [phone]    INJECTION OF JOINT Right 09/18/2023    Procedure: INJECTION, JOINT, RIGHT ISCHIAL BURSA  SOONER DATE;  Surgeon: Yesica Monroy MD;  Location: [facility] PAIN MGT;  Service: Pain Management;  Laterality: Right;    RADIOFREQUENCY ABLATION Left 11/11/2024    Procedure: RADIOFREQUENCY ABLATION LEFT SHOULDER COOLED *REP CONFIRMED*;  Surgeon: Yesica Monroy MD;  Location: [facility] PAIN MGT;  Service: Pain Management;  Laterality: Left;  [phone]  6 WK F/U SHALINI    TENOTOMY, ELBOW, WITH DEBRIDEMENT AND TENDON REPAIR Right 05/20/2022    Procedure: TENOTOMY,ELBOW,WITH DEBRIDEMENT AND TENDON REPAIR;  Surgeon: Melchor Hughes Jr., MD;  Location: [facility];  Service: Orthopedics;  Laterality: Right;    TONSILLECTOMY       Social History     Socioeconomic History    Marital status:    Tobacco Use    Smoking status: Former     Current packs/day: 0.00     Average packs/day: 0.5 packs/day for 47.0 years (23.5 ttl pk-yrs)     Types: Vaping with nicotine, Cigarettes     Start date: 9/12/1985     Quit date: 6/8/2014     Years since quitting: 10.6    Smokeless tobacco: Former     Quit date: 10/20/2014    Tobacco comments:     Vaping 30 ml last 5 or 6 weeks - 3% nicotine    Substance and Sexual Activity    Alcohol use: Not Currently     Alcohol/week: 1.0 standard drink of alcohol     Types: 1 Cans of beer per week     Comment: Maybe 1 or 2 a month    Drug use: Yes     Types: Marijuana    Sexual activity: Yes     Partners: Male     Birth control/protection: OCP     Social Drivers of Health      Financial Resource Strain: High Risk (7/2/2024)    Overall Financial Resource Strain (CARDIA)     Difficulty of Paying Living Expenses: Very hard   Food Insecurity: Food Insecurity Present (7/2/2024)    Hunger Vital Sign     Worried About Running Out of Food in the Last Year: Often true     Ran Out of Food in the Last Year: Often true   Transportation Needs: No Transportation Needs (3/4/2024)    PRAPARE - Transportation     Lack of Transportation (Medical): No     Lack of Transportation (Non-Medical): No   Physical Activity: Inactive (7/2/2024)    Exercise Vital Sign     Days of Exercise per Week: 0 days     Minutes of Exercise per Session: 0 min   Stress: Stress Concern Present (7/2/2024)    [employer] - Occupational Stress Questionnaire     Feeling of Stress : Very much   Housing Stability: High Risk (3/4/2024)    Housing Stability Vital Sign     Unable to Pay for Housing in the Last Year: Yes     Number of Places Lived in the Last Year: 1     Unstable Housing in the Last Year: No     Family History   Problem Relation Name Age of Onset    Allergies Mother Anali Parta     Hypertension Mother Anali Parta     Hypothyroidism Mother Anali Parta     Arthritis Mother Anali Parta     Depression Mother Anali Parta     Heart disease Mother Anali Parta     Hyperlipidemia Mother Anali Parta     Hypertension Father Dl Tommy Parta     Stroke Father Dl Sanders Parta 46    Diabetes Father Dl Tommy Parta     Heart disease Father Dl Tommy Parta     Hyperlipidemia Father Dl Tommy Parta     Depression Sister Renuka Linton     Breast cancer Paternal Aunt      Cancer Paternal Aunt Alysia Parta         Bilat breast Cancer    Heart disease Paternal Aunt Alysia Parta     Cancer Maternal Grandmother Zulay Abbasi         Lung w/ Mets    COPD Maternal Grandmother Zulay Abbasi     Depression Maternal Grandmother Zulay Abbasi     Early death Maternal Grandmother Zulay Abbasi      Heart disease Maternal Grandmother Zulay Lachney     Hyperlipidemia Maternal Grandmother Zulay Lachney     Hypertension Maternal Grandmother Zulay Lachney     Cancer Maternal Grandfather [place] Lachney         Stomach Ca    Depression Maternal Grandfather [place] Lachney     Early death Maternal Grandfather [place] Lachney     Heart disease Maternal Grandfather [place] Lachney     Hyperlipidemia Maternal Grandfather [place] Lachney     Hypertension Maternal Grandfather Floyd Lachney     Mental illness Maternal Grandfather [place] Lachney     Vision loss Maternal Grandfather [place]stable Lachney         Loss during yhe War.    Arthritis Paternal Grandmother Omayra Parta     Depression Paternal Grandmother Omayra Parta     Heart disease Paternal Grandmother Omayra Parta     Diabetes Paternal Grandfather Dl Parta Jr.     Early death Paternal Grandfather Dl Parta Jr.     Heart disease Paternal Grandfather Dl Parta Jr.     Hyperlipidemia Paternal Grandfather Dl Parta Jr.     Hypertension Paternal Grandfather Dl Parta Jr.     Kidney disease Paternal Grandfather Dl Parta Jr.     Depression Daughter Anushka Reyes     Depression Son Adan Reyes     Learning disabilities Son Adan Reyes        Review of patient's allergies indicates:  No Known Allergies    Current Outpatient Medications   Medication Sig    acetaminophen (TYLENOL) 500 MG tablet Take 500 mg by mouth every 6 (six) hours as needed for Pain.    albuterol (PROVENTIL/VENTOLIN HFA) 90 mcg/actuation inhaler Inhale 2 puffs into the lungs every 6 (six) hours as needed for Wheezing. Rescue    atorvastatin (LIPITOR) 40 MG tablet Take 1 tablet (40 mg total) by mouth once daily.    azelastine (ASTELIN) 137 mcg (0.1 %) nasal spray 1 spray (137 mcg total) by Nasal route 2 (two) times daily.    cetirizine (ZYRTEC) 10 MG tablet Take 2 tablets (20 mg total) by mouth every morning.    chlorzoxazone (PARAFON FORTE) 500 mg Tab Take 500 mg by mouth daily as  needed.    clonazePAM (KLONOPIN) 1 MG tablet TAKE 1 TABLET BY MOUTH TWICE DAILY    cyanocobalamin 1,000 mcg/mL injection Inject 1 mL (1,000 mcg total) into the muscle every 30 days.    cyclobenzaprine (FLEXERIL) 10 MG tablet Take 10 mg by mouth 2 (two) times daily as needed.    diclofenac sodium (VOLTAREN) 1 % Gel Apply 2 g topically 4 (four) times daily.    dicyclomine (BENTYL) 10 MG capsule TAKE 1 CAPSULE (10 MG TOTAL) BY MOUTH 4 (FOUR) TIMES DAILY AS NEEDED (ABDOMINAL PAIN).    estradioL (ESTRACE) 1 MG tablet Take 1 mg by mouth once daily.     mg tablet TAKE 1 TABLET TWICE DAILY WITH MEALS    LIDOcaine (LIDODERM) 5 % Place 1 patch onto the skin once daily. Remove & Discard patch within 12 hours or as directed by MD SMALL 290 mcg Cap capsule TAKE 1 CAPSULE(290 MCG) BY MOUTH BEFORE BREAKFAST    losartan (COZAAR) 25 MG tablet Take 1 tablet (25 mg total) by mouth once daily.    medroxyPROGESTERone (PROVERA) 2.5 MG tablet Take 2.5 mg by mouth.    melatonin 5 mg Chew Take 10 mg by mouth daily as needed.    mupirocin (BACTROBAN) 2 % ointment Apply topically 2 (two) times daily. Apply to affected area    mv-mn/iron/folic acid/herb 190 (VITAMIN D3 COMPLETE ORAL) Take by mouth once daily at 6am.    omega-3 fatty acids/fish oil (FISH OIL-OMEGA-3 FATTY ACIDS) 300-1,000 mg capsule Take 1 capsule by mouth once daily.    omeprazole (PRILOSEC) 40 MG capsule Take 1 capsule (40 mg total) by mouth once daily.    POLYETHYLENE GLYCOL 3350 (MIRALAX ORAL) Take by mouth as needed.     potassium chloride 10% (KAYCIEL) 20 mEq/15 mL oral solution Take 15 mLs (20 mEq total) by mouth once daily.    QUEtiapine (SEROQUEL) 200 MG Tab Take 1 tablet (200 mg total) by mouth every evening.    tiZANidine (ZANAFLEX) 4 MG tablet Take 1 tablet (4 mg total) by mouth every evening.    traZODone (DESYREL) 100 MG tablet TK 1 T PO  QHS    venlafaxine (EFFEXOR-XR) 150 MG Cp24 Take 150 mg by mouth once daily.    venlafaxine (EFFEXOR-XR) 75 MG 24  hr capsule TAKE ONE CAPSULE BY MOUTH DAILY WITH 150 MG TO EQUAL 225 MG     No current facility-administered medications for this visit.       REVIEW OF SYSTEMS:    GENERAL:  No weight loss, malaise or fevers.  HEENT:  Negative for frequent or significant headaches.  NECK:  Negative for lumps, goiter, pain and significant neck swelling.  RESPIRATORY:  Negative for cough, wheezing or shortness of breath.  CARDIOVASCULAR:  Negative for chest pain, leg swelling or palpitations.  GI:  Negative for abdominal discomfort, blood in stools or black stools or change in bowel habits.  MUSCULOSKELETAL:  See HPI.  SKIN:  Negative for lesions, rash, and itching.  PSYCH:  Negative for sleep disturbance, mood disorder and recent psychosocial stressors.  HEMATOLOGY/LYMPHOLOGY:  Negative for prolonged bleeding, bruising easily or swollen nodes.  NEURO:   No history of headaches, syncope, paralysis, seizures or tremors.  All other reviewed and negative other than HPI.    OBJECTIVE:    /75   Pulse 80   Temp 98.8 °F (37.1 °C)   Resp 18   Wt 65.8 kg (145 lb 1 oz)   SpO2 98%   BMI 26.53 kg/m²     PHYSICAL EXAMINATION:    General appearance: Well appearing, in no acute distress, alert and oriented x3.  Psych:  Mood and affect appropriate.  Skin: Skin color, texture, turgor normal, no rashes or lesions, in both upper and lower body.  Head/face:  Normocephalic, atraumatic. No palpable lymph nodes.  Neck: No pain to palpation over the cervical paraspinous muscles. Spurling Negative. No pain with neck flexion, extension, or lateral flexion.   Cor: Rate regular.  Pulm: breathing is even and unlabored, equal chest rise  GI:  non-distended  Back: Straight leg raising in the sitting and supine positions is negative to radicular pain. No pain to palpation over the spine or costovertebral angles. Pain with flexion > extension, ROM limited due to pain  Extremities: No deformities, edema, or skin discoloration. Good capillary  refill.  Musculoskeletal: Left shoulder: pain about the AC joint, +full can, +empty can, +[place], +Neers, limited ROM in all planes worse with internal rotation.  Bilateral upper and lower extremity strength is normal and symmetric.  No atrophy or tone abnormalities are noted.  Neuro: Bilateral upper and lower extremity coordination and muscle stretch reflexes are physiologic and symmetric.  Plantar response are downgoing. No loss of sensation is noted.  Gait: normal.        ASSESSMENT: 59 y.o. year old female with buttock pain, consistent with      1. Chronic left shoulder pain        2. History of rotator cuff surgery        3. Chronic pain in left shoulder        4. Chronic pain syndrome            PLAN:   We discussed with the patient the assessment and recommendations. The following is the plan we agreed on:   - I have stressed the importance of physical activity and a home exercise plan to help with pain and improve health.  - Patient can continue with medications for now since they are providing benefits, using them appropriately, and without side effects.  - Continue ibuprofen and tizanidine from Ortho.   - She is s/p Left shoulder cooled RFA with 70% relief.    - Continue PT and home exercises. Encouraged daily.    - Counseled patient regarding the importance of activity modification and physical therapy.  - RTC 3-4 months or sooner for follow-up.     The above plan and management options were discussed at length with patient. Patient is in agreement with the above and verbalized understanding.     Estefany Kulkarni NP  02/13/2025

## 2025-02-20 DIAGNOSIS — M25.512 CHRONIC LEFT SHOULDER PAIN: ICD-10-CM

## 2025-02-20 DIAGNOSIS — M75.122 COMPLETE TEAR OF LEFT ROTATOR CUFF, UNSPECIFIED WHETHER TRAUMATIC: ICD-10-CM

## 2025-02-20 DIAGNOSIS — G89.29 CHRONIC LEFT SHOULDER PAIN: ICD-10-CM

## 2025-02-20 RX ORDER — TIZANIDINE 4 MG/1
4 TABLET ORAL EVERY 8 HOURS
Qty: 30 TABLET | Refills: 0 | Status: SHIPPED | OUTPATIENT
Start: 2025-02-20

## 2025-02-20 RX ORDER — IBUPROFEN 800 MG/1
800 TABLET ORAL 2 TIMES DAILY WITH MEALS
Qty: 60 TABLET | Refills: 0 | Status: SHIPPED | OUTPATIENT
Start: 2025-02-20

## 2025-02-22 DIAGNOSIS — Z00.00 ENCOUNTER FOR MEDICARE ANNUAL WELLNESS EXAM: ICD-10-CM

## 2025-02-27 DIAGNOSIS — I15.2 HYPERTENSION ASSOCIATED WITH DIABETES: ICD-10-CM

## 2025-02-27 DIAGNOSIS — E11.59 HYPERTENSION ASSOCIATED WITH DIABETES: ICD-10-CM

## 2025-02-27 RX ORDER — LOSARTAN POTASSIUM 25 MG/1
25 TABLET ORAL
Qty: 90 TABLET | Refills: 1 | Status: SHIPPED | OUTPATIENT
Start: 2025-02-27

## 2025-02-28 ENCOUNTER — TELEPHONE (OUTPATIENT)
Dept: ORTHOPEDICS | Facility: CLINIC | Age: 60
End: 2025-02-28
Payer: MEDICARE

## 2025-02-28 NOTE — TELEPHONE ENCOUNTER
Spoke with [facility]. Pharmacist stated that patient recently had an rx filled for Flexeril last month. I attempted to contact patient to verify whether or not she is taking this medication. Left message.

## 2025-02-28 NOTE — TELEPHONE ENCOUNTER
----- Message from Elke sent at 2/28/2025 12:48 PM CST -----  Type:  Pharmacy Calling to Clarify an RXName of Caller:Pharmacy Name:Neilcription Name:tiZANidine (ZANAFLEX) 4 MG tabletWhat do they need to clarify?:which medication is correct Best Call Back Number:[phone] Information: patient is also taking cyclobenzaprine (FLEXERIL) 10 MG tablet

## 2025-02-28 NOTE — TELEPHONE ENCOUNTER
Refill Routing Note   Medication(s) are not appropriate for processing by [facility] for the following reason(s):        Drug-disease interaction: losartan and Hypotension     [facility] action(s):  Defer             Pharmacist review requested: Yes     Appointments  past 12m or future 3m with PCP    Date Provider   Last Visit   7/8/2024 Farhana Whittington MD   Next Visit   Visit date not found Farhana Whittington MD   ED visits in past 90 days: 0        Note composed:8:16 PM 02/27/2025

## 2025-02-28 NOTE — TELEPHONE ENCOUNTER
No care due was identified.  [facility] Embedded Care Due Messages. Reference number: [id number].   2/27/2025 8:14:16 PM CST

## 2025-02-28 NOTE — TELEPHONE ENCOUNTER
Refill Decision Note   Kathrineritchie Amy  is requesting a refill authorization.  Brief Assessment and Rationale for Refill:  Approve     Medication Therapy Plan:        Pharmacist review requested: Yes   Comments:     Note composed:9:42 PM 02/27/2025

## 2025-03-17 RX ORDER — TIZANIDINE 4 MG/1
4 TABLET ORAL EVERY 8 HOURS
Qty: 30 TABLET | Refills: 0 | Status: SHIPPED | OUTPATIENT
Start: 2025-03-17

## 2025-04-10 ENCOUNTER — OFFICE VISIT (OUTPATIENT)
Dept: ORTHOPEDICS | Facility: CLINIC | Age: 60
End: 2025-04-10
Payer: MEDICARE

## 2025-04-10 ENCOUNTER — LAB VISIT (OUTPATIENT)
Dept: LAB | Facility: HOSPITAL | Age: 60
End: 2025-04-10
Attending: INTERNAL MEDICINE
Payer: MEDICARE

## 2025-04-10 DIAGNOSIS — M25.512 CHRONIC PAIN OF BOTH SHOULDERS: Primary | ICD-10-CM

## 2025-04-10 DIAGNOSIS — M25.511 CHRONIC PAIN OF BOTH SHOULDERS: Primary | ICD-10-CM

## 2025-04-10 DIAGNOSIS — E78.49 OTHER HYPERLIPIDEMIA: ICD-10-CM

## 2025-04-10 DIAGNOSIS — E11.9 TYPE 2 DIABETES MELLITUS WITHOUT COMPLICATION, WITHOUT LONG-TERM CURRENT USE OF INSULIN: ICD-10-CM

## 2025-04-10 DIAGNOSIS — G89.29 CHRONIC PAIN OF BOTH SHOULDERS: Primary | ICD-10-CM

## 2025-04-10 LAB
ALBUMIN/CREAT UR: 4.2 UG/MG
CHOLEST SERPL-MCNC: 158 MG/DL (ref 120–199)
CHOLEST/HDLC SERPL: 2.6 {RATIO} (ref 2–5)
CREAT UR-MCNC: 402 MG/DL (ref 15–325)
EAG (OHS): 108 MG/DL (ref 68–131)
HBA1C MFR BLD: 5.4 % (ref 4–5.6)
HDLC SERPL-MCNC: 61 MG/DL (ref 40–75)
HDLC SERPL: 38.6 % (ref 20–50)
LDLC SERPL CALC-MCNC: 83.6 MG/DL (ref 63–159)
MICROALBUMIN UR-MCNC: 17 UG/ML (ref ?–5000)
NONHDLC SERPL-MCNC: 97 MG/DL
TRIGL SERPL-MCNC: 67 MG/DL (ref 30–150)
TSH SERPL-ACNC: 0.75 UIU/ML (ref 0.4–4)

## 2025-04-10 PROCEDURE — 36415 COLL VENOUS BLD VENIPUNCTURE: CPT | Mod: HCNC

## 2025-04-10 PROCEDURE — 84443 ASSAY THYROID STIM HORMONE: CPT | Mod: HCNC

## 2025-04-10 PROCEDURE — 99999 PR PBB SHADOW E&M-EST. PATIENT-LVL I: CPT | Mod: PBBFAC,HCNC,, | Performed by: ORTHOPAEDIC SURGERY

## 2025-04-10 PROCEDURE — 82570 ASSAY OF URINE CREATININE: CPT | Mod: HCNC

## 2025-04-10 PROCEDURE — 83036 HEMOGLOBIN GLYCOSYLATED A1C: CPT | Mod: HCNC

## 2025-04-10 PROCEDURE — 80061 LIPID PANEL: CPT | Mod: HCNC

## 2025-04-10 RX ORDER — TRIAMCINOLONE ACETONIDE 40 MG/ML
40 INJECTION, SUSPENSION INTRA-ARTICULAR; INTRAMUSCULAR
Status: COMPLETED | OUTPATIENT
Start: 2025-04-10 | End: 2025-04-10

## 2025-04-10 RX ADMIN — TRIAMCINOLONE ACETONIDE 40 MG: 40 INJECTION, SUSPENSION INTRA-ARTICULAR; INTRAMUSCULAR at 02:04

## 2025-04-10 NOTE — PROGRESS NOTES
Subjective:      Patient ID: Sherie Reyes is a 59 y.o. female.  Chief Complaint: Follow-up of the Left Shoulder and Pain of the Right Shoulder      HPI  Sherie Reyes is a  59 y.o. female presenting today for follow up of bilateral shoulder pain.  She reports that she is having a flare-up in both shoulders   She has had previous rotator cuff surgery on both shoulders   She had an injection a few months ago with a good results she would like to have this repeated   I explained to her that I am concerned about too many injections which could weaken the rotator cuff   She is also treated for chronic pain in her neck by pain management.    Review of patient's allergies indicates:  No Known Allergies      Current Medications[1]    Past Medical History:   Diagnosis Date    Anxiety     Degenerative joint disease (DJD) of hip     GERD (gastroesophageal reflux disease)     Hyperlipidemia     Hypertension     IBS (irritable bowel syndrome)     Insomnia     Lumbar disc herniation     PTSD (post-traumatic stress disorder)        Past Surgical History:   Procedure Laterality Date    ADENOIDECTOMY      ARTHROSCOPIC DEBRIDEMENT OF ROTATOR CUFF Right 06/09/2020    Procedure: DEBRIDEMENT, ROTATOR CUFF, ARTHROSCOPIC;  Surgeon: Melchor Hughes Jr., MD;  Location: [facility] OR;  Service: Orthopedics;  Laterality: Right;  need opus system (Ger MESA notified 5/26, EF)  video, notified/confirmed 6/8/2020 KB 1310    ARTHROSCOPIC EXCISION OF ACROMIOCLAVICULAR JOINT  06/09/2020    Procedure: EXCISION, ACROMIOCLAVICULAR JOINT, ARTHROSCOPIC;  Surgeon: Melchor Hughes Jr., MD;  Location: [facility] OR;  Service: Orthopedics;;    ARTHROSCOPIC REPAIR OF ROTATOR CUFF OF SHOULDER      ARTHROSCOPIC REPAIR OF ROTATOR CUFF OF SHOULDER Left 03/19/2024    Procedure: REPAIR, ROTATOR CUFF, ARTHROSCOPIC;  Surgeon: Melchor Hughes Jr., MD;  Location: [facility] OR;  Service: Orthopedics;  Laterality: Left;  need opus system and regeneten patch     ARTHROSCOPY OF SHOULDER WITH DECOMPRESSION OF SUBACROMIAL SPACE  2020    Procedure: ARTHROSCOPY, SHOULDER, WITH SUBACROMIAL SPACE DECOMPRESSION;  Surgeon: Melchor Hughes Jr., MD;  Location: [facility] OR;  Service: Orthopedics;;    BREAST BIOPSY Right 2024     SECTION      DECOMPRESSION OF SUBACROMIAL SPACE Left 2024    Procedure: DECOMPRESSION, SUBACROMIAL SPACE;  Surgeon: Melchor Hughes Jr., MD;  Location: [facility] OR;  Service: Orthopedics;  Laterality: Left;    EPIDURAL STEROID INJECTION INTO CERVICAL SPINE N/A 2021    Procedure: Injection-steroid-epidural-cervical--C7-T1 IL ZAYNAB;  Surgeon: Gregoria Rodriguez MD;  Location: [facility] PAIN MGT;  Service: Pain Management;  Laterality: N/A;    ESOPHAGOGASTRODUODENOSCOPY N/A 2023    Procedure: ESOPHAGOGASTRODUODENOSCOPY (EGD);  Surgeon: Tommy Schmitt MD;  Location: [address]);  Service: Endoscopy;  Laterality: N/A;  referral Nav[facility]ot NP-instr portal-GT    INJECTION OF ANESTHETIC AGENT AROUND NERVE Left 2024    Procedure: BLOCK, NERVE LEFT SHOULDER DIAGNOSTIC *REP CONFIRMED*;  Surgeon: Yesica Monroy MD;  Location: [facility] PAIN MGT;  Service: Pain Management;  Laterality: Left;  [phone]    INJECTION OF JOINT Right 2023    Procedure: INJECTION, JOINT, RIGHT ISCHIAL BURSA  SOONER DATE;  Surgeon: Yesica Monroy MD;  Location: [facility] PAIN MGT;  Service: Pain Management;  Laterality: Right;    RADIOFREQUENCY ABLATION Left 2024    Procedure: RADIOFREQUENCY ABLATION LEFT SHOULDER COOLED *REP CONFIRMED*;  Surgeon: Yesica Monroy MD;  Location: [facility] PAIN MGT;  Service: Pain Management;  Laterality: Left;  [phone]  6 WK F/U SHALINI    TENOTOMY, ELBOW, WITH DEBRIDEMENT AND TENDON REPAIR Right 2022    Procedure: TENOTOMY,ELBOW,WITH DEBRIDEMENT AND TENDON REPAIR;  Surgeon: Melchor Hughes Jr., MD;  Location: [facility] OR;  Service: Orthopedics;  Laterality: Right;    TONSILLECTOMY         OBJECTIVE:   PHYSICAL EXAM:        Vitals:    04/10/25 1428   PainSc:   9   PainLoc: Shoulder     Ortho/SPM Exam  Examination of the shoulders both shoulders have full range of motion mildly positive impingement sign   Strength intact no instability    RADIOGRAPHS:  None  Comments: I have personally reviewed the imaging and I agree with the above radiologist's report.    ASSESSMENT/PLAN:     IMPRESSION:  Bilateral shoulder pain chronic    PLAN:  I think we can try injections with a low-dose today   After pause for time-out identified each shoulder injected with combination Kenalog 20 mg 2 cc xylocaine sterile technique each shoulder   Continue current medications   Continue pain management    FOLLOW UP:  2-3 months     Disclaimer: This note has been generated using voice-recognition software. There may be typographical errors that have been missed during proof-reading.          [1]   Current Outpatient Medications   Medication Sig Dispense Refill    acetaminophen (TYLENOL) 500 MG tablet Take 500 mg by mouth every 6 (six) hours as needed for Pain.      albuterol (PROVENTIL/VENTOLIN HFA) 90 mcg/actuation inhaler Inhale 2 puffs into the lungs every 6 (six) hours as needed for Wheezing. Rescue 18 g 3    atorvastatin (LIPITOR) 40 MG tablet Take 1 tablet (40 mg total) by mouth once daily. 90 tablet 3    azelastine (ASTELIN) 137 mcg (0.1 %) nasal spray 1 spray (137 mcg total) by Nasal route 2 (two) times daily. 90 mL 1    cetirizine (ZYRTEC) 10 MG tablet Take 2 tablets (20 mg total) by mouth every morning. 60 tablet 3    chlorzoxazone (PARAFON FORTE) 500 mg Tab Take 500 mg by mouth daily as needed.      clonazePAM (KLONOPIN) 1 MG tablet TAKE 1 TABLET BY MOUTH TWICE DAILY 60 tablet 1    cyanocobalamin 1,000 mcg/mL injection Inject 1 mL (1,000 mcg total) into the muscle every 30 days. 30 mL 1    cyclobenzaprine (FLEXERIL) 10 MG tablet Take 10 mg by mouth 2 (two) times daily as needed.      diclofenac sodium (VOLTAREN) 1 % Gel Apply 2 g topically 4 (four)  times daily. 50 g 0    dicyclomine (BENTYL) 10 MG capsule TAKE 1 CAPSULE (10 MG TOTAL) BY MOUTH 4 (FOUR) TIMES DAILY AS NEEDED (ABDOMINAL PAIN). 360 capsule 0    estradioL (ESTRACE) 1 MG tablet Take 1 mg by mouth once daily.      ibuprofen (IBU) 800 MG tablet Take 1 tablet (800 mg total) by mouth 2 (two) times daily with meals. 60 tablet 0    LIDOcaine (LIDODERM) 5 % Place 1 patch onto the skin once daily. Remove & Discard patch within 12 hours or as directed by MD 14 patch 0    LINZESS 290 mcg Cap capsule TAKE 1 CAPSULE(290 MCG) BY MOUTH BEFORE BREAKFAST 90 capsule 3    losartan (COZAAR) 25 MG tablet TAKE 1 TABLET ONE TIME DAILY 90 tablet 1    medroxyPROGESTERone (PROVERA) 2.5 MG tablet Take 2.5 mg by mouth.      melatonin 5 mg Chew Take 10 mg by mouth daily as needed.      mupirocin (BACTROBAN) 2 % ointment Apply topically 2 (two) times daily. Apply to affected area 15 g 2    mv-mn/iron/folic acid/herb 190 (VITAMIN D3 COMPLETE ORAL) Take by mouth once daily at 6am.      omega-3 fatty acids/fish oil (FISH OIL-OMEGA-3 FATTY ACIDS) 300-1,000 mg capsule Take 1 capsule by mouth once daily.      omeprazole (PRILOSEC) 40 MG capsule Take 1 capsule (40 mg total) by mouth once daily. 90 capsule 3    POLYETHYLENE GLYCOL 3350 (MIRALAX ORAL) Take by mouth as needed.       potassium chloride 10% (KAYCIEL) 20 mEq/15 mL oral solution Take 15 mLs (20 mEq total) by mouth once daily. 1350 mL 1    tiZANidine (ZANAFLEX) 4 MG tablet TAKE 1 TABLET EVERY 8 HOURS 30 tablet 0    traZODone (DESYREL) 100 MG tablet TK 1 T PO  QHS      venlafaxine (EFFEXOR-XR) 150 MG Cp24 Take 150 mg by mouth once daily.      venlafaxine (EFFEXOR-XR) 75 MG 24 hr capsule TAKE ONE CAPSULE BY MOUTH DAILY WITH 150 MG TO EQUAL 225 MG      QUEtiapine (SEROQUEL) 200 MG Tab Take 1 tablet (200 mg total) by mouth every evening. 30 tablet 2     No current facility-administered medications for this visit.

## 2025-04-29 DIAGNOSIS — G89.29 CHRONIC ABDOMINAL PAIN: ICD-10-CM

## 2025-04-29 DIAGNOSIS — R10.9 CHRONIC ABDOMINAL PAIN: ICD-10-CM

## 2025-04-29 RX ORDER — OMEPRAZOLE 40 MG/1
40 CAPSULE, DELAYED RELEASE ORAL DAILY
Qty: 90 CAPSULE | Refills: 0 | Status: SHIPPED | OUTPATIENT
Start: 2025-04-29

## 2025-04-29 NOTE — TELEPHONE ENCOUNTER
Refill Routing Note   Medication(s) are not appropriate for processing by [facility] for the following reason(s):        Outside of protocol: Dicyclomione    [facility] action(s):  Route  Approve   Requires appointment : Yes             Appointments  past 12m or future 3m with PCP    Date Provider   Last Visit   7/8/2024 Farhana Whittington MD   Next Visit   Visit date not found Farhana Whittington MD   ED visits in past 90 days: 0        Note composed:3:49 PM 04/29/2025

## 2025-04-29 NOTE — TELEPHONE ENCOUNTER
Care Due:                  Date            Visit Type   Department     Provider  --------------------------------------------------------------------------------                                ESTABLISHED                              PATIENT -    [facility] INTERNAL  Last Visit: 07-      [facility]       Farhana Whittington  Next Visit: None Scheduled  None         None Found                                                            Last  Test          Frequency    Reason                     Performed    Due Date  --------------------------------------------------------------------------------    Office Visit  12 months..  alvaro SMALL.....  07- 07-    [facility] Embedded Care Due Messages. Reference number: [id number].   4/29/2025 3:15:45 PM CDT

## 2025-05-01 ENCOUNTER — TELEPHONE (OUTPATIENT)
Dept: INTERNAL MEDICINE | Facility: CLINIC | Age: 60
End: 2025-05-01
Payer: MEDICARE

## 2025-05-01 DIAGNOSIS — R10.9 CHRONIC ABDOMINAL PAIN: ICD-10-CM

## 2025-05-01 DIAGNOSIS — G89.29 CHRONIC ABDOMINAL PAIN: ICD-10-CM

## 2025-05-01 DIAGNOSIS — Z00.00 ANNUAL PHYSICAL EXAM: ICD-10-CM

## 2025-05-01 DIAGNOSIS — E11.59 HYPERTENSION ASSOCIATED WITH DIABETES: Primary | ICD-10-CM

## 2025-05-01 DIAGNOSIS — E53.8 LOW SERUM VITAMIN B12: ICD-10-CM

## 2025-05-01 DIAGNOSIS — I15.2 HYPERTENSION ASSOCIATED WITH DIABETES: Primary | ICD-10-CM

## 2025-05-01 NOTE — TELEPHONE ENCOUNTER
Refill Routing Note   Medication(s) are not appropriate for processing by [facility] for the following reason(s):        Outside of protocol    [facility] action(s):  Route             Appointments  past 12m or future 3m with PCP    Date Provider   Last Visit   7/8/2024 Farhana Whittington MD   Next Visit   Visit date not found Farhana Whittington MD   ED visits in past 90 days: 0        Note composed:12:39 PM 05/01/2025

## 2025-05-01 NOTE — TELEPHONE ENCOUNTER
No care due was identified.  [facility] Embedded Care Due Messages. Reference number: [id number].   5/01/2025 12:32:29 PM CDT

## 2025-05-02 ENCOUNTER — TELEPHONE (OUTPATIENT)
Dept: PAIN MEDICINE | Facility: CLINIC | Age: 60
End: 2025-05-02
Payer: MEDICARE

## 2025-05-02 RX ORDER — DICYCLOMINE HYDROCHLORIDE 10 MG/1
10 CAPSULE ORAL 4 TIMES DAILY PRN
Qty: 360 CAPSULE | Refills: 0 | Status: SHIPPED | OUTPATIENT
Start: 2025-05-02

## 2025-05-02 NOTE — TELEPHONE ENCOUNTER
Staff contacted patient in regards to her visit scheduled with  06/18/2025 needing to be rescheduled. Patient states this is not a good time to reschedule her and she  will give us a call back after she look at her calendar.

## 2025-05-05 RX ORDER — DICYCLOMINE HYDROCHLORIDE 10 MG/1
10 CAPSULE ORAL 4 TIMES DAILY PRN
Qty: 360 CAPSULE | Refills: 0 | Status: SHIPPED | OUTPATIENT
Start: 2025-05-05

## 2025-05-05 NOTE — TELEPHONE ENCOUNTER
Refilled bentyl.    Please schedule annual exam as soon as she can. Also schedule labs 1 week prior to appointment.

## 2025-05-28 ENCOUNTER — PATIENT MESSAGE (OUTPATIENT)
Dept: PAIN MEDICINE | Facility: CLINIC | Age: 60
End: 2025-05-28
Payer: MEDICARE

## 2025-06-18 ENCOUNTER — LAB VISIT (OUTPATIENT)
Dept: LAB | Facility: HOSPITAL | Age: 60
End: 2025-06-18
Attending: INTERNAL MEDICINE
Payer: MEDICARE

## 2025-06-18 DIAGNOSIS — E53.8 LOW SERUM VITAMIN B12: ICD-10-CM

## 2025-06-18 DIAGNOSIS — I15.2 HYPERTENSION ASSOCIATED WITH DIABETES: ICD-10-CM

## 2025-06-18 DIAGNOSIS — Z00.00 ANNUAL PHYSICAL EXAM: ICD-10-CM

## 2025-06-18 DIAGNOSIS — E11.59 HYPERTENSION ASSOCIATED WITH DIABETES: ICD-10-CM

## 2025-06-18 LAB
ABSOLUTE EOSINOPHIL (OHS): 0.05 K/UL
ABSOLUTE MONOCYTE (OHS): 0.35 K/UL (ref 0.3–1)
ABSOLUTE NEUTROPHIL COUNT (OHS): 2.89 K/UL (ref 1.8–7.7)
ALBUMIN SERPL BCP-MCNC: 4.3 G/DL (ref 3.5–5.2)
ALP SERPL-CCNC: 58 UNIT/L (ref 40–150)
ALT SERPL W/O P-5'-P-CCNC: 15 UNIT/L (ref 10–44)
ANION GAP (OHS): 10 MMOL/L (ref 8–16)
AST SERPL-CCNC: 17 UNIT/L (ref 11–45)
BASOPHILS # BLD AUTO: 0.03 K/UL
BASOPHILS NFR BLD AUTO: 0.6 %
BILIRUB SERPL-MCNC: 0.3 MG/DL (ref 0.1–1)
BILIRUB UR QL STRIP.AUTO: NEGATIVE
BUN SERPL-MCNC: 6 MG/DL (ref 6–20)
CALCIUM SERPL-MCNC: 9.1 MG/DL (ref 8.7–10.5)
CHLORIDE SERPL-SCNC: 108 MMOL/L (ref 95–110)
CLARITY UR: CLEAR
CO2 SERPL-SCNC: 23 MMOL/L (ref 23–29)
COLOR UR AUTO: YELLOW
CREAT SERPL-MCNC: 0.9 MG/DL (ref 0.5–1.4)
ERYTHROCYTE [DISTWIDTH] IN BLOOD BY AUTOMATED COUNT: 13 % (ref 11.5–14.5)
GFR SERPLBLD CREATININE-BSD FMLA CKD-EPI: >60 ML/MIN/1.73/M2
GLUCOSE SERPL-MCNC: 86 MG/DL (ref 70–110)
GLUCOSE UR QL STRIP: NEGATIVE
HCT VFR BLD AUTO: 41.8 % (ref 37–48.5)
HGB BLD-MCNC: 13.7 GM/DL (ref 12–16)
HGB UR QL STRIP: NEGATIVE
IMM GRANULOCYTES # BLD AUTO: 0.03 K/UL (ref 0–0.04)
IMM GRANULOCYTES NFR BLD AUTO: 0.6 % (ref 0–0.5)
KETONES UR QL STRIP: NEGATIVE
LEUKOCYTE ESTERASE UR QL STRIP: NEGATIVE
LYMPHOCYTES # BLD AUTO: 1.96 K/UL (ref 1–4.8)
MCH RBC QN AUTO: 28.8 PG (ref 27–31)
MCHC RBC AUTO-ENTMCNC: 32.8 G/DL (ref 32–36)
MCV RBC AUTO: 88 FL (ref 82–98)
NITRITE UR QL STRIP: NEGATIVE
NUCLEATED RBC (/100WBC) (OHS): 0 /100 WBC
PH UR STRIP: 6 [PH]
PLATELET # BLD AUTO: 193 K/UL (ref 150–450)
PMV BLD AUTO: 12.3 FL (ref 9.2–12.9)
POTASSIUM SERPL-SCNC: 3.6 MMOL/L (ref 3.5–5.1)
PROT SERPL-MCNC: 7.1 GM/DL (ref 6–8.4)
PROT UR QL STRIP: ABNORMAL
RBC # BLD AUTO: 4.75 M/UL (ref 4–5.4)
RELATIVE EOSINOPHIL (OHS): 0.9 %
RELATIVE LYMPHOCYTE (OHS): 36.9 % (ref 18–48)
RELATIVE MONOCYTE (OHS): 6.6 % (ref 4–15)
RELATIVE NEUTROPHIL (OHS): 54.4 % (ref 38–73)
SODIUM SERPL-SCNC: 141 MMOL/L (ref 136–145)
SP GR UR STRIP: 1.03
TSH SERPL-ACNC: 0.66 UIU/ML (ref 0.4–4)
UROBILINOGEN UR STRIP-ACNC: NEGATIVE EU/DL
VIT B12 SERPL-MCNC: 268 PG/ML (ref 210–950)
WBC # BLD AUTO: 5.31 K/UL (ref 3.9–12.7)

## 2025-06-18 PROCEDURE — 36415 COLL VENOUS BLD VENIPUNCTURE: CPT

## 2025-06-18 PROCEDURE — 82607 VITAMIN B-12: CPT | Mod: HCNC

## 2025-06-18 PROCEDURE — 85025 COMPLETE CBC W/AUTO DIFF WBC: CPT | Mod: HCNC

## 2025-06-18 PROCEDURE — 80053 COMPREHEN METABOLIC PANEL: CPT | Mod: HCNC

## 2025-06-18 PROCEDURE — 84443 ASSAY THYROID STIM HORMONE: CPT | Mod: HCNC

## 2025-06-18 PROCEDURE — 81003 URINALYSIS AUTO W/O SCOPE: CPT | Mod: HCNC

## 2025-06-18 RX ORDER — ATORVASTATIN CALCIUM 40 MG/1
40 TABLET, FILM COATED ORAL
Qty: 90 TABLET | Refills: 0 | Status: SHIPPED | OUTPATIENT
Start: 2025-06-18

## 2025-06-18 NOTE — TELEPHONE ENCOUNTER
Refill Decision Note   Sehrie Reyes  is requesting a refill authorization.  Brief Assessment and Rationale for Refill:  Approve     Medication Therapy Plan:         Comments:     Note composed:9:43 AM 06/18/2025

## 2025-06-18 NOTE — TELEPHONE ENCOUNTER
No care due was identified.  [facility] Embedded Care Due Messages. Reference number: [id number].   6/18/2025 1:33:23 AM CDT

## 2025-06-25 ENCOUNTER — OFFICE VISIT (OUTPATIENT)
Dept: INTERNAL MEDICINE | Facility: CLINIC | Age: 60
End: 2025-06-25
Payer: MEDICARE

## 2025-06-25 VITALS
HEIGHT: 67 IN | OXYGEN SATURATION: 99 % | TEMPERATURE: 97 F | RESPIRATION RATE: 18 BRPM | SYSTOLIC BLOOD PRESSURE: 118 MMHG | WEIGHT: 139.31 LBS | BODY MASS INDEX: 21.87 KG/M2 | DIASTOLIC BLOOD PRESSURE: 78 MMHG

## 2025-06-25 DIAGNOSIS — G89.29 CHRONIC EAR PAIN, BILATERAL: ICD-10-CM
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
## 2025-06-25 DIAGNOSIS — E11.9 CONTROLLED TYPE 2 DIABETES MELLITUS WITHOUT COMPLICATION, WITHOUT LONG-TERM CURRENT USE OF INSULIN: ICD-10-CM

## 2025-06-25 DIAGNOSIS — E53.8 DEFICIENCY OF OTHER SPECIFIED B GROUP VITAMINS: ICD-10-CM

## 2025-06-25 DIAGNOSIS — R79.89 LOW VITAMIN B12 LEVEL: ICD-10-CM

## 2025-06-25 DIAGNOSIS — F32.A ANXIETY AND DEPRESSION: ICD-10-CM

## 2025-06-25 DIAGNOSIS — H91.90 CHANGE IN HEARING, UNSPECIFIED LATERALITY: ICD-10-CM

## 2025-06-25 DIAGNOSIS — E11.69 HYPERLIPIDEMIA DUE TO TYPE 2 DIABETES MELLITUS: ICD-10-CM

## 2025-06-25 DIAGNOSIS — F41.9 ANXIETY AND DEPRESSION: ICD-10-CM

## 2025-06-25 DIAGNOSIS — M51.16 RADICULOPATHY DUE TO LUMBAR INTERVERTEBRAL DISC DISORDER: ICD-10-CM

## 2025-06-25 DIAGNOSIS — E11.59 HYPERTENSION ASSOCIATED WITH DIABETES: Primary | ICD-10-CM

## 2025-06-25 DIAGNOSIS — H93.13 TINNITUS OF BOTH EARS: ICD-10-CM

## 2025-06-25 DIAGNOSIS — F43.10 PTSD (POST-TRAUMATIC STRESS DISORDER): ICD-10-CM

## 2025-06-25 DIAGNOSIS — N18.30 CKD STAGE 3 SECONDARY TO DIABETES: ICD-10-CM

## 2025-06-25 DIAGNOSIS — G89.29 CHRONIC ABDOMINAL PAIN: ICD-10-CM

## 2025-06-25 DIAGNOSIS — I15.2 HYPERTENSION ASSOCIATED WITH DIABETES: Primary | ICD-10-CM

## 2025-06-25 DIAGNOSIS — Z00.00 ANNUAL PHYSICAL EXAM: ICD-10-CM

## 2025-06-25 DIAGNOSIS — M48.9 CERVICAL SPINE DISEASE: ICD-10-CM

## 2025-06-25 DIAGNOSIS — M25.511 CHRONIC PAIN OF BOTH SHOULDERS: ICD-10-CM

## 2025-06-25 DIAGNOSIS — M25.512 CHRONIC PAIN OF BOTH SHOULDERS: ICD-10-CM

## 2025-06-25 DIAGNOSIS — R82.90 ABNORMAL URINALYSIS: ICD-10-CM

## 2025-06-25 DIAGNOSIS — R68.89 HEAT INTOLERANCE: ICD-10-CM

## 2025-06-25 DIAGNOSIS — R10.9 CHRONIC ABDOMINAL PAIN: ICD-10-CM

## 2025-06-25 DIAGNOSIS — E11.22 CKD STAGE 3 SECONDARY TO DIABETES: ICD-10-CM

## 2025-06-25 DIAGNOSIS — H92.03 CHRONIC EAR PAIN, BILATERAL: ICD-10-CM

## 2025-06-25 DIAGNOSIS — E78.5 HYPERLIPIDEMIA DUE TO TYPE 2 DIABETES MELLITUS: ICD-10-CM

## 2025-06-25 DIAGNOSIS — Z79.890 HORMONE REPLACEMENT THERAPY: ICD-10-CM

## 2025-06-25 DIAGNOSIS — G89.29 CHRONIC PAIN OF BOTH SHOULDERS: ICD-10-CM

## 2025-06-25 PROCEDURE — 3074F SYST BP LT 130 MM HG: CPT | Mod: CPTII,HCNC,S$GLB, | Performed by: INTERNAL MEDICINE

## 2025-06-25 PROCEDURE — 90715 TDAP VACCINE 7 YRS/> IM: CPT | Mod: HCNC,GZ,S$GLB, | Performed by: INTERNAL MEDICINE

## 2025-06-25 PROCEDURE — 4010F ACE/ARB THERAPY RXD/TAKEN: CPT | Mod: CPTII,HCNC,S$GLB, | Performed by: INTERNAL MEDICINE

## 2025-06-25 PROCEDURE — 1159F MED LIST DOCD IN RCRD: CPT | Mod: CPTII,HCNC,S$GLB, | Performed by: INTERNAL MEDICINE

## 2025-06-25 PROCEDURE — 90471 IMMUNIZATION ADMIN: CPT | Mod: HCNC,S$GLB,, | Performed by: INTERNAL MEDICINE

## 2025-06-25 PROCEDURE — 99999 PR PBB SHADOW E&M-EST. PATIENT-LVL V: CPT | Mod: PBBFAC,HCNC,, | Performed by: INTERNAL MEDICINE

## 2025-06-25 PROCEDURE — 3061F NEG MICROALBUMINURIA REV: CPT | Mod: CPTII,HCNC,S$GLB, | Performed by: INTERNAL MEDICINE

## 2025-06-25 PROCEDURE — 1160F RVW MEDS BY RX/DR IN RCRD: CPT | Mod: CPTII,HCNC,S$GLB, | Performed by: INTERNAL MEDICINE

## 2025-06-25 PROCEDURE — 3008F BODY MASS INDEX DOCD: CPT | Mod: CPTII,HCNC,S$GLB, | Performed by: INTERNAL MEDICINE

## 2025-06-25 PROCEDURE — 3044F HG A1C LEVEL LT 7.0%: CPT | Mod: CPTII,HCNC,S$GLB, | Performed by: INTERNAL MEDICINE

## 2025-06-25 PROCEDURE — 3078F DIAST BP <80 MM HG: CPT | Mod: CPTII,HCNC,S$GLB, | Performed by: INTERNAL MEDICINE

## 2025-06-25 PROCEDURE — 3066F NEPHROPATHY DOC TX: CPT | Mod: CPTII,HCNC,S$GLB, | Performed by: INTERNAL MEDICINE

## 2025-06-25 PROCEDURE — 3072F LOW RISK FOR RETINOPATHY: CPT | Mod: CPTII,HCNC,S$GLB, | Performed by: INTERNAL MEDICINE

## 2025-06-25 PROCEDURE — 99215 OFFICE O/P EST HI 40 MIN: CPT | Mod: HCNC,25,S$GLB, | Performed by: INTERNAL MEDICINE

## 2025-06-25 NOTE — PROGRESS NOTES
Subjective:     PCP: Farhana Whittington MD    Sherie Reyes is a 60 y.o. female who presents for an annual exam.    Orthopedics- Dr. Hughes (shoulders)  OB/GYN- Dr. Basil Terrell  Psychiatry- Dr. April Ashley  Pain Management: Dr. Porfirio Kapadia- [facility]      History of Present Illness      Sherie reports chronic widespread pain affecting multiple body regions. Her shoulder pain is severe, rated >10/10, significantly impacting daily functioning. She also experiences upper body, middle back, and hip pain. She reports left leg instability with occasional giving out when standing or walking.    She reports memory issues characterized by losing her train of thought during conversations and frequently misplacing items while actively using them. She denies forgetting driving directions or locations. She also reports tinnitus for 1-2 years, describing sudden, intense sounds like a train horn or whistle in her ears, occurring in both quiet and noisy environments. She endorses mild hearing loss.    She reports difficulty initiating sleep due to racing thoughts. Currently sleeping approximately 12 hours continuously without interruption to urinate. She requires medication to fall asleep and denies hearing herself snore.    The patient has a normal blood pressure reading today. She has been taking losartan every day. She also takes Lipitor every day and denies muscle pain or weakness.    She reports significant postprandial fatigue after evening meals, poor appetite, and loose stools. Her diet primarily consists of noodles with meat. She experiences frequent but not voluminous loose bowel movements after eating. She denies specific food triggers but notes recent changes in bowel movement pattern over the past few weeks.    She reports two recent incidents: a fall while walking down stairs with a dog resulting in being struck in the glasses, and a cat bite that caused bleeding. She  notes increased bruising attributed to her current medication. She also reports episodes of falling backwards while crouching or kneeling, denying any head injuries during these falls.    She continues to maintain physical activity, including walking.        Medical History:   Past Medical History:   Diagnosis Date    Anxiety     Degenerative joint disease (DJD) of hip     GERD (gastroesophageal reflux disease)     Hyperlipidemia     Hypertension     IBS (irritable bowel syndrome)     Insomnia     Lumbar disc herniation     PTSD (post-traumatic stress disorder)        Family History: family history includes Allergies in her mother; Arthritis in her mother and paternal grandmother; Breast cancer in her paternal aunt; COPD in her maternal grandmother; Cancer in her maternal grandfather, maternal grandmother, and paternal aunt; Depression in her daughter, maternal grandfather, maternal grandmother, mother, paternal grandmother, sister, and son; Diabetes in her father and paternal grandfather; Early death in her maternal grandfather, maternal grandmother, and paternal grandfather; Heart disease in her father, maternal grandfather, maternal grandmother, mother, paternal aunt, paternal grandfather, and paternal grandmother; Hyperlipidemia in her father, maternal grandfather, maternal grandmother, mother, and paternal grandfather; Hypertension in her father, maternal grandfather, maternal grandmother, mother, and paternal grandfather; Hypothyroidism in her mother; Kidney disease in her paternal grandfather; Learning disabilities in her son; Mental illness in her maternal grandfather; Stroke (age of onset: 46) in her father; Vision loss in her maternal grandfather.    Surgical History:   Past Surgical History:   Procedure Laterality Date    ADENOIDECTOMY      ARTHROSCOPIC DEBRIDEMENT OF ROTATOR CUFF Right 06/09/2020    Procedure: DEBRIDEMENT, ROTATOR CUFF, ARTHROSCOPIC;  Surgeon: Melchor Hughes Jr., MD;  Location: [facility]  OR;  Service: Orthopedics;  Laterality: Right;  need opus system (Ger MESA notified , EF)  video, notified/confirmed 2020 KB 1310    ARTHROSCOPIC EXCISION OF ACROMIOCLAVICULAR JOINT  2020    Procedure: EXCISION, ACROMIOCLAVICULAR JOINT, ARTHROSCOPIC;  Surgeon: Melchor Hughes Jr., MD;  Location: [facility] OR;  Service: Orthopedics;;    ARTHROSCOPIC REPAIR OF ROTATOR CUFF OF SHOULDER      ARTHROSCOPIC REPAIR OF ROTATOR CUFF OF SHOULDER Left 2024    Procedure: REPAIR, ROTATOR CUFF, ARTHROSCOPIC;  Surgeon: Melchor Hughes Jr., MD;  Location: [facility] OR;  Service: Orthopedics;  Laterality: Left;  need opus system and regeneten patch    ARTHROSCOPY OF SHOULDER WITH DECOMPRESSION OF SUBACROMIAL SPACE  2020    Procedure: ARTHROSCOPY, SHOULDER, WITH SUBACROMIAL SPACE DECOMPRESSION;  Surgeon: Melchor Hughes Jr., MD;  Location: [facility] OR;  Service: Orthopedics;;    BREAST BIOPSY Right 2024     SECTION      DECOMPRESSION OF SUBACROMIAL SPACE Left 2024    Procedure: DECOMPRESSION, SUBACROMIAL SPACE;  Surgeon: Melchor Hughes Jr., MD;  Location: [facility] OR;  Service: Orthopedics;  Laterality: Left;    EPIDURAL STEROID INJECTION INTO CERVICAL SPINE N/A 2021    Procedure: Injection-steroid-epidural-cervical--C7-T1 IL ZAYNAB;  Surgeon: Gregoria Rodriguez MD;  Location: [facility] PAIN MGT;  Service: Pain Management;  Laterality: N/A;    ESOPHAGOGASTRODUODENOSCOPY N/A 2023    Procedure: ESOPHAGOGASTRODUODENOSCOPY (EGD);  Surgeon: Tommy Schmitt MD;  Location: [facility] ENDO (4TH FLR);  Service: Endoscopy;  Laterality: N/A;  referral [employer] HENRY-instr portal-GT    INJECTION OF ANESTHETIC AGENT AROUND NERVE Left 2024    Procedure: BLOCK, NERVE LEFT SHOULDER DIAGNOSTIC *REP CONFIRMED*;  Surgeon: Yesica Monroy MD;  Location: [facility] PAIN MGT;  Service: Pain Management;  Laterality: Left;  [phone]    INJECTION OF JOINT Right 2023    Procedure: INJECTION, JOINT, RIGHT ISCHIAL BURSA   SOONER DATE;  Surgeon: Yesica Monroy MD;  Location: [facility] PAIN MGT;  Service: Pain Management;  Laterality: Right;    RADIOFREQUENCY ABLATION Left 11/11/2024    Procedure: RADIOFREQUENCY ABLATION LEFT SHOULDER COOLED *REP CONFIRMED*;  Surgeon: Yesica Monroy MD;  Location: [facility] PAIN MGT;  Service: Pain Management;  Laterality: Left;  [phone]  6 WK F/U SHALINI    TENOTOMY, ELBOW, WITH DEBRIDEMENT AND TENDON REPAIR Right 05/20/2022    Procedure: TENOTOMY,ELBOW,WITH DEBRIDEMENT AND TENDON REPAIR;  Surgeon: Melchor Hughes Jr., MD;  Location: [facility] OR;  Service: Orthopedics;  Laterality: Right;    TONSILLECTOMY          Social History:  reports that she quit smoking about 11 years ago. Her smoking use included vaping with nicotine and cigarettes. She started smoking about 39 years ago. She has a 23.5 pack-year smoking history. She quit smokeless tobacco use about 10 years ago. She reports that she does not currently use alcohol after a past usage of about 1.0 standard drink of alcohol per week. She reports current drug use. Drug: Marijuana.     Allergies: Review of patient's allergies indicates:  No Known Allergies    Medications:   Current Outpatient Medications   Medication Sig    acetaminophen (TYLENOL) 500 MG tablet Take 500 mg by mouth every 6 (six) hours as needed for Pain.    albuterol (PROVENTIL/VENTOLIN HFA) 90 mcg/actuation inhaler Inhale 2 puffs into the lungs every 6 (six) hours as needed for Wheezing. Rescue    atorvastatin (LIPITOR) 40 MG tablet TAKE 1 TABLET ONE TIME DAILY    azelastine (ASTELIN) 137 mcg (0.1 %) nasal spray 1 spray (137 mcg total) by Nasal route 2 (two) times daily.    cetirizine (ZYRTEC) 10 MG tablet Take 2 tablets (20 mg total) by mouth every morning.    chlorzoxazone (PARAFON FORTE) 500 mg Tab Take 500 mg by mouth daily as needed.    clonazePAM (KLONOPIN) 1 MG tablet TAKE 1 TABLET BY MOUTH TWICE DAILY    cyanocobalamin 1,000 mcg/mL injection Inject 1 mL (1,000 mcg total) into the  muscle every 30 days.    cyclobenzaprine (FLEXERIL) 10 MG tablet Take 10 mg by mouth 2 (two) times daily as needed.    diclofenac sodium (VOLTAREN) 1 % Gel Apply 2 g topically 4 (four) times daily.    dicyclomine (BENTYL) 10 MG capsule Take 1 capsule (10 mg total) by mouth 4 (four) times daily as needed (abdominal pain).    estradioL (ESTRACE) 1 MG tablet Take 1 mg by mouth once daily.    ibuprofen (IBU) 800 MG tablet Take 1 tablet (800 mg total) by mouth 2 (two) times daily with meals.    LIDOcaine (LIDODERM) 5 % Place 1 patch onto the skin once daily. Remove & Discard patch within 12 hours or as directed by MD    losartan (COZAAR) 25 MG tablet TAKE 1 TABLET EVERY DAY    medroxyPROGESTERone (PROVERA) 2.5 MG tablet Take 2.5 mg by mouth.    melatonin 5 mg Chew Take 10 mg by mouth daily as needed.    mupirocin (BACTROBAN) 2 % ointment Apply topically 2 (two) times daily. Apply to affected area    mv-mn/iron/folic acid/herb 190 (VITAMIN D3 COMPLETE ORAL) Take by mouth once daily at 6am.    omega-3 fatty acids/fish oil (FISH OIL-OMEGA-3 FATTY ACIDS) 300-1,000 mg capsule Take 1 capsule by mouth once daily.    omeprazole (PRILOSEC) 40 MG capsule Take 1 capsule (40 mg total) by mouth once daily.    tiZANidine (ZANAFLEX) 4 MG tablet TAKE 1 TABLET EVERY 8 HOURS    traZODone (DESYREL) 100 MG tablet TK 1 T PO  QHS    venlafaxine (EFFEXOR-XR) 150 MG Cp24 Take 150 mg by mouth once daily.    venlafaxine (EFFEXOR-XR) 75 MG 24 hr capsule TAKE ONE CAPSULE BY MOUTH DAILY WITH 150 MG TO EQUAL 225 MG    QUEtiapine (SEROQUEL) 200 MG Tab Take 1 tablet (200 mg total) by mouth every evening.     No current facility-administered medications for this visit.       Health Maintenance:   Health Maintenance Topics with due status: Not Due       Topic Last Completion Date    Colorectal Cancer Screening 08/25/2016    Influenza Vaccine 10/27/2021    Cervical Cancer Screening 02/06/2024    Mammogram 02/11/2025    Diabetes Urine Screening 04/10/2025     Lipid Panel 04/10/2025    Hemoglobin A1c 04/10/2025    TETANUS VACCINE 06/25/2025    Low Dose Statin 06/25/2025    RSV Vaccine (Age 60+ and Pregnant patients) Not Due     Lab Results   Component Value Date    HEPCAB Non-reactive 12/14/2023    [id number] Non-reactive 12/14/2023     Eye Exam: Last Ophthalmology Visit: 1/23/2023 [facility] OPHTHALMOLOGY   Dental Exam:  not recently  OB/GYN: Dr. Basil Terrell  Pap smear: 12/4/2019  Mammogram: 8/2024, abnormal  Colonoscopy:   8/2016 at [facility], 10 years  HIV: 12/2023, neg  Hepatitis C  Ab: 12/2023, neg    Vaccinations:  Immunization History   Administered Date(s) Administered    COVID-19, MRNA, LN-S, PF (Pfizer) (Purple Cap) 08/25/2021    Influenza - Quadrivalent - PF *Preferred* (6 months and older) 01/25/2017    Influenza A (H1N1) 2009 Monovalent - Intranasal 11/18/2009    Pneumococcal Polysaccharide - 23 Valent 05/05/2021    Tdap 06/25/2025       Tetanus: due  Shingrix: due  Pneumovax: 2021  Prevnar-20: discussed, she will consider  Covid vaccine: not boosted      Body mass index is 21.82 kg/m².  Wt Readings from Last 3 Encounters:   06/25/25 63.2 kg (139 lb 5.3 oz)   02/13/25 65.8 kg (145 lb 1 oz)   11/11/24 65.8 kg (145 lb)       Review of Systems   Constitutional:  Positive for appetite change (decreased), fatigue and unexpected weight change. Negative for chills, diaphoresis and fever.   HENT:  Positive for ear pain, hearing loss (changes in hearing), rhinorrhea and tinnitus (1-2 years). Negative for congestion, dental problem, ear discharge, postnasal drip, sinus pressure, sore throat and trouble swallowing.    Eyes:  Negative for redness and visual disturbance.   Respiratory:  Negative for apnea (snores loudly but does not nap), cough, chest tightness and shortness of breath.    Cardiovascular:  Negative for chest pain, palpitations and leg swelling.   Gastrointestinal:  Positive for abdominal pain and diarrhea (associated with eating). Negative for blood in  stool, constipation, nausea and vomiting.   Endocrine: Positive for heat intolerance. Negative for cold intolerance.   Genitourinary:  Negative for decreased urine volume, dysuria, frequency and hematuria. Vaginal bleeding: neck pain, lower back pain.  Musculoskeletal:  Positive for arthralgias (shoulder pain), back pain and gait problem (difficulty with balance). Negative for myalgias.        Hip pain wih walking R hip next month   Skin:  Positive for wound (on arm from cat bite). Negative for rash.   Neurological:  Negative for dizziness, weakness, numbness and headaches.   Hematological:  Negative for adenopathy.   Psychiatric/Behavioral:  Positive for dysphoric mood (sees Psychiatry regularly) and sleep disturbance (has been working on managing the insomnia without being too drowsy the next AM). The patient is nervous/anxious.         She reports being more forgetful than usual          Objective:     Physical Exam  Vitals reviewed.   Constitutional:       General: She is awake. She is not in acute distress.     Appearance: Normal appearance. She is well-developed and well-groomed. She is not diaphoretic.   HENT:      Head: Normocephalic and atraumatic.      Right Ear: Hearing, tympanic membrane, ear canal and external ear normal. Tympanic membrane is not erythematous or bulging.      Left Ear: Hearing, tympanic membrane, ear canal and external ear normal. Tympanic membrane is not erythematous or bulging.      Nose: Nose normal. No congestion.      Mouth/Throat:      Mouth: Mucous membranes are moist.      Tongue: No lesions.      Pharynx: Oropharynx is clear. Uvula midline. No oropharyngeal exudate or posterior oropharyngeal erythema.   Eyes:      General: Lids are normal. Vision grossly intact. Gaze aligned appropriately. No scleral icterus.     Conjunctiva/sclera:      Right eye: Right conjunctiva is not injected.      Left eye: Left conjunctiva is not injected.      Pupils: Pupils are equal, round, and  reactive to light.   Neck:      Thyroid: No thyroid mass or thyromegaly.   Cardiovascular:      Rate and Rhythm: Normal rate and regular rhythm.      Pulses: Normal pulses.      Heart sounds: Normal heart sounds. No murmur heard.  Pulmonary:      Effort: Pulmonary effort is normal. No respiratory distress.      Breath sounds: Normal breath sounds. No decreased breath sounds or wheezing.   Abdominal:      General: Bowel sounds are normal. There is no distension.      Palpations: Abdomen is soft. Abdomen is not rigid.      Tenderness: There is no abdominal tenderness. There is no guarding or rebound.   Musculoskeletal:         General: Normal range of motion.      Cervical back: Normal range of motion and neck supple. Spasms and tenderness present. Pain with movement and muscular tenderness present.      Thoracic back: Spasms present.      Lumbar back: Spasms and tenderness present.      Right lower leg: No edema.      Left lower leg: No edema.   Lymphadenopathy:      Cervical: No cervical adenopathy.      Upper Body:      Right upper body: No supraclavicular adenopathy.      Left upper body: No supraclavicular adenopathy.   Skin:     General: Skin is warm and dry.      Coloration: Skin is not cyanotic.      Findings: Lesion present. No rash.      Nails: There is no clubbing.      Comments: Has a cat bite on right arm, + mild erythema, no induration or drainage   Neurological:      General: No focal deficit present.      Mental Status: She is alert and oriented to person, place, and time.      Sensory: Sensation is intact.      Coordination: Coordination is intact.      Gait: Gait is intact.      Deep Tendon Reflexes: Reflexes are normal and symmetric.   Psychiatric:         Attention and Perception: Attention normal.         Mood and Affect: Mood normal.         Behavior: Behavior is cooperative.              Assessment:        1. Hypertension associated with diabetes    2. Controlled type 2 diabetes mellitus without  complication, without long-term current use of insulin    3. CKD stage 3 secondary to diabetes    4. Hyperlipidemia due to type 2 diabetes mellitus    5. Radiculopathy due to lumbar intervertebral disc disorder    6. Chronic pain of both shoulders    7. Cervical spine disease    8. Chronic midline low back pain without sciatica    9. Cat bite, sequela    10. PTSD (post-traumatic stress disorder)    11. Anxiety and depression    12. Tinnitus of both ears    13. Chronic abdominal pain    14. Chronic ear pain, bilateral    15. Change in hearing, unspecified laterality    16. Hormone replacement therapy    17. Low vitamin B12 level    18. Abnormal urinalysis    19. Heat intolerance    20. Deficiency of other specified B group vitamins           Plan:       Assessment & Plan    - Assessed medication regimen, noting multiple sleep aids and muscle relaxants.  - Evaluated thyroid function and autoimmune markers due to recent weight loss and loose stools.  - Considered B12 deficiency as potential cause of fatigue, ordering tests to assess absorption and intrinsic factor antibodies.  - Reviewed urinalysis results showing trace protein.  - Evaluated fall risk.  - Hypertension and hyperlipidemia are controlled with current regimen.  - Diabetes has been controlled with lifestyle modifications.      _________________________________________________________    HYPERTENSION:   Controlled, continue current regimen.    HYPERLIPIDEMIA:   Controlled, continue current regimen.    CHRONIC PAIN:   Monitor patient's chronic pain in the upper body, shoulders, and back, with pain levels reaching >10/10.   Pain is tolerable but affects mobility, making it difficult to walk distances or sit in one place for too long.   She sees Dr. Kelly for pain management evaluation of the back   The patient sees Dr. Hughes next month for further evaluation of shoulder pain.   Sherie uses multiple muscle relaxants and will clarify how she uses them via email.    - She has active orders for Voltaren gel and ibuprofen pills--> patient will clarify use via email.    BACK PAIN:   Monitor chronic upper and middle back pain that affects mobility and ability to sit for long periods.   She sees Dr. Kelly for pain management.    GAIT AND MOBILITY ABNORMALITIES:   Monitor difficulty walking and maintaining balance, with left leg giving out occasionally.   These abnormalities are affecting daily activities.    MEMORY ISSUES:   Monitor episodes of forgetfulness, including forgotten conversations and misplaced items, which are affecting daily activities.   These issues may be related to anxiety or PTSD.     TINNITUS IN EARS & HEARING LOSS:   Monitor tinnitus described as a train horn sound causing pain and affecting daily life.   Refer to otolaryngologist for evaluation.    INSOMNIA:   Monitor sleep patterns, including extended sleep (up to 12 hours) without waking to urinate and difficulty waking up.   Sherie reports difficulty falling asleep without medication due to racing thoughts.   Currently taking Seroquel, trazodone, and melatonin.   Adjust medications: discontinue melatonin and decrease trazodone dosage by instructing patient to break tablet in half.    FUNCTIONAL DIARRHEA:   Monitor loose bowel movements after eating, occurring frequently over the last few weeks.   Sherie reports no appetite and fatigue after eating dinner.   She was prescribed Bentyl for stomach cramps, currently taking daily, with consideration to increase frequency of use.    CHRONIC FATIGUE:   Monitor persistent fatigue and lack of energy for daily activities.   Sherie reports fatigue after eating and difficulty waking up due to medication effects.   Discuss potential causes including medication effects and possible vitamin B12 deficiency.    CAT BITE:   Documented the bite and ordered tetanus vaccine due to the bite.    VITAMIN B12 DEFICIENCY ANEMIA:   Monitor persistent fatigue that could be related to low  vitamin B12 levels in past tests.   Sherie took vitamin B12 injections but stopped in recent years.   Explained potential autoimmune causes of B12 deficiency and absorption issues.   Order methylmalonic acid (MMA), homocysteine, intrinsic factor antibodies to determine cause of B12 deficiency.    PROTEINURIA:   Monitor trace protein in urine from past tests, noting inconsistent results over multiple tests.   Creatinine and BUN levels are normal, indicating no kidney issues.   Discuss how dipstick urinalysis can be affected by various factors.   Order urine protein test to quantify and confirm protein levels.    VACCINATIONS:   Educate on shingles risk for those who have had chickenpox and importance of vaccination.   Ordered pneumonia vaccine.    Return in 6 months for follow-up.      __________________________    Farhana Whittington MD, PharmD  [facility]- Internal Medicine  American Board of Obesity Medicine diplomate  Office [phone]        Over 50% of 40 minute visit was spent reviewing medical records, education and discussion of patient's medical conditions and medical management.

## 2025-06-30 PROBLEM — E78.5 HYPERLIPIDEMIA DUE TO TYPE 2 DIABETES MELLITUS: Status: ACTIVE | Noted: 2025-06-30

## 2025-06-30 PROBLEM — E11.69 HYPERLIPIDEMIA DUE TO TYPE 2 DIABETES MELLITUS: Status: ACTIVE | Noted: 2025-06-30

## 2025-06-30 PROBLEM — E11.59 HYPERTENSION ASSOCIATED WITH DIABETES: Status: ACTIVE | Noted: 2017-01-19

## 2025-06-30 PROBLEM — I15.2 HYPERTENSION ASSOCIATED WITH DIABETES: Status: ACTIVE | Noted: 2017-01-19

## 2025-07-07 ENCOUNTER — PATIENT MESSAGE (OUTPATIENT)
Dept: ADMINISTRATIVE | Facility: CLINIC | Age: 60
End: 2025-07-07
Payer: MEDICARE

## 2025-07-08 ENCOUNTER — OFFICE VISIT (OUTPATIENT)
Dept: HOME HEALTH SERVICES | Facility: CLINIC | Age: 60
End: 2025-07-08
Payer: MEDICARE

## 2025-07-08 DIAGNOSIS — Z00.00 ENCOUNTER FOR MEDICARE ANNUAL WELLNESS EXAM: Primary | ICD-10-CM

## 2025-07-08 DIAGNOSIS — E11.22 CKD STAGE 3 SECONDARY TO DIABETES: ICD-10-CM

## 2025-07-08 DIAGNOSIS — E11.69 HYPERLIPIDEMIA DUE TO TYPE 2 DIABETES MELLITUS: ICD-10-CM

## 2025-07-08 DIAGNOSIS — E11.9 CONTROLLED TYPE 2 DIABETES MELLITUS WITHOUT COMPLICATION, WITHOUT LONG-TERM CURRENT USE OF INSULIN: ICD-10-CM

## 2025-07-08 DIAGNOSIS — E78.5 HYPERLIPIDEMIA DUE TO TYPE 2 DIABETES MELLITUS: ICD-10-CM

## 2025-07-08 DIAGNOSIS — R26.9 ABNORMALITY OF GAIT AND MOBILITY: ICD-10-CM

## 2025-07-08 DIAGNOSIS — E11.59 HYPERTENSION ASSOCIATED WITH DIABETES: ICD-10-CM

## 2025-07-08 DIAGNOSIS — N18.30 CKD STAGE 3 SECONDARY TO DIABETES: ICD-10-CM

## 2025-07-08 DIAGNOSIS — I15.2 HYPERTENSION ASSOCIATED WITH DIABETES: ICD-10-CM

## 2025-07-08 NOTE — PATIENT INSTRUCTIONS
Counseling and Referral of Other Preventative  (Italic type indicates deductible and co-insurance are waived)    Patient Name: Sherie Reyes  Today's Date: 7/8/2025    Health Maintenance       Date Due Completion Date    Shingles Vaccine (1 of 2) Never done ---    Foot Exam 02/02/2022 2/2/2021    Pneumococcal Vaccines (Age 50+) (2 of 2 - PCV) 05/05/2022 5/5/2021    LDCT Lung Screen 12/30/2023 12/30/2022    COVID-19 Vaccine (2 - 2024-25 season) 09/01/2024 8/25/2021    Diabetic Eye Exam 09/06/2025 9/6/2024    Influenza Vaccine (1) 09/01/2025 10/27/2021    Hemoglobin A1c 10/10/2025 4/10/2025    Mammogram 02/11/2026 2/11/2025    Diabetes Urine Screening 04/10/2026 4/10/2025    Lipid Panel 04/10/2026 4/10/2025    Low Dose Statin 06/30/2026 6/30/2025    Colorectal Cancer Screening 08/25/2026 8/25/2016    Cervical Cancer Screening 02/06/2027 2/6/2024    TETANUS VACCINE 06/25/2035 6/25/2025    Override on 10/23/2008: Done    RSV Vaccine (Age 60+ and Pregnant patients) (1 - 1-dose 75+ series) 06/20/2042 ---        No orders of the defined types were placed in this encounter.    The following information is provided to all patients.  This information is to help you find resources for any of the problems found today that may be affecting your health:                  Living healthy guide: www.[facility].[place].gov      Understanding Diabetes: www.diabetes.org      Eating healthy: www.cdc.gov/healthyweight      CDC home safety checklist: www.cdc.gov/steadi/patient.html      Agency on Aging: www.goea.[place].gov      Alcoholics anonymous (AA): www.aa.org      Physical Activity: www.samson.nih.gov/rg7jagq      Tobacco use: www.quitwithusla.org

## 2025-07-08 NOTE — PROGRESS NOTES
The patient location is: [place]  The chief complaint leading to consultation is: awv    Visit type: audiovisual    Face to Face time with patient: 10  25 minutes of total time spent on the encounter, which includes face to face time and non-face to face time preparing to see the patient (eg, review of tests), Obtaining and/or reviewing separately obtained history, Documenting clinical information in the electronic or other health record, Independently interpreting results (not separately reported) and communicating results to the patient/family/caregiver, or Care coordination (not separately reported).         Each patient to whom he or she provides medical services by telemedicine is:  (1) informed of the relationship between the physician and patient and the respective role of any other health care provider with respect to management of the patient; and (2) notified that he or she may decline to receive medical services by telemedicine and may withdraw from such care at any time.    Notes:                         Sherie Reyes presented for an initial Medicare AWV today. The following components were reviewed and updated:    Medical history  Family History  Social history  Allergies and Current Medications  Health Risk Assessment  Health Maintenance  Care Team    **See Completed Assessments for Annual Wellness visit with in the encounter summary    The following assessments were completed:  Depression Screening  Cognitive function Screening  Timed Get Up Test  Whisper Test      Opioid documentation:      Patient does not have a current opioid prescription.          There were no vitals filed for this visit.  There is no height or weight on file to calculate BMI.       Physical Exam  Constitutional:       Appearance: Normal appearance.   Neurological:      Mental Status: She is alert.   Psychiatric:         Attention and Perception: Attention and perception normal.         Mood and Affect: Mood and affect  normal.         Speech: Speech normal.         Behavior: Behavior normal. Behavior is cooperative.         Thought Content: Thought content normal.         Cognition and Memory: Cognition and memory normal.         Judgment: Judgment normal.           Diagnoses and health risks identified today and associated recommendations/orders:  1. Encounter for Medicare annual wellness exam  Stable, followed by provider  - Referral to Enhanced Annual Wellness Visit (eAWV) M+6    2. Abnormality of gait and mobility  Stable, followed by provider, uses cane when needed    3. Hypertension associated with diabetes  Stable, followed by provider, takes losartan    4. Hyperlipidemia due to type 2 diabetes mellitus  Stable, followed by provider, takes atorvastatin    5. CKD stage 3 secondary to diabetes  Stable, followed by provider, take losartan for BP    6. Controlled type 2 diabetes mellitus without complication, without long-term current use of insulin  Stable, followed by provider, controlled with diet      Provided Windy with a 5-10 year written screening schedule and personal prevention plan. Recommendations were developed using the USPSTF age appropriate recommendations. Education, counseling, and referrals were provided as needed.  After Visit Summary printed and given to patient which includes a list of additional screenings\tests needed.    Follow up in about 1 year (around 7/8/2026) for your next annual wellness visit.      Aidan Iqbal, ELAINE  I offered to discuss advanced care planning, including how to pick a person who would make decisions for you if you were unable to make them for yourself, called a health care power of , and what kind of decisions you might make such as use of life sustaining treatments such as ventilators and tube feeding when faced with a life limiting illness recorded on a living will that they will need to know. (How you want to be cared for as you near the end of your natural life)     X  Patient is interested in learning more about how to make advanced directives.  I provided them paperwork and offered to discuss this with them.

## 2025-07-10 ENCOUNTER — TELEPHONE (OUTPATIENT)
Dept: PAIN MEDICINE | Facility: CLINIC | Age: 60
End: 2025-07-10
Payer: MEDICARE

## 2025-07-10 NOTE — TELEPHONE ENCOUNTER
Staff attempted to contact the patient to inform that her upcoming appointment will need to be canceled and rescheduled due to the provider being out of the office. A brief voicemail was left, and a message was also sent via the patient portal.

## 2025-07-17 ENCOUNTER — OFFICE VISIT (OUTPATIENT)
Dept: PAIN MEDICINE | Facility: CLINIC | Age: 60
End: 2025-07-17
Attending: ANESTHESIOLOGY
Payer: MEDICARE

## 2025-07-17 ENCOUNTER — TELEPHONE (OUTPATIENT)
Dept: PAIN MEDICINE | Facility: CLINIC | Age: 60
End: 2025-07-17

## 2025-07-17 DIAGNOSIS — G89.29 CHRONIC HIP PAIN, UNSPECIFIED LATERALITY: ICD-10-CM

## 2025-07-17 DIAGNOSIS — M25.559 CHRONIC HIP PAIN, UNSPECIFIED LATERALITY: ICD-10-CM

## 2025-07-17 DIAGNOSIS — G89.29 CHRONIC LEFT SHOULDER PAIN: ICD-10-CM

## 2025-07-17 DIAGNOSIS — M25.512 CHRONIC LEFT SHOULDER PAIN: ICD-10-CM

## 2025-07-17 DIAGNOSIS — M51.360 DEGENERATION OF INTERVERTEBRAL DISC OF LUMBAR REGION WITH DISCOGENIC BACK PAIN: Primary | ICD-10-CM

## 2025-07-17 DIAGNOSIS — M48.061 SPINAL STENOSIS OF LUMBAR REGION WITHOUT NEUROGENIC CLAUDICATION: ICD-10-CM

## 2025-07-17 DIAGNOSIS — M47.816 LUMBAR SPONDYLOSIS: ICD-10-CM

## 2025-07-17 DIAGNOSIS — M70.71 ISCHIAL BURSITIS OF RIGHT SIDE: ICD-10-CM

## 2025-07-17 DIAGNOSIS — M75.122 COMPLETE TEAR OF LEFT ROTATOR CUFF, UNSPECIFIED WHETHER TRAUMATIC: ICD-10-CM

## 2025-07-17 DIAGNOSIS — Z98.890 HISTORY OF ROTATOR CUFF SURGERY: ICD-10-CM

## 2025-07-17 DIAGNOSIS — M62.838 MUSCLE SPASM: ICD-10-CM

## 2025-07-17 DIAGNOSIS — M19.019 OSTEOARTHRITIS OF GLENOHUMERAL JOINT, UNSPECIFIED LATERALITY: ICD-10-CM

## 2025-07-17 PROCEDURE — 1159F MED LIST DOCD IN RCRD: CPT | Mod: CPTII,HCNC,95,

## 2025-07-17 PROCEDURE — 3072F LOW RISK FOR RETINOPATHY: CPT | Mod: CPTII,HCNC,95,

## 2025-07-17 PROCEDURE — 3061F NEG MICROALBUMINURIA REV: CPT | Mod: CPTII,HCNC,95,

## 2025-07-17 PROCEDURE — 1160F RVW MEDS BY RX/DR IN RCRD: CPT | Mod: CPTII,HCNC,95,

## 2025-07-17 PROCEDURE — 4010F ACE/ARB THERAPY RXD/TAKEN: CPT | Mod: CPTII,HCNC,95,

## 2025-07-17 PROCEDURE — 3066F NEPHROPATHY DOC TX: CPT | Mod: CPTII,HCNC,95,

## 2025-07-17 PROCEDURE — 98007 SYNCH AUDIO-VIDEO EST HI 40: CPT | Mod: HCNC,95,,

## 2025-07-17 PROCEDURE — 3044F HG A1C LEVEL LT 7.0%: CPT | Mod: CPTII,HCNC,95,

## 2025-07-17 RX ORDER — LIDOCAINE 50 MG/G
1 PATCH TOPICAL DAILY
Qty: 30 PATCH | Refills: 2 | Status: SHIPPED | OUTPATIENT
Start: 2025-07-17

## 2025-07-17 RX ORDER — IBUPROFEN 800 MG/1
800 TABLET, FILM COATED ORAL EVERY 12 HOURS PRN
Qty: 40 TABLET | Refills: 0 | Status: SHIPPED | OUTPATIENT
Start: 2025-07-17

## 2025-07-17 RX ORDER — CYCLOBENZAPRINE HCL 10 MG
10 TABLET ORAL 2 TIMES DAILY PRN
Qty: 60 TABLET | Refills: 1 | Status: SHIPPED | OUTPATIENT
Start: 2025-07-17

## 2025-07-17 NOTE — PROGRESS NOTES
Interventional Pain Management - Established Visit  Virtual     The patient location is: [place]  The chief complaint leading to consultation is:left shoulder pain     Visit type: audiovisual     Face to Face time with patient: 25  30 minutes of total time spent on the encounter, which includes face to face time and non-face to face time preparing to see the patient (eg, review of tests), Obtaining and/or reviewing separately obtained history, Documenting clinical information in the electronic or other health record, Independently interpreting results (not separately reported) and communicating results to the patient/family/caregiver, or Care coordination (not separately reported).            Each patient to whom he or she provides medical services by telemedicine is:  (1) informed of the relationship between the physician and patient and the respective role of any other health care provider with respect to management of the patient; and (2) notified that he or she may decline to receive medical services by telemedicine and may withdraw from such care at any time.     Notes:         Referring Physician: No ref. provider found    Chief Complaint:   No chief complaint on file.       SUBJECTIVE:    Interval History 7/17/2025 (VIRTUAL):  Sherie Reyes returns for follow-up of left shoulder pain. She reports 70% relief for 6 months from last left shoulder cooled RFA. She would like to repeat this at this time. She also states she has been having some right hip and neck pain over the last couple of months. She has not been compliant with her home exercise program from prior neck and back PT. She states she had done aquatic therapy in the past with benefit. She is requesting updated pain medications. She states she has taken several muscle relaxants and prefers Flexeril. She also takes ibuprofen. She has taken tramadol in the past with benefit from Dr Kapadia. She denies recent health changes. She denies recent  falls or trauma. She denies new onset fever/night sweats, urinary incontinence, bowel incontinence, significant weight changes, significant motor weakness or changes, or loss of sensations. Her pain today is 8/10.      Interval History 2/13/2025:  Sherie Reyes returns for follow-up of left shoulder pain. She had left shoulder cooled RFA on 11/11/2024. She reports 70% relief that is on-going. She states this is the best she has felt in over 1 year. She continues HEP previously learned in PT. She is able to move the shoulder more since the procedure. She takes ibuprofen or toradol and flexeril as needed with good relief. She denies any perceived side effects. she denies recent health changes. She denies recent falls or trauma. She denies new onset fever/night sweats, urinary incontinence, bowel incontinence, significant weight changes, significant motor weakness or changes, or loss of sensations. Her left shoulder pain today is 4-5/10.      Interval History 8/29/2024:  Sherie Reyes returns to clinic for delayed follow-up. She states she had left rotator cuff repair surgery with Dr Hughes on 3/19/2024. She has continued limited range of motion and pain since the surgery. She completed 10 weeks of physical therapy without help. She also complains of chronic back and neck pain. She states her biggest source of pain is her left shoulder. She continues to see Dr Kaufman and is on flexeril, ibuprofen, chlorzoxone, and toradol with some relief. She denies any perceived side effects. She denies recent falls or trauma. She denies new onset fever/night sweats, urinary incontinence, bowel incontinence, significant weight changes, significant motor weakness or changes, or loss of sensations. Her pain today is 8/10.     Original HPI 7/19/2023:  Sherie Reyes presents to the clinic for the evaluation of ischial pain. Referred by [facility] by Dr. Freddy Kapadia. 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 Symptoms have been worsening.The pain is located in the right buttock area and radiates to the right hip.  The pain is described as stabbing and is rated as 8/10. The pain is rated with a score of  7/10 on the BEST day and a score of 10/10 on the WORST day.  Symptoms interfere with daily activity and work. The pain is exacerbated by Sitting and Walking.  The pain is mitigated by laying down and rest. The patient reports 6 hours of uninterrupted sleep per night.    Patient denies night fever/night sweats, urinary incontinence, bowel incontinence, significant motor weakness, and loss of sensations. Patient lost 50lbs in past 9 month, PCP is working her up for this.     Physical Therapy/Home Exercise: yes, 4/2024-5/2024 (8 weeks) and continues HEP most days    Pain Disability Index Review:      8/29/2024     3:06 PM 7/19/2023     2:30 PM   Last 3 PDI Scores   Pain Disability Index (PDI) 70 45       Pain Medications:  Parafon Forte 500mg   Klonopin 1mg BID  Flexeril - no longer taking  Voltaren gel 1% qhs  Mobic 15mg not taking   Tizanidine 4mg - not taking  Venlafaxine 225mg qam  Tramadol from Dr Kaufman        report:  Reviewed and consistent with medication use as prescribed.    Pain Procedures:   [facility] by Dr. Fredyd Kapadia.   9/18/2024 - right ischial bursa injection   11/11/2024 - Left shoulder cooled RFA - 70% relief x 6 months    Imaging:     XR SHOULDER COMPLETE 2 OR MORE VIEWS LEFT     CLINICAL HISTORY:  Other specified postprocedural states     TECHNIQUE:  Two or three views of the left shoulder were performed.     COMPARISON:  Plain film from 09/28/2023     FINDINGS:  No evidence of acute fracture or dislocation.  Acromioclavicular joint demonstrates no significant arthrosis.  No soft tissue abnormality.     Impression:     As above.        Electronically signed by:Abisai Haddad MD  Date:                                             05/14/2024  Time:                                           14:56    EXAMINATION:  MRI LUMBAR SPINE WITHOUT CONTRAST     CLINICAL HISTORY:  Back pain or radiculopathy, > 6 wks; Dorsalgia, unspecified     TECHNIQUE:  Multiplanar, multisequence MR images were acquired from the thoracolumbar junction to the sacrum without the administration of contrast.     COMPARISON:  Lumbar radiograph 07/20/2021.     CT lumbar spine 08/06/2021.     FINDINGS:  Alignment: Normal.     Vertebrae: 5 lumbar-type vertebral bodies. No aggressive marrow replacement process or fracture.     Discs: Multilevel degenerative changes, described in detail below.     Cord: Conus appears unremarkable and terminates at L1.  Cauda equina nerve roots are unremarkable.     Paraspinal soft tissues are unremarkable.     Significant findings by level:     T12-L1 and L1-L2: Unremarkable.     L2-L3: Disc bulge with superimposed right foraminal extrusion.  Mild right facet arthropathy.  No canal stenosis.  Mild right foraminal stenosis.  Disc material abuts the exiting right L2 nerve root.     L3-L4: Disc bulge.  Bilateral facet arthropathy and ligamentum flavum thickening, left greater than right. Mild canal stenosis with narrowing of the left subarticular zone and abutment of the descending left L4 nerve root.  Mild left foraminal stenosis.     L4-L5: Disc bulge.  Bilateral facet arthropathy.  No canal stenosis.  Mild bilateral foraminal stenosis.     L5-S1: Bilateral facet arthropathy.  No spinal canal or foraminal stenosis.     Impression:     Multilevel degenerative changes as described, noting right foraminal extrusion at L2-3 with abutment of the exiting right L2 nerve root, and disc bulging and facet arthropathy at L3-4 with abutment of the descending left L4 nerve root.     Electronically signed by resident: Jovan Powell  Date:                                            08/06/2021  Time:                                            11:24     Electronically signed by: Charles Mccray  Date:                                            08/06/2021  Time:                                           15:12    MRI CERVICAL SPINE WITHOUT CONTRAST     CLINICAL HISTORY:  Neck pain, abnormal neuro exam; Cervicalgia     TECHNIQUE:  Multiplanar, multisequence MR images of the cervical spine were performed without the administration of contrast.     COMPARISON:  12/23/2020.     FINDINGS:  C1-C2: Dens is intact.  Pre dens space is maintained.     Alignment: Alignment is maintained.  Straightening of lordosis noted.     Vertebrae: Normal marrow signal. No fracture.     Discs: Mild disc height loss at C5-C7.  No evidence for discitis.     Cord: Normal.     Skull base and craniocervical junction: Normal.     Degenerative findings:     C2-C3: No spinal canal stenosis or neural foraminal narrowing.     C3-C4: Posterior disc osteophyte complex with uncovertebral and facet arthrosis result in mild spinal canal stenosis with minimal effacement of the left neural foramen.     C4-C5: No spinal canal stenosis or neural foraminal narrowing.     C5-C6: Posterior disc osteophyte complex with uncovertebral and facet arthrosis result in moderate spinal canal stenosis and moderate right, mild left neural foraminal narrowing.     C6-C7: Posterior disc osteophyte complex with uncovertebral and facet arthrosis result in mild spinal canal stenosis and mild bilateral neural foraminal narrowing.     C7-T1: No spinal canal stenosis or neural foraminal narrowing.     Paraspinal muscles & soft tissues: Unremarkable.     Impression:     1. Degenerative changes of the cervical spine detailed above.  Moderate spinal canal stenosis noted at C5-C6.  Mild-to-moderate neural foraminal narrowing noted at C5-C7.        Electronically signed by: Enoch Rojas MD  Date:                                            08/06/2021  Time:                                           11:36    Past  Medical History:   Diagnosis Date    Anxiety     Degenerative joint disease (DJD) of hip     GERD (gastroesophageal reflux disease)     Hyperlipidemia     Hypertension     IBS (irritable bowel syndrome)     Insomnia     Lumbar disc herniation     PTSD (post-traumatic stress disorder)      Past Surgical History:   Procedure Laterality Date    ADENOIDECTOMY      ARTHROSCOPIC DEBRIDEMENT OF ROTATOR CUFF Right 2020    Procedure: DEBRIDEMENT, ROTATOR CUFF, ARTHROSCOPIC;  Surgeon: Melchor Hughes Jr., MD;  Location: [facility] OR;  Service: Orthopedics;  Laterality: Right;  need opus system (Ger MESA notified , )  video, notified/confirmed 2020 KB 1310    ARTHROSCOPIC EXCISION OF ACROMIOCLAVICULAR JOINT  2020    Procedure: EXCISION, ACROMIOCLAVICULAR JOINT, ARTHROSCOPIC;  Surgeon: Melchor Hughes Jr., MD;  Location: [facility] OR;  Service: Orthopedics;;    ARTHROSCOPIC REPAIR OF ROTATOR CUFF OF SHOULDER      ARTHROSCOPIC REPAIR OF ROTATOR CUFF OF SHOULDER Left 2024    Procedure: REPAIR, ROTATOR CUFF, ARTHROSCOPIC;  Surgeon: Melchor Hughes Jr., MD;  Location: [facility] OR;  Service: Orthopedics;  Laterality: Left;  need opus system and regeneten patch    ARTHROSCOPY OF SHOULDER WITH DECOMPRESSION OF SUBACROMIAL SPACE  2020    Procedure: ARTHROSCOPY, SHOULDER, WITH SUBACROMIAL SPACE DECOMPRESSION;  Surgeon: Melchor Hughes Jr., MD;  Location: [facility] OR;  Service: Orthopedics;;    BREAST BIOPSY Right 2024     SECTION      DECOMPRESSION OF SUBACROMIAL SPACE Left 2024    Procedure: DECOMPRESSION, SUBACROMIAL SPACE;  Surgeon: Melchor Hughes Jr., MD;  Location: [facility] OR;  Service: Orthopedics;  Laterality: Left;    EPIDURAL STEROID INJECTION INTO CERVICAL SPINE N/A 2021    Procedure: Injection-steroid-epidural-cervical--C7-T1 IL ZAYNAB;  Surgeon: Gregoria Rodriguez MD;  Location: [facility] PAIN MGT;  Service: Pain Management;  Laterality: N/A;    ESOPHAGOGASTRODUODENOSCOPY N/A  2023    Procedure: ESOPHAGOGASTRODUODENOSCOPY (EGD);  Surgeon: Tommy Schmitt MD;  Location: [facility] ENDO (4TH FLR);  Service: Endoscopy;  Laterality: N/A;  referral [facility] HENRY-instr portal-GT    INJECTION OF ANESTHETIC AGENT AROUND NERVE Left 2024    Procedure: BLOCK, NERVE LEFT SHOULDER DIAGNOSTIC *REP CONFIRMED*;  Surgeon: Yesica Monroy MD;  Location: [facility];  Service: Pain Management;  Laterality: Left;  [phone]    INJECTION OF JOINT Right 2023    Procedure: INJECTION, JOINT, RIGHT ISCHIAL BURSA  SOONER DATE;  Surgeon: Yesica Monroy MD;  [facility]: [facility];  Service: Pain Management;  Laterality: Right;    RADIOFREQUENCY ABLATION Left 2024    Procedure: RADIOFREQUENCY ABLATION LEFT SHOULDER COOLED *REP CONFIRMED*;  Surgeon: Yesica Monroy MD;  Location: [facility];  Service: Pain Management;  Laterality: Left;  [phone]  6 WK F/U SHALINI    TENOTOMY, ELBOW, WITH DEBRIDEMENT AND TENDON REPAIR Right 2022    Procedure: TENOTOMY,ELBOW,WITH DEBRIDEMENT AND TENDON REPAIR;  Surgeon: Melchor Hughes Jr., MD;  Location: [facility];  Service: Orthopedics;  Laterality: Right;    TONSILLECTOMY       Social History     Socioeconomic History    Marital status:    Tobacco Use    Smoking status: Former     Current packs/day: 0.00     Average packs/day: 0.5 packs/day for 47.0 years (23.5 ttl pk-yrs)     Types: Vaping with nicotine, Cigarettes     Start date: 1985     Quit date: 2014     Years since quittin.1    Smokeless tobacco: Former     Quit date: 10/20/2014    Tobacco comments:     Vaping 30 ml last 5 or 6 weeks - 3% nicotine    Substance and Sexual Activity    Alcohol use: Not Currently     Alcohol/week: 1.0 standard drink of alcohol     Types: 1 Cans of beer per week     Comment: Maybe 1 or 2 a month    Drug use: Yes     Types: Marijuana    Sexual activity: Yes     Partners: Male     Birth control/protection: OCP     Social Drivers of Health      Financial Resource Strain: High Risk (7/2/2024)    Overall Financial Resource Strain (CARDIA)     Difficulty of Paying Living Expenses: Very hard   Food Insecurity: Food Insecurity Present (7/2/2024)    Hunger Vital Sign     Worried About Running Out of Food in the Last Year: Often true     Ran Out of Food in the Last Year: Often true   Transportation Needs: No Transportation Needs (3/4/2024)    PRAPARE - Transportation     Lack of Transportation (Medical): No     Lack of Transportation (Non-Medical): No   Physical Activity: Inactive (7/2/2024)    Exercise Vital Sign     Days of Exercise per Week: 0 days     Minutes of Exercise per Session: 0 min   Stress: Stress Concern Present (7/2/2024)    [employer] - Occupational Stress Questionnaire     Feeling of Stress : Very much   Housing Stability: High Risk (3/4/2024)    Housing Stability Vital Sign     Unable to Pay for Housing in the Last Year: Yes     Number of Places Lived in the Last Year: 1     Unstable Housing in the Last Year: No     Family History   Problem Relation Name Age of Onset    Allergies Mother Anali Parta     Hypertension Mother Anali Parta     Hypothyroidism Mother Anali Parta     Arthritis Mother Anali Parta     Depression Mother Anali Parta     Heart disease Mother Anali Parta     Hyperlipidemia Mother Anali Parta     Hypertension Father Dl Tommy Parta     Stroke Father Dl Sanders Parta 46    Diabetes Father Dl Tommy Parta     Heart disease Father Dl Tommy Parta     Hyperlipidemia Father Dl Tommy Parta     Depression Sister Renuka Linton     Breast cancer Paternal Aunt      Cancer Paternal Aunt Alysia Parta         Bilat breast Cancer    Heart disease Paternal Aunt Alysia Parta     Cancer Maternal Grandmother Zulay Abbasi         Lung w/ Mets    COPD Maternal Grandmother Zulay Abbasi     Depression Maternal Grandmother Zulay Abbasi     Early death Maternal Grandmother Zulay Abbasi      Heart disease Maternal Grandmother Zulay Lachney     Hyperlipidemia Maternal Grandmother Zulay Lachney     Hypertension Maternal Grandmother Zulay Lachney     Cancer Maternal Grandfather Rush Lachney         Stomach Ca    Depression Maternal Grandfather Rush Lachney     Early death Maternal Grandfather Rush Lachney     Heart disease Maternal Grandfather Rush Lachney     Hyperlipidemia Maternal Grandfather Rush Lachney     Hypertension Maternal Grandfather Floyd Lachney     Mental illness Maternal Grandfather Rush Lachney     Vision loss Maternal Grandfather Rush Lachney         Loss during yhe War.    Arthritis Paternal Grandmother Omayra Parta     Depression Paternal Grandmother Omayra Parta     Heart disease Paternal Grandmother Omayra Parta     Diabetes Paternal Grandfather Dl Parta Jr.     Early death Paternal Grandfather Dl Parta Jr.     Heart disease Paternal Grandfather Dl Parta Jr.     Hyperlipidemia Paternal Grandfather Dl Parta Jr.     Hypertension Paternal Grandfather Dl Parta Jr.     Kidney disease Paternal Grandfather Dl Parta Jr.     Depression Daughter Anushka Reyes     Depression Son Adan Reyes     Learning disabilities Son Adan Reyes        Review of patient's allergies indicates:  No Known Allergies    Current Outpatient Medications   Medication Sig    albuterol (PROVENTIL/VENTOLIN HFA) 90 mcg/actuation inhaler Inhale 2 puffs into the lungs every 6 (six) hours as needed for Wheezing. Rescue    atorvastatin (LIPITOR) 40 MG tablet TAKE 1 TABLET ONE TIME DAILY    azelastine (ASTELIN) 137 mcg (0.1 %) nasal spray 1 spray (137 mcg total) by Nasal route 2 (two) times daily.    cetirizine (ZYRTEC) 10 MG tablet Take 2 tablets (20 mg total) by mouth every morning.    clonazePAM (KLONOPIN) 1 MG tablet TAKE 1 TABLET BY MOUTH TWICE DAILY    cyanocobalamin 1,000 mcg/mL injection Inject 1 mL (1,000 mcg total) into the muscle every 30 days.    cyclobenzaprine  (FLEXERIL) 10 MG tablet Take 1 tablet (10 mg total) by mouth 2 (two) times daily as needed for Muscle spasms.    diclofenac sodium (VOLTAREN) 1 % Gel Apply 2 g topically 4 (four) times daily.    dicyclomine (BENTYL) 10 MG capsule Take 1 capsule (10 mg total) by mouth 4 (four) times daily as needed (abdominal pain).    estradioL (ESTRACE) 1 MG tablet Take 1 mg by mouth once daily.    ibuprofen (IBU) 800 MG tablet Take 1 tablet (800 mg total) by mouth every 12 (twelve) hours as needed for Pain.    LIDOcaine (LIDODERM) 5 % Place 1 patch onto the skin once daily. Remove & Discard patch within 12 hours or as directed by MD    losartan (COZAAR) 25 MG tablet TAKE 1 TABLET EVERY DAY    medroxyPROGESTERone (PROVERA) 2.5 MG tablet Take 2.5 mg by mouth.    melatonin 5 mg Chew Take 10 mg by mouth daily as needed.    mupirocin (BACTROBAN) 2 % ointment Apply topically 2 (two) times daily. Apply to affected area    mv-mn/iron/folic acid/herb 190 (VITAMIN D3 COMPLETE ORAL) Take by mouth once daily at 6am.    omega-3 fatty acids/fish oil (FISH OIL-OMEGA-3 FATTY ACIDS) 300-1,000 mg capsule Take 1 capsule by mouth once daily.    omeprazole (PRILOSEC) 40 MG capsule Take 1 capsule (40 mg total) by mouth once daily.    QUEtiapine (SEROQUEL) 200 MG Tab Take 1 tablet (200 mg total) by mouth every evening.    traZODone (DESYREL) 100 MG tablet TK 1 T PO  QHS    venlafaxine (EFFEXOR-XR) 150 MG Cp24 Take 150 mg by mouth once daily.    venlafaxine (EFFEXOR-XR) 75 MG 24 hr capsule TAKE ONE CAPSULE BY MOUTH DAILY WITH 150 MG TO EQUAL 225 MG     No current facility-administered medications for this visit.       REVIEW OF SYSTEMS:    GENERAL:  No weight loss, malaise or fevers.  HEENT:  Negative for frequent or significant headaches.  NECK:  Negative for lumps, goiter, pain and significant neck swelling.  RESPIRATORY:  Negative for cough, wheezing or shortness of breath.  CARDIOVASCULAR:  Negative for chest pain, leg swelling or palpitations.  GI:   Negative for abdominal discomfort, blood in stools or black stools or change in bowel habits.  MUSCULOSKELETAL:  See HPI.  SKIN:  Negative for lesions, rash, and itching.  PSYCH:  Negative for sleep disturbance, mood disorder and recent psychosocial stressors.  HEMATOLOGY/LYMPHOLOGY:  Negative for prolonged bleeding, bruising easily or swollen nodes.  NEURO:   No history of headaches, syncope, paralysis, seizures or tremors.  All other reviewed and negative other than HPI.    OBJECTIVE:    There were no vitals taken for this visit.    VIRTUAL PHYSICAL EXAMINATION:    GENERAL APPEARANCE: Well appearing, in no acute distress.  PSYCH:  Mood and affect appropriate.  SKIN: Skin color, texture, turgor normal, no rashes or lesions to visible areas.  HEAD/FACE:  Normocephalic, atraumatic.  PULM: Bilateral chest rise. No apparent respiratory distress.      Prior PHYSICAL EXAMINATION:    General appearance: Well appearing, in no acute distress, alert and oriented x3.  Psych:  Mood and affect appropriate.  Skin: Skin color, texture, turgor normal, no rashes or lesions, in both upper and lower body.  Head/face:  Normocephalic, atraumatic. No palpable lymph nodes.  Neck: No pain to palpation over the cervical paraspinous muscles. Spurling Negative. No pain with neck flexion, extension, or lateral flexion.   Cor: Rate regular.  Pulm: breathing is even and unlabored, equal chest rise  GI:  non-distended  Back: Straight leg raising in the sitting and supine positions is negative to radicular pain. No pain to palpation over the spine or costovertebral angles. Pain with flexion > extension, ROM limited due to pain  Extremities: No deformities, edema, or skin discoloration. Good capillary refill.  Musculoskeletal: Left shoulder: pain about the AC joint, +full can, +empty can, +[place], +Neers, limited ROM in all planes worse with internal rotation.  Bilateral upper and lower extremity strength is normal and symmetric.  No atrophy or  tone abnormalities are noted.  Neuro: Bilateral upper and lower extremity coordination and muscle stretch reflexes are physiologic and symmetric.  Plantar response are downgoing. No loss of sensation is noted.  Gait: normal.        ASSESSMENT: 60 y.o. year old female with buttock pain, consistent with      1. Degeneration of intervertebral disc of lumbar region with discogenic back pain        2. Spinal stenosis of lumbar region without neurogenic claudication  Ambulatory Referral/Consult to Physical Therapy      3. Lumbar spondylosis        4. Ischial bursitis of right side  Ambulatory Referral/Consult to Physical Therapy      5. Chronic hip pain, unspecified laterality  Ambulatory Referral/Consult to Physical Therapy      6. Muscle spasm  cyclobenzaprine (FLEXERIL) 10 MG tablet      7. Osteoarthritis of glenohumeral joint, unspecified laterality  LIDOcaine (LIDODERM) 5 %      8. Chronic left shoulder pain  ibuprofen (IBU) 800 MG tablet    Procedure Order to Pain Management      9. Complete tear of left rotator cuff, unspecified whether traumatic  ibuprofen (IBU) 800 MG tablet      10. History of rotator cuff surgery  Procedure Order to Pain Management          PLAN:   We discussed with the patient the assessment and recommendations. The following is the plan we agreed on:   - Previous imaging was reviewed and discussed with the patient today.   - I have stressed the importance of physical activity and a home exercise plan to help with pain and improve health.  - Counseled patient regarding the importance of activity modification and physical therapy.  - Referral to Aquatic Therapy for neck and back pain. If waitlist at [facility], referral to be sent for  Aquatic Therapy.    - Patient can continue with medications for now since they are providing benefits, using them appropriately, and without side effects.  - Refilled ibuprofen 800 mg BID PRN, #40, R0. eGFR and Cr reviewed and appropriate. Discussed effect on  kidneys with longterm use.  - Refilled Flexeril 10 mg BID PRN muscle spasms  - Prescribed Lidocaine 5% patches for pain.   - Discussed with patient that I do not believe opiates would be of benefit to her. I discussed with patient that [facility] providers offer interventional procedure such as epidurals, nerve blocks, and nerve ablations to treat chronic pain. Patients will be presented with a multi-modal treatment plan that may or may not include imaging, interventional procedures, therapy, pain creams, non-narcotic pain medications used for treatment of chronic pain. I further explained that I do not recommend chronic opioid medications for low back pain or neck pain as they have not be shown to be superior to nonopioid treatments and can lead to worse outcomes in the long term.     - She is s/p Left shoulder cooled RFA with 70% relief x 6 months. Procedure order placed for repeat.     - RTC 4-6 weeks after above procedure for in-office evaluation of neck and back after PT.     The above plan and management options were discussed at length with patient. Patient is in agreement with the above and verbalized understanding.     Estefany Kulkarni NP  07/17/2025      I spent a total of 40 minutes on the day of the visit.  This includes face to face time and non-face to face time preparing to see the patient by reviewing previous labs/imaging, obtaining and/or reviewing separately obtained history, documenting clinical information in the electronic or other health record, independently interpreting results and communicating results to the patient/family/caregiver.

## 2025-07-18 ENCOUNTER — PATIENT MESSAGE (OUTPATIENT)
Dept: PAIN MEDICINE | Facility: CLINIC | Age: 60
End: 2025-07-18
Payer: MEDICARE

## 2025-07-18 DIAGNOSIS — G89.29 CHRONIC LEFT SHOULDER PAIN: Primary | ICD-10-CM

## 2025-07-18 DIAGNOSIS — M25.512 CHRONIC LEFT SHOULDER PAIN: Primary | ICD-10-CM

## 2025-07-19 ENCOUNTER — PATIENT MESSAGE (OUTPATIENT)
Dept: ADMINISTRATIVE | Facility: OTHER | Age: 60
End: 2025-07-19
Payer: MEDICARE

## 2025-08-02 ENCOUNTER — PATIENT MESSAGE (OUTPATIENT)
Dept: ADMINISTRATIVE | Facility: OTHER | Age: 60
End: 2025-08-02
Payer: MEDICARE

## 2025-08-02 ENCOUNTER — PATIENT MESSAGE (OUTPATIENT)
Dept: ADMINISTRATIVE | Facility: HOSPITAL | Age: 60
End: 2025-08-02
Payer: MEDICARE

## 2025-08-08 ENCOUNTER — PATIENT MESSAGE (OUTPATIENT)
Dept: PAIN MEDICINE | Facility: OTHER | Age: 60
End: 2025-08-08
Payer: MEDICARE

## 2025-08-09 ENCOUNTER — PATIENT MESSAGE (OUTPATIENT)
Dept: PAIN MEDICINE | Facility: OTHER | Age: 60
End: 2025-08-09
Payer: MEDICARE

## 2025-08-11 ENCOUNTER — PATIENT MESSAGE (OUTPATIENT)
Dept: PAIN MEDICINE | Facility: OTHER | Age: 60
End: 2025-08-11
Payer: MEDICARE

## 2025-08-13 ENCOUNTER — PATIENT MESSAGE (OUTPATIENT)
Dept: PAIN MEDICINE | Facility: OTHER | Age: 60
End: 2025-08-13
Payer: MEDICARE

## 2025-08-15 ENCOUNTER — HOSPITAL ENCOUNTER (OUTPATIENT)
Facility: OTHER | Age: 60
Discharge: HOME | End: 2025-08-15
Attending: ANESTHESIOLOGY | Admitting: ANESTHESIOLOGY
Payer: MEDICARE

## 2025-08-15 VITALS
SYSTOLIC BLOOD PRESSURE: 126 MMHG | DIASTOLIC BLOOD PRESSURE: 58 MMHG | HEIGHT: 62 IN | RESPIRATION RATE: 16 BRPM | WEIGHT: 130 LBS | HEART RATE: 81 BPM | TEMPERATURE: 98 F | BODY MASS INDEX: 23.92 KG/M2 | OXYGEN SATURATION: 97 %

## 2025-08-15 DIAGNOSIS — M51.16 RADICULOPATHY DUE TO LUMBAR INTERVERTEBRAL DISC DISORDER: Primary | ICD-10-CM

## 2025-08-15 DIAGNOSIS — G89.29 CHRONIC PAIN: ICD-10-CM

## 2025-08-15 PROCEDURE — 99153 MOD SED SAME PHYS/QHP EA: CPT | Mod: HCNC | Performed by: ANESTHESIOLOGY

## 2025-08-15 PROCEDURE — 99152 MOD SED SAME PHYS/QHP 5/>YRS: CPT | Mod: HCNC | Performed by: ANESTHESIOLOGY

## 2025-08-15 PROCEDURE — 64640 INJECTION TREATMENT OF NERVE: CPT | Mod: HCNC,LT | Performed by: ANESTHESIOLOGY

## 2025-08-15 PROCEDURE — 99152 MOD SED SAME PHYS/QHP 5/>YRS: CPT | Mod: HCNC,,, | Performed by: ANESTHESIOLOGY

## 2025-08-15 PROCEDURE — 64640 INJECTION TREATMENT OF NERVE: CPT | Mod: HCNC,LT,, | Performed by: ANESTHESIOLOGY

## 2025-08-15 PROCEDURE — A4649 SURGICAL SUPPLIES: HCPCS | Mod: HCNC | Performed by: ANESTHESIOLOGY

## 2025-08-15 PROCEDURE — 63600175 PHARM REV CODE 636 W HCPCS: Mod: HCNC | Performed by: ANESTHESIOLOGY

## 2025-08-15 RX ORDER — FENTANYL CITRATE 50 UG/ML
INJECTION, SOLUTION INTRAMUSCULAR; INTRAVENOUS
Status: DISCONTINUED | OUTPATIENT
Start: 2025-08-15 | End: 2025-08-15 | Stop reason: HOSPADM

## 2025-08-15 RX ORDER — BUPIVACAINE HYDROCHLORIDE 2.5 MG/ML
INJECTION, SOLUTION EPIDURAL; INFILTRATION; INTRACAUDAL; PERINEURAL
Status: DISCONTINUED | OUTPATIENT
Start: 2025-08-15 | End: 2025-08-15 | Stop reason: HOSPADM

## 2025-08-15 RX ORDER — SODIUM CHLORIDE 9 MG/ML
INJECTION, SOLUTION INTRAVENOUS CONTINUOUS
Status: DISCONTINUED | OUTPATIENT
Start: 2025-08-15 | End: 2025-08-15 | Stop reason: HOSPADM

## 2025-08-15 RX ORDER — MIDAZOLAM HYDROCHLORIDE 1 MG/ML
INJECTION INTRAMUSCULAR; INTRAVENOUS
Status: DISCONTINUED | OUTPATIENT
Start: 2025-08-15 | End: 2025-08-15 | Stop reason: HOSPADM

## 2025-08-15 RX ORDER — LIDOCAINE HYDROCHLORIDE 20 MG/ML
INJECTION, SOLUTION INFILTRATION; PERINEURAL
Status: DISCONTINUED | OUTPATIENT
Start: 2025-08-15 | End: 2025-08-15 | Stop reason: HOSPADM

## 2025-08-15 RX ORDER — TRIAMCINOLONE ACETONIDE 40 MG/ML
INJECTION, SUSPENSION INTRA-ARTICULAR; INTRAMUSCULAR
Status: DISCONTINUED | OUTPATIENT
Start: 2025-08-15 | End: 2025-08-15 | Stop reason: HOSPADM

## 2025-08-17 ENCOUNTER — PATIENT MESSAGE (OUTPATIENT)
Dept: PAIN MEDICINE | Facility: OTHER | Age: 60
End: 2025-08-17
Payer: MEDICARE

## 2025-08-31 RX ORDER — ATORVASTATIN CALCIUM 40 MG/1
40 TABLET, FILM COATED ORAL
Qty: 90 TABLET | Refills: 2 | Status: SHIPPED | OUTPATIENT
Start: 2025-08-31

## (undated) DEVICE — DRESSING XEROFORM FOIL PK 1X8

## (undated) DEVICE — CLOSURE SKIN STERI STRIP 1/2X4

## (undated) DEVICE — SEE MEDLINE ITEM 146313

## (undated) DEVICE — ADHESIVE MASTISOL VIAL 48/BX

## (undated) DEVICE — GOWN POLY REINF BRTH SLV LG

## (undated) DEVICE — SLING ARM FASHION NAVY X-LG

## (undated) DEVICE — WAND COBLATION TURBOVAC 90

## (undated) DEVICE — TAPE MEDIPORE 4IN X 2YDS

## (undated) DEVICE — SUT VICRYL 4-0 27 SH

## (undated) DEVICE — GLOVE SURG BIOGEL LATEX SZ 7.5

## (undated) DEVICE — SOL IRR NACL .9% 3000ML

## (undated) DEVICE — SUPPORT SLING SHOT II MEDIUM

## (undated) DEVICE — TOWEL OR DISP STRL BLUE 4/PK

## (undated) DEVICE — DRAPE SHOULDER 160X102IN 10/CA

## (undated) DEVICE — PACK SURGERY START

## (undated) DEVICE — PADDING CAST SPECIALIST 3X4YD

## (undated) DEVICE — SHEET DRAPE MEDIUM

## (undated) DEVICE — Device

## (undated) DEVICE — TAPER AWL BONE 3.8 MM DISP

## (undated) DEVICE — DRAPE STERI-DRAPE 1000 17X11IN

## (undated) DEVICE — SUPPORT ULNA NERVE PROTECTOR

## (undated) DEVICE — MANIFOLD 4 PORT

## (undated) DEVICE — SUT ETHILON 3/0 18IN PS-1

## (undated) DEVICE — NDL SPINAL 18GX3.5 SPINOCAN

## (undated) DEVICE — SEE MEDLINE ITEM 157116

## (undated) DEVICE — COVER OVERHEAD SURG LT BLUE

## (undated) DEVICE — BLADE SCALP OPHTL BEVEL STR

## (undated) DEVICE — SEE MEDLINE ITEM 157117

## (undated) DEVICE — APPLICATOR CHLORAPREP ORN 26ML

## (undated) DEVICE — SLING ARM COMFT NAVY BLU LG

## (undated) DEVICE — PAD CAST SPECIALIST STRL 3

## (undated) DEVICE — SEE MEDLINE ITEM 152622

## (undated) DEVICE — TUBE SET INFLOW/OUTFLOW

## (undated) DEVICE — PAD ABDOMINAL 5X9 STERILE

## (undated) DEVICE — DRESSING XEROFORM NONADH 1X8IN

## (undated) DEVICE — PACK BASIC

## (undated) DEVICE — ALCOHOL 70% ISOP W/GREEN 16OZ

## (undated) DEVICE — COVER PROXIMA MAYO STAND

## (undated) DEVICE — TAPE ADH MEDIPORE 4 X 10YDS

## (undated) DEVICE — GAUZE SPONGE 4X4 12PLY

## (undated) DEVICE — ELECTRODE REM PLYHSV RETURN 9

## (undated) DEVICE — SEE MEDLINE ITEM 156955

## (undated) DEVICE — PAD CAST SPECIALIST STRL 4

## (undated) DEVICE — GOWN POLY REINF BRTH SLV XL

## (undated) DEVICE — BANDAGE ELASTIC 3IN ACE NS

## (undated) DEVICE — BANDAGE ESMARK ELASTIC ST 4X9

## (undated) DEVICE — SEE L#120831

## (undated) DEVICE — SUT VICRYL CTD 2-0 GI 27 SH

## (undated) DEVICE — SPLINT PLASTER FS 5IN X 30IN

## (undated) DEVICE — BANDAGE MATRIX HK LOOP 3IN 5YD

## (undated) DEVICE — DRESSING AQUACEL SACRAL 8 X 7

## (undated) DEVICE — INSTRAMOD SHOULDER TRACTION

## (undated) DEVICE — CARTRIDGE SUTURE SMARTSTITCH

## (undated) DEVICE — SEE MEDLINE ITEM 157173

## (undated) DEVICE — STOCKINET 4INX48

## (undated) DEVICE — PAD PREP 50/CA

## (undated) DEVICE — DRAPE U SPLIT SHEET 54X76IN

## (undated) DEVICE — SEE MEDLINE ITEM 157125

## (undated) DEVICE — BLADE SHAVER 4.5 6/BX

## (undated) DEVICE — PLASTER SPLINT FAST 5INX30IN

## (undated) DEVICE — CLOSURE SKIN STERI STRIP 1/4X3

## (undated) DEVICE — SEE MEDLINE ITEM 146292

## (undated) DEVICE — PAD ABDOMINAL STERILE 5X9IN

## (undated) DEVICE — SPONGE COTTON TRAY 4X4IN

## (undated) DEVICE — DRAPE PLASTIC U 60X72

## (undated) DEVICE — SUT ETHILON 3-0 PS2 18 BLK

## (undated) DEVICE — BLADE SURG #15 CARBON STEEL

## (undated) DEVICE — DRAPE THREE-QTR REINF 53X77IN

## (undated) DEVICE — COVER TABLE HVY DTY 60X90IN

## (undated) DEVICE — TRAY INST ROTATOR CUFF RENTAL

## (undated) DEVICE — SEE MEDLINE ITEM 157131

## (undated) DEVICE — SYR 10CC LUER LOCK

## (undated) DEVICE — SEE MEDLINE ITEM 146420

## (undated) DEVICE — CONN SMARTSTITCH PERFECTPASSER

## (undated) DEVICE — TOURNIQUET SB QC DP 18X4IN

## (undated) DEVICE — DRAPE SHOULDER BEACH CHAIR

## (undated) DEVICE — BANDAGE MATRIX HK LOOP 4IN 5YD

## (undated) DEVICE — TUBING SUC UNIV W/CONN 12FT

## (undated) DEVICE — CONNECTOR TUBING STR 5 IN 1

## (undated) DEVICE — PADDING 4X4YD SPECIALIST100

## (undated) DEVICE — MAT SUCTION PUDDLEVAC ORANGE

## (undated) DEVICE — SUT SMARTSTITCH PERFECTPASS

## (undated) DEVICE — GAUZE SPONGE 4'X4 12 PLY